# Patient Record
Sex: FEMALE | Race: WHITE | Employment: FULL TIME
[De-identification: names, ages, dates, MRNs, and addresses within clinical notes are randomized per-mention and may not be internally consistent; named-entity substitution may affect disease eponyms.]

---

## 2017-01-03 ENCOUNTER — TELEPHONE (OUTPATIENT)
Dept: FAMILY MEDICINE CLINIC | Facility: CLINIC | Age: 52
End: 2017-01-03

## 2017-01-03 DIAGNOSIS — Z00.00 ROUTINE ADULT HEALTH MAINTENANCE: Primary | ICD-10-CM

## 2017-01-03 DIAGNOSIS — Z78.9 PARTICIPANT IN HEALTH AND WELLNESS PLAN: ICD-10-CM

## 2017-01-08 DIAGNOSIS — E78.00 PURE HYPERCHOLESTEROLEMIA: Primary | ICD-10-CM

## 2017-01-08 RX ORDER — PRAVASTATIN SODIUM 20 MG
20 TABLET ORAL DAILY
Qty: 30 TABLET | Refills: 11 | Status: SHIPPED | OUTPATIENT
Start: 2017-01-08 | End: 2017-09-28 | Stop reason: SDUPTHER

## 2017-01-09 DIAGNOSIS — E78.00 PURE HYPERCHOLESTEROLEMIA: ICD-10-CM

## 2017-01-12 ENCOUNTER — OFFICE VISIT (OUTPATIENT)
Dept: FAMILY MEDICINE CLINIC | Facility: CLINIC | Age: 52
End: 2017-01-12

## 2017-01-12 VITALS
HEART RATE: 81 BPM | BODY MASS INDEX: 38.96 KG/M2 | WEIGHT: 227 LBS | SYSTOLIC BLOOD PRESSURE: 140 MMHG | OXYGEN SATURATION: 94 % | DIASTOLIC BLOOD PRESSURE: 94 MMHG

## 2017-01-12 DIAGNOSIS — Z00.00 WELL ADULT EXAM: Primary | ICD-10-CM

## 2017-01-12 DIAGNOSIS — Z12.11 COLON CANCER SCREENING: ICD-10-CM

## 2017-01-12 DIAGNOSIS — M54.50 CHRONIC BILATERAL LOW BACK PAIN WITHOUT SCIATICA: ICD-10-CM

## 2017-01-12 DIAGNOSIS — G89.29 CHRONIC BILATERAL LOW BACK PAIN WITHOUT SCIATICA: ICD-10-CM

## 2017-01-12 PROCEDURE — 99396 PREV VISIT EST AGE 40-64: CPT | Performed by: FAMILY MEDICINE

## 2017-01-12 RX ORDER — TRAMADOL HYDROCHLORIDE 50 MG/1
TABLET ORAL
Qty: 30 TABLET | Refills: 0 | Status: SHIPPED | OUTPATIENT
Start: 2017-01-12 | End: 2017-09-28 | Stop reason: SDUPTHER

## 2017-01-12 RX ORDER — OMEPRAZOLE 40 MG/1
CAPSULE, DELAYED RELEASE ORAL DAILY
Refills: 1 | COMMUNITY
Start: 2017-01-06 | End: 2017-10-16 | Stop reason: SDUPTHER

## 2017-03-09 ENCOUNTER — OFFICE VISIT (OUTPATIENT)
Dept: FAMILY MEDICINE CLINIC | Facility: CLINIC | Age: 52
End: 2017-03-09

## 2017-03-09 VITALS
OXYGEN SATURATION: 96 % | HEART RATE: 81 BPM | WEIGHT: 225.38 LBS | DIASTOLIC BLOOD PRESSURE: 90 MMHG | SYSTOLIC BLOOD PRESSURE: 140 MMHG | BODY MASS INDEX: 38.69 KG/M2

## 2017-03-09 DIAGNOSIS — K21.9 GASTROESOPHAGEAL REFLUX DISEASE WITHOUT ESOPHAGITIS: ICD-10-CM

## 2017-03-09 DIAGNOSIS — R07.9 CHEST PAIN, UNSPECIFIED TYPE: ICD-10-CM

## 2017-03-09 DIAGNOSIS — I10 ESSENTIAL HYPERTENSION: Primary | ICD-10-CM

## 2017-03-09 DIAGNOSIS — E78.00 PURE HYPERCHOLESTEROLEMIA: ICD-10-CM

## 2017-03-09 DIAGNOSIS — R06.09 DOE (DYSPNEA ON EXERTION): ICD-10-CM

## 2017-03-09 PROCEDURE — 99214 OFFICE O/P EST MOD 30 MIN: CPT | Performed by: FAMILY MEDICINE

## 2017-03-09 RX ORDER — ENALAPRIL MALEATE AND HYDROCHLOROTHIAZIDE 10; 25 MG/1; MG/1
1 TABLET ORAL DAILY
Qty: 30 TABLET | Refills: 11 | Status: SHIPPED | OUTPATIENT
Start: 2017-03-09 | End: 2018-06-16 | Stop reason: SDUPTHER

## 2017-06-06 ENCOUNTER — TELEPHONE (OUTPATIENT)
Dept: FAMILY MEDICINE CLINIC | Age: 52
End: 2017-06-06

## 2017-06-13 ENCOUNTER — HOSPITAL ENCOUNTER (OUTPATIENT)
Dept: CARDIAC CATH/INVASIVE PROCEDURES | Age: 52
Discharge: HOME | End: 2017-06-13
Payer: COMMERCIAL

## 2017-06-13 VITALS
WEIGHT: 220 LBS | OXYGEN SATURATION: 94 % | HEIGHT: 64 IN | TEMPERATURE: 98.1 F | RESPIRATION RATE: 18 BRPM | BODY MASS INDEX: 37.56 KG/M2 | DIASTOLIC BLOOD PRESSURE: 70 MMHG | SYSTOLIC BLOOD PRESSURE: 137 MMHG | HEART RATE: 70 BPM

## 2017-06-13 LAB
GFR NON-AFRICAN AMERICAN: >60 ML/MIN
GFR SERPL CREATININE-BSD FRML MDRD: >60 ML/MIN
GFR SERPL CREATININE-BSD FRML MDRD: NORMAL ML/MIN/{1.73_M2}
GLUCOSE BLD-MCNC: 89 MG/DL (ref 74–100)
PLATELET # BLD: 221 K/UL (ref 140–450)
POC CHLORIDE: 109 MMOL/L (ref 98–107)
POC CREATININE: 0.63 MG/DL (ref 0.51–1.19)
POC HEMATOCRIT: 40 % (ref 36–46)
POC HEMOGLOBIN: 13.6 G/DL (ref 12–16)
POC POTASSIUM: 4.2 MMOL/L (ref 3.5–4.5)
POC SODIUM: 145 MMOL/L (ref 138–146)

## 2017-06-13 PROCEDURE — 82565 ASSAY OF CREATININE: CPT

## 2017-06-13 PROCEDURE — 82435 ASSAY OF BLOOD CHLORIDE: CPT

## 2017-06-13 PROCEDURE — 85014 HEMATOCRIT: CPT

## 2017-06-13 PROCEDURE — 82947 ASSAY GLUCOSE BLOOD QUANT: CPT

## 2017-06-13 PROCEDURE — C1769 GUIDE WIRE: HCPCS

## 2017-06-13 PROCEDURE — 7100000010 HC PHASE II RECOVERY - FIRST 15 MIN

## 2017-06-13 PROCEDURE — 93458 L HRT ARTERY/VENTRICLE ANGIO: CPT | Performed by: INTERNAL MEDICINE

## 2017-06-13 PROCEDURE — 2709999900 HC NON-CHARGEABLE SUPPLY

## 2017-06-13 PROCEDURE — 84132 ASSAY OF SERUM POTASSIUM: CPT

## 2017-06-13 PROCEDURE — 7100000011 HC PHASE II RECOVERY - ADDTL 15 MIN

## 2017-06-13 PROCEDURE — C1894 INTRO/SHEATH, NON-LASER: HCPCS

## 2017-06-13 PROCEDURE — C1725 CATH, TRANSLUMIN NON-LASER: HCPCS

## 2017-06-13 PROCEDURE — 85049 AUTOMATED PLATELET COUNT: CPT

## 2017-06-13 PROCEDURE — 2500000003 HC RX 250 WO HCPCS

## 2017-06-13 PROCEDURE — 84295 ASSAY OF SERUM SODIUM: CPT

## 2017-06-13 RX ORDER — ISOSORBIDE MONONITRATE 30 MG/1
15 TABLET, EXTENDED RELEASE ORAL DAILY
COMMUNITY
End: 2018-07-24 | Stop reason: ALTCHOICE

## 2017-06-13 RX ORDER — SODIUM CHLORIDE 9 MG/ML
INJECTION, SOLUTION INTRAVENOUS CONTINUOUS
Status: DISCONTINUED | OUTPATIENT
Start: 2017-06-13 | End: 2017-06-14 | Stop reason: HOSPADM

## 2017-06-13 RX ADMIN — SODIUM CHLORIDE: 9 INJECTION, SOLUTION INTRAVENOUS at 12:59

## 2017-09-28 ENCOUNTER — OFFICE VISIT (OUTPATIENT)
Dept: FAMILY MEDICINE CLINIC | Age: 52
End: 2017-09-28
Payer: COMMERCIAL

## 2017-09-28 VITALS
SYSTOLIC BLOOD PRESSURE: 138 MMHG | HEART RATE: 84 BPM | OXYGEN SATURATION: 98 % | WEIGHT: 215.4 LBS | DIASTOLIC BLOOD PRESSURE: 86 MMHG | HEIGHT: 65 IN | BODY MASS INDEX: 35.89 KG/M2

## 2017-09-28 DIAGNOSIS — G47.33 OSA ON CPAP: ICD-10-CM

## 2017-09-28 DIAGNOSIS — I10 ESSENTIAL HYPERTENSION: ICD-10-CM

## 2017-09-28 DIAGNOSIS — Z99.89 OSA ON CPAP: ICD-10-CM

## 2017-09-28 DIAGNOSIS — E78.00 PURE HYPERCHOLESTEROLEMIA: Primary | ICD-10-CM

## 2017-09-28 PROCEDURE — 99213 OFFICE O/P EST LOW 20 MIN: CPT | Performed by: FAMILY MEDICINE

## 2017-09-28 RX ORDER — OMEPRAZOLE 40 MG/1
CAPSULE, DELAYED RELEASE ORAL
Qty: 15 CAPSULE | Refills: 11 | Status: SHIPPED | OUTPATIENT
Start: 2017-09-28 | End: 2018-06-18 | Stop reason: SDUPTHER

## 2017-09-28 RX ORDER — TRAMADOL HYDROCHLORIDE 50 MG/1
TABLET ORAL
Qty: 30 TABLET | Refills: 0 | Status: SHIPPED | OUTPATIENT
Start: 2017-09-28 | End: 2020-06-10 | Stop reason: ALTCHOICE

## 2017-09-28 RX ORDER — PRAVASTATIN SODIUM 20 MG
20 TABLET ORAL DAILY
Qty: 30 TABLET | Refills: 11 | Status: SHIPPED | OUTPATIENT
Start: 2017-09-28 | End: 2018-06-22 | Stop reason: SDUPTHER

## 2017-09-28 ASSESSMENT — PATIENT HEALTH QUESTIONNAIRE - PHQ9
1. LITTLE INTEREST OR PLEASURE IN DOING THINGS: 0
SUM OF ALL RESPONSES TO PHQ QUESTIONS 1-9: 0
SUM OF ALL RESPONSES TO PHQ9 QUESTIONS 1 & 2: 0
2. FEELING DOWN, DEPRESSED OR HOPELESS: 0

## 2017-10-16 ENCOUNTER — HOSPITAL ENCOUNTER (EMERGENCY)
Age: 52
Discharge: HOME | End: 2017-10-16
Attending: EMERGENCY MEDICINE
Payer: COMMERCIAL

## 2017-10-16 ENCOUNTER — APPOINTMENT (OUTPATIENT)
Dept: GENERAL RADIOLOGY | Age: 52
End: 2017-10-16
Payer: COMMERCIAL

## 2017-10-16 ENCOUNTER — TELEPHONE (OUTPATIENT)
Dept: FAMILY MEDICINE CLINIC | Age: 52
End: 2017-10-16

## 2017-10-16 VITALS
TEMPERATURE: 97.5 F | BODY MASS INDEX: 36.62 KG/M2 | OXYGEN SATURATION: 94 % | SYSTOLIC BLOOD PRESSURE: 127 MMHG | DIASTOLIC BLOOD PRESSURE: 66 MMHG | HEART RATE: 80 BPM | RESPIRATION RATE: 16 BRPM | HEIGHT: 64 IN | WEIGHT: 214.51 LBS

## 2017-10-16 DIAGNOSIS — R07.9 CHEST PAIN, UNSPECIFIED TYPE: Primary | ICD-10-CM

## 2017-10-16 DIAGNOSIS — R51.9 NONINTRACTABLE HEADACHE, UNSPECIFIED CHRONICITY PATTERN, UNSPECIFIED HEADACHE TYPE: ICD-10-CM

## 2017-10-16 LAB
% CKMB: 2.2 % (ref 0–3)
ABSOLUTE EOS #: 0.1 K/UL (ref 0–0.4)
ABSOLUTE LYMPH #: 2.2 K/UL (ref 1–4.8)
ABSOLUTE MONO #: 0.6 K/UL (ref 0.2–0.8)
ANION GAP SERPL CALCULATED.3IONS-SCNC: 14 MMOL/L (ref 9–17)
BASOPHILS # BLD: 1 %
BASOPHILS ABSOLUTE: 0 K/UL (ref 0–0.2)
BUN BLDV-MCNC: 13 MG/DL (ref 6–20)
BUN/CREAT BLD: 18 (ref 9–20)
CALCIUM SERPL-MCNC: 10 MG/DL (ref 8.6–10.4)
CHLORIDE BLD-SCNC: 100 MMOL/L (ref 98–107)
CK MB: 2.1 NG/ML
CKMB INTERPRETATION: NORMAL
CO2: 26 MMOL/L (ref 20–31)
CREAT SERPL-MCNC: 0.72 MG/DL (ref 0.5–0.9)
DIFFERENTIAL TYPE: NORMAL
EKG ATRIAL RATE: 82 BPM
EKG P AXIS: 59 DEGREES
EKG P-R INTERVAL: 148 MS
EKG Q-T INTERVAL: 380 MS
EKG QRS DURATION: 90 MS
EKG QTC CALCULATION (BAZETT): 443 MS
EKG R AXIS: 25 DEGREES
EKG T AXIS: 96 DEGREES
EKG VENTRICULAR RATE: 82 BPM
EOSINOPHILS RELATIVE PERCENT: 2 %
GFR AFRICAN AMERICAN: >60 ML/MIN
GFR NON-AFRICAN AMERICAN: >60 ML/MIN
GFR SERPL CREATININE-BSD FRML MDRD: NORMAL ML/MIN/{1.73_M2}
GFR SERPL CREATININE-BSD FRML MDRD: NORMAL ML/MIN/{1.73_M2}
GLUCOSE BLD-MCNC: 86 MG/DL (ref 70–99)
HCT VFR BLD CALC: 43.5 % (ref 36–46)
HEMOGLOBIN: 14.6 G/DL (ref 12–16)
LYMPHOCYTES # BLD: 27 %
MCH RBC QN AUTO: 28.6 PG (ref 26–34)
MCHC RBC AUTO-ENTMCNC: 33.7 G/DL (ref 31–37)
MCV RBC AUTO: 84.8 FL (ref 80–100)
MONOCYTES # BLD: 8 %
MYOGLOBIN: 31 NG/ML (ref 25–58)
MYOGLOBIN: <21 NG/ML (ref 25–58)
PDW BLD-RTO: 13.5 % (ref 11.5–14.5)
PLATELET # BLD: 261 K/UL (ref 130–400)
PLATELET ESTIMATE: NORMAL
PMV BLD AUTO: 8.8 FL (ref 6–12)
POTASSIUM SERPL-SCNC: 3.7 MMOL/L (ref 3.7–5.3)
RBC # BLD: 5.12 M/UL (ref 4–5.2)
RBC # BLD: NORMAL 10*6/UL
SEG NEUTROPHILS: 62 %
SEGMENTED NEUTROPHILS ABSOLUTE COUNT: 5.1 K/UL (ref 1.8–7.7)
SODIUM BLD-SCNC: 140 MMOL/L (ref 135–144)
TOTAL CK: 94 U/L (ref 26–192)
TROPONIN INTERP: ABNORMAL
TROPONIN INTERP: NORMAL
TROPONIN T: <0.03 NG/ML
TROPONIN T: <0.03 NG/ML
WBC # BLD: 8.1 K/UL (ref 3.5–11)
WBC # BLD: NORMAL 10*3/UL

## 2017-10-16 PROCEDURE — 71010 XR CHEST PORTABLE: CPT

## 2017-10-16 PROCEDURE — 82550 ASSAY OF CK (CPK): CPT

## 2017-10-16 PROCEDURE — 83874 ASSAY OF MYOGLOBIN: CPT

## 2017-10-16 PROCEDURE — 93005 ELECTROCARDIOGRAM TRACING: CPT

## 2017-10-16 PROCEDURE — 85025 COMPLETE CBC W/AUTO DIFF WBC: CPT

## 2017-10-16 PROCEDURE — 80048 BASIC METABOLIC PNL TOTAL CA: CPT

## 2017-10-16 PROCEDURE — 6370000000 HC RX 637 (ALT 250 FOR IP): Performed by: NURSE PRACTITIONER

## 2017-10-16 PROCEDURE — 99285 EMERGENCY DEPT VISIT HI MDM: CPT

## 2017-10-16 PROCEDURE — 84484 ASSAY OF TROPONIN QUANT: CPT

## 2017-10-16 PROCEDURE — 82553 CREATINE MB FRACTION: CPT

## 2017-10-16 RX ORDER — BUTALBITAL, ACETAMINOPHEN AND CAFFEINE 50; 325; 40 MG/1; MG/1; MG/1
1 TABLET ORAL EVERY 4 HOURS PRN
Status: DISCONTINUED | OUTPATIENT
Start: 2017-10-16 | End: 2017-10-16

## 2017-10-16 RX ORDER — ASPIRIN 81 MG/1
324 TABLET, CHEWABLE ORAL ONCE
Status: COMPLETED | OUTPATIENT
Start: 2017-10-16 | End: 2017-10-16

## 2017-10-16 RX ORDER — MORPHINE SULFATE 2 MG/ML
2 INJECTION, SOLUTION INTRAMUSCULAR; INTRAVENOUS ONCE
Status: DISCONTINUED | OUTPATIENT
Start: 2017-10-16 | End: 2017-10-16 | Stop reason: HOSPADM

## 2017-10-16 RX ORDER — M-VIT,TX,IRON,MINS/CALC/FOLIC 27MG-0.4MG
1 TABLET ORAL DAILY
COMMUNITY
End: 2018-07-24

## 2017-10-16 RX ADMIN — ASPIRIN 81 MG 324 MG: 81 TABLET ORAL at 15:33

## 2017-10-16 RX ADMIN — BUTALBITAL, ACETAMINOPHEN, AND CAFFEINE 1 TABLET: 50; 325; 40 TABLET ORAL at 18:54

## 2017-10-16 ASSESSMENT — PAIN DESCRIPTION - ONSET: ONSET: SUDDEN

## 2017-10-16 ASSESSMENT — ENCOUNTER SYMPTOMS
PHOTOPHOBIA: 0
SINUS PRESSURE: 0
BACK PAIN: 0
NAUSEA: 1
CONSTIPATION: 0
VOMITING: 0
CHEST TIGHTNESS: 0
SHORTNESS OF BREATH: 1
ABDOMINAL PAIN: 0
DIARRHEA: 0

## 2017-10-16 ASSESSMENT — PAIN DESCRIPTION - DIRECTION: RADIATING_TOWARDS: BILATERAL RIB CAGE

## 2017-10-16 ASSESSMENT — PAIN DESCRIPTION - PAIN TYPE: TYPE: ACUTE PAIN

## 2017-10-16 ASSESSMENT — PAIN SCALES - GENERAL: PAINLEVEL_OUTOF10: 4

## 2017-10-16 ASSESSMENT — PAIN DESCRIPTION - DESCRIPTORS: DESCRIPTORS: HEAVINESS

## 2017-10-16 ASSESSMENT — PAIN DESCRIPTION - FREQUENCY: FREQUENCY: CONTINUOUS

## 2017-10-16 ASSESSMENT — PAIN DESCRIPTION - LOCATION: LOCATION: CHEST

## 2017-10-16 NOTE — TELEPHONE ENCOUNTER
The patient calling c/o headache for four days now, patient stated she is nausea, and the high beat is heavy. The patient check her blood pressure last night 143/87, and 152/91. The patient stated she has not been haeviness in her chest dull pain. The patient wanted to see if it ok for her to come in to be seen today. What do you recommend.

## 2017-10-16 NOTE — ED PROVIDER NOTES
CHANGED    Details   Multiple Vitamins-Minerals (THERAPEUTIC MULTIVITAMIN-MINERALS) tablet Take 1 tablet by mouth dailyHistorical Med      pravastatin (PRAVACHOL) 20 MG tablet Take 1 tablet by mouth daily, Disp-30 tablet, R-11Normal      traMADol (ULTRAM) 50 MG tablet take 1 tablet by mouth once daily if needed, Disp-30 tablet, R-0Normal      isosorbide mononitrate (IMDUR) 30 MG extended release tablet Take 15 mg by mouth daily Takes 1/2 tab daily. Historical Med      enalapril-hydrochlorothiazide (VASERETIC) 10-25 MG per tablet Take 1 tablet by mouth daily, Disp-30 tablet, R-11Normal      nitroGLYCERIN (NITROSTAT) 0.4 MG SL tablet Place 1 tablet under the tongue every 5 minutes as needed for Chest pain Use up to 3 times; if no relief, go to the ER, Disp-100 tablet, R-11      aspirin 81 MG tablet Take 1 tablet by mouth daily (with breakfast), Disp-30 tablet, R-11      omeprazole (PRILOSEC) 40 MG delayed release capsule take 1 capsule by mouth once daily, Disp-15 capsule, R-11Normal      EPINEPHrine (EPIPEN 2-MICHELLE) 0.3 MG/0.3ML SOAJ injection Use as directed for allergic reaction, Disp-2 each, R-1             PAST MEDICAL HISTORY         Diagnosis Date    Abnormal stress test     Abnormal stress test     Allergic rhinitis     Chronic back pain     Hyperlipidemia     Hypertension     SRI on CPAP        SURGICAL HISTORY           Procedure Laterality Date    CARDIAC CATHETERIZATION  2017    CARPAL TUNNEL RELEASE Right     ENDOMETRIAL ABLATION      FOOT SURGERY      HYSTERECTOMY      LUMBAR DISC SURGERY      TONSILLECTOMY      TUBAL LIGATION           FAMILY HISTORY           Problem Relation Age of Onset    Stroke Father      Family Status   Relation Status    Father  at age 62    CV        SOCIAL HISTORY      reports that she has been smoking Cigarettes. She has been smoking about 0.50 packs per day.  She has never used smokeless tobacco. She reports that she does not drink alcohol or use

## 2018-04-25 ENCOUNTER — OFFICE VISIT (OUTPATIENT)
Dept: FAMILY MEDICINE CLINIC | Age: 53
End: 2018-04-25
Payer: COMMERCIAL

## 2018-04-25 VITALS
HEART RATE: 84 BPM | BODY MASS INDEX: 37.9 KG/M2 | WEIGHT: 222 LBS | SYSTOLIC BLOOD PRESSURE: 140 MMHG | DIASTOLIC BLOOD PRESSURE: 90 MMHG | HEIGHT: 64 IN

## 2018-04-25 DIAGNOSIS — I10 ESSENTIAL HYPERTENSION: Primary | ICD-10-CM

## 2018-04-25 DIAGNOSIS — Z12.39 BREAST CANCER SCREENING: ICD-10-CM

## 2018-04-25 DIAGNOSIS — Z12.31 ENCOUNTER FOR SCREENING MAMMOGRAM FOR BREAST CANCER: ICD-10-CM

## 2018-04-25 DIAGNOSIS — Z12.11 COLON CANCER SCREENING: Primary | ICD-10-CM

## 2018-04-25 DIAGNOSIS — Z12.11 COLON CANCER SCREENING: ICD-10-CM

## 2018-04-25 DIAGNOSIS — M25.511 CHRONIC RIGHT SHOULDER PAIN: ICD-10-CM

## 2018-04-25 DIAGNOSIS — D17.1 LIPOMA OF TORSO: ICD-10-CM

## 2018-04-25 DIAGNOSIS — G89.29 CHRONIC RIGHT SHOULDER PAIN: ICD-10-CM

## 2018-04-25 PROCEDURE — 99214 OFFICE O/P EST MOD 30 MIN: CPT | Performed by: FAMILY MEDICINE

## 2018-05-12 ENCOUNTER — HOSPITAL ENCOUNTER (OUTPATIENT)
Dept: MAMMOGRAPHY | Age: 53
Discharge: HOME | End: 2018-05-14
Payer: COMMERCIAL

## 2018-05-12 ENCOUNTER — HOSPITAL ENCOUNTER (OUTPATIENT)
Age: 53
Discharge: HOME | End: 2018-05-12
Payer: COMMERCIAL

## 2018-05-12 DIAGNOSIS — Z12.31 ENCOUNTER FOR SCREENING MAMMOGRAM FOR BREAST CANCER: ICD-10-CM

## 2018-05-12 DIAGNOSIS — E78.00 PURE HYPERCHOLESTEROLEMIA: ICD-10-CM

## 2018-05-12 LAB
ABSOLUTE EOS #: 0.2 K/UL (ref 0–0.4)
ABSOLUTE IMMATURE GRANULOCYTE: ABNORMAL K/UL (ref 0–0.3)
ABSOLUTE LYMPH #: 2.4 K/UL (ref 1–4.8)
ABSOLUTE MONO #: 0.5 K/UL (ref 0.2–0.8)
ALBUMIN SERPL-MCNC: 4.4 G/DL (ref 3.5–5.2)
ALBUMIN/GLOBULIN RATIO: ABNORMAL (ref 1–2.5)
ALP BLD-CCNC: 114 U/L (ref 35–104)
ALT SERPL-CCNC: 21 U/L (ref 5–33)
ANION GAP SERPL CALCULATED.3IONS-SCNC: 14 MMOL/L (ref 9–17)
AST SERPL-CCNC: 17 U/L
BASOPHILS # BLD: 1 % (ref 0–2)
BASOPHILS ABSOLUTE: 0 K/UL (ref 0–0.2)
BILIRUB SERPL-MCNC: 0.28 MG/DL (ref 0.3–1.2)
BUN BLDV-MCNC: 12 MG/DL (ref 6–20)
BUN/CREAT BLD: 17 (ref 9–20)
CALCIUM SERPL-MCNC: 9.2 MG/DL (ref 8.6–10.4)
CHLORIDE BLD-SCNC: 102 MMOL/L (ref 98–107)
CHOLESTEROL/HDL RATIO: 4.6
CHOLESTEROL: 246 MG/DL
CO2: 25 MMOL/L (ref 20–31)
CREAT SERPL-MCNC: 0.72 MG/DL (ref 0.5–0.9)
DIFFERENTIAL TYPE: ABNORMAL
EOSINOPHILS RELATIVE PERCENT: 3 % (ref 1–4)
GFR AFRICAN AMERICAN: >60 ML/MIN
GFR NON-AFRICAN AMERICAN: >60 ML/MIN
GFR SERPL CREATININE-BSD FRML MDRD: ABNORMAL ML/MIN/{1.73_M2}
GFR SERPL CREATININE-BSD FRML MDRD: ABNORMAL ML/MIN/{1.73_M2}
GLUCOSE BLD-MCNC: 108 MG/DL (ref 70–99)
HCT VFR BLD CALC: 43.5 % (ref 36–46)
HDLC SERPL-MCNC: 54 MG/DL
HEMOGLOBIN: 14.5 G/DL (ref 12–16)
IMMATURE GRANULOCYTES: ABNORMAL %
LDL CHOLESTEROL: 157 MG/DL (ref 0–130)
LYMPHOCYTES # BLD: 41 % (ref 24–44)
MCH RBC QN AUTO: 28.7 PG (ref 26–34)
MCHC RBC AUTO-ENTMCNC: 33.5 G/DL (ref 31–37)
MCV RBC AUTO: 85.8 FL (ref 80–100)
MONOCYTES # BLD: 8 % (ref 1–7)
NRBC AUTOMATED: ABNORMAL PER 100 WBC
PDW BLD-RTO: 13.6 % (ref 11.5–14.5)
PLATELET # BLD: 260 K/UL (ref 130–400)
PLATELET ESTIMATE: ABNORMAL
PMV BLD AUTO: 8.6 FL (ref 6–12)
POTASSIUM SERPL-SCNC: 4.4 MMOL/L (ref 3.7–5.3)
RBC # BLD: 5.06 M/UL (ref 4–5.2)
RBC # BLD: ABNORMAL 10*6/UL
SEG NEUTROPHILS: 47 % (ref 36–66)
SEGMENTED NEUTROPHILS ABSOLUTE COUNT: 2.8 K/UL (ref 1.8–7.7)
SODIUM BLD-SCNC: 141 MMOL/L (ref 135–144)
TOTAL PROTEIN: 7.1 G/DL (ref 6.4–8.3)
TRIGL SERPL-MCNC: 174 MG/DL
TSH SERPL DL<=0.05 MIU/L-ACNC: 2.18 MIU/L (ref 0.3–5)
VLDLC SERPL CALC-MCNC: ABNORMAL MG/DL (ref 1–30)
WBC # BLD: 5.9 K/UL (ref 3.5–11)
WBC # BLD: ABNORMAL 10*3/UL

## 2018-05-12 PROCEDURE — 77063 BREAST TOMOSYNTHESIS BI: CPT

## 2018-05-12 PROCEDURE — 85025 COMPLETE CBC W/AUTO DIFF WBC: CPT

## 2018-05-12 PROCEDURE — 80053 COMPREHEN METABOLIC PANEL: CPT

## 2018-05-12 PROCEDURE — 36415 COLL VENOUS BLD VENIPUNCTURE: CPT

## 2018-05-12 PROCEDURE — 80061 LIPID PANEL: CPT

## 2018-05-12 PROCEDURE — 84443 ASSAY THYROID STIM HORMONE: CPT

## 2018-05-15 ENCOUNTER — OFFICE VISIT (OUTPATIENT)
Dept: ORTHOPEDIC SURGERY | Age: 53
End: 2018-05-15
Payer: COMMERCIAL

## 2018-05-15 VITALS — HEIGHT: 64 IN | WEIGHT: 221 LBS | BODY MASS INDEX: 37.73 KG/M2

## 2018-05-15 DIAGNOSIS — G56.01 CARPAL TUNNEL SYNDROME ON RIGHT: ICD-10-CM

## 2018-05-15 DIAGNOSIS — M25.511 CHRONIC RIGHT SHOULDER PAIN: Primary | ICD-10-CM

## 2018-05-15 DIAGNOSIS — M75.41 IMPINGEMENT SYNDROME OF RIGHT SHOULDER: ICD-10-CM

## 2018-05-15 DIAGNOSIS — M19.011 ARTHRITIS OF RIGHT ACROMIOCLAVICULAR JOINT: ICD-10-CM

## 2018-05-15 DIAGNOSIS — G89.29 CHRONIC RIGHT SHOULDER PAIN: Primary | ICD-10-CM

## 2018-05-15 PROCEDURE — 99204 OFFICE O/P NEW MOD 45 MIN: CPT | Performed by: ORTHOPAEDIC SURGERY

## 2018-05-15 PROCEDURE — 20610 DRAIN/INJ JOINT/BURSA W/O US: CPT | Performed by: ORTHOPAEDIC SURGERY

## 2018-05-15 RX ORDER — METHYLPREDNISOLONE ACETATE 40 MG/ML
40 INJECTION, SUSPENSION INTRA-ARTICULAR; INTRALESIONAL; INTRAMUSCULAR; SOFT TISSUE ONCE
Status: COMPLETED | OUTPATIENT
Start: 2018-05-15 | End: 2018-05-15

## 2018-05-15 RX ADMIN — METHYLPREDNISOLONE ACETATE 40 MG: 40 INJECTION, SUSPENSION INTRA-ARTICULAR; INTRALESIONAL; INTRAMUSCULAR; SOFT TISSUE at 07:23

## 2018-06-05 ENCOUNTER — OFFICE VISIT (OUTPATIENT)
Dept: ORTHOPEDIC SURGERY | Age: 53
End: 2018-06-05
Payer: COMMERCIAL

## 2018-06-05 VITALS — HEIGHT: 63 IN | WEIGHT: 220 LBS | BODY MASS INDEX: 38.98 KG/M2

## 2018-06-05 DIAGNOSIS — G89.29 CHRONIC RIGHT SHOULDER PAIN: Primary | ICD-10-CM

## 2018-06-05 DIAGNOSIS — M25.511 CHRONIC RIGHT SHOULDER PAIN: Primary | ICD-10-CM

## 2018-06-05 PROCEDURE — 99213 OFFICE O/P EST LOW 20 MIN: CPT | Performed by: ORTHOPAEDIC SURGERY

## 2018-06-15 ENCOUNTER — TELEPHONE (OUTPATIENT)
Dept: FAMILY MEDICINE CLINIC | Age: 53
End: 2018-06-15

## 2018-06-17 RX ORDER — ENALAPRIL MALEATE AND HYDROCHLOROTHIAZIDE 10; 25 MG/1; MG/1
TABLET ORAL
Qty: 30 TABLET | Refills: 9 | Status: SHIPPED | OUTPATIENT
Start: 2018-06-17 | End: 2018-06-22 | Stop reason: SDUPTHER

## 2018-06-18 DIAGNOSIS — Z12.11 COLON CANCER SCREENING: Primary | ICD-10-CM

## 2018-06-18 RX ORDER — OMEPRAZOLE 40 MG/1
CAPSULE, DELAYED RELEASE ORAL
Qty: 30 CAPSULE | Refills: 11 | Status: SHIPPED | OUTPATIENT
Start: 2018-06-18 | End: 2018-06-22 | Stop reason: SDUPTHER

## 2018-06-19 RX ORDER — POLYETHYLENE GLYCOL 3350 17 G/17G
POWDER, FOR SOLUTION ORAL
Qty: 255 G | Refills: 0 | Status: SHIPPED | OUTPATIENT
Start: 2018-06-19 | End: 2018-07-18

## 2018-06-22 RX ORDER — ENALAPRIL MALEATE AND HYDROCHLOROTHIAZIDE 10; 25 MG/1; MG/1
TABLET ORAL
Qty: 90 TABLET | Refills: 3 | Status: ON HOLD | OUTPATIENT
Start: 2018-06-22 | End: 2018-07-26 | Stop reason: HOSPADM

## 2018-06-22 RX ORDER — PRAVASTATIN SODIUM 20 MG
20 TABLET ORAL DAILY
Qty: 90 TABLET | Refills: 3 | Status: SHIPPED | OUTPATIENT
Start: 2018-06-22 | End: 2018-08-02 | Stop reason: SDUPTHER

## 2018-06-22 RX ORDER — OMEPRAZOLE 40 MG/1
CAPSULE, DELAYED RELEASE ORAL
Qty: 90 CAPSULE | Refills: 3 | Status: SHIPPED | OUTPATIENT
Start: 2018-06-22 | End: 2019-04-26 | Stop reason: SDUPTHER

## 2018-07-24 ENCOUNTER — APPOINTMENT (OUTPATIENT)
Dept: CT IMAGING | Age: 53
DRG: 247 | End: 2018-07-24
Payer: COMMERCIAL

## 2018-07-24 ENCOUNTER — HOSPITAL ENCOUNTER (INPATIENT)
Age: 53
LOS: 2 days | Discharge: HOME | DRG: 247 | End: 2018-07-26
Attending: EMERGENCY MEDICINE | Admitting: INTERNAL MEDICINE
Payer: COMMERCIAL

## 2018-07-24 DIAGNOSIS — R07.9 CHEST PAIN, UNSPECIFIED TYPE: Primary | ICD-10-CM

## 2018-07-24 LAB
ABSOLUTE EOS #: 0.2 K/UL (ref 0–0.4)
ABSOLUTE IMMATURE GRANULOCYTE: ABNORMAL K/UL (ref 0–0.3)
ABSOLUTE LYMPH #: 2.2 K/UL (ref 1–4.8)
ABSOLUTE MONO #: 0.5 K/UL (ref 0.2–0.8)
ALBUMIN SERPL-MCNC: 4.3 G/DL (ref 3.5–5.2)
ALBUMIN/GLOBULIN RATIO: NORMAL (ref 1–2.5)
ALP BLD-CCNC: 102 U/L (ref 35–104)
ALT SERPL-CCNC: 33 U/L (ref 5–33)
AMYLASE: 39 U/L (ref 28–100)
ANION GAP SERPL CALCULATED.3IONS-SCNC: 14 MMOL/L (ref 9–17)
AST SERPL-CCNC: 21 U/L
BASOPHILS # BLD: 1 % (ref 0–2)
BASOPHILS ABSOLUTE: 0 K/UL (ref 0–0.2)
BILIRUB SERPL-MCNC: 0.31 MG/DL (ref 0.3–1.2)
BILIRUBIN DIRECT: <0.08 MG/DL
BILIRUBIN, INDIRECT: NORMAL MG/DL (ref 0–1)
BUN BLDV-MCNC: 14 MG/DL (ref 6–20)
BUN/CREAT BLD: 18 (ref 9–20)
CALCIUM SERPL-MCNC: 9.4 MG/DL (ref 8.6–10.4)
CHLORIDE BLD-SCNC: 103 MMOL/L (ref 98–107)
CO2: 24 MMOL/L (ref 20–31)
CREAT SERPL-MCNC: 0.77 MG/DL (ref 0.5–0.9)
DIFFERENTIAL TYPE: ABNORMAL
EOSINOPHILS RELATIVE PERCENT: 3 % (ref 1–4)
GFR AFRICAN AMERICAN: >60 ML/MIN
GFR NON-AFRICAN AMERICAN: >60 ML/MIN
GFR SERPL CREATININE-BSD FRML MDRD: ABNORMAL ML/MIN/{1.73_M2}
GFR SERPL CREATININE-BSD FRML MDRD: ABNORMAL ML/MIN/{1.73_M2}
GLOBULIN: NORMAL G/DL (ref 1.5–3.8)
GLUCOSE BLD-MCNC: 155 MG/DL (ref 70–99)
HCT VFR BLD CALC: 41.8 % (ref 36–46)
HEMOGLOBIN: 13.9 G/DL (ref 12–16)
IMMATURE GRANULOCYTES: ABNORMAL %
LIPASE: 20 U/L (ref 13–60)
LYMPHOCYTES # BLD: 42 % (ref 24–44)
MCH RBC QN AUTO: 28.4 PG (ref 26–34)
MCHC RBC AUTO-ENTMCNC: 33.2 G/DL (ref 31–37)
MCV RBC AUTO: 85.7 FL (ref 80–100)
MONOCYTES # BLD: 9 % (ref 1–7)
MYOGLOBIN: <21 NG/ML (ref 25–58)
NRBC AUTOMATED: ABNORMAL PER 100 WBC
PDW BLD-RTO: 13.5 % (ref 11.5–14.5)
PLATELET # BLD: 227 K/UL (ref 130–400)
PLATELET ESTIMATE: ABNORMAL
PMV BLD AUTO: 8.4 FL (ref 6–12)
POTASSIUM SERPL-SCNC: 4 MMOL/L (ref 3.7–5.3)
RBC # BLD: 4.88 M/UL (ref 4–5.2)
RBC # BLD: ABNORMAL 10*6/UL
SEG NEUTROPHILS: 45 % (ref 36–66)
SEGMENTED NEUTROPHILS ABSOLUTE COUNT: 2.4 K/UL (ref 1.8–7.7)
SODIUM BLD-SCNC: 141 MMOL/L (ref 135–144)
TOTAL PROTEIN: 6.8 G/DL (ref 6.4–8.3)
TROPONIN INTERP: ABNORMAL
TROPONIN INTERP: NORMAL
TROPONIN T: <0.03 NG/ML
TROPONIN T: <0.03 NG/ML
WBC # BLD: 5.2 K/UL (ref 3.5–11)
WBC # BLD: ABNORMAL 10*3/UL

## 2018-07-24 PROCEDURE — 83690 ASSAY OF LIPASE: CPT

## 2018-07-24 PROCEDURE — 6360000002 HC RX W HCPCS: Performed by: NURSE PRACTITIONER

## 2018-07-24 PROCEDURE — 6370000000 HC RX 637 (ALT 250 FOR IP): Performed by: INTERNAL MEDICINE

## 2018-07-24 PROCEDURE — 36415 COLL VENOUS BLD VENIPUNCTURE: CPT

## 2018-07-24 PROCEDURE — 6370000000 HC RX 637 (ALT 250 FOR IP): Performed by: EMERGENCY MEDICINE

## 2018-07-24 PROCEDURE — 80048 BASIC METABOLIC PNL TOTAL CA: CPT

## 2018-07-24 PROCEDURE — 96375 TX/PRO/DX INJ NEW DRUG ADDON: CPT

## 2018-07-24 PROCEDURE — 82150 ASSAY OF AMYLASE: CPT

## 2018-07-24 PROCEDURE — 84484 ASSAY OF TROPONIN QUANT: CPT

## 2018-07-24 PROCEDURE — 74177 CT ABD & PELVIS W/CONTRAST: CPT

## 2018-07-24 PROCEDURE — 99285 EMERGENCY DEPT VISIT HI MDM: CPT

## 2018-07-24 PROCEDURE — 93005 ELECTROCARDIOGRAM TRACING: CPT

## 2018-07-24 PROCEDURE — 6360000004 HC RX CONTRAST MEDICATION: Performed by: NURSE PRACTITIONER

## 2018-07-24 PROCEDURE — 96361 HYDRATE IV INFUSION ADD-ON: CPT

## 2018-07-24 PROCEDURE — 1200000000 HC SEMI PRIVATE

## 2018-07-24 PROCEDURE — 83874 ASSAY OF MYOGLOBIN: CPT

## 2018-07-24 PROCEDURE — 2500000003 HC RX 250 WO HCPCS: Performed by: INTERNAL MEDICINE

## 2018-07-24 PROCEDURE — 96374 THER/PROPH/DIAG INJ IV PUSH: CPT

## 2018-07-24 PROCEDURE — 85025 COMPLETE CBC W/AUTO DIFF WBC: CPT

## 2018-07-24 PROCEDURE — 6360000002 HC RX W HCPCS: Performed by: INTERNAL MEDICINE

## 2018-07-24 PROCEDURE — 80076 HEPATIC FUNCTION PANEL: CPT

## 2018-07-24 PROCEDURE — 2580000003 HC RX 258: Performed by: NURSE PRACTITIONER

## 2018-07-24 RX ORDER — AMINOPHYLLINE DIHYDRATE 25 MG/ML
100 INJECTION, SOLUTION INTRAVENOUS
Status: DISCONTINUED | OUTPATIENT
Start: 2018-07-24 | End: 2018-07-24

## 2018-07-24 RX ORDER — POTASSIUM CHLORIDE 20MEQ/15ML
40 LIQUID (ML) ORAL PRN
Status: DISCONTINUED | OUTPATIENT
Start: 2018-07-24 | End: 2018-07-26 | Stop reason: HOSPADM

## 2018-07-24 RX ORDER — SODIUM CHLORIDE 0.9 % (FLUSH) 0.9 %
10 SYRINGE (ML) INJECTION PRN
Status: DISCONTINUED | OUTPATIENT
Start: 2018-07-24 | End: 2018-07-26 | Stop reason: HOSPADM

## 2018-07-24 RX ORDER — ASPIRIN 81 MG/1
81 TABLET ORAL
Status: DISCONTINUED | OUTPATIENT
Start: 2018-07-25 | End: 2018-07-26 | Stop reason: HOSPADM

## 2018-07-24 RX ORDER — ATORVASTATIN CALCIUM 20 MG/1
20 TABLET, FILM COATED ORAL NIGHTLY
Status: DISCONTINUED | OUTPATIENT
Start: 2018-07-24 | End: 2018-07-24

## 2018-07-24 RX ORDER — ONDANSETRON 2 MG/ML
4 INJECTION INTRAMUSCULAR; INTRAVENOUS ONCE
Status: COMPLETED | OUTPATIENT
Start: 2018-07-24 | End: 2018-07-24

## 2018-07-24 RX ORDER — POTASSIUM CHLORIDE 7.45 MG/ML
10 INJECTION INTRAVENOUS PRN
Status: DISCONTINUED | OUTPATIENT
Start: 2018-07-24 | End: 2018-07-26 | Stop reason: HOSPADM

## 2018-07-24 RX ORDER — LISINOPRIL 10 MG/1
10 TABLET ORAL DAILY
Status: DISCONTINUED | OUTPATIENT
Start: 2018-07-24 | End: 2018-07-24

## 2018-07-24 RX ORDER — POTASSIUM CHLORIDE 20 MEQ/1
40 TABLET, EXTENDED RELEASE ORAL PRN
Status: DISCONTINUED | OUTPATIENT
Start: 2018-07-24 | End: 2018-07-26 | Stop reason: HOSPADM

## 2018-07-24 RX ORDER — ACETAMINOPHEN 325 MG/1
650 TABLET ORAL EVERY 4 HOURS PRN
Status: DISCONTINUED | OUTPATIENT
Start: 2018-07-24 | End: 2018-07-26 | Stop reason: HOSPADM

## 2018-07-24 RX ORDER — PANTOPRAZOLE SODIUM 40 MG/1
40 TABLET, DELAYED RELEASE ORAL
Status: DISCONTINUED | OUTPATIENT
Start: 2018-07-25 | End: 2018-07-26 | Stop reason: HOSPADM

## 2018-07-24 RX ORDER — DOCUSATE SODIUM 100 MG/1
100 CAPSULE, LIQUID FILLED ORAL 2 TIMES DAILY
Status: DISCONTINUED | OUTPATIENT
Start: 2018-07-24 | End: 2018-07-26 | Stop reason: HOSPADM

## 2018-07-24 RX ORDER — ONDANSETRON 2 MG/ML
4 INJECTION INTRAMUSCULAR; INTRAVENOUS EVERY 6 HOURS PRN
Status: DISCONTINUED | OUTPATIENT
Start: 2018-07-24 | End: 2018-07-26 | Stop reason: HOSPADM

## 2018-07-24 RX ORDER — LISINOPRIL 10 MG/1
10 TABLET ORAL DAILY
Status: DISCONTINUED | OUTPATIENT
Start: 2018-07-24 | End: 2018-07-26 | Stop reason: HOSPADM

## 2018-07-24 RX ORDER — PRAVASTATIN SODIUM 20 MG
20 TABLET ORAL NIGHTLY
Status: DISCONTINUED | OUTPATIENT
Start: 2018-07-24 | End: 2018-07-26 | Stop reason: HOSPADM

## 2018-07-24 RX ORDER — BISACODYL 10 MG
10 SUPPOSITORY, RECTAL RECTAL DAILY PRN
Status: DISCONTINUED | OUTPATIENT
Start: 2018-07-24 | End: 2018-07-26 | Stop reason: HOSPADM

## 2018-07-24 RX ORDER — MORPHINE SULFATE 4 MG/ML
4 INJECTION, SOLUTION INTRAMUSCULAR; INTRAVENOUS ONCE
Status: COMPLETED | OUTPATIENT
Start: 2018-07-24 | End: 2018-07-24

## 2018-07-24 RX ORDER — ONDANSETRON 4 MG/1
4 TABLET, ORALLY DISINTEGRATING ORAL EVERY 6 HOURS PRN
Status: DISCONTINUED | OUTPATIENT
Start: 2018-07-24 | End: 2018-07-26 | Stop reason: HOSPADM

## 2018-07-24 RX ORDER — SODIUM CHLORIDE 0.9 % (FLUSH) 0.9 %
10 SYRINGE (ML) INJECTION EVERY 12 HOURS SCHEDULED
Status: DISCONTINUED | OUTPATIENT
Start: 2018-07-24 | End: 2018-07-26 | Stop reason: HOSPADM

## 2018-07-24 RX ORDER — NITROGLYCERIN 20 MG/100ML
5 INJECTION INTRAVENOUS CONTINUOUS
Status: DISCONTINUED | OUTPATIENT
Start: 2018-07-24 | End: 2018-07-26 | Stop reason: HOSPADM

## 2018-07-24 RX ORDER — 0.9 % SODIUM CHLORIDE 0.9 %
50 INTRAVENOUS SOLUTION INTRAVENOUS ONCE
Status: COMPLETED | OUTPATIENT
Start: 2018-07-24 | End: 2018-07-24

## 2018-07-24 RX ORDER — HYDROCHLOROTHIAZIDE 25 MG/1
25 TABLET ORAL DAILY
Status: DISCONTINUED | OUTPATIENT
Start: 2018-07-24 | End: 2018-07-26

## 2018-07-24 RX ORDER — LISINOPRIL 10 MG/1
10 TABLET ORAL DAILY
COMMUNITY
End: 2018-07-24

## 2018-07-24 RX ORDER — ISOSORBIDE MONONITRATE 30 MG/1
15 TABLET, EXTENDED RELEASE ORAL DAILY
Status: DISCONTINUED | OUTPATIENT
Start: 2018-07-24 | End: 2018-07-24

## 2018-07-24 RX ORDER — SODIUM CHLORIDE 0.9 % (FLUSH) 0.9 %
10 SYRINGE (ML) INJECTION
Status: COMPLETED | OUTPATIENT
Start: 2018-07-24 | End: 2018-07-24

## 2018-07-24 RX ORDER — HYDROCODONE BITARTRATE AND ACETAMINOPHEN 5; 325 MG/1; MG/1
1 TABLET ORAL EVERY 4 HOURS PRN
Status: DISCONTINUED | OUTPATIENT
Start: 2018-07-24 | End: 2018-07-26 | Stop reason: HOSPADM

## 2018-07-24 RX ORDER — METOPROLOL TARTRATE 5 MG/5ML
2.5 INJECTION INTRAVENOUS PRN
Status: DISCONTINUED | OUTPATIENT
Start: 2018-07-24 | End: 2018-07-24

## 2018-07-24 RX ORDER — 0.9 % SODIUM CHLORIDE 0.9 %
250 INTRAVENOUS SOLUTION INTRAVENOUS ONCE
Status: DISCONTINUED | OUTPATIENT
Start: 2018-07-24 | End: 2018-07-24

## 2018-07-24 RX ORDER — ASPIRIN 81 MG/1
324 TABLET, CHEWABLE ORAL ONCE
Status: COMPLETED | OUTPATIENT
Start: 2018-07-24 | End: 2018-07-24

## 2018-07-24 RX ORDER — MAGNESIUM SULFATE 1 G/100ML
1 INJECTION INTRAVENOUS PRN
Status: DISCONTINUED | OUTPATIENT
Start: 2018-07-24 | End: 2018-07-26 | Stop reason: HOSPADM

## 2018-07-24 RX ORDER — ONDANSETRON 2 MG/ML
4 INJECTION INTRAMUSCULAR; INTRAVENOUS EVERY 6 HOURS PRN
Status: DISCONTINUED | OUTPATIENT
Start: 2018-07-24 | End: 2018-07-24

## 2018-07-24 RX ORDER — M-VIT,TX,IRON,MINS/CALC/FOLIC 27MG-0.4MG
1 TABLET ORAL DAILY
Status: DISCONTINUED | OUTPATIENT
Start: 2018-07-24 | End: 2018-07-26 | Stop reason: HOSPADM

## 2018-07-24 RX ORDER — SODIUM CHLORIDE 0.9 % (FLUSH) 0.9 %
10 SYRINGE (ML) INJECTION PRN
Status: DISCONTINUED | OUTPATIENT
Start: 2018-07-24 | End: 2018-07-24

## 2018-07-24 RX ORDER — NITROGLYCERIN 0.4 MG/1
0.4 TABLET SUBLINGUAL EVERY 5 MIN PRN
Status: DISCONTINUED | OUTPATIENT
Start: 2018-07-24 | End: 2018-07-24

## 2018-07-24 RX ORDER — NITROGLYCERIN 0.4 MG/1
0.4 TABLET SUBLINGUAL EVERY 5 MIN PRN
Status: DISCONTINUED | OUTPATIENT
Start: 2018-07-24 | End: 2018-07-25 | Stop reason: SDUPTHER

## 2018-07-24 RX ORDER — SODIUM CHLORIDE 9 MG/ML
INJECTION, SOLUTION INTRAVENOUS CONTINUOUS
Status: DISCONTINUED | OUTPATIENT
Start: 2018-07-24 | End: 2018-07-26

## 2018-07-24 RX ORDER — TRAMADOL HYDROCHLORIDE 50 MG/1
50 TABLET ORAL EVERY 6 HOURS PRN
Status: DISCONTINUED | OUTPATIENT
Start: 2018-07-24 | End: 2018-07-26 | Stop reason: HOSPADM

## 2018-07-24 RX ORDER — MORPHINE SULFATE 4 MG/ML
4 INJECTION, SOLUTION INTRAMUSCULAR; INTRAVENOUS
Status: DISCONTINUED | OUTPATIENT
Start: 2018-07-24 | End: 2018-07-26 | Stop reason: HOSPADM

## 2018-07-24 RX ORDER — MORPHINE SULFATE 2 MG/ML
2 INJECTION, SOLUTION INTRAMUSCULAR; INTRAVENOUS
Status: DISCONTINUED | OUTPATIENT
Start: 2018-07-24 | End: 2018-07-26 | Stop reason: HOSPADM

## 2018-07-24 RX ADMIN — NITROGLYCERIN 5 MCG/MIN: 20 INJECTION INTRAVENOUS at 17:39

## 2018-07-24 RX ADMIN — ONDANSETRON HYDROCHLORIDE 4 MG: 2 INJECTION, SOLUTION INTRAMUSCULAR; INTRAVENOUS at 08:43

## 2018-07-24 RX ADMIN — IOPAMIDOL 75 ML: 755 INJECTION, SOLUTION INTRAVENOUS at 09:25

## 2018-07-24 RX ADMIN — Medication 10 ML: at 09:25

## 2018-07-24 RX ADMIN — ASPIRIN 81 MG 324 MG: 81 TABLET ORAL at 11:54

## 2018-07-24 RX ADMIN — PRAVASTATIN SODIUM 20 MG: 20 TABLET ORAL at 21:25

## 2018-07-24 RX ADMIN — ENOXAPARIN SODIUM 40 MG: 40 INJECTION SUBCUTANEOUS at 17:38

## 2018-07-24 RX ADMIN — ACETAMINOPHEN 650 MG: 325 TABLET ORAL at 23:58

## 2018-07-24 RX ADMIN — MORPHINE SULFATE 4 MG: 4 INJECTION INTRAVENOUS at 08:43

## 2018-07-24 RX ADMIN — LISINOPRIL 10 MG: 10 TABLET ORAL at 17:38

## 2018-07-24 RX ADMIN — HYDROCHLOROTHIAZIDE 25 MG: 25 TABLET ORAL at 17:38

## 2018-07-24 RX ADMIN — METOPROLOL TARTRATE 25 MG: 25 TABLET ORAL at 21:25

## 2018-07-24 RX ADMIN — SODIUM CHLORIDE: 9 INJECTION, SOLUTION INTRAVENOUS at 08:43

## 2018-07-24 RX ADMIN — SODIUM CHLORIDE 50 ML: 9 INJECTION, SOLUTION INTRAVENOUS at 09:25

## 2018-07-24 ASSESSMENT — ENCOUNTER SYMPTOMS
ABDOMINAL PAIN: 1
CONSTIPATION: 0
SORE THROAT: 0
NAUSEA: 0
RHINORRHEA: 0
SINUS PRESSURE: 0
COLOR CHANGE: 0
DIARRHEA: 0
COUGH: 0
VOMITING: 0
SHORTNESS OF BREATH: 0
WHEEZING: 0

## 2018-07-24 ASSESSMENT — PAIN SCALES - GENERAL
PAINLEVEL_OUTOF10: 8
PAINLEVEL_OUTOF10: 3
PAINLEVEL_OUTOF10: 9
PAINLEVEL_OUTOF10: 4

## 2018-07-24 ASSESSMENT — PAIN DESCRIPTION - FREQUENCY: FREQUENCY: INTERMITTENT

## 2018-07-24 ASSESSMENT — PAIN DESCRIPTION - DESCRIPTORS
DESCRIPTORS: SHARP
DESCRIPTORS: ACHING

## 2018-07-24 ASSESSMENT — PAIN DESCRIPTION - LOCATION
LOCATION: ABDOMEN
LOCATION: HEAD

## 2018-07-24 ASSESSMENT — PAIN DESCRIPTION - PAIN TYPE
TYPE: ACUTE PAIN
TYPE: ACUTE PAIN

## 2018-07-24 ASSESSMENT — PAIN DESCRIPTION - ONSET: ONSET: GRADUAL

## 2018-07-24 ASSESSMENT — PAIN DESCRIPTION - ORIENTATION: ORIENTATION: UPPER

## 2018-07-24 NOTE — ED PROVIDER NOTES
Addendum to note  The HPI should read she is here today by private automobile for evaluation of abdominal pain.      BRITTON [place] - [place]  07/24/18 [id number]

## 2018-07-24 NOTE — ED NOTES
Pt presents to the ED with upper stomach pain. She states it started this morning around 0700. She states it hurts right where her ribs end and behind her ribs. She rates the pain an 8/10 and did not take any pain medications. Pt states she is a little bit nauseous. Pt states she has never had this pain before.       Shyla Dietrich RN  07/24/18 7945

## 2018-07-24 NOTE — ED PROVIDER NOTES
[address] [place] [address] ED  eMERGENCY dEPARTMENT eNCOUnter      Pt Name: Hoang Ledbetter  MRN: [id number]  Fredgflois 1965  Date of evaluation: 7/24/2018  Provider: [address] [place] [address] NP, APRN - [id number] [place]       Chief Complaint   Patient presents with    Abdominal Pain     since AM         HISTORY OF PRESENT ILLNESS  (Location/Symptom, Timing/Onset, Context/Setting, Quality, Duration, Modifying Factors, Severity.)   Hoang Ledbetter is a 46 y.o. female who presents to the emergency department Today by private vehicle for evaluation of abdominal pain. Patient states that since this morning she has had a sharp stabbing pain all across her upper abdomen. She states that she feels like she has a tight band around her upper abdomen. She rates the pain an 8 on a 0-10 scale. She denies any associated nausea or vomiting. She states that she had a normal bowel movement this morning. She denies any fevers or chills. She states that she did not eat or drink anything for breakfast before the pain started. She states that last night for dinner she had a salad. Nursing Notes were reviewed. ALLERGIES     Bee venom and Tetracyclines & related    CURRENT MEDICATIONS       Previous Medications    ASPIRIN 81 MG TABLET    Take 1 tablet by mouth daily (with breakfast)    ENALAPRIL-HYDROCHLOROTHIAZIDE (VASERETIC) 10-25 MG PER TABLET    take 1 tablet by mouth once daily    ISOSORBIDE MONONITRATE (IMDUR) 30 MG EXTENDED RELEASE TABLET    Take 15 mg by mouth daily Takes 1/2 tab daily.      LISINOPRIL (PRINIVIL;ZESTRIL) 10 MG TABLET    Take 10 mg by mouth daily    MULTIPLE VITAMINS-MINERALS (THERAPEUTIC MULTIVITAMIN-MINERALS) TABLET    Take 1 tablet by mouth daily    OMEPRAZOLE (PRILOSEC) 40 MG DELAYED RELEASE CAPSULE    take 1 capsule by mouth once daily    PRAVASTATIN (PRAVACHOL) 20 MG TABLET    Take 1 tablet by mouth daily    TRAMADOL (ULTRAM) 50 MG TABLET    take 1 tablet by mouth once daily if needed       PAST MEDICAL

## 2018-07-25 ENCOUNTER — APPOINTMENT (OUTPATIENT)
Dept: NUCLEAR MEDICINE | Age: 53
DRG: 247 | End: 2018-07-25
Payer: COMMERCIAL

## 2018-07-25 ENCOUNTER — APPOINTMENT (OUTPATIENT)
Dept: CARDIAC CATH/INVASIVE PROCEDURES | Age: 53
DRG: 247 | End: 2018-07-25
Payer: COMMERCIAL

## 2018-07-25 PROBLEM — E78.2 MIXED HYPERLIPIDEMIA: Status: ACTIVE | Noted: 2017-01-08

## 2018-07-25 PROBLEM — E66.812 CLASS 2 OBESITY DUE TO EXCESS CALORIES WITH BODY MASS INDEX (BMI) OF 39.0 TO 39.9 IN ADULT: Status: ACTIVE | Noted: 2018-07-25

## 2018-07-25 PROBLEM — E66.09 CLASS 2 OBESITY DUE TO EXCESS CALORIES WITH BODY MASS INDEX (BMI) OF 39.0 TO 39.9 IN ADULT: Status: ACTIVE | Noted: 2018-07-25

## 2018-07-25 PROBLEM — I25.110 CORONARY ARTERY DISEASE INVOLVING NATIVE CORONARY ARTERY OF NATIVE HEART WITH UNSTABLE ANGINA PECTORIS (HCC): Status: ACTIVE | Noted: 2018-07-25

## 2018-07-25 PROBLEM — Z95.5 S/P DRUG ELUTING CORONARY STENT PLACEMENT: Status: ACTIVE | Noted: 2018-07-25

## 2018-07-25 LAB
ALBUMIN SERPL-MCNC: 4.3 G/DL (ref 3.5–5.2)
ALBUMIN/GLOBULIN RATIO: ABNORMAL (ref 1–2.5)
ALP BLD-CCNC: 97 U/L (ref 35–104)
ALT SERPL-CCNC: 30 U/L (ref 5–33)
ANION GAP SERPL CALCULATED.3IONS-SCNC: 14 MMOL/L (ref 9–17)
AST SERPL-CCNC: 19 U/L
BILIRUB SERPL-MCNC: 0.42 MG/DL (ref 0.3–1.2)
BUN BLDV-MCNC: 9 MG/DL (ref 6–20)
BUN/CREAT BLD: 14 (ref 9–20)
CALCIUM SERPL-MCNC: 9.3 MG/DL (ref 8.6–10.4)
CHLORIDE BLD-SCNC: 101 MMOL/L (ref 98–107)
CHOLESTEROL/HDL RATIO: 4.9
CHOLESTEROL: 232 MG/DL
CO2: 24 MMOL/L (ref 20–31)
CREAT SERPL-MCNC: 0.66 MG/DL (ref 0.5–0.9)
D-DIMER QUANTITATIVE: 0.59 MG/L FEU
GFR AFRICAN AMERICAN: >60 ML/MIN
GFR NON-AFRICAN AMERICAN: >60 ML/MIN
GFR SERPL CREATININE-BSD FRML MDRD: ABNORMAL ML/MIN/{1.73_M2}
GFR SERPL CREATININE-BSD FRML MDRD: ABNORMAL ML/MIN/{1.73_M2}
GLUCOSE BLD-MCNC: 112 MG/DL (ref 70–99)
HCT VFR BLD CALC: 42 % (ref 36–46)
HDLC SERPL-MCNC: 47 MG/DL
HEMOGLOBIN: 13.8 G/DL (ref 12–16)
LDL CHOLESTEROL: 150 MG/DL (ref 0–130)
LV EF: 60 %
LVEF MODALITY: NORMAL
MAGNESIUM: 2 MG/DL (ref 1.6–2.6)
MCH RBC QN AUTO: 28.4 PG (ref 26–34)
MCHC RBC AUTO-ENTMCNC: 33 G/DL (ref 31–37)
MCV RBC AUTO: 86.1 FL (ref 80–100)
MYOGLOBIN: 22 NG/ML (ref 25–58)
MYOGLOBIN: 23 NG/ML (ref 25–58)
NRBC AUTOMATED: NORMAL PER 100 WBC
PDW BLD-RTO: 13.6 % (ref 11.5–14.5)
PLATELET # BLD: 252 K/UL (ref 130–400)
PMV BLD AUTO: 9 FL (ref 6–12)
POTASSIUM SERPL-SCNC: 3.9 MMOL/L (ref 3.7–5.3)
RBC # BLD: 4.87 M/UL (ref 4–5.2)
SODIUM BLD-SCNC: 139 MMOL/L (ref 135–144)
TOTAL PROTEIN: 6.7 G/DL (ref 6.4–8.3)
TRIGL SERPL-MCNC: 173 MG/DL
TROPONIN INTERP: ABNORMAL
TROPONIN INTERP: ABNORMAL
TROPONIN T: <0.03 NG/ML
TROPONIN T: <0.03 NG/ML
TSH SERPL DL<=0.05 MIU/L-ACNC: 3.08 MIU/L (ref 0.3–5)
VLDLC SERPL CALC-MCNC: ABNORMAL MG/DL (ref 1–30)
WBC # BLD: 7.3 K/UL (ref 3.5–11)

## 2018-07-25 PROCEDURE — B2151ZZ FLUOROSCOPY OF LEFT HEART USING LOW OSMOLAR CONTRAST: ICD-10-PCS | Performed by: INTERNAL MEDICINE

## 2018-07-25 PROCEDURE — 6370000000 HC RX 637 (ALT 250 FOR IP): Performed by: INTERNAL MEDICINE

## 2018-07-25 PROCEDURE — 85027 COMPLETE CBC AUTOMATED: CPT

## 2018-07-25 PROCEDURE — 6360000004 HC RX CONTRAST MEDICATION

## 2018-07-25 PROCEDURE — 027034Z DILATION OF CORONARY ARTERY, ONE ARTERY WITH DRUG-ELUTING INTRALUMINAL DEVICE, PERCUTANEOUS APPROACH: ICD-10-PCS | Performed by: INTERNAL MEDICINE

## 2018-07-25 PROCEDURE — 80053 COMPREHEN METABOLIC PANEL: CPT

## 2018-07-25 PROCEDURE — 93306 TTE W/DOPPLER COMPLETE: CPT

## 2018-07-25 PROCEDURE — 83735 ASSAY OF MAGNESIUM: CPT

## 2018-07-25 PROCEDURE — 4A023N7 MEASUREMENT OF CARDIAC SAMPLING AND PRESSURE, LEFT HEART, PERCUTANEOUS APPROACH: ICD-10-PCS | Performed by: INTERNAL MEDICINE

## 2018-07-25 PROCEDURE — 6360000002 HC RX W HCPCS: Performed by: INTERNAL MEDICINE

## 2018-07-25 PROCEDURE — 92928 PRQ TCAT PLMT NTRAC ST 1 LES: CPT | Performed by: INTERNAL MEDICINE

## 2018-07-25 PROCEDURE — 6370000000 HC RX 637 (ALT 250 FOR IP)

## 2018-07-25 PROCEDURE — 2060000000 HC ICU INTERMEDIATE R&B

## 2018-07-25 PROCEDURE — 2500000003 HC RX 250 WO HCPCS

## 2018-07-25 PROCEDURE — 84443 ASSAY THYROID STIM HORMONE: CPT

## 2018-07-25 PROCEDURE — 85379 FIBRIN DEGRADATION QUANT: CPT

## 2018-07-25 PROCEDURE — 6360000002 HC RX W HCPCS

## 2018-07-25 PROCEDURE — 93458 L HRT ARTERY/VENTRICLE ANGIO: CPT | Performed by: INTERNAL MEDICINE

## 2018-07-25 PROCEDURE — 93005 ELECTROCARDIOGRAM TRACING: CPT

## 2018-07-25 PROCEDURE — B2111ZZ FLUOROSCOPY OF MULTIPLE CORONARY ARTERIES USING LOW OSMOLAR CONTRAST: ICD-10-PCS | Performed by: INTERNAL MEDICINE

## 2018-07-25 PROCEDURE — 80061 LIPID PANEL: CPT

## 2018-07-25 PROCEDURE — 36415 COLL VENOUS BLD VENIPUNCTURE: CPT

## 2018-07-25 PROCEDURE — 99223 1ST HOSP IP/OBS HIGH 75: CPT | Performed by: INTERNAL MEDICINE

## 2018-07-25 RX ORDER — SODIUM CHLORIDE 0.9 % (FLUSH) 0.9 %
10 SYRINGE (ML) INJECTION EVERY 12 HOURS SCHEDULED
Status: CANCELLED | OUTPATIENT
Start: 2018-07-25

## 2018-07-25 RX ORDER — ACETAMINOPHEN 325 MG/1
650 TABLET ORAL EVERY 4 HOURS PRN
Status: CANCELLED | OUTPATIENT
Start: 2018-07-25

## 2018-07-25 RX ORDER — SODIUM CHLORIDE 0.9 % (FLUSH) 0.9 %
10 SYRINGE (ML) INJECTION PRN
Status: CANCELLED | OUTPATIENT
Start: 2018-07-25

## 2018-07-25 RX ORDER — SODIUM CHLORIDE 9 MG/ML
INJECTION, SOLUTION INTRAVENOUS CONTINUOUS
Status: CANCELLED | OUTPATIENT
Start: 2018-07-25

## 2018-07-25 RX ORDER — NITROGLYCERIN 0.4 MG/1
0.4 TABLET SUBLINGUAL EVERY 5 MIN PRN
Status: DISCONTINUED | OUTPATIENT
Start: 2018-07-25 | End: 2018-07-26 | Stop reason: HOSPADM

## 2018-07-25 RX ADMIN — MULTIPLE VITAMINS W/ MINERALS TAB 1 TABLET: TAB at 12:40

## 2018-07-25 RX ADMIN — HYDROCHLOROTHIAZIDE 25 MG: 25 TABLET ORAL at 12:39

## 2018-07-25 RX ADMIN — LISINOPRIL 10 MG: 10 TABLET ORAL at 12:39

## 2018-07-25 RX ADMIN — MORPHINE SULFATE 2 MG: 2 INJECTION, SOLUTION INTRAMUSCULAR; INTRAVENOUS at 12:30

## 2018-07-25 RX ADMIN — MORPHINE SULFATE 2 MG: 2 INJECTION, SOLUTION INTRAMUSCULAR; INTRAVENOUS at 19:16

## 2018-07-25 RX ADMIN — ACETAMINOPHEN 650 MG: 325 TABLET ORAL at 06:12

## 2018-07-25 RX ADMIN — ASPIRIN 81 MG: 81 TABLET, COATED ORAL at 12:39

## 2018-07-25 RX ADMIN — ONDANSETRON 4 MG: 2 INJECTION INTRAMUSCULAR; INTRAVENOUS at 12:36

## 2018-07-25 RX ADMIN — METOPROLOL TARTRATE 25 MG: 25 TABLET ORAL at 12:40

## 2018-07-25 RX ADMIN — METOPROLOL TARTRATE 25 MG: 25 TABLET ORAL at 20:25

## 2018-07-25 RX ADMIN — PRAVASTATIN SODIUM 20 MG: 20 TABLET ORAL at 20:26

## 2018-07-25 RX ADMIN — PANTOPRAZOLE SODIUM 40 MG: 40 TABLET, DELAYED RELEASE ORAL at 06:12

## 2018-07-25 ASSESSMENT — PAIN DESCRIPTION - DIRECTION: RADIATING_TOWARDS: EPIGASTRIC AREA

## 2018-07-25 ASSESSMENT — PAIN DESCRIPTION - PROGRESSION
CLINICAL_PROGRESSION: NOT CHANGED
CLINICAL_PROGRESSION: RESOLVED
CLINICAL_PROGRESSION: NOT CHANGED
CLINICAL_PROGRESSION: GRADUALLY IMPROVING
CLINICAL_PROGRESSION: RESOLVED
CLINICAL_PROGRESSION: NOT CHANGED
CLINICAL_PROGRESSION: RESOLVED

## 2018-07-25 ASSESSMENT — PAIN SCALES - GENERAL
PAINLEVEL_OUTOF10: 3
PAINLEVEL_OUTOF10: 0
PAINLEVEL_OUTOF10: 0
PAINLEVEL_OUTOF10: 5
PAINLEVEL_OUTOF10: 6
PAINLEVEL_OUTOF10: 7
PAINLEVEL_OUTOF10: 6
PAINLEVEL_OUTOF10: 6
PAINLEVEL_OUTOF10: 5
PAINLEVEL_OUTOF10: 6

## 2018-07-25 ASSESSMENT — PAIN DESCRIPTION - FREQUENCY
FREQUENCY: INTERMITTENT
FREQUENCY: CONTINUOUS
FREQUENCY: INTERMITTENT
FREQUENCY: CONTINUOUS

## 2018-07-25 ASSESSMENT — PAIN DESCRIPTION - LOCATION
LOCATION: CHEST
LOCATION: CHEST
LOCATION: ABDOMEN
LOCATION: CHEST
LOCATION: CHEST
LOCATION: CHEST;ABDOMEN

## 2018-07-25 ASSESSMENT — PAIN DESCRIPTION - ONSET
ONSET: ON-GOING

## 2018-07-25 ASSESSMENT — PAIN DESCRIPTION - ORIENTATION
ORIENTATION: UPPER
ORIENTATION: MID
ORIENTATION: MID
ORIENTATION: MID;UPPER
ORIENTATION: LEFT

## 2018-07-25 ASSESSMENT — PAIN DESCRIPTION - PAIN TYPE
TYPE: ACUTE PAIN

## 2018-07-25 ASSESSMENT — PAIN DESCRIPTION - DESCRIPTORS
DESCRIPTORS: CONSTANT;SHARP
DESCRIPTORS: SHARP
DESCRIPTORS: ACHING
DESCRIPTORS: CONSTANT;PRESSURE

## 2018-07-25 NOTE — PROGRESS NOTES
Second attempt to remove air. 2cc of air removed and patient began bleeding again. 2 cc replaced at this time. TR band recovery restarted again at this time. Patient aware we will wait another hour and retry. Patient aware it is likely due to the loading dose of Brilinta. RN will continue to monitor.

## 2018-07-25 NOTE — PROGRESS NOTES
3rd attempt to remove air for cath recovery protocol. 2 cc removed and no bleeding or oozing noted at this time. Patient denies any pain at this time. NO hematoma, NO bleeding, NO oozing, NO pain. Will continue to monitor and continue with cath recovery protocol.

## 2018-07-25 NOTE — PROGRESS NOTES
Patient received in CVL for Cardiolite stress test with nitroglycerin gtt infusing.  Stopped per cardiologist.    Will stay off for 5 minutes and re-evalute morning stress testing

## 2018-07-25 NOTE — PROGRESS NOTES
Patient arrived for stress test  Chest/epigastric pain on admission    Pain was 9/10 and down to 6/10 with IV-NTG  Trops negative, old cath noted    However she does have significant ST and T wave changes in the anterolateral leads that were not present on her last EKG on record (10/2017).     Due to new ST/T wave changes and ongoing chest pain despite IV-NTG, stress test was put on hold pending in person evaluation by primary service and cardiology (seen in the past by [facility])    Stephanie Conway 7/25/2018 8:39 AM

## 2018-07-25 NOTE — CONSULTS
pulsation Normal  · The carotid upstroke is normal in amplitude and contour without delay or bruit   Abdomen:   · soft  · Bowel sounds present  Extremities:  ·  No edema  Neurological:  · Alert and oriented. DATA:    Diagnostics:    EKG: NSR, ST-T changes, inferior and anterolateral ischemia, new changes 10/16/17    Cardiac cath 6/13/17: LAD moderate disease    Labs:     CBC:   Recent Labs      07/24/18   0840  07/25/18   0533   WBC  5.2  7.3   HGB  13.9  13.8   HCT  41.8  42.0   PLT  227  252     BMP:   Recent Labs      07/24/18   0840  07/25/18   0533   NA  141  139   K  4.0  3.9   CO2  24  24   BUN  14  9   CREATININE  0.77  0.66   LABGLOM  >60  >60   GLUCOSE  155*  112*     BNP: No results for input(s): BNP in the last 72 hours. PT/INR: No results for input(s): PROTIME, INR in the last 72 hours. APTT:No results for input(s): APTT in the last 72 hours. CARDIAC ENZYMES:No results for input(s): CKTOTAL, CKMB, CKMBINDEX, TROPONINI in the last 72 hours. FASTING LIPID PANEL:  Lab Results   Component Value Date    HDL 47 07/25/2018    TRIG 173 07/25/2018     LIVER PROFILE:  Recent Labs      07/24/18   0840  07/25/18   0533   AST  21  19   ALT  33  30   LABALBU  4.3  4.3       IMPRESSION:    Patient Active Problem List   Diagnosis    Chronic back pain    Hypertension    SRI on CPAP    Pure hypercholesterolemia    Chest pain     Chest pain worrisome for angina  New ECG changes  HTN  HL  SRI on CPAP    RECOMMENDATIONS:  1. Continue ASA  2. Continue BB and statin  3. Need cardiac cath for risk stratification. Risk and benefits of cardiac catheterization were discussed in detail. Risk of bleeding, requiring blood transfusion, vascular complication requiring surgery, renal insufficieny with need of dialysis, CVA, MI, death and anesthesia complications including intubation were discussed. Patient agrees to proceed and verbalizes understanding. Discussed with patient and Nurse.     Pinky Abarca MD  [facility]

## 2018-07-25 NOTE — FLOWSHEET NOTE
07/25/18 0020   Provider Notification   Reason for Communication Review case   Provider Name Dr. Markie Taveras   Provider Notification Physician   Method of Communication Call   Response Other (Comment)     Dr. Markie Taveras called unit and states he does not feel comfortable performing stress test at this time due to twave inversion and chest pain that continues on Nitro. MD aware that Dr. Jeff Youssef is on the case and provided orders last night. RN to clarify if cardiac cath to be done today or other plan but no stress to be done at this time per Dr. Markie Taveras.

## 2018-07-25 NOTE — PROGRESS NOTES
Patient placed on bedpan at this time as she states she needs to urinate. 400 mL clear yellow urine resulted. Will continue to monitor.

## 2018-07-25 NOTE — FLOWSHEET NOTE
07/25/18 1259   Provider Notification   Reason for Communication Evaluate; Review case   Provider Name Dr. Deb Alarcon   Provider Notification Physician   Method of Communication Call   Response See orders     Dr. Venetta Parson called unit and states that Dr. José Chambers will be coming soon and will plan to perform a cardiac cath around 1330. Patient aware and Lovenox was not given.

## 2018-07-25 NOTE — FLOWSHEET NOTE
07/25/18 1427   Mobility   Patient Arrival To Unit 1427  (Back to CVICU from Cath lab s/p intervention)

## 2018-07-25 NOTE — H&P
Medications Prior to Admission:     Prior to Admission medications    Medication Sig Start Date End Date Taking? Authorizing Provider   pravastatin (PRAVACHOL) 20 MG tablet Take 1 tablet by mouth daily 6/22/18  Yes Ashley Olivera MD   omeprazole (PRILOSEC) 40 MG delayed release capsule take 1 capsule by mouth once daily 6/22/18  Yes Ashley Olivera MD   enalapril-hydrochlorothiazide (VASERETIC) 10-25 MG per tablet take 1 tablet by mouth once daily 6/22/18   Ashley Olivera MD   traMADol Burnett Juan) 50 MG tablet take 1 tablet by mouth once daily if needed 9/28/17   Ashley Olivera MD   aspirin 81 MG tablet Take 1 tablet by mouth daily (with breakfast) 8/19/16   Ashley Olivera MD        Allergies:     Bee venom and Tetracyclines & related    Social History:     Tobacco:    reports that she has been smoking Cigarettes. She has smoked for the past 30.00 years. She has never used smokeless tobacco.  Alcohol:      reports that she does not drink alcohol. Drug Use:  reports that she does not use drugs. Family History:     Family History   Problem Relation Age of Onset    Stroke Father        Review of Systems:     Positive and Negative as described in HPI. CONSTITUTIONAL:  negative for fevers, chills, sweats, fatigue, weight loss  HEENT:  negative for vision, hearing changes, runny nose, throat pain  RESPIRATORY:  negative for  cough, congestion, wheezing.   GASTROINTESTINAL:  negative for nausea, vomiting, diarrhea, constipation, change in bowel habits, abdominal pain   GENITOURINARY:  negative for difficulty of urination, burning with urination, frequency   INTEGUMENT:  negative for rash, skin lesions, easy bruising   HEMATOLOGIC/LYMPHATIC:  negative for swelling/edema   ALLERGIC/IMMUNOLOGIC:  negative for urticaria , itching  ENDOCRINE:  negative increase in drinking, increase in urination, hot or cold intolerance  MUSCULOSKELETAL:  negative joint pains, muscle aches, swelling of joints  NEUROLOGICAL: negative for headaches, dizziness, lightheadedness, numbness, pain, tingling extremities  BEHAVIOR/PSYCH:  negative for depression, anxiety    Physical Exam:   /61   Pulse 62   Temp 98 °F (36.7 °C) (Temporal)   Resp 16   Ht 5' 4\" (1.626 m)   Wt 227 lb 1.2 oz (103 kg)   SpO2 98%   BMI 38.98 kg/m²   Temp (24hrs), Av °F (36.7 °C), Min:97.8 °F (36.6 °C), Max:98.2 °F (36.8 °C)    No results for input(s): POCGLU in the last 72 hours. Intake/Output Summary (Last 24 hours) at 18 1606  Last data filed at 18 1500   Gross per 24 hour   Intake          8.33 ml   Output                0 ml   Net          8.33 ml       General Appearance:  alert, well appearing, and in no acute distress. Mental status: oriented to person, place, and time with normal affect  Head:  normocephalic, atraumatic. Eye: no icterus, redness, pupils equal and reactive, extraocular eye movements intact, conjunctiva clear  Ear: normal external ear, no discharge, hearing intact  Nose:  no drainage noted Mouth: mucous membranes moist  Neck: supple, no carotid bruits, thyroid not palpable  Lungs: Bilateral equal air entry, clear to ausculation, no wheezing, rales or rhonchi, normal effort  Cardiovascular: normal rate, regular rhythm, no murmur, gallop, rub.   Abdomen: Soft, nontender, nondistended, normal bowel sounds, no hepatomegaly or splenomegaly  Neurologic: There are no new focal motor or sensory deficits, normal muscle tone and bulk, no abnormal sensation, normal speech, cranial nerves II through XII grossly intact  Skin: No gross lesions, rashes, bruising or bleeding on exposed skin area  Extremities:  peripheral pulses palpable, no pedal edema or calf pain with palpation    Investigations:      Laboratory Testing:  Recent Results (from the past 24 hour(s))   Trop/Myoglobin    Collection Time: 18  4:47 PM   Result Value Ref Range    Troponin T <0.03 <0.03 ng/mL    Troponin Interp          Myoglobin <21 (L) 25 - 58 ng/mL   EKG 12 lead    Collection Time: 07/24/18  6:35 PM   Result Value Ref Range    Ventricular Rate 74 BPM    Atrial Rate 74 BPM    P-R Interval 150 ms    QRS Duration 82 ms    Q-T Interval 412 ms    QTc Calculation (Bazett) 457 ms    P Axis 55 degrees    R Axis 32 degrees    T Axis 133 degrees   Trop/Myoglobin    Collection Time: 07/24/18 11:49 PM   Result Value Ref Range    Troponin T <0.03 <0.03 ng/mL    Troponin Interp          Myoglobin 22 (L) 25 - 58 ng/mL   EKG 12 lead    Collection Time: 07/25/18 12:00 AM   Result Value Ref Range    Ventricular Rate 66 BPM    Atrial Rate 66 BPM    P-R Interval 140 ms    QRS Duration 86 ms    Q-T Interval 458 ms    QTc Calculation (Bazett) 480 ms    P Axis 41 degrees    R Axis 27 degrees    T Axis 140 degrees   Comprehensive Metabolic Panel w/ Reflex to MG    Collection Time: 07/25/18  5:33 AM   Result Value Ref Range    Glucose 112 (H) 70 - 99 mg/dL    BUN 9 6 - 20 mg/dL    CREATININE 0.66 0.50 - 0.90 mg/dL    Bun/Cre Ratio 14 9 - 20    Calcium 9.3 8.6 - 10.4 mg/dL    Sodium 139 135 - 144 mmol/L    Potassium 3.9 3.7 - 5.3 mmol/L    Chloride 101 98 - 107 mmol/L    CO2 24 20 - 31 mmol/L    Anion Gap 14 9 - 17 mmol/L    Alkaline Phosphatase 97 35 - 104 U/L    ALT 30 5 - 33 U/L    AST 19 <32 U/L    Total Bilirubin 0.42 0.3 - 1.2 mg/dL    Total Protein 6.7 6.4 - 8.3 g/dL    Alb 4.3 3.5 - 5.2 g/dL    Albumin/Globulin Ratio NOT REPORTED 1.0 - 2.5    GFR Non-African American >60 >60 mL/min    GFR African American >60 >60 mL/min    GFR Comment          GFR Staging NOT REPORTED    Magnesium    Collection Time: 07/25/18  5:33 AM   Result Value Ref Range    Magnesium 2.0 1.6 - 2.6 mg/dL   Lipid panel - fasting    Collection Time: 07/25/18  5:33 AM   Result Value Ref Range    Cholesterol 232 (H) <200 mg/dL    HDL 47 >40 mg/dL    LDL Cholesterol 150 (H) 0 - 130 mg/dL    Chol/HDL Ratio 4.9 <5    Triglycerides 173 (H) <150 mg/dL    VLDL NOT REPORTED 1 - 30 mg/dL   CBC the  Cardiovascular ICU   47 y/o admitted with unstable angina - underwent cath : CAD s/p stent in LAD. - uncontrolled HPL: . Discussed importance of diet control and medication compliance. - HTN: controlled now. Continue current meds. Started on BB and was to be on  ACEI at home but admits to noncomplaince. - Obesity, SRI: discussed importance of weight loss. - chronic back pain with CT showing   Severe L4-L5 and L5-S1 disc space narrowing as well as   bilateral foraminal narrowing. Will need outpatient w/u and Neuro sx eval.     Consultations:   IP CONSULT TO INTERNAL MEDICINE  IP CONSULT TO CARDIOLOGY     Patient is admitted as inpatient status because of co-morbidities listed above, severity of signs and symptoms as outlined, requirement for current medical therapies and most importantly because of direct risk to patient if care not provided in a hospital setting.     Marcie Hernandez MD  7/25/2018  4:06 PM    Copy sent to Dr. Jennifer Peterson MD

## 2018-07-25 NOTE — FLOWSHEET NOTE
07/25/18 0911   Provider Notification   Reason for Communication Evaluate; Review case   Provider Name Dr. Margie Gould   Provider Notification Physician   Method of Communication Call   Response See orders     Dr. Margie Gould contacted and updated on cancellation of stress test per Dr. Osiel Richey. Also aware that patient continues on Nitro drip, has had a cough but no SOB and states she had a previous cath that was negative and was done by Dr. Liz Or. Per Dr. Margie Gould - OK for patient to eat but he would like a D-Dimer obtained and CTA chest to rule out PE. Patient updated and aware. No plan for a cardiac cath today per Dr. Margie Gould but he is at [facility] at this time and will call his partners to find out who is rounding here today and will have them come see the patient.

## 2018-07-25 NOTE — FLOWSHEET NOTE
Patient receives Sacrament of the Sick (anointing) from [facility]. [facility] will follow as needed. (writer charting for [employer].)     26/13/17 1200   Encounter Summary   Services provided to: Patient   Referral/Consult From: Rounding   [facility] [address] [facility] (7/25/18 anointed)   Complexity of Encounter Low   Length of Encounter 15 minutes   Routine   Type Initial   Sacraments   Sacrament of Sick-Anointing Anointed  (7/25/18 Fr. Wu Cross)

## 2018-07-25 NOTE — OP NOTE
[facility]    CARDIAC CATHETERIZATION    Date:   7/25/2018  Patient name: Daniel Parker  Date of admission:  7/24/2018  8:15 AM  MRN:   [id number]  YOB: 1965    Operators:  Primary:     CV Fellow:    Pre Procedure Conscious Sedation Data:    ASA Class:    [] I [x] II [] III [] IV    Mallampati Class:  [] I [] II [x] III [] IV     Indication:  [] STEMI      [] + Stress test  [x] ACS      [x] + EKG Changes  [] Non Q MI       [x] Significant Risk Factors  [] Recurrent Angina             [x] Diabetes Mellitus    [] New LBBB      [x] Uncontrolled HTN. [] CHF / Low EF changes     [] Abnormal CTA / Ca Score    Procedure:  Access:  [] Femoral  [x] Radial  artery       [x] Right  [] Left    Procedure: After informed consent was obtained with explanation of the risks and benefits, patient was brought to the cath lab. The access area was prepped and draped in sterile fashion. 1% lidocaine was used for local block. The artery was cannulated with 6  Fr sheath with brisk arterial blood return. The side port was frequently flushed and aspirated with normal saline. Findings:  Left main: NL  LAD: Mid area 80-90% stenosis. Required PTCA -NASIR 2.5/12. LCX: NL  RCA: Minimal disease  The LV gram was performed in the RODRÍGUEZ 30 position. LVEF: 65%. LV Wall Motion: Hyperdynamic     Conclusions:  1. Single vessel CAD  2. Successful PTCA -NASIR  3. Overall preserved LV function    Recommendation:  1. Post stent protocol        History and Risk Factors    [x] Hypertension     [x] Family history of CAD  [x] Hyperlipidemia     [] Cerebrovascular Disease   [] Prior MI       [] Peripheral Vascular disease   [] Prior PCI              [x] Diabetes Mellitus    [] Left Main PCI. [] Currently on Dialysis. [] Prior CABG. [] Currently smoker. [] Cardiac Arrest outside of healthcare facility.  [] Yes    [x] No        Witnessed     [] Yes   [] No     Arrest after arrival of EMS  [] Yes   [] No     [] Cardiac Arrest at other Facility. [] Yes   [x] No    Pre-Procedure Information. Heart Failure       [] Yes    [x] No        Class  [] I      [] II  [] III    [] IV. New Diagnosis    [] Yes  [] No    HF Type      [] Systolic   [] Diastolic          [] Unknown. Diagnostic Test:   EKG       [] Normal   [x] Abnormal    New antiarrhythmia medications:    [] Yes   [] No   New onset atrial fibrillation / Flutter     [] Yes   [] No   ECG Abnormalities:      [] V. Fib   [] Nasra V. Tach           [] NS V. T   [] New LBBB           [] T. Inv  []  ST dev > 0.5 mm         [] PVC's freq  [] PVC's infrequent    Stress Test Performed:   [] Yes    [x] No     Type:     [] Stress Echo   [] Exercise Stress Test (no imaging)      [] Stress Nuclear  [] Stress Imaging     Results   [] Negative   [] Positive        [] Indeterminate  [] Unavailable     If Positive/ Risk / Extent of Ischemia:       [] Low  [] Intermediate         [] High  [] Unavailable      Cardiac CTA Performed:  [] Yes    [x] No      Results   [] CAD   [] Non obstructive CAD      [] No CAD   [] Uncertain      [] Unknown   [] Structural Disease.      Pre Procedure Medications: [] Yes    [x] No         [] ASA  [] Beta Blockers      [] Nitrate  [] Ca Channel Blockers      [] Ranolazine  [] Statin       [] Plavix/Others antiplatelets        Electronically signed by Alicja Hanks MD on 7/25/2018 at 2:24 PM      [facility]  [phone]

## 2018-07-26 ENCOUNTER — APPOINTMENT (OUTPATIENT)
Dept: GENERAL RADIOLOGY | Age: 53
DRG: 247 | End: 2018-07-26
Payer: COMMERCIAL

## 2018-07-26 VITALS
HEIGHT: 64 IN | SYSTOLIC BLOOD PRESSURE: 112 MMHG | TEMPERATURE: 97.7 F | RESPIRATION RATE: 24 BRPM | BODY MASS INDEX: 37.83 KG/M2 | WEIGHT: 221.56 LBS | HEART RATE: 75 BPM | DIASTOLIC BLOOD PRESSURE: 66 MMHG | OXYGEN SATURATION: 96 %

## 2018-07-26 PROBLEM — J20.8 ACUTE BRONCHITIS DUE TO OTHER SPECIFIED ORGANISMS: Status: ACTIVE | Noted: 2018-07-26

## 2018-07-26 PROBLEM — R07.9 CHEST PAIN: Status: RESOLVED | Noted: 2018-07-24 | Resolved: 2018-07-26

## 2018-07-26 PROCEDURE — C1887 CATHETER, GUIDING: HCPCS

## 2018-07-26 PROCEDURE — C1874 STENT, COATED/COV W/DEL SYS: HCPCS

## 2018-07-26 PROCEDURE — 6370000000 HC RX 637 (ALT 250 FOR IP): Performed by: INTERNAL MEDICINE

## 2018-07-26 PROCEDURE — 2709999900 HC NON-CHARGEABLE SUPPLY

## 2018-07-26 PROCEDURE — 99232 SBSQ HOSP IP/OBS MODERATE 35: CPT | Performed by: INTERNAL MEDICINE

## 2018-07-26 PROCEDURE — 6360000002 HC RX W HCPCS: Performed by: INTERNAL MEDICINE

## 2018-07-26 PROCEDURE — C1769 GUIDE WIRE: HCPCS

## 2018-07-26 PROCEDURE — C1894 INTRO/SHEATH, NON-LASER: HCPCS

## 2018-07-26 PROCEDURE — 93005 ELECTROCARDIOGRAM TRACING: CPT

## 2018-07-26 PROCEDURE — C1725 CATH, TRANSLUMIN NON-LASER: HCPCS

## 2018-07-26 PROCEDURE — 71045 X-RAY EXAM CHEST 1 VIEW: CPT

## 2018-07-26 RX ORDER — LISINOPRIL 10 MG/1
10 TABLET ORAL DAILY
Qty: 30 TABLET | Refills: 1 | Status: SHIPPED | OUTPATIENT
Start: 2018-07-27 | End: 2018-09-23 | Stop reason: SDUPTHER

## 2018-07-26 RX ORDER — AZITHROMYCIN 500 MG/1
500 TABLET, FILM COATED ORAL DAILY
Qty: 5 TABLET | Refills: 0 | Status: SHIPPED | OUTPATIENT
Start: 2018-07-26 | End: 2018-07-31

## 2018-07-26 RX ORDER — GUAIFENESIN 600 MG/1
600 TABLET, EXTENDED RELEASE ORAL 2 TIMES DAILY
Qty: 14 TABLET | Refills: 1 | Status: ON HOLD | OUTPATIENT
Start: 2018-07-26 | End: 2018-08-16 | Stop reason: ALTCHOICE

## 2018-07-26 RX ORDER — NITROGLYCERIN 0.4 MG/1
TABLET SUBLINGUAL
Qty: 25 TABLET | Refills: 1 | Status: SHIPPED | OUTPATIENT
Start: 2018-07-26 | End: 2020-09-13

## 2018-07-26 RX ORDER — GUAIFENESIN 600 MG/1
600 TABLET, EXTENDED RELEASE ORAL 2 TIMES DAILY
Status: DISCONTINUED | OUTPATIENT
Start: 2018-07-26 | End: 2018-07-26 | Stop reason: HOSPADM

## 2018-07-26 RX ORDER — AZITHROMYCIN 250 MG/1
500 TABLET, FILM COATED ORAL DAILY
Status: DISCONTINUED | OUTPATIENT
Start: 2018-07-26 | End: 2018-07-26 | Stop reason: HOSPADM

## 2018-07-26 RX ADMIN — ASPIRIN 81 MG: 81 TABLET, COATED ORAL at 09:00

## 2018-07-26 RX ADMIN — MULTIPLE VITAMINS W/ MINERALS TAB 1 TABLET: TAB at 09:01

## 2018-07-26 RX ADMIN — MORPHINE SULFATE 2 MG: 2 INJECTION, SOLUTION INTRAMUSCULAR; INTRAVENOUS at 00:58

## 2018-07-26 RX ADMIN — PANTOPRAZOLE SODIUM 40 MG: 40 TABLET, DELAYED RELEASE ORAL at 09:01

## 2018-07-26 RX ADMIN — TICAGRELOR 90 MG: 90 TABLET ORAL at 09:42

## 2018-07-26 RX ADMIN — METOPROLOL TARTRATE 25 MG: 25 TABLET ORAL at 09:01

## 2018-07-26 RX ADMIN — HYDROCHLOROTHIAZIDE 25 MG: 25 TABLET ORAL at 09:01

## 2018-07-26 RX ADMIN — LISINOPRIL 10 MG: 10 TABLET ORAL at 09:00

## 2018-07-26 ASSESSMENT — PAIN DESCRIPTION - FREQUENCY
FREQUENCY: CONTINUOUS
FREQUENCY: INTERMITTENT

## 2018-07-26 ASSESSMENT — PAIN DESCRIPTION - PAIN TYPE
TYPE: ACUTE PAIN
TYPE: CHRONIC PAIN
TYPE: ACUTE PAIN

## 2018-07-26 ASSESSMENT — PAIN SCALES - GENERAL
PAINLEVEL_OUTOF10: 0
PAINLEVEL_OUTOF10: 0
PAINLEVEL_OUTOF10: 2
PAINLEVEL_OUTOF10: 3
PAINLEVEL_OUTOF10: 6

## 2018-07-26 ASSESSMENT — PAIN DESCRIPTION - LOCATION
LOCATION: CHEST
LOCATION: BACK
LOCATION: CHEST

## 2018-07-26 ASSESSMENT — PAIN DESCRIPTION - ONSET: ONSET: ON-GOING

## 2018-07-26 ASSESSMENT — PAIN DESCRIPTION - DESCRIPTORS
DESCRIPTORS: ACHING
DESCRIPTORS: ACHING
DESCRIPTORS: PRESSURE

## 2018-07-26 NOTE — FLOWSHEET NOTE
07/26/18 0911   Provider Notification   Reason for Communication Review case   Provider Name Dr. Enriqueta Diaz   Provider Notification Physician   Method of Communication Page   Response Waiting for response   Notification Time [phone]   RN paged to ask if patient needs to be d/c on anti-platelet. 2427: MD returned page - ordered received for brilinta 90mg BID. See orders.

## 2018-07-26 NOTE — PROGRESS NOTES
[facility]    Progress Note    7/26/2018    4:16 PM    Name:   Deanne Vieyra  MRN:     [id number]     Acct:      [de-identified]   Room:   2027/2027-01   Day:  2  Admit Date:  7/24/2018  8:15 AM    PCP:   Zaida De Leon MD  Code Status:  Full Code    Subjective:     C/C:   Chief Complaint   Patient presents with    Abdominal Pain     since AM     Interval History Status: not changed. Patient still has a little chest tightness. She is c/o a productive cough of green-yellow mucus. No fever or chills. She is SOB too. Brief History:     Per my partner's note:  70-year-old presented with epigastric sharp pain 8 /10 that started 7/24 morning, associated with shortness of breath and diaphoresis, no radiation. +mild nauses. No vomiting/diarrhea. No sick contacts. ED w/u: /70, EKG- 'ST depression in the lateral leads that are new compared to previous EKGs. ' tropsx3 neg. CT abd and pelvis unremarkable for any acute findings. Admits to dyspnea on exertion in the last couple years. Had a cardiac cath done 7/2017 which showed mid LAD moderate disease. +prior h/o HTN, HPL, SRI . Also admits to non -complaince with diet and medications' sometimes go weeks without taking medications'. - Stress test this Am was cancelled sec to changes in EKG and active chest pain. Cardiology evaluated her this morning and she underwent Cath this afternoon. Showed Single vessel CAD- Mid area 80-90% stenosis. Required PTCA -NASIR. - post cath she is doing well . Denies chest pain/SOB/diaphoresis.      Review of Systems:     Constitutional:  negative for chills, fevers, sweats  Respiratory:  Positive for cough, dyspnea on exertion, shortness of breath, wheezing  Cardiovascular:  Positive for chest pain, chest pressure/discomfort, no lower extremity edema, palpitations  Gastrointestinal:  negative for abdominal pain, constipation, diarrhea, nausea, vomiting  Neurological: negative for dizziness, headache    Medications: Allergies: Allergies   Allergen Reactions    Bee Venom Hives    Tetracyclines & Related Nausea Only and Other (See Comments)     dizziness       Current Meds:   Scheduled Meds:    ticagrelor  90 mg Oral BID    azithromycin  500 mg Oral Daily    guaiFENesin  600 mg Oral BID    aspirin  81 mg Oral Daily with breakfast    therapeutic multivitamin-minerals  1 tablet Oral Daily    pravastatin  20 mg Oral Nightly    pantoprazole  40 mg Oral QAM AC    lisinopril  10 mg Oral Daily    sodium chloride flush  10 mL Intravenous 2 times per day    docusate sodium  100 mg Oral BID    enoxaparin  40 mg Subcutaneous Daily    metoprolol tartrate  25 mg Oral BID     Continuous Infusions:    nitroGLYCERIN Stopped (18 1425)     PRN Meds: nitroGLYCERIN, traMADol, sodium chloride flush, potassium chloride **OR** potassium chloride **OR** potassium chloride, potassium chloride, magnesium sulfate, acetaminophen, HYDROcodone 5 mg - acetaminophen, morphine **OR** morphine, magnesium hydroxide, bisacodyl, ondansetron **OR** ondansetron    Data:     Past Medical History:   has a past medical history of Abnormal stress test; Abnormal stress test; Allergic rhinitis; Chronic back pain; Hyperlipidemia; Hypertension; and SRI on CPAP. Social History:   reports that she has been smoking Cigarettes. She has smoked for the past 30.00 years. She has never used smokeless tobacco. She reports that she does not drink alcohol or use drugs. Family History:   Family History   Problem Relation Age of Onset    Stroke Father        Vitals:  /66   Pulse 75   Temp 97.7 °F (36.5 °C) (Temporal)   Resp 24   Ht 5' 4\" (1.626 m)   Wt 221 lb 9 oz (100.5 kg)   SpO2 96%   BMI 38.03 kg/m²   Temp (24hrs), Av.9 °F (36.6 °C), Min:97.5 °F (36.4 °C), Max:98.2 °F (36.8 °C)    No results for input(s): POCGLU in the last 72 hours. I/O (24Hr):     Intake/Output Summary (Last 24 07/25/2018    Class 2 obesity due to excess calories with body mass index (BMI) of 39.0 to 39.9 in adult [E66.09, Z68.39] 07/25/2018    Chest pain [R07.9] 07/24/2018    Mixed hyperlipidemia [E78.2] 01/08/2017    Chronic back pain [M54.9, G89.29]     Hypertension [I10]     SRI on CPAP [G47.33, Z99.89]        Plan:        1. CAD- s/p NASIR, con't ASA, statin, Brilinta, BB, f/u with Cardiology  2. URI/ acute bronchitis- check CXR, start Zithromax, Mucinex  3. HTN- BP is low now, stop HCTZ, con't ACE-I and BB  4. Chronic low back pain: f/u with Neurosurgery outpatient for L4-L5 and L5-S1 disc space narrowing  5.  HPL: statin  6. GI proph    DC home today after CXR    dw pt and nurse    Asaf Serrano MD  7/26/2018  4:16 PM

## 2018-07-26 NOTE — FLOWSHEET NOTE
07/26/18 0830   Provider Notification   Reason for Communication Review case   Provider Name Dr. Nova Mcginnis   Provider Notification Physician   Method of Communication Face to face   Response At bedside   Notification Time 0830   Per Easter Nyhan - MD rounded and states patient is OK to d/c today and f/u in 2-3 weeks.

## 2018-07-26 NOTE — DISCHARGE SUMMARY
[facility]    Discharge Summary     Patient ID: Kirsten Perry  :  1965   MRN: [id number]     ACCOUNT:  [de-identified]   Patient's PCP: Reggie Holder MD  Admit Date: 2018   Discharge Date: 2018     Length of Stay: 2  Code Status:  Full Code  Admitting Physician: Keyshawn Kaur MD  Discharge Physician: Keyshawn Kaur MD     Active Discharge Diagnoses:     Hospital Problem Lists:  Principal Problem (Resolved):    Chest pain  Active Problems:    S/P drug eluting coronary stent placement - Mid LAD 18-Dr. lea    Chronic back pain    Hypertension    SRI on CPAP    Mixed hyperlipidemia    Coronary artery disease involving native coronary artery of native heart with unstable angina pectoris (HCC)    Class 2 obesity due to excess calories with body mass index (BMI) of 39.0 to 39.9 in adult    Acute bronchitis due to other specified organisms      Admission Condition:  fair     Discharged Condition: good    Hospital Stay:     Hospital Course:  Kirsten Perry is a 46 y.o. female who was admitted for the management of   Chest pain , presented to ER with Abdominal Pain (since AM)    Per my partner's note:  27-year-old presented with epigastric sharp pain 8 /10 that started  morning, associated with shortness of breath and diaphoresis, no radiation. +mild nauses. No vomiting/diarrhea. No sick contacts. ED w/u: /70, EKG- 'ST depression in the lateral leads that are new compared to previous EKGs. ' tropsx3 neg. CT abd and pelvis unremarkable for any acute findings. Admits to dyspnea on exertion in the last couple years. Had a cardiac cath done 2017 which showed mid LAD moderate disease. +prior h/o HTN, HPL, SRI . Also admits to non -complaince with diet and medications' sometimes go weeks without taking medications'. - Stress test this Am was cancelled sec to changes in EKG and active chest pain.  Cardiology evaluated her  and she underwent Cath this afternoon. Showed Single vessel CAD- Mid area 80-90% stenosis. Required PTCA -NASIR. - post cath she is doing well . Denies chest pain. She was started on Brilinti, Metoprolol and a statin. She also is noted to have a cough productive of green-yellow sputum, CXR negative. She was started on     Significant therapeutic interventions: PCI    Significant Diagnostic Studies:      Radiology:    Ct Abdomen Pelvis W Iv Contrast    Result Date: 7/24/2018  EXAMINATION: CT OF THE ABDOMEN AND PELVIS WITH CONTRAST 7/24/2018 9:39 am TECHNIQUE: CT of the abdomen and pelvis was performed with the administration of intravenous contrast. Multiplanar reformatted images are provided for review. Dose modulation, iterative reconstruction, and/or weight based adjustment of the mA/kV was utilized to reduce the radiation dose to as low as reasonably achievable. COMPARISON: None. HISTORY: ORDERING SYSTEM PROVIDED HISTORY: abd pain TECHNOLOGIST PROVIDED HISTORY: IV Only Contrast FINDINGS: Lower Chest: Normal heart size. Bibasilar dependent atelectasis. Organs: The liver demonstrates fatty infiltration with focal sparing along the gallbladder fossa. Spleen, pancreas, adrenal glands, right kidney and gallbladder appear unremarkable. 4 mm hypodense lesion posterior upper to midpole left kidney which is too small to characterize but appears benign likely cyst. GI/Bowel: No evidence of bowel obstruction or inflammation. Pelvis: Bladder is unremarkable hysterectomy. Peritoneum/Retroperitoneum: Normal caliber abdominal aorta with mild infrarenal atherosclerosis. No suspicious lymphadenopathy. No ascites or free air. Bones/Soft Tissues: Severe L4-L5 and L5-S1 disc space narrowing as well as bilateral foraminal narrowing. No acute process within the abdomen or pelvis including normal appearing appendix. Multiple incidental/chronic findings as detailed above including hepatic fatty infiltration.      Xr Chest Portable    Result Date: 7/26/2018  EXAMINATION: SINGLE XRAY VIEW OF THE CHEST 7/26/2018 4:10 pm COMPARISON: 10 October 2017 HISTORY: ORDERING SYSTEM PROVIDED HISTORY: cough TECHNOLOGIST PROVIDED HISTORY: Reason for exam:->cough Ordering Physician Provided Reason for Exam: cough Acuity: Unknown Type of Exam: Unknown FINDINGS: AP portable view of the chest time stamped at 1608 hours demonstrates overlying cardiac monitoring electrodes. Heart size is normal.  No vascular congestion, focal consolidation, effusion, or pneumothorax is noted. Osseous structures are stable. Mild to moderate spondylosis is present in the dorsal spine. Mediastinal contours are within normal limits. No evidence of acute cardiopulmonary disease. Consultations:    Consults:     Final Specialist Recommendations/Findings:   IP CONSULT TO INTERNAL MEDICINE  IP CONSULT TO CARDIOLOGY      The patient was seen and examined on day of discharge and this discharge summary is in conjunction with any daily progress note from day of discharge. Discharge plan:     Disposition: Home    Physician Follow Up:     Monica Ruth MD  [address] [place], [place] [phone]    On 8/2/2018  Hospital follow up 2:00. Bring photo ID, insurance cared and all medicaatons. Paola Whitaker MD  [facility]  [phone]    Schedule an appointment as soon as possible for a visit in 2 weeks  2-3 week follow up    Leopold Mattocks, MD  [address]  [phone]             Requiring Further Evaluation/Follow Up POST HOSPITALIZATION/Incidental Findings:     Diet: cardiac diet    Activity: As tolerated    Discharge Medications:      Medication List      START taking these medications    azithromycin 500 MG tablet  Commonly known as:  ZITHROMAX  Take 1 tablet by mouth daily for 5 days     guaiFENesin 600 MG extended release tablet  Commonly known as:  MUCINEX  Take 1 tablet by mouth 2 times daily     metoprolol tartrate 25 MG tablet  Commonly known as:  LOPRESSOR  Take 1 tablet by mouth 2 times daily     nitroGLYCERIN 0.4 MG SL tablet  Commonly known as:  NITROSTAT  up to max of 3 total doses. If no relief after 1 dose, call 911. ticagrelor 90 MG Tabs tablet  Commonly known as:  BRILINTA  Take 1 tablet by mouth 2 times daily        CONTINUE taking these medications    aspirin 81 MG tablet  Take 1 tablet by mouth daily (with breakfast)     lisinopril 10 MG tablet  Commonly known as:  PRINIVIL;ZESTRIL  Take 1 tablet by mouth daily     omeprazole 40 MG delayed release capsule  Commonly known as:  PRILOSEC  take 1 capsule by mouth once daily     pravastatin 20 MG tablet  Commonly known as:  PRAVACHOL  Take 1 tablet by mouth daily     traMADol 50 MG tablet  Commonly known as:  ULTRAM  take 1 tablet by mouth once daily if needed        STOP taking these medications    enalapril-hydrochlorothiazide 10-25 MG per tablet  Commonly known as:  VASERETIC           Where to Get Your Medications      You can get these medications from any pharmacy    Bring a paper prescription for each of these medications  · azithromycin 500 MG tablet  · guaiFENesin 600 MG extended release tablet  · lisinopril 10 MG tablet  · metoprolol tartrate 25 MG tablet  · nitroGLYCERIN 0.4 MG SL tablet  · ticagrelor 90 MG Tabs tablet         Time Spent on discharge is  20 mins in patient examination, evaluation, counseling as well as medication reconciliation, prescriptions for required medications, discharge plan and follow up. Electronically signed by   Kymberly Macedo MD  7/26/2018  5:14 PM      Thank you Dr. Melissa Aj MD for the opportunity to be involved in this patient's care.

## 2018-07-26 NOTE — FLOWSHEET NOTE
Patient sitting up in bed and eating lunch. She is pleasant, and engages in conversation. She shares that she \"had a stent placed\", and that she is doing well, and expects to be discharged today. Writer offers supportive conversation and listening presence, and patient expresses thanks.      07/26/18 2624   Encounter Summary   Services provided to: Patient   Referral/Consult From: Paul   Continue Visiting (7/26/18)   Complexity of Encounter Low   Length of Encounter 15 minutes   Spiritual Assessment Completed Yes   Routine   Type Follow up   Assessment Approachable   Intervention Active listening   Outcome Engaged in conversation;Expressed gratitude

## 2018-07-27 LAB
EKG ATRIAL RATE: 60 BPM
EKG ATRIAL RATE: 66 BPM
EKG ATRIAL RATE: 67 BPM
EKG ATRIAL RATE: 71 BPM
EKG ATRIAL RATE: 74 BPM
EKG ATRIAL RATE: 81 BPM
EKG P AXIS: 36 DEGREES
EKG P AXIS: 41 DEGREES
EKG P AXIS: 42 DEGREES
EKG P AXIS: 46 DEGREES
EKG P AXIS: 48 DEGREES
EKG P AXIS: 55 DEGREES
EKG P-R INTERVAL: 132 MS
EKG P-R INTERVAL: 138 MS
EKG P-R INTERVAL: 140 MS
EKG P-R INTERVAL: 140 MS
EKG P-R INTERVAL: 142 MS
EKG P-R INTERVAL: 150 MS
EKG Q-T INTERVAL: 412 MS
EKG Q-T INTERVAL: 414 MS
EKG Q-T INTERVAL: 444 MS
EKG Q-T INTERVAL: 450 MS
EKG Q-T INTERVAL: 458 MS
EKG Q-T INTERVAL: 458 MS
EKG QRS DURATION: 82 MS
EKG QRS DURATION: 82 MS
EKG QRS DURATION: 84 MS
EKG QRS DURATION: 86 MS
EKG QRS DURATION: 86 MS
EKG QRS DURATION: 94 MS
EKG QTC CALCULATION (BAZETT): 457 MS
EKG QTC CALCULATION (BAZETT): 458 MS
EKG QTC CALCULATION (BAZETT): 475 MS
EKG QTC CALCULATION (BAZETT): 480 MS
EKG QTC CALCULATION (BAZETT): 480 MS
EKG QTC CALCULATION (BAZETT): 482 MS
EKG R AXIS: 24 DEGREES
EKG R AXIS: 25 DEGREES
EKG R AXIS: 25 DEGREES
EKG R AXIS: 27 DEGREES
EKG R AXIS: 28 DEGREES
EKG R AXIS: 32 DEGREES
EKG T AXIS: 133 DEGREES
EKG T AXIS: 133 DEGREES
EKG T AXIS: 139 DEGREES
EKG T AXIS: 139 DEGREES
EKG T AXIS: 140 DEGREES
EKG T AXIS: 144 DEGREES
EKG VENTRICULAR RATE: 60 BPM
EKG VENTRICULAR RATE: 66 BPM
EKG VENTRICULAR RATE: 67 BPM
EKG VENTRICULAR RATE: 71 BPM
EKG VENTRICULAR RATE: 74 BPM
EKG VENTRICULAR RATE: 81 BPM

## 2018-08-02 ENCOUNTER — OFFICE VISIT (OUTPATIENT)
Dept: FAMILY MEDICINE CLINIC | Age: 53
End: 2018-08-02
Payer: COMMERCIAL

## 2018-08-02 VITALS
BODY MASS INDEX: 39.2 KG/M2 | OXYGEN SATURATION: 94 % | WEIGHT: 228.4 LBS | HEART RATE: 72 BPM | DIASTOLIC BLOOD PRESSURE: 72 MMHG | SYSTOLIC BLOOD PRESSURE: 124 MMHG

## 2018-08-02 DIAGNOSIS — F17.200 SMOKER: Chronic | ICD-10-CM

## 2018-08-02 DIAGNOSIS — I10 ESSENTIAL HYPERTENSION: ICD-10-CM

## 2018-08-02 DIAGNOSIS — I25.110 CORONARY ARTERY DISEASE INVOLVING NATIVE CORONARY ARTERY OF NATIVE HEART WITH UNSTABLE ANGINA PECTORIS (HCC): Primary | ICD-10-CM

## 2018-08-02 PROCEDURE — 99214 OFFICE O/P EST MOD 30 MIN: CPT | Performed by: FAMILY MEDICINE

## 2018-08-02 RX ORDER — PRAVASTATIN SODIUM 80 MG/1
80 TABLET ORAL DAILY
Qty: 90 TABLET | Refills: 3 | Status: SHIPPED | OUTPATIENT
Start: 2018-08-02 | End: 2019-03-07

## 2018-08-02 NOTE — PROGRESS NOTES
Visit Information    Have you changed or started any medications since your last visit including any over-the-counter medicines, vitamins, or herbal medicines? no   Are you having any side effects from any of your medications? -  no  Have you stopped taking any of your medications? Is so, why? -  no    Have you seen any other physician or provider since your last visit? No  Have you had any other diagnostic tests since your last visit? No  Have you been seen in the emergency room and/or had an admission to a hospital since we last saw you? Yes - Records Obtained  Have you had your routine dental cleaning in the past 6 months? yes -     Have you activated your [employer] account? If not, what are your barriers?  Yes     Patient Care Team:  Orquidea Zelaya MD as PCP - General (Family Medicine)  Orquidea Zelaya MD as PCP - S Attributed Provider  Saqib Lewis    Medical History Review  Past Medical, Family, and Social History reviewed and does not contribute to the patient presenting condition    Health Maintenance   Topic Date Due    Hepatitis C screen  1965    HIV screen  04/28/1979    DTaP/Tdap/Td vaccine (1 - Tdap) 04/28/1983    Pneumococcal med risk (1 of 1 - PPSV23) 04/28/1983    Cervical cancer screen  04/28/1985    Shingles Vaccine (1 of 2 - 2 Dose Series) 04/28/2014    Colon cancer screen colonoscopy  04/28/2014    A1C test (Diabetic or Prediabetic)  01/05/2018    Flu vaccine (1) 09/01/2018    Potassium monitoring  07/25/2019    Creatinine monitoring  07/25/2019    Breast cancer screen  05/12/2020    Lipid screen  07/25/2023
may help protect the heart and blood vessels. How can you get started on the Mediterranean diet? Here are some things you can do to switch to a more Mediterranean way of eating. What to eat  · Eat a variety of fruits and vegetables each day, such as grapes, blueberries, tomatoes, broccoli, peppers, figs, olives, spinach, eggplant, beans, lentils, and chickpeas. · Eat a variety of whole-grain foods each day, such as oats, brown rice, and whole wheat bread, pasta, and couscous. · Eat fish at least 2 times a week. Try tuna, salmon, mackerel, lake trout, herring, or sardines. · Eat moderate amounts of low-fat dairy products, such as milk, cheese, or yogurt. · Eat moderate amounts of poultry and eggs. · Choose healthy (unsaturated) fats, such as nuts, olive oil, and certain nut or seed oils like canola, soybean, and flaxseed. · Limit unhealthy (saturated) fats, such as butter, palm oil, and coconut oil. And limit fats found in animal products, such as meat and dairy products made with whole milk. Try to eat red meat only a few times a month in very small amounts. · Limit sweets and desserts to only a few times a week. This includes sugar-sweetened drinks like soda. The Mediterranean diet may also include red wine with your meal-1 glass each day for women and up to 2 glasses a day for men. Tips for eating at home  · Use herbs, spices, garlic, lemon zest, and citrus juice instead of salt to add flavor to foods. · Add avocado slices to your sandwich instead of montgomery. · Have fish for lunch or dinner instead of red meat. Brush the fish with olive oil, and broil or grill it. · Sprinkle your salad with seeds or nuts instead of cheese. · Cook with olive or canola oil instead of butter or oils that are high in saturated fat. · Switch from 2% milk or whole milk to 1% or fat-free milk.   · Dip raw vegetables in a vinaigrette dressing or hummus instead of dips made from mayonnaise or sour cream.  · Have a piece of

## 2018-08-02 NOTE — PATIENT INSTRUCTIONS
Patient Education        Learning About the Mediterranean Diet  What is the [address]? The Mediterranean diet is a style of eating rather than a diet plan. It features foods eaten in [place], [place], [place] and [place], and other countries along the [facility]. It emphasizes eating foods like fish, fruits, vegetables, beans, high-fiber breads and whole grains, nuts, and olive oil. This style of eating includes limited red meat, cheese, and sweets. Why choose the Mediterranean diet? A Mediterranean-style diet may improve heart health. It contains more fat than other heart-healthy diets. But the fats are mainly from nuts, unsaturated oils (such as fish oils and olive oil), and certain nut or seed oils (such as canola, soybean, or flaxseed oil). These fats may help protect the heart and blood vessels. How can you get started on the Mediterranean diet? Here are some things you can do to switch to a more Mediterranean way of eating. What to eat  · Eat a variety of fruits and vegetables each day, such as grapes, blueberries, tomatoes, broccoli, peppers, figs, olives, spinach, eggplant, beans, lentils, and chickpeas. · Eat a variety of whole-grain foods each day, such as oats, brown rice, and whole wheat bread, pasta, and couscous. · Eat fish at least 2 times a week. Try tuna, salmon, mackerel, lake trout, herring, or sardines. · Eat moderate amounts of low-fat dairy products, such as milk, cheese, or yogurt. · Eat moderate amounts of poultry and eggs. · Choose healthy (unsaturated) fats, such as nuts, olive oil, and certain nut or seed oils like canola, soybean, and flaxseed. · Limit unhealthy (saturated) fats, such as butter, palm oil, and coconut oil. And limit fats found in animal products, such as meat and dairy products made with whole milk. Try to eat red meat only a few times a month in very small amounts. · Limit sweets and desserts to only a few times a week.  This includes sugar-sweetened drinks like soda. The Mediterranean diet may also include red wine with your meal-1 glass each day for women and up to 2 glasses a day for men. Tips for eating at home  · Use herbs, spices, garlic, lemon zest, and citrus juice instead of salt to add flavor to foods. · Add avocado slices to your sandwich instead of montgomery. · Have fish for lunch or dinner instead of red meat. Brush the fish with olive oil, and broil or grill it. · Sprinkle your salad with seeds or nuts instead of cheese. · Cook with olive or canola oil instead of butter or oils that are high in saturated fat. · Switch from 2% milk or whole milk to 1% or fat-free milk. · Dip raw vegetables in a vinaigrette dressing or hummus instead of dips made from mayonnaise or sour cream.  · Have a piece of fruit for dessert instead of a piece of cake. Try baked apples, or have some dried fruit. Tips for eating out  · Try broiled, grilled, baked, or poached fish instead of having it fried or breaded. · Ask your  to have your meals prepared with olive oil instead of butter. · Order dishes made with marinara sauce or sauces made from olive oil. Avoid sauces made from cream or mayonnaise. · Choose whole-grain breads, whole wheat pasta and pizza crust, brown rice, beans, and lentils. · Cut back on butter or margarine on bread. Instead, you can dip your bread in a small amount of olive oil. · Ask for a side salad or grilled vegetables instead of french fries or chips. Where can you learn more? Go to https://[email or web address]. org and sign in to your [employer] account. Enter [phone] in the [facility] box to learn more about \"Learning About the Mediterranean Diet. \"     If you do not have an account, please click on the \"Sign Up Now\" link. Current as of: May 12, 2017  Content Version: 11.6  © 0004-7043 [employer]. Care instructions adapted under license by [facility] ([facility]).  If you have questions about a medical condition or this instruction, always ask your healthcare professional. [place] [address] any warranty or liability for your use of this information.

## 2018-08-10 ENCOUNTER — TELEPHONE (OUTPATIENT)
Dept: GASTROENTEROLOGY | Age: 53
End: 2018-08-10

## 2018-08-14 ENCOUNTER — TELEPHONE (OUTPATIENT)
Dept: GASTROENTEROLOGY | Age: 53
End: 2018-08-14

## 2018-08-15 ENCOUNTER — ANESTHESIA EVENT (OUTPATIENT)
Dept: OPERATING ROOM | Age: 53
End: 2018-08-15
Payer: COMMERCIAL

## 2018-08-16 ENCOUNTER — HOSPITAL ENCOUNTER (OUTPATIENT)
Age: 53
Setting detail: OUTPATIENT SURGERY
Discharge: HOME | End: 2018-08-16
Attending: INTERNAL MEDICINE | Admitting: INTERNAL MEDICINE
Payer: COMMERCIAL

## 2018-08-16 ENCOUNTER — ANESTHESIA (OUTPATIENT)
Dept: OPERATING ROOM | Age: 53
End: 2018-08-16
Payer: COMMERCIAL

## 2018-08-16 VITALS
OXYGEN SATURATION: 96 % | RESPIRATION RATE: 20 BRPM | SYSTOLIC BLOOD PRESSURE: 114 MMHG | DIASTOLIC BLOOD PRESSURE: 55 MMHG

## 2018-08-16 VITALS
TEMPERATURE: 97.2 F | RESPIRATION RATE: 14 BRPM | SYSTOLIC BLOOD PRESSURE: 115 MMHG | BODY MASS INDEX: 37.94 KG/M2 | OXYGEN SATURATION: 97 % | WEIGHT: 222.22 LBS | DIASTOLIC BLOOD PRESSURE: 62 MMHG | HEIGHT: 64 IN | HEART RATE: 70 BPM

## 2018-08-16 PROCEDURE — 3700000000 HC ANESTHESIA ATTENDED CARE: Performed by: INTERNAL MEDICINE

## 2018-08-16 PROCEDURE — 7100000011 HC PHASE II RECOVERY - ADDTL 15 MIN: Performed by: INTERNAL MEDICINE

## 2018-08-16 PROCEDURE — 2500000003 HC RX 250 WO HCPCS: Performed by: NURSE ANESTHETIST, CERTIFIED REGISTERED

## 2018-08-16 PROCEDURE — 3700000001 HC ADD 15 MINUTES (ANESTHESIA): Performed by: INTERNAL MEDICINE

## 2018-08-16 PROCEDURE — 7100000010 HC PHASE II RECOVERY - FIRST 15 MIN: Performed by: INTERNAL MEDICINE

## 2018-08-16 PROCEDURE — 2580000003 HC RX 258: Performed by: ANESTHESIOLOGY

## 2018-08-16 PROCEDURE — 3609010300 HC COLONOSCOPY W/BIOPSY SINGLE/MULTIPLE: Performed by: INTERNAL MEDICINE

## 2018-08-16 PROCEDURE — 2709999900 HC NON-CHARGEABLE SUPPLY: Performed by: INTERNAL MEDICINE

## 2018-08-16 PROCEDURE — 6360000002 HC RX W HCPCS: Performed by: NURSE ANESTHETIST, CERTIFIED REGISTERED

## 2018-08-16 PROCEDURE — 2580000003 HC RX 258: Performed by: NURSE ANESTHETIST, CERTIFIED REGISTERED

## 2018-08-16 PROCEDURE — 88305 TISSUE EXAM BY PATHOLOGIST: CPT

## 2018-08-16 RX ORDER — FENTANYL CITRATE 50 UG/ML
50 INJECTION, SOLUTION INTRAMUSCULAR; INTRAVENOUS EVERY 5 MIN PRN
Status: DISCONTINUED | OUTPATIENT
Start: 2018-08-16 | End: 2018-08-16 | Stop reason: HOSPADM

## 2018-08-16 RX ORDER — LIDOCAINE HYDROCHLORIDE 20 MG/ML
INJECTION, SOLUTION EPIDURAL; INFILTRATION; INTRACAUDAL; PERINEURAL PRN
Status: DISCONTINUED | OUTPATIENT
Start: 2018-08-16 | End: 2018-08-16 | Stop reason: SDUPTHER

## 2018-08-16 RX ORDER — LIDOCAINE HYDROCHLORIDE 10 MG/ML
1 INJECTION, SOLUTION EPIDURAL; INFILTRATION; INTRACAUDAL; PERINEURAL
Status: DISCONTINUED | OUTPATIENT
Start: 2018-08-17 | End: 2018-08-16 | Stop reason: HOSPADM

## 2018-08-16 RX ORDER — SODIUM CHLORIDE, SODIUM LACTATE, POTASSIUM CHLORIDE, CALCIUM CHLORIDE 600; 310; 30; 20 MG/100ML; MG/100ML; MG/100ML; MG/100ML
INJECTION, SOLUTION INTRAVENOUS CONTINUOUS
Status: DISCONTINUED | OUTPATIENT
Start: 2018-08-17 | End: 2018-08-16 | Stop reason: HOSPADM

## 2018-08-16 RX ORDER — FENTANYL CITRATE 50 UG/ML
25 INJECTION, SOLUTION INTRAMUSCULAR; INTRAVENOUS EVERY 5 MIN PRN
Status: DISCONTINUED | OUTPATIENT
Start: 2018-08-16 | End: 2018-08-16 | Stop reason: HOSPADM

## 2018-08-16 RX ORDER — PROPOFOL 10 MG/ML
INJECTION, EMULSION INTRAVENOUS PRN
Status: DISCONTINUED | OUTPATIENT
Start: 2018-08-16 | End: 2018-08-16 | Stop reason: SDUPTHER

## 2018-08-16 RX ORDER — ONDANSETRON 2 MG/ML
4 INJECTION INTRAMUSCULAR; INTRAVENOUS
Status: DISCONTINUED | OUTPATIENT
Start: 2018-08-16 | End: 2018-08-16 | Stop reason: HOSPADM

## 2018-08-16 RX ORDER — SODIUM CHLORIDE, SODIUM LACTATE, POTASSIUM CHLORIDE, CALCIUM CHLORIDE 600; 310; 30; 20 MG/100ML; MG/100ML; MG/100ML; MG/100ML
INJECTION, SOLUTION INTRAVENOUS CONTINUOUS PRN
Status: DISCONTINUED | OUTPATIENT
Start: 2018-08-16 | End: 2018-08-16 | Stop reason: SDUPTHER

## 2018-08-16 RX ADMIN — PROPOFOL 80 MG: 10 INJECTION, EMULSION INTRAVENOUS at 07:40

## 2018-08-16 RX ADMIN — PROPOFOL 30 MG: 10 INJECTION, EMULSION INTRAVENOUS at 07:56

## 2018-08-16 RX ADMIN — SODIUM CHLORIDE, POTASSIUM CHLORIDE, SODIUM LACTATE AND CALCIUM CHLORIDE: 600; 310; 30; 20 INJECTION, SOLUTION INTRAVENOUS at 07:36

## 2018-08-16 RX ADMIN — PROPOFOL 20 MG: 10 INJECTION, EMULSION INTRAVENOUS at 07:49

## 2018-08-16 RX ADMIN — LIDOCAINE HYDROCHLORIDE 100 MG: 20 INJECTION, SOLUTION EPIDURAL; INFILTRATION; INTRACAUDAL; PERINEURAL at 07:40

## 2018-08-16 RX ADMIN — SODIUM CHLORIDE, POTASSIUM CHLORIDE, SODIUM LACTATE AND CALCIUM CHLORIDE: 600; 310; 30; 20 INJECTION, SOLUTION INTRAVENOUS at 07:03

## 2018-08-16 RX ADMIN — PROPOFOL 20 MG: 10 INJECTION, EMULSION INTRAVENOUS at 07:53

## 2018-08-16 RX ADMIN — PROPOFOL 20 MG: 10 INJECTION, EMULSION INTRAVENOUS at 07:46

## 2018-08-16 RX ADMIN — PROPOFOL 60 MG: 10 INJECTION, EMULSION INTRAVENOUS at 07:44

## 2018-08-16 ASSESSMENT — PULMONARY FUNCTION TESTS
PIF_VALUE: 1

## 2018-08-16 ASSESSMENT — PAIN DESCRIPTION - LOCATION: LOCATION: ABDOMEN

## 2018-08-16 ASSESSMENT — PAIN SCALES - GENERAL
PAINLEVEL_OUTOF10: 2
PAINLEVEL_OUTOF10: 2
PAINLEVEL_OUTOF10: 0
PAINLEVEL_OUTOF10: 2

## 2018-08-16 ASSESSMENT — PAIN DESCRIPTION - DESCRIPTORS: DESCRIPTORS: CRAMPING

## 2018-08-16 ASSESSMENT — PAIN - FUNCTIONAL ASSESSMENT: PAIN_FUNCTIONAL_ASSESSMENT: 0-10

## 2018-08-16 NOTE — ANESTHESIA POSTPROCEDURE EVALUATION
Department of Anesthesiology  Postprocedure Note    Patient: Sandra Alvarez  MRN: [id number]  YOB: 1965  Date of evaluation: 8/16/2018  Time:  9:52 AM     Procedure Summary     Date:  08/16/18 Room / Location:  [place] [address] / [facility]    Anesthesia Start:  0736 Anesthesia Stop:  0806    Procedure:  COLONOSCOPY (N/A ) Diagnosis:  (SCREENING)    Surgeon:  Graciela Connell MD Responsible Provider:  Rory Rascon MD    Anesthesia Type:  MAC, general ASA Status:  3          Anesthesia Type: MAC, general    Lowell Phase I: Lowell Score: 10    Lowell Phase II: Lowell Score: 5    Last vitals: Reviewed and per EMR flowsheets.        Anesthesia Post Evaluation    Patient location during evaluation: PACU  Level of consciousness: awake and alert  Airway patency: patent  Nausea & Vomiting: no nausea and no vomiting  Complications: no  Cardiovascular status: blood pressure returned to baseline  Respiratory status: acceptable  Hydration status: euvolemic

## 2018-08-16 NOTE — OP NOTE
COLONOSCOPY    DATE OF PROCEDURE: 8/16/2018    SURGEON: Alaina Floewr MD    ASSISTANT: None    PREOPERATIVE DIAGNOSIS: Screening exam,    POSTOPERATIVE DIAGNOSIS: Polyp in the rectosigmoid area    OPERATION: Total colonoscopy , biopsy and cauterization of the polyp rectosigmoid area    ANESTHESIA: MAC    ESTIMATED BLOOD LOSS: None    COMPLICATIONS: None     SPECIMENS:  Was Obtained: Rectosigmoid polyp    HISTORY: The patient is a 46y.o. year old female with history of above preop diagnosis. I recommended colonoscopy with possible biopsy or polypectomy and I explained the risk, benefits, expected outcome, and alternatives to the procedure. Risks included but are not limited to bleeding, infection, respiratory distress, hypotension, and perforation of the colon and possibility of missing a lesion. The patient understands and is in agreement. PROCEDURE:  The patient's SPO2 remained above 90% throughout the procedure. Digital rectal exam was normal.  The colonoscope was inserted through the anus into the rectum and advanced under direct vision to the cecum without difficulty. Terminal ileum was examined for approximately 2 inches. The prep was good. Findings:  Terminal ileum: normal    Cecum/Ascending colon: normal    Transverse colon: normal    Descending/Sigmoid colon: normal    Rectum/Anus: examined in normal and retroflexed positions and was abnormal: In the rectosigmoid area at about 25 cm from the anus, there is a polyp, 5 mm, sessile, biopsied and cauterized with hot biopsy forceps    Withdrawal Time was (minutes): 12          The colon was decompressed and the scope was removed. The patient tolerated the procedure and conscious sedation without unusual events. During the procedure, the patient's blood pressure, pulse and oxygen saturation remained stable and documented. No unusual events occurred during the procedure.  Patient was transferred to recovery room and will be discharged

## 2018-08-16 NOTE — H&P (VIEW-ONLY)
Collection Time: 07/25/18  5:33 AM   Result Value Ref Range    WBC 7.3 3.5 - 11.0 k/uL    RBC 4.87 4.0 - 5.2 m/uL    Hemoglobin 13.8 12.0 - 16.0 g/dL    Hematocrit 42.0 36 - 46 %    MCV 86.1 80 - 100 fL    MCH 28.4 26 - 34 pg    MCHC 33.0 31 - 37 g/dL    RDW 13.6 11.5 - 14.5 %    Platelets 554 993 - 280 k/uL    MPV 9.0 6.0 - 12.0 fL    NRBC Automated NOT REPORTED per 100 WBC   Trop/Myoglobin    Collection Time: 07/25/18  5:33 AM   Result Value Ref Range    Troponin T <0.03 <0.03 ng/mL    Troponin Interp          Myoglobin 23 (L) 25 - 58 ng/mL   EKG 12 lead    Collection Time: 07/25/18  5:51 AM   Result Value Ref Range    Ventricular Rate 67 BPM    Atrial Rate 67 BPM    P-R Interval 140 ms    QRS Duration 94 ms    Q-T Interval 450 ms    QTc Calculation (Bazett) 475 ms    P Axis 46 degrees    R Axis 25 degrees    T Axis 139 degrees   D-Dimer Test    Collection Time: 07/25/18  9:08 AM   Result Value Ref Range    D-Dimer, Quant 0.59 mg/L FEU   EKG 12 lead    Collection Time: 07/25/18  2:47 PM   Result Value Ref Range    Ventricular Rate 60 BPM    Atrial Rate 60 BPM    P-R Interval 132 ms    QRS Duration 82 ms    Q-T Interval 458 ms    QTc Calculation (Bazett) 458 ms    P Axis 36 degrees    R Axis 24 degrees    T Axis 133 degrees       Assessment :      Primary Problem  Chest pain    Active Hospital Problems    Diagnosis Date Noted    S/P drug eluting coronary stent placement - Mid LAD 7/25/18-Dr. Luis Adam [Z95.5] 07/25/2018     Priority: High    Coronary artery disease involving native coronary artery of native heart with unstable angina pectoris (HCC) [I25.110] 07/25/2018    Class 2 obesity due to excess calories with body mass index (BMI) of 39.0 to 39.9 in adult [E66.09, Z68.39] 07/25/2018    Chest pain [R07.9] 07/24/2018    Mixed hyperlipidemia [E78.2] 01/08/2017    Chronic back pain [M54.9, G89.29]     Hypertension [I10]     SRI on CPAP [G47.33, Z99.89]        Plan:     Patient status Admit as inpatient in

## 2018-08-16 NOTE — PROGRESS NOTES
Dr Zada Cogan was informed prior to the colonoscopy that the patient was not allowed by her cardiologist to stop her Brilinta prior to this procedure today.

## 2018-08-17 LAB — SURGICAL PATHOLOGY REPORT: NORMAL

## 2018-09-05 ENCOUNTER — HOSPITAL ENCOUNTER (OUTPATIENT)
Dept: CARDIAC REHAB | Age: 53
Setting detail: THERAPIES SERIES
Discharge: HOME | End: 2018-09-05
Payer: COMMERCIAL

## 2018-09-05 VITALS — WEIGHT: 222 LBS | HEIGHT: 64 IN | BODY MASS INDEX: 37.9 KG/M2

## 2018-09-05 PROCEDURE — 93798 PHYS/QHP OP CAR RHAB W/ECG: CPT

## 2018-09-05 RX ORDER — HYDROCHLOROTHIAZIDE 25 MG/1
25 TABLET ORAL DAILY
COMMUNITY

## 2018-09-05 ASSESSMENT — PATIENT HEALTH QUESTIONNAIRE - PHQ9
SUM OF ALL RESPONSES TO PHQ QUESTIONS 1-9: 0
SUM OF ALL RESPONSES TO PHQ QUESTIONS 1-9: 0

## 2018-09-10 ENCOUNTER — HOSPITAL ENCOUNTER (OUTPATIENT)
Dept: CARDIAC REHAB | Age: 53
Setting detail: THERAPIES SERIES
Discharge: HOME | End: 2018-09-10
Payer: COMMERCIAL

## 2018-09-10 VITALS — BODY MASS INDEX: 38.78 KG/M2 | WEIGHT: 225.9 LBS

## 2018-09-10 PROCEDURE — 93798 PHYS/QHP OP CAR RHAB W/ECG: CPT

## 2018-09-12 ENCOUNTER — HOSPITAL ENCOUNTER (OUTPATIENT)
Dept: CARDIAC REHAB | Age: 53
Setting detail: THERAPIES SERIES
Discharge: HOME | End: 2018-09-12
Payer: COMMERCIAL

## 2018-09-12 VITALS — BODY MASS INDEX: 38.96 KG/M2 | WEIGHT: 227 LBS

## 2018-09-12 PROCEDURE — 93798 PHYS/QHP OP CAR RHAB W/ECG: CPT

## 2018-09-13 ENCOUNTER — OFFICE VISIT (OUTPATIENT)
Dept: GASTROENTEROLOGY | Age: 53
End: 2018-09-13
Payer: COMMERCIAL

## 2018-09-13 VITALS
BODY MASS INDEX: 38.39 KG/M2 | HEIGHT: 64 IN | HEART RATE: 66 BPM | DIASTOLIC BLOOD PRESSURE: 60 MMHG | OXYGEN SATURATION: 96 % | WEIGHT: 224.9 LBS | SYSTOLIC BLOOD PRESSURE: 115 MMHG

## 2018-09-13 DIAGNOSIS — K63.5 HYPERPLASTIC COLONIC POLYP, UNSPECIFIED PART OF COLON: ICD-10-CM

## 2018-09-13 PROCEDURE — 99213 OFFICE O/P EST LOW 20 MIN: CPT | Performed by: INTERNAL MEDICINE

## 2018-09-13 ASSESSMENT — ENCOUNTER SYMPTOMS
RECTAL PAIN: 0
CONSTIPATION: 0
ABDOMINAL PAIN: 0
RESPIRATORY NEGATIVE: 1
BACK PAIN: 1
ANAL BLEEDING: 0
ALLERGIC/IMMUNOLOGIC NEGATIVE: 1
DIARRHEA: 0
ABDOMINAL DISTENTION: 0
BLOOD IN STOOL: 0
VOMITING: 0
NAUSEA: 0

## 2018-09-14 ENCOUNTER — HOSPITAL ENCOUNTER (OUTPATIENT)
Dept: CARDIAC REHAB | Age: 53
Setting detail: THERAPIES SERIES
Discharge: HOME | End: 2018-09-14
Payer: COMMERCIAL

## 2018-09-14 VITALS — WEIGHT: 224.7 LBS | BODY MASS INDEX: 38.57 KG/M2

## 2018-09-14 PROCEDURE — 93798 PHYS/QHP OP CAR RHAB W/ECG: CPT

## 2018-09-17 ENCOUNTER — HOSPITAL ENCOUNTER (OUTPATIENT)
Dept: CARDIAC REHAB | Age: 53
Setting detail: THERAPIES SERIES
Discharge: HOME | End: 2018-09-17
Payer: COMMERCIAL

## 2018-09-17 VITALS — WEIGHT: 222.8 LBS | BODY MASS INDEX: 38.24 KG/M2

## 2018-09-17 PROCEDURE — 93798 PHYS/QHP OP CAR RHAB W/ECG: CPT

## 2018-09-19 ENCOUNTER — HOSPITAL ENCOUNTER (OUTPATIENT)
Dept: CARDIAC REHAB | Age: 53
Setting detail: THERAPIES SERIES
Discharge: HOME | End: 2018-09-19
Payer: COMMERCIAL

## 2018-09-19 VITALS — WEIGHT: 222 LBS | BODY MASS INDEX: 38.11 KG/M2

## 2018-09-19 PROCEDURE — 93798 PHYS/QHP OP CAR RHAB W/ECG: CPT

## 2018-09-21 ENCOUNTER — HOSPITAL ENCOUNTER (OUTPATIENT)
Dept: CARDIAC REHAB | Age: 53
Setting detail: THERAPIES SERIES
Discharge: HOME | End: 2018-09-21
Payer: COMMERCIAL

## 2018-09-21 VITALS — WEIGHT: 223.8 LBS | BODY MASS INDEX: 38.42 KG/M2

## 2018-09-21 PROCEDURE — 93798 PHYS/QHP OP CAR RHAB W/ECG: CPT

## 2018-09-24 ENCOUNTER — HOSPITAL ENCOUNTER (OUTPATIENT)
Dept: CARDIAC REHAB | Age: 53
Setting detail: THERAPIES SERIES
Discharge: HOME | End: 2018-09-24
Payer: COMMERCIAL

## 2018-09-24 VITALS — WEIGHT: 222.4 LBS | BODY MASS INDEX: 38.17 KG/M2

## 2018-09-24 PROCEDURE — 93798 PHYS/QHP OP CAR RHAB W/ECG: CPT

## 2018-09-24 RX ORDER — LISINOPRIL 10 MG/1
TABLET ORAL
Qty: 30 TABLET | Refills: 1 | Status: SHIPPED | OUTPATIENT
Start: 2018-09-24 | End: 2018-11-24 | Stop reason: SDUPTHER

## 2018-09-26 ENCOUNTER — HOSPITAL ENCOUNTER (OUTPATIENT)
Dept: CARDIAC REHAB | Age: 53
Setting detail: THERAPIES SERIES
Discharge: HOME | End: 2018-09-26
Payer: COMMERCIAL

## 2018-09-26 VITALS — WEIGHT: 223.7 LBS | BODY MASS INDEX: 38.4 KG/M2

## 2018-09-26 PROCEDURE — 93798 PHYS/QHP OP CAR RHAB W/ECG: CPT

## 2018-09-28 ENCOUNTER — HOSPITAL ENCOUNTER (OUTPATIENT)
Dept: CARDIAC REHAB | Age: 53
Setting detail: THERAPIES SERIES
Discharge: HOME | End: 2018-09-28
Payer: COMMERCIAL

## 2018-09-28 VITALS — WEIGHT: 223 LBS | BODY MASS INDEX: 38.28 KG/M2

## 2018-09-28 PROCEDURE — 93798 PHYS/QHP OP CAR RHAB W/ECG: CPT

## 2018-09-29 ENCOUNTER — HOSPITAL ENCOUNTER (OUTPATIENT)
Age: 53
Discharge: HOME | End: 2018-09-29
Payer: COMMERCIAL

## 2018-09-29 DIAGNOSIS — I25.110 CORONARY ARTERY DISEASE INVOLVING NATIVE CORONARY ARTERY OF NATIVE HEART WITH UNSTABLE ANGINA PECTORIS (HCC): ICD-10-CM

## 2018-09-29 LAB
ALBUMIN SERPL-MCNC: 4.2 G/DL (ref 3.5–5.2)
ALBUMIN/GLOBULIN RATIO: ABNORMAL (ref 1–2.5)
ALP BLD-CCNC: 105 U/L (ref 35–104)
ALT SERPL-CCNC: 33 U/L (ref 5–33)
AST SERPL-CCNC: 26 U/L
BILIRUB SERPL-MCNC: 0.47 MG/DL (ref 0.3–1.2)
BILIRUBIN DIRECT: 0.09 MG/DL
BILIRUBIN, INDIRECT: 0.38 MG/DL (ref 0–1)
CHOLESTEROL/HDL RATIO: 3.1
CHOLESTEROL: 167 MG/DL
GLOBULIN: ABNORMAL G/DL (ref 1.5–3.8)
HDLC SERPL-MCNC: 54 MG/DL
LDL CHOLESTEROL: 87 MG/DL (ref 0–130)
TOTAL PROTEIN: 7 G/DL (ref 6.4–8.3)
TRIGL SERPL-MCNC: 132 MG/DL
VLDLC SERPL CALC-MCNC: NORMAL MG/DL (ref 1–30)

## 2018-09-29 PROCEDURE — 36415 COLL VENOUS BLD VENIPUNCTURE: CPT

## 2018-09-29 PROCEDURE — 80076 HEPATIC FUNCTION PANEL: CPT

## 2018-09-29 PROCEDURE — 80061 LIPID PANEL: CPT

## 2018-10-01 ENCOUNTER — HOSPITAL ENCOUNTER (OUTPATIENT)
Dept: CARDIAC REHAB | Age: 53
Setting detail: THERAPIES SERIES
Discharge: HOME | End: 2018-10-01
Payer: COMMERCIAL

## 2018-10-01 VITALS — WEIGHT: 221 LBS | BODY MASS INDEX: 37.93 KG/M2

## 2018-10-01 PROCEDURE — 93798 PHYS/QHP OP CAR RHAB W/ECG: CPT

## 2018-10-02 ENCOUNTER — OFFICE VISIT (OUTPATIENT)
Dept: FAMILY MEDICINE CLINIC | Age: 53
End: 2018-10-02
Payer: COMMERCIAL

## 2018-10-02 VITALS
OXYGEN SATURATION: 93 % | WEIGHT: 224 LBS | SYSTOLIC BLOOD PRESSURE: 130 MMHG | HEART RATE: 78 BPM | BODY MASS INDEX: 38.45 KG/M2 | DIASTOLIC BLOOD PRESSURE: 80 MMHG

## 2018-10-02 DIAGNOSIS — E66.9 OBESITY WITH SERIOUS COMORBIDITY, UNSPECIFIED CLASSIFICATION, UNSPECIFIED OBESITY TYPE: ICD-10-CM

## 2018-10-02 DIAGNOSIS — Z23 NEED FOR INFLUENZA VACCINATION: ICD-10-CM

## 2018-10-02 DIAGNOSIS — E78.2 MIXED HYPERLIPIDEMIA: Primary | ICD-10-CM

## 2018-10-02 DIAGNOSIS — I10 ESSENTIAL HYPERTENSION: ICD-10-CM

## 2018-10-02 DIAGNOSIS — I25.110 CORONARY ARTERY DISEASE INVOLVING NATIVE CORONARY ARTERY OF NATIVE HEART WITH UNSTABLE ANGINA PECTORIS (HCC): ICD-10-CM

## 2018-10-02 PROCEDURE — 99214 OFFICE O/P EST MOD 30 MIN: CPT | Performed by: FAMILY MEDICINE

## 2018-10-03 ENCOUNTER — HOSPITAL ENCOUNTER (OUTPATIENT)
Dept: CARDIAC REHAB | Age: 53
Setting detail: THERAPIES SERIES
Discharge: HOME | End: 2018-10-03
Payer: COMMERCIAL

## 2018-10-03 VITALS — WEIGHT: 222 LBS | BODY MASS INDEX: 38.11 KG/M2

## 2018-10-03 PROCEDURE — 93798 PHYS/QHP OP CAR RHAB W/ECG: CPT

## 2018-10-05 ENCOUNTER — HOSPITAL ENCOUNTER (OUTPATIENT)
Dept: CARDIAC REHAB | Age: 53
Setting detail: THERAPIES SERIES
Discharge: HOME | End: 2018-10-05
Payer: COMMERCIAL

## 2018-10-05 VITALS — BODY MASS INDEX: 38.11 KG/M2 | WEIGHT: 222 LBS

## 2018-10-05 PROCEDURE — 93798 PHYS/QHP OP CAR RHAB W/ECG: CPT

## 2018-10-08 ENCOUNTER — HOSPITAL ENCOUNTER (OUTPATIENT)
Dept: CARDIAC REHAB | Age: 53
Setting detail: THERAPIES SERIES
Discharge: HOME | End: 2018-10-08
Payer: COMMERCIAL

## 2018-10-08 VITALS — BODY MASS INDEX: 38.45 KG/M2 | WEIGHT: 224 LBS

## 2018-10-08 PROCEDURE — 93798 PHYS/QHP OP CAR RHAB W/ECG: CPT

## 2018-10-10 ENCOUNTER — HOSPITAL ENCOUNTER (OUTPATIENT)
Dept: CARDIAC REHAB | Age: 53
Setting detail: THERAPIES SERIES
Discharge: HOME | End: 2018-10-10
Payer: COMMERCIAL

## 2018-10-10 VITALS — BODY MASS INDEX: 38.11 KG/M2 | WEIGHT: 222 LBS

## 2018-10-10 PROCEDURE — 93798 PHYS/QHP OP CAR RHAB W/ECG: CPT

## 2018-10-12 ENCOUNTER — HOSPITAL ENCOUNTER (OUTPATIENT)
Dept: CARDIAC REHAB | Age: 53
Setting detail: THERAPIES SERIES
Discharge: HOME | End: 2018-10-12
Payer: COMMERCIAL

## 2018-10-12 VITALS — WEIGHT: 225 LBS | BODY MASS INDEX: 38.62 KG/M2

## 2018-10-12 PROCEDURE — 93798 PHYS/QHP OP CAR RHAB W/ECG: CPT

## 2018-10-15 ENCOUNTER — HOSPITAL ENCOUNTER (OUTPATIENT)
Dept: CARDIAC REHAB | Age: 53
Setting detail: THERAPIES SERIES
Discharge: HOME | End: 2018-10-15
Payer: COMMERCIAL

## 2018-10-15 VITALS — BODY MASS INDEX: 37.93 KG/M2 | WEIGHT: 221 LBS

## 2018-10-15 PROCEDURE — 93798 PHYS/QHP OP CAR RHAB W/ECG: CPT

## 2018-10-17 ENCOUNTER — HOSPITAL ENCOUNTER (OUTPATIENT)
Dept: CARDIAC REHAB | Age: 53
Setting detail: THERAPIES SERIES
Discharge: HOME | End: 2018-10-17
Payer: COMMERCIAL

## 2018-10-17 VITALS — WEIGHT: 222 LBS | BODY MASS INDEX: 38.11 KG/M2

## 2018-10-17 PROCEDURE — 93798 PHYS/QHP OP CAR RHAB W/ECG: CPT

## 2018-10-19 ENCOUNTER — HOSPITAL ENCOUNTER (OUTPATIENT)
Dept: CARDIAC REHAB | Age: 53
Setting detail: THERAPIES SERIES
Discharge: HOME | End: 2018-10-19
Payer: COMMERCIAL

## 2018-10-19 VITALS — BODY MASS INDEX: 38.11 KG/M2 | WEIGHT: 222 LBS

## 2018-10-19 PROCEDURE — 93798 PHYS/QHP OP CAR RHAB W/ECG: CPT

## 2018-10-22 ENCOUNTER — HOSPITAL ENCOUNTER (OUTPATIENT)
Dept: CARDIAC REHAB | Age: 53
Setting detail: THERAPIES SERIES
Discharge: HOME | End: 2018-10-22
Payer: COMMERCIAL

## 2018-10-22 VITALS — BODY MASS INDEX: 37.93 KG/M2 | WEIGHT: 221 LBS

## 2018-10-22 PROCEDURE — 93798 PHYS/QHP OP CAR RHAB W/ECG: CPT

## 2018-10-24 ENCOUNTER — HOSPITAL ENCOUNTER (OUTPATIENT)
Dept: CARDIAC REHAB | Age: 53
Setting detail: THERAPIES SERIES
Discharge: HOME | End: 2018-10-24
Payer: COMMERCIAL

## 2018-10-24 VITALS — BODY MASS INDEX: 37.76 KG/M2 | WEIGHT: 220 LBS

## 2018-10-24 PROCEDURE — 93798 PHYS/QHP OP CAR RHAB W/ECG: CPT

## 2018-10-26 ENCOUNTER — HOSPITAL ENCOUNTER (OUTPATIENT)
Dept: CARDIAC REHAB | Age: 53
Setting detail: THERAPIES SERIES
Discharge: HOME | End: 2018-10-26
Payer: COMMERCIAL

## 2018-10-26 VITALS — BODY MASS INDEX: 37.76 KG/M2 | WEIGHT: 220 LBS

## 2018-10-26 PROCEDURE — 93798 PHYS/QHP OP CAR RHAB W/ECG: CPT

## 2018-10-26 NOTE — PROGRESS NOTES
GOAL REVIEWED WITH PATIENT. FEELS SHE IS IMPROVING TOLERANCE TO ACTIVITY. EXERCISES ON OFF DAYS- WALKS X2 AT WORK ALSO.

## 2018-10-29 ENCOUNTER — HOSPITAL ENCOUNTER (OUTPATIENT)
Dept: CARDIAC REHAB | Age: 53
Setting detail: THERAPIES SERIES
Discharge: HOME | End: 2018-10-29
Payer: COMMERCIAL

## 2018-10-29 VITALS — BODY MASS INDEX: 37.93 KG/M2 | WEIGHT: 221 LBS

## 2018-10-29 PROCEDURE — 93798 PHYS/QHP OP CAR RHAB W/ECG: CPT

## 2018-10-31 ENCOUNTER — HOSPITAL ENCOUNTER (OUTPATIENT)
Dept: CARDIAC REHAB | Age: 53
Setting detail: THERAPIES SERIES
Discharge: HOME | End: 2018-10-31
Payer: COMMERCIAL

## 2018-11-02 ENCOUNTER — HOSPITAL ENCOUNTER (OUTPATIENT)
Dept: CARDIAC REHAB | Age: 53
Setting detail: THERAPIES SERIES
Discharge: HOME | End: 2018-11-02
Payer: COMMERCIAL

## 2018-11-02 VITALS — HEIGHT: 64 IN | BODY MASS INDEX: 37.73 KG/M2 | WEIGHT: 221 LBS

## 2018-11-02 PROCEDURE — 93798 PHYS/QHP OP CAR RHAB W/ECG: CPT

## 2018-11-05 ENCOUNTER — HOSPITAL ENCOUNTER (OUTPATIENT)
Dept: CARDIAC REHAB | Age: 53
Setting detail: THERAPIES SERIES
Discharge: HOME | End: 2018-11-05
Payer: COMMERCIAL

## 2018-11-05 VITALS — BODY MASS INDEX: 37.76 KG/M2 | WEIGHT: 220 LBS

## 2018-11-05 PROCEDURE — 93798 PHYS/QHP OP CAR RHAB W/ECG: CPT

## 2018-11-07 ENCOUNTER — HOSPITAL ENCOUNTER (OUTPATIENT)
Dept: CARDIAC REHAB | Age: 53
Setting detail: THERAPIES SERIES
Discharge: HOME | End: 2018-11-07
Payer: COMMERCIAL

## 2018-11-09 ENCOUNTER — HOSPITAL ENCOUNTER (OUTPATIENT)
Dept: CARDIAC REHAB | Age: 53
Setting detail: THERAPIES SERIES
Discharge: HOME | End: 2018-11-09
Payer: COMMERCIAL

## 2018-11-09 VITALS — BODY MASS INDEX: 37.76 KG/M2 | WEIGHT: 220 LBS

## 2018-11-09 PROCEDURE — 93798 PHYS/QHP OP CAR RHAB W/ECG: CPT

## 2018-11-12 ENCOUNTER — HOSPITAL ENCOUNTER (OUTPATIENT)
Dept: CARDIAC REHAB | Age: 53
Setting detail: THERAPIES SERIES
Discharge: HOME | End: 2018-11-12
Payer: COMMERCIAL

## 2018-11-12 VITALS — BODY MASS INDEX: 37.76 KG/M2 | WEIGHT: 220 LBS

## 2018-11-12 PROCEDURE — 93798 PHYS/QHP OP CAR RHAB W/ECG: CPT

## 2018-11-14 ENCOUNTER — HOSPITAL ENCOUNTER (OUTPATIENT)
Dept: CARDIAC REHAB | Age: 53
Setting detail: THERAPIES SERIES
Discharge: HOME | End: 2018-11-14
Payer: COMMERCIAL

## 2018-11-14 VITALS — BODY MASS INDEX: 37.59 KG/M2 | WEIGHT: 219 LBS

## 2018-11-14 PROCEDURE — 93798 PHYS/QHP OP CAR RHAB W/ECG: CPT

## 2018-11-16 ENCOUNTER — HOSPITAL ENCOUNTER (OUTPATIENT)
Dept: CARDIAC REHAB | Age: 53
Setting detail: THERAPIES SERIES
Discharge: HOME | End: 2018-11-16
Payer: COMMERCIAL

## 2018-11-16 VITALS — WEIGHT: 220 LBS | BODY MASS INDEX: 37.76 KG/M2

## 2018-11-16 PROCEDURE — 93798 PHYS/QHP OP CAR RHAB W/ECG: CPT

## 2018-11-19 ENCOUNTER — HOSPITAL ENCOUNTER (OUTPATIENT)
Dept: CARDIAC REHAB | Age: 53
Setting detail: THERAPIES SERIES
Discharge: HOME | End: 2018-11-19
Payer: COMMERCIAL

## 2018-11-19 VITALS — BODY MASS INDEX: 37.42 KG/M2 | WEIGHT: 218 LBS

## 2018-11-19 PROCEDURE — 93798 PHYS/QHP OP CAR RHAB W/ECG: CPT

## 2018-11-21 ENCOUNTER — HOSPITAL ENCOUNTER (OUTPATIENT)
Dept: CARDIAC REHAB | Age: 53
Setting detail: THERAPIES SERIES
Discharge: HOME | End: 2018-11-21
Payer: COMMERCIAL

## 2018-11-21 VITALS — WEIGHT: 218 LBS | BODY MASS INDEX: 37.42 KG/M2

## 2018-11-21 PROCEDURE — 93798 PHYS/QHP OP CAR RHAB W/ECG: CPT

## 2018-11-23 ENCOUNTER — APPOINTMENT (OUTPATIENT)
Dept: CARDIAC REHAB | Age: 53
End: 2018-11-23
Payer: COMMERCIAL

## 2018-11-25 RX ORDER — LISINOPRIL 10 MG/1
TABLET ORAL
Qty: 30 TABLET | Refills: 1 | Status: SHIPPED | OUTPATIENT
Start: 2018-11-25 | End: 2019-01-11

## 2018-11-26 ENCOUNTER — HOSPITAL ENCOUNTER (OUTPATIENT)
Dept: CARDIAC REHAB | Age: 53
Setting detail: THERAPIES SERIES
Discharge: HOME | End: 2018-11-26
Payer: COMMERCIAL

## 2018-11-26 VITALS — WEIGHT: 220 LBS | BODY MASS INDEX: 37.76 KG/M2

## 2018-11-26 PROCEDURE — 93798 PHYS/QHP OP CAR RHAB W/ECG: CPT

## 2018-11-28 ENCOUNTER — HOSPITAL ENCOUNTER (OUTPATIENT)
Dept: CARDIAC REHAB | Age: 53
Setting detail: THERAPIES SERIES
Discharge: HOME | End: 2018-11-28
Payer: COMMERCIAL

## 2018-11-28 VITALS — WEIGHT: 216 LBS | BODY MASS INDEX: 37.08 KG/M2

## 2018-11-28 PROCEDURE — 93798 PHYS/QHP OP CAR RHAB W/ECG: CPT

## 2018-11-30 ENCOUNTER — HOSPITAL ENCOUNTER (OUTPATIENT)
Dept: CARDIAC REHAB | Age: 53
Setting detail: THERAPIES SERIES
Discharge: HOME | End: 2018-11-30
Payer: COMMERCIAL

## 2018-11-30 VITALS — BODY MASS INDEX: 37.25 KG/M2 | WEIGHT: 217 LBS

## 2018-11-30 PROCEDURE — 93798 PHYS/QHP OP CAR RHAB W/ECG: CPT

## 2018-12-03 ENCOUNTER — HOSPITAL ENCOUNTER (OUTPATIENT)
Dept: CARDIAC REHAB | Age: 53
Setting detail: THERAPIES SERIES
Discharge: HOME | End: 2018-12-03
Payer: COMMERCIAL

## 2018-12-03 VITALS — WEIGHT: 219 LBS | BODY MASS INDEX: 37.59 KG/M2

## 2018-12-03 PROCEDURE — 93798 PHYS/QHP OP CAR RHAB W/ECG: CPT

## 2018-12-05 ENCOUNTER — TELEPHONE (OUTPATIENT)
Dept: FAMILY MEDICINE CLINIC | Age: 53
End: 2018-12-05

## 2018-12-05 ENCOUNTER — HOSPITAL ENCOUNTER (OUTPATIENT)
Dept: CARDIAC REHAB | Age: 53
Setting detail: THERAPIES SERIES
Discharge: HOME | End: 2018-12-05
Payer: COMMERCIAL

## 2018-12-05 VITALS — HEIGHT: 64 IN | BODY MASS INDEX: 37.05 KG/M2 | WEIGHT: 217 LBS

## 2018-12-05 PROCEDURE — 93798 PHYS/QHP OP CAR RHAB W/ECG: CPT

## 2018-12-05 ASSESSMENT — PATIENT HEALTH QUESTIONNAIRE - PHQ9
SUM OF ALL RESPONSES TO PHQ QUESTIONS 1-9: 0
SUM OF ALL RESPONSES TO PHQ QUESTIONS 1-9: 0

## 2019-01-02 ENCOUNTER — TELEPHONE (OUTPATIENT)
Dept: FAMILY MEDICINE CLINIC | Age: 54
End: 2019-01-02

## 2019-01-02 ENCOUNTER — HOSPITAL ENCOUNTER (OUTPATIENT)
Age: 54
Discharge: HOME | End: 2019-01-02
Payer: COMMERCIAL

## 2019-01-02 PROCEDURE — 9900000065 HC CARDIAC REHAB PHASE 3 - 1 VISIT

## 2019-01-11 ENCOUNTER — OFFICE VISIT (OUTPATIENT)
Dept: FAMILY MEDICINE CLINIC | Age: 54
End: 2019-01-11
Payer: COMMERCIAL

## 2019-01-11 VITALS
SYSTOLIC BLOOD PRESSURE: 138 MMHG | BODY MASS INDEX: 38.11 KG/M2 | DIASTOLIC BLOOD PRESSURE: 60 MMHG | WEIGHT: 222 LBS | HEART RATE: 72 BPM

## 2019-01-11 DIAGNOSIS — L30.9 ECZEMA, UNSPECIFIED TYPE: Primary | ICD-10-CM

## 2019-01-11 PROCEDURE — 99213 OFFICE O/P EST LOW 20 MIN: CPT | Performed by: FAMILY MEDICINE

## 2019-01-11 RX ORDER — PREDNISONE 20 MG/1
20 TABLET ORAL 2 TIMES DAILY
Qty: 10 TABLET | Refills: 0 | Status: SHIPPED | OUTPATIENT
Start: 2019-01-11 | End: 2019-01-16

## 2019-01-11 ASSESSMENT — ENCOUNTER SYMPTOMS
SINUS PRESSURE: 0
DIARRHEA: 0
SORE THROAT: 0
BACK PAIN: 0
SHORTNESS OF BREATH: 0
COUGH: 0
VOMITING: 0
NAUSEA: 0

## 2019-01-25 RX ORDER — LISINOPRIL 10 MG/1
TABLET ORAL
Qty: 30 TABLET | Refills: 5 | Status: SHIPPED | OUTPATIENT
Start: 2019-01-25 | End: 2019-02-05

## 2019-02-05 ENCOUNTER — OFFICE VISIT (OUTPATIENT)
Dept: FAMILY MEDICINE CLINIC | Age: 54
End: 2019-02-05
Payer: COMMERCIAL

## 2019-02-05 VITALS
SYSTOLIC BLOOD PRESSURE: 150 MMHG | WEIGHT: 224 LBS | OXYGEN SATURATION: 93 % | BODY MASS INDEX: 38.45 KG/M2 | HEART RATE: 83 BPM | DIASTOLIC BLOOD PRESSURE: 90 MMHG

## 2019-02-05 DIAGNOSIS — I10 ESSENTIAL HYPERTENSION: Primary | ICD-10-CM

## 2019-02-05 DIAGNOSIS — I25.110 CORONARY ARTERY DISEASE INVOLVING NATIVE CORONARY ARTERY OF NATIVE HEART WITH UNSTABLE ANGINA PECTORIS (HCC): ICD-10-CM

## 2019-02-05 DIAGNOSIS — Z12.39 SCREENING FOR BREAST CANCER: ICD-10-CM

## 2019-02-05 DIAGNOSIS — E78.2 MIXED HYPERLIPIDEMIA: ICD-10-CM

## 2019-02-05 PROBLEM — F17.200 SMOKER: Chronic | Status: RESOLVED | Noted: 2018-08-02 | Resolved: 2019-02-05

## 2019-02-05 PROCEDURE — 99214 OFFICE O/P EST MOD 30 MIN: CPT | Performed by: FAMILY MEDICINE

## 2019-02-05 RX ORDER — LOSARTAN POTASSIUM 100 MG/1
100 TABLET ORAL DAILY
Qty: 30 TABLET | Refills: 5 | Status: SHIPPED | OUTPATIENT
Start: 2019-02-05 | End: 2019-07-22 | Stop reason: SDUPTHER

## 2019-02-10 ENCOUNTER — APPOINTMENT (OUTPATIENT)
Dept: GENERAL RADIOLOGY | Age: 54
End: 2019-02-10
Payer: COMMERCIAL

## 2019-02-10 ENCOUNTER — HOSPITAL ENCOUNTER (OUTPATIENT)
Age: 54
Setting detail: OBSERVATION
Discharge: HOME | End: 2019-02-11
Attending: EMERGENCY MEDICINE | Admitting: INTERNAL MEDICINE
Payer: COMMERCIAL

## 2019-02-10 DIAGNOSIS — R07.9 CHEST PAIN, UNSPECIFIED TYPE: Primary | ICD-10-CM

## 2019-02-10 LAB
ABSOLUTE EOS #: 0.07 K/UL (ref 0–0.4)
ABSOLUTE IMMATURE GRANULOCYTE: ABNORMAL K/UL (ref 0–0.3)
ABSOLUTE LYMPH #: 2 K/UL (ref 1–4.8)
ABSOLUTE MONO #: 0.67 K/UL (ref 0.2–0.8)
ANION GAP SERPL CALCULATED.3IONS-SCNC: 15 MMOL/L (ref 9–17)
BASOPHILS # BLD: 1 %
BASOPHILS ABSOLUTE: 0.07 K/UL (ref 0–0.2)
BNP INTERPRETATION: NORMAL
BUN BLDV-MCNC: 14 MG/DL (ref 6–20)
BUN/CREAT BLD: 15 (ref 9–20)
CALCIUM SERPL-MCNC: 9.7 MG/DL (ref 8.6–10.4)
CHLORIDE BLD-SCNC: 100 MMOL/L (ref 98–107)
CO2: 23 MMOL/L (ref 20–31)
CREAT SERPL-MCNC: 0.92 MG/DL (ref 0.5–0.9)
DIFFERENTIAL TYPE: ABNORMAL
EOSINOPHILS RELATIVE PERCENT: 1 % (ref 1–4)
GFR AFRICAN AMERICAN: >60 ML/MIN
GFR NON-AFRICAN AMERICAN: >60 ML/MIN
GFR SERPL CREATININE-BSD FRML MDRD: ABNORMAL ML/MIN/{1.73_M2}
GFR SERPL CREATININE-BSD FRML MDRD: ABNORMAL ML/MIN/{1.73_M2}
GLUCOSE BLD-MCNC: 112 MG/DL (ref 65–105)
GLUCOSE BLD-MCNC: 161 MG/DL (ref 70–99)
HCT VFR BLD CALC: 41.1 % (ref 36–46)
HEMOGLOBIN: 14.5 G/DL (ref 12–16)
IMMATURE GRANULOCYTES: ABNORMAL %
LYMPHOCYTES # BLD: 27 % (ref 24–44)
MCH RBC QN AUTO: 28.7 PG (ref 26–34)
MCHC RBC AUTO-ENTMCNC: 35.2 G/DL (ref 31–37)
MCV RBC AUTO: 81.6 FL (ref 80–100)
MONOCYTES # BLD: 9 % (ref 1–7)
MYOGLOBIN: 28 NG/ML (ref 25–58)
MYOGLOBIN: <21 NG/ML (ref 25–58)
NRBC AUTOMATED: ABNORMAL PER 100 WBC
PDW BLD-RTO: 13.3 % (ref 11.5–14.5)
PLATELET # BLD: 266 K/UL (ref 130–400)
PLATELET ESTIMATE: ABNORMAL
PMV BLD AUTO: 8.5 FL (ref 6–12)
POTASSIUM SERPL-SCNC: 4.3 MMOL/L (ref 3.7–5.3)
PRO-BNP: 30 PG/ML
RBC # BLD: 5.04 M/UL (ref 4–5.2)
RBC # BLD: ABNORMAL 10*6/UL
SEG NEUTROPHILS: 62 % (ref 36–66)
SEGMENTED NEUTROPHILS ABSOLUTE COUNT: 4.59 K/UL (ref 1.8–7.7)
SODIUM BLD-SCNC: 138 MMOL/L (ref 135–144)
TROPONIN INTERP: ABNORMAL
TROPONIN INTERP: ABNORMAL
TROPONIN INTERP: NORMAL
TROPONIN T: ABNORMAL NG/ML
TROPONIN T: ABNORMAL NG/ML
TROPONIN T: NORMAL NG/ML
TROPONIN, HIGH SENSITIVITY: 11 NG/L (ref 0–14)
TROPONIN, HIGH SENSITIVITY: 23 NG/L (ref 0–14)
TROPONIN, HIGH SENSITIVITY: 28 NG/L (ref 0–14)
WBC # BLD: 7.4 K/UL (ref 3.5–11)
WBC # BLD: ABNORMAL 10*3/UL

## 2019-02-10 PROCEDURE — 6370000000 HC RX 637 (ALT 250 FOR IP): Performed by: INTERNAL MEDICINE

## 2019-02-10 PROCEDURE — G0378 HOSPITAL OBSERVATION PER HR: HCPCS

## 2019-02-10 PROCEDURE — 36415 COLL VENOUS BLD VENIPUNCTURE: CPT

## 2019-02-10 PROCEDURE — 2060000000 HC ICU INTERMEDIATE R&B

## 2019-02-10 PROCEDURE — 96374 THER/PROPH/DIAG INJ IV PUSH: CPT

## 2019-02-10 PROCEDURE — 71045 X-RAY EXAM CHEST 1 VIEW: CPT

## 2019-02-10 PROCEDURE — 83880 ASSAY OF NATRIURETIC PEPTIDE: CPT

## 2019-02-10 PROCEDURE — 80048 BASIC METABOLIC PNL TOTAL CA: CPT

## 2019-02-10 PROCEDURE — 99223 1ST HOSP IP/OBS HIGH 75: CPT | Performed by: INTERNAL MEDICINE

## 2019-02-10 PROCEDURE — 6370000000 HC RX 637 (ALT 250 FOR IP): Performed by: EMERGENCY MEDICINE

## 2019-02-10 PROCEDURE — 2580000003 HC RX 258: Performed by: INTERNAL MEDICINE

## 2019-02-10 PROCEDURE — 93005 ELECTROCARDIOGRAM TRACING: CPT

## 2019-02-10 PROCEDURE — 6360000002 HC RX W HCPCS: Performed by: NURSE PRACTITIONER

## 2019-02-10 PROCEDURE — 99285 EMERGENCY DEPT VISIT HI MDM: CPT

## 2019-02-10 PROCEDURE — 83874 ASSAY OF MYOGLOBIN: CPT

## 2019-02-10 PROCEDURE — 84484 ASSAY OF TROPONIN QUANT: CPT

## 2019-02-10 PROCEDURE — 85025 COMPLETE CBC W/AUTO DIFF WBC: CPT

## 2019-02-10 PROCEDURE — 82947 ASSAY GLUCOSE BLOOD QUANT: CPT

## 2019-02-10 RX ORDER — SODIUM CHLORIDE 0.9 % (FLUSH) 0.9 %
10 SYRINGE (ML) INJECTION EVERY 12 HOURS SCHEDULED
Status: DISCONTINUED | OUTPATIENT
Start: 2019-02-10 | End: 2019-02-10 | Stop reason: SDUPTHER

## 2019-02-10 RX ORDER — POTASSIUM CHLORIDE 7.45 MG/ML
10 INJECTION INTRAVENOUS PRN
Status: DISCONTINUED | OUTPATIENT
Start: 2019-02-10 | End: 2019-02-12 | Stop reason: HOSPADM

## 2019-02-10 RX ORDER — POTASSIUM CHLORIDE 20MEQ/15ML
40 LIQUID (ML) ORAL PRN
Status: DISCONTINUED | OUTPATIENT
Start: 2019-02-10 | End: 2019-02-12 | Stop reason: HOSPADM

## 2019-02-10 RX ORDER — LOSARTAN POTASSIUM 100 MG/1
100 TABLET ORAL DAILY
Status: DISCONTINUED | OUTPATIENT
Start: 2019-02-10 | End: 2019-02-12 | Stop reason: HOSPADM

## 2019-02-10 RX ORDER — NITROGLYCERIN 0.4 MG/1
0.4 TABLET SUBLINGUAL EVERY 5 MIN PRN
Status: DISCONTINUED | OUTPATIENT
Start: 2019-02-10 | End: 2019-02-12 | Stop reason: HOSPADM

## 2019-02-10 RX ORDER — NITROGLYCERIN 0.4 MG/1
0.4 TABLET SUBLINGUAL EVERY 5 MIN PRN
Status: DISCONTINUED | OUTPATIENT
Start: 2019-02-10 | End: 2019-02-10 | Stop reason: SDUPTHER

## 2019-02-10 RX ORDER — PANTOPRAZOLE SODIUM 40 MG/1
40 TABLET, DELAYED RELEASE ORAL
Status: DISCONTINUED | OUTPATIENT
Start: 2019-02-11 | End: 2019-02-12 | Stop reason: HOSPADM

## 2019-02-10 RX ORDER — ONDANSETRON 4 MG/1
4 TABLET, ORALLY DISINTEGRATING ORAL EVERY 6 HOURS PRN
Status: DISCONTINUED | OUTPATIENT
Start: 2019-02-10 | End: 2019-02-12 | Stop reason: HOSPADM

## 2019-02-10 RX ORDER — ONDANSETRON 2 MG/ML
4 INJECTION INTRAMUSCULAR; INTRAVENOUS EVERY 6 HOURS PRN
Status: DISCONTINUED | OUTPATIENT
Start: 2019-02-10 | End: 2019-02-12 | Stop reason: HOSPADM

## 2019-02-10 RX ORDER — DOCUSATE SODIUM 100 MG/1
100 CAPSULE, LIQUID FILLED ORAL 2 TIMES DAILY
Status: DISCONTINUED | OUTPATIENT
Start: 2019-02-10 | End: 2019-02-12 | Stop reason: HOSPADM

## 2019-02-10 RX ORDER — ONDANSETRON 2 MG/ML
4 INJECTION INTRAMUSCULAR; INTRAVENOUS EVERY 6 HOURS PRN
Status: DISCONTINUED | OUTPATIENT
Start: 2019-02-10 | End: 2019-02-10 | Stop reason: SDUPTHER

## 2019-02-10 RX ORDER — ASPIRIN 325 MG
325 TABLET ORAL DAILY
Status: DISCONTINUED | OUTPATIENT
Start: 2019-02-11 | End: 2019-02-12 | Stop reason: HOSPADM

## 2019-02-10 RX ORDER — SODIUM CHLORIDE 0.9 % (FLUSH) 0.9 %
10 SYRINGE (ML) INJECTION PRN
Status: DISCONTINUED | OUTPATIENT
Start: 2019-02-10 | End: 2019-02-10 | Stop reason: SDUPTHER

## 2019-02-10 RX ORDER — HYDROCHLOROTHIAZIDE 25 MG/1
25 TABLET ORAL DAILY
Status: DISCONTINUED | OUTPATIENT
Start: 2019-02-11 | End: 2019-02-12 | Stop reason: HOSPADM

## 2019-02-10 RX ORDER — ATORVASTATIN CALCIUM 40 MG/1
40 TABLET, FILM COATED ORAL NIGHTLY
Status: DISCONTINUED | OUTPATIENT
Start: 2019-02-10 | End: 2019-02-10 | Stop reason: ALTCHOICE

## 2019-02-10 RX ORDER — TRAMADOL HYDROCHLORIDE 50 MG/1
50 TABLET ORAL EVERY 6 HOURS PRN
Status: DISCONTINUED | OUTPATIENT
Start: 2019-02-10 | End: 2019-02-12 | Stop reason: HOSPADM

## 2019-02-10 RX ORDER — POTASSIUM CHLORIDE 7.45 MG/ML
10 INJECTION INTRAVENOUS PRN
Status: DISCONTINUED | OUTPATIENT
Start: 2019-02-10 | End: 2019-02-10 | Stop reason: SDUPTHER

## 2019-02-10 RX ORDER — SODIUM CHLORIDE 0.9 % (FLUSH) 0.9 %
10 SYRINGE (ML) INJECTION EVERY 12 HOURS SCHEDULED
Status: DISCONTINUED | OUTPATIENT
Start: 2019-02-10 | End: 2019-02-12 | Stop reason: HOSPADM

## 2019-02-10 RX ORDER — MAGNESIUM SULFATE 1 G/100ML
1 INJECTION INTRAVENOUS PRN
Status: DISCONTINUED | OUTPATIENT
Start: 2019-02-10 | End: 2019-02-12 | Stop reason: HOSPADM

## 2019-02-10 RX ORDER — DOCUSATE SODIUM 100 MG/1
100 CAPSULE, LIQUID FILLED ORAL 2 TIMES DAILY
Status: DISCONTINUED | OUTPATIENT
Start: 2019-02-10 | End: 2019-02-10 | Stop reason: SDUPTHER

## 2019-02-10 RX ORDER — POTASSIUM CHLORIDE 20MEQ/15ML
40 LIQUID (ML) ORAL PRN
Status: DISCONTINUED | OUTPATIENT
Start: 2019-02-10 | End: 2019-02-10 | Stop reason: SDUPTHER

## 2019-02-10 RX ORDER — MAGNESIUM SULFATE 1 G/100ML
1 INJECTION INTRAVENOUS PRN
Status: DISCONTINUED | OUTPATIENT
Start: 2019-02-10 | End: 2019-02-10 | Stop reason: SDUPTHER

## 2019-02-10 RX ORDER — NICOTINE 21 MG/24HR
1 PATCH, TRANSDERMAL 24 HOURS TRANSDERMAL DAILY
Status: DISCONTINUED | OUTPATIENT
Start: 2019-02-10 | End: 2019-02-12 | Stop reason: HOSPADM

## 2019-02-10 RX ORDER — POTASSIUM CHLORIDE 20 MEQ/1
40 TABLET, EXTENDED RELEASE ORAL PRN
Status: DISCONTINUED | OUTPATIENT
Start: 2019-02-10 | End: 2019-02-10 | Stop reason: SDUPTHER

## 2019-02-10 RX ORDER — PRAVASTATIN SODIUM 40 MG
80 TABLET ORAL DAILY
Status: DISCONTINUED | OUTPATIENT
Start: 2019-02-10 | End: 2019-02-12 | Stop reason: HOSPADM

## 2019-02-10 RX ORDER — NITROGLYCERIN 0.4 MG/1
0.4 TABLET SUBLINGUAL ONCE
Status: COMPLETED | OUTPATIENT
Start: 2019-02-10 | End: 2019-02-10

## 2019-02-10 RX ORDER — MORPHINE SULFATE 4 MG/ML
4 INJECTION, SOLUTION INTRAMUSCULAR; INTRAVENOUS ONCE
Status: COMPLETED | OUTPATIENT
Start: 2019-02-10 | End: 2019-02-10

## 2019-02-10 RX ORDER — BISACODYL 10 MG
10 SUPPOSITORY, RECTAL RECTAL DAILY PRN
Status: DISCONTINUED | OUTPATIENT
Start: 2019-02-10 | End: 2019-02-12 | Stop reason: HOSPADM

## 2019-02-10 RX ORDER — BISACODYL 10 MG
10 SUPPOSITORY, RECTAL RECTAL DAILY PRN
Status: DISCONTINUED | OUTPATIENT
Start: 2019-02-10 | End: 2019-02-10 | Stop reason: SDUPTHER

## 2019-02-10 RX ORDER — ATORVASTATIN CALCIUM 40 MG/1
40 TABLET, FILM COATED ORAL NIGHTLY
Status: DISCONTINUED | OUTPATIENT
Start: 2019-02-10 | End: 2019-02-10

## 2019-02-10 RX ORDER — POTASSIUM CHLORIDE 20 MEQ/1
40 TABLET, EXTENDED RELEASE ORAL PRN
Status: DISCONTINUED | OUTPATIENT
Start: 2019-02-10 | End: 2019-02-12 | Stop reason: HOSPADM

## 2019-02-10 RX ORDER — LOSARTAN POTASSIUM 100 MG/1
100 TABLET ORAL DAILY
Status: DISCONTINUED | OUTPATIENT
Start: 2019-02-10 | End: 2019-02-10 | Stop reason: SDUPTHER

## 2019-02-10 RX ORDER — SODIUM CHLORIDE 0.9 % (FLUSH) 0.9 %
10 SYRINGE (ML) INJECTION PRN
Status: DISCONTINUED | OUTPATIENT
Start: 2019-02-10 | End: 2019-02-12 | Stop reason: HOSPADM

## 2019-02-10 RX ADMIN — MORPHINE SULFATE 4 MG: 4 INJECTION INTRAVENOUS at 16:41

## 2019-02-10 RX ADMIN — LIDOCAINE HYDROCHLORIDE: 20 SOLUTION ORAL; TOPICAL at 22:40

## 2019-02-10 RX ADMIN — TICAGRELOR 90 MG: 90 TABLET ORAL at 22:02

## 2019-02-10 RX ADMIN — NITROGLYCERIN 0.4 MG: 0.4 TABLET SUBLINGUAL at 22:04

## 2019-02-10 RX ADMIN — METOPROLOL TARTRATE 25 MG: 25 TABLET ORAL at 22:04

## 2019-02-10 RX ADMIN — NITROGLYCERIN 0.4 MG: 0.4 TABLET, ORALLY DISINTEGRATING SUBLINGUAL at 18:13

## 2019-02-10 RX ADMIN — Medication 10 ML: at 22:10

## 2019-02-10 RX ADMIN — PRAVASTATIN SODIUM 80 MG: 40 TABLET ORAL at 22:04

## 2019-02-10 ASSESSMENT — PAIN DESCRIPTION - FREQUENCY: FREQUENCY: CONTINUOUS

## 2019-02-10 ASSESSMENT — PAIN DESCRIPTION - ORIENTATION: ORIENTATION: LEFT;RIGHT

## 2019-02-10 ASSESSMENT — ENCOUNTER SYMPTOMS
ABDOMINAL PAIN: 0
COUGH: 0
NAUSEA: 0
VOMITING: 0
SHORTNESS OF BREATH: 0

## 2019-02-10 ASSESSMENT — PAIN SCALES - GENERAL
PAINLEVEL_OUTOF10: 4
PAINLEVEL_OUTOF10: 4

## 2019-02-10 ASSESSMENT — PAIN DESCRIPTION - DESCRIPTORS: DESCRIPTORS: HEAVINESS;CONSTANT

## 2019-02-10 ASSESSMENT — PAIN DESCRIPTION - LOCATION: LOCATION: ARM

## 2019-02-10 ASSESSMENT — PAIN SCALES - WONG BAKER: WONGBAKER_NUMERICALRESPONSE: 4

## 2019-02-11 ENCOUNTER — APPOINTMENT (OUTPATIENT)
Dept: NUCLEAR MEDICINE | Age: 54
End: 2019-02-11
Payer: COMMERCIAL

## 2019-02-11 VITALS
BODY MASS INDEX: 37.87 KG/M2 | SYSTOLIC BLOOD PRESSURE: 117 MMHG | HEIGHT: 64 IN | RESPIRATION RATE: 14 BRPM | TEMPERATURE: 97.3 F | HEART RATE: 73 BPM | DIASTOLIC BLOOD PRESSURE: 56 MMHG | WEIGHT: 221.8 LBS | OXYGEN SATURATION: 93 %

## 2019-02-11 LAB
ALBUMIN SERPL-MCNC: 3.9 G/DL (ref 3.5–5.2)
ALBUMIN/GLOBULIN RATIO: ABNORMAL (ref 1–2.5)
ALP BLD-CCNC: 96 U/L (ref 35–104)
ALT SERPL-CCNC: 20 U/L (ref 5–33)
ANION GAP SERPL CALCULATED.3IONS-SCNC: 15 MMOL/L (ref 9–17)
AST SERPL-CCNC: 17 U/L
BILIRUB SERPL-MCNC: 0.46 MG/DL (ref 0.3–1.2)
BUN BLDV-MCNC: 16 MG/DL (ref 6–20)
BUN/CREAT BLD: 23 (ref 9–20)
CALCIUM SERPL-MCNC: 9.3 MG/DL (ref 8.6–10.4)
CHLORIDE BLD-SCNC: 101 MMOL/L (ref 98–107)
CHOLESTEROL/HDL RATIO: 3.7
CHOLESTEROL: 183 MG/DL
CO2: 24 MMOL/L (ref 20–31)
CREAT SERPL-MCNC: 0.69 MG/DL (ref 0.5–0.9)
D-DIMER QUANTITATIVE: 0.2 MG/L FEU
EKG ATRIAL RATE: 87 BPM
EKG P AXIS: 50 DEGREES
EKG P-R INTERVAL: 134 MS
EKG Q-T INTERVAL: 344 MS
EKG QRS DURATION: 82 MS
EKG QTC CALCULATION (BAZETT): 413 MS
EKG R AXIS: 20 DEGREES
EKG T AXIS: 104 DEGREES
EKG VENTRICULAR RATE: 87 BPM
GFR AFRICAN AMERICAN: >60 ML/MIN
GFR NON-AFRICAN AMERICAN: >60 ML/MIN
GFR SERPL CREATININE-BSD FRML MDRD: ABNORMAL ML/MIN/{1.73_M2}
GFR SERPL CREATININE-BSD FRML MDRD: ABNORMAL ML/MIN/{1.73_M2}
GLUCOSE BLD-MCNC: 100 MG/DL (ref 70–99)
GLUCOSE BLD-MCNC: 129 MG/DL (ref 65–105)
HCT VFR BLD CALC: 40.2 % (ref 36–46)
HDLC SERPL-MCNC: 49 MG/DL
HEMOGLOBIN: 13.4 G/DL (ref 12–16)
LDL CHOLESTEROL: 111 MG/DL (ref 0–130)
LV EF: 70 %
LVEF MODALITY: NORMAL
MAGNESIUM: 2.1 MG/DL (ref 1.6–2.6)
MCH RBC QN AUTO: 27.8 PG (ref 26–34)
MCHC RBC AUTO-ENTMCNC: 33.4 G/DL (ref 31–37)
MCV RBC AUTO: 83.2 FL (ref 80–100)
NRBC AUTOMATED: NORMAL PER 100 WBC
PDW BLD-RTO: 13.8 % (ref 11.5–14.5)
PLATELET # BLD: 274 K/UL (ref 130–400)
PMV BLD AUTO: 8.7 FL (ref 6–12)
POTASSIUM SERPL-SCNC: 3.9 MMOL/L (ref 3.7–5.3)
RBC # BLD: 4.84 M/UL (ref 4–5.2)
SODIUM BLD-SCNC: 140 MMOL/L (ref 135–144)
TOTAL PROTEIN: 6.6 G/DL (ref 6.4–8.3)
TRIGL SERPL-MCNC: 114 MG/DL
TROPONIN INTERP: ABNORMAL
TROPONIN T: ABNORMAL NG/ML
TROPONIN, HIGH SENSITIVITY: 25 NG/L (ref 0–14)
VLDLC SERPL CALC-MCNC: NORMAL MG/DL (ref 1–30)
WBC # BLD: 7.1 K/UL (ref 3.5–11)

## 2019-02-11 PROCEDURE — 80053 COMPREHEN METABOLIC PANEL: CPT

## 2019-02-11 PROCEDURE — 6360000002 HC RX W HCPCS: Performed by: INTERNAL MEDICINE

## 2019-02-11 PROCEDURE — 2580000003 HC RX 258: Performed by: INTERNAL MEDICINE

## 2019-02-11 PROCEDURE — 93005 ELECTROCARDIOGRAM TRACING: CPT

## 2019-02-11 PROCEDURE — 93017 CV STRESS TEST TRACING ONLY: CPT

## 2019-02-11 PROCEDURE — A9500 TC99M SESTAMIBI: HCPCS | Performed by: INTERNAL MEDICINE

## 2019-02-11 PROCEDURE — G0378 HOSPITAL OBSERVATION PER HR: HCPCS

## 2019-02-11 PROCEDURE — 6370000000 HC RX 637 (ALT 250 FOR IP): Performed by: INTERNAL MEDICINE

## 2019-02-11 PROCEDURE — 99225 PR SBSQ OBSERVATION CARE/DAY 25 MINUTES: CPT | Performed by: INTERNAL MEDICINE

## 2019-02-11 PROCEDURE — 85379 FIBRIN DEGRADATION QUANT: CPT

## 2019-02-11 PROCEDURE — 36415 COLL VENOUS BLD VENIPUNCTURE: CPT

## 2019-02-11 PROCEDURE — 83735 ASSAY OF MAGNESIUM: CPT

## 2019-02-11 PROCEDURE — 85027 COMPLETE CBC AUTOMATED: CPT

## 2019-02-11 PROCEDURE — 80061 LIPID PANEL: CPT

## 2019-02-11 PROCEDURE — 78452 HT MUSCLE IMAGE SPECT MULT: CPT

## 2019-02-11 PROCEDURE — 3430000000 HC RX DIAGNOSTIC RADIOPHARMACEUTICAL: Performed by: INTERNAL MEDICINE

## 2019-02-11 PROCEDURE — 82947 ASSAY GLUCOSE BLOOD QUANT: CPT

## 2019-02-11 RX ORDER — NITROGLYCERIN 0.4 MG/1
0.4 TABLET SUBLINGUAL EVERY 5 MIN PRN
Status: DISCONTINUED | OUTPATIENT
Start: 2019-02-11 | End: 2019-02-12 | Stop reason: HOSPADM

## 2019-02-11 RX ORDER — SODIUM CHLORIDE 0.9 % (FLUSH) 0.9 %
10 SYRINGE (ML) INJECTION PRN
Status: DISCONTINUED | OUTPATIENT
Start: 2019-02-11 | End: 2019-02-12 | Stop reason: HOSPADM

## 2019-02-11 RX ORDER — AMINOPHYLLINE DIHYDRATE 25 MG/ML
100 INJECTION, SOLUTION INTRAVENOUS
Status: DISCONTINUED | OUTPATIENT
Start: 2019-02-11 | End: 2019-02-11 | Stop reason: HOSPADM

## 2019-02-11 RX ORDER — 0.9 % SODIUM CHLORIDE 0.9 %
250 INTRAVENOUS SOLUTION INTRAVENOUS ONCE
Status: DISCONTINUED | OUTPATIENT
Start: 2019-02-11 | End: 2019-02-12 | Stop reason: HOSPADM

## 2019-02-11 RX ORDER — METOPROLOL TARTRATE 5 MG/5ML
2.5 INJECTION INTRAVENOUS PRN
Status: DISCONTINUED | OUTPATIENT
Start: 2019-02-11 | End: 2019-02-12 | Stop reason: HOSPADM

## 2019-02-11 RX ADMIN — HYDROCHLOROTHIAZIDE 25 MG: 25 TABLET ORAL at 09:36

## 2019-02-11 RX ADMIN — Medication 10 ML: at 09:37

## 2019-02-11 RX ADMIN — TICAGRELOR 90 MG: 90 TABLET ORAL at 09:36

## 2019-02-11 RX ADMIN — LOSARTAN POTASSIUM 100 MG: 100 TABLET, FILM COATED ORAL at 09:36

## 2019-02-11 RX ADMIN — METOPROLOL TARTRATE 25 MG: 25 TABLET ORAL at 09:36

## 2019-02-11 RX ADMIN — TETRAKIS(2-METHOXYISOBUTYLISOCYANIDE)COPPER(I) TETRAFLUOROBORATE 40.4 MILLICURIE: 1 INJECTION, POWDER, LYOPHILIZED, FOR SOLUTION INTRAVENOUS at 12:05

## 2019-02-11 RX ADMIN — TETRAKIS(2-METHOXYISOBUTYLISOCYANIDE)COPPER(I) TETRAFLUOROBORATE 19 MILLICURIE: 1 INJECTION, POWDER, LYOPHILIZED, FOR SOLUTION INTRAVENOUS at 08:21

## 2019-02-11 RX ADMIN — Medication 10 ML: at 08:20

## 2019-02-11 RX ADMIN — REGADENOSON 0.4 MG: 0.08 INJECTION, SOLUTION INTRAVENOUS at 08:20

## 2019-02-11 RX ADMIN — ASPIRIN 325 MG: 325 TABLET, COATED ORAL at 09:36

## 2019-02-11 ASSESSMENT — PAIN SCALES - GENERAL
PAINLEVEL_OUTOF10: 4
PAINLEVEL_OUTOF10: 4

## 2019-02-11 ASSESSMENT — ENCOUNTER SYMPTOMS
COUGH: 0
NAUSEA: 0
ABDOMINAL PAIN: 0
VOMITING: 0
SHORTNESS OF BREATH: 0

## 2019-02-12 LAB
EKG ATRIAL RATE: 75 BPM
EKG P AXIS: 59 DEGREES
EKG P-R INTERVAL: 126 MS
EKG Q-T INTERVAL: 348 MS
EKG QRS DURATION: 84 MS
EKG QTC CALCULATION (BAZETT): 388 MS
EKG R AXIS: 64 DEGREES
EKG T AXIS: 121 DEGREES
EKG VENTRICULAR RATE: 75 BPM

## 2019-02-14 ENCOUNTER — TELEPHONE (OUTPATIENT)
Dept: FAMILY MEDICINE CLINIC | Age: 54
End: 2019-02-14

## 2019-02-14 ENCOUNTER — OFFICE VISIT (OUTPATIENT)
Dept: FAMILY MEDICINE CLINIC | Age: 54
End: 2019-02-14
Payer: COMMERCIAL

## 2019-02-14 VITALS
HEART RATE: 71 BPM | OXYGEN SATURATION: 96 % | BODY MASS INDEX: 37.76 KG/M2 | DIASTOLIC BLOOD PRESSURE: 70 MMHG | SYSTOLIC BLOOD PRESSURE: 110 MMHG | WEIGHT: 220 LBS

## 2019-02-14 DIAGNOSIS — I25.110 CORONARY ARTERY DISEASE INVOLVING NATIVE CORONARY ARTERY OF NATIVE HEART WITH UNSTABLE ANGINA PECTORIS (HCC): Primary | ICD-10-CM

## 2019-02-14 DIAGNOSIS — R07.9 CHEST PAIN, UNSPECIFIED TYPE: ICD-10-CM

## 2019-02-14 PROCEDURE — 99214 OFFICE O/P EST MOD 30 MIN: CPT | Performed by: FAMILY MEDICINE

## 2019-02-14 ASSESSMENT — PATIENT HEALTH QUESTIONNAIRE - PHQ9
2. FEELING DOWN, DEPRESSED OR HOPELESS: 0
SUM OF ALL RESPONSES TO PHQ QUESTIONS 1-9: 0
1. LITTLE INTEREST OR PLEASURE IN DOING THINGS: 0
SUM OF ALL RESPONSES TO PHQ9 QUESTIONS 1 & 2: 0
SUM OF ALL RESPONSES TO PHQ QUESTIONS 1-9: 0

## 2019-03-07 ENCOUNTER — OFFICE VISIT (OUTPATIENT)
Dept: FAMILY MEDICINE CLINIC | Age: 54
End: 2019-03-07
Payer: COMMERCIAL

## 2019-03-07 VITALS
WEIGHT: 223 LBS | TEMPERATURE: 97.9 F | OXYGEN SATURATION: 95 % | SYSTOLIC BLOOD PRESSURE: 124 MMHG | BODY MASS INDEX: 38.28 KG/M2 | DIASTOLIC BLOOD PRESSURE: 74 MMHG | HEART RATE: 81 BPM

## 2019-03-07 DIAGNOSIS — J01.90 SUBACUTE SINUSITIS, UNSPECIFIED LOCATION: Primary | ICD-10-CM

## 2019-03-07 PROCEDURE — 99213 OFFICE O/P EST LOW 20 MIN: CPT | Performed by: FAMILY MEDICINE

## 2019-03-07 RX ORDER — ATORVASTATIN CALCIUM 40 MG/1
TABLET, FILM COATED ORAL DAILY
Status: ON HOLD | COMMUNITY
Start: 2019-03-04 | End: 2019-03-13 | Stop reason: HOSPADM

## 2019-03-07 RX ORDER — AMOXICILLIN 875 MG/1
875 TABLET, COATED ORAL 2 TIMES DAILY
Qty: 20 TABLET | Refills: 0 | Status: SHIPPED | OUTPATIENT
Start: 2019-03-07 | End: 2019-03-14 | Stop reason: SDUPTHER

## 2019-03-07 RX ORDER — AMLODIPINE BESYLATE 5 MG/1
10 TABLET ORAL DAILY
COMMUNITY
Start: 2019-03-04

## 2019-03-12 ENCOUNTER — HOSPITAL ENCOUNTER (OUTPATIENT)
Dept: CARDIAC CATH/INVASIVE PROCEDURES | Age: 54
Setting detail: OBSERVATION
Discharge: HOME | End: 2019-03-13
Attending: INTERNAL MEDICINE | Admitting: INTERNAL MEDICINE
Payer: COMMERCIAL

## 2019-03-12 DIAGNOSIS — Z98.61 POST PTCA: ICD-10-CM

## 2019-03-12 LAB
GFR NON-AFRICAN AMERICAN: >60 ML/MIN
GFR SERPL CREATININE-BSD FRML MDRD: >60 ML/MIN
GFR SERPL CREATININE-BSD FRML MDRD: NORMAL ML/MIN/{1.73_M2}
GLUCOSE BLD-MCNC: 101 MG/DL (ref 74–100)
PLATELET # BLD: 272 K/UL (ref 138–453)
POC CHLORIDE: 108 MMOL/L (ref 98–107)
POC CREATININE: 0.75 MG/DL (ref 0.51–1.19)
POC HEMATOCRIT: 41 % (ref 36–46)
POC HEMOGLOBIN: 14 G/DL (ref 12–16)
POC LACTIC ACID: 1.07 MMOL/L (ref 0.56–1.39)
POC POTASSIUM: 3.3 MMOL/L (ref 3.5–4.5)
POC SODIUM: 144 MMOL/L (ref 138–146)

## 2019-03-12 PROCEDURE — 82435 ASSAY OF BLOOD CHLORIDE: CPT

## 2019-03-12 PROCEDURE — 7100000010 HC PHASE II RECOVERY - FIRST 15 MIN

## 2019-03-12 PROCEDURE — 7100000011 HC PHASE II RECOVERY - ADDTL 15 MIN

## 2019-03-12 PROCEDURE — 6370000000 HC RX 637 (ALT 250 FOR IP)

## 2019-03-12 PROCEDURE — C1769 GUIDE WIRE: HCPCS

## 2019-03-12 PROCEDURE — C1760 CLOSURE DEV, VASC: HCPCS

## 2019-03-12 PROCEDURE — 76937 US GUIDE VASCULAR ACCESS: CPT

## 2019-03-12 PROCEDURE — G0378 HOSPITAL OBSERVATION PER HR: HCPCS

## 2019-03-12 PROCEDURE — 83605 ASSAY OF LACTIC ACID: CPT

## 2019-03-12 PROCEDURE — C1887 CATHETER, GUIDING: HCPCS

## 2019-03-12 PROCEDURE — 84132 ASSAY OF SERUM POTASSIUM: CPT

## 2019-03-12 PROCEDURE — 85049 AUTOMATED PLATELET COUNT: CPT

## 2019-03-12 PROCEDURE — C1725 CATH, TRANSLUMIN NON-LASER: HCPCS

## 2019-03-12 PROCEDURE — 82947 ASSAY GLUCOSE BLOOD QUANT: CPT

## 2019-03-12 PROCEDURE — 85014 HEMATOCRIT: CPT

## 2019-03-12 PROCEDURE — 6360000004 HC RX CONTRAST MEDICATION

## 2019-03-12 PROCEDURE — 6360000002 HC RX W HCPCS

## 2019-03-12 PROCEDURE — 92920 PRQ TRLUML C ANGIOP 1ART&/BR: CPT | Performed by: INTERNAL MEDICINE

## 2019-03-12 PROCEDURE — 84295 ASSAY OF SERUM SODIUM: CPT

## 2019-03-12 PROCEDURE — 6370000000 HC RX 637 (ALT 250 FOR IP): Performed by: STUDENT IN AN ORGANIZED HEALTH CARE EDUCATION/TRAINING PROGRAM

## 2019-03-12 PROCEDURE — 2709999900 HC NON-CHARGEABLE SUPPLY

## 2019-03-12 PROCEDURE — 93454 CORONARY ARTERY ANGIO S&I: CPT | Performed by: INTERNAL MEDICINE

## 2019-03-12 PROCEDURE — 82565 ASSAY OF CREATININE: CPT

## 2019-03-12 PROCEDURE — 2500000003 HC RX 250 WO HCPCS

## 2019-03-12 PROCEDURE — C1894 INTRO/SHEATH, NON-LASER: HCPCS

## 2019-03-12 RX ORDER — LOSARTAN POTASSIUM 50 MG/1
100 TABLET ORAL DAILY
Status: DISCONTINUED | OUTPATIENT
Start: 2019-03-12 | End: 2019-03-13 | Stop reason: HOSPADM

## 2019-03-12 RX ORDER — TRAMADOL HYDROCHLORIDE 50 MG/1
25 TABLET ORAL EVERY 6 HOURS PRN
Status: DISCONTINUED | OUTPATIENT
Start: 2019-03-12 | End: 2019-03-13 | Stop reason: HOSPADM

## 2019-03-12 RX ORDER — ASPIRIN 81 MG/1
81 TABLET ORAL
Status: DISCONTINUED | OUTPATIENT
Start: 2019-03-13 | End: 2019-03-13 | Stop reason: HOSPADM

## 2019-03-12 RX ORDER — NITROGLYCERIN 0.4 MG/1
0.4 TABLET SUBLINGUAL EVERY 5 MIN PRN
Status: DISCONTINUED | OUTPATIENT
Start: 2019-03-12 | End: 2019-03-13 | Stop reason: HOSPADM

## 2019-03-12 RX ORDER — PANTOPRAZOLE SODIUM 40 MG/1
40 TABLET, DELAYED RELEASE ORAL
Status: DISCONTINUED | OUTPATIENT
Start: 2019-03-13 | End: 2019-03-13 | Stop reason: HOSPADM

## 2019-03-12 RX ORDER — ATORVASTATIN CALCIUM 40 MG/1
40 TABLET, FILM COATED ORAL DAILY
Status: DISCONTINUED | OUTPATIENT
Start: 2019-03-12 | End: 2019-03-13 | Stop reason: HOSPADM

## 2019-03-12 RX ORDER — ACETAMINOPHEN 325 MG/1
650 TABLET ORAL EVERY 4 HOURS PRN
Status: DISCONTINUED | OUTPATIENT
Start: 2019-03-12 | End: 2019-03-13 | Stop reason: HOSPADM

## 2019-03-12 RX ORDER — AMLODIPINE BESYLATE 5 MG/1
5 TABLET ORAL DAILY
Status: DISCONTINUED | OUTPATIENT
Start: 2019-03-12 | End: 2019-03-13 | Stop reason: HOSPADM

## 2019-03-12 RX ORDER — POTASSIUM CHLORIDE 7.45 MG/ML
10 INJECTION INTRAVENOUS ONCE
Status: COMPLETED | OUTPATIENT
Start: 2019-03-12 | End: 2019-03-12

## 2019-03-12 RX ORDER — SODIUM CHLORIDE 0.9 % (FLUSH) 0.9 %
10 SYRINGE (ML) INJECTION EVERY 12 HOURS SCHEDULED
Status: DISCONTINUED | OUTPATIENT
Start: 2019-03-12 | End: 2019-03-13 | Stop reason: HOSPADM

## 2019-03-12 RX ORDER — SODIUM CHLORIDE 9 MG/ML
INJECTION, SOLUTION INTRAVENOUS CONTINUOUS
Status: DISCONTINUED | OUTPATIENT
Start: 2019-03-12 | End: 2019-03-13 | Stop reason: HOSPADM

## 2019-03-12 RX ORDER — SODIUM CHLORIDE 0.9 % (FLUSH) 0.9 %
10 SYRINGE (ML) INJECTION PRN
Status: DISCONTINUED | OUTPATIENT
Start: 2019-03-12 | End: 2019-03-13 | Stop reason: HOSPADM

## 2019-03-12 RX ADMIN — LOSARTAN POTASSIUM 100 MG: 50 TABLET, FILM COATED ORAL at 20:47

## 2019-03-12 RX ADMIN — METOPROLOL TARTRATE 25 MG: 25 TABLET ORAL at 20:47

## 2019-03-12 RX ADMIN — DESMOPRESSIN ACETATE 40 MG: 0.2 TABLET ORAL at 20:47

## 2019-03-12 RX ADMIN — AMLODIPINE BESYLATE 5 MG: 5 TABLET ORAL at 20:48

## 2019-03-12 RX ADMIN — TICAGRELOR 90 MG: 90 TABLET ORAL at 20:47

## 2019-03-12 RX ADMIN — POTASSIUM CHLORIDE 10 MEQ: 7.45 INJECTION INTRAVENOUS at 12:24

## 2019-03-12 RX ADMIN — SODIUM CHLORIDE: 9 INJECTION, SOLUTION INTRAVENOUS at 12:08

## 2019-03-13 VITALS
WEIGHT: 225 LBS | BODY MASS INDEX: 38.41 KG/M2 | SYSTOLIC BLOOD PRESSURE: 103 MMHG | OXYGEN SATURATION: 95 % | DIASTOLIC BLOOD PRESSURE: 49 MMHG | RESPIRATION RATE: 13 BRPM | HEART RATE: 74 BPM | TEMPERATURE: 98 F | HEIGHT: 64 IN

## 2019-03-13 LAB
ANION GAP SERPL CALCULATED.3IONS-SCNC: 10 MMOL/L (ref 9–17)
BUN BLDV-MCNC: 9 MG/DL (ref 6–20)
BUN/CREAT BLD: ABNORMAL (ref 9–20)
CALCIUM SERPL-MCNC: 9.2 MG/DL (ref 8.6–10.4)
CHLORIDE BLD-SCNC: 103 MMOL/L (ref 98–107)
CO2: 27 MMOL/L (ref 20–31)
CREAT SERPL-MCNC: 0.58 MG/DL (ref 0.5–0.9)
GFR AFRICAN AMERICAN: >60 ML/MIN
GFR NON-AFRICAN AMERICAN: >60 ML/MIN
GFR SERPL CREATININE-BSD FRML MDRD: ABNORMAL ML/MIN/{1.73_M2}
GFR SERPL CREATININE-BSD FRML MDRD: ABNORMAL ML/MIN/{1.73_M2}
GLUCOSE BLD-MCNC: 125 MG/DL (ref 70–99)
HCT VFR BLD CALC: 37.2 % (ref 36.3–47.1)
HEMOGLOBIN: 12.4 G/DL (ref 11.9–15.1)
MCH RBC QN AUTO: 28.1 PG (ref 25.2–33.5)
MCHC RBC AUTO-ENTMCNC: 33.3 G/DL (ref 28.4–34.8)
MCV RBC AUTO: 84.4 FL (ref 82.6–102.9)
NRBC AUTOMATED: 0 PER 100 WBC
PDW BLD-RTO: 13 % (ref 11.8–14.4)
PLATELET # BLD: 257 K/UL (ref 138–453)
PMV BLD AUTO: 10.6 FL (ref 8.1–13.5)
POTASSIUM SERPL-SCNC: 3.8 MMOL/L (ref 3.7–5.3)
RBC # BLD: 4.41 M/UL (ref 3.95–5.11)
SODIUM BLD-SCNC: 140 MMOL/L (ref 135–144)
WBC # BLD: 6.4 K/UL (ref 3.5–11.3)

## 2019-03-13 PROCEDURE — 85027 COMPLETE CBC AUTOMATED: CPT

## 2019-03-13 PROCEDURE — 80048 BASIC METABOLIC PNL TOTAL CA: CPT

## 2019-03-13 PROCEDURE — G0378 HOSPITAL OBSERVATION PER HR: HCPCS

## 2019-03-13 PROCEDURE — 36415 COLL VENOUS BLD VENIPUNCTURE: CPT

## 2019-03-13 RX ORDER — ATORVASTATIN CALCIUM 40 MG/1
80 TABLET, FILM COATED ORAL DAILY
Qty: 30 TABLET | Refills: 3 | Status: SHIPPED | OUTPATIENT
Start: 2019-03-13 | End: 2019-05-01 | Stop reason: SDUPTHER

## 2019-03-13 RX ORDER — POTASSIUM CHLORIDE 20 MEQ/1
20 TABLET, EXTENDED RELEASE ORAL DAILY
Qty: 90 TABLET | Refills: 1 | Status: SHIPPED | OUTPATIENT
Start: 2019-03-13 | End: 2020-02-08

## 2019-03-14 RX ORDER — AMOXICILLIN 875 MG/1
875 TABLET, COATED ORAL 2 TIMES DAILY
Qty: 20 TABLET | Refills: 0 | Status: SHIPPED | OUTPATIENT
Start: 2019-03-14 | End: 2019-03-24

## 2019-03-20 ENCOUNTER — HOSPITAL ENCOUNTER (OUTPATIENT)
Age: 54
Setting detail: SPECIMEN
Discharge: HOME | End: 2019-03-20
Payer: COMMERCIAL

## 2019-03-20 ENCOUNTER — OFFICE VISIT (OUTPATIENT)
Dept: FAMILY MEDICINE CLINIC | Age: 54
End: 2019-03-20
Payer: COMMERCIAL

## 2019-03-20 VITALS
WEIGHT: 220 LBS | OXYGEN SATURATION: 95 % | BODY MASS INDEX: 37.76 KG/M2 | HEART RATE: 75 BPM | DIASTOLIC BLOOD PRESSURE: 64 MMHG | SYSTOLIC BLOOD PRESSURE: 116 MMHG

## 2019-03-20 DIAGNOSIS — E78.00 ELEVATED LDL CHOLESTEROL LEVEL: ICD-10-CM

## 2019-03-20 DIAGNOSIS — E87.6 HYPOKALEMIA: ICD-10-CM

## 2019-03-20 DIAGNOSIS — I25.110 CORONARY ARTERY DISEASE INVOLVING NATIVE CORONARY ARTERY OF NATIVE HEART WITH UNSTABLE ANGINA PECTORIS (HCC): Primary | ICD-10-CM

## 2019-03-20 LAB
ANION GAP SERPL CALCULATED.3IONS-SCNC: 12 MMOL/L (ref 9–17)
CHLORIDE BLD-SCNC: 101 MMOL/L (ref 98–107)
CO2: 27 MMOL/L (ref 20–31)
POTASSIUM SERPL-SCNC: 3.3 MMOL/L (ref 3.7–5.3)
SODIUM BLD-SCNC: 140 MMOL/L (ref 135–144)

## 2019-03-20 PROCEDURE — 99214 OFFICE O/P EST MOD 30 MIN: CPT | Performed by: FAMILY MEDICINE

## 2019-03-22 DIAGNOSIS — E87.6 HYPOKALEMIA: Primary | ICD-10-CM

## 2019-03-26 ENCOUNTER — TELEPHONE (OUTPATIENT)
Dept: FAMILY MEDICINE CLINIC | Age: 54
End: 2019-03-26

## 2019-03-26 DIAGNOSIS — J01.91 ACUTE RECURRENT SINUSITIS, UNSPECIFIED LOCATION: Primary | ICD-10-CM

## 2019-03-27 ENCOUNTER — HOSPITAL ENCOUNTER (OUTPATIENT)
Age: 54
Discharge: HOME | End: 2019-03-27
Payer: COMMERCIAL

## 2019-03-27 DIAGNOSIS — E87.6 HYPOKALEMIA: ICD-10-CM

## 2019-03-27 LAB
ANION GAP SERPL CALCULATED.3IONS-SCNC: 14 MMOL/L (ref 9–17)
CHLORIDE BLD-SCNC: 108 MMOL/L (ref 98–107)
CO2: 25 MMOL/L (ref 20–31)
POTASSIUM SERPL-SCNC: 4.2 MMOL/L (ref 3.7–5.3)
SODIUM BLD-SCNC: 147 MMOL/L (ref 135–144)

## 2019-03-27 PROCEDURE — 36415 COLL VENOUS BLD VENIPUNCTURE: CPT

## 2019-03-27 PROCEDURE — 80051 ELECTROLYTE PANEL: CPT

## 2019-03-29 ENCOUNTER — TELEPHONE (OUTPATIENT)
Dept: FAMILY MEDICINE CLINIC | Age: 54
End: 2019-03-29

## 2019-04-04 ENCOUNTER — HOSPITAL ENCOUNTER (OUTPATIENT)
Dept: CT IMAGING | Age: 54
Discharge: HOME | End: 2019-04-06
Payer: COMMERCIAL

## 2019-04-04 DIAGNOSIS — J01.91 ACUTE RECURRENT SINUSITIS, UNSPECIFIED LOCATION: ICD-10-CM

## 2019-04-04 PROCEDURE — 70486 CT MAXILLOFACIAL W/O DYE: CPT

## 2019-04-08 DIAGNOSIS — J01.91 ACUTE RECURRENT SINUSITIS, UNSPECIFIED LOCATION: Primary | ICD-10-CM

## 2019-04-30 ENCOUNTER — TELEPHONE (OUTPATIENT)
Dept: FAMILY MEDICINE CLINIC | Age: 54
End: 2019-04-30

## 2019-05-01 RX ORDER — ATORVASTATIN CALCIUM 40 MG/1
80 TABLET, FILM COATED ORAL DAILY
Qty: 60 TABLET | Refills: 11 | Status: SHIPPED | OUTPATIENT
Start: 2019-05-01 | End: 2020-01-24

## 2019-05-01 NOTE — TELEPHONE ENCOUNTER
That never happened, their was no communication or need to stop the med. They must have mixed this up with another patient.     Med is refilled

## 2019-05-15 ENCOUNTER — OFFICE VISIT (OUTPATIENT)
Dept: FAMILY MEDICINE CLINIC | Age: 54
End: 2019-05-15
Payer: COMMERCIAL

## 2019-05-15 VITALS
HEART RATE: 79 BPM | DIASTOLIC BLOOD PRESSURE: 80 MMHG | WEIGHT: 234 LBS | BODY MASS INDEX: 40.17 KG/M2 | OXYGEN SATURATION: 98 % | SYSTOLIC BLOOD PRESSURE: 120 MMHG

## 2019-05-15 DIAGNOSIS — I10 ESSENTIAL HYPERTENSION: Primary | ICD-10-CM

## 2019-05-15 DIAGNOSIS — E78.2 MIXED HYPERLIPIDEMIA: ICD-10-CM

## 2019-05-15 DIAGNOSIS — I25.110 CORONARY ARTERY DISEASE INVOLVING NATIVE CORONARY ARTERY OF NATIVE HEART WITH UNSTABLE ANGINA PECTORIS (HCC): ICD-10-CM

## 2019-05-15 PROCEDURE — 99214 OFFICE O/P EST MOD 30 MIN: CPT | Performed by: FAMILY MEDICINE

## 2019-05-15 RX ORDER — LORATADINE 10 MG/1
10 TABLET ORAL DAILY
COMMUNITY
End: 2019-11-06

## 2019-05-15 NOTE — PROGRESS NOTES
Subjective: Vazquez Rolle presents for   Chief Complaint   Patient presents with    Hypertension    Hyperlipidemia     Tolerating meds    No cp    See's cardio about every 6 months    They did increase her atorvastatin, but didn;t order any labs for FU    Patient Active Problem List   Diagnosis    Chronic back pain    Hypertension    SRI on CPAP    Mixed hyperlipidemia    S/P drug eluting coronary stent placement - Mid LAD 7/25/18-Dr. lea    Coronary artery disease involving native coronary artery of native heart with unstable angina pectoris (HCC)    Class 2 obesity due to excess calories with body mass index (BMI) of 39.0 to 39.9 in adult    Acute bronchitis due to other specified organisms    Hyperplastic colon polyp    Chest pain    BMI 38.0-38.9,adult    Post PTCA       Review of Systems:  · General: no significant weight changes. · Respiratory: no cough, pleuritic chest pain, dyspnea, or wheezing  · Cardiovascular: no pain, THOMAS, orthopnea, palpitations, or claudication  · Gastrointestinal: no chronic nausea, vomiting, heartburn, diarrhea, constipation, bloating, or abdominal pain. No bloody or black stools. Objective:  Physical Exam   Vitals:   Vitals:    05/15/19 1125   BP: 120/80   Pulse: 79   SpO2: 98%   Weight: 234 lb (106.1 kg)     Wt Readings from Last 3 Encounters:   05/15/19 234 lb (106.1 kg)   03/20/19 220 lb (99.8 kg)   03/12/19 225 lb (102.1 kg)     Ht Readings from Last 3 Encounters:   03/12/19 5' 4\" (1.626 m)   02/10/19 5' 4\" (1.626 m)   12/05/18 5' 4\" (1.626 m)     Body mass index is 40.17 kg/m². Constitutional: She is oriented to person, place, and time. She appears well-developed and well-nourished and in no acute distress. Answers all my questions appropriately. Head: Normocephalic and atraumatic. Eyes:conjunctiva appear normal.  Nose: pink, non-edematous mucosa. No polyps. No septal deviation  Throat: no erythema, tonsillar hypertrophy or exudate.  No ulcerations noted.  Lips/Teeth/Gums all appear normal.  Neck: Normal range of motion. Neck supple. No tracheal deviation present. No abnormal lymphadenopathy. No JVD noted. Carotids are clear bilaterally. No thyroid masses noted. Heart: RRR jennifer 4/6. No S3, S4, or gallop noted. Chest: Clear to auscultation bilaterally. Good breath sounds noted. No rales, wheezes, or rhonchi noted. No respiratory retractions noted. Wall has symmetrical movement with respirations. Trace pedal edema  Assessment:   Encounter Diagnoses   Name Primary?  Essential hypertension Yes    Mixed hyperlipidemia     Coronary artery disease involving native coronary artery of native heart with unstable angina pectoris ([facility] [address]          Plan:    monitor wiehgt., not sure if today's is accurate    There are no discontinued medications. THE ABOVE NOTED DISCONTINUED MEDS MAY ONLY BE FROM 'CLEANING UP' THE MED LIST AND WERE NOT ACTUALLY CANCELED;  SEE CHART FOR DETAILS! No orders of the defined types were placed in this encounter. Orders Placed This Encounter   Procedures    Comprehensive Metabolic Panel     Standing Status:   Future     Standing Expiration Date:   5/15/2020    Lipid Panel     Standing Status:   Future     Standing Expiration Date:   5/15/2020     Order Specific Question:   Is Patient Fasting?/# of Hours     Answer:   8 hour fasting (no calories)     Return in about 4 months (around 9/15/2019). There are no Patient Instructions on file for this visit.   Data Unavailable

## 2019-05-18 ENCOUNTER — HOSPITAL ENCOUNTER (OUTPATIENT)
Age: 54
Discharge: HOME | End: 2019-05-18
Payer: COMMERCIAL

## 2019-05-18 DIAGNOSIS — E78.2 MIXED HYPERLIPIDEMIA: ICD-10-CM

## 2019-05-18 LAB
ALBUMIN SERPL-MCNC: 4.1 G/DL (ref 3.5–5.2)
ALBUMIN/GLOBULIN RATIO: 1.5 (ref 1–2.5)
ALP BLD-CCNC: 99 U/L (ref 35–104)
ALT SERPL-CCNC: 22 U/L (ref 5–33)
ANION GAP SERPL CALCULATED.3IONS-SCNC: 11 MMOL/L (ref 9–17)
AST SERPL-CCNC: 18 U/L
BILIRUB SERPL-MCNC: 0.51 MG/DL (ref 0.3–1.2)
BUN BLDV-MCNC: 11 MG/DL (ref 6–20)
BUN/CREAT BLD: ABNORMAL (ref 9–20)
CALCIUM SERPL-MCNC: 9.3 MG/DL (ref 8.6–10.4)
CHLORIDE BLD-SCNC: 105 MMOL/L (ref 98–107)
CHOLESTEROL/HDL RATIO: 2.5
CHOLESTEROL: 133 MG/DL
CO2: 25 MMOL/L (ref 20–31)
CREAT SERPL-MCNC: 0.65 MG/DL (ref 0.5–0.9)
GFR AFRICAN AMERICAN: >60 ML/MIN
GFR NON-AFRICAN AMERICAN: >60 ML/MIN
GFR SERPL CREATININE-BSD FRML MDRD: ABNORMAL ML/MIN/{1.73_M2}
GFR SERPL CREATININE-BSD FRML MDRD: ABNORMAL ML/MIN/{1.73_M2}
GLUCOSE BLD-MCNC: 125 MG/DL (ref 70–99)
HDLC SERPL-MCNC: 53 MG/DL
LDL CHOLESTEROL: 62 MG/DL (ref 0–130)
POTASSIUM SERPL-SCNC: 4.1 MMOL/L (ref 3.7–5.3)
SODIUM BLD-SCNC: 141 MMOL/L (ref 135–144)
TOTAL PROTEIN: 6.8 G/DL (ref 6.4–8.3)
TRIGL SERPL-MCNC: 92 MG/DL
VLDLC SERPL CALC-MCNC: NORMAL MG/DL (ref 1–30)

## 2019-05-18 PROCEDURE — 36415 COLL VENOUS BLD VENIPUNCTURE: CPT

## 2019-05-18 PROCEDURE — 80061 LIPID PANEL: CPT

## 2019-05-18 PROCEDURE — 80053 COMPREHEN METABOLIC PANEL: CPT

## 2019-05-20 PROBLEM — R73.01 IFG (IMPAIRED FASTING GLUCOSE): Chronic | Status: ACTIVE | Noted: 2019-05-20

## 2019-05-29 ENCOUNTER — TELEPHONE (OUTPATIENT)
Dept: FAMILY MEDICINE CLINIC | Age: 54
End: 2019-05-29

## 2019-05-29 RX ORDER — BACLOFEN 10 MG/1
10 TABLET ORAL 3 TIMES DAILY
Qty: 30 TABLET | Refills: 1 | Status: SHIPPED | OUTPATIENT
Start: 2019-05-29 | End: 2019-10-03 | Stop reason: ALTCHOICE

## 2019-05-29 NOTE — TELEPHONE ENCOUNTER
The patient is having back pain and she doesnt know what to do. Could someone call her back? She dont know if she go to a specialist or not.

## 2019-07-01 ENCOUNTER — OFFICE VISIT (OUTPATIENT)
Dept: FAMILY MEDICINE CLINIC | Age: 54
End: 2019-07-01
Payer: COMMERCIAL

## 2019-07-01 ENCOUNTER — HOSPITAL ENCOUNTER (OUTPATIENT)
Dept: GENERAL RADIOLOGY | Age: 54
Discharge: HOME | End: 2019-07-03
Payer: COMMERCIAL

## 2019-07-01 ENCOUNTER — HOSPITAL ENCOUNTER (OUTPATIENT)
Age: 54
Discharge: HOME | End: 2019-07-03
Payer: COMMERCIAL

## 2019-07-01 VITALS
SYSTOLIC BLOOD PRESSURE: 128 MMHG | OXYGEN SATURATION: 97 % | BODY MASS INDEX: 39.82 KG/M2 | WEIGHT: 232 LBS | HEART RATE: 70 BPM | DIASTOLIC BLOOD PRESSURE: 70 MMHG

## 2019-07-01 DIAGNOSIS — M54.5 ACUTE MIDLINE LOW BACK PAIN, WITH SCIATICA PRESENCE UNSPECIFIED: Primary | ICD-10-CM

## 2019-07-01 DIAGNOSIS — M54.5 ACUTE MIDLINE LOW BACK PAIN, WITH SCIATICA PRESENCE UNSPECIFIED: ICD-10-CM

## 2019-07-01 PROCEDURE — 72100 X-RAY EXAM L-S SPINE 2/3 VWS: CPT

## 2019-07-01 PROCEDURE — 99214 OFFICE O/P EST MOD 30 MIN: CPT | Performed by: FAMILY MEDICINE

## 2019-07-01 RX ORDER — PREDNISONE 20 MG/1
TABLET ORAL
Qty: 18 TABLET | Refills: 0 | Status: SHIPPED | OUTPATIENT
Start: 2019-07-01 | End: 2019-07-11

## 2019-07-09 ENCOUNTER — HOSPITAL ENCOUNTER (OUTPATIENT)
Dept: PHYSICAL THERAPY | Facility: CLINIC | Age: 54
Setting detail: THERAPIES SERIES
Discharge: HOME | End: 2019-07-09
Payer: COMMERCIAL

## 2019-07-09 PROCEDURE — 97161 PT EVAL LOW COMPLEX 20 MIN: CPT

## 2019-07-09 PROCEDURE — 97110 THERAPEUTIC EXERCISES: CPT

## 2019-07-09 NOTE — PLAN OF CARE
Dexamethasone Sodium  [x] Manual Therapy                                                                          Phosphate  mAmin  [] Aquatic Therapy                                 [] Dry Needling  [] Other:     []  Medication allergies reviewed for use of               Dexamethasone Sodium Phosphate 4mg/ml                with iontophoresis treatments. Pt is not allergic.     Frequency:  1-2 x/week for 12 visits          Electronically signed by: Nba Addison PT        Physician Signature:________________________________Date:__________________  By signing above or cosigning this note, I have reviewed this plan of care and certify a need for medically necessary rehabilitation services.      *PLEASE SIGN ABOVE AND FAX BACK ALL PAGES*

## 2019-07-15 ENCOUNTER — HOSPITAL ENCOUNTER (OUTPATIENT)
Dept: PHYSICAL THERAPY | Facility: CLINIC | Age: 54
Setting detail: THERAPIES SERIES
Discharge: HOME | End: 2019-07-15
Payer: COMMERCIAL

## 2019-07-15 PROCEDURE — 97012 MECHANICAL TRACTION THERAPY: CPT

## 2019-07-15 PROCEDURE — 97110 THERAPEUTIC EXERCISES: CPT

## 2019-07-15 NOTE — FLOWSHEET NOTE
Other:       Time In: 12:00 pm            Time Out: 1250 pm    Electronically signed by:  Vicky Powell PTA

## 2019-07-18 ENCOUNTER — HOSPITAL ENCOUNTER (OUTPATIENT)
Dept: PHYSICAL THERAPY | Facility: CLINIC | Age: 54
Setting detail: THERAPIES SERIES
Discharge: HOME | End: 2019-07-18
Payer: COMMERCIAL

## 2019-07-18 PROCEDURE — 97110 THERAPEUTIC EXERCISES: CPT

## 2019-07-18 PROCEDURE — 97012 MECHANICAL TRACTION THERAPY: CPT

## 2019-07-18 NOTE — TELEPHONE ENCOUNTER
native coronary artery of native heart with unstable angina pectoris (HCC)     Class 2 obesity due to excess calories with body mass index (BMI) of 39.0 to 39.9 in adult     Acute bronchitis due to other specified organisms     Hyperplastic colon polyp     Chest pain     BMI 38.0-38.9,adult     Post PTCA     IFG (impaired fasting glucose)

## 2019-07-18 NOTE — FLOWSHEET NOTE
[] [facility]) - [facility] &  [facility]  [address]  [phone]  F: [phone] [x] [address]  [address]   [address]  P: [phone]  F: [phone] [] [address] &  Therapy  [address] [facility]  P: [phone]  F: [phone] [] [address]  [facility]   [address]  [place]: [phone]  F: [phone]     Physical Therapy Daily Treatment Note    Date:  2019  Patient Name:  Jose Gant    :  1965  MRN: [id number]  Physician: [address] [place] [address] Avenue: [facility]  Diagnosis: Acute Midline low back pain with sciatica presence unspecified   Onset Date: 19   Next Dr. Jamison [place]: 19  Visit# / total visits: 3/12  Cancels/No Shows: 0/0    Subjective:    Pain:  [x] Yes  [] No Location: low back Pain Rating: (0-10 scale) 5/10  Pain altered Tx:  [] No  [] Yes  Action:  Comments: Patient states she feels a little better, only notes increased pain upon movement. Pt ordered a lumbar support roll, should receive it later today.      Objective:  Modalities: Traction post exercises this date: Intermittent   60#/30#  Body weight 225   Precautions: HTN, 3 previous L5-S1 microdiscectomies    Exercises:  Exercise Reps/ Time Weight/ Level Comments   PRONE         Prone lying x   Pillow under hips   Prone prop x   \"\"   Press up 10 x 5\"   \"\"   Hip extension  10x ea A     Glute squeezes 10x5\"     Heel squeezes 10x5\"   No pain 7                   SUPINE          Bridges with band 15x blueberry     Supine clamshells 15x blueberry     HS stretch  3 x 20\"   With towel/strap    PPT 10x5\"   Added 7/15   SLR 10x  Added 7/15   LTR 10x5\" ea  Added 7/15          Standing         Doorway pec stretch 3 x 15\"  Added    Pallof press 15x ea blueberry     Other: discussed  Use of lumbar roll to promote proper

## 2019-07-19 RX ORDER — OMEPRAZOLE 40 MG/1
CAPSULE, DELAYED RELEASE ORAL
Qty: 30 CAPSULE | Refills: 5 | Status: SHIPPED | OUTPATIENT
Start: 2019-07-19 | End: 2020-01-10

## 2019-07-22 ENCOUNTER — HOSPITAL ENCOUNTER (OUTPATIENT)
Dept: PHYSICAL THERAPY | Facility: CLINIC | Age: 54
Setting detail: THERAPIES SERIES
Discharge: HOME | End: 2019-07-22
Payer: COMMERCIAL

## 2019-07-22 PROCEDURE — 97110 THERAPEUTIC EXERCISES: CPT

## 2019-07-22 PROCEDURE — 97012 MECHANICAL TRACTION THERAPY: CPT

## 2019-07-22 RX ORDER — LOSARTAN POTASSIUM 100 MG/1
TABLET ORAL
Qty: 30 TABLET | Refills: 5 | Status: SHIPPED | OUTPATIENT
Start: 2019-07-22 | End: 2020-01-10

## 2019-07-22 NOTE — FLOWSHEET NOTE
[] [facility] &  Therapy  [address]  [phone]  F: [phone] [x] [address]  [facility]   [address]  P: [phone]  F: [phone] [] [address]  [address] [facility]  P: [phone]  F: [phone] [] [address] [facility]   [address]  [place]: [phone]  F: [phone]     Physical Therapy Daily Treatment Note    Date:  2019  Patient Name:  Calos Alba    :  1965  MRN: [id number]  Physician: [address] [place] [address] Avenue: [facility]  Diagnosis: Acute Midline low back pain with sciatica presence unspecified   Onset Date: 19   Next Dr. Hollice Ganser: 19  Visit# / total visits:   Cancels/No Shows: 0/0    Subjective:    Pain:  [x] Yes  [] No Location: low back Pain Rating: (0-10 scale) 5/10  Pain altered Tx:  [x] No  [] Yes  Action:  Comments: Patient states she feels better overall but has some pain around her ribs during traction.      Objective:  Modalities: Traction post exercises this date: Intermittent   60#/30#  Body weight 225   Precautions: HTN, 3 previous L5-S1 microdiscectomies    Exercises:  Exercise Reps/ Time Weight/ Level Comments   PRONE         Prone lying x   Pillow under hips   Prone prop x   \"\"   Press up 10 x 5\"   \"\"   Hip extension  10x ea A     Glute squeezes 10x5\"     Heel squeezes 10x5\"   Gentle squeeze                    SUPINE          Bridges with band 15x blueberry     Supine clamshells 15x blueberry     HS stretch  3 x 20\"   With towel/strap    PPT 10x5\"   Added 7/15   SLR 10x  Added 7/15   LTR 10x5\" ea  Added 7/15   Marches 10x  Added    Lat pull down 15x blueberry Added           Standing         Doorway pec stretch 3 x 15\"  Added    Pallof press 15x ea blueberry     Other: discussed  Use of lumbar roll to promote

## 2019-07-25 ENCOUNTER — HOSPITAL ENCOUNTER (OUTPATIENT)
Dept: PHYSICAL THERAPY | Facility: CLINIC | Age: 54
Setting detail: THERAPIES SERIES
Discharge: HOME | End: 2019-07-25
Payer: COMMERCIAL

## 2019-07-25 PROCEDURE — 97110 THERAPEUTIC EXERCISES: CPT

## 2019-07-25 PROCEDURE — 97012 MECHANICAL TRACTION THERAPY: CPT

## 2019-07-29 ENCOUNTER — HOSPITAL ENCOUNTER (OUTPATIENT)
Dept: PHYSICAL THERAPY | Facility: CLINIC | Age: 54
Setting detail: THERAPIES SERIES
Discharge: HOME | End: 2019-07-29
Payer: COMMERCIAL

## 2019-07-29 PROCEDURE — 97012 MECHANICAL TRACTION THERAPY: CPT

## 2019-07-29 PROCEDURE — 97110 THERAPEUTIC EXERCISES: CPT

## 2019-08-01 ENCOUNTER — HOSPITAL ENCOUNTER (OUTPATIENT)
Dept: PHYSICAL THERAPY | Facility: CLINIC | Age: 54
Setting detail: THERAPIES SERIES
Discharge: HOME | End: 2019-08-01
Payer: COMMERCIAL

## 2019-08-01 PROCEDURE — 97110 THERAPEUTIC EXERCISES: CPT

## 2019-08-01 PROCEDURE — 97012 MECHANICAL TRACTION THERAPY: CPT

## 2019-08-05 ENCOUNTER — HOSPITAL ENCOUNTER (OUTPATIENT)
Dept: PHYSICAL THERAPY | Facility: CLINIC | Age: 54
Setting detail: THERAPIES SERIES
Discharge: HOME | End: 2019-08-05
Payer: COMMERCIAL

## 2019-08-05 PROCEDURE — 97012 MECHANICAL TRACTION THERAPY: CPT

## 2019-08-05 PROCEDURE — 97110 THERAPEUTIC EXERCISES: CPT

## 2019-08-05 NOTE — FLOWSHEET NOTE
[] [facility] &  Therapy  [address]  [phone]  F: [phone] [x] [address]  [address]   [address]  P: [phone]  F: [phone] [] [address] &  Therapy  [address] [facility] [address]  P: [phone]  F: [phone] [] [address]  [facility]   [address]  [place]: [phone]  F: [phone]     Physical Therapy Daily Treatment Note    Date:  2019  Patient Name:  Jazzmine Montaño    :  1965  MRN: [id number]  Physician: [address] [place] [address] Avenue: [facility]  Diagnosis: Acute Midline low back pain with sciatica presence unspecified   Onset Date: 19   Next Dr. Matute Done: 19  Visit# / total visits:   Cancels/No Shows: 0/0      Subjective:    Pain:  [x] Yes  [] No Location: low back Pain Rating: (0-10 scale) 5/10  Pain altered Tx:  [x] No  [] Yes  Action:  Comments: Patient reports higher low back irritability today, states she did some heavy lifting while moving/cleaning over the weekend.       Objective:  Modalities: Traction before exercises this date: Intermittent   70#/40#  (table unlocked) Body weight 225   HP to Low back in Prone x 10 minutes following exercises   Precautions: HTN, 3 previous L5-S1 microdiscectomies    Exercises:bolded completed 19   Exercise Reps/ Time Weight/ Level Comments   Nustep warm up 4 min  Added    PRONE      NOT TODAY- standing    Prone lying x   Pillow under hips   Prone prop x   \"\"   Press up 10 x 5\"   \"\"   Hip extension  10x ea A     Glute squeezes 10x5\"     Heel squeezes 10x5\"   Gentle squeeze    STM R paraspinals 5'  Added 8/ d/t increased pain          Sidelying       Clamshells  20x ea blueberry    Reverse clamshells 20x blueberry    Abduction 15x               SUPINE          Bridges with band 15x blueberry     Supine clamshells 15x blueberry     HS stretch  3 x 20\"   With towel/strap    PPT 10x5\"      SLR 12x 2# Increased 8/1   LTR 10x5\" ea     Marches 10x ea 2# Wt added 7/29   Lat pull down 15x blueberry    Piriformis stretch 20\"x2  Knee to shoulder         Standing      held 8/5 d/t increased pain irritability    Standing lumbar ext 10 x 5\"  Added 7/29, against wall    Doorway pec stretch 3 x 15\"  Added 7/18   Side steps  10x ea blue Added 7/25   Pallof press 15x ea blueberry     3 way hip 10x   Add resistance NEXT   Quad stretch 2 x 20\"ea  Added 8/1, pt holding onto pant leg for stretch   Squats  10x  Added 8/1   Other:      Specific Instructions for next treatment: quadruped strengthening as tolerated          Treatment Charges: Mins Units   [x]  Modalities: traction, HP 15/10 1/0   [x]  Ther Exercise 45 3   []  Manual Therapy     []  Ther Activities     []  Aquatics     []  Vasocompression     []  Other     Total Treatment time 60 4       Assessment: [x] Progressing toward goals. Pt with higher pain levels this date; reports slight increase in pain following traction this date. Held standing exercises d/t high irritability. Initiated STM to R paraspinals as spasms were present with fair tolerance; pt reports greatest discomfort with palpation to R SIJ. Ended with hot back to low back, pt reports pain levels returned to 4/10 at conclusion of session. [] No change. [] Other:    STG: (to be met in 6 treatments)  1. ? Pain:   a. <5/10 average LBP for improved tolerance to ADL's- MET, reports average of 4-5/10  b. Pt will report 50% or greater reduction in BLE radicular sx to signify centralization of symptoms- MET, no reports of radicular pain    2. ? ROM: Pt will demonstrate lumbar extension ROM to neutral without increased pain for improved lumbar mobility-MET   3. ? Function:   a. Pt will deny pain during sit to stand for improved ability to stand up from work desk-NOT MET, still reports pain with STS  b.  Pt will

## 2019-08-08 ENCOUNTER — HOSPITAL ENCOUNTER (OUTPATIENT)
Dept: PHYSICAL THERAPY | Facility: CLINIC | Age: 54
Setting detail: THERAPIES SERIES
Discharge: HOME | End: 2019-08-08
Payer: COMMERCIAL

## 2019-08-08 PROCEDURE — 97110 THERAPEUTIC EXERCISES: CPT

## 2019-08-12 ENCOUNTER — HOSPITAL ENCOUNTER (OUTPATIENT)
Dept: PHYSICAL THERAPY | Facility: CLINIC | Age: 54
Setting detail: THERAPIES SERIES
Discharge: HOME | End: 2019-08-12
Payer: COMMERCIAL

## 2019-08-12 PROCEDURE — 97110 THERAPEUTIC EXERCISES: CPT

## 2019-08-15 ENCOUNTER — HOSPITAL ENCOUNTER (OUTPATIENT)
Dept: PHYSICAL THERAPY | Facility: CLINIC | Age: 54
Setting detail: THERAPIES SERIES
Discharge: HOME | End: 2019-08-15
Payer: COMMERCIAL

## 2019-08-15 PROCEDURE — 97110 THERAPEUTIC EXERCISES: CPT

## 2019-08-20 ENCOUNTER — HOSPITAL ENCOUNTER (OUTPATIENT)
Dept: PHYSICAL THERAPY | Facility: CLINIC | Age: 54
Setting detail: THERAPIES SERIES
Discharge: HOME | End: 2019-08-20
Payer: COMMERCIAL

## 2019-08-20 PROCEDURE — 97110 THERAPEUTIC EXERCISES: CPT

## 2019-08-20 NOTE — DISCHARGE SUMMARY
pain levels. At this time, patient is discharged from physical therapy and is expected to continue exercises as a part of her weekly routine. [] No change. [] Other:     STG: (to be met in 6 treatments)  1. ? Pain:   a. <5/10 average LBP for improved tolerance to ADL's- MET, reports average of 4-5/10  b. Pt will report 50% or greater reduction in BLE radicular sx to signify centralization of symptoms- MET, no reports of radicular pain    2. ? ROM: Pt will demonstrate lumbar extension ROM to neutral without increased pain for improved lumbar mobility-MET   3. ? Function:   a. Pt will deny pain during sit to stand for improved ability to stand up from work desk- MET, still reports pain with STS  b. Pt will report purchase of lumbar roll to help neutral lumbar alignment with seated positions- MET   4. Independent with Home Exercise Programs-MET  LTG: (to be met in 12 treatments)  1.  ? Strength: pt to demo grossly 4+/5 B hip strength for improved trunk stability- MET   2. Pt will demonstrate proper T.A. Activation and will hold for 10 seconds without pain or valsalva maneuver for improved core stability- MET   3. <50% impairment on Oswestry to indicate improved functional mobility- MET  4.  Pt will report 2 or less nightly sleep disturbances d/t pain for improved sleep patterns-NOT MET, wakes up 5-6x a night still, chronic issue      Treatment to Date:  [x] Therapeutic Exercise    [] Modalities:  [x] Therapeutic Activity    [] Ultrasound  [] Electrical Stimulation  [] Gait Training     [] Massage       [] Lumbar/Cervical Traction  [] Neuromuscular Re-education [] Cold/hotpack [] Iontophoresis: 4 mg/mL  [x] Instruction in Home Exercise Program                     Dexamethasone Sodium  [] Manual Therapy             Phosphate 40-80 mAmin  [] Aquatic Therapy                   [] Vasocompression/    [] Other:             Game Ready    Discharge Status:     [x] Pt

## 2019-09-04 ENCOUNTER — OFFICE VISIT (OUTPATIENT)
Dept: FAMILY MEDICINE CLINIC | Age: 54
End: 2019-09-04
Payer: COMMERCIAL

## 2019-09-04 VITALS
HEART RATE: 76 BPM | SYSTOLIC BLOOD PRESSURE: 120 MMHG | WEIGHT: 232 LBS | DIASTOLIC BLOOD PRESSURE: 74 MMHG | BODY MASS INDEX: 39.82 KG/M2 | RESPIRATION RATE: 18 BRPM | OXYGEN SATURATION: 98 %

## 2019-09-04 DIAGNOSIS — M54.5 ACUTE MIDLINE LOW BACK PAIN, WITH SCIATICA PRESENCE UNSPECIFIED: Primary | ICD-10-CM

## 2019-09-04 PROCEDURE — 99213 OFFICE O/P EST LOW 20 MIN: CPT | Performed by: FAMILY MEDICINE

## 2019-10-03 ENCOUNTER — OFFICE VISIT (OUTPATIENT)
Dept: FAMILY MEDICINE CLINIC | Age: 54
End: 2019-10-03
Payer: COMMERCIAL

## 2019-10-03 VITALS
SYSTOLIC BLOOD PRESSURE: 130 MMHG | HEART RATE: 84 BPM | OXYGEN SATURATION: 97 % | BODY MASS INDEX: 39.48 KG/M2 | WEIGHT: 230 LBS | DIASTOLIC BLOOD PRESSURE: 74 MMHG

## 2019-10-03 DIAGNOSIS — M54.42 CHRONIC LEFT-SIDED LOW BACK PAIN WITH LEFT-SIDED SCIATICA: Primary | ICD-10-CM

## 2019-10-03 DIAGNOSIS — G89.29 CHRONIC LEFT-SIDED LOW BACK PAIN WITH LEFT-SIDED SCIATICA: Primary | ICD-10-CM

## 2019-10-03 PROCEDURE — 99214 OFFICE O/P EST MOD 30 MIN: CPT | Performed by: FAMILY MEDICINE

## 2019-10-12 ENCOUNTER — HOSPITAL ENCOUNTER (OUTPATIENT)
Age: 54
Discharge: HOME | End: 2019-10-12
Payer: COMMERCIAL

## 2019-10-12 ENCOUNTER — HOSPITAL ENCOUNTER (OUTPATIENT)
Dept: MRI IMAGING | Age: 54
Discharge: HOME | End: 2019-10-14
Payer: COMMERCIAL

## 2019-10-12 DIAGNOSIS — G89.29 CHRONIC LEFT-SIDED LOW BACK PAIN WITH LEFT-SIDED SCIATICA: ICD-10-CM

## 2019-10-12 DIAGNOSIS — M54.42 CHRONIC LEFT-SIDED LOW BACK PAIN WITH LEFT-SIDED SCIATICA: ICD-10-CM

## 2019-10-12 LAB — IGE: 32 IU/ML

## 2019-10-12 PROCEDURE — 72148 MRI LUMBAR SPINE W/O DYE: CPT

## 2019-10-12 PROCEDURE — 36415 COLL VENOUS BLD VENIPUNCTURE: CPT

## 2019-10-12 PROCEDURE — 86003 ALLG SPEC IGE CRUDE XTRC EA: CPT

## 2019-10-12 PROCEDURE — 82785 ASSAY OF IGE: CPT

## 2019-10-14 LAB
2000687N OAK TREE IGE: <0.34 KU/L (ref 0–0.34)
ALLERGEN BERMUDA GRASS IGE: <0.34 KU/L (ref 0–0.34)
ALLERGEN BIRCH IGE: <0.34 KU/L (ref 0–0.34)
ALLERGEN DOG DANDER IGE: <0.34 KU/L (ref 0–0.34)
ALLERGEN GERMAN COCKROACH IGE: <0.34 KU/L (ref 0–0.34)
ALLERGEN HELMINTHOSPORIUM HALODES: <0.34 KU/L (ref 0–0.34)
ALLERGEN HORMODENDRUM IGE: <0.34 KUL/L (ref 0–0.34)
ALLERGEN JOHNSON GRASS IGE: <0.34 KU/L (ref 0–0.34)
ALLERGEN LAMB'S QUARTER IGE: <0.34 KU/L (ref 0–0.34)
ALLERGEN PHOMA BETAE: <0.34 KU/L (ref 0–0.34)
ALLERGEN PIGWEED ROUGH IGE: <0.34 KU/L (ref 0–0.34)
ALLERGEN PINE WHITE IGE: <0.34 KU/L (ref 0–0.34)
ALLERGEN SHEEP SORREL (W18) IGE: <0.34 KU/L (ref 0–0.34)
ALLERGEN STEMPHYLIUM HERBARUM IGE: <0.34 KU/L (ref 0–0.34)
ALLERGEN TREE SYCAMORE: <0.34 KU/L (ref 0–0.34)
ALLERGEN WALNUT TREE IGE: <0.34 KU/L (ref 0–0.34)
ALTERNARIA ALTERNATA: <0.34 KU/L (ref 0–0.34)
ASPERGILLUS FUMIGATUS: <0.34 KU/L (ref 0–0.34)
CANDIDA ALBICANS IGE: <0.34 KU/L (ref 0–0.34)
CAT DANDER ANTIBODY: <0.34 KU/L (ref 0–0.34)
COTTONWOOD TREE: <0.34 KU/L (ref 0–0.34)
D. FARINAE: <0.34 KU/L (ref 0–0.34)
D. PTERONYSSINUS: <0.34 KU/L (ref 0–0.34)
ELM TREE: <0.34 KU/L (ref 0–0.34)
FUSARIUM MONILIFORME: <0.34 KU/L (ref 0–0.34)
IGE: 33 IU/ML
MAPLE/BOXELDER TREE: <0.34 KU/L (ref 0–0.34)
P. NOTATUM: <0.34 KU/L (ref 0–0.34)
SHORT RAGWD(A ARTEMIS.) IGE: <0.34 KU/L (ref 0–0.34)
TIMOTHY GRASS: <0.34 KU/L (ref 0–0.34)

## 2019-10-16 DIAGNOSIS — G89.29 CHRONIC LEFT-SIDED LOW BACK PAIN WITH LEFT-SIDED SCIATICA: Primary | ICD-10-CM

## 2019-10-16 DIAGNOSIS — M54.42 CHRONIC LEFT-SIDED LOW BACK PAIN WITH LEFT-SIDED SCIATICA: Primary | ICD-10-CM

## 2019-11-02 ENCOUNTER — HOSPITAL ENCOUNTER (OUTPATIENT)
Dept: MAMMOGRAPHY | Age: 54
Discharge: HOME | End: 2019-11-04
Payer: COMMERCIAL

## 2019-11-02 DIAGNOSIS — Z12.39 SCREENING FOR BREAST CANCER: ICD-10-CM

## 2019-11-02 PROCEDURE — 77063 BREAST TOMOSYNTHESIS BI: CPT

## 2019-11-06 ENCOUNTER — OFFICE VISIT (OUTPATIENT)
Dept: NEUROSURGERY | Age: 54
End: 2019-11-06
Payer: COMMERCIAL

## 2019-11-06 VITALS — RESPIRATION RATE: 16 BRPM | SYSTOLIC BLOOD PRESSURE: 161 MMHG | HEART RATE: 73 BPM | DIASTOLIC BLOOD PRESSURE: 72 MMHG

## 2019-11-06 DIAGNOSIS — M47.26 OTHER SPONDYLOSIS WITH RADICULOPATHY, LUMBAR REGION: Primary | ICD-10-CM

## 2019-11-06 PROCEDURE — 99203 OFFICE O/P NEW LOW 30 MIN: CPT | Performed by: NEUROLOGICAL SURGERY

## 2019-11-09 ENCOUNTER — HOSPITAL ENCOUNTER (OUTPATIENT)
Dept: GENERAL RADIOLOGY | Age: 54
Discharge: HOME | End: 2019-11-11
Payer: COMMERCIAL

## 2019-11-09 ENCOUNTER — HOSPITAL ENCOUNTER (OUTPATIENT)
Age: 54
Discharge: HOME | End: 2019-11-11
Payer: COMMERCIAL

## 2019-11-09 DIAGNOSIS — M47.26 OTHER SPONDYLOSIS WITH RADICULOPATHY, LUMBAR REGION: ICD-10-CM

## 2019-11-09 PROCEDURE — 72110 X-RAY EXAM L-2 SPINE 4/>VWS: CPT

## 2019-11-15 ENCOUNTER — OFFICE VISIT (OUTPATIENT)
Dept: FAMILY MEDICINE CLINIC | Age: 54
End: 2019-11-15
Payer: COMMERCIAL

## 2019-11-15 VITALS
SYSTOLIC BLOOD PRESSURE: 132 MMHG | WEIGHT: 232 LBS | HEART RATE: 75 BPM | DIASTOLIC BLOOD PRESSURE: 88 MMHG | BODY MASS INDEX: 39.82 KG/M2 | OXYGEN SATURATION: 98 %

## 2019-11-15 DIAGNOSIS — Z98.61 POST PTCA: ICD-10-CM

## 2019-11-15 DIAGNOSIS — G47.33 OSA ON CPAP: ICD-10-CM

## 2019-11-15 DIAGNOSIS — Z99.89 OSA ON CPAP: ICD-10-CM

## 2019-11-15 DIAGNOSIS — I10 ESSENTIAL HYPERTENSION: Primary | ICD-10-CM

## 2019-11-15 PROCEDURE — 99213 OFFICE O/P EST LOW 20 MIN: CPT | Performed by: FAMILY MEDICINE

## 2019-11-22 ENCOUNTER — INITIAL CONSULT (OUTPATIENT)
Dept: PAIN MANAGEMENT | Age: 54
End: 2019-11-22
Payer: COMMERCIAL

## 2019-11-22 VITALS
DIASTOLIC BLOOD PRESSURE: 79 MMHG | HEART RATE: 71 BPM | SYSTOLIC BLOOD PRESSURE: 136 MMHG | WEIGHT: 228 LBS | HEIGHT: 64 IN | BODY MASS INDEX: 38.93 KG/M2

## 2019-11-22 DIAGNOSIS — M47.817 LUMBOSACRAL SPONDYLOSIS WITHOUT MYELOPATHY: ICD-10-CM

## 2019-11-22 DIAGNOSIS — M47.26 OTHER SPONDYLOSIS WITH RADICULOPATHY, LUMBAR REGION: ICD-10-CM

## 2019-11-22 DIAGNOSIS — M46.1 SACROILIITIS (HCC): Primary | ICD-10-CM

## 2019-11-22 DIAGNOSIS — M54.16 LUMBAR RADICULOPATHY: ICD-10-CM

## 2019-11-22 DIAGNOSIS — I25.110 CORONARY ARTERY DISEASE INVOLVING NATIVE CORONARY ARTERY OF NATIVE HEART WITH UNSTABLE ANGINA PECTORIS (HCC): ICD-10-CM

## 2019-11-22 PROCEDURE — 99204 OFFICE O/P NEW MOD 45 MIN: CPT | Performed by: PAIN MEDICINE

## 2019-11-22 ASSESSMENT — ENCOUNTER SYMPTOMS
SHORTNESS OF BREATH: 1
BACK PAIN: 1
BOWEL INCONTINENCE: 0

## 2019-12-12 ENCOUNTER — ANESTHESIA EVENT (OUTPATIENT)
Dept: OPERATING ROOM | Age: 54
End: 2019-12-12
Payer: COMMERCIAL

## 2019-12-12 ENCOUNTER — HOSPITAL ENCOUNTER (OUTPATIENT)
Age: 54
Setting detail: OUTPATIENT SURGERY
Discharge: HOME | End: 2019-12-12
Attending: PAIN MEDICINE | Admitting: PAIN MEDICINE
Payer: COMMERCIAL

## 2019-12-12 ENCOUNTER — ANESTHESIA (OUTPATIENT)
Dept: OPERATING ROOM | Age: 54
End: 2019-12-12
Payer: COMMERCIAL

## 2019-12-12 ENCOUNTER — APPOINTMENT (OUTPATIENT)
Dept: GENERAL RADIOLOGY | Age: 54
End: 2019-12-12
Attending: PAIN MEDICINE
Payer: COMMERCIAL

## 2019-12-12 VITALS
TEMPERATURE: 97.3 F | WEIGHT: 227.8 LBS | BODY MASS INDEX: 38.89 KG/M2 | DIASTOLIC BLOOD PRESSURE: 77 MMHG | HEART RATE: 71 BPM | HEIGHT: 64 IN | SYSTOLIC BLOOD PRESSURE: 145 MMHG | OXYGEN SATURATION: 97 % | RESPIRATION RATE: 16 BRPM

## 2019-12-12 VITALS
DIASTOLIC BLOOD PRESSURE: 82 MMHG | SYSTOLIC BLOOD PRESSURE: 172 MMHG | RESPIRATION RATE: 19 BRPM | OXYGEN SATURATION: 100 %

## 2019-12-12 PROCEDURE — 27096 INJECT SACROILIAC JOINT: CPT | Performed by: PAIN MEDICINE

## 2019-12-12 PROCEDURE — 7100000011 HC PHASE II RECOVERY - ADDTL 15 MIN: Performed by: PAIN MEDICINE

## 2019-12-12 PROCEDURE — 3600000002 HC SURGERY LEVEL 2 BASE: Performed by: PAIN MEDICINE

## 2019-12-12 PROCEDURE — 6360000002 HC RX W HCPCS: Performed by: PAIN MEDICINE

## 2019-12-12 PROCEDURE — 3209999900 FLUORO FOR SURGICAL PROCEDURES

## 2019-12-12 PROCEDURE — 7100000010 HC PHASE II RECOVERY - FIRST 15 MIN: Performed by: PAIN MEDICINE

## 2019-12-12 PROCEDURE — 2709999900 HC NON-CHARGEABLE SUPPLY: Performed by: PAIN MEDICINE

## 2019-12-12 PROCEDURE — 2500000003 HC RX 250 WO HCPCS: Performed by: PAIN MEDICINE

## 2019-12-12 PROCEDURE — 6360000002 HC RX W HCPCS: Performed by: NURSE ANESTHETIST, CERTIFIED REGISTERED

## 2019-12-12 PROCEDURE — 3700000000 HC ANESTHESIA ATTENDED CARE: Performed by: PAIN MEDICINE

## 2019-12-12 RX ORDER — MIDAZOLAM HYDROCHLORIDE 1 MG/ML
INJECTION INTRAMUSCULAR; INTRAVENOUS PRN
Status: DISCONTINUED | OUTPATIENT
Start: 2019-12-12 | End: 2019-12-12 | Stop reason: SDUPTHER

## 2019-12-12 RX ORDER — FENTANYL CITRATE 50 UG/ML
25 INJECTION, SOLUTION INTRAMUSCULAR; INTRAVENOUS EVERY 5 MIN PRN
Status: DISCONTINUED | OUTPATIENT
Start: 2019-12-12 | End: 2019-12-12 | Stop reason: HOSPADM

## 2019-12-12 RX ORDER — ONDANSETRON 2 MG/ML
4 INJECTION INTRAMUSCULAR; INTRAVENOUS
Status: DISCONTINUED | OUTPATIENT
Start: 2019-12-12 | End: 2019-12-12 | Stop reason: HOSPADM

## 2019-12-12 RX ORDER — DEXAMETHASONE SODIUM PHOSPHATE 10 MG/ML
INJECTION INTRAMUSCULAR; INTRAVENOUS PRN
Status: DISCONTINUED | OUTPATIENT
Start: 2019-12-12 | End: 2019-12-12 | Stop reason: ALTCHOICE

## 2019-12-12 RX ORDER — FENTANYL CITRATE 50 UG/ML
INJECTION, SOLUTION INTRAMUSCULAR; INTRAVENOUS PRN
Status: DISCONTINUED | OUTPATIENT
Start: 2019-12-12 | End: 2019-12-12 | Stop reason: SDUPTHER

## 2019-12-12 RX ORDER — FENTANYL CITRATE 50 UG/ML
50 INJECTION, SOLUTION INTRAMUSCULAR; INTRAVENOUS EVERY 5 MIN PRN
Status: DISCONTINUED | OUTPATIENT
Start: 2019-12-12 | End: 2019-12-12 | Stop reason: HOSPADM

## 2019-12-12 RX ORDER — BUPIVACAINE HYDROCHLORIDE 2.5 MG/ML
INJECTION, SOLUTION INFILTRATION; PERINEURAL PRN
Status: DISCONTINUED | OUTPATIENT
Start: 2019-12-12 | End: 2019-12-12 | Stop reason: ALTCHOICE

## 2019-12-12 RX ORDER — HYDRALAZINE HYDROCHLORIDE 20 MG/ML
5 INJECTION INTRAMUSCULAR; INTRAVENOUS EVERY 10 MIN PRN
Status: DISCONTINUED | OUTPATIENT
Start: 2019-12-12 | End: 2019-12-12 | Stop reason: HOSPADM

## 2019-12-12 RX ORDER — METOCLOPRAMIDE HYDROCHLORIDE 5 MG/ML
10 INJECTION INTRAMUSCULAR; INTRAVENOUS
Status: DISCONTINUED | OUTPATIENT
Start: 2019-12-12 | End: 2019-12-12 | Stop reason: HOSPADM

## 2019-12-12 RX ADMIN — MIDAZOLAM HYDROCHLORIDE 2 MG: 1 INJECTION, SOLUTION INTRAMUSCULAR; INTRAVENOUS at 11:51

## 2019-12-12 RX ADMIN — FENTANYL CITRATE 100 MCG: 50 INJECTION INTRAMUSCULAR; INTRAVENOUS at 11:51

## 2019-12-12 ASSESSMENT — PAIN SCALES - GENERAL
PAINLEVEL_OUTOF10: 4
PAINLEVEL_OUTOF10: 4

## 2019-12-12 ASSESSMENT — PULMONARY FUNCTION TESTS
PIF_VALUE: 0

## 2019-12-12 ASSESSMENT — PAIN DESCRIPTION - DESCRIPTORS: DESCRIPTORS: RADIATING;CONSTANT

## 2019-12-12 ASSESSMENT — PAIN - FUNCTIONAL ASSESSMENT
PAIN_FUNCTIONAL_ASSESSMENT: 0-10
PAIN_FUNCTIONAL_ASSESSMENT: PREVENTS OR INTERFERES SOME ACTIVE ACTIVITIES AND ADLS

## 2019-12-12 ASSESSMENT — PAIN DESCRIPTION - LOCATION: LOCATION: BACK

## 2019-12-18 ENCOUNTER — ANESTHESIA EVENT (OUTPATIENT)
Dept: OPERATING ROOM | Age: 54
End: 2019-12-18
Payer: COMMERCIAL

## 2019-12-18 RX ORDER — SODIUM CHLORIDE 0.9 % (FLUSH) 0.9 %
10 SYRINGE (ML) INJECTION EVERY 12 HOURS SCHEDULED
Status: CANCELLED | OUTPATIENT
Start: 2019-12-18

## 2019-12-18 RX ORDER — SODIUM CHLORIDE 0.9 % (FLUSH) 0.9 %
10 SYRINGE (ML) INJECTION PRN
Status: CANCELLED | OUTPATIENT
Start: 2019-12-18

## 2019-12-19 ENCOUNTER — HOSPITAL ENCOUNTER (OUTPATIENT)
Age: 54
Setting detail: OUTPATIENT SURGERY
Discharge: HOME | End: 2019-12-19
Attending: PAIN MEDICINE | Admitting: PAIN MEDICINE
Payer: COMMERCIAL

## 2019-12-19 ENCOUNTER — ANESTHESIA (OUTPATIENT)
Dept: OPERATING ROOM | Age: 54
End: 2019-12-19
Payer: COMMERCIAL

## 2019-12-19 ENCOUNTER — APPOINTMENT (OUTPATIENT)
Dept: GENERAL RADIOLOGY | Age: 54
End: 2019-12-19
Attending: PAIN MEDICINE
Payer: COMMERCIAL

## 2019-12-19 VITALS
RESPIRATION RATE: 21 BRPM | SYSTOLIC BLOOD PRESSURE: 144 MMHG | DIASTOLIC BLOOD PRESSURE: 72 MMHG | OXYGEN SATURATION: 100 %

## 2019-12-19 VITALS
DIASTOLIC BLOOD PRESSURE: 78 MMHG | OXYGEN SATURATION: 94 % | HEART RATE: 75 BPM | WEIGHT: 225.5 LBS | RESPIRATION RATE: 15 BRPM | SYSTOLIC BLOOD PRESSURE: 137 MMHG | TEMPERATURE: 97.5 F | HEIGHT: 64 IN | BODY MASS INDEX: 38.5 KG/M2

## 2019-12-19 PROCEDURE — 3700000000 HC ANESTHESIA ATTENDED CARE: Performed by: PAIN MEDICINE

## 2019-12-19 PROCEDURE — 6360000002 HC RX W HCPCS: Performed by: NURSE ANESTHETIST, CERTIFIED REGISTERED

## 2019-12-19 PROCEDURE — 2709999900 HC NON-CHARGEABLE SUPPLY: Performed by: PAIN MEDICINE

## 2019-12-19 PROCEDURE — 64493 INJ PARAVERT F JNT L/S 1 LEV: CPT | Performed by: PAIN MEDICINE

## 2019-12-19 PROCEDURE — 2500000003 HC RX 250 WO HCPCS: Performed by: PAIN MEDICINE

## 2019-12-19 PROCEDURE — 7100000011 HC PHASE II RECOVERY - ADDTL 15 MIN: Performed by: PAIN MEDICINE

## 2019-12-19 PROCEDURE — 7100000010 HC PHASE II RECOVERY - FIRST 15 MIN: Performed by: PAIN MEDICINE

## 2019-12-19 PROCEDURE — 3600000002 HC SURGERY LEVEL 2 BASE: Performed by: PAIN MEDICINE

## 2019-12-19 PROCEDURE — 6360000002 HC RX W HCPCS: Performed by: ANESTHESIOLOGY

## 2019-12-19 PROCEDURE — 3209999900 FLUORO FOR SURGICAL PROCEDURES

## 2019-12-19 RX ORDER — BUPIVACAINE HYDROCHLORIDE 2.5 MG/ML
INJECTION, SOLUTION EPIDURAL; INFILTRATION; INTRACAUDAL PRN
Status: DISCONTINUED | OUTPATIENT
Start: 2019-12-19 | End: 2019-12-19 | Stop reason: ALTCHOICE

## 2019-12-19 RX ORDER — HYDRALAZINE HYDROCHLORIDE 20 MG/ML
5 INJECTION INTRAMUSCULAR; INTRAVENOUS EVERY 10 MIN PRN
Status: DISCONTINUED | OUTPATIENT
Start: 2019-12-19 | End: 2019-12-19 | Stop reason: HOSPADM

## 2019-12-19 RX ORDER — FENTANYL CITRATE 50 UG/ML
50 INJECTION, SOLUTION INTRAMUSCULAR; INTRAVENOUS EVERY 5 MIN PRN
Status: DISCONTINUED | OUTPATIENT
Start: 2019-12-19 | End: 2019-12-19 | Stop reason: HOSPADM

## 2019-12-19 RX ORDER — FENTANYL CITRATE 50 UG/ML
25 INJECTION, SOLUTION INTRAMUSCULAR; INTRAVENOUS EVERY 5 MIN PRN
Status: DISCONTINUED | OUTPATIENT
Start: 2019-12-19 | End: 2019-12-19 | Stop reason: HOSPADM

## 2019-12-19 RX ORDER — ONDANSETRON 2 MG/ML
4 INJECTION INTRAMUSCULAR; INTRAVENOUS
Status: COMPLETED | OUTPATIENT
Start: 2019-12-19 | End: 2019-12-19

## 2019-12-19 RX ORDER — MIDAZOLAM HYDROCHLORIDE 1 MG/ML
INJECTION INTRAMUSCULAR; INTRAVENOUS PRN
Status: DISCONTINUED | OUTPATIENT
Start: 2019-12-19 | End: 2019-12-19 | Stop reason: SDUPTHER

## 2019-12-19 RX ORDER — METOCLOPRAMIDE HYDROCHLORIDE 5 MG/ML
10 INJECTION INTRAMUSCULAR; INTRAVENOUS
Status: COMPLETED | OUTPATIENT
Start: 2019-12-19 | End: 2019-12-19

## 2019-12-19 RX ORDER — FENTANYL CITRATE 50 UG/ML
INJECTION, SOLUTION INTRAMUSCULAR; INTRAVENOUS PRN
Status: DISCONTINUED | OUTPATIENT
Start: 2019-12-19 | End: 2019-12-19 | Stop reason: SDUPTHER

## 2019-12-19 RX ADMIN — MIDAZOLAM HYDROCHLORIDE 2 MG: 1 INJECTION, SOLUTION INTRAMUSCULAR; INTRAVENOUS at 09:18

## 2019-12-19 RX ADMIN — ONDANSETRON 4 MG: 2 INJECTION INTRAMUSCULAR; INTRAVENOUS at 09:32

## 2019-12-19 RX ADMIN — METOCLOPRAMIDE 10 MG: 5 INJECTION, SOLUTION INTRAMUSCULAR; INTRAVENOUS at 09:48

## 2019-12-19 RX ADMIN — FENTANYL CITRATE 100 MCG: 50 INJECTION INTRAMUSCULAR; INTRAVENOUS at 09:18

## 2019-12-19 ASSESSMENT — PULMONARY FUNCTION TESTS
PIF_VALUE: 0

## 2019-12-19 ASSESSMENT — PAIN SCALES - GENERAL: PAINLEVEL_OUTOF10: 0

## 2019-12-19 ASSESSMENT — PAIN - FUNCTIONAL ASSESSMENT: PAIN_FUNCTIONAL_ASSESSMENT: 0-10

## 2020-01-10 ENCOUNTER — OFFICE VISIT (OUTPATIENT)
Dept: PAIN MANAGEMENT | Age: 55
End: 2020-01-10
Payer: COMMERCIAL

## 2020-01-10 VITALS
BODY MASS INDEX: 40.12 KG/M2 | HEIGHT: 64 IN | DIASTOLIC BLOOD PRESSURE: 72 MMHG | SYSTOLIC BLOOD PRESSURE: 141 MMHG | HEART RATE: 84 BPM | WEIGHT: 235 LBS

## 2020-01-10 PROCEDURE — 99213 OFFICE O/P EST LOW 20 MIN: CPT | Performed by: PAIN MEDICINE

## 2020-01-10 RX ORDER — LOSARTAN POTASSIUM 100 MG/1
TABLET ORAL
Qty: 30 TABLET | Refills: 5 | Status: SHIPPED | OUTPATIENT
Start: 2020-01-10 | End: 2020-07-15

## 2020-01-10 RX ORDER — OMEPRAZOLE 40 MG/1
CAPSULE, DELAYED RELEASE ORAL
Qty: 30 CAPSULE | Refills: 5 | Status: SHIPPED | OUTPATIENT
Start: 2020-01-10 | End: 2020-07-15

## 2020-01-10 ASSESSMENT — ENCOUNTER SYMPTOMS
DOUBLE VISION: 0
BOWEL INCONTINENCE: 0
ABDOMINAL PAIN: 0
SHORTNESS OF BREATH: 1
BACK PAIN: 1

## 2020-01-10 NOTE — PROGRESS NOTES
HPI: Back Pain   This is a chronic problem. The current episode started more than 1 year ago. The problem occurs constantly. The problem has been gradually worsening since onset. The pain is present in the lumbar spine, gluteal and sacro-iliac. The quality of the pain is described as burning and aching. The pain radiates to the left thigh. The pain is at a severity of 4/10. The pain is moderate. The pain is the same all the time. The symptoms are aggravated by position and standing. Stiffness is present in the morning. Associated symptoms include bladder incontinence, leg pain, numbness and tingling. Pertinent negatives include no abdominal pain, bowel incontinence, chest pain, dysuria, fever, headaches, paresis, paresthesias, pelvic pain, perianal numbness, weakness or weight loss. She has tried walking, home exercises and analgesics for the symptoms. The treatment provided no relief. Chronic left-sided low back pain. Imaging with degenerative changes. She had a left SI joint injection with good temporary benefit following the left L5-S1 facet joint injection. She feels the left L5-S1 facet injection was more beneficial.  Physical therapy in the past with minimal benefit. She is seeing neurosurgeon and considering surgical route. She is on Brilinta for coronary artery disease. Patient denies any new neurological symptoms. Nobowel or bladder incontinence, no weakness, and no falling. Review of OARRS does not show any aberrant prescription behavior.  .    Past Medical History:   Diagnosis Date    Abnormal stress test     Abnormal stress test     Allergic rhinitis     Chronic back pain     Hyperlipidemia     Hypertension     SRI on CPAP        Past Surgical History:   Procedure Laterality Date    ANESTHESIA NERVE BLOCK Left 12/19/2019    NERVE BLOCK (DEFINE) - # 1 L5-S1 performed by Eugenio Quijano MD at [address]  06/13/2017    CARDIAC CATHETERIZATION 2019    CARPAL TUNNEL RELEASE Right      SECTION      x3    COLONOSCOPY  2018    polypectomy    COLONOSCOPY N/A 2018    COLONOSCOPY WITH BIOPSY performed by Richard Toledo MD at [address] [place] [address]      HAND TENDON SURGERY [facility] INJECTION PROCEDURE FOR SACROILIAC JOINT Left 2019    SACROILIAC JOINT INJECTION - #1 performed by Yovanny Levin MD at [facility]  2019    SACROILIAC JOINT INJECTION - #1     NERVE BLOCK Left 2019    #1 L5-S1    TONSILLECTOMY      TUBAL LIGATION         Allergies   Allergen Reactions    Bee Venom Hives    Tetracyclines & Related Nausea Only and Other (See Comments)     dizziness    Ace Inhibitors Other (See Comments)     cough         Current Outpatient Medications:     atorvastatin (LIPITOR) 40 MG tablet, Take 2 tablets by mouth daily, Disp: 60 tablet, Rfl: 11    potassium chloride (KLOR-CON M) 20 MEQ extended release tablet, Take 1 tablet by mouth daily, Disp: 90 tablet, Rfl: 1    amLODIPine (NORVASC) 5 MG tablet, Take by mouth daily, Disp: , Rfl:     hydrochlorothiazide (HYDRODIURIL) 25 MG tablet, Take 25 mg by mouth daily, Disp: , Rfl:     Multiple Vitamins-Minerals (ONE-A-DAY 50 PLUS PO), Take 1 tablet by mouth daily, Disp: , Rfl:     nitroGLYCERIN (NITROSTAT) 0.4 MG SL tablet, up to max of 3 total doses.  If no relief after 1 dose, call 911., Disp: 25 tablet, Rfl: 1    ticagrelor (BRILINTA) 90 MG TABS tablet, Take 1 tablet by mouth 2 times daily, Disp: 60 tablet, Rfl: 0    traMADol (ULTRAM) 50 MG tablet, take 1 tablet by mouth once daily if needed, Disp: 30 tablet, Rfl: 0    aspirin 81 MG tablet, Take 1 tablet by mouth daily (with breakfast), Disp: 30 tablet, Rfl: 11    omeprazole (PRILOSEC) 40 MG delayed release capsule, take 1 capsule by mouth once daily, Disp: 30 capsule, Rfl: 5    metoprolol tartrate Positive for shortness of breath. Endocrine: Positive for cold intolerance. Hematologic/Lymphatic: Negative for bleeding problem. Skin: Negative for rash. Musculoskeletal: Positive for back pain. Gastrointestinal: Negative for abdominal pain and bowel incontinence. Genitourinary: Positive for bladder incontinence. Negative for dysuria and pelvic pain. Neurological: Positive for numbness and tingling. Negative for headaches, paresthesias and weakness. Psychiatric/Behavioral: The patient has insomnia. Allergic/Immunologic: Negative for environmental allergies and HIV exposure. Physical Exam:  BP (!) 141/72   Pulse 84   Ht 5' 4\" (1.626 m)   Wt 235 lb (106.6 kg)   BMI 40.34 kg/m²     Physical Exam  Vitals signs reviewed. Musculoskeletal:      Lumbar back: She exhibits tenderness. She exhibits no edema and no deformity. Neurological:      Mental Status: She is alert and oriented to person, place, and time. Gait: Gait normal.       Record/Diagnostics Review:    As above, I did review the imaging      Assessment:  1. Sacroiliitis ([address]    2. Lumbosacral spondylosis without myelopathy    3. S/P drug eluting coronary stent placement - Mid LAD 7/25/18-Dr. lea        Treatment Plan:  DISCUSSION: Treatment options discussed with patient and all questions answered to patient's satisfaction. OARRS Review: Reviewed and acceptable for medications prescribed. TREATMENT OPTIONS:     Excellent temporary benefit with both SI joint injection as well as left L5 and S1 MBB, feels the left L5-S1 injection was more beneficial than the SI joint injection. She has surgical evaluation pending. Consider confirmatory block and possible RFA. Discussed the importance of continued physical therapy and exercise program as well. On Brilinta for coronary artery disease, okay for RFA but will need clearance to hold for certain interventions. Jc Fernandez M.D.         I have reviewed the chief complaint and history of present illness (including ROS and PFSH) and vital documentation by my staff and I agree with their documentation and have added where applicable.

## 2020-01-24 ENCOUNTER — TELEPHONE (OUTPATIENT)
Dept: FAMILY MEDICINE CLINIC | Age: 55
End: 2020-01-24

## 2020-01-24 RX ORDER — ATORVASTATIN CALCIUM 80 MG/1
80 TABLET, FILM COATED ORAL DAILY
Qty: 30 TABLET | Refills: 11 | Status: SHIPPED | OUTPATIENT
Start: 2020-01-24 | End: 2021-05-21

## 2020-01-24 NOTE — TELEPHONE ENCOUNTER
Received notification from pharmacy stating insurance wont pay for 2 tablets a day of atorvastatin. They are asking if atorvastatin 80mg once a day. Can this be changed and sent to [facility]?

## 2020-01-29 ENCOUNTER — OFFICE VISIT (OUTPATIENT)
Dept: NEUROSURGERY | Age: 55
End: 2020-01-29
Payer: COMMERCIAL

## 2020-01-29 VITALS — RESPIRATION RATE: 16 BRPM | DIASTOLIC BLOOD PRESSURE: 74 MMHG | SYSTOLIC BLOOD PRESSURE: 128 MMHG | HEART RATE: 73 BPM

## 2020-01-29 PROCEDURE — 99214 OFFICE O/P EST MOD 30 MIN: CPT | Performed by: NEUROLOGICAL SURGERY

## 2020-01-29 NOTE — PROGRESS NOTES
finger rub    Sensation:   Diminished L5    Motor  L deltoid 5/5; R deltoid 5/5  L biceps 5/5; R biceps 5/5  L triceps 5/5; R triceps 5/5  L wrist extension 5/5; R wrist extension 5/5  L intrinsics 5/5; R intrinsics 5/5     L iliopsoas 5/5 , R iliopsoas 5/5  L quadriceps 5/5; R quadriceps 5/5  L Dorsiflexion 5/5; R dorsiflexion 5/5  L Plantarflexion 5/5; R plantarflexion 5/5  L EHL 5/5; R EHL 5/5    Reflexes  L Brachioradialis 2+/4; R brachioradialis 2+/4  L Biceps 2+/4; R Biceps 2+/4  L Triceps 2+/4; R Triceps 2+/4  L Patellar 2+/4: R Patellar 2+/4  L Achilles 2+/4; R Achilles 2+/4    hoffmans L: neg  hoffmans R: neg  Clonus L: neg  Clonus R: neg  Babinski L: up  Babinski R; up    Studies Review:     Flex ext stable  Mri L5 protrusion with lat recess stenosis    Assessment and Plan:      1. Other spondylosis with radiculopathy, lumbar region          Plan: Offered options including lumbar fusion from L4-S1 with fusion fixation and decompression of the L5 lateral recess. Another option would be lumbar discectomy with lysis of the scar and adhesions considering there is lateral recess stenosis with a small disc protrusion and the patient is has significant limiting L5 dermatomal pain and numbness in the left side. Third option would be spinal cord stimulation that would not treat the root cause of the symptoms but has a good likelihood of success as well as the only option of the availability to trial the therapy prior to implantation. The patient like to think over the options and decide what she would wants to do. I did explain that with a lumbar fusion there is some presumption as to the source of the back pain which cannot tell definitively. Considering spondylosis and some relief with the facet block I am presuming that these are the source of her axial symptoms. She would like to talk things over with her  and decide on a plan and she will notify us at that time.     Followup: No follow-ups on

## 2020-02-07 ENCOUNTER — HOSPITAL ENCOUNTER (INPATIENT)
Age: 55
LOS: 2 days | Discharge: HOME | DRG: 287 | End: 2020-02-10
Attending: EMERGENCY MEDICINE | Admitting: INTERNAL MEDICINE
Payer: COMMERCIAL

## 2020-02-07 ENCOUNTER — APPOINTMENT (OUTPATIENT)
Dept: CT IMAGING | Age: 55
DRG: 287 | End: 2020-02-07
Payer: COMMERCIAL

## 2020-02-07 ENCOUNTER — APPOINTMENT (OUTPATIENT)
Dept: GENERAL RADIOLOGY | Age: 55
DRG: 287 | End: 2020-02-07
Payer: COMMERCIAL

## 2020-02-07 PROBLEM — Z87.891 FORMER SMOKER: Status: ACTIVE | Noted: 2020-02-07

## 2020-02-07 PROBLEM — R73.9 HYPERGLYCEMIA: Status: ACTIVE | Noted: 2020-02-07

## 2020-02-07 PROBLEM — E87.6 HYPOKALEMIA: Status: ACTIVE | Noted: 2020-02-07

## 2020-02-07 LAB
ABSOLUTE EOS #: 0.13 K/UL (ref 0–0.44)
ABSOLUTE IMMATURE GRANULOCYTE: 0.02 K/UL (ref 0–0.3)
ABSOLUTE LYMPH #: 2.51 K/UL (ref 1.1–3.7)
ABSOLUTE MONO #: 0.62 K/UL (ref 0.1–1.2)
ANION GAP SERPL CALCULATED.3IONS-SCNC: 11 MMOL/L (ref 9–17)
BASOPHILS # BLD: 0 % (ref 0–2)
BASOPHILS ABSOLUTE: 0.03 K/UL (ref 0–0.2)
BNP INTERPRETATION: NORMAL
BUN BLDV-MCNC: 17 MG/DL (ref 6–20)
BUN/CREAT BLD: 25 (ref 9–20)
CALCIUM SERPL-MCNC: 9.5 MG/DL (ref 8.6–10.4)
CHLORIDE BLD-SCNC: 103 MMOL/L (ref 98–107)
CO2: 25 MMOL/L (ref 20–31)
CREAT SERPL-MCNC: 0.68 MG/DL (ref 0.5–0.9)
DIFFERENTIAL TYPE: NORMAL
EKG ATRIAL RATE: 88 BPM
EKG P AXIS: 60 DEGREES
EKG P-R INTERVAL: 112 MS
EKG Q-T INTERVAL: 368 MS
EKG QRS DURATION: 86 MS
EKG QTC CALCULATION (BAZETT): 445 MS
EKG R AXIS: 36 DEGREES
EKG T AXIS: 148 DEGREES
EKG VENTRICULAR RATE: 88 BPM
EOSINOPHILS RELATIVE PERCENT: 2 % (ref 1–4)
GFR AFRICAN AMERICAN: >60 ML/MIN
GFR NON-AFRICAN AMERICAN: >60 ML/MIN
GFR SERPL CREATININE-BSD FRML MDRD: ABNORMAL ML/MIN/{1.73_M2}
GFR SERPL CREATININE-BSD FRML MDRD: ABNORMAL ML/MIN/{1.73_M2}
GLUCOSE BLD-MCNC: 152 MG/DL (ref 70–99)
HCT VFR BLD CALC: 40 % (ref 36.3–47.1)
HEMOGLOBIN: 13.2 G/DL (ref 11.9–15.1)
IMMATURE GRANULOCYTES: 0 %
LYMPHOCYTES # BLD: 37 % (ref 24–43)
MCH RBC QN AUTO: 28 PG (ref 25.2–33.5)
MCHC RBC AUTO-ENTMCNC: 33 G/DL (ref 28.4–34.8)
MCV RBC AUTO: 84.7 FL (ref 82.6–102.9)
MONOCYTES # BLD: 9 % (ref 3–12)
MYOGLOBIN: <21 NG/ML (ref 25–58)
NRBC AUTOMATED: 0 PER 100 WBC
PDW BLD-RTO: 13.3 % (ref 11.8–14.4)
PLATELET # BLD: 232 K/UL (ref 138–453)
PLATELET ESTIMATE: NORMAL
PMV BLD AUTO: 10.6 FL (ref 8.1–13.5)
POTASSIUM SERPL-SCNC: 3.6 MMOL/L (ref 3.7–5.3)
PRO-BNP: 91 PG/ML
RBC # BLD: 4.72 M/UL (ref 3.95–5.11)
RBC # BLD: NORMAL 10*6/UL
SEG NEUTROPHILS: 52 % (ref 36–65)
SEGMENTED NEUTROPHILS ABSOLUTE COUNT: 3.44 K/UL (ref 1.5–8.1)
SODIUM BLD-SCNC: 139 MMOL/L (ref 135–144)
TROPONIN INTERP: ABNORMAL
TROPONIN INTERP: NORMAL
TROPONIN T: ABNORMAL NG/ML
TROPONIN T: NORMAL NG/ML
TROPONIN, HIGH SENSITIVITY: <6 NG/L (ref 0–14)
TROPONIN, HIGH SENSITIVITY: <6 NG/L (ref 0–14)
WBC # BLD: 6.8 K/UL (ref 3.5–11.3)
WBC # BLD: NORMAL 10*3/UL

## 2020-02-07 PROCEDURE — 36415 COLL VENOUS BLD VENIPUNCTURE: CPT

## 2020-02-07 PROCEDURE — 83880 ASSAY OF NATRIURETIC PEPTIDE: CPT

## 2020-02-07 PROCEDURE — 83036 HEMOGLOBIN GLYCOSYLATED A1C: CPT

## 2020-02-07 PROCEDURE — 84484 ASSAY OF TROPONIN QUANT: CPT

## 2020-02-07 PROCEDURE — 99285 EMERGENCY DEPT VISIT HI MDM: CPT

## 2020-02-07 PROCEDURE — 6360000002 HC RX W HCPCS: Performed by: NURSE PRACTITIONER

## 2020-02-07 PROCEDURE — 6370000000 HC RX 637 (ALT 250 FOR IP): Performed by: NURSE PRACTITIONER

## 2020-02-07 PROCEDURE — 2580000003 HC RX 258: Performed by: NURSE PRACTITIONER

## 2020-02-07 PROCEDURE — 93010 ELECTROCARDIOGRAM REPORT: CPT | Performed by: INTERNAL MEDICINE

## 2020-02-07 PROCEDURE — 96374 THER/PROPH/DIAG INJ IV PUSH: CPT

## 2020-02-07 PROCEDURE — 93005 ELECTROCARDIOGRAM TRACING: CPT | Performed by: NURSE PRACTITIONER

## 2020-02-07 PROCEDURE — 6360000004 HC RX CONTRAST MEDICATION: Performed by: NURSE PRACTITIONER

## 2020-02-07 PROCEDURE — 83874 ASSAY OF MYOGLOBIN: CPT

## 2020-02-07 PROCEDURE — 71045 X-RAY EXAM CHEST 1 VIEW: CPT

## 2020-02-07 PROCEDURE — G0378 HOSPITAL OBSERVATION PER HR: HCPCS

## 2020-02-07 PROCEDURE — 99222 1ST HOSP IP/OBS MODERATE 55: CPT | Performed by: NURSE PRACTITIONER

## 2020-02-07 PROCEDURE — 96375 TX/PRO/DX INJ NEW DRUG ADDON: CPT

## 2020-02-07 PROCEDURE — 80048 BASIC METABOLIC PNL TOTAL CA: CPT

## 2020-02-07 PROCEDURE — 71260 CT THORAX DX C+: CPT

## 2020-02-07 PROCEDURE — 85025 COMPLETE CBC W/AUTO DIFF WBC: CPT

## 2020-02-07 RX ORDER — SODIUM CHLORIDE 0.9 % (FLUSH) 0.9 %
10 SYRINGE (ML) INJECTION PRN
Status: DISCONTINUED | OUTPATIENT
Start: 2020-02-07 | End: 2020-02-08 | Stop reason: SDUPTHER

## 2020-02-07 RX ORDER — SODIUM CHLORIDE 0.9 % (FLUSH) 0.9 %
10 SYRINGE (ML) INJECTION EVERY 12 HOURS SCHEDULED
Status: DISCONTINUED | OUTPATIENT
Start: 2020-02-07 | End: 2020-02-10 | Stop reason: SDUPTHER

## 2020-02-07 RX ORDER — AMLODIPINE BESYLATE 5 MG/1
5 TABLET ORAL DAILY
Status: DISCONTINUED | OUTPATIENT
Start: 2020-02-08 | End: 2020-02-10 | Stop reason: HOSPADM

## 2020-02-07 RX ORDER — ATORVASTATIN CALCIUM 80 MG/1
80 TABLET, FILM COATED ORAL DAILY
Status: DISCONTINUED | OUTPATIENT
Start: 2020-02-08 | End: 2020-02-10 | Stop reason: HOSPADM

## 2020-02-07 RX ORDER — ACETAMINOPHEN 325 MG/1
650 TABLET ORAL EVERY 4 HOURS PRN
Status: DISCONTINUED | OUTPATIENT
Start: 2020-02-07 | End: 2020-02-10 | Stop reason: HOSPADM

## 2020-02-07 RX ORDER — TRAMADOL HYDROCHLORIDE 50 MG/1
50 TABLET ORAL EVERY 6 HOURS PRN
Status: DISCONTINUED | OUTPATIENT
Start: 2020-02-07 | End: 2020-02-10 | Stop reason: HOSPADM

## 2020-02-07 RX ORDER — POTASSIUM CHLORIDE 20 MEQ/1
40 TABLET, EXTENDED RELEASE ORAL PRN
Status: DISCONTINUED | OUTPATIENT
Start: 2020-02-07 | End: 2020-02-10 | Stop reason: HOSPADM

## 2020-02-07 RX ORDER — PANTOPRAZOLE SODIUM 40 MG/1
40 TABLET, DELAYED RELEASE ORAL
Status: DISCONTINUED | OUTPATIENT
Start: 2020-02-08 | End: 2020-02-10 | Stop reason: HOSPADM

## 2020-02-07 RX ORDER — POTASSIUM CHLORIDE 7.45 MG/ML
10 INJECTION INTRAVENOUS PRN
Status: DISCONTINUED | OUTPATIENT
Start: 2020-02-07 | End: 2020-02-10 | Stop reason: HOSPADM

## 2020-02-07 RX ORDER — ONDANSETRON 2 MG/ML
4 INJECTION INTRAMUSCULAR; INTRAVENOUS EVERY 6 HOURS PRN
Status: DISCONTINUED | OUTPATIENT
Start: 2020-02-07 | End: 2020-02-08 | Stop reason: SDUPTHER

## 2020-02-07 RX ORDER — LOSARTAN POTASSIUM 100 MG/1
100 TABLET ORAL DAILY
Status: DISCONTINUED | OUTPATIENT
Start: 2020-02-08 | End: 2020-02-10 | Stop reason: HOSPADM

## 2020-02-07 RX ORDER — MAGNESIUM SULFATE 1 G/100ML
1 INJECTION INTRAVENOUS PRN
Status: DISCONTINUED | OUTPATIENT
Start: 2020-02-07 | End: 2020-02-10 | Stop reason: HOSPADM

## 2020-02-07 RX ORDER — HYDROCHLOROTHIAZIDE 25 MG/1
25 TABLET ORAL DAILY
Status: DISCONTINUED | OUTPATIENT
Start: 2020-02-08 | End: 2020-02-10 | Stop reason: HOSPADM

## 2020-02-07 RX ORDER — ONDANSETRON 2 MG/ML
4 INJECTION INTRAMUSCULAR; INTRAVENOUS ONCE
Status: COMPLETED | OUTPATIENT
Start: 2020-02-07 | End: 2020-02-07

## 2020-02-07 RX ORDER — MORPHINE SULFATE 2 MG/ML
2 INJECTION, SOLUTION INTRAMUSCULAR; INTRAVENOUS ONCE
Status: COMPLETED | OUTPATIENT
Start: 2020-02-07 | End: 2020-02-07

## 2020-02-07 RX ORDER — SODIUM CHLORIDE 0.9 % (FLUSH) 0.9 %
10 SYRINGE (ML) INJECTION PRN
Status: DISCONTINUED | OUTPATIENT
Start: 2020-02-07 | End: 2020-02-10 | Stop reason: SDUPTHER

## 2020-02-07 RX ORDER — NITROGLYCERIN 0.4 MG/1
0.4 TABLET SUBLINGUAL EVERY 5 MIN PRN
Status: DISCONTINUED | OUTPATIENT
Start: 2020-02-07 | End: 2020-02-10 | Stop reason: HOSPADM

## 2020-02-07 RX ORDER — POTASSIUM CHLORIDE 7.45 MG/ML
10 INJECTION INTRAVENOUS PRN
Status: DISCONTINUED | OUTPATIENT
Start: 2020-02-07 | End: 2020-02-08 | Stop reason: SDUPTHER

## 2020-02-07 RX ORDER — ASPIRIN 81 MG/1
81 TABLET ORAL
Status: DISCONTINUED | OUTPATIENT
Start: 2020-02-08 | End: 2020-02-10 | Stop reason: HOSPADM

## 2020-02-07 RX ORDER — 0.9 % SODIUM CHLORIDE 0.9 %
80 INTRAVENOUS SOLUTION INTRAVENOUS ONCE
Status: COMPLETED | OUTPATIENT
Start: 2020-02-07 | End: 2020-02-07

## 2020-02-07 RX ORDER — ASPIRIN 81 MG/1
324 TABLET, CHEWABLE ORAL ONCE
Status: COMPLETED | OUTPATIENT
Start: 2020-02-07 | End: 2020-02-07

## 2020-02-07 RX ADMIN — Medication 10 ML: at 19:24

## 2020-02-07 RX ADMIN — ONDANSETRON 4 MG: 2 INJECTION INTRAMUSCULAR; INTRAVENOUS at 19:45

## 2020-02-07 RX ADMIN — SODIUM CHLORIDE 80 ML: 9 INJECTION, SOLUTION INTRAVENOUS at 19:24

## 2020-02-07 RX ADMIN — ASPIRIN 81 MG 324 MG: 81 TABLET ORAL at 18:03

## 2020-02-07 RX ADMIN — MORPHINE SULFATE 2 MG: 2 INJECTION, SOLUTION INTRAMUSCULAR; INTRAVENOUS at 19:45

## 2020-02-07 RX ADMIN — IOPAMIDOL 75 ML: 755 INJECTION, SOLUTION INTRAVENOUS at 19:24

## 2020-02-07 ASSESSMENT — PAIN SCALES - GENERAL
PAINLEVEL_OUTOF10: 7
PAINLEVEL_OUTOF10: 7
PAINLEVEL_OUTOF10: 3

## 2020-02-07 ASSESSMENT — PAIN DESCRIPTION - ONSET: ONSET: GRADUAL

## 2020-02-07 ASSESSMENT — PAIN DESCRIPTION - PAIN TYPE: TYPE: ACUTE PAIN

## 2020-02-07 ASSESSMENT — ENCOUNTER SYMPTOMS
BACK PAIN: 0
COUGH: 0
COLOR CHANGE: 0
SHORTNESS OF BREATH: 0
ABDOMINAL PAIN: 0

## 2020-02-07 ASSESSMENT — PAIN DESCRIPTION - PROGRESSION: CLINICAL_PROGRESSION: NOT CHANGED

## 2020-02-07 ASSESSMENT — PAIN DESCRIPTION - FREQUENCY: FREQUENCY: CONTINUOUS

## 2020-02-07 ASSESSMENT — PAIN DESCRIPTION - LOCATION: LOCATION: CHEST

## 2020-02-07 ASSESSMENT — PAIN DESCRIPTION - DESCRIPTORS: DESCRIPTORS: ACHING

## 2020-02-07 NOTE — ED NOTES
Pt to ED with c/o midsternal chest pain that wraps around both sides under the breast line that started this morning. Pt describes the pain as aching but constant. Pt reports she has a cardiac history of x2 previous stent placements after abnormal cardiac cath. Pt reports that her first stent clotted, and they re stented it. Pt rates pain 8/10 on pain scale. Pt is alert, oriented x4, eupneic, skin WPD but flushed @ times. NSR on the monitor, slightly hypertensive. Pt reports she takes Brilinta as a blood thinner twice a day. Nc 2L applied for comfort, will monitor.         Prince Powell RN  02/07/20 1088

## 2020-02-07 NOTE — ED PROVIDER NOTES
Abnormal stress test     Abnormal stress test     Allergic rhinitis     Chronic back pain     Hyperlipidemia     Hypertension     SRI on CPAP        SURGICAL HISTORY           Procedure Laterality Date    ANESTHESIA NERVE BLOCK Left 2019    NERVE BLOCK (DEFINE) - # 1 L5-S1 performed by Patric Bradford MD at [address]  2017    CARDIAC CATHETERIZATION  2019    CARPAL TUNNEL RELEASE Right      SECTION      x3    COLONOSCOPY  2018    polypectomy    COLONOSCOPY N/A 2018    COLONOSCOPY WITH BIOPSY performed by Beti Gray MD at [address] [facility]      HAND TENDON SURGERY Right     [facility] [place]JECTION PROCEDURE FOR SACROILIAC JOINT Left 2019    SACROILIAC JOINT INJECTION - #1 performed by Patric Bradford MD at [facility]  2019    SACROILIAC JOINT INJECTION - #1     NERVE BLOCK Left 2019    #1 L5-S1    TONSILLECTOMY      TUBAL LIGATION           FAMILY HISTORY           Problem Relation Age of Onset    Stroke Father      Family Status   Relation Name Status    Father   at age 62        CV        SOCIAL HISTORY      reports that she quit smoking about 18 months ago. Her smoking use included cigarettes. She quit after 30.00 years of use. She has never used smokeless tobacco. She reports that she does not drink alcohol or use drugs. REVIEW OF SYSTEMS    (2-9 systems for level 4, 10 or more for level 5)     Review of Systems   Constitutional: Negative for chills, diaphoresis, fatigue and fever. Respiratory: Negative for cough and shortness of breath. Cardiovascular: Positive for chest pain. Negative for palpitations and leg swelling. Gastrointestinal: Negative for abdominal pain. Musculoskeletal: Negative for back pain. Skin: Negative for color change, rash and wound.    Neurological: Negative for dizziness, weakness, light-headedness and headaches. Except as noted above the remainder of the review of systems was reviewed and negative. PHYSICAL EXAM    (up to 7 for level 4, 8 or more for level 5)     ED Triage Vitals [02/07/20 1737]   BP Temp Temp Source Pulse Resp SpO2 Height Weight   (!) 149/78 98.5 °F (36.9 °C) Oral 84 16 98 % -- --     Physical Exam  Vitals signs reviewed. Constitutional:       General: She is not in acute distress. Appearance: She is well-developed. She is not diaphoretic. HENT:      Mouth/Throat:      Mouth: Mucous membranes are moist.   Eyes:      Conjunctiva/sclera: Conjunctivae normal.   Cardiovascular:      Rate and Rhythm: Normal rate and regular rhythm. Pulses: Normal pulses. Heart sounds: Normal heart sounds. Pulmonary:      Effort: Pulmonary effort is normal. No respiratory distress. Breath sounds: Normal breath sounds. No wheezing or rales. Abdominal:      General: There is no distension. Palpations: Abdomen is soft. Tenderness: There is no abdominal tenderness. Musculoskeletal:      Right lower leg: No edema. Left lower leg: No edema. Skin:     General: Skin is warm and dry. Capillary Refill: Capillary refill takes less than 2 seconds. Findings: No rash. Neurological:      Mental Status: She is alert and oriented to person, place, and time. Psychiatric:         Behavior: Behavior normal.           DIAGNOSTIC RESULTS     EKG: All EKG's are interpreted by the Emergency Department Physician who either signs or Co-signs this chart in the absence of a cardiologist.  EKG was interpreted by Dr. Agata Betancur after completion.       RADIOLOGY:   Non-plain film images such as CT, Ultrasound and MRI are read by the radiologist. Plain radiographic images are visualized and preliminarily interpreted by the emergency physician with the below findings:    Interpretation per the Radiologist below, if available at the time of this All other components within normal limits   TROP/MYOGLOBIN - Abnormal; Notable for the following components:    Myoglobin <21 (*)     All other components within normal limits   BRAIN NATRIURETIC PEPTIDE   CBC WITH AUTO DIFFERENTIAL   TROPONIN       All other labs were within normal range or not returned as of this dictation. EMERGENCY DEPARTMENT COURSE and DIFFERENTIAL DIAGNOSIS/MDM:   Vitals:    Vitals:    02/07/20 1737 02/07/20 1820   BP: (!) 149/78 (!) 144/66   Pulse: 84 81   Resp: 16    Temp: 98.5 °F (36.9 °C)    TempSrc: Oral    SpO2: 98% 96%         MEDICATIONS GIVEN IN THE ED:  Medications   sodium chloride flush 0.9 % injection 10 mL (10 mLs Intravenous Given 2/7/20 1924)   aspirin chewable tablet 324 mg (324 mg Oral Given 2/7/20 1803)   ondansetron (ZOFRAN) injection 4 mg (4 mg Intravenous Given 2/7/20 1945)   morphine (PF) injection 2 mg (2 mg Intravenous Given 2/7/20 1945)   0.9 % sodium chloride bolus (0 mLs Intravenous Stopped 2/7/20 1928)   iopamidol (ISOVUE-370) 76 % injection 75 mL (75 mLs Intravenous Given 2/7/20 1924)       CLINICAL DECISION MAKING:  The patient presented alert with a nontoxic appearance and was seen in conjunction with Dr. Frederick Patton. She has a cardiac history. She will be admitted for further evaluation and treatment. I spoke with Niyah Blancas CNP from [facility]. CONSULTS:  IP CONSULT TO INTERNAL MEDICINE        FINAL IMPRESSION      1.  Chest pain, unspecified type            Problem List  Patient Active Problem List   Diagnosis Code    Chronic back pain M54.9, G89.29    Hypertension I10    SRI on CPAP G47.33, Z99.89    Mixed hyperlipidemia E78.2    S/P drug eluting coronary stent placement - Mid LAD 7/25/18-Dr. Vincent Avilez Z95.5    Coronary artery disease involving native coronary artery of native heart with unstable angina pectoris (HCC) I25.110    Class 2 obesity due to excess calories with body mass index (BMI) of 39.0 to 39.9 in adult E66.09, Z68.39    Acute bronchitis due to other specified organisms J20.8    Hyperplastic colon polyp K63.5    Chest pain R07.9    BMI 38.0-38.9,adult Z68.38    Post PTCA Z98.61    IFG (impaired fasting glucose) R73.01         DISPOSITION/PLAN   DISPOSITION  ADMIT      PATIENT REFERRED TO:   Clement Paul MD  [address] [place] [address]            DISCHARGE MEDICATIONS:     New Prescriptions    No medications on file           (Please note that portions of this note were completed with a voice recognition program.  Efforts were made to edit the dictations but occasionally words are mis-transcribed.)    BRITTON Loco - BRITTON Murphy CNP  02/07/20 2022       BRITTON Loco CNP  02/07/20 2039

## 2020-02-08 PROBLEM — E66.812 CLASS 2 OBESITY DUE TO EXCESS CALORIES WITH BODY MASS INDEX (BMI) OF 39.0 TO 39.9 IN ADULT: Chronic | Status: ACTIVE | Noted: 2018-07-25

## 2020-02-08 PROBLEM — E66.09 CLASS 2 OBESITY DUE TO EXCESS CALORIES WITH BODY MASS INDEX (BMI) OF 39.0 TO 39.9 IN ADULT: Chronic | Status: ACTIVE | Noted: 2018-07-25

## 2020-02-08 PROBLEM — R01.1 MURMUR, CARDIAC: Chronic | Status: ACTIVE | Noted: 2020-02-08

## 2020-02-08 PROBLEM — Z95.5 S/P DRUG ELUTING CORONARY STENT PLACEMENT: Chronic | Status: ACTIVE | Noted: 2018-07-25

## 2020-02-08 PROBLEM — Z87.891 FORMER SMOKER: Chronic | Status: ACTIVE | Noted: 2020-02-07

## 2020-02-08 PROBLEM — I20.0 UNSTABLE ANGINA (HCC): Status: ACTIVE | Noted: 2020-02-08

## 2020-02-08 PROBLEM — E78.2 MIXED HYPERLIPIDEMIA: Chronic | Status: ACTIVE | Noted: 2017-01-08

## 2020-02-08 PROBLEM — I20.0 UNSTABLE ANGINA (HCC): Status: ACTIVE | Noted: 2019-02-10

## 2020-02-08 LAB
ALBUMIN SERPL-MCNC: 3.8 G/DL (ref 3.5–5.2)
ALBUMIN/GLOBULIN RATIO: ABNORMAL (ref 1–2.5)
ALP BLD-CCNC: 106 U/L (ref 35–104)
ALT SERPL-CCNC: 28 U/L (ref 5–33)
ANION GAP SERPL CALCULATED.3IONS-SCNC: 11 MMOL/L (ref 9–17)
AST SERPL-CCNC: 20 U/L
BILIRUB SERPL-MCNC: 0.39 MG/DL (ref 0.3–1.2)
BUN BLDV-MCNC: 14 MG/DL (ref 6–20)
BUN/CREAT BLD: 20 (ref 9–20)
CALCIUM SERPL-MCNC: 9.1 MG/DL (ref 8.6–10.4)
CHLORIDE BLD-SCNC: 106 MMOL/L (ref 98–107)
CHOLESTEROL/HDL RATIO: 2.4
CHOLESTEROL: 140 MG/DL
CO2: 25 MMOL/L (ref 20–31)
CREAT SERPL-MCNC: 0.69 MG/DL (ref 0.5–0.9)
EKG ATRIAL RATE: 70 BPM
EKG P AXIS: 50 DEGREES
EKG P-R INTERVAL: 150 MS
EKG Q-T INTERVAL: 434 MS
EKG QRS DURATION: 90 MS
EKG QTC CALCULATION (BAZETT): 468 MS
EKG R AXIS: 47 DEGREES
EKG T AXIS: -165 DEGREES
EKG VENTRICULAR RATE: 70 BPM
GFR AFRICAN AMERICAN: >60 ML/MIN
GFR NON-AFRICAN AMERICAN: >60 ML/MIN
GFR SERPL CREATININE-BSD FRML MDRD: ABNORMAL ML/MIN/{1.73_M2}
GFR SERPL CREATININE-BSD FRML MDRD: ABNORMAL ML/MIN/{1.73_M2}
GLUCOSE BLD-MCNC: 112 MG/DL (ref 65–105)
GLUCOSE BLD-MCNC: 94 MG/DL (ref 70–99)
HCG QUALITATIVE: NEGATIVE
HCT VFR BLD CALC: 39 % (ref 36.3–47.1)
HDLC SERPL-MCNC: 59 MG/DL
HEMOGLOBIN: 12.8 G/DL (ref 11.9–15.1)
LDL CHOLESTEROL: 65 MG/DL (ref 0–130)
MAGNESIUM: 2 MG/DL (ref 1.6–2.6)
MCH RBC QN AUTO: 28.1 PG (ref 25.2–33.5)
MCHC RBC AUTO-ENTMCNC: 32.8 G/DL (ref 28.4–34.8)
MCV RBC AUTO: 85.5 FL (ref 82.6–102.9)
NRBC AUTOMATED: 0 PER 100 WBC
PDW BLD-RTO: 13.4 % (ref 11.8–14.4)
PLATELET # BLD: 234 K/UL (ref 138–453)
PMV BLD AUTO: 10.5 FL (ref 8.1–13.5)
POTASSIUM SERPL-SCNC: 3.8 MMOL/L (ref 3.7–5.3)
RBC # BLD: 4.56 M/UL (ref 3.95–5.11)
SODIUM BLD-SCNC: 142 MMOL/L (ref 135–144)
TOTAL PROTEIN: 6.5 G/DL (ref 6.4–8.3)
TRIGL SERPL-MCNC: 78 MG/DL
TROPONIN INTERP: NORMAL
TROPONIN INTERP: NORMAL
TROPONIN T: NORMAL NG/ML
TROPONIN T: NORMAL NG/ML
TROPONIN, HIGH SENSITIVITY: <6 NG/L (ref 0–14)
TROPONIN, HIGH SENSITIVITY: <6 NG/L (ref 0–14)
VLDLC SERPL CALC-MCNC: NORMAL MG/DL (ref 1–30)
WBC # BLD: 6.2 K/UL (ref 3.5–11.3)

## 2020-02-08 PROCEDURE — 36415 COLL VENOUS BLD VENIPUNCTURE: CPT

## 2020-02-08 PROCEDURE — 6360000002 HC RX W HCPCS: Performed by: INTERNAL MEDICINE

## 2020-02-08 PROCEDURE — 93010 ELECTROCARDIOGRAM REPORT: CPT | Performed by: INTERNAL MEDICINE

## 2020-02-08 PROCEDURE — 99232 SBSQ HOSP IP/OBS MODERATE 35: CPT | Performed by: INTERNAL MEDICINE

## 2020-02-08 PROCEDURE — 6360000002 HC RX W HCPCS: Performed by: NURSE PRACTITIONER

## 2020-02-08 PROCEDURE — 96365 THER/PROPH/DIAG IV INF INIT: CPT

## 2020-02-08 PROCEDURE — 96372 THER/PROPH/DIAG INJ SC/IM: CPT

## 2020-02-08 PROCEDURE — 2500000003 HC RX 250 WO HCPCS: Performed by: INTERNAL MEDICINE

## 2020-02-08 PROCEDURE — 2580000003 HC RX 258: Performed by: NURSE PRACTITIONER

## 2020-02-08 PROCEDURE — 85027 COMPLETE CBC AUTOMATED: CPT

## 2020-02-08 PROCEDURE — 96366 THER/PROPH/DIAG IV INF ADDON: CPT

## 2020-02-08 PROCEDURE — 84484 ASSAY OF TROPONIN QUANT: CPT

## 2020-02-08 PROCEDURE — 80061 LIPID PANEL: CPT

## 2020-02-08 PROCEDURE — 6370000000 HC RX 637 (ALT 250 FOR IP): Performed by: NURSE PRACTITIONER

## 2020-02-08 PROCEDURE — 83735 ASSAY OF MAGNESIUM: CPT

## 2020-02-08 PROCEDURE — 93005 ELECTROCARDIOGRAM TRACING: CPT | Performed by: NURSE PRACTITIONER

## 2020-02-08 PROCEDURE — 80053 COMPREHEN METABOLIC PANEL: CPT

## 2020-02-08 PROCEDURE — 84703 CHORIONIC GONADOTROPIN ASSAY: CPT

## 2020-02-08 PROCEDURE — 2060000000 HC ICU INTERMEDIATE R&B

## 2020-02-08 PROCEDURE — 82947 ASSAY GLUCOSE BLOOD QUANT: CPT

## 2020-02-08 RX ORDER — FENTANYL CITRATE 50 UG/ML
50 INJECTION, SOLUTION INTRAMUSCULAR; INTRAVENOUS EVERY 6 HOURS PRN
Status: DISCONTINUED | OUTPATIENT
Start: 2020-02-08 | End: 2020-02-10

## 2020-02-08 RX ORDER — METOPROLOL TARTRATE 5 MG/5ML
5 INJECTION INTRAVENOUS EVERY 5 MIN PRN
Status: CANCELLED | OUTPATIENT
Start: 2020-02-08 | End: 2020-02-08

## 2020-02-08 RX ORDER — ATROPINE SULFATE 0.1 MG/ML
0.5 INJECTION INTRAVENOUS EVERY 5 MIN PRN
Status: CANCELLED | OUTPATIENT
Start: 2020-02-08 | End: 2020-02-08

## 2020-02-08 RX ORDER — SODIUM CHLORIDE 9 MG/ML
500 INJECTION, SOLUTION INTRAVENOUS CONTINUOUS PRN
Status: CANCELLED | OUTPATIENT
Start: 2020-02-08 | End: 2020-02-08

## 2020-02-08 RX ORDER — ALBUTEROL SULFATE 90 UG/1
2 AEROSOL, METERED RESPIRATORY (INHALATION) PRN
Status: CANCELLED | OUTPATIENT
Start: 2020-02-08 | End: 2020-02-08

## 2020-02-08 RX ORDER — FENTANYL CITRATE 50 UG/ML
25 INJECTION, SOLUTION INTRAMUSCULAR; INTRAVENOUS EVERY 6 HOURS PRN
Status: DISCONTINUED | OUTPATIENT
Start: 2020-02-08 | End: 2020-02-10

## 2020-02-08 RX ORDER — AMINOPHYLLINE DIHYDRATE 25 MG/ML
50 INJECTION, SOLUTION INTRAVENOUS PRN
Status: CANCELLED | OUTPATIENT
Start: 2020-02-08 | End: 2020-02-08

## 2020-02-08 RX ORDER — ONDANSETRON 4 MG/1
4 TABLET, ORALLY DISINTEGRATING ORAL EVERY 6 HOURS PRN
Status: DISCONTINUED | OUTPATIENT
Start: 2020-02-08 | End: 2020-02-10 | Stop reason: HOSPADM

## 2020-02-08 RX ORDER — SODIUM CHLORIDE 0.9 % (FLUSH) 0.9 %
10 SYRINGE (ML) INJECTION PRN
Status: CANCELLED | OUTPATIENT
Start: 2020-02-08 | End: 2020-02-08

## 2020-02-08 RX ORDER — ONDANSETRON 2 MG/ML
4 INJECTION INTRAMUSCULAR; INTRAVENOUS EVERY 6 HOURS PRN
Status: DISCONTINUED | OUTPATIENT
Start: 2020-02-08 | End: 2020-02-10 | Stop reason: HOSPADM

## 2020-02-08 RX ORDER — NITROGLYCERIN 20 MG/100ML
5 INJECTION INTRAVENOUS CONTINUOUS
Status: DISCONTINUED | OUTPATIENT
Start: 2020-02-08 | End: 2020-02-10

## 2020-02-08 RX ADMIN — ENOXAPARIN SODIUM 100 MG: 100 INJECTION SUBCUTANEOUS at 09:34

## 2020-02-08 RX ADMIN — TICAGRELOR 90 MG: 90 TABLET ORAL at 08:47

## 2020-02-08 RX ADMIN — ASPIRIN 81 MG: 81 TABLET, COATED ORAL at 08:47

## 2020-02-08 RX ADMIN — TRAMADOL HYDROCHLORIDE 50 MG: 50 TABLET, FILM COATED ORAL at 08:47

## 2020-02-08 RX ADMIN — TRAMADOL HYDROCHLORIDE 50 MG: 50 TABLET, FILM COATED ORAL at 02:33

## 2020-02-08 RX ADMIN — ATORVASTATIN CALCIUM 80 MG: 80 TABLET, FILM COATED ORAL at 08:47

## 2020-02-08 RX ADMIN — AMLODIPINE BESYLATE 5 MG: 5 TABLET ORAL at 08:47

## 2020-02-08 RX ADMIN — TICAGRELOR 90 MG: 90 TABLET ORAL at 19:58

## 2020-02-08 RX ADMIN — ACETAMINOPHEN 650 MG: 325 TABLET ORAL at 07:01

## 2020-02-08 RX ADMIN — PANTOPRAZOLE SODIUM 40 MG: 40 TABLET, DELAYED RELEASE ORAL at 07:34

## 2020-02-08 RX ADMIN — ENOXAPARIN SODIUM 100 MG: 100 INJECTION SUBCUTANEOUS at 19:58

## 2020-02-08 RX ADMIN — METOPROLOL TARTRATE 25 MG: 25 TABLET ORAL at 19:58

## 2020-02-08 RX ADMIN — ACETAMINOPHEN 650 MG: 325 TABLET ORAL at 17:01

## 2020-02-08 RX ADMIN — SODIUM CHLORIDE, PRESERVATIVE FREE 10 ML: 5 INJECTION INTRAVENOUS at 08:54

## 2020-02-08 RX ADMIN — ONDANSETRON 4 MG: 2 INJECTION INTRAMUSCULAR; INTRAVENOUS at 19:58

## 2020-02-08 RX ADMIN — SODIUM CHLORIDE, PRESERVATIVE FREE 10 ML: 5 INJECTION INTRAVENOUS at 00:24

## 2020-02-08 RX ADMIN — METOPROLOL TARTRATE 25 MG: 25 TABLET ORAL at 00:22

## 2020-02-08 RX ADMIN — METOPROLOL TARTRATE 25 MG: 25 TABLET ORAL at 08:47

## 2020-02-08 RX ADMIN — FENTANYL CITRATE 50 MCG: 50 INJECTION INTRAMUSCULAR; INTRAVENOUS at 21:50

## 2020-02-08 RX ADMIN — LOSARTAN POTASSIUM 100 MG: 100 TABLET, FILM COATED ORAL at 08:47

## 2020-02-08 RX ADMIN — HYDROCHLOROTHIAZIDE 25 MG: 25 TABLET ORAL at 08:47

## 2020-02-08 RX ADMIN — TICAGRELOR 90 MG: 90 TABLET ORAL at 00:23

## 2020-02-08 RX ADMIN — NITROGLYCERIN 5 MCG/MIN: 20 INJECTION INTRAVENOUS at 09:35

## 2020-02-08 ASSESSMENT — PAIN SCALES - GENERAL
PAINLEVEL_OUTOF10: 5
PAINLEVEL_OUTOF10: 5
PAINLEVEL_OUTOF10: 7
PAINLEVEL_OUTOF10: 5
PAINLEVEL_OUTOF10: 5
PAINLEVEL_OUTOF10: 6
PAINLEVEL_OUTOF10: 5
PAINLEVEL_OUTOF10: 6
PAINLEVEL_OUTOF10: 6
PAINLEVEL_OUTOF10: 5
PAINLEVEL_OUTOF10: 4
PAINLEVEL_OUTOF10: 3
PAINLEVEL_OUTOF10: 5
PAINLEVEL_OUTOF10: 5
PAINLEVEL_OUTOF10: 6
PAINLEVEL_OUTOF10: 3
PAINLEVEL_OUTOF10: 5

## 2020-02-08 ASSESSMENT — PAIN DESCRIPTION - PROGRESSION
CLINICAL_PROGRESSION: NOT CHANGED

## 2020-02-08 ASSESSMENT — PAIN DESCRIPTION - DESCRIPTORS
DESCRIPTORS: TIGHTNESS

## 2020-02-08 ASSESSMENT — PAIN DESCRIPTION - ONSET
ONSET: ON-GOING
ONSET: PROGRESSIVE
ONSET: ON-GOING
ONSET: ON-GOING

## 2020-02-08 ASSESSMENT — PAIN DESCRIPTION - LOCATION
LOCATION: RIB CAGE

## 2020-02-08 ASSESSMENT — PAIN - FUNCTIONAL ASSESSMENT: PAIN_FUNCTIONAL_ASSESSMENT: ACTIVITIES ARE NOT PREVENTED

## 2020-02-08 ASSESSMENT — PAIN DESCRIPTION - FREQUENCY
FREQUENCY: CONTINUOUS
FREQUENCY: INTERMITTENT
FREQUENCY: CONTINUOUS
FREQUENCY: CONTINUOUS

## 2020-02-08 ASSESSMENT — ENCOUNTER SYMPTOMS
DIARRHEA: 0
WHEEZING: 0
VOMITING: 0
SHORTNESS OF BREATH: 0
CONSTIPATION: 0
COUGH: 0
BLOOD IN STOOL: 0
NAUSEA: 1
ABDOMINAL PAIN: 0
STRIDOR: 0

## 2020-02-08 ASSESSMENT — PAIN DESCRIPTION - PAIN TYPE
TYPE: ACUTE PAIN

## 2020-02-08 NOTE — ED NOTES
Pt placed in Hospital bed.  Pt to be put on portable Tele     [facility], [facility]  02/07/20 5787

## 2020-02-08 NOTE — CONSULTS
for which a RCA was injected again and there was no lesion. She was started on Norvasc. 14. Lipid panel dated 2019 showed a total cholesterol 133, LDL 62, HDL 53    Past Medical History:   has a past medical history of Abnormal stress test, Abnormal stress test, Allergic rhinitis, Chronic back pain, Former smoker, Hyperlipidemia, Hypertension, Murmur, cardiac, and SRI on CPAP. Past Surgical History:   has a past surgical history that includes Tonsillectomy; Foot surgery; Lumbar disc surgery; Endometrial ablation; Tubal ligation; Hysterectomy; Carpal tunnel release (Right); Cardiac catheterization (2017); Colonoscopy (2018); Colonoscopy (N/A, 2018); Cardiac catheterization (2019); Nerve Block (2019); Injection Procedure For Sacroiliac Joint (Left, 2019);  section; Hand tendon surgery (Right); Nerve Block (Left, 2019); and Anesthesia Nerve Block (Left, 2019). Home Medications:    Prior to Admission medications    Medication Sig Start Date End Date Taking?  Authorizing Provider   omeprazole (PRILOSEC) 40 MG delayed release capsule take 1 capsule by mouth once daily 1/10/20  Yes Jerilyn Sebastian MD   metoprolol tartrate (LOPRESSOR) 25 MG tablet take 1 tablet by mouth twice a day 1/10/20  Yes Jerilyn Sebastian MD   losartan (COZAAR) 100 MG tablet take 1 tablet by mouth once daily 1/10/20  Yes Jerilyn Sebastian MD   potassium chloride (KLOR-CON M) 20 MEQ extended release tablet Take 1 tablet by mouth daily 3/13/19  Yes BRITTON Castellanos - CNP   amLODIPine (NORVASC) 5 MG tablet Take by mouth daily 3/4/19  Yes Historical Provider, MD   Multiple Vitamins-Minerals (ONE-A-DAY 50 PLUS PO) Take 1 tablet by mouth daily   Yes Historical Provider, MD   ticagrelor (BRILINTA) 90 MG TABS tablet Take 1 tablet by mouth 2 times daily 18  Yes Robert Rendon MD   traMADol (ULTRAM) 50 MG tablet take 1 tablet by mouth once daily if needed 17  Yes Emmanuelle Alfaro Marsha Rivas MD   aspirin 81 MG tablet Take 1 tablet by mouth daily (with breakfast) 8/19/16  Yes Divina Blackmon MD   atorvastatin (LIPITOR) 80 MG tablet Take 1 tablet by mouth daily 1/24/20   Divina Blackmon MD   hydrochlorothiazide (HYDRODIURIL) 25 MG tablet Take 25 mg by mouth daily    Historical Provider, MD   nitroGLYCERIN (NITROSTAT) 0.4 MG SL tablet up to max of 3 total doses. If no relief after 1 dose, call 911. 7/26/18   Yisel Blackwood MD       Allergies:  Bee venom; Tetracyclines & related; and Ace inhibitors    Social History:   reports that she quit smoking about 18 months ago. Her smoking use included cigarettes. She quit after 30.00 years of use. She has never used smokeless tobacco. She reports that she does not drink alcohol or use drugs. Family History: family history includes Stroke in her father. No h/o sudden cardiac death. No for premature CAD    REVIEW OF SYSTEMS:    · Constitutional: there has been no unanticipated weight loss. There's been No change in energy level, No change in activity level. · Eyes: No visual changes or diplopia. No scleral icterus. · ENT: No Headaches, hearing loss or vertigo. No mouth sores or sore throat. · Cardiovascular: see above  · Respiratory: see above  · Gastrointestinal: No abdominal pain, appetite loss, blood in stools. · Genitourinary: No dysuria, trouble voiding, or hematuria. · Musculoskeletal:  No gait disturbance, No weakness or joint complaints. · Integumentary: No rash or pruritis. · Neurological: No headache or diplopia. No tingling  · Psychiatric: No anxiety, or depression. · Endocrine: No temperature intolerance. · Hematologic/Lymphatic: No abnormal bruising or bleeding, blood clots or swollen lymph nodes. · Allergic/Immunologic: No nasal congestion or hives.       PHYSICAL EXAM:      BP (!) 133/58   Pulse 95   Temp 98.5 °F (36.9 °C) (Oral)   Resp 16   SpO2 94%    Constitutional and General Appearance: alert, cooperative, no distress and appears stated age  [de-identified]: PERRL, no cervical lymphadenopathy. No masses palpable. Normal oral mucosa  Respiratory:  · Normal excursion and expansion without use of accessory muscles  · Resp Auscultation: Good respiratory effort. No for increased work of breathing. On auscultation: clear to auscultation bilaterally  Cardiovascular:  · Heart tones are crisp and normal. regular S1 and S2.  · Jugular venous pulsation Normal  · The carotid upstroke is normal in amplitude and contour without delay or bruit   Abdomen:   · soft  · Bowel sounds present  Extremities:  ·  No edema  Neurological:  · Alert and oriented. DATA:    Diagnostics:    EKG: NSR, ST-T changes inferior and anterolateral ischemia    Labs:     CBC:   Recent Labs     02/07/20  1800 02/08/20  0358   WBC 6.8 6.2   HGB 13.2 12.8   HCT 40.0 39.0    234     BMP:   Recent Labs     02/07/20  1800 02/08/20  0358    142   K 3.6* 3.8   CO2 25 25   BUN 17 14   CREATININE 0.68 0.69   LABGLOM >60 >60   GLUCOSE 152* 94     BNP: No results for input(s): BNP in the last 72 hours. PT/INR: No results for input(s): PROTIME, INR in the last 72 hours. APTT:No results for input(s): APTT in the last 72 hours. CARDIAC ENZYMES:No results for input(s): CKTOTAL, CKMB, CKMBINDEX, TROPONINI in the last 72 hours.   FASTING LIPID PANEL:  Lab Results   Component Value Date    HDL 59 02/08/2020    TRIG 78 02/08/2020     LIVER PROFILE:  Recent Labs     02/08/20  0358   AST 20   ALT 28   LABALBU 3.8       IMPRESSION:    Patient Active Problem List   Diagnosis    Chronic back pain    Hypertension    SRI on CPAP    Mixed hyperlipidemia    S/P drug eluting coronary stent placement - Mid LAD 7/25/18-Dr. lea    Coronary artery disease involving native coronary artery of native heart with unstable angina pectoris (HCC)    Class 2 obesity due to excess calories with body mass index (BMI) of 39.0 to 39.9 in adult    Acute bronchitis due to other specified organisms    Hyperplastic colon polyp    Chest pain    BMI 38.0-38.9,adult    Post PTCA    IFG (impaired fasting glucose)    Former smoker    Hypokalemia    Hyperglycemia    Murmur, cardiac     Chest pain concerning for angina with EKG changes   history of coronary artery disease with previous PCI to LAD   hypertension   hyperlipidemia      RECOMMENDATIONS:  1. Continue aspirin and Brilinta  2. Continue  Metoprolol, amlodipine  3. Full dose Lovenox  4. IV nitroglycerin  5. N.p.o. midnight Hank night  6. Need cardiac cath for risk stratification. Risk and benefits of cardiac catheterization were discussed in detail. Risk of bleeding, requiring blood transfusion, vascular complication requiring surgery, renal insufficieny with need of dialysis, CVA, MI, death and anesthesia complications including intubation were discussed. Patient agrees to proceed and verbalizes understanding. Discussed with patient and Nurse.     Selina Garcia [address] Cardiology Consult           [phone]

## 2020-02-08 NOTE — H&P
disease of ostial LCX. Transient ST elevation noted during case prior to intervention. RCA   injected again with no issues and LCX also stable lesion. Successful POBA of mid LAD stent    2D echo on 2018  Summary  Left ventricle is normal in size  Global left ventricular systolic function is normal  Estimated ejection fraction is 60 % . No significant valvular regurgitation or stenosis seen. No pericardial effusion seen. Normal aortic root dimension. Past Medical History:     Past Medical History:   Diagnosis Date    Abnormal stress test     Abnormal stress test     Allergic rhinitis     Chronic back pain     Former smoker 2020    30-year history. Quit in 2018    Hyperlipidemia     Hypertension     Murmur, cardiac 2020    SRI on CPAP         Past Surgical History:     Past Surgical History:   Procedure Laterality Date    ANESTHESIA NERVE BLOCK Left 2019    NERVE BLOCK (DEFINE) - # 1 L5-S1 performed by Meng Contreras MD at [address] [facility]  2017    CARDIAC CATHETERIZATION  2019    CARPAL TUNNEL RELEASE Right      SECTION      x3    COLONOSCOPY  2018    polypectomy    COLONOSCOPY N/A 2018    COLONOSCOPY WITH BIOPSY performed by Lindsey Venegas MD at [address] [place] [address]      HAND TENDON SURGERY [facility] INJECTION PROCEDURE FOR SACROILIAC JOINT Left 2019    SACROILIAC JOINT INJECTION - #1 performed by Meng Contreras MD at [facility]  2019    SACROILIAC JOINT INJECTION - #1     NERVE BLOCK Left 2019    #1 L5-S1    TONSILLECTOMY      TUBAL LIGATION          Medications Prior to Admission:     Prior to Admission medications    Medication Sig Start Date End Date Taking?  Authorizing Provider   omeprazole (PRILOSEC) 40 MG delayed release capsule take 1 capsule by mouth once daily 1/10/20 Yes Aric Boothe MD   metoprolol tartrate (LOPRESSOR) 25 MG tablet take 1 tablet by mouth twice a day 1/10/20  Yes Aric Boothe MD   losartan (COZAAR) 100 MG tablet take 1 tablet by mouth once daily 1/10/20  Yes Aric Boothe MD   potassium chloride (KLOR-CON M) 20 MEQ extended release tablet Take 1 tablet by mouth daily 3/13/19  Yes Adell Goldmann, APRN - CNP   amLODIPine (NORVASC) 5 MG tablet Take by mouth daily 3/4/19  Yes Historical Provider, MD   Multiple Vitamins-Minerals (ONE-A-DAY 50 PLUS PO) Take 1 tablet by mouth daily   Yes Historical Provider, MD   ticagrelor (BRILINTA) 90 MG TABS tablet Take 1 tablet by mouth 2 times daily 7/26/18  Yes Michael Bansal MD   traMADol (ULTRAM) 50 MG tablet take 1 tablet by mouth once daily if needed 9/28/17  Yes Aric Boothe MD   aspirin 81 MG tablet Take 1 tablet by mouth daily (with breakfast) 8/19/16  Yes Aric Boothe MD   atorvastatin (LIPITOR) 80 MG tablet Take 1 tablet by mouth daily 1/24/20   Aric Boothe MD   hydrochlorothiazide (HYDRODIURIL) 25 MG tablet Take 25 mg by mouth daily    Historical Provider, MD   nitroGLYCERIN (NITROSTAT) 0.4 MG SL tablet up to max of 3 total doses. If no relief after 1 dose, call 911. 7/26/18   Michael Bansal MD        Allergies:     Bee venom; Tetracyclines & related; and Ace inhibitors    Social History:     Tobacco:    reports that she quit smoking about 18 months ago. Her smoking use included cigarettes. She quit after 30.00 years of use. She has never used smokeless tobacco.  Alcohol:      reports no history of alcohol use. Drug Use:  reports no history of drug use. Family History:     Family History   Problem Relation Age of Onset    Stroke Father        Review of Systems:     Positive and Negative as described in HPI. Review of Systems   Constitutional: Positive for diaphoresis. Negative for chills and fever. HENT: Negative for congestion and hearing loss.     Respiratory: Negative for 0.0 0.0 per 100 WBC    Differential Type NOT REPORTED     Seg Neutrophils 52 36 - 65 %    Lymphocytes 37 24 - 43 %    Monocytes 9 3 - 12 %    Eosinophils % 2 1 - 4 %    Basophils 0 0 - 2 %    Immature Granulocytes 0 0 %    Segs Absolute 3.44 1.50 - 8.10 k/uL    Absolute Lymph # 2.51 1.10 - 3.70 k/uL    Absolute Mono # 0.62 0.10 - 1.20 k/uL    Absolute Eos # 0.13 0.00 - 0.44 k/uL    Basophils Absolute 0.03 0.00 - 0.20 k/uL    Absolute Immature Granulocyte 0.02 0.00 - 0.30 k/uL    WBC Morphology NOT REPORTED     RBC Morphology NOT REPORTED     Platelet Estimate NOT REPORTED    Trop/Myoglobin    Collection Time: 02/07/20  6:00 PM   Result Value Ref Range    Troponin, High Sensitivity <6 0 - 14 ng/L    Troponin T NOT REPORTED <0.03 ng/mL    Troponin Interp NOT REPORTED     Myoglobin <21 (L) 25 - 58 ng/mL   Troponin    Collection Time: 02/07/20  7:46 PM   Result Value Ref Range    Troponin, High Sensitivity <6 0 - 14 ng/L    Troponin T NOT REPORTED <0.03 ng/mL    Troponin Interp NOT REPORTED    Troponin    Collection Time: 02/07/20 11:53 PM   Result Value Ref Range    Troponin, High Sensitivity <6 0 - 14 ng/L    Troponin T NOT REPORTED <0.03 ng/mL    Troponin Interp NOT REPORTED    CBC    Collection Time: 02/08/20  3:58 AM   Result Value Ref Range    WBC 6.2 3.5 - 11.3 k/uL    RBC 4.56 3.95 - 5.11 m/uL    Hemoglobin 12.8 11.9 - 15.1 g/dL    Hematocrit 39.0 36.3 - 47.1 %    MCV 85.5 82.6 - 102.9 fL    MCH 28.1 25.2 - 33.5 pg    MCHC 32.8 28.4 - 34.8 g/dL    RDW 13.4 11.8 - 14.4 %    Platelets 028 695 - 479 k/uL    MPV 10.5 8.1 - 13.5 fL    NRBC Automated 0.0 0.0 per 100 WBC       Imaging/Diagnostics:  Xr Chest Portable    Result Date: 2/7/2020  No acute process. Ct Chest Pulmonary Embolism W Contrast    Result Date: 2/7/2020  No pulmonary embolism or focal airspace consolidation. Bibasilar atelectasis.        Assessment :      Hospital Problems           Last Modified POA    * (Principal) Chest pain 2/7/2020 Yes    S/P drug eluting coronary stent placement - Mid LAD 7/25/18-Dr. lea (Chronic) 2/8/2020 Yes    Hypertension 2/7/2020 Yes    SRI on CPAP (Chronic) 2/8/2020 Yes    Mixed hyperlipidemia (Chronic) 2/8/2020 Yes    Coronary artery disease involving native coronary artery of native heart with unstable angina pectoris ([facility] [address] 2/7/2020 Yes    Class 2 obesity due to excess calories with body mass index (BMI) of 39.0 to 39.9 in adult (Chronic) 2/8/2020 Yes    BMI 38.0-38.9,adult (Chronic) 2/8/2020 Yes    Former smoker (Chronic) 2/8/2020 Yes    Overview Signed 2/7/2020  8:31 PM by BRITTON Lyn CNP     30-year history. Quit in 2018         Hypokalemia 2/7/2020 Yes    Hyperglycemia 2/7/2020 Yes    Murmur, cardiac (Chronic) 2/8/2020 Yes          Plan:     Patient status observation in the  Med/Surge    1. Admit for observation  2. Consult cardiology Dr. Carlos Chapa group  3. Resume home Norvasc, hydrochlorothiazide, Cozaar, PPI, Brilinta, Lopressor, Lipitor, and aspirin  4. Zofran for nausea prn  5. Continue home CPAP  6. Nitroglycerin sublingual as needed for chest pain  7. Meter glucose before meals and at bedtime  8. Hemoglobin A1c  9. Repeat EKG in a.m. 10. Replace electrolytes as needed  11. Monitor telemetry and vitals  12. Oxygen as needed  13.  Plan discussed with the patient and RN    Consultations:   Adilene Massey  IP CONSULT TO BRITTON Schwartz CNP  2/8/2020  4:21 AM    Copy sent to Dr. Aric Boothe MD

## 2020-02-08 NOTE — PROGRESS NOTES
[facility]    Progress Note    2/8/2020    1:55 PM    Name:   Zuleima Roberts  MRN:     [id number]     Acct:      [de-identified]   Room:   [facility]12/  IP Day:  0  Admit Date:  2/7/2020  5:29 PM    PCP:   Delvin Nayak MD  Code Status:  Full Code    Subjective:     C/C:   Chief Complaint   Patient presents with    Chest Pain     Interval History Status: improved. Patient is pain-free at the time of evaluation. Had episode of chest pain similar to her prior angina that is improved with treatment here. Denies any shortness of breath, nausea vomiting, fever chills or other at this point. Brief History: This is a 50-year-old white female with known history of coronary disease with prior heart catheterization with stents. She presents at this time with recurrent anginal symptoms similar to prior episodes. She is undergone serial cardiac enzymes which have been negative and initially had a stress test ordered this morning however had recurrent episodes of angina and cardiology cancel stress test and is opting for heart catheterization. She is been started on nitro drip. Review of Systems:     Constitutional:  negative for chills, fevers, sweats  Respiratory:  negative for cough, dyspnea on exertion, shortness of breath, wheezing  Cardiovascular:  negative for chest pain, chest pressure/discomfort, lower extremity edema, palpitations  Gastrointestinal:  negative for abdominal pain, constipation, diarrhea, nausea, vomiting  Neurological:  negative for dizziness, headache    Medications: Allergies:     Allergies   Allergen Reactions    Bee Venom Hives    Tetracyclines & Related Nausea Only and Other (See Comments)     dizziness    Ace Inhibitors Other (See Comments)     cough       Current Meds:   Scheduled Meds:    enoxaparin  1 mg/kg Subcutaneous BID    amLODIPine  5 mg Oral Daily    aspirin  81 mg Oral Daily with breakfast    atorvastatin  80 mg Oral Daily    hydrochlorothiazide  25 mg Oral Daily    losartan  100 mg Oral Daily    metoprolol tartrate  25 mg Oral BID    pantoprazole  40 mg Oral QAM AC    ticagrelor  90 mg Oral BID    sodium chloride flush  10 mL Intravenous 2 times per day     Continuous Infusions:    nitroGLYCERIN 6 mcg/min (20 1304)     PRN Meds: ondansetron **OR** ondansetron, traMADol, sodium chloride flush, potassium chloride **OR** potassium alternative oral replacement **OR** potassium chloride, magnesium sulfate, magnesium hydroxide, nitroGLYCERIN, acetaminophen    Data:     Past Medical History:   has a past medical history of Abnormal stress test, Abnormal stress test, Allergic rhinitis, Chronic back pain, Former smoker, Hyperlipidemia, Hypertension, Murmur, cardiac, and SRI on CPAP. Social History:   reports that she quit smoking about 18 months ago. Her smoking use included cigarettes. She quit after 30.00 years of use. She has never used smokeless tobacco. She reports that she does not drink alcohol or use drugs. Family History:   Family History   Problem Relation Age of Onset    Stroke Father        Vitals:  /63   Pulse 88   Temp 97 °F (36.1 °C) (Oral)   Resp 16   Wt 230 lb (104.3 kg)   SpO2 94%   BMI 39.48 kg/m²   Temp (24hrs), Av.8 °F (36.6 °C), Min:97 °F (36.1 °C), Max:98.5 °F (36.9 °C)    Recent Labs     20  0737   POCGLU 112*       I/O (24Hr):     Intake/Output Summary (Last 24 hours) at 2020 1355  Last data filed at 2020 0050  Gross per 24 hour   Intake --   Output 500 ml   Net -500 ml       Labs:  Hematology:  Recent Labs     20  1800 20  0358   WBC 6.8 6.2   RBC 4.72 4.56   HGB 13.2 12.8   HCT 40.0 39.0   MCV 84.7 85.5   MCH 28.0 28.1   MCHC 33.0 32.8   RDW 13.3 13.4    234   MPV 10.6 10.5     Chemistry:  Recent Labs     20  1800 20  1946 20  2353 20  0358     --   --  142   K 3.6*  --   --  3.8    --   --  106   CO2 25  --   --  25   GLUCOSE 152*  --   --  94   BUN 17  --   --  14   CREATININE 0.68  --   --  0.69   MG  --   --   --  2.0   ANIONGAP 11  --   --  11   LABGLOM >60  --   --  >60   GFRAA >60  --   --  >60   CALCIUM 9.5  --   --  9.1   PROBNP 91  --   --   --    TROPHS <6 <6 <6 <6   MYOGLOBIN <21*  --   --   --      Recent Labs     02/08/20  0358 02/08/20  0737   PROT 6.5  --    LABALBU 3.8  --    AST 20  --    ALT 28  --    ALKPHOS 106*  --    BILITOT 0.39  --    CHOL 140  --    HDL 59  --    LDLCHOLESTEROL 65  --    CHOLHDLRATIO 2.4  --    TRIG 78  --    VLDL NOT REPORTED  --    POCGLU  --  112*     ABG:No results found for: POCPH, PHART, PH, POCPCO2, ESF0BNE, PCO2, POCPO2, PO2ART, PO2, POCHCO3, NNM4MMK, HCO3, NBEA, PBEA, BEART, BE, THGBART, THB, AXN0JBM, SQSX4RAA, J5YUMRCH, O2SAT, FIO2  No results found for: SPECIAL  No results found for: CULTURE    Radiology:  Xr Chest Portable    Result Date: 2/7/2020  No acute process. Ct Chest Pulmonary Embolism W Contrast    Result Date: 2/7/2020  No pulmonary embolism or focal airspace consolidation. Bibasilar atelectasis.        Physical Examination:        General appearance:  alert, cooperative and no distress  Mental Status:  oriented to person, place and time and normal affect  Lungs:  clear to auscultation bilaterally, normal effort  Heart:  regular rate and rhythm, no murmur  Abdomen:  soft, nontender, nondistended, normal bowel sounds, no masses, hepatomegaly, splenomegaly  Extremities:  no edema, redness, tenderness in the calves  Skin:  no gross lesions, rashes, induration    Assessment:        Hospital Problems           Last Modified POA    * (Principal) Unstable angina ([address] 2/8/2020 Yes    S/P drug eluting coronary stent placement - Mid LAD 7/25/18-Dr. lea (Chronic) 2/8/2020 Yes    Hypertension 2/7/2020 Yes    SRI on CPAP (Chronic) 2/8/2020 Yes    Mixed hyperlipidemia (Chronic) 2/8/2020 Yes    Coronary artery disease involving native coronary artery of native heart with unstable angina pectoris ([facility] [address] 2/7/2020 Yes    Class 2 obesity due to excess calories with body mass index (BMI) of 39.0 to 39.9 in adult (Chronic) 2/8/2020 Yes    BMI 38.0-38.9,adult (Chronic) 2/8/2020 Yes    Former smoker (Chronic) 2/8/2020 Yes    Overview Signed 2/7/2020  8:31 PM by BRITTON Lyn CNP     30-year history. Quit in 2018         Hypokalemia 2/7/2020 Yes    Hyperglycemia 2/7/2020 Yes    Murmur, cardiac (Chronic) 2/8/2020 Yes          Plan:        1. Nitroglycerin drip per cardiology  2. Heart catheterization  3. Monitor and control blood pressure  4. Continue home CPAP  5. DVT prophylaxis  6. Cardiac diet pending testing  7. Trend labs, supplement electrolytes as needed  8.  Further recommendations and plans pending cardiac work-up    Alejandro Che DO  2/8/2020  1:55 PM

## 2020-02-08 NOTE — PROGRESS NOTES
Patient weight is between 101-149kg. For prophylaxis with Enoxaparin, Pharmacy adjusted the dose to account for the patient's increased body weight in accordance with hospital approved protocol. The dose has been changed to 30mg BID. Please contact pharmacy with any concerns @ [phone]. Thank you.    Aminta Mayorga  2/8/2020 7:31 AM

## 2020-02-09 LAB
ABSOLUTE EOS #: 0.14 K/UL (ref 0–0.44)
ABSOLUTE IMMATURE GRANULOCYTE: 0.02 K/UL (ref 0–0.3)
ABSOLUTE LYMPH #: 2.07 K/UL (ref 1.1–3.7)
ABSOLUTE MONO #: 0.55 K/UL (ref 0.1–1.2)
ANION GAP SERPL CALCULATED.3IONS-SCNC: 11 MMOL/L (ref 9–17)
BASOPHILS # BLD: 1 % (ref 0–2)
BASOPHILS ABSOLUTE: 0.04 K/UL (ref 0–0.2)
BUN BLDV-MCNC: 12 MG/DL (ref 6–20)
BUN/CREAT BLD: 16 (ref 9–20)
CALCIUM SERPL-MCNC: 9.7 MG/DL (ref 8.6–10.4)
CHLORIDE BLD-SCNC: 102 MMOL/L (ref 98–107)
CO2: 26 MMOL/L (ref 20–31)
CREAT SERPL-MCNC: 0.76 MG/DL (ref 0.5–0.9)
DIFFERENTIAL TYPE: NORMAL
EOSINOPHILS RELATIVE PERCENT: 2 % (ref 1–4)
ESTIMATED AVERAGE GLUCOSE: 143 MG/DL
GFR AFRICAN AMERICAN: >60 ML/MIN
GFR NON-AFRICAN AMERICAN: >60 ML/MIN
GFR SERPL CREATININE-BSD FRML MDRD: ABNORMAL ML/MIN/{1.73_M2}
GFR SERPL CREATININE-BSD FRML MDRD: ABNORMAL ML/MIN/{1.73_M2}
GLUCOSE BLD-MCNC: 116 MG/DL (ref 70–99)
HBA1C MFR BLD: 6.6 % (ref 4–6)
HCT VFR BLD CALC: 43 % (ref 36.3–47.1)
HEMOGLOBIN: 13.8 G/DL (ref 11.9–15.1)
IMMATURE GRANULOCYTES: 0 %
INR BLD: 1
LYMPHOCYTES # BLD: 29 % (ref 24–43)
MAGNESIUM: 2.1 MG/DL (ref 1.6–2.6)
MCH RBC QN AUTO: 27.7 PG (ref 25.2–33.5)
MCHC RBC AUTO-ENTMCNC: 32.1 G/DL (ref 28.4–34.8)
MCV RBC AUTO: 86.2 FL (ref 82.6–102.9)
MONOCYTES # BLD: 8 % (ref 3–12)
NRBC AUTOMATED: 0 PER 100 WBC
PARTIAL THROMBOPLASTIN TIME: 31.2 SEC (ref 23–31)
PDW BLD-RTO: 13.4 % (ref 11.8–14.4)
PLATELET # BLD: 245 K/UL (ref 138–453)
PLATELET ESTIMATE: NORMAL
PMV BLD AUTO: 10.6 FL (ref 8.1–13.5)
POTASSIUM SERPL-SCNC: 3.9 MMOL/L (ref 3.7–5.3)
PROTHROMBIN TIME: 10.7 SEC (ref 9.7–11.6)
RBC # BLD: 4.99 M/UL (ref 3.95–5.11)
RBC # BLD: NORMAL 10*6/UL
SEG NEUTROPHILS: 60 % (ref 36–65)
SEGMENTED NEUTROPHILS ABSOLUTE COUNT: 4.22 K/UL (ref 1.5–8.1)
SODIUM BLD-SCNC: 139 MMOL/L (ref 135–144)
WBC # BLD: 7 K/UL (ref 3.5–11.3)
WBC # BLD: NORMAL 10*3/UL

## 2020-02-09 PROCEDURE — 80048 BASIC METABOLIC PNL TOTAL CA: CPT

## 2020-02-09 PROCEDURE — 99232 SBSQ HOSP IP/OBS MODERATE 35: CPT | Performed by: INTERNAL MEDICINE

## 2020-02-09 PROCEDURE — 6370000000 HC RX 637 (ALT 250 FOR IP): Performed by: NURSE PRACTITIONER

## 2020-02-09 PROCEDURE — 2060000000 HC ICU INTERMEDIATE R&B

## 2020-02-09 PROCEDURE — 6360000002 HC RX W HCPCS: Performed by: INTERNAL MEDICINE

## 2020-02-09 PROCEDURE — 85730 THROMBOPLASTIN TIME PARTIAL: CPT

## 2020-02-09 PROCEDURE — 85025 COMPLETE CBC W/AUTO DIFF WBC: CPT

## 2020-02-09 PROCEDURE — 85610 PROTHROMBIN TIME: CPT

## 2020-02-09 PROCEDURE — 36415 COLL VENOUS BLD VENIPUNCTURE: CPT

## 2020-02-09 PROCEDURE — 83735 ASSAY OF MAGNESIUM: CPT

## 2020-02-09 RX ADMIN — TICAGRELOR 90 MG: 90 TABLET ORAL at 08:44

## 2020-02-09 RX ADMIN — METOPROLOL TARTRATE 25 MG: 25 TABLET ORAL at 19:45

## 2020-02-09 RX ADMIN — ATORVASTATIN CALCIUM 80 MG: 80 TABLET, FILM COATED ORAL at 08:44

## 2020-02-09 RX ADMIN — LOSARTAN POTASSIUM 100 MG: 100 TABLET, FILM COATED ORAL at 08:44

## 2020-02-09 RX ADMIN — METOPROLOL TARTRATE 25 MG: 25 TABLET ORAL at 08:44

## 2020-02-09 RX ADMIN — ASPIRIN 81 MG: 81 TABLET, COATED ORAL at 08:44

## 2020-02-09 RX ADMIN — PANTOPRAZOLE SODIUM 40 MG: 40 TABLET, DELAYED RELEASE ORAL at 08:48

## 2020-02-09 RX ADMIN — ENOXAPARIN SODIUM 100 MG: 100 INJECTION SUBCUTANEOUS at 08:44

## 2020-02-09 RX ADMIN — HYDROCHLOROTHIAZIDE 25 MG: 25 TABLET ORAL at 08:44

## 2020-02-09 RX ADMIN — AMLODIPINE BESYLATE 5 MG: 5 TABLET ORAL at 08:44

## 2020-02-09 RX ADMIN — TICAGRELOR 90 MG: 90 TABLET ORAL at 19:45

## 2020-02-09 RX ADMIN — ACETAMINOPHEN 650 MG: 325 TABLET ORAL at 19:45

## 2020-02-09 RX ADMIN — TRAMADOL HYDROCHLORIDE 50 MG: 50 TABLET, FILM COATED ORAL at 08:49

## 2020-02-09 ASSESSMENT — PAIN SCALES - GENERAL
PAINLEVEL_OUTOF10: 3
PAINLEVEL_OUTOF10: 4
PAINLEVEL_OUTOF10: 4
PAINLEVEL_OUTOF10: 5

## 2020-02-09 ASSESSMENT — PAIN DESCRIPTION - DESCRIPTORS
DESCRIPTORS: TIGHTNESS
DESCRIPTORS: TIGHTNESS

## 2020-02-09 ASSESSMENT — PAIN DESCRIPTION - PAIN TYPE
TYPE: ACUTE PAIN

## 2020-02-09 ASSESSMENT — PAIN DESCRIPTION - PROGRESSION
CLINICAL_PROGRESSION: NOT CHANGED

## 2020-02-09 ASSESSMENT — PAIN - FUNCTIONAL ASSESSMENT
PAIN_FUNCTIONAL_ASSESSMENT: ACTIVITIES ARE NOT PREVENTED
PAIN_FUNCTIONAL_ASSESSMENT: ACTIVITIES ARE NOT PREVENTED
PAIN_FUNCTIONAL_ASSESSMENT: PREVENTS OR INTERFERES SOME ACTIVE ACTIVITIES AND ADLS

## 2020-02-09 ASSESSMENT — PAIN DESCRIPTION - ONSET
ONSET: ON-GOING

## 2020-02-09 ASSESSMENT — PAIN DESCRIPTION - FREQUENCY
FREQUENCY: CONTINUOUS

## 2020-02-09 ASSESSMENT — PAIN DESCRIPTION - LOCATION
LOCATION: RIB CAGE

## 2020-02-09 NOTE — PROGRESS NOTES
[facility]   Progress Note                   Date:   2/9/2020  Patient name: Jun Roldan  Date of admission:  2/7/2020  5:29 PM  MRN:   [id number]  YOB: 1965  PCP: Saima Foreman MD    Reason for Admission: Chest pain [R07.9]  Chest pain [R07.9]    Subjective:       Clinical Changes / Abnormalities: some chest pain. No dyspnea. Medications:   Scheduled Meds:   enoxaparin  1 mg/kg Subcutaneous BID    amLODIPine  5 mg Oral Daily    aspirin  81 mg Oral Daily with breakfast    atorvastatin  80 mg Oral Daily    hydrochlorothiazide  25 mg Oral Daily    losartan  100 mg Oral Daily    metoprolol tartrate  25 mg Oral BID    pantoprazole  40 mg Oral QAM AC    ticagrelor  90 mg Oral BID    sodium chloride flush  10 mL Intravenous 2 times per day     Continuous Infusions:   nitroGLYCERIN 6 mcg/min (02/09/20 0619)     CBC:   Recent Labs     02/07/20  1800 02/08/20  0358   WBC 6.8 6.2   HGB 13.2 12.8    234     BMP:    Recent Labs     02/07/20  1800 02/08/20  0358    142   K 3.6* 3.8    106   CO2 25 25   BUN 17 14   CREATININE 0.68 0.69   GLUCOSE 152* 94     Hepatic:   Recent Labs     02/08/20  0358   AST 20   ALT 28   BILITOT 0.39   ALKPHOS 106*     Troponin: No results for input(s): TROPONINI in the last 72 hours. BNP: No results for input(s): BNP in the last 72 hours. Lipids:   Recent Labs     02/08/20  0358   CHOL 140   HDL 59     INR: No results for input(s): INR in the last 72 hours. Objective:   Vitals: /71   Pulse 63   Temp 98 °F (36.7 °C) (Temporal)   Resp 14   Wt 228 lb 6 oz (103.6 kg)   SpO2 97%   BMI 39.20 kg/m²   General appearance: alert and cooperative with exam  HEENT: Head: Normocephalic, no lesions, without obvious abnormality.   Neck: no JVD  Lungs: CTAB  Heart: RRR s1+s2, no murmurs  Abdomen: soft, non-tender  Extremities: no edema  Neurologic: not done        Assessment / Acute Cardiac Problems:   Unstable angina  EKG

## 2020-02-09 NOTE — PROGRESS NOTES
[facility]    Progress Note    2/9/2020    11:54 AM    Name:   Zuleima Roberts  MRN:     [id number]     Acct:      [de-identified]   Room:   2028/2028-01   Day:  1  Admit Date:  2/7/2020  5:29 PM    PCP:   Delvin Nayak MD  Code Status:  Full Code    Subjective:     C/C:   Chief Complaint   Patient presents with    Chest Pain     Interval History Status: improved. Patient continues to have some lower chest pain, improved with nitroglycerin drip. Denies any shortness of breath, diaphoresis, fevers or chills or associated symptoms. Brief History: This is a 79-year-old white female with known history of coronary artery disease with prior heart catheterization and stent that presents with recurrent anginal symptoms. Generally serial cardiac enzymes in the emergency room which were negative and had improvement in her pain. She initially was a scheduled for a stress test however developed recurrent anginal symptoms. The stress test was canceled per cardiology and she was placed on nitroglycerin drip. Her pain is improved with the nitroglycerin drip and she is scheduled for heart catheterization on the 10th    Review of Systems:     Constitutional:  negative for chills, fevers, sweats  Respiratory:  negative for cough, dyspnea on exertion, shortness of breath, wheezing  Cardiovascular:  negative for chest pain, chest pressure/discomfort, lower extremity edema, palpitations  Gastrointestinal:  negative for abdominal pain, constipation, diarrhea, nausea, vomiting  Neurological:  negative for dizziness, headache    Medications: Allergies:     Allergies   Allergen Reactions    Bee Venom Hives    Tetracyclines & Related Nausea Only and Other (See Comments)     dizziness    Ace Inhibitors Other (See Comments)     cough       Current Meds:   Scheduled Meds:    enoxaparin  1 mg/kg Subcutaneous BID    amLODIPine  5 mg Oral Daily    aspirin  81 induration    Assessment:        Hospital Problems           Last Modified POA    * (Principal) Unstable angina ([facility] [address] 2/8/2020 Yes    S/P drug eluting coronary stent placement - Mid LAD 7/25/18-Dr. lea (Chronic) 2/8/2020 Yes    Hypertension 2/7/2020 Yes    SRI on CPAP (Chronic) 2/8/2020 Yes    Mixed hyperlipidemia (Chronic) 2/8/2020 Yes    Coronary artery disease involving native coronary artery of native heart with unstable angina pectoris ([facility] [address] 2/7/2020 Yes    Class 2 obesity due to excess calories with body mass index (BMI) of 39.0 to 39.9 in adult (Chronic) 2/8/2020 Yes    BMI 38.0-38.9,adult (Chronic) 2/8/2020 Yes    Former smoker (Chronic) 2/8/2020 Yes    Overview Signed 2/7/2020  8:31 PM by BRITTON Greer - CNP     30-year history. Quit in 2018         Hypokalemia 2/7/2020 Yes    Hyperglycemia 2/7/2020 Yes    Murmur, cardiac (Chronic) 2/8/2020 Yes          Plan:        1. Heart cath tomorrow  2. Continue present cardiac medications  3. Nitroglycerin drip as ordered  4. Statin therapy  5. Aspirin daily  6. CPAP nightly and as needed  7. Risk factor modification  8. DVT prophylaxis  9. Consider initiation of metformin after her heart catheterization versus dietary changes and weight loss as an outpatient. Given history of coronary disease and family history favor more aggressive approach initially.     Tracy Galindo,   2/9/2020  11:54 AM

## 2020-02-10 VITALS
HEIGHT: 64 IN | RESPIRATION RATE: 16 BRPM | WEIGHT: 228.38 LBS | BODY MASS INDEX: 38.99 KG/M2 | OXYGEN SATURATION: 93 % | TEMPERATURE: 96.7 F | HEART RATE: 68 BPM | DIASTOLIC BLOOD PRESSURE: 65 MMHG | SYSTOLIC BLOOD PRESSURE: 128 MMHG

## 2020-02-10 LAB
ABSOLUTE EOS #: 0.14 K/UL (ref 0–0.44)
ABSOLUTE IMMATURE GRANULOCYTE: 0.02 K/UL (ref 0–0.3)
ABSOLUTE LYMPH #: 2.34 K/UL (ref 1.1–3.7)
ABSOLUTE MONO #: 0.58 K/UL (ref 0.1–1.2)
ANION GAP SERPL CALCULATED.3IONS-SCNC: 10 MMOL/L (ref 9–17)
BASOPHILS # BLD: 1 % (ref 0–2)
BASOPHILS ABSOLUTE: 0.04 K/UL (ref 0–0.2)
BUN BLDV-MCNC: 16 MG/DL (ref 6–20)
BUN/CREAT BLD: 25 (ref 9–20)
CALCIUM SERPL-MCNC: 9.5 MG/DL (ref 8.6–10.4)
CHLORIDE BLD-SCNC: 102 MMOL/L (ref 98–107)
CO2: 26 MMOL/L (ref 20–31)
CREAT SERPL-MCNC: 0.65 MG/DL (ref 0.5–0.9)
DIFFERENTIAL TYPE: NORMAL
EOSINOPHILS RELATIVE PERCENT: 2 % (ref 1–4)
GFR AFRICAN AMERICAN: >60 ML/MIN
GFR NON-AFRICAN AMERICAN: >60 ML/MIN
GFR SERPL CREATININE-BSD FRML MDRD: ABNORMAL ML/MIN/{1.73_M2}
GFR SERPL CREATININE-BSD FRML MDRD: ABNORMAL ML/MIN/{1.73_M2}
GLUCOSE BLD-MCNC: 104 MG/DL (ref 65–105)
GLUCOSE BLD-MCNC: 121 MG/DL (ref 70–99)
GLUCOSE BLD-MCNC: 97 MG/DL (ref 65–105)
HCT VFR BLD CALC: 42.9 % (ref 36.3–47.1)
HEMOGLOBIN: 13.8 G/DL (ref 11.9–15.1)
IMMATURE GRANULOCYTES: 0 %
LYMPHOCYTES # BLD: 38 % (ref 24–43)
MCH RBC QN AUTO: 27.6 PG (ref 25.2–33.5)
MCHC RBC AUTO-ENTMCNC: 32.2 G/DL (ref 28.4–34.8)
MCV RBC AUTO: 85.8 FL (ref 82.6–102.9)
MONOCYTES # BLD: 10 % (ref 3–12)
NRBC AUTOMATED: 0 PER 100 WBC
PDW BLD-RTO: 13.5 % (ref 11.8–14.4)
PLATELET # BLD: 240 K/UL (ref 138–453)
PLATELET ESTIMATE: NORMAL
PMV BLD AUTO: 10.7 FL (ref 8.1–13.5)
POTASSIUM SERPL-SCNC: 3.9 MMOL/L (ref 3.7–5.3)
RBC # BLD: 5 M/UL (ref 3.95–5.11)
RBC # BLD: NORMAL 10*6/UL
SEG NEUTROPHILS: 49 % (ref 36–65)
SEGMENTED NEUTROPHILS ABSOLUTE COUNT: 2.98 K/UL (ref 1.5–8.1)
SODIUM BLD-SCNC: 138 MMOL/L (ref 135–144)
WBC # BLD: 6.1 K/UL (ref 3.5–11.3)
WBC # BLD: NORMAL 10*3/UL

## 2020-02-10 PROCEDURE — 6370000000 HC RX 637 (ALT 250 FOR IP): Performed by: INTERNAL MEDICINE

## 2020-02-10 PROCEDURE — C1769 GUIDE WIRE: HCPCS

## 2020-02-10 PROCEDURE — 80048 BASIC METABOLIC PNL TOTAL CA: CPT

## 2020-02-10 PROCEDURE — 85025 COMPLETE CBC W/AUTO DIFF WBC: CPT

## 2020-02-10 PROCEDURE — B2151ZZ FLUOROSCOPY OF LEFT HEART USING LOW OSMOLAR CONTRAST: ICD-10-PCS | Performed by: INTERNAL MEDICINE

## 2020-02-10 PROCEDURE — 6370000000 HC RX 637 (ALT 250 FOR IP): Performed by: NURSE PRACTITIONER

## 2020-02-10 PROCEDURE — 2500000003 HC RX 250 WO HCPCS

## 2020-02-10 PROCEDURE — 93458 L HRT ARTERY/VENTRICLE ANGIO: CPT | Performed by: INTERNAL MEDICINE

## 2020-02-10 PROCEDURE — 2709999900 HC NON-CHARGEABLE SUPPLY

## 2020-02-10 PROCEDURE — 4A023N7 MEASUREMENT OF CARDIAC SAMPLING AND PRESSURE, LEFT HEART, PERCUTANEOUS APPROACH: ICD-10-PCS | Performed by: INTERNAL MEDICINE

## 2020-02-10 PROCEDURE — 6360000002 HC RX W HCPCS

## 2020-02-10 PROCEDURE — 36415 COLL VENOUS BLD VENIPUNCTURE: CPT

## 2020-02-10 PROCEDURE — 99232 SBSQ HOSP IP/OBS MODERATE 35: CPT | Performed by: INTERNAL MEDICINE

## 2020-02-10 PROCEDURE — C1894 INTRO/SHEATH, NON-LASER: HCPCS

## 2020-02-10 PROCEDURE — B2111ZZ FLUOROSCOPY OF MULTIPLE CORONARY ARTERIES USING LOW OSMOLAR CONTRAST: ICD-10-PCS | Performed by: INTERNAL MEDICINE

## 2020-02-10 PROCEDURE — 82947 ASSAY GLUCOSE BLOOD QUANT: CPT

## 2020-02-10 PROCEDURE — 6360000004 HC RX CONTRAST MEDICATION

## 2020-02-10 RX ORDER — SODIUM CHLORIDE 0.9 % (FLUSH) 0.9 %
10 SYRINGE (ML) INJECTION EVERY 12 HOURS SCHEDULED
Status: DISCONTINUED | OUTPATIENT
Start: 2020-02-10 | End: 2020-02-10 | Stop reason: HOSPADM

## 2020-02-10 RX ORDER — SODIUM CHLORIDE 9 MG/ML
INJECTION, SOLUTION INTRAVENOUS CONTINUOUS
Status: DISCONTINUED | OUTPATIENT
Start: 2020-02-10 | End: 2020-02-10 | Stop reason: HOSPADM

## 2020-02-10 RX ORDER — SODIUM CHLORIDE 0.9 % (FLUSH) 0.9 %
10 SYRINGE (ML) INJECTION PRN
Status: DISCONTINUED | OUTPATIENT
Start: 2020-02-10 | End: 2020-02-10 | Stop reason: HOSPADM

## 2020-02-10 RX ADMIN — HYDROCHLOROTHIAZIDE 25 MG: 25 TABLET ORAL at 08:05

## 2020-02-10 RX ADMIN — METOPROLOL TARTRATE 25 MG: 25 TABLET ORAL at 08:05

## 2020-02-10 RX ADMIN — AMLODIPINE BESYLATE 5 MG: 5 TABLET ORAL at 08:05

## 2020-02-10 RX ADMIN — LOSARTAN POTASSIUM 100 MG: 100 TABLET, FILM COATED ORAL at 08:05

## 2020-02-10 RX ADMIN — TICAGRELOR 90 MG: 90 TABLET ORAL at 08:05

## 2020-02-10 RX ADMIN — ASPIRIN 81 MG: 81 TABLET, COATED ORAL at 08:04

## 2020-02-10 RX ADMIN — TRAMADOL HYDROCHLORIDE 50 MG: 50 TABLET, FILM COATED ORAL at 17:22

## 2020-02-10 RX ADMIN — ATORVASTATIN CALCIUM 80 MG: 80 TABLET, FILM COATED ORAL at 08:05

## 2020-02-10 ASSESSMENT — PAIN SCALES - GENERAL
PAINLEVEL_OUTOF10: 0
PAINLEVEL_OUTOF10: 3
PAINLEVEL_OUTOF10: 6

## 2020-02-10 ASSESSMENT — PAIN DESCRIPTION - PROGRESSION
CLINICAL_PROGRESSION: NOT CHANGED

## 2020-02-10 ASSESSMENT — PAIN - FUNCTIONAL ASSESSMENT: PAIN_FUNCTIONAL_ASSESSMENT: PREVENTS OR INTERFERES SOME ACTIVE ACTIVITIES AND ADLS

## 2020-02-10 ASSESSMENT — PAIN DESCRIPTION - LOCATION: LOCATION: RIB CAGE

## 2020-02-10 ASSESSMENT — PAIN DESCRIPTION - PAIN TYPE: TYPE: ACUTE PAIN

## 2020-02-10 ASSESSMENT — PAIN DESCRIPTION - DESCRIPTORS: DESCRIPTORS: TIGHTNESS

## 2020-02-10 ASSESSMENT — PAIN DESCRIPTION - FREQUENCY: FREQUENCY: CONTINUOUS

## 2020-02-10 ASSESSMENT — PAIN DESCRIPTION - ONSET: ONSET: ON-GOING

## 2020-02-10 NOTE — PROGRESS NOTES
[facility]        Date:   2/10/2020  Patient name: Jean Borden  Date of admission:  2/7/2020  5:29 PM  MRN:   [id number]  YOB: 1965  PCP: Azeb Gonzalez MD    Reason for Consult:       Subjective: No new cardiac issues. No overnight events. Chart reviewed. Physical Exam:   Vitals: /65   Pulse 68   Temp 96.7 °F (35.9 °C) (Temporal)   Resp 16   Ht 5' 4\" (1.626 m)   Wt 228 lb 6 oz (103.6 kg)   SpO2 93%   BMI 39.20 kg/m²   General appearance: alert and cooperative with exam  HEENT: Head: Normocephalic, no lesions, without obvious abnormality. Neck: no adenopathy, no carotid bruit, no JVD, supple, symmetrical, trachea midline and thyroid not enlarged, symmetric, no tenderness/mass/nodules  Lungs: clear to auscultation bilaterally  Heart: regular rate and rhythm, S1, S2 normal, no murmur, click, rub or gallop  Abdomen: soft, non-tender; bowel sounds normal; no masses,  no organomegaly  Extremities: extremities normal, atraumatic, no cyanosis or edema  Neurologic: Mental status: Alert, oriented, thought content appropriate      Lab work, imaging, nursing, and chart reviewed extensively.     Medications:   Scheduled Meds:   sodium chloride flush  10 mL Intravenous 2 times per day    amLODIPine  5 mg Oral Daily    aspirin  81 mg Oral Daily with breakfast    atorvastatin  80 mg Oral Daily    hydrochlorothiazide  25 mg Oral Daily    losartan  100 mg Oral Daily    metoprolol tartrate  25 mg Oral BID    pantoprazole  40 mg Oral QAM AC    ticagrelor  90 mg Oral BID     Continuous Infusions:   sodium chloride 75 mL/hr at 02/10/20 1700         Labs:     CBC:   Recent Labs     02/08/20  0358 02/09/20  1034 02/10/20  0549   WBC 6.2 7.0 6.1   HGB 12.8 13.8 13.8    245 240     BMP:    Recent Labs     02/08/20  0358 02/09/20  1034 02/10/20  0549    139 138   K 3.8 3.9 3.9    102 102   CO2 25 26 26   BUN 14 12 16   CREATININE 0.69 0.76 0.65   GLUCOSE 94 116* 121*     Hepatic:   Recent Labs     02/08/20  0358   AST 20   ALT 28   BILITOT 0.39   ALKPHOS 106*     Troponin: No results for input(s): TROPONINI in the last 72 hours. BNP: No results for input(s): BNP in the last 72 hours. Lipids:   Recent Labs     02/08/20  0358   CHOL 140   HDL 59     INR:   Recent Labs     02/09/20  1034   INR 1.0       Other active acute problems:  Patient Active Problem List   Diagnosis    Chronic back pain    Hypertension    SRI on CPAP    Mixed hyperlipidemia    S/P drug eluting coronary stent placement - Mid LAD 7/25/18-Dr. lea    Coronary artery disease involving native coronary artery of native heart with unstable angina pectoris (HCC)    Class 2 obesity due to excess calories with body mass index (BMI) of 39.0 to 39.9 in adult    Acute bronchitis due to other specified organisms    Hyperplastic colon polyp    Unstable angina (HCC)    BMI 38.0-38.9,adult    Post PTCA    IFG (impaired fasting glucose)    Former smoker    Hypokalemia    Hyperglycemia    Murmur, cardiac     Cath 2/10/2020  Left main: NL  LAD: Patent mid stent with luminal irregularities of 20-30%. Ostial Diagonal: 70% jailed by the stent. Very small vessel. LCX: Luminal irregularities of 20-30%. RCA: uminal irregularities of 20-30%. The LV gram was performed in the [address] position. LVEF: 60%. LV Wall Motion: Normal.    IMPRESSION & Recommendations:    S/p cardiac cath. Non obstructive CAD  Ok to DC home  Removed wrist pressure bandage      Discussed with patient, family, and Nurse. Electronically signed by Sherley Nichols DO on 2/10/2020 at 5:51 PM    Sherley Nichols, [address] [facility]  [email or web address] [email or web address]  [phone]

## 2020-02-10 NOTE — FLOWSHEET NOTE
Dr. Meredith Ashford arrived to the patient's bedside for evaluation and was updated on the patient's current status via RN. RN instructed to check with cardiology if patient is okay for discharge and if any medications need to be added or adjusted.

## 2020-02-10 NOTE — FLOWSHEET NOTE
Patient arrived to room 2028 via bed post cardiac catheterization. Patient is alert and oriented and denies pain. Bed locked and placed in lowest position. Side rails up x2. Call light placed at the patient's bedside. Telephone report completed prior. Right radial assessed. No redness, drainage, swelling, or hematoma present. TR band remains clean, dry and intact. Armboard applied. Pulses palpable bilaterally. Vital signs obtained (see flowsheet). RN educated the patient on position orders and activity restrictions for cath recovery. Patient verbalizes understanding. Will continue to monitor closely.

## 2020-02-10 NOTE — PROGRESS NOTES
[address]     Progress Note    2/10/2020    7:55 AM    Name:   Ced Yeung  MRN:     [id number]     Acct:      [de-identified]   Room:   2028/2028-01  IP Day:  2  Admit Date:  2/7/2020  5:29 PM    PCP:   Iris Aase, MD  Code Status:  Full Code    Subjective:     C/C:   Chief Complaint   Patient presents with    Chest Pain     Interval History Status: improved. Continued mild chest pain with nitro drip, pain currently a 3-4 out of 10. Scheduled for heart catheterization today. Denies any shortness of breath, nausea vomiting, fevers or chills. Brief History: This is a 76-year-old white female with known history of coronary artery disease with prior heart catheterization and stent that presents with recurrent anginal symptoms. Generally serial cardiac enzymes in the emergency room which were negative and had improvement in her pain. She initially was a scheduled for a stress test however developed recurrent anginal symptoms. The stress test was canceled per cardiology and she was placed on nitroglycerin drip. Her pain is improved with the nitroglycerin drip and she is scheduled for heart catheterization on the 10th    Review of Systems:     Constitutional:  negative for chills, fevers, sweats  Respiratory:  negative for cough, dyspnea on exertion, shortness of breath, wheezing  Cardiovascular:  negative for chest pain, chest pressure/discomfort, lower extremity edema, palpitations  Gastrointestinal:  negative for abdominal pain, constipation, diarrhea, nausea, vomiting  Neurological:  negative for dizziness, headache    Medications: Allergies:     Allergies   Allergen Reactions    Bee Venom Hives    Tetracyclines & Related Nausea Only and Other (See Comments)     dizziness    Ace Inhibitors Other (See Comments)     cough       Current Meds:   Scheduled Meds:    enoxaparin  1 mg/kg Subcutaneous BID    amLODIPine  5 mg Oral Daily

## 2020-02-10 NOTE — FLOWSHEET NOTE
Glorious Omi arrived to the patient's bedside. Patient reports Dr. Cathryn Yates removed armboard and TR band and left site open to air. RN was not present. RN applied pressure dressing upon arrival to room. Bruising present at right radial. No drianage, swelling,or hematoma present. Pulses remain palpable bilaterally.

## 2020-02-10 NOTE — CARE COORDINATION
[address] Transitions Interview     2/10/2020    Patient: Linnette Hahn Patient : 1965   MRN: [id number]  Reason for Admission: CP, cath 2/10/20  RARS: Readmission Risk Score: 11         Spoke with: Beata    Met with pt in her room. Plan is cath today. Pt states she is aware of cath procedure as she has had a few. Encouragement offered  Writer reviewed role of CTN following discharge- v/u. Contact information provided. Agreeable to transition calls       Readmission Risk  Patient Active Problem List   Diagnosis    Chronic back pain    Hypertension    SRI on CPAP    Mixed hyperlipidemia    S/P drug eluting coronary stent placement - Mid LAD 18-Dr. lea    Coronary artery disease involving native coronary artery of native heart with unstable angina pectoris (HCC)    Class 2 obesity due to excess calories with body mass index (BMI) of 39.0 to 39.9 in adult    Acute bronchitis due to other specified organisms    Hyperplastic colon polyp    Unstable angina (HCC)    BMI 38.0-38.9,adult    Post PTCA    IFG (impaired fasting glucose)    Former smoker    Hypokalemia    Hyperglycemia    Murmur, cardiac       Inpatient Assessment  Care Transitions Summary    Care Transitions Inpatient Review  Medication Review  Are you able to afford your medications?:  Yes  How often do you have difficulty taking your medications?:  I always take them as prescribed. Housing Review  Who do you live with?:  Partner/Spouse/SO  Social Support  Do you have a ?:  No  Do you have a [address] [place] [address]?:  No  Durable Medical Equipment  Patient Home Equipment:  CPAP  Functional Review  Ability to seek help/take action for Emergent/Urgent situations i.e. fire, crime, inclement weather or health crisis. :  Independent  Ability handle personal hygiene needs (bathing/dressing/grooming): Independent  Ability to manage medications:   Independent  Ability to prepare food:  Independent  Ability to maintain home (clean home, laundry): Independent  Ability to drive and/or has transportation:  Independent  Ability to do shopping:  Independent  Ability to manage finances:   Independent  Is patient able to live independently?:  Yes  Hearing and Vision  Care Transitions Interventions                                 Follow Up  Future Appointments   Date Time Provider Radha Galarza   2/10/2020  1:00 PM STA CATH LAB RM 1 [facility] CATHLAB [facility]   3/5/2020  9:30 AM [facility] PAT RM 2 [facility] PAT [facility]   4/2/2020  9:15 AM BRITTON Howard - CNP [facility]   5/18/2020  9:30 AM Tito Jason [facility] [address] Maintenance  Health Maintenance Due   Topic Date Due    Diabetic microalbuminuria test  04/28/1982    A1C test (Diabetic or Prediabetic)  01/05/2018       Dora Chavarria RN

## 2020-02-10 NOTE — DISCHARGE SUMMARY
[facility]    Discharge Summary     Patient ID: Debbie Shirley  :  1965   MRN: [id number]     ACCOUNT:  [de-identified]   Patient's PCP: Divina Blackmon MD  Admit Date: 2020   Discharge Date: 2/10/2020     Length of Stay: 2  Code Status:  Full Code  Admitting Physician: Nelson Marie DO  Discharge Physician: Nelson Marie DO     Active Discharge Diagnoses:     Hospital Problem Lists:  Principal Problem:    Unstable angina ([facility] [address]  Active Problems:    S/P drug eluting coronary stent placement - Mid LAD 18-Dr. Vinecnt Avilez    Hypertension    SRI on CPAP    Mixed hyperlipidemia    Coronary artery disease involving native coronary artery of native heart with unstable angina pectoris ([facility] [address]    Class 2 obesity due to excess calories with body mass index (BMI) of 39.0 to 39.9 in adult    BMI 38.0-38.9,adult    Former smoker    Hypokalemia    Hyperglycemia    Murmur, cardiac  Resolved Problems:    * No resolved hospital problems. *      Admission Condition:  fair     Discharged Condition: stable    Hospital Stay:     Hospital Course:  Debbie Shirley is a 53 y.o. female who was admitted for the management of  Unstable angina [facility]) , presented to ER with Chest Pain    This is a 21-year-old white female with known history of coronary disease and prior heart catheterization and stent. She presents at this time with recurrent anginal symptoms. Initially her pain improved and cardiac enzymes are negative and she was scheduled for stress testing. Ultimately she developed recurrent pain and was placed on nitroglycerin drip and the stress test was canceled. Cardiology evaluated and decided to proceed with heart catheterization. Heart catheterization was completed on the  without evidence of recurrent disease. The nitroglycerin drip was discontinued status post heart catheterization and she was cleared by cardiology for discharge home.       Significant

## 2020-02-11 ENCOUNTER — CARE COORDINATION (OUTPATIENT)
Dept: CASE MANAGEMENT | Age: 55
End: 2020-02-11

## 2020-02-11 NOTE — CARE COORDINATION
Susana 45 Transitions Initial Follow Up Call    Call within 2 business days of discharge: Yes    Patient: Nnamdi Oshea Patient : 1965   MRN: [id number]  Reason for Admission:  CP, cath 2/10/20   Discharge Date: 2/10/20 RARS: Readmission Risk Score: 10      Last Discharge [facility]       Complaint Diagnosis Description Type Department Provider    20 Chest Pain Chest pain, unspecified type ED to Hosp-Admission (Discharged) (ADMITTED) EVELYN Marino DO; Star Chamberlain... Spoke with: Beata     Call to pt who states she is tired but denies CP or SOB  States she will call to schedule with Dr Uriel Milner but their office is closed for lunch. States she has appt with cardiology 2020 but will call to have this moved up s/p cath  States right wrist is sore but denies leaking, swelling or heat to site. States bruised but otherwise okay. States she slept with the splint on last night   States she did not take ntg before coming to hospital- states understanding how to take this and states her bottle is only a few months old.  States understanding it expires after a year  States appreciation for Ced Isbell (RN) & Joellen (PCT)  Denies needs  Encouraged to call writer/ CTC or providers if questions or concerns- v/u     Message to scheduling pool to call pt for TCM        Facility: [facility]     Non-face-to-face services provided:  Scheduled appointment with PCP-pt states she will call after their office opens from lunch  Scheduled appointment with Specialist-states she has one  but is calling to get in sooner since she had heart cath- needs follow up   Obtained and reviewed discharge summary and/or continuity of care documents  Assessment and support for treatment adherence and medication management-[id number] complete    Care Transitions 24 Hour Call    Patient Home Equipment:  CPAP  Do you have support at home?:  Partner/Spouse/SO  Care Transitions Interventions                                 Follow Up  Future Appointments   Date Time Provider Radha Galarza   3/5/2020  9:30 AM [facility] PAT RM 2 [facility] PAT [facility]   4/2/2020  9:15 AM BRITTON Rebolledo - CNP [facility]   5/18/2020  9:30 AM Shamir Saavedra DO [facility] Bette Neville RN

## 2020-02-11 NOTE — PROGRESS NOTES
IV removed, pt given education and DC instructions. No further questions at this time.     Electronically signed by Chris Light RN on 2/10/2020 at 7:27 PM

## 2020-02-13 ENCOUNTER — OFFICE VISIT (OUTPATIENT)
Dept: FAMILY MEDICINE CLINIC | Age: 55
End: 2020-02-13
Payer: COMMERCIAL

## 2020-02-13 VITALS
HEART RATE: 67 BPM | BODY MASS INDEX: 39.31 KG/M2 | DIASTOLIC BLOOD PRESSURE: 80 MMHG | OXYGEN SATURATION: 97 % | SYSTOLIC BLOOD PRESSURE: 134 MMHG | WEIGHT: 229 LBS

## 2020-02-13 PROCEDURE — 99213 OFFICE O/P EST LOW 20 MIN: CPT | Performed by: FAMILY MEDICINE

## 2020-02-13 ASSESSMENT — PATIENT HEALTH QUESTIONNAIRE - PHQ9
SUM OF ALL RESPONSES TO PHQ QUESTIONS 1-9: 0
SUM OF ALL RESPONSES TO PHQ QUESTIONS 1-9: 0
2. FEELING DOWN, DEPRESSED OR HOPELESS: 0
1. LITTLE INTEREST OR PLEASURE IN DOING THINGS: 0
SUM OF ALL RESPONSES TO PHQ9 QUESTIONS 1 & 2: 0

## 2020-02-18 ENCOUNTER — CARE COORDINATION (OUTPATIENT)
Dept: CASE MANAGEMENT | Age: 55
End: 2020-02-18

## 2020-02-18 NOTE — CARE COORDINATION
[facility]ana 45 Transitions Follow Up Call- 1st attempt     2020    Patient: Espinoza Little  Patient : 1965   MRN: [id number]  Reason for Admission: CP, cath 2/10/20   Discharge Date: 2/10/20 RARS: Readmission Risk Score: 10         Attempted to reach patient for subsequent transitional call. VM left to return call to [phone].     Follow Up  Future Appointments   Date Time Provider Radha Galarza   3/5/2020  9:30 AM [facility] PAT RM 2 [facility] PAT [facility]   2020  9:15 AM BRITTON Montez - CNP [facility]   2020  9:30 AM DO [facility] Kirill Garland RN

## 2020-02-19 ENCOUNTER — CARE COORDINATION (OUTPATIENT)
Dept: CASE MANAGEMENT | Age: 55
End: 2020-02-19

## 2020-02-19 NOTE — CARE COORDINATION
[facility]ana 45 Transitions Follow Up Call- 2nd attempt     2020    Patient: Silas Buenrostro  Patient : 1965   MRN: [id number]  Reason for Admission: CP, cath 2/10/20    Discharge Date: 2/10/20 RARS: Readmission Risk Score: 10         Attempted to reach patient for subsequent transitional call. VM left to return call to [phone].     Follow Up  Future Appointments   Date Time Provider Radha Galarza   3/5/2020  9:30 AM [facility] PAT RM 2 [facility]Z PAT [facility]   2020  9:15 AM BRITTON Bergeron CNP Neuro [facility]   2020  9:30 AM DO [facility] Louann Forman RN

## 2020-02-19 NOTE — CARE COORDINATION
Susana 45 Transitions Follow Up Call    2020    Patient: Chantelle Boles  Patient : 1965   MRN: [id number]  Reason for Admission: CP, cath 2/10/20   Discharge Date: 2/10/20 RARS: Readmission Risk Score: 10         Spoke with: Beata    Patient returned call. Denies CP but states short of breath with activity- states she has appt with pulm 3/16 and will follow up with them   States Dr Waldrop Speaks referred her to GI doctor since she has stomach pain- RUQ (which could be the reason for what was originally thought of as chest pain (negative heart cath)- she will call to schedule this soon  States cath insertion point on right wrist has healed  Denies needs  Encouraged to call writer/ CTC or providers if questions or concerns- v/u       Care Transitions Subsequent and Final Call    Subsequent and Final Calls  Do you have any ongoing symptoms?:  No  Have your medications changed?:  No  Do you have any questions related to your medications?:  No  Do you currently have any active services?:  No  Do you have any needs or concerns that I can assist you with?:  No  Identified Barriers:  Lack of Education  Care Transitions Interventions                          Other Interventions:             Follow Up  Future Appointments   Date Time Provider Radha Galarza   3/5/2020  9:30 AM [facility] PAT RM 2 [facility]Z PAT [facility]   2020  9:15 AM BRITTON Murphy - CNP [facility]   2020  9:30 AM DO [facility] Haylee Prince RN

## 2020-02-21 ENCOUNTER — TELEPHONE (OUTPATIENT)
Dept: NEUROSURGERY | Age: 55
End: 2020-02-21

## 2020-02-21 NOTE — TELEPHONE ENCOUNTER
[place]  paperwork was received. Blank copy of form has been scanned.  Patient notified it will take up to 7-10 business days for completion

## 2020-02-24 ENCOUNTER — FOLLOWUP TELEPHONE ENCOUNTER (OUTPATIENT)
Dept: CARDIOVASCULAR ICU | Age: 55
End: 2020-02-24

## 2020-02-24 ENCOUNTER — CARE COORDINATION (OUTPATIENT)
Dept: CASE MANAGEMENT | Age: 55
End: 2020-02-24

## 2020-03-05 ENCOUNTER — HOSPITAL ENCOUNTER (OUTPATIENT)
Dept: PREADMISSION TESTING | Age: 55
Discharge: HOME | End: 2020-03-09
Payer: COMMERCIAL

## 2020-03-05 VITALS
HEIGHT: 64 IN | BODY MASS INDEX: 39.97 KG/M2 | SYSTOLIC BLOOD PRESSURE: 113 MMHG | DIASTOLIC BLOOD PRESSURE: 71 MMHG | WEIGHT: 234.13 LBS | TEMPERATURE: 97.5 F | RESPIRATION RATE: 20 BRPM | HEART RATE: 73 BPM | OXYGEN SATURATION: 96 %

## 2020-03-05 LAB
ANION GAP SERPL CALCULATED.3IONS-SCNC: 12 MMOL/L (ref 9–17)
BUN BLDV-MCNC: 11 MG/DL (ref 6–20)
CHLORIDE BLD-SCNC: 103 MMOL/L (ref 98–107)
CO2: 24 MMOL/L (ref 20–31)
CREAT SERPL-MCNC: 0.58 MG/DL (ref 0.5–0.9)
GFR AFRICAN AMERICAN: >60 ML/MIN
GFR NON-AFRICAN AMERICAN: >60 ML/MIN
GFR SERPL CREATININE-BSD FRML MDRD: NORMAL ML/MIN/{1.73_M2}
GFR SERPL CREATININE-BSD FRML MDRD: NORMAL ML/MIN/{1.73_M2}
GLUCOSE BLD-MCNC: 187 MG/DL (ref 70–99)
HCT VFR BLD CALC: 40.9 % (ref 36.3–47.1)
HEMOGLOBIN: 13.4 G/DL (ref 11.9–15.1)
POTASSIUM SERPL-SCNC: 3.7 MMOL/L (ref 3.7–5.3)
SODIUM BLD-SCNC: 139 MMOL/L (ref 135–144)

## 2020-03-05 PROCEDURE — 85018 HEMOGLOBIN: CPT

## 2020-03-05 PROCEDURE — 85014 HEMATOCRIT: CPT

## 2020-03-05 PROCEDURE — 86850 RBC ANTIBODY SCREEN: CPT

## 2020-03-05 PROCEDURE — 82565 ASSAY OF CREATININE: CPT

## 2020-03-05 PROCEDURE — 86900 BLOOD TYPING SEROLOGIC ABO: CPT

## 2020-03-05 PROCEDURE — 80051 ELECTROLYTE PANEL: CPT

## 2020-03-05 PROCEDURE — 84520 ASSAY OF UREA NITROGEN: CPT

## 2020-03-05 PROCEDURE — 36415 COLL VENOUS BLD VENIPUNCTURE: CPT

## 2020-03-05 PROCEDURE — 82947 ASSAY GLUCOSE BLOOD QUANT: CPT

## 2020-03-05 PROCEDURE — 86901 BLOOD TYPING SEROLOGIC RH(D): CPT

## 2020-03-05 RX ORDER — SODIUM CHLORIDE, SODIUM LACTATE, POTASSIUM CHLORIDE, CALCIUM CHLORIDE 600; 310; 30; 20 MG/100ML; MG/100ML; MG/100ML; MG/100ML
1000 INJECTION, SOLUTION INTRAVENOUS CONTINUOUS
Status: CANCELLED | OUTPATIENT
Start: 2020-03-05

## 2020-03-05 RX ORDER — LANOLIN ALCOHOL/MO/W.PET/CERES
3 CREAM (GRAM) TOPICAL NIGHTLY PRN
Status: ON HOLD | COMMUNITY
End: 2021-01-15 | Stop reason: HOSPADM

## 2020-03-05 NOTE — H&P
by mouth once daily, Disp: 30 tablet, Rfl: 5    amLODIPine (NORVASC) 5 MG tablet, Take by mouth daily, Disp: , Rfl:     hydrochlorothiazide (HYDRODIURIL) 25 MG tablet, Take 25 mg by mouth daily, Disp: , Rfl:     Multiple Vitamins-Minerals (ONE-A-DAY 50 PLUS PO), Take 1 tablet by mouth daily, Disp: , Rfl:     nitroGLYCERIN (NITROSTAT) 0.4 MG SL tablet, up to max of 3 total doses. If no relief after 1 dose, call 911., Disp: 25 tablet, Rfl: 1    ticagrelor (BRILINTA) 90 MG TABS tablet, Take 1 tablet by mouth 2 times daily, Disp: 60 tablet, Rfl: 0    traMADol (ULTRAM) 50 MG tablet, take 1 tablet by mouth once daily if needed, Disp: 30 tablet, Rfl: 0    aspirin 81 MG tablet, Take 1 tablet by mouth daily (with breakfast), Disp: 30 tablet, Rfl: 11  Allergies  is allergic to bee venom; tetracyclines & related; and ace inhibitors. Family History  family history includes Diabetes in her maternal grandmother; Other in her sister; Stroke in her father. Social History   reports that she quit smoking about 19 months ago. Her smoking use included cigarettes. She quit after 30.00 years of use. She has never used smokeless tobacco.   reports no history of alcohol use. reports no history of drug use. Marital Status:   Children: two sons, one daughter, six grandchildren with another one due in July  Occupation: finance at [employer] and [address]:  negative   EYES:  glasses  HENT:  negative   RESPIRATORY:  Sleep apnea  CARDIOVASCULAR:  chest pain, dyspnea  GASTROINTESTINAL:  negative   GENITOURINARY:  negative   INTEGUMENT/BREAST:  redness  HEMATOLOGIC/LYMPHATIC:  negative   ALLERGIC/IMMUNOLOGIC:  drug reactions  ENDOCRINE:  negative   MUSCULOSKELETAL:  myalgias, arthralgias and pain  NEUROLOGICAL:  gait problems  BEHAVIOR/PSYCH:  negative     OBJECTIVE:     VITALS:  height is 5' 4\" (1.626 m) and weight is 234 lb 2.1 oz (106.2 kg). Her temporal temperature is 97.5 °F (36.4 °C).  Her

## 2020-03-05 NOTE — PROGRESS NOTES
Anesthesia Focused Assessment    STOP-BANG Sleep Apnea Questionnaire    SNORE loudly (heard through closed doors)? No  TIRED, fatigued, sleepy during daytime? No  OBSERVED stopping breathing during sleep? No  High blood PRESSURE being treated? Yes    BMI over 35? Yes  AGE over 48? Yes  NECK circumference over 16\"? No  GENDER (male)? No             Total 3  High risk 5-8  Intermediate risk 3-4  Low risk 0-2    Obstructive Sleep Apnea: yes  If YES, machine used: yes     Type 1 DM:   no  T2DM:  no    Coronary Artery Disease:  yes  Hypertension:  yes    Active smoker: smoked for 30 years, quit for 12 years then smoked for 3 years - peak of 2 packs per day  Drinks Alcohol:  no    Dentition: WDL    Defib / AICD / Pacemaker: no      Renal Failure/dialysis:  no    Patient was evaluated in PAT & anesthesia guidelines were applied. NPO guidelines, medication instructions and scheduled arrival time were reviewed with patient.     Hx of anesthesia complications:  no  Family hx of anesthesia complications:  no                                                                                                                     Anesthesia contacted:   no  Medical or cardiac clearance ordered: on file    HERB Lancaster  3/5/20  9:47 AM

## 2020-03-13 ENCOUNTER — OFFICE VISIT (OUTPATIENT)
Dept: FAMILY MEDICINE CLINIC | Age: 55
End: 2020-03-13
Payer: COMMERCIAL

## 2020-03-13 ENCOUNTER — HOSPITAL ENCOUNTER (OUTPATIENT)
Age: 55
Setting detail: SPECIMEN
Discharge: HOME | End: 2020-03-13
Payer: COMMERCIAL

## 2020-03-13 ENCOUNTER — TELEPHONE (OUTPATIENT)
Dept: GASTROENTEROLOGY | Age: 55
End: 2020-03-13

## 2020-03-13 VITALS
HEART RATE: 90 BPM | SYSTOLIC BLOOD PRESSURE: 134 MMHG | OXYGEN SATURATION: 94 % | WEIGHT: 233.38 LBS | DIASTOLIC BLOOD PRESSURE: 86 MMHG | BODY MASS INDEX: 40.06 KG/M2

## 2020-03-13 LAB
ESTIMATED AVERAGE GLUCOSE: 148 MG/DL
GLUCOSE BLD-MCNC: 151 MG/DL (ref 70–99)
HBA1C MFR BLD: 6.8 % (ref 4–6)

## 2020-03-13 PROCEDURE — 99214 OFFICE O/P EST MOD 30 MIN: CPT | Performed by: FAMILY MEDICINE

## 2020-03-13 NOTE — PATIENT INSTRUCTIONS
Patient Education        Prediabetes: Care Instructions  Your Care Instructions    Prediabetes is a warning sign that you are at risk for getting type 2 diabetes. It means that your blood sugar is higher than it should be. The food you eat turns into sugar, which your body uses for energy. Normally, an organ called the pancreas makes insulin, which allows the sugar in your blood to get into your body's cells. But when your body can't use insulin the right way, the sugar doesn't move into cells. It stays in your blood instead. This is called insulin resistance. The buildup of sugar in the blood causes prediabetes. The good news is that lifestyle changes may help you get your blood sugar back to normal and help you avoid or delay diabetes. Follow-up care is a key part of your treatment and safety. Be sure to make and go to all appointments, and call your doctor if you are having problems. It's also a good idea to know your test results and keep a list of the medicines you take. How can you care for yourself at home? · Watch your weight. A healthy weight helps your body use insulin properly. · Limit the amount of calories, sweets, and unhealthy fat you eat. Ask your doctor if you should see a dietitian. A registered dietitian can help you create meal plans that fit your lifestyle. · Get at least 30 minutes of exercise on most days of the week. Exercise helps control your blood sugar. It also helps you maintain a healthy weight. Walking is a good choice. You also may want to do other activities, such as running, swimming, cycling, or playing tennis or team sports. · Do not smoke. Smoking can make prediabetes worse. If you need help quitting, talk to your doctor about stop-smoking programs and medicines. These can increase your chances of quitting for good. · If your doctor prescribed medicines, take them exactly as prescribed. Call your doctor if you think you are having a problem with your medicine.  You will get more details on the specific medicines your doctor prescribes. When should you call for help? Watch closely for changes in your health, and be sure to contact your doctor if:    · You have any symptoms of diabetes. These may include:  ? Being thirsty more often. ? Urinating more. ? Being hungrier. ? Losing weight. ? Being very tired. ? Having blurry vision.     · You have a wound that will not heal.     · You have an infection that will not go away.     · You have problems with your blood pressure.     · You want more information about diabetes and how you can keep from getting it. Where can you learn more? Go to https://[email or web address] [email or web address].[employer]. org and sign in to your [employer] account. Enter I222 in the [employer] box to learn more about \"Prediabetes: Care Instructions. \"     If you do not have an account, please click on the \"Sign Up Now\" link. Current as of: April 16, 2019  Content Version: 12.3  © 9552-3099 Healthwise, [employer]. Care instructions adapted under license by [facility] ([facility]). If you have questions about a medical condition or this instruction, always ask your healthcare professional. [address] any warranty or liability for your use of this information. Patient Education        Learning About Diabetes Food Guidelines  Your Care Instructions    Meal planning is important to manage diabetes. It helps keep your blood sugar at a target level (which you set with your doctor). You don't have to eat special foods. You can eat what your family eats, including sweets once in a while. But you do have to pay attention to how often you eat and how much you eat of certain foods. You may want to work with a dietitian or a certified diabetes educator (CDE) to help you plan meals and snacks. A dietitian or CDE can also help you lose weight if that is one of your goals. What should you know about eating carbs?   Managing the amount of carbohydrate (carbs) you eat is an important part of healthy meals when you have diabetes. Carbohydrate is found in many foods. · Learn which foods have carbs. And learn the amounts of carbs in different foods. ? Bread, cereal, pasta, and rice have about 15 grams of carbs in a serving. A serving is 1 slice of bread (1 ounce), ½ cup of cooked cereal, or 1/3 cup of cooked pasta or rice. ? Fruits have 15 grams of carbs in a serving. A serving is 1 small fresh fruit, such as an apple or orange; ½ of a banana; ½ cup of cooked or canned fruit; ½ cup of fruit juice; 1 cup of melon or raspberries; or 2 tablespoons of dried fruit. ? Milk and no-sugar-added yogurt have 15 grams of carbs in a serving. A serving is 1 cup of milk or 2/3 cup of no-sugar-added yogurt. ? Starchy vegetables have 15 grams of carbs in a serving. A serving is ½ cup of mashed potatoes or sweet potato; 1 cup winter squash; ½ of a small baked potato; ½ cup of cooked beans; or ½ cup cooked corn or green peas. · Learn how much carbs to eat each day and at each meal. A dietitian or CDE can teach you how to keep track of the amount of carbs you eat. This is called carbohydrate counting. · If you are not sure how to count carbohydrate grams, use the Plate Method to plan meals. It is a good, quick way to make sure that you have a balanced meal. It also helps you spread carbs throughout the day. ? Divide your plate by types of foods. Put non-starchy vegetables on half the plate, meat or other protein food on one-quarter of the plate, and a grain or starchy vegetable in the final quarter of the plate. To this you can add a small piece of fruit and 1 cup of milk or yogurt, depending on how many carbs you are supposed to eat at a meal.  · Try to eat about the same amount of carbs at each meal. Do not \"save up\" your daily allowance of carbs to eat at one meal.  · Proteins have very little or no carbs per serving.  Examples of proteins are beef, chicken, turkey, fish, the Search Health Information box to learn more about \"Learning About Diabetes Food Guidelines. \"     If you do not have an account, please click on the \"Sign Up Now\" link. Current as of: April 16, 2019  Content Version: 12.3  © 3020-8560 Healthwise, Inc[employer]. Care instructions adapted under license by [facility] ([facility]). If you have questions about a medical condition or this instruction, always ask your healthcare professional. [address] any warranty or liability for your use of this information. Patient Education        Learning About Meal Planning for Diabetes  Why plan your meals? Meal planning can be a key part of managing diabetes. Planning meals and snacks with the right balance of carbohydrate, protein, and fat can help you keep your blood sugar at the target level you set with your doctor. You don't have to eat special foods. You can eat what your family eats, including sweets once in a while. But you do have to pay attention to how often you eat and how much you eat of certain foods. You may want to work with a dietitian or a certified diabetes educator. He or she can give you tips and meal ideas and can answer your questions about meal planning. This health professional can also help you reach a healthy weight if that is one of your goals. What plan is right for you? Your dietitian or diabetes educator may suggest that you start with the plate format or carbohydrate counting. The plate format  The plate format is a simple way to help you manage how you eat. You plan meals by learning how much space each food should take on a plate. Using the plate format helps you spread carbohydrate throughout the day. It can make it easier to keep your blood sugar level within your target range. It also helps you see if you're eating healthy portion sizes. To use the plate format, you put non-starchy vegetables on half your plate.  Add meat or meat substitutes on one-quarter of need to adjust the other numbers. Total carbohydrate is the next thing you need to look for on the label. If you count carbohydrate servings, one serving of carbohydrate is 15 grams. For foods that don't come with labels, such as fresh fruits and vegetables, you'll need a guide that lists carbohydrate in these foods. Ask your doctor, dietitian, or diabetes educator about books or other nutrition guides you can use. If you take insulin, you need to know how many grams of carbohydrate are in a meal. This lets you know how much rapid-acting insulin to take before you eat. If you use an insulin pump, you get a constant rate of insulin during the day. So the pump must be programmed at meals to give you extra insulin to cover the rise in blood sugar after meals. When you know how much carbohydrate you will eat, you can take the right amount of insulin. Or, if you always use the same amount of insulin, you need to make sure that you eat the same amount of carbohydrate at meals. If you need more help to understand carbohydrate counting and food labels, ask your doctor, dietitian, or diabetes educator. How do you get started with meal planning? Here are some tips to get started:  · Plan your meals a week at a time. Don't forget to include snacks too. · Use cookbooks or online recipes to plan several main meals. Plan some quick meals for busy nights. You also can double some recipes that freeze well. Then you can save half for other busy nights when you don't have time to cook. · Make sure you have the ingredients you need for your recipes. If you're running low on basic items, put these items on your shopping list too. · List foods that you use to make breakfasts, lunches, and snacks. List plenty of fruits and vegetables. · Post this list on the refrigerator. Add to it as you think of more things you need. · Take the list to the store to do your weekly shopping.   Follow-up care is a key part of your treatment and safety. Be sure to make and go to all appointments, and call your doctor if you are having problems. It's also a good idea to know your test results and keep a list of the medicines you take. Where can you learn more? Go to https://[employer]. org and sign in to your [employer] account. Enter U037 in the [employer] box to learn more about \"Learning About Meal Planning for Diabetes. \"     If you do not have an account, please click on the \"Sign Up Now\" link. Current as of: April 16, 2019  Content Version: 12.3  © 8238-0275 Healthwise, Incorporated. Care instructions adapted under license by [facility] ([facility]). If you have questions about a medical condition or this instruction, always ask your healthcare professional. [address] any warranty or liability for your use of this information.

## 2020-03-13 NOTE — PROGRESS NOTES
Eyes:conjunctiva appear normal.  Nose: pink, non-edematous mucosa. No polyps. No septal deviation  Throat: no erythema, tonsillar hypertrophy or exudate. No ulcerations noted. Lips/Teeth/Gums all appear normal.  Neck: Normal range of motion. Neck supple. No tracheal deviation present. No abnormal lymphadenopathy. No JVD noted. Carotids are clear bilaterally. No thyroid masses noted. Heart: RRR without murmur. No S3, S4, or gallop noted. Chest: Clear to auscultation bilaterally. Good breath sounds noted. No rales, wheezes, or rhonchi noted. No respiratory retractions noted. Wall has symmetrical movement with respirations. Abdomen: No distension noted.  + bowel sounds in all quadrants which are normoactive. No bruits noted. No masses could be palpated. No unusual pulsatile masses noted. To deep palpation, patient denied any significant pain. No rebound, guarding or rigidity noted to my exam.        Left forearm - has discomfort of the proximal forearm but NOT the epciondyles. No swelling or discoloration    Assessment:   Encounter Diagnoses   Name Primary?  Elevated glucose Yes    Pain of left forearm          Plan:   There are no discontinued medications. THE ABOVE NOTED DISCONTINUED MEDS MAY ONLY BE FROM 'CLEANING UP' THE MED LIST AND WERE NOT ACTUALLY CANCELED;  SEE CHART FOR DETAILS! No orders of the defined types were placed in this encounter. Orders Placed This Encounter   Procedures    Hemoglobin A1C     Standing Status:   Future     Standing Expiration Date:   3/13/2021    Glucose, random     Standing Status:   Future     Standing Expiration Date:   3/13/2021     No follow-ups on file. Patient Instructions       Patient Education        Prediabetes: Care Instructions  Your Care Instructions    Prediabetes is a warning sign that you are at risk for getting type 2 diabetes. It means that your blood sugar is higher than it should be.  The food you eat turns into sugar, which your body uses for energy. Normally, an organ called the pancreas makes insulin, which allows the sugar in your blood to get into your body's cells. But when your body can't use insulin the right way, the sugar doesn't move into cells. It stays in your blood instead. This is called insulin resistance. The buildup of sugar in the blood causes prediabetes. The good news is that lifestyle changes may help you get your blood sugar back to normal and help you avoid or delay diabetes. Follow-up care is a key part of your treatment and safety. Be sure to make and go to all appointments, and call your doctor if you are having problems. It's also a good idea to know your test results and keep a list of the medicines you take. How can you care for yourself at home? · Watch your weight. A healthy weight helps your body use insulin properly. · Limit the amount of calories, sweets, and unhealthy fat you eat. Ask your doctor if you should see a dietitian. A registered dietitian can help you create meal plans that fit your lifestyle. · Get at least 30 minutes of exercise on most days of the week. Exercise helps control your blood sugar. It also helps you maintain a healthy weight. Walking is a good choice. You also may want to do other activities, such as running, swimming, cycling, or playing tennis or team sports. · Do not smoke. Smoking can make prediabetes worse. If you need help quitting, talk to your doctor about stop-smoking programs and medicines. These can increase your chances of quitting for good. · If your doctor prescribed medicines, take them exactly as prescribed. Call your doctor if you think you are having a problem with your medicine. You will get more details on the specific medicines your doctor prescribes. When should you call for help? Watch closely for changes in your health, and be sure to contact your doctor if:    · You have any symptoms of diabetes.  These may include:  ? Being thirsty more often.  ? Urinating more. ? Being hungrier. ? Losing weight. ? Being very tired. ? Having blurry vision.     · You have a wound that will not heal.     · You have an infection that will not go away.     · You have problems with your blood pressure.     · You want more information about diabetes and how you can keep from getting it. Where can you learn more? Go to [email or web address] and sign in to your [employer] account. Enter I222 in the [facility] box to learn more about \"Prediabetes: Care Instructions. \"     If you do not have an account, please click on the \"Sign Up Now\" link. Current as of: April 16, 2019  Content Version: 12.3  © 7981-5377 Healthwise, [employer]. Care instructions adapted under license by [facility] ([facility]). If you have questions about a medical condition or this instruction, always ask your healthcare professional. [place] [address] any warranty or liability for your use of this information. Patient Education        Learning About Diabetes Food Guidelines  Your Care Instructions    Meal planning is important to manage diabetes. It helps keep your blood sugar at a target level (which you set with your doctor). You don't have to eat special foods. You can eat what your family eats, including sweets once in a while. But you do have to pay attention to how often you eat and how much you eat of certain foods. You may want to work with a dietitian or a certified diabetes educator (CDE) to help you plan meals and snacks. A dietitian or CDE can also help you lose weight if that is one of your goals. What should you know about eating carbs? Managing the amount of carbohydrate (carbs) you eat is an important part of healthy meals when you have diabetes. Carbohydrate is found in many foods. · Learn which foods have carbs. And learn the amounts of carbs in different foods.   ? Bread, cereal, pasta, and rice have about 15 grams of carbs in a Care instructions adapted under license by [facility] ([facility]). If you have questions about a medical condition or this instruction, always ask your healthcare professional. [address] any warranty or liability for your use of this information. Patient Education        Learning About Meal Planning for Diabetes  Why plan your meals? Meal planning can be a key part of managing diabetes. Planning meals and snacks with the right balance of carbohydrate, protein, and fat can help you keep your blood sugar at the target level you set with your doctor. You don't have to eat special foods. You can eat what your family eats, including sweets once in a while. But you do have to pay attention to how often you eat and how much you eat of certain foods. You may want to work with a dietitian or a certified diabetes educator. He or she can give you tips and meal ideas and can answer your questions about meal planning. This health professional can also help you reach a healthy weight if that is one of your goals. What plan is right for you? Your dietitian or diabetes educator may suggest that you start with the plate format or carbohydrate counting. The plate format  The plate format is a simple way to help you manage how you eat. You plan meals by learning how much space each food should take on a plate. Using the plate format helps you spread carbohydrate throughout the day. It can make it easier to keep your blood sugar level within your target range. It also helps you see if you're eating healthy portion sizes. To use the plate format, you put non-starchy vegetables on half your plate. Add meat or meat substitutes on one-quarter of the plate. Put a grain or starchy vegetable (such as brown rice or a potato) on the final quarter of the plate.  You can add a small piece of fruit and some low-fat or fat-free milk or yogurt, depending on your carbohydrate goal for each meal.  Here are some tips for using the plate format:  · Make sure that you are not using an oversized plate. A 9-inch plate is best. Many restaurants use larger plates. · Get used to using the plate format at home. Then you can use it when you eat out. · Write down your questions about using the plate format. Talk to your doctor, a dietitian, or a diabetes educator about your concerns. Carbohydrate counting  With carbohydrate counting, you plan meals based on the amount of carbohydrate in each food. Carbohydrate raises blood sugar higher and more quickly than any other nutrient. It is found in desserts, breads and cereals, and fruit. It's also found in starchy vegetables such as potatoes and corn, grains such as rice and pasta, and milk and yogurt. Spreading carbohydrate throughout the day helps keep your blood sugar levels within your target range. Your daily amount depends on several things, including your weight, how active you are, which diabetes medicines you take, and what your goals are for your blood sugar levels. A registered dietitian or diabetes educator can help you plan how much carbohydrate to include in each meal and snack. A guideline for your daily amount of carbohydrate is:  · 45 to 60 grams at each meal. That's about the same as 3 to 4 carbohydrate servings. · 15 to 20 grams at each snack. That's about the same as 1 carbohydrate serving. The Nutrition Facts label on packaged foods tells you how much carbohydrate is in a serving of the food. First, look at the serving size on the food label. Is that the amount you eat in a serving? All of the nutrition information on a food label is based on that serving size. So if you eat more or less than that, you'll need to adjust the other numbers. Total carbohydrate is the next thing you need to look for on the label. If you count carbohydrate servings, one serving of carbohydrate is 15 grams.   For foods that don't come with labels, such as fresh fruits and vegetables, you'll need a guide that lists carbohydrate in these foods. Ask your doctor, dietitian, or diabetes educator about books or other nutrition guides you can use. If you take insulin, you need to know how many grams of carbohydrate are in a meal. This lets you know how much rapid-acting insulin to take before you eat. If you use an insulin pump, you get a constant rate of insulin during the day. So the pump must be programmed at meals to give you extra insulin to cover the rise in blood sugar after meals. When you know how much carbohydrate you will eat, you can take the right amount of insulin. Or, if you always use the same amount of insulin, you need to make sure that you eat the same amount of carbohydrate at meals. If you need more help to understand carbohydrate counting and food labels, ask your doctor, dietitian, or diabetes educator. How do you get started with meal planning? Here are some tips to get started:  · Plan your meals a week at a time. Don't forget to include snacks too. · Use cookbooks or online recipes to plan several main meals. Plan some quick meals for busy nights. You also can double some recipes that freeze well. Then you can save half for other busy nights when you don't have time to cook. · Make sure you have the ingredients you need for your recipes. If you're running low on basic items, put these items on your shopping list too. · List foods that you use to make breakfasts, lunches, and snacks. List plenty of fruits and vegetables. · Post this list on the refrigerator. Add to it as you think of more things you need. · Take the list to the store to do your weekly shopping. Follow-up care is a key part of your treatment and safety. Be sure to make and go to all appointments, and call your doctor if you are having problems. It's also a good idea to know your test results and keep a list of the medicines you take. Where can you learn more? Go to [email or web address] and sign in to your [employer] account. Enter Z923 in the [facility] box to learn more about \"Learning About Meal Planning for Diabetes. \"     If you do not have an account, please click on the \"Sign Up Now\" link. Current as of: April 16, 2019  Content Version: 12.3  © 0462-4163 Healthwise, [employer]. Care instructions adapted under license by [facility] [address] If you have questions about a medical condition or this instruction, always ask your healthcare professional. [place] [address] any warranty or liability for your use of this information. Data Unavailable      Will call her with resutls of glucose/hba1c. May need oral meds to get thourgh  the surgery.     Will add pt for arm when she does pt for back    Use heat QID for 30 minutes on arm    Discussed diet for diabetes and handouts given

## 2020-03-16 ENCOUNTER — TELEPHONE (OUTPATIENT)
Dept: NEUROSURGERY | Age: 55
End: 2020-03-16

## 2020-03-16 PROBLEM — E11.9 TYPE 2 DIABETES MELLITUS WITHOUT COMPLICATION, WITHOUT LONG-TERM CURRENT USE OF INSULIN (HCC): Chronic | Status: ACTIVE | Noted: 2020-03-16

## 2020-03-16 RX ORDER — METFORMIN HYDROCHLORIDE 500 MG/1
TABLET, EXTENDED RELEASE ORAL
Qty: 60 TABLET | Refills: 11 | Status: SHIPPED | OUTPATIENT
Start: 2020-03-16 | End: 2021-02-22

## 2020-03-16 NOTE — TELEPHONE ENCOUNTER
Spoke to patient and explained that as of right now her surgery would have to be cancelled. That we will update her as soon as we have answers.

## 2020-03-17 LAB
ABO/RH: NORMAL
ANTIBODY SCREEN: NEGATIVE
ARM BAND NUMBER: NORMAL
EXPIRATION DATE: NORMAL

## 2020-03-19 ENCOUNTER — PATIENT MESSAGE (OUTPATIENT)
Dept: FAMILY MEDICINE CLINIC | Age: 55
End: 2020-03-19

## 2020-03-20 ENCOUNTER — TELEPHONE (OUTPATIENT)
Dept: DIABETES SERVICES | Age: 55
End: 2020-03-20

## 2020-03-20 SDOH — ECONOMIC STABILITY: FOOD INSECURITY: ADDITIONAL INFORMATION: NO

## 2020-03-20 NOTE — LETTER
[facility]  [address] [facility]  Phone: [phone]         March 20, 2020    To :Miranda Epley, MD    From: Lorena Burns RN    Patient: Lobito Ignacio   YOB: 1965   Date of Visit: 3/20/20 - via phone call     An initial assessment to determine diabetes education needs was completed. Education plan includes:interpretation of lab results, blood sugar goals, complications of diabetes mellitus, hypoglycemia prevention and treatment, exercise, illness management, nutrition, carbohydrate counting and role of diabetes medications. An ongoing plan was created to include: follow up per phone calls ( face to face after May 27 2020)     Thank you for the opportunity to provide Diabetes Self Management Education to your patient.

## 2020-03-20 NOTE — PROGRESS NOTES
meal planning techniques & current nutrition guideline   At home is 66year old mom and her   Up at 6 am     Skips BK    10 30 am and 10 30 pm for medication     Lunch - varies ( 11 30 - 1230 )   Fast food - salad from [employer] or burgers- coffee or coke - 12 ox can - 2 per day   1 bottle water   ( maybe)     Dinner -  varies - may skip   10 15 or 1030 after work - more take out    Works 2 jobs   Now off for next 2 weeks - due to covid 19    sensitive to artifical sweetners    What to eat - Food groups, When to eat - timing of meals and snacks, and How much to eat - portions control. calories/ day   CHO choices/ meal   CHO choices/  day   grams of protein /day   gram of fat /day     Correctly read food labels & demonstrate CHO counting & portion control with personalized meal plan. Identify dining out strategies, & dietary sick day guidelines. 1       Incorporating physical activity into lifestyle:   Verbalize effect of exercise on blood glucose levels; benefits of regular exercise; safety considerations; contraindications; maintenance of activity. 1    Bad back  - but feels better with walking   Dog at home and big yard   Using medications safely:  Identify effects of diabetes medicines on blood glucose levels; List diabetes medication taken, action & side effects;    1    To start metformin XR 500mg qd - for 2 weeks and then up to BId    Insulin / Injectable - Appropriate injection sites; proper storage; supplies needed; proper technique; safe needle disposal guidelines. 1       Monitoring blood glucose, interpreting and using results:  Identify recommended & personal blood glucose targets; importance of testing; testing supplies; HgbA1C target levels; Factors affecting blood glucose;  Importance of logging blood glucose levels for pattern recognition; ketone testing; safe lancet disposal.       Not checking now - plan to following with pcp - aic June 2020   Prevention, detection & treatment of acute complications:  Identify symptoms of hyper & hypoglycemia, and prevention & treatment strategies. Describe sick day guidelines & indications for  physician notification. Identify short term consequences of poor control. Prevention, detection & treatment of chronic complications:  Define the natural course of diabetes & describe the relationship of blood glucose levels to long term complications of diabetes. Identify preventative measures & standards of care. - obesity   And HTN and B/p  Poor sleep and works 2 jobs  - follow also with podiatry for plantar fasciitis    Developing strategies to address psychosocial issues:  Describe feelings about living with diabetes; Describe how stress, depression & anxiety affect blood glucose; Identify coping strategies; Identify support needed & support network available. Developing strategies to promote health/change behavior: Identify 7 self-care behaviors; Personal health risk factors; Benefits, challenges & strategies for behavioral change; Individualized goal selection. My goal , to help me improve my health, I will:   1. Healthy eating - try to cut back/  Stop drinking regular pop    2. Healthy eating - start to count CHO in meals    Start to look at home cooked meals - view [address] planned - via phone next wed 3/ 25/2020 at 3 pm    Instruction Method: [x]Lecture/Discussion  []Power Point Presentation  []Handouts  []Return Demonstration    Education Materials/Equipment Provided (VIA Mail for phone visits)  :    [x]Self-Management - Initial assessment - Enrolment in to ADA  Where do I Begin, Living with Type 2 diabetes ADA home support program and  handout on diabetes education classes.  -- sent out 3/20/20 + mychart note sent with website and you tube links      []Self-Management  Class 1 - \"Diabetes - your take control guide\" - ADA booklet   Understanding Type 2 Diabetes from Animated Diabetes Patient [email or web address]  -- sent out 3/20/20 rs    [] Self-Management  Class 2 - Meal Plan and handout for serving sizes, smarter snacking, Ready Set Carb Counting / Plate Method, Nutrient Conversion and International Diabetes [address] Eating for People with Diabetes and Nutrition in the [employer] - fast facts about fast food    [] Self-Management  Class 3 -  Diabetes ID card,  foot care tips sheet, Healthy I  Continuing Your Journey with Diabetes map handout, Individualized Diabetes report card    [] Self-Management Class 4 - BD Booklet  Sick Day Rules and  Dinning Out Guide , recipe hand outs and tips, diabetes Cookbooks  ( when available)     []Self-Management - 3 month follow -  AADE7 Self care behaviors work sheets, 2020 [employer],  Online resource list - March 2020      []Self-Management  Gestational - RN class -Resource materials sent out : care booklet - \" Gestational Diabetes Mellitus ( GDM) toolkit form [place] gestational diabetes postpartum care learning collaborative 2018. \"Simple Guidelines for meal planning with gestational diabetes. SMBG sheets to fax back to M weekly. BD  healthy injection site selection and rotation with 6 mm insulin syringe and 4 mm pen needle. Gestational diabetes handout from [facility] 2016. Did you have gestational diabetes when you were pregnant? Handout from [facility]  April 2014    []Self-Management Gestational - RD class - My Food Plan for Gestational diabetes    []Glucose Meter     []Insulin Kit     []Other      Encounter Type Date Start Time End Time Comments No Show Dates   Assessment   3/20/20 rs    2 30   3 30   []In Person  [x]Telephone    Class 1 - Understanding diabetes     American Diabetes Association  www. diabetes. org    Class 2- Nutrition and diabetes      Healthy Eating with Diabetes- Automatic Data of Diabetes and Digestive and Kidney dates       [] [address]  Free class   [facility]  [address] [facility]  [facility], [address] Tuesday and Thursday -   9 :30 am- 10:30am - ongoing   [] [address] classes   [facility]  [address] [facility]  [facility], [address] [address] [facility] Thursday 11:00am - 11:45am     [] Third Wednesday Cooking  Class  Free  Registration is required     [address] [facility]. [address] [facility], [facility] [phone] or   Email [email or web address]   Wednesdays-5:00- 6:00 pm       [] Eat Smart  Be Active & Learn How - Free   Families & grandparents with children in home 0- 25 & pregnant moms          Various sites in community - call to find next session near you. [facility] extension office for future sessions - Guy Klein  Email : [email or web address]  Phone: [phone]     [] (SNAP-Ed)   - free nutrition education   - adults and youth, who are eligible for the Supplemental Nutrition Assistance Program    Various sites in community - call to find next session near you. [facility] SNAP-Ed  contact Samina Ernst, Email:[email or web address]           Post Education Referrals:      [] [place] Tobacco Quit information sheet and [address]       [] Dental care - [facility]     [] [facility] ([facility]) link  phone number - for information and referral to [facility]  Clinically  [address], WEIGHT MANAGEMENT        []Other  Willie Leyva RN

## 2020-03-25 ENCOUNTER — TELEPHONE (OUTPATIENT)
Dept: DIABETES SERVICES | Age: 55
End: 2020-03-25

## 2020-03-25 NOTE — TELEPHONE ENCOUNTER
Diabetes Self- Management Education Program Assessment -   Also see Diabetic Screening  Patient, Surya Gil, contacted via phone call encounters for diabetes self-management education  visit/ assessment on 3/20/20        MEDICAL HISTORY:  Past Medical History:   Diagnosis Date    Abnormal stress test     Allergic rhinitis     Arthritis     CAD (coronary artery disease)     Dr. Howard Phillips last seen 2/20/2020    Chronic back pain     Former smoker 2/7/2020    30-year history. Quit in 2018    Hyperlipidemia     Dr. Felix Levi Hypertension     Dr. Sylvia Reardon, cardiac 2/8/2020    SRI on CPAP     instructed to bring Dr. Kathya Reeves Wears prescription eyeglasses     Wellness examination     Dr. Kary Hernandez last seen 2/13/2020     Family History   Problem Relation Age of Onset    Stroke Father     Other Sister         homeless    Diabetes Maternal Grandmother      Bee venom; Tetracyclines & related; and Ace inhibitors   There is no immunization history for the selected administration types on file for this patient. Current Medications  Current Outpatient Medications   Medication Sig Dispense Refill    metFORMIN (GLUCOPHAGE-XR) 500 MG extended release tablet Take 500 mg a day for 2 weeks, then increase the dose to twice a day.  60 tablet 11    melatonin 3 MG TABS tablet Take 3 mg by mouth nightly as needed Stopping 3/10      atorvastatin (LIPITOR) 80 MG tablet Take 1 tablet by mouth daily 30 tablet 11    omeprazole (PRILOSEC) 40 MG delayed release capsule take 1 capsule by mouth once daily 30 capsule 5    metoprolol tartrate (LOPRESSOR) 25 MG tablet take 1 tablet by mouth twice a day 60 tablet 5    losartan (COZAAR) 100 MG tablet take 1 tablet by mouth once daily 30 tablet 5    amLODIPine (NORVASC) 5 MG tablet Take by mouth daily      hydrochlorothiazide (HYDRODIURIL) 25 MG tablet Take 25 mg by mouth daily      Multiple Vitamins-Minerals (ONE-A-DAY 50 PLUS PO) Take 1 tablet by mouth daily      nitroGLYCERIN (NITROSTAT) 0.4 MG SL tablet up to max of 3 total doses. If no relief after 1 dose, call 911. 25 tablet 1    ticagrelor (BRILINTA) 90 MG TABS tablet Take 1 tablet by mouth 2 times daily 60 tablet 0    traMADol (ULTRAM) 50 MG tablet take 1 tablet by mouth once daily if needed 30 tablet 0    aspirin 81 MG tablet Take 1 tablet by mouth daily (with breakfast) 30 tablet 11     No current facility-administered medications for this visit. :     Comments:  Allergies: Allergies   Allergen Reactions    Bee Venom Hives    Tetracyclines & Related Nausea Only and Other (See Comments)     dizziness    Ace Inhibitors Other (See Comments)     cough       A1C blood level   Lab Results   Component Value Date    LABA1C 6.8 (H) 03/13/2020    LABA1C 6.6 (H) 02/07/2020    LABA1C 5.9 01/05/2017     Lab Results   Component Value Date    CREATININE 0.58 03/05/2020       Blood pressure   BP Readings from Last 3 Encounters:   03/13/20 134/86   03/05/20 113/71   02/13/20 134/80        Cholesterol  Lab Results   Component Value Date    LDLCHOLESTEROL 65 02/08/2020        Diabetes Self- Management Education Record    Participant Name: Surya Poster  Referring Provider: Svetlaan Morales MD   Assessment/Evaluation Ratings:  1=Needs Instruction   4=Demonstrates Understanding/Competency  2=Needs Review   NC=Not Covered    3=Comprehends Key Points  N/A=Not Applicable  Topics/Learning Objectives Pre-session Assess Date:  3/20/20 rs Instr. Date Reinforce Date Post- session Eval Comments   Diabetes disease process & Treatment process: Define diabetes & pre-diabetes; Identify own type of diabetes; role of the pancreas; signs/symptoms; diagnostic criteria; prevention & treatment options; contributing factors.  1 3/25/20rs   New Dx of type 2 DM with A1C of 6.8% found pre adm testing - mom hx of type 2 DM    Incorporating nutritional management into lifestyle: Describe effect of type, amount & timing of food on blood glucose; HgbA1C target levels; Factors affecting blood glucose; Importance of logging blood glucose levels for pattern recognition; ketone testing; safe lancet disposal.   1 3/25/20RS   Not checking now - plan to following with pcp - [facility] June 2020   Prevention, detection & treatment of acute complications:  Identify symptoms of hyper & hypoglycemia, and prevention & treatment strategies. 1 3/25/20RS   If wait too long eat, or if eating breakfast will have low BG feelings  No high BG symptoms    Describe sick day guidelines & indications for  physician notification. Identify short term consequences of poor control. Prevention, detection & treatment of chronic complications:  Define the natural course of diabetes & describe the relationship of blood glucose levels to long term complications of diabetes. Identify preventative measures & standards of care. 1    - obesity   And HTN and B/p  Poor sleep and works 2 jobs  - follow also with podiatry for plantar fasciitis    Developing strategies to address psychosocial issues:  Describe feelings about living with diabetes; Describe how stress, depression & anxiety affect blood glucose; Identify coping strategies; Identify support needed & support network available. 1 3/25/20RS   3/25/20RS- surprised but expected dx of diabetes - not over worried at this time   Developing strategies to promote health/change behavior: Identify 7 self-care behaviors; Personal health risk factors; Benefits, challenges & strategies for behavioral change;    1 3/25/20RS     - 3/25/20RS- trying to figure out \" good foods vs bad food\" - expressed reading a lot but not sure what to do next with diet - working on cutting back on regular pop- but still drinking some daily    Individualized goal selection. My goal , to help me improve my health, I will:   1. Healthy eating - try to cut back/  Stop drinking regular pop    2.  Healthy eating - start to count CHO in meals web sites - ADAWeb site: www.diabetes. org       [] [facility] opens at 8 30 am daily for walkers, shops open at 10 am   [address] [facility], [address] [facility]   [phone] Daily - 8:30am - 10am    3rd Tuesday of every month [facility] expert speakers visit from 9 - 10am        [] [address] [facility], [facility], [facility] [place], [facility] mar[place] (site rotate)  Call [phone] or visit   website: [email or web address] for more details   Check web site for updated times/ dates       [] [address]  [address] [facility], [id number] [place] [address] Tuesday and Thursday -   9 :30 am- 10:30am - ongoing   [] [address]  [address] [facility], [address] [address] [facility] Thursday 11:00am - 11:45am     [] Third Wednesday Cooking  Class  Free  Registration is required     [address] [facility]. [address] [facility], 125 [facility] [phone] or   Email [email or web address]   Wednesdays-5:00- 6:00 pm       [] Eat Smart  Be Active & Learn How - Free   Families & grandparents with children in home 0- 25 & pregnant moms          Various sites in community - call to find next session near you. [facility] extension office for future sessions - Estuardo Amin  Email : [email or web address]  Phone: [phone]     [] (SNAP-Ed)   - free nutrition education   - adults and youth, who are eligible for the Supplemental Nutrition Assistance Program    Various sites in community - call to find next session near you. [facility] PASSCIRILO-CRANBERRY-ER SNAP-Ed  contact Jas Law, Email:[email or web address]. [phone]           Post Education Referrals:      [] [place] Tobacco Quit information sheet and [address] , [address]       [] Dental care -

## 2020-04-01 ENCOUNTER — TELEPHONE (OUTPATIENT)
Dept: DIABETES SERVICES | Age: 55
End: 2020-04-01

## 2020-04-01 NOTE — PROGRESS NOTES
Diabetes Self- Management Education Program Assessment -   Also see Diabetic Screening  Patient, Osvaldo Wellington, contacted via phone call encounters for diabetes self-management education  visit/ assessment on 3/20/20        MEDICAL HISTORY:  Past Medical History:   Diagnosis Date    Abnormal stress test     Allergic rhinitis     Arthritis     CAD (coronary artery disease)     Dr. Roula Pool last seen 2/20/2020    Chronic back pain     Former smoker 2/7/2020    30-year history. Quit in 2018    Hyperlipidemia     Dr. Larkin Sis Hypertension     Dr. Nicolas Cerna, cardiac 2/8/2020    SRI on CPAP     instructed to bring Dr. Gurjit Brizuela Wears prescription eyeglasses     Wellness examination     Dr. Adams Bamberger last seen 2/13/2020     Family History   Problem Relation Age of Onset    Stroke Father     Other Sister         homeless    Diabetes Maternal Grandmother      Bee venom; Tetracyclines & related; and Ace inhibitors   There is no immunization history for the selected administration types on file for this patient. Current Medications  Current Outpatient Medications   Medication Sig Dispense Refill    metFORMIN (GLUCOPHAGE-XR) 500 MG extended release tablet Take 500 mg a day for 2 weeks, then increase the dose to twice a day.  60 tablet 11    melatonin 3 MG TABS tablet Take 3 mg by mouth nightly as needed Stopping 3/10      atorvastatin (LIPITOR) 80 MG tablet Take 1 tablet by mouth daily 30 tablet 11    omeprazole (PRILOSEC) 40 MG delayed release capsule take 1 capsule by mouth once daily 30 capsule 5    metoprolol tartrate (LOPRESSOR) 25 MG tablet take 1 tablet by mouth twice a day 60 tablet 5    losartan (COZAAR) 100 MG tablet take 1 tablet by mouth once daily 30 tablet 5    amLODIPine (NORVASC) 5 MG tablet Take by mouth daily      hydrochlorothiazide (HYDRODIURIL) 25 MG tablet Take 25 mg by mouth daily      Multiple Vitamins-Minerals (ONE-A-DAY 50 PLUS PO) Take 1 tablet by mouth daily      nitroGLYCERIN (NITROSTAT) 0.4 MG SL tablet up to max of 3 total doses. If no relief after 1 dose, call 911. 25 tablet 1    ticagrelor (BRILINTA) 90 MG TABS tablet Take 1 tablet by mouth 2 times daily 60 tablet 0    traMADol (ULTRAM) 50 MG tablet take 1 tablet by mouth once daily if needed 30 tablet 0    aspirin 81 MG tablet Take 1 tablet by mouth daily (with breakfast) 30 tablet 11     No current facility-administered medications for this visit. :     Comments:  Allergies: Allergies   Allergen Reactions    Bee Venom Hives    Tetracyclines & Related Nausea Only and Other (See Comments)     dizziness    Ace Inhibitors Other (See Comments)     cough       A1C blood level   Lab Results   Component Value Date    LABA1C 6.8 (H) 03/13/2020    LABA1C 6.6 (H) 02/07/2020    LABA1C 5.9 01/05/2017     Lab Results   Component Value Date    CREATININE 0.58 03/05/2020       Blood pressure   BP Readings from Last 3 Encounters:   03/13/20 134/86   03/05/20 113/71   02/13/20 134/80        Cholesterol  Lab Results   Component Value Date    LDLCHOLESTEROL 65 02/08/2020        Diabetes Self- Management Education Record    Participant Name: Yaima Villavicencio  Referring Provider: Sanchez Phelps MD   Assessment/Evaluation Ratings:  1=Needs Instruction   4=Demonstrates Understanding/Competency  2=Needs Review   NC=Not Covered    3=Comprehends Key Points  N/A=Not Applicable  Topics/Learning Objectives Pre-session Assess Date:  3/20/20 rs Instr. Date Reinforce Date Post- session Eval Comments   Diabetes disease process & Treatment process: Define diabetes & pre-diabetes; Identify own type of diabetes; role of the pancreas; signs/symptoms; diagnostic criteria; prevention & treatment options; contributing factors.  1 3/25/20rs   New Dx of type 2 DM with A1C of 6.8% found pre adm testing - mom hx of type 2 DM    Incorporating nutritional management into lifestyle: Describe effect of type, amount & timing of food on blood glucose; Describe basic meal planning techniques & current nutrition guideline   At home is 66year old mom and her   Up at 6 am     Skips BK    10 30 am and 10 30 pm for medication     Lunch - varies ( 11 30 - 1230 )   Fast food - salad from [employer] or burgers- coffee or coke - 12 ox can - 2 per day   1 bottle water   ( maybe)     Dinner -  varies - may skip   10 15 or 1030 after work - more take out    Works 2 jobs   Now off for next 2 weeks - due to covid 19    sensitive to artifical sweetners     4/1/20 JW- with being off work from 1 job and working from home with other, pt reports taking time to focus on herself and her health. Eating breakfast (oatmeal)  and making healthier choices. Is tracking with myCardLabnesspal and finds it's helping but eating closer to 1800 calories. What to eat - Food groups, When to eat - timing of meals and snacks, and How much to eat - portions control. 1500 calories/ day   CHO choices/ meal 3,1,3,1,3,1   CHO choices/  day   grams of protein /day   gram of fat /day      Praised changes pt is making and not to worry about calories being off with myfitnesspal but looking at big picture changes she's making. Not eating out, cooking more and more fruits/veges and H20. Has cut out pop but occasional can. Correctly read food labels & demonstrate CHO counting & portion control with personalized meal plan. Identify dining out strategies, & dietary sick day guidelines. 1  Start to look at home cooked meals - view diabetesfoodhub.org -   3/25/20- tried but has a hard time with site - looking for crock pot meals  4/1/20 JW      Incorporating physical activity into lifestyle:   Verbalize effect of exercise on blood glucose levels; benefits of regular exercise; safety considerations; contraindications; maintenance of activity.    1 4/1/20 JW   Bad back  - but feels better with walking   Dog at home and big yard   Using medications safely:  Identify effects of diabetes medicines on blood glucose levels; List diabetes medication taken, action & side effects;    1    To start metformin XR 500mg qd - for 2 weeks and then up to BId -   3/25/20 rs - Startted metformin and tolerated well q day - will go up to BID  ( 1st week of April)    Insulin / Injectable - Appropriate injection sites; proper storage; supplies needed; proper technique; safe needle disposal guidelines. 1       Monitoring blood glucose, interpreting and using results:  Identify recommended & personal blood glucose targets; importance of testing; testing supplies; HgbA1C target levels; Factors affecting blood glucose; Importance of logging blood glucose levels for pattern recognition; ketone testing; safe lancet disposal.   1 3/25/20RS   Not checking now - plan to following with pcp - [facility] June 2020   Prevention, detection & treatment of acute complications:  Identify symptoms of hyper & hypoglycemia, and prevention & treatment strategies. 1 3/25/20RS   If wait too long eat, or if eating breakfast will have low BG feelings  No high BG symptoms    Describe sick day guidelines & indications for  physician notification. Identify short term consequences of poor control. Prevention, detection & treatment of chronic complications:  Define the natural course of diabetes & describe the relationship of blood glucose levels to long term complications of diabetes. Identify preventative measures & standards of care. 1    - obesity   And HTN and B/p  Poor sleep and works 2 jobs  - follow also with podiatry for plantar fasciitis    Developing strategies to address psychosocial issues:  Describe feelings about living with diabetes; Describe how stress, depression & anxiety affect blood glucose; Identify coping strategies; Identify support needed & support network available.  1 3/25/20RS   3/25/20RS- surprised but expected dx of diabetes - not over worried at this time   Developing strategies to promote health/change behavior: Identify 7 behaviors work sheets, 2020 [employer],  Online resource list - March 2020      []Self-Management  Gestational - RN class -Resource materials sent out : care booklet - \" Gestational Diabetes Mellitus ( GDM) toolkit form [place] gestational diabetes postpartum care learning collaborative 2018. \"Simple Guidelines for meal planning with gestational diabetes. SMBG sheets to fax back to [facility] weekly. BD  healthy injection site selection and rotation with 6 mm insulin syringe and 4 mm pen needle. Gestational diabetes handout from [facility] 2016. Did you have gestational diabetes when you were pregnant? Handout from [facility]  April 2014    []Self-Management Gestational - RD class - My Food Plan for Gestational diabetes    []Glucose Meter     []Insulin Kit     []Other      Encounter Type Date Start Time End Time Comments No Show Dates   Assessment   3/20/20 rs    2 30   3 30   []In Person  [x]Telephone    Class 1 - Understanding diabetes 3/25/20rs 300   330   American Diabetes Association  www. diabetes. org    Class 2- Nutrition and diabetes   4/1/20 JW 3:00 4:15 Healthy Eating with Diabetes- Automatic Data of Diabetes and Digestive and Kidney   [email or web address]    Class 3 - Preventing Complications         Class 4 -  In depth Nutrition and sick day care    Diabetes Food hub  www. diabetesfoodhub.org     Class 5 - 3 month follow up / goal reassessment        Gestational - RN         Gestational - RD        Individual MNT         Shared Med Appt         Yearly Follow-up        Meter Instrx      How to Measure Your Blood Sugar - [facility] Patient Education  [email or web address]    Insulin Instrx      []Pen  []Vial & Syringe   BD Diabetes Care: How to Inject Insulin with a Pen Needle  [email or web address]    Diabetes Care: How to Inject Insulin with a Syringe  [email or web address]       DSMS Support :   [] MNT      [] Annual update     [] Starting Fresh adults living with diabetes or pre diabetes. [address] [place], [place]    [id number]-7:30pm- on 6 week rotation  Free -  REGISTRATION IS REQUIRED - EZAX [phone] call for dates    []  Diabetes Group at  [address] - Free 6 week diabetes education support   classes - contact : Cathryn Colvin [phone]  for dates and locations      []ADA  Where do I Begin, Living with Type 2 diabetes ADA home support program    Web site: diabetes. org/living    Call: 1800 DIABETES  e-mail: [email or web address]     []  Internet web sites - ADAWeb site: www.diabetes. org       [] [facility] opens at 8 30 am daily for walkers, shops open at 10 am   [address] [facility], [address] [place] [address]   [phone] Daily - 8:30am - 10am    3rd Tuesday of every month Maximilian Guy expert speakers visit from 9 - 10am        [] [address] [facility], [facility], [facility], [place], [facility] mar[place]aga (site rotate)  Call [phone] or visit   website: [email or web address] for more details   Check web site for updated times/ dates       [] [address]  [address] [facility], [address] [address] [place] [address] Tuesday and Thursday -   9 :30 am- 10:30am - ongoing   [] [address], [address] [facility] [address] [address] [facility] Thursday 11:00am - 11:45am     [] Third Wednesday Cooking  Class  Free  Registration is required     [address] [facility]. 1100 [facility], 125 [facility] [phone] or   Email [email or web address]   Wednesdays-5:00- 6:00 pm       [] Eat Smart  Be Active & Learn How - Free   Families & grandparents with children in home 0- 25 & pregnant moms          Various sites in community - call to find next session near you. [facility] extension office for future sessions - Óscar iGll  Email : Germán Stern@"[email or web address] edu  Phone: [phone]     [] (SNAP-Ed)   - free nutrition education   - adults and youth, who are eligible for the Supplemental Nutrition Assistance Program    Various sites in community - call to find next session near you. [facility] DULCE MARIA-CRANBERRY- SNAP-Ed  contact Sahara Mcmahon, Email:yessica Cadena@"[employer] [phone]           Post Education Referrals:      [] [place] Tobacco Quit information sheet and [address] [facility] , [address]       [] Dental care - [facility]     [] [employer] link  phone number - for information and referral to [address], WOUND, WEIGHT MANAGEMENT        []Other  Nesha Winter, RD, LD

## 2020-04-02 ENCOUNTER — TELEPHONE (OUTPATIENT)
Dept: DIABETES SERVICES | Age: 55
End: 2020-04-02

## 2020-04-08 ENCOUNTER — TELEPHONE (OUTPATIENT)
Dept: DIABETES SERVICES | Age: 55
End: 2020-04-08

## 2020-04-08 NOTE — PROGRESS NOTES
Diabetes Self- Management Education Program Assessment -   Also see Diabetic Screening  Patient, Marko Hodges, contacted via phone call encounters for diabetes self-management education  visit/ assessment on 3/20/20        MEDICAL HISTORY:  Past Medical History:   Diagnosis Date    Abnormal stress test     Allergic rhinitis     Arthritis     CAD (coronary artery disease)     Dr. Kaylene Madison last seen 2/20/2020    Chronic back pain     Former smoker 2/7/2020    30-year history. Quit in 2018    Hyperlipidemia     Dr. Sal Pack Hypertension     Dr. Alexandria Hines, cardiac 2/8/2020    SRI on CPAP     instructed to bring Dr. Khalida Florian Wears prescription eyeglasses     Wellness examination     Dr. Kati Farias last seen 2/13/2020     Family History   Problem Relation Age of Onset    Stroke Father     Other Sister         homeless    Diabetes Maternal Grandmother      Bee venom; Tetracyclines & related; and Ace inhibitors   There is no immunization history for the selected administration types on file for this patient. Current Medications  Current Outpatient Medications   Medication Sig Dispense Refill    metFORMIN (GLUCOPHAGE-XR) 500 MG extended release tablet Take 500 mg a day for 2 weeks, then increase the dose to twice a day.  60 tablet 11    melatonin 3 MG TABS tablet Take 3 mg by mouth nightly as needed Stopping 3/10      atorvastatin (LIPITOR) 80 MG tablet Take 1 tablet by mouth daily 30 tablet 11    omeprazole (PRILOSEC) 40 MG delayed release capsule take 1 capsule by mouth once daily 30 capsule 5    metoprolol tartrate (LOPRESSOR) 25 MG tablet take 1 tablet by mouth twice a day 60 tablet 5    losartan (COZAAR) 100 MG tablet take 1 tablet by mouth once daily 30 tablet 5    amLODIPine (NORVASC) 5 MG tablet Take by mouth daily      hydrochlorothiazide (HYDRODIURIL) 25 MG tablet Take 25 mg by mouth daily      Multiple Vitamins-Minerals (ONE-A-DAY 50 PLUS PO) Take 1 tablet by mouth daily      nitroGLYCERIN (NITROSTAT) 0.4 MG SL tablet up to max of 3 total doses. If no relief after 1 dose, call 911. 25 tablet 1    ticagrelor (BRILINTA) 90 MG TABS tablet Take 1 tablet by mouth 2 times daily 60 tablet 0    traMADol (ULTRAM) 50 MG tablet take 1 tablet by mouth once daily if needed 30 tablet 0    aspirin 81 MG tablet Take 1 tablet by mouth daily (with breakfast) 30 tablet 11     No current facility-administered medications for this visit. :     Comments:  Allergies: Allergies   Allergen Reactions    Bee Venom Hives    Tetracyclines & Related Nausea Only and Other (See Comments)     dizziness    Ace Inhibitors Other (See Comments)     cough       A1C blood level   Lab Results   Component Value Date    LABA1C 6.8 (H) 03/13/2020    LABA1C 6.6 (H) 02/07/2020    LABA1C 5.9 01/05/2017     Lab Results   Component Value Date    CREATININE 0.58 03/05/2020       Blood pressure   BP Readings from Last 3 Encounters:   03/13/20 134/86   03/05/20 113/71   02/13/20 134/80        Cholesterol  Lab Results   Component Value Date    LDLCHOLESTEROL 65 02/08/2020        Diabetes Self- Management Education Record    Participant Name: Edith Norton  Referring Provider: Ruby Gonsalves MD   Assessment/Evaluation Ratings:  1=Needs Instruction   4=Demonstrates Understanding/Competency  2=Needs Review   NC=Not Covered    3=Comprehends Key Points  N/A=Not Applicable  Topics/Learning Objectives Pre-session Assess Date:  3/20/20 rs Instr. Date Reinforce Date Post- session Eval Comments   Diabetes disease process & Treatment process: Define diabetes & pre-diabetes; Identify own type of diabetes; role of the pancreas; signs/symptoms; diagnostic criteria; prevention & treatment options; contributing factors.  1 3/25/20rs   New Dx of type 2 DM with A1C of 6.8% found pre adm testing - mom hx of type 2 DM    Incorporating nutritional management into lifestyle: Describe effect of type, amount & timing of food on blood glucose; Describe basic meal planning techniques & current nutrition guideline   At home is 66year old mom and her   Up at 6 am     Skips BK    10 30 am and 10 30 pm for medication     Lunch - varies ( 11 30 - 1230 )   Fast food - salad from [employer] or burgers- coffee or coke - 12 ox can - 2 per day   1 bottle water   ( maybe)     Dinner -  varies - may skip   10 15 or 1030 after work - more take out    Works 2 jobs   Now off for next 2 weeks - due to covid 19    sensitive to artifical sweetners   4/1/20 JW- with being off work from 1 job and working from home with other, pt reports taking time to focus on herself and her health. Eating breakfast (oatmeal)  and making healthier choices. Is tracking with myfitnesspal and finds it's helping but eating closer to 1800 calories. 4/8/20 JW- avoiding sugary beverages has slipped and had a can pop. Trying stevia in coffee. Using fitness pal still and around 1800 calories  What to eat - Food groups, When to eat - timing of meals and snacks, and How much to eat - portions control. 1500 calories/ day   CHO choices/ meal 3,1,3,1,3,1   CHO choices/  day   grams of protein /day   gram of fat /day      Praised changes pt is making and not to worry about calories being off with myfitnesspal but looking at big picture changes she's making. Not eating out, cooking more and more fruits/veges and H20. Has cut out pop but occasional can. Correctly read food labels & demonstrate CHO counting & portion control with personalized meal plan. Identify dining out strategies, & dietary sick day guidelines. 1  Start to look at home cooked meals - view [email or web address].org -   3/25/20- tried but has a hard time with site - looking for crock pot meals  4/1/20 JW 4/8/20 JW     Incorporating physical activity into lifestyle:   Verbalize effect of exercise on blood glucose levels; benefits of regular exercise; safety considerations; contraindications; maintenance of activity.    1 4/1/20 JW [address] [facility], [address] [place] [address] [phone] or   Email [email or web address]   Wednesdays-5:00- 6:00 pm       [] Eat Smart  Be Active & Learn How - Free   Families & grandparents with children in home 0- 25 & pregnant moms          Various sites in community - call to find next session near you. [facility] extension office for future sessions - Caryl Ormond  Email : [email or web address]. [email or web address]  Phone: [phone]     [] (SNAP-Ed)   - free nutrition education   - adults and youth, who are eligible for the Supplemental Nutrition Assistance Program    Various sites in community - call to find next session near you. [facility] DULCE MARIA-CRANBERRY- SNAP-Ed  contact Mery Godfrey, Email:[email or web address]           Post Education Referrals:      [] [place] Tobacco Quit information sheet and [address] [facility] , [address]       [] Dental care - [facility]     [] [facility] ([facility]) link  phone number - for information and referral to [address], WOUND, WEIGHT MANAGEMENT        []Other  Ova DEENA Goetz, LD

## 2020-04-14 ENCOUNTER — TELEPHONE (OUTPATIENT)
Dept: DIABETES SERVICES | Age: 55
End: 2020-04-14

## 2020-04-26 ENCOUNTER — HOSPITAL ENCOUNTER (EMERGENCY)
Age: 55
Discharge: HOME | End: 2020-04-26
Attending: EMERGENCY MEDICINE
Payer: COMMERCIAL

## 2020-04-26 VITALS
HEART RATE: 81 BPM | WEIGHT: 235 LBS | OXYGEN SATURATION: 97 % | RESPIRATION RATE: 16 BRPM | HEIGHT: 64 IN | DIASTOLIC BLOOD PRESSURE: 69 MMHG | SYSTOLIC BLOOD PRESSURE: 149 MMHG | BODY MASS INDEX: 40.12 KG/M2 | TEMPERATURE: 98 F

## 2020-04-26 PROCEDURE — 99282 EMERGENCY DEPT VISIT SF MDM: CPT

## 2020-04-26 PROCEDURE — 6360000002 HC RX W HCPCS: Performed by: NURSE PRACTITIONER

## 2020-04-26 PROCEDURE — 90471 IMMUNIZATION ADMIN: CPT | Performed by: NURSE PRACTITIONER

## 2020-04-26 PROCEDURE — 90715 TDAP VACCINE 7 YRS/> IM: CPT | Performed by: NURSE PRACTITIONER

## 2020-04-26 RX ADMIN — TETANUS TOXOID, REDUCED DIPHTHERIA TOXOID AND ACELLULAR PERTUSSIS VACCINE, ADSORBED 0.5 ML: 5; 2.5; 8; 8; 2.5 SUSPENSION INTRAMUSCULAR at 11:50

## 2020-04-26 ASSESSMENT — PAIN DESCRIPTION - LOCATION: LOCATION: FINGER (COMMENT WHICH ONE)

## 2020-04-26 ASSESSMENT — PAIN SCALES - GENERAL: PAINLEVEL_OUTOF10: 4

## 2020-04-26 ASSESSMENT — PAIN DESCRIPTION - FREQUENCY: FREQUENCY: CONTINUOUS

## 2020-04-26 ASSESSMENT — PAIN DESCRIPTION - DESCRIPTORS: DESCRIPTORS: THROBBING;CONSTANT

## 2020-04-26 ASSESSMENT — ENCOUNTER SYMPTOMS: COLOR CHANGE: 1

## 2020-04-26 ASSESSMENT — PAIN DESCRIPTION - ORIENTATION: ORIENTATION: RIGHT

## 2020-04-26 NOTE — ED PROVIDER NOTES
SURGERY      HAND TENDON SURGERY Right     [facility] [place], [facility]. INJECTION PROCEDURE FOR SACROILIAC JOINT Left 12/12/2019    SACROILIAC JOINT INJECTION - #1 performed by Jazmyn Alexander MD at [address] [place] [address]     Current Discharge Medication List      CONTINUE these medications which have NOT CHANGED    Details   metFORMIN (GLUCOPHAGE-XR) 500 MG extended release tablet Take 500 mg a day for 2 weeks, then increase the dose to twice a day. Qty: 60 tablet, Refills: 11      melatonin 3 MG TABS tablet Take 3 mg by mouth nightly as needed Stopping 3/10      atorvastatin (LIPITOR) 80 MG tablet Take 1 tablet by mouth daily  Qty: 30 tablet, Refills: 11      omeprazole (PRILOSEC) 40 MG delayed release capsule take 1 capsule by mouth once daily  Qty: 30 capsule, Refills: 5      metoprolol tartrate (LOPRESSOR) 25 MG tablet take 1 tablet by mouth twice a day  Qty: 60 tablet, Refills: 5      losartan (COZAAR) 100 MG tablet take 1 tablet by mouth once daily  Qty: 30 tablet, Refills: 5      amLODIPine (NORVASC) 5 MG tablet Take by mouth daily      hydrochlorothiazide (HYDRODIURIL) 25 MG tablet Take 25 mg by mouth daily      Multiple Vitamins-Minerals (ONE-A-DAY 50 PLUS PO) Take 1 tablet by mouth daily      ticagrelor (BRILINTA) 90 MG TABS tablet Take 1 tablet by mouth 2 times daily  Qty: 60 tablet, Refills: 0      traMADol (ULTRAM) 50 MG tablet take 1 tablet by mouth once daily if needed  Qty: 30 tablet, Refills: 0      aspirin 81 MG tablet Take 1 tablet by mouth daily (with breakfast)  Qty: 30 tablet, Refills: 11      nitroGLYCERIN (NITROSTAT) 0.4 MG SL tablet up to max of 3 total doses. If no relief after 1 dose, call 911. Qty: 25 tablet, Refills: 1             ALLERGIES     Bee venom;  Tetracyclines & related; and Ace inhibitors    FAMILY HISTORY       Family History   Problem Relation Age of Onset    Stroke Father     Other Sister         homeless    Diabetes Maternal Grandmother           SOCIAL HISTORY       Social History     Socioeconomic History    Marital status:      Spouse name: None    Number of children: None    Years of education: None    Highest education level: None   Occupational History    None   Social Needs    Financial resource strain: None    Food insecurity     Worry: None     Inability: None    Transportation needs     Medical: None     Non-medical: None   Tobacco Use    Smoking status: Former Smoker     Years: 30.00     Types: Cigarettes     Last attempt to quit: 2018     Years since quittin.7    Smokeless tobacco: Never Used   Substance and Sexual Activity    Alcohol use: No    Drug use: No    Sexual activity: None   Lifestyle    Physical activity     Days per week: None     Minutes per session: None    Stress: None   Relationships    Social connections     Talks on phone: None     Gets together: None     Attends Zoroastrianism service: None     Active member of club or organization: None     Attends meetings of clubs or organizations: None     Relationship status: None    Intimate partner violence     Fear of current or ex partner: None     Emotionally abused: None     Physically abused: None     Forced sexual activity: None   Other Topics Concern    None   Social History Narrative    None         REVIEW OF SYSTEMS    (2-9 systems for level 4, 10 or more for level 5)     Review of Systems   Skin: Positive for color change. All other systems reviewed and are negative. Except as noted above the remainder of the review of systems was reviewed and negative. PHYSICAL EXAM    (up to 7 for level 4, 8 or more for level 5)     ED Triage Vitals [20 1131]   BP Temp Temp Source Pulse Resp SpO2 Height Weight   (!) 149/69 98 °F (36.7 °C) Oral 81 16 97 % 5' 4\" (1.626 m) 235 lb (106.6 kg)       Physical Exam  Constitutional:       Appearance: Normal appearance. She is normal weight.

## 2020-05-15 ENCOUNTER — HOSPITAL ENCOUNTER (EMERGENCY)
Age: 55
Discharge: HOME | End: 2020-05-15
Attending: EMERGENCY MEDICINE
Payer: COMMERCIAL

## 2020-05-15 ENCOUNTER — APPOINTMENT (OUTPATIENT)
Dept: CT IMAGING | Age: 55
End: 2020-05-15
Payer: COMMERCIAL

## 2020-05-15 VITALS
SYSTOLIC BLOOD PRESSURE: 179 MMHG | TEMPERATURE: 99 F | BODY MASS INDEX: 40.73 KG/M2 | RESPIRATION RATE: 16 BRPM | DIASTOLIC BLOOD PRESSURE: 92 MMHG | OXYGEN SATURATION: 94 % | WEIGHT: 237.31 LBS | HEART RATE: 83 BPM

## 2020-05-15 PROCEDURE — 2500000003 HC RX 250 WO HCPCS: Performed by: NURSE PRACTITIONER

## 2020-05-15 PROCEDURE — 70486 CT MAXILLOFACIAL W/O DYE: CPT

## 2020-05-15 PROCEDURE — 12011 RPR F/E/E/N/L/M 2.5 CM/<: CPT

## 2020-05-15 PROCEDURE — 99283 EMERGENCY DEPT VISIT LOW MDM: CPT

## 2020-05-15 RX ORDER — LIDOCAINE HYDROCHLORIDE 10 MG/ML
10 INJECTION, SOLUTION INFILTRATION; PERINEURAL ONCE
Status: COMPLETED | OUTPATIENT
Start: 2020-05-15 | End: 2020-05-15

## 2020-05-15 RX ADMIN — LIDOCAINE HYDROCHLORIDE 10 ML: 10 INJECTION, SOLUTION INFILTRATION; PERINEURAL at 21:48

## 2020-05-15 ASSESSMENT — ENCOUNTER SYMPTOMS
FACIAL SWELLING: 1
COLOR CHANGE: 1

## 2020-05-15 ASSESSMENT — PAIN SCALES - GENERAL
PAINLEVEL_OUTOF10: 9
PAINLEVEL_OUTOF10: 9

## 2020-05-15 ASSESSMENT — PAIN DESCRIPTION - LOCATION: LOCATION: FACE

## 2020-05-15 ASSESSMENT — PAIN DESCRIPTION - FREQUENCY: FREQUENCY: CONTINUOUS

## 2020-05-15 ASSESSMENT — PAIN DESCRIPTION - DESCRIPTORS: DESCRIPTORS: CONSTANT;THROBBING

## 2020-05-15 ASSESSMENT — PAIN DESCRIPTION - PAIN TYPE: TYPE: ACUTE PAIN

## 2020-05-16 NOTE — ED PROVIDER NOTES
Weight   05/15/20 1956 05/15/20 1956 05/15/20 1956 05/15/20 1956 05/15/20 1956 05/15/20 1956 -- 05/15/20 2134   (!) 179/92 99 °F (37.2 °C) Oral 83 16 94 %  237 lb 5 oz (107.6 kg)       Physical Exam  Constitutional:       Appearance: Normal appearance. She is well-developed, well-groomed and overweight. HENT:      Head: Normocephalic. Abrasion and laceration present. Comments: 2 abrasions noted to the face. There is a 1.5 cm laceration noted above the upper lip just below the nose. Patient does go through to the inside of the mouth. Teeth are intact. Right Ear: Hearing and external ear normal.      Left Ear: Hearing and external ear normal.      Nose: Nose normal.      Comments: There is dried blood noted in both nares but no active bleeding. No septal hematoma. Mouth/Throat:      Mouth: Mucous membranes are moist.      Dentition: Normal dentition. No dental tenderness or dental caries. Pharynx: Oropharynx is clear. Eyes:      Extraocular Movements: Extraocular movements intact. Conjunctiva/sclera: Conjunctivae normal.      Pupils: Pupils are equal, round, and reactive to light. Neck:      Musculoskeletal: Full passive range of motion without pain, normal range of motion and neck supple. Pulmonary:      Effort: Pulmonary effort is normal. No respiratory distress. Musculoskeletal: Normal range of motion. Skin:     General: Skin is dry. Neurological:      Mental Status: She is alert and oriented to person, place, and time. GCS: GCS eye subscore is 4. GCS verbal subscore is 5. GCS motor subscore is 6. Cranial Nerves: Cranial nerves are intact. Sensory: Sensation is intact. Motor: Motor function is intact. Coordination: Coordination is intact. Gait: Gait is intact. Psychiatric:         Mood and Affect: Mood normal.         Behavior: Behavior normal.         Thought Content:  Thought content normal.         Judgment: Judgment normal. time of this note:    CT FACIAL BONES WO CONTRAST   Final Result   Mild soft tissue swelling and few gas bubbles in the right malar and upper   lip area. No fractures visualized. EDBEDSIDE ULTRASOUND:   Performed by Evert Madrid - none    LABS:  Labs Reviewed - No data to display    All other labs were within normal range or not returned as of this dictation. EMERGENCY DEPARTMENT COURSE andDIFFERENTIAL DIAGNOSIS/MDM:   Laceration repaired. See procedure note. CT facial bones negative for fracture. Patient discharged home instructed to return to emergency department in 6 days for suture removal.  Return precautions provided. Vitals:    Vitals:    05/15/20 1956 05/15/20 2134   BP: (!) 179/92    Pulse: 83    Resp: 16    Temp: 99 °F (37.2 °C)    TempSrc: Oral    SpO2: 94%    Weight:  237 lb 5 oz (107.6 kg)         CONSULTS:  None    RES:  Lac Repair  Date/Time: 5/15/2020 10:18 PM  Performed by: BRITTON Fink - CNP  Authorized by: Jo-Ann Sampson MD     Consent:     Consent obtained:  Verbal    Consent given by:  Patient    Risks discussed:  Pain, infection and poor cosmetic result  Anesthesia (see MAR for exact dosages):      Anesthesia method:  Local infiltration    Local anesthetic:  Lidocaine 1% w/o epi  Laceration details:     Location:  Face    Face location:  R cheek    Length (cm):  1.5    Depth (mm):  2  Repair type:     Repair type:  Simple  Exploration:     Wound exploration: wound explored through full range of motion and entire depth of wound probed and visualized      Wound extent: no nerve damage noted and no underlying fracture noted    Treatment:     Area cleansed with:  Betadine    Irrigation solution:  Sterile saline  Skin repair:     Repair method:  Sutures    Suture size:  6-0    Suture material:  Nylon    Suture technique:  Simple interrupted    Number of sutures:  3  Approximation:     Approximation:  Close  Post-procedure details:     Dressing:  Open (no dressing)

## 2020-05-16 NOTE — ED NOTES
Pt to ED via private auto with c/o of striking face on entertainment center while falling. Pt reports her tooth went into her lip causing a laceration like area. Pt arrives ambulatory, skin warm & dry, A&Ox4, eupneic on RA, all VS stable - HTN noted. Pt rates pain 9/10. Will monitor.          Kriss Doran RN  05/15/20 [id number]

## 2020-05-18 ENCOUNTER — TELEPHONE (OUTPATIENT)
Dept: FAMILY MEDICINE CLINIC | Age: 55
End: 2020-05-18

## 2020-05-21 ENCOUNTER — OFFICE VISIT (OUTPATIENT)
Dept: FAMILY MEDICINE CLINIC | Age: 55
End: 2020-05-21
Payer: COMMERCIAL

## 2020-05-21 VITALS
HEART RATE: 72 BPM | SYSTOLIC BLOOD PRESSURE: 134 MMHG | DIASTOLIC BLOOD PRESSURE: 84 MMHG | OXYGEN SATURATION: 98 % | TEMPERATURE: 98.3 F

## 2020-05-21 PROCEDURE — 99214 OFFICE O/P EST MOD 30 MIN: CPT | Performed by: FAMILY MEDICINE

## 2020-05-26 ENCOUNTER — TELEPHONE (OUTPATIENT)
Dept: DIABETES SERVICES | Age: 55
End: 2020-05-26

## 2020-06-10 ENCOUNTER — OFFICE VISIT (OUTPATIENT)
Dept: NEUROSURGERY | Age: 55
End: 2020-06-10
Payer: COMMERCIAL

## 2020-06-10 VITALS
OXYGEN SATURATION: 95 % | TEMPERATURE: 98.1 F | WEIGHT: 230 LBS | HEIGHT: 64 IN | HEART RATE: 69 BPM | BODY MASS INDEX: 39.27 KG/M2 | DIASTOLIC BLOOD PRESSURE: 71 MMHG | SYSTOLIC BLOOD PRESSURE: 121 MMHG

## 2020-06-10 PROCEDURE — 99213 OFFICE O/P EST LOW 20 MIN: CPT | Performed by: NEUROLOGICAL SURGERY

## 2020-06-12 ENCOUNTER — TELEPHONE (OUTPATIENT)
Dept: PAIN MANAGEMENT | Age: 55
End: 2020-06-12

## 2020-06-18 ENCOUNTER — PATIENT MESSAGE (OUTPATIENT)
Dept: FAMILY MEDICINE CLINIC | Age: 55
End: 2020-06-18

## 2020-06-18 NOTE — TELEPHONE ENCOUNTER
From: Rojelio Flannery  To: Alee Navarro MD  Sent: 6/18/2020 1:49 PM EDT  Subject: Non-Urgent Medical Question    The last few days for sure, maybe longer that I haven't paid attention, my ankles have been significantly swollen. 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 Should I be concerned?

## 2020-06-24 ENCOUNTER — TELEPHONE (OUTPATIENT)
Dept: INTERVENTIONAL RADIOLOGY/VASCULAR | Age: 55
End: 2020-06-24

## 2020-06-29 ENCOUNTER — TELEPHONE (OUTPATIENT)
Dept: NEUROSURGERY | Age: 55
End: 2020-06-29

## 2020-06-29 NOTE — TELEPHONE ENCOUNTER
Patient would like clarification on if you want her to try the injections first to see if it relieves the pain and then do the Discogram if it doesn't work or is she to do the injections and the discogram regardless of the injections working or not? Please Advise thanks.

## 2020-07-02 ENCOUNTER — ANESTHESIA EVENT (OUTPATIENT)
Dept: OPERATING ROOM | Age: 55
End: 2020-07-02
Payer: COMMERCIAL

## 2020-07-10 NOTE — PROGRESS NOTES
Preoperative Instructions:    Stop eating solid foods at midnight the night prior to your surgery. Stop drinking clear liquids at midnight the night prior to your surgery. Arrive at the surgery center (3rd entrance) on ___[phone]____________ by ___[id number]____________. Please stop any blood thinning medications as directed by your surgeon or prescribing physician. Failure to stop certain medications may interfere with your scheduled surgery. These may include: Aspirin, Coumadin, Plavix, NSAIDS (Motrin, Aleve, Advil, Mobic, Celebrex), Eliquis, Pradaxa, Xarelto, Fish oil, and herbal supplements. HOLD BRILINTA and ASPIRIN only if directed to by Dr. Stephan Barraza may continue the rest of your medications through the night before surgery unless instructed otherwise. Day of surgery please take only the following medication(s) with a small sip of water:Metoprolol Omeprazole      PLEASE shower with antibacterial soap and water. Reminders:  -If you are going home the day of your procedure, you will need a family member or friend to stay during the procedure and drive you home after your procedure. Your  must be 25years of age or older and able to sign off on your discharge instructions.    -If you are going home the same day of your surgery, someone must remain with you for the first 24 hours after your surgery if you receive sedation or anesthesia.      -Please do not wear any jewelery or body piercing the day of surgery

## 2020-07-14 ENCOUNTER — HOSPITAL ENCOUNTER (OUTPATIENT)
Dept: PREADMISSION TESTING | Age: 55
Setting detail: SPECIMEN
Discharge: HOME | End: 2020-07-18
Payer: COMMERCIAL

## 2020-07-14 PROCEDURE — U0004 COV-19 TEST NON-CDC HGH THRU: HCPCS

## 2020-07-15 LAB
SARS-COV-2, PCR: NOT DETECTED
SARS-COV-2, RAPID: NORMAL
SARS-COV-2: NORMAL
SOURCE: NORMAL

## 2020-07-15 RX ORDER — OMEPRAZOLE 40 MG/1
CAPSULE, DELAYED RELEASE ORAL
Qty: 30 CAPSULE | Refills: 5 | Status: ON HOLD | OUTPATIENT
Start: 2020-07-15 | End: 2021-01-15 | Stop reason: HOSPADM

## 2020-07-15 RX ORDER — LOSARTAN POTASSIUM 100 MG/1
TABLET ORAL
Qty: 30 TABLET | Refills: 5 | Status: SHIPPED | OUTPATIENT
Start: 2020-07-15 | End: 2021-07-11

## 2020-07-17 ENCOUNTER — APPOINTMENT (OUTPATIENT)
Dept: GENERAL RADIOLOGY | Age: 55
End: 2020-07-17
Attending: PAIN MEDICINE
Payer: COMMERCIAL

## 2020-07-17 ENCOUNTER — HOSPITAL ENCOUNTER (OUTPATIENT)
Age: 55
Setting detail: OUTPATIENT SURGERY
Discharge: HOME | End: 2020-07-17
Attending: PAIN MEDICINE | Admitting: PAIN MEDICINE
Payer: COMMERCIAL

## 2020-07-17 ENCOUNTER — ANESTHESIA (OUTPATIENT)
Dept: OPERATING ROOM | Age: 55
End: 2020-07-17
Payer: COMMERCIAL

## 2020-07-17 VITALS
BODY MASS INDEX: 40.67 KG/M2 | RESPIRATION RATE: 16 BRPM | HEIGHT: 64 IN | DIASTOLIC BLOOD PRESSURE: 81 MMHG | OXYGEN SATURATION: 94 % | WEIGHT: 238.2 LBS | SYSTOLIC BLOOD PRESSURE: 135 MMHG | HEART RATE: 68 BPM | TEMPERATURE: 97.3 F

## 2020-07-17 VITALS — OXYGEN SATURATION: 98 % | SYSTOLIC BLOOD PRESSURE: 156 MMHG | DIASTOLIC BLOOD PRESSURE: 77 MMHG

## 2020-07-17 PROCEDURE — 3700000000 HC ANESTHESIA ATTENDED CARE: Performed by: PAIN MEDICINE

## 2020-07-17 PROCEDURE — 3700000001 HC ADD 15 MINUTES (ANESTHESIA): Performed by: PAIN MEDICINE

## 2020-07-17 PROCEDURE — 2709999900 HC NON-CHARGEABLE SUPPLY: Performed by: PAIN MEDICINE

## 2020-07-17 PROCEDURE — 64494 INJ PARAVERT F JNT L/S 2 LEV: CPT | Performed by: PAIN MEDICINE

## 2020-07-17 PROCEDURE — 7100000011 HC PHASE II RECOVERY - ADDTL 15 MIN: Performed by: PAIN MEDICINE

## 2020-07-17 PROCEDURE — 7100000010 HC PHASE II RECOVERY - FIRST 15 MIN: Performed by: PAIN MEDICINE

## 2020-07-17 PROCEDURE — 64493 INJ PARAVERT F JNT L/S 1 LEV: CPT | Performed by: PAIN MEDICINE

## 2020-07-17 PROCEDURE — 6360000002 HC RX W HCPCS: Performed by: NURSE ANESTHETIST, CERTIFIED REGISTERED

## 2020-07-17 PROCEDURE — 3600000012 HC SURGERY LEVEL 2 ADDTL 15MIN: Performed by: PAIN MEDICINE

## 2020-07-17 PROCEDURE — 3600000002 HC SURGERY LEVEL 2 BASE: Performed by: PAIN MEDICINE

## 2020-07-17 PROCEDURE — 3209999900 FLUORO FOR SURGICAL PROCEDURES

## 2020-07-17 PROCEDURE — 2500000003 HC RX 250 WO HCPCS: Performed by: PAIN MEDICINE

## 2020-07-17 RX ORDER — PROMETHAZINE HYDROCHLORIDE 25 MG/ML
6.25 INJECTION, SOLUTION INTRAMUSCULAR; INTRAVENOUS
Status: DISCONTINUED | OUTPATIENT
Start: 2020-07-17 | End: 2020-07-17 | Stop reason: HOSPADM

## 2020-07-17 RX ORDER — MORPHINE SULFATE 1 MG/ML
1 INJECTION, SOLUTION EPIDURAL; INTRATHECAL; INTRAVENOUS EVERY 5 MIN PRN
Status: DISCONTINUED | OUTPATIENT
Start: 2020-07-17 | End: 2020-07-17 | Stop reason: HOSPADM

## 2020-07-17 RX ORDER — ONDANSETRON 2 MG/ML
4 INJECTION INTRAMUSCULAR; INTRAVENOUS
Status: DISCONTINUED | OUTPATIENT
Start: 2020-07-17 | End: 2020-07-17 | Stop reason: HOSPADM

## 2020-07-17 RX ORDER — DIPHENHYDRAMINE HYDROCHLORIDE 50 MG/ML
12.5 INJECTION INTRAMUSCULAR; INTRAVENOUS
Status: DISCONTINUED | OUTPATIENT
Start: 2020-07-17 | End: 2020-07-17 | Stop reason: HOSPADM

## 2020-07-17 RX ORDER — HYDRALAZINE HYDROCHLORIDE 20 MG/ML
5 INJECTION INTRAMUSCULAR; INTRAVENOUS EVERY 10 MIN PRN
Status: DISCONTINUED | OUTPATIENT
Start: 2020-07-17 | End: 2020-07-17 | Stop reason: HOSPADM

## 2020-07-17 RX ORDER — SODIUM CHLORIDE 0.9 % (FLUSH) 0.9 %
10 SYRINGE (ML) INJECTION PRN
Status: DISCONTINUED | OUTPATIENT
Start: 2020-07-17 | End: 2020-07-17 | Stop reason: HOSPADM

## 2020-07-17 RX ORDER — MIDAZOLAM HYDROCHLORIDE 1 MG/ML
INJECTION INTRAMUSCULAR; INTRAVENOUS PRN
Status: DISCONTINUED | OUTPATIENT
Start: 2020-07-17 | End: 2020-07-17 | Stop reason: SDUPTHER

## 2020-07-17 RX ORDER — FENTANYL CITRATE 50 UG/ML
INJECTION, SOLUTION INTRAMUSCULAR; INTRAVENOUS PRN
Status: DISCONTINUED | OUTPATIENT
Start: 2020-07-17 | End: 2020-07-17 | Stop reason: SDUPTHER

## 2020-07-17 RX ORDER — FENTANYL CITRATE 50 UG/ML
25 INJECTION, SOLUTION INTRAMUSCULAR; INTRAVENOUS EVERY 5 MIN PRN
Status: DISCONTINUED | OUTPATIENT
Start: 2020-07-17 | End: 2020-07-17 | Stop reason: HOSPADM

## 2020-07-17 RX ORDER — BUPIVACAINE HYDROCHLORIDE 2.5 MG/ML
INJECTION, SOLUTION INFILTRATION; PERINEURAL PRN
Status: DISCONTINUED | OUTPATIENT
Start: 2020-07-17 | End: 2020-07-17 | Stop reason: ALTCHOICE

## 2020-07-17 RX ORDER — HYDROCODONE BITARTRATE AND ACETAMINOPHEN 5; 325 MG/1; MG/1
2 TABLET ORAL PRN
Status: DISCONTINUED | OUTPATIENT
Start: 2020-07-17 | End: 2020-07-17 | Stop reason: HOSPADM

## 2020-07-17 RX ORDER — SODIUM CHLORIDE 0.9 % (FLUSH) 0.9 %
10 SYRINGE (ML) INJECTION EVERY 12 HOURS SCHEDULED
Status: DISCONTINUED | OUTPATIENT
Start: 2020-07-17 | End: 2020-07-17 | Stop reason: HOSPADM

## 2020-07-17 RX ORDER — HYDROCODONE BITARTRATE AND ACETAMINOPHEN 5; 325 MG/1; MG/1
1 TABLET ORAL PRN
Status: DISCONTINUED | OUTPATIENT
Start: 2020-07-17 | End: 2020-07-17 | Stop reason: HOSPADM

## 2020-07-17 RX ADMIN — FENTANYL CITRATE 100 MCG: 50 INJECTION INTRAMUSCULAR; INTRAVENOUS at 09:18

## 2020-07-17 RX ADMIN — MIDAZOLAM HYDROCHLORIDE 2 MG: 1 INJECTION, SOLUTION INTRAMUSCULAR; INTRAVENOUS at 09:15

## 2020-07-17 ASSESSMENT — PULMONARY FUNCTION TESTS
PIF_VALUE: 0

## 2020-07-17 ASSESSMENT — PAIN - FUNCTIONAL ASSESSMENT
PAIN_FUNCTIONAL_ASSESSMENT: ACTIVITIES ARE NOT PREVENTED
PAIN_FUNCTIONAL_ASSESSMENT: 0-10

## 2020-07-17 ASSESSMENT — PAIN SCALES - GENERAL
PAINLEVEL_OUTOF10: 0

## 2020-07-17 ASSESSMENT — PAIN DESCRIPTION - DESCRIPTORS: DESCRIPTORS: THROBBING

## 2020-07-17 NOTE — OP NOTE
Lumbar Facet Nerve Block Injection:  Surgeon: Stan Castro     PRE-OP DIAGNOSIS: M47.817 (lumbosacral spondylosis), M54.5 (low back pain)    POST-OP DIAGNOSIS: Same. PROCEDURE PERFORMED: Lumbar Facet Nerve Block Multiple Levels  Bilateral L4 - 5 and L5 - S1. Physician confirmed and marked the surgical site. EBL: minimal      CONSENT: Patient has undergone the educational process with this procedure, is aware and fully understands the risks involved: potential damage to any and all body organs including possible bleeding, infection and nerve injury, allergic reaction and headache. Patient also understands that the procedure will be undertaken in a safe, controlled, and monitored setting. Patient recognizes that the benefits include relief from pain and reduction in the oral use of medications. Patient agreed to proceed. The patient was counseled at length about the risks of mason Covid-19 during their perioperative period and any recovery window from their procedure. The patient was made aware that mason Covid-19  may worsen their prognosis for recovering from their procedure  and lend to a higher morbidity and/or mortality risk. All material risks, benefits, and reasonable alternatives including postponing the procedure were discussed. The patient does wish to proceed with the procedure at this time. PREP: Timeout was performed prior to starting the procedure. The patient's back was prepped with chloroprep and draped appropriately. 5ml of 0.5% lidocaine was used to anesthetize the skin and subcutaneous tissue. PROCEDURE NOTE: A 25 gauge 5 inch spinal needle was advanced under  fluoroscopic guidance to the appropriate anatomic location for the medial branches corresponding to the facets at the base of the appropriate superior articular process and/or sacral ala . Aspiration was negative for blood, CSF and producing pain.  1 ml of 0.25% marcaine was then injected at each site to block medial branch nerve innervating the  Bilateral L4 - 5 and L5 - S1 facet joints. Patient tolerated the procedure well, no complications occurred. At the end of the injection the physician withdrew the needle and the nurse applied a sterile bandage to the site. Patient transferred to the recovery room in satisfactory condition. Appropriate written discharge instructions were given to the patient. If good results are obtained, Patient would be a candidate for Radiofrequency Ablation.       Constantin Wright

## 2020-07-17 NOTE — ANESTHESIA PRE PROCEDURE
Department of Anesthesiology  Preprocedure Note       Name:  Laquita Shah   Age:  63 y.o.  :  1965                                          MRN:  [id number]         Date:  2020      Surgeon: Liliam Cortes):  [address] V[address] Alexander MD    Procedure: Procedure(s):  NERVE BLOCK BILATERAL - B MBB # 1 L4-5, L5-S1    Medications prior to admission:   Prior to Admission medications    Medication Sig Start Date End Date Taking? Authorizing Provider   omeprazole (PRILOSEC) 40 MG delayed release capsule take 1 capsule by mouth once daily 7/15/20  Yes Shade Orr MD   metoprolol tartrate (LOPRESSOR) 25 MG tablet take 1 tablet by mouth twice a day 7/15/20  Yes Shade Orr MD   losartan (COZAAR) 100 MG tablet take 1 tablet by mouth once daily 7/15/20  Yes Shade Orr MD   metFORMIN (GLUCOPHAGE-XR) 500 MG extended release tablet Take 500 mg a day for 2 weeks, then increase the dose to twice a day. 3/16/20  Yes Shade Orr MD   melatonin 3 MG TABS tablet Take 3 mg by mouth nightly as needed Stopping 3/10   Yes Historical Provider, MD   atorvastatin (LIPITOR) 80 MG tablet Take 1 tablet by mouth daily 20  Yes Shade Orr MD   amLODIPine (NORVASC) 5 MG tablet Take by mouth daily 3/4/19  Yes Historical Provider, MD   hydrochlorothiazide (HYDRODIURIL) 25 MG tablet Take 25 mg by mouth daily   Yes Historical Provider, MD   Multiple Vitamins-Minerals (ONE-A-DAY 50 PLUS PO) Take 1 tablet by mouth daily   Yes Historical Provider, MD   ticagrelor (BRILINTA) 90 MG TABS tablet Take 1 tablet by mouth 2 times daily 18  Yes Wally Babin MD   aspirin 81 MG tablet Take 1 tablet by mouth daily (with breakfast) 16  Yes Shade Orr MD   nitroGLYCERIN (NITROSTAT) 0.4 MG SL tablet up to max of 3 total doses.  If no relief after 1 dose, call 911. 18   Wally Babin MD       Current medications:    Current Facility-Administered Medications   Medication Dose Route Frequency Provider Last Rate Last Dose    sodium chloride flush 0.9 % injection 10 mL  10 mL Intravenous 2 times per day Yady Catalan MD        sodium chloride flush 0.9 % injection 10 mL  10 mL Intravenous PRN Yady Catalan MD           Allergies: Allergies   Allergen Reactions    Bee Venom Hives    Tetracyclines & Related Nausea Only and Other (See Comments)     dizziness    Ace Inhibitors Other (See Comments)     cough       Problem List:    Patient Active Problem List   Diagnosis Code    Chronic back pain M54.9, G89.29    Hypertension I10    SRI on CPAP G47.33, Z99.89    Mixed hyperlipidemia E78.2    S/P drug eluting coronary stent placement - Mid LAD 7/25/18-Dr. lea Z95.5    Coronary artery disease involving native coronary artery of native heart with unstable angina pectoris (HCC) I25.110    Class 2 obesity due to excess calories with body mass index (BMI) of 39.0 to 39.9 in adult E66.09, Z68.39    Acute bronchitis due to other specified organisms J20.8    Hyperplastic colon polyp K63.5    Unstable angina (HCC) I20.0    BMI 38.0-38.9,adult Z68.38    Post PTCA Z80.64    Former smoker Z87.891    Hypokalemia E87.6    Hyperglycemia R73.9    Murmur, cardiac R01.1    Type 2 diabetes mellitus without complication, without long-term current use of insulin (HCC) E11.9       Past Medical History:        Diagnosis Date    Abnormal stress test     Allergic rhinitis     Arthritis     CAD (coronary artery disease)     Dr. Kervin Degroot last seen 2/20/2020    Chronic back pain     Former smoker 2/7/2020    30-year history.   Quit in 2018    Hyperlipidemia     Dr. Darlene Infante Hypertension     Dr. Srini Cullen, cardiac 2/8/2020    SRI on CPAP     instructed to bring Dr. Terrance Catalan Wears prescription eyeglasses     Wellness examination     Dr. Vivian Rudolph last seen 2/13/2020       Past Surgical History:        Procedure Laterality Date    ANESTHESIA NERVE BLOCK Left 12/19/2019    NERVE BLOCK (DEFINE) - # 1 L5-S1 performed by Nimo Oquendo MD [place] at [facility]  2017    CARDIAC CATHETERIZATION  2019    CARDIAC CATHETERIZATION  2018    1 stent mid LAD    CARDIAC CATHETERIZATION  02/10/2020    for chest pain no new blockage    CARPAL TUNNEL RELEASE Right      SECTION      x3    COLONOSCOPY N/A 2018    COLONOSCOPY WITH BIOPSY performed by Vaishali España MD at [place] [address]      cataract surgery left eye    FOOT SURGERY      HAND TENDON SURGERY Right     [place] INJECTION PROCEDURE FOR SACROILIAC JOINT Left 2019    SACROILIAC JOINT INJECTION - #1 performed by Raul Ga MD at [address]         Social History:    Social History     Tobacco Use    Smoking status: Former Smoker     Packs/day: 0.25     Years: 30.00     Pack years: 7.50     Types: Cigarettes     Last attempt to quit: 2018     Years since quittin.9    Smokeless tobacco: Never Used   Substance Use Topics    Alcohol use: No                                Counseling given: Not Answered      Vital Signs (Current):   Vitals:    07/10/20 0845   Weight: 235 lb (106.6 kg)   Height: 5' 4\" (1.626 m)                                              BP Readings from Last 3 Encounters:   06/10/20 121/71   20 134/84   05/15/20 (!) 179/92       NPO Status: Time of last liquid consumption:                         Time of last solid consumption:                         Date of last liquid consumption: 20                        Date of last solid food consumption: 20    BMI:   Wt Readings from Last 3 Encounters:   07/10/20 235 lb (106.6 kg)   06/10/20 230 lb (104.3 kg)   05/15/20 237 lb 5 oz (107.6 kg)     Body mass index is 40.34 kg/m².     CBC:   Lab Results   Component Value Date    WBC 6.1 02/10/2020    RBC 5.00 02/10/2020    HGB 13.4 2020    HCT 40.9 change, , : arthritis:., .                 Abdominal:   (+) obese,         Vascular: negative vascular ROS. Anesthesia Plan      MAC     ASA 3             Anesthetic plan and risks discussed with patient. Plan discussed with CRNA.                   Poli Rodas MD   7/17/2020

## 2020-07-17 NOTE — ANESTHESIA POSTPROCEDURE EVALUATION
POST- ANESTHESIA EVALUATION       Pt Name: Celeste Ambrosio  MRN: [id number]  [place]: 1965  Date of evaluation: 7/17/2020  Time:  10:28 AM      /81   Pulse 68   Temp 97.3 °F (36.3 °C)   Resp 16   Ht 5' 4\" (1.626 m)   Wt 238 lb 3.2 oz (108 kg)   SpO2 94%   BMI 40.89 kg/m²      Consciousness Level  Awake  Cardiopulmonary Status  Stable  Pain Adequately Treated YES  Nausea / Vomiting  NO  Adequate Hydration  YES  Anesthesia Related Complications NONE      Electronically signed by Queen Silas MD on 7/17/2020 at 10:28 AM       Department of Anesthesiology  Postprocedure Note    Patient: Celeste Ambrosio  MRN: [id number]  YOB: 1965  Date of evaluation: 7/17/2020  Time:  10:28 AM     Procedure Summary     Date:  07/17/20 Room / Location:  [address] [place] [address]    Anesthesia Start:  7207 Anesthesia Stop:  6373    Procedure:  NERVE BLOCK BILATERAL - B MBB # 1 L4-5, L5-S1 (Bilateral ) Diagnosis:  (SPONDYLOSIS)    Surgeon:  Emanuel Rivers MD Responsible Provider:  BRITTON Sanchez CRNA    Anesthesia Type:  MAC ASA Status:  3          Anesthesia Type: MAC    Lowell Phase I: Lowell Score: 10    Lowell Phase II: Lowell Score: 10    Last vitals: Reviewed and per EMR flowsheets.        Anesthesia Post Evaluation

## 2020-07-17 NOTE — H&P
Pain Pre-Op H&P Note    Sharyn Castro    HPI: Lilly Pérez  presents with low back pain. Has seen surgeon, requested diagnostic MBB. Past Medical History:   Diagnosis Date    Abnormal stress test     Allergic rhinitis     Arthritis     CAD (coronary artery disease)     Dr. Jil Braga last seen 2020    Chronic back pain     Former smoker 2020    30-year history.   Quit in 2018    Hyperlipidemia     Dr. Aquino Lowers Hypertension     Dr. Rhoda Estevez, cardiac 2020    SRI on CPAP     instructed to bring Dr. Gael Holliday Wears prescription eyeglasses     Wellness examination     Dr. Sadi Jain last seen 2020       Past Surgical History:   Procedure Laterality Date    ANESTHESIA NERVE BLOCK Left 2019    NERVE BLOCK (DEFINE) - # 1 L5-S1 performed by Jude Haque MD at [address] [place] [address]  2017    CARDIAC CATHETERIZATION  2019    CARDIAC CATHETERIZATION  2018    1 stent mid LAD   [address]  02/10/2020    for chest pain no new blockage    CARPAL TUNNEL RELEASE Right      SECTION      x3    COLONOSCOPY N/A 2018    COLONOSCOPY WITH BIOPSY performed by Adelaide Lam MD at [place] [address]      cataract surgery left eye    FOOT SURGERY      HAND TENDON SURGERY [facility] INJECTION PROCEDURE FOR SACROILIAC JOINT Left 2019    SACROILIAC JOINT INJECTION - #1 performed by Jude Haque MD at [address] [place] [address]      TONSILLECTOMY      TUBAL LIGATION         Family History   Problem Relation Age of Onset    Stroke Father     Other Sister         homeless    Diabetes Maternal Grandmother        Allergies   Allergen Reactions    Bee Venom Hives    Tetracyclines & Related Nausea Only and Other (See Comments)     dizziness    Ace Inhibitors Other (See Comments)     cough         Current Facility-Administered

## 2020-07-20 ENCOUNTER — TELEPHONE (OUTPATIENT)
Dept: PAIN MANAGEMENT | Age: 55
End: 2020-07-20

## 2020-07-21 NOTE — TELEPHONE ENCOUNTER
Pain level before procedure with activity 7-9-8. Pain with activity after procedure 2. What activities done the day of procedure  Walking and bending. How much pain relief from procedure did you get 85 %  Success yes  OR Scheduled  8/14/2020 @9:20 am  Patient requested this date.

## 2020-08-03 ENCOUNTER — HOSPITAL ENCOUNTER (OUTPATIENT)
Dept: PREADMISSION TESTING | Age: 55
Setting detail: SPECIMEN
Discharge: HOME | End: 2020-08-07
Payer: COMMERCIAL

## 2020-08-03 PROCEDURE — U0003 INFECTIOUS AGENT DETECTION BY NUCLEIC ACID (DNA OR RNA); SEVERE ACUTE RESPIRATORY SYNDROME CORONAVIRUS 2 (SARS-COV-2) (CORONAVIRUS DISEASE [COVID-19]), AMPLIFIED PROBE TECHNIQUE, MAKING USE OF HIGH THROUGHPUT TECHNOLOGIES AS DESCRIBED BY CMS-2020-01-R: HCPCS

## 2020-08-05 LAB — SARS-COV-2, NAA: NOT DETECTED

## 2020-08-06 RX ORDER — SODIUM CHLORIDE 0.9 % (FLUSH) 0.9 %
10 SYRINGE (ML) INJECTION PRN
Status: CANCELLED | OUTPATIENT
Start: 2020-08-06

## 2020-08-07 ENCOUNTER — ANESTHESIA EVENT (OUTPATIENT)
Dept: OPERATING ROOM | Age: 55
End: 2020-08-07
Payer: COMMERCIAL

## 2020-08-07 ENCOUNTER — HOSPITAL ENCOUNTER (OUTPATIENT)
Dept: CT IMAGING | Age: 55
Discharge: HOME | End: 2020-08-09
Payer: COMMERCIAL

## 2020-08-07 VITALS
HEIGHT: 64 IN | WEIGHT: 235 LBS | DIASTOLIC BLOOD PRESSURE: 64 MMHG | RESPIRATION RATE: 20 BRPM | OXYGEN SATURATION: 100 % | HEART RATE: 76 BPM | TEMPERATURE: 97.3 F | BODY MASS INDEX: 40.12 KG/M2 | SYSTOLIC BLOOD PRESSURE: 122 MMHG

## 2020-08-07 LAB
INR BLD: 0.9
PARTIAL THROMBOPLASTIN TIME: 29.1 SEC (ref 24–36)
PLATELET # BLD: 228 K/UL (ref 150–450)
PROTHROMBIN TIME: 12.3 SEC (ref 11.8–14.6)

## 2020-08-07 PROCEDURE — 6360000004 HC RX CONTRAST MEDICATION: Performed by: RADIOLOGY

## 2020-08-07 PROCEDURE — 62290 NJX PX DISCOGRAPHY LUMBAR: CPT | Performed by: RADIOLOGY

## 2020-08-07 PROCEDURE — 36415 COLL VENOUS BLD VENIPUNCTURE: CPT

## 2020-08-07 PROCEDURE — 85049 AUTOMATED PLATELET COUNT: CPT

## 2020-08-07 PROCEDURE — 85610 PROTHROMBIN TIME: CPT

## 2020-08-07 PROCEDURE — 7100000010 HC PHASE II RECOVERY - FIRST 15 MIN

## 2020-08-07 PROCEDURE — 72295 X-RAY OF LOWER SPINE DISK: CPT | Performed by: RADIOLOGY

## 2020-08-07 PROCEDURE — 85730 THROMBOPLASTIN TIME PARTIAL: CPT

## 2020-08-07 PROCEDURE — 7100000031 HC ASPR PHASE II RECOVERY - ADDTL 15 MIN

## 2020-08-07 PROCEDURE — 7100000011 HC PHASE II RECOVERY - ADDTL 15 MIN

## 2020-08-07 PROCEDURE — 62290 NJX PX DISCOGRAPHY LUMBAR: CPT

## 2020-08-07 PROCEDURE — 94761 N-INVAS EAR/PLS OXIMETRY MLT: CPT

## 2020-08-07 PROCEDURE — 7100000030 HC ASPR PHASE II RECOVERY - FIRST 15 MIN

## 2020-08-07 PROCEDURE — 6360000002 HC RX W HCPCS: Performed by: RADIOLOGY

## 2020-08-07 PROCEDURE — 2500000003 HC RX 250 WO HCPCS: Performed by: RADIOLOGY

## 2020-08-07 PROCEDURE — 72132 CT LUMBAR SPINE W/DYE: CPT

## 2020-08-07 RX ORDER — SODIUM CHLORIDE 0.9 % (FLUSH) 0.9 %
10 SYRINGE (ML) INJECTION PRN
Status: DISCONTINUED | OUTPATIENT
Start: 2020-08-07 | End: 2020-08-10 | Stop reason: HOSPADM

## 2020-08-07 RX ORDER — FENTANYL CITRATE 50 UG/ML
INJECTION, SOLUTION INTRAMUSCULAR; INTRAVENOUS
Status: COMPLETED | OUTPATIENT
Start: 2020-08-07 | End: 2020-08-07

## 2020-08-07 RX ORDER — ACETAMINOPHEN 325 MG/1
650 TABLET ORAL EVERY 4 HOURS PRN
Status: DISCONTINUED | OUTPATIENT
Start: 2020-08-07 | End: 2020-08-10 | Stop reason: HOSPADM

## 2020-08-07 RX ORDER — LIDOCAINE HYDROCHLORIDE 10 MG/ML
INJECTION, SOLUTION INFILTRATION; PERINEURAL
Status: COMPLETED | OUTPATIENT
Start: 2020-08-07 | End: 2020-08-07

## 2020-08-07 RX ADMIN — Medication 50 MCG: at 12:11

## 2020-08-07 RX ADMIN — IOHEXOL 15 ML: 240 INJECTION, SOLUTION INTRATHECAL; INTRAVASCULAR; INTRAVENOUS; ORAL at 12:25

## 2020-08-07 RX ADMIN — LIDOCAINE HYDROCHLORIDE 2 ML: 10 INJECTION, SOLUTION INFILTRATION; PERINEURAL at 11:50

## 2020-08-07 RX ADMIN — LIDOCAINE HYDROCHLORIDE 2 ML: 10 INJECTION, SOLUTION INFILTRATION; PERINEURAL at 12:02

## 2020-08-07 ASSESSMENT — PAIN DESCRIPTION - LOCATION: LOCATION: BACK

## 2020-08-07 ASSESSMENT — PAIN - FUNCTIONAL ASSESSMENT: PAIN_FUNCTIONAL_ASSESSMENT: 0-10

## 2020-08-07 ASSESSMENT — PAIN SCALES - GENERAL
PAINLEVEL_OUTOF10: 6

## 2020-08-07 NOTE — POST SEDATION
Sedation Post Procedure Note    Patient Name: Luis Rios   YOB: 1965  Room/Bed: Room/bed info not found  Medical Record Number: [id number]  Date: 8/7/2020   Time: 12:43 PM         Physicians/Assistants: Derrick Mccord MD    Procedure Performed:  discogram    Post-Sedation Vital Signs:  Vitals:    08/07/20 1230   BP: (!) 169/79   Pulse: 66   Resp: 26   Temp:    SpO2: 97%      Vital signs were reviewed and were stable after the procedure (see flow sheet for vitals)            Post-Sedation Exam: stable           Complications: none    Electronically signed by Derrick Mccord MD on 8/7/2020 at 12:43 PM

## 2020-08-07 NOTE — OP NOTE
Brief Postoperative Note    Meño Guillen  YOB: 1965  [id number]    Pre-operative Diagnosis: back pain    Post-operative Diagnosis: Same    Procedure: discogram lumbar    Anesthesia: Local with sedation    Surgeons/Assistants: Ana Rosa Avalos MD     Estimated Blood Loss: minimal    Complications: none immediate    Specimens: were not obtained      Electronically signed by Ana Rosa Avalos MD on 8/7/2020 at 12:43 PM

## 2020-08-07 NOTE — PRE SEDATION
Sedation Pre-Procedure Note    Patient Name: Juan Arias   YOB: 1965  Room/Bed: Room/bed info not found  Medical Record Number: [id number]  Date: 2020   Time: 12:42 PM       Indication:  Back pain    Consent: I have discussed with the patient and/or the patient representative the indication, alternatives, and the possible risks and/or complications of the planned procedure and the anesthesia methods. The patient and/or patient representative appear to understand and agree to proceed. Vital Signs:   Vitals:    20 1230   BP: (!) 169/79   Pulse: 66   Resp: 26   Temp:    SpO2: 97%       Past Medical History:   has a past medical history of Abnormal stress test, Allergic rhinitis, Arthritis, CAD (coronary artery disease), Chronic back pain, Diabetes mellitus ([address] Former smoker, Hyperlipidemia, Hypertension, Murmur, cardiac, SRI on CPAP, Wears prescription eyeglasses, and Wellness examination. Past Surgical History:   has a past surgical history that includes Tonsillectomy; Foot surgery; Lumbar disc surgery; Endometrial ablation; Tubal ligation; Hysterectomy; Carpal tunnel release (Right); Colonoscopy (N/A, 2018); Injection Procedure For Sacroiliac Joint (Left, 2019);  section; Hand tendon surgery (Right); Anesthesia Nerve Block (Left, 2019); Cardiac catheterization (2017); Cardiac catheterization (2019); Cardiac catheterization (2018); Cardiac catheterization (02/10/2020); Nerve Block (2020); Anesthesia Nerve Block (Bilateral, 2020); and eye surgery. Medications:   Scheduled Meds:   Continuous Infusions:   PRN Meds: sodium chloride flush  Home Meds:   Prior to Admission medications    Medication Sig Start Date End Date Taking?  Authorizing Provider   omeprazole (PRILOSEC) 40 MG delayed release capsule take 1 capsule by mouth once daily 7/15/20  Yes Denise Carrillo MD   metoprolol tartrate (LOPRESSOR) 25 MG tablet take 1 tablet by mouth twice a day 7/15/20  Yes Titus Botello MD   losartan (COZAAR) 100 MG tablet take 1 tablet by mouth once daily 7/15/20  Yes Titus Botello MD   metFORMIN (GLUCOPHAGE-XR) 500 MG extended release tablet Take 500 mg a day for 2 weeks, then increase the dose to twice a day. 3/16/20  Yes Titus Botello MD   melatonin 3 MG TABS tablet Take 3 mg by mouth nightly as needed Stopping 3/10   Yes Historical Provider, MD   atorvastatin (LIPITOR) 80 MG tablet Take 1 tablet by mouth daily 1/24/20  Yes Titus Botello MD   amLODIPine (NORVASC) 5 MG tablet Take by mouth daily 3/4/19  Yes Historical Provider, MD   hydrochlorothiazide (HYDRODIURIL) 25 MG tablet Take 25 mg by mouth daily   Yes Historical Provider, MD   Multiple Vitamins-Minerals (ONE-A-DAY 50 PLUS PO) Take 1 tablet by mouth daily   Yes Historical Provider, MD   nitroGLYCERIN (NITROSTAT) 0.4 MG SL tablet up to max of 3 total doses. If no relief after 1 dose, call 911. 7/26/18   Alphonso Pelaez MD   ticagrelor (BRILINTA) 90 MG TABS tablet Take 1 tablet by mouth 2 times daily 7/26/18   Alphonso Pelaez MD   aspirin 81 MG tablet Take 1 tablet by mouth daily (with breakfast) 8/19/16   Titus Botello MD     Coumadin Use Last 7 Days:  no  Antiplatelet drug therapy use last 7 days: no  Other anticoagulant use last 7 days: no  Additional Medication Information:  na      Pre-Sedation Documentation and Exam:   Vital signs have been reviewed (see flow sheet for vitals).     Mallampati Airway Assessment:  dentition not prohibitive    Prior History of Anesthesia Complications:   none    ASA Classification:  Class 1 - A normal healthy patient   Sedation/ Anesthesia Plan:   intravenous sedation    Medications Planned:   fentanyl intravenously    Patient is an appropriate candidate for plan of sedation: yes    Electronically signed by Elham Orozco MD on 8/7/2020 at 12:42 PM

## 2020-08-07 NOTE — H&P
HISTORY and [address]       NAME:  Susannah Westfall  MRN: [id number]   YOB: 1965   Date: 8/7/2020   Age: 63 y.o. Gender: female       COMPLAINT AND PRESENT HISTORY:     Susannah Westfall is 63 y.o.,  female, here for lumbar back pain with scheduled IR discography. Pt has history of spondylosis with radiculopathy of lumbar spine. Pt is followed by neurosurgery Dr. Regina Caraballo. The symptoms started over 20 years ago. Pt c/o pain to lower back. Describes pain as dull aching, sharp at times. Rating pain 5-7/10. Used to take tramadol with good relief. Reports she hasn't taken in a few years due to side effects from pain medications such as lethargy. Does take tylenol at times with minimal relief. Changing positions aggravates pain. Heat helps alleviate symptoms. Reports several back surgeries in the past. Pain does radiate to left lower extremity to calf. Pt does have numbness to left leg. Has numbness to dorsal surface of bilateral feet. Has tried injections, physical therapy with some temporary relief with last injection. No recent falls, injury or trauma. No redness, swelling or rashes. PAST MEDICAL HISTORY     Past Medical History:   Diagnosis Date    Abnormal stress test     Allergic rhinitis     Arthritis     CAD (coronary artery disease)     Dr. Leona Pratt last seen 2/20/2020    Chronic back pain     Diabetes mellitus ([address]     Former smoker 2/7/2020    30-year history.   Quit in 2018    Hyperlipidemia     Dr. Beltran Mean Hypertension     Dr. Irais Kirby, cardiac 2/8/2020    SRI on CPAP     instructed to bring Dr. Marguerita Hodgkins Wears prescription eyeglasses     Wellness examination     Dr. Ethan Ruiz last seen 2/13/2020       SURGICAL HISTORY       Past Surgical History:   Procedure Laterality Date    ANESTHESIA NERVE BLOCK Left 12/19/2019    NERVE BLOCK (DEFINE) - # 1 L5-S1 performed by Elli Love MD at [address] BLOCK Bilateral 2020    NERVE BLOCK BILATERAL - B MBB # 1 L4-5, L5-S1 performed by Carlos Eduardo Banda MD at [address]  2017    CARDIAC CATHETERIZATION  2019    CARDIAC CATHETERIZATION  2018    1 stent mid LAD   [address]  02/10/2020    for chest pain no new blockage    CARPAL TUNNEL RELEASE Right      SECTION      x3    COLONOSCOPY N/A 2018    COLONOSCOPY WITH BIOPSY performed by Luisa Luo MD at [place] [address]      cataract surgery left eye    FOOT SURGERY      HAND TENDON SURGERY [facility] INJECTION PROCEDURE FOR SACROILIAC JOINT Left 2019    SACROILIAC JOINT INJECTION - #1 performed by Carlos Eduardo Banda MD at [facility]  2020     NERVE BLOCK BILATERAL - B MBB # 1 L4-5, L5-S1 (Bilateral     TONSILLECTOMY      TUBAL LIGATION         FAMILY HISTORY       Family History   Problem Relation Age of Onset    Stroke Father     Other Sister         homeless    Diabetes Maternal Grandmother        SOCIAL HISTORY       Social History     Socioeconomic History    Marital status:      Spouse name: None    Number of children: None    Years of education: None    Highest education level: None   Occupational History    None   Social Needs    Financial resource strain: None    Food insecurity     Worry: None     Inability: None    Transportation needs     Medical: None     Non-medical: None   Tobacco Use    Smoking status: Former Smoker     Packs/day: 0.25     Years: 30.00     Pack years: 7.50     Types: Cigarettes     Last attempt to quit: 2018     Years since quittin.0    Smokeless tobacco: Never Used   Substance and Sexual Activity    Alcohol use: No    Drug use: No    Sexual activity: None   Lifestyle    Physical activity     Days per week: None     Minutes per session: None  Stress: None   Relationships    Social connections     Talks on phone: None     Gets together: None     Attends Nondenominational service: None     Active member of club or organization: None     Attends meetings of clubs or organizations: None     Relationship status: None    Intimate partner violence     Fear of current or ex partner: None     Emotionally abused: None     Physically abused: None     Forced sexual activity: None   Other Topics Concern    None   Social History Narrative    None        REVIEW OF SYSTEMS      Allergies   Allergen Reactions    Bee Venom Hives    Tetracyclines & Related Nausea Only and Other (See Comments)     dizziness    Ace Inhibitors Other (See Comments)     cough       Current Outpatient Medications on File Prior to Encounter   Medication Sig Dispense Refill    omeprazole (PRILOSEC) 40 MG delayed release capsule take 1 capsule by mouth once daily 30 capsule 5    metoprolol tartrate (LOPRESSOR) 25 MG tablet take 1 tablet by mouth twice a day 60 tablet 5    losartan (COZAAR) 100 MG tablet take 1 tablet by mouth once daily 30 tablet 5    metFORMIN (GLUCOPHAGE-XR) 500 MG extended release tablet Take 500 mg a day for 2 weeks, then increase the dose to twice a day. 60 tablet 11    melatonin 3 MG TABS tablet Take 3 mg by mouth nightly as needed Stopping 3/10      atorvastatin (LIPITOR) 80 MG tablet Take 1 tablet by mouth daily 30 tablet 11    amLODIPine (NORVASC) 5 MG tablet Take by mouth daily      hydrochlorothiazide (HYDRODIURIL) 25 MG tablet Take 25 mg by mouth daily      Multiple Vitamins-Minerals (ONE-A-DAY 50 PLUS PO) Take 1 tablet by mouth daily      nitroGLYCERIN (NITROSTAT) 0.4 MG SL tablet up to max of 3 total doses.  If no relief after 1 dose, call 911. 25 tablet 1    ticagrelor (BRILINTA) 90 MG TABS tablet Take 1 tablet by mouth 2 times daily 60 tablet 0    aspirin 81 MG tablet Take 1 tablet by mouth daily (with breakfast) 30 tablet 11     No current facility-administered medications on file prior to encounter. Negative except for what is mentioned in the HPI. GENERAL PHYSICAL EXAM     Vitals: BP (!) 170/92   Pulse 72   Temp 97.9 °F (36.6 °C) (Temporal)   Resp 16   Ht 5' 4\" (1.626 m)   Wt 235 lb (106.6 kg)   SpO2 99%   BMI 40.34 kg/m²  Body mass index is 40.34 kg/m². GENERAL APPEARANCE:   Lilly Pérez is 63 y.o.,  female, moderately obese, nourished, conscious, alert. Does not appear to be in distress or pain at this time. SKIN:  Warm, dry, no cyanosis or jaundice. HEAD:  Normocephalic, atraumatic. EYES:  Pupils equal, reactive to light. EARS:  No discharge, no marked hearing loss. NOSE:  No rhinorrhea, epistaxis or septal deformity. THROAT:  Not congested. No ulceration bleeding or discharge. NECK:  No stiffness, trachea central.  No palpable masses or L.N.                 CHEST:  Symmetrical and equal on expansion. HEART:  Hypertensive. RRR S1 > S2. No audible murmurs or gallops. LUNGS:  Equal on expansion, normal breath sounds. No wheezing, rhonchi or rales. ABDOMEN:  Obese. NABS x 4 quads. Soft on palpation. No localized tenderness. No guarding or rigidity. LOCOMOTOR, BACK AND SPINE:  No tenderness or deformities. EXTREMITIES:  Symmetrical, no pretibial edema. No calf tenderness. No discoloration or ulcerations. NEUROLOGIC:  The patient is conscious, alert, oriented. Speech clear. No facial drooping. No apparent focal sensory or motor deficits.              PROVISIONAL DIAGNOSES / SURGERY:      Lumbar back pain    IR Discography Lumbar    Patient Active Problem List    Diagnosis Date Noted    Post PTCA 03/12/2019     Priority: High    S/P drug eluting coronary stent placement - Mid LAD 7/25/18-Dr. Rahel Burnett 07/25/2018     Priority: High    Type 2 diabetes mellitus without complication, without long-term current use of insulin ([email or web address] 03/16/2020    Unstable angina ([facility] 02/08/2020    Murmur, cardiac 02/08/2020    Former smoker 02/07/2020    Hypokalemia 02/07/2020    Hyperglycemia 02/07/2020    BMI 38.0-38.9,adult     Hyperplastic colon polyp 09/13/2018    Acute bronchitis due to other specified organisms 07/26/2018    Coronary artery disease involving native coronary artery of native heart with unstable angina pectoris ([email or web address] 07/25/2018    Class 2 obesity due to excess calories with body mass index (BMI) of 39.0 to 39.9 in adult 07/25/2018    Mixed hyperlipidemia 01/08/2017    Chronic back pain     Hypertension     SRI on CPAP            BRITTON Saunders CNP on 8/7/2020 at 10:40 AM

## 2020-08-10 ENCOUNTER — HOSPITAL ENCOUNTER (OUTPATIENT)
Dept: PREADMISSION TESTING | Age: 55
Setting detail: SPECIMEN
Discharge: HOME | End: 2020-08-14
Payer: COMMERCIAL

## 2020-08-10 PROCEDURE — U0003 INFECTIOUS AGENT DETECTION BY NUCLEIC ACID (DNA OR RNA); SEVERE ACUTE RESPIRATORY SYNDROME CORONAVIRUS 2 (SARS-COV-2) (CORONAVIRUS DISEASE [COVID-19]), AMPLIFIED PROBE TECHNIQUE, MAKING USE OF HIGH THROUGHPUT TECHNOLOGIES AS DESCRIBED BY CMS-2020-01-R: HCPCS

## 2020-08-11 LAB — SARS-COV-2, NAA: NOT DETECTED

## 2020-08-14 ENCOUNTER — APPOINTMENT (OUTPATIENT)
Dept: GENERAL RADIOLOGY | Age: 55
End: 2020-08-14
Attending: PAIN MEDICINE
Payer: COMMERCIAL

## 2020-08-14 ENCOUNTER — HOSPITAL ENCOUNTER (OUTPATIENT)
Age: 55
Setting detail: OUTPATIENT SURGERY
Discharge: HOME | End: 2020-08-14
Attending: PAIN MEDICINE | Admitting: PAIN MEDICINE
Payer: COMMERCIAL

## 2020-08-14 ENCOUNTER — ANESTHESIA (OUTPATIENT)
Dept: OPERATING ROOM | Age: 55
End: 2020-08-14
Payer: COMMERCIAL

## 2020-08-14 VITALS
HEIGHT: 64 IN | BODY MASS INDEX: 39.82 KG/M2 | HEART RATE: 64 BPM | DIASTOLIC BLOOD PRESSURE: 58 MMHG | RESPIRATION RATE: 16 BRPM | TEMPERATURE: 98.1 F | SYSTOLIC BLOOD PRESSURE: 122 MMHG | OXYGEN SATURATION: 93 % | WEIGHT: 233.25 LBS

## 2020-08-14 VITALS — OXYGEN SATURATION: 98 % | DIASTOLIC BLOOD PRESSURE: 79 MMHG | SYSTOLIC BLOOD PRESSURE: 144 MMHG

## 2020-08-14 LAB — GLUCOSE BLD-MCNC: 120 MG/DL (ref 65–105)

## 2020-08-14 PROCEDURE — 82947 ASSAY GLUCOSE BLOOD QUANT: CPT

## 2020-08-14 PROCEDURE — 3700000000 HC ANESTHESIA ATTENDED CARE: Performed by: PAIN MEDICINE

## 2020-08-14 PROCEDURE — 7100000011 HC PHASE II RECOVERY - ADDTL 15 MIN: Performed by: PAIN MEDICINE

## 2020-08-14 PROCEDURE — 7100000010 HC PHASE II RECOVERY - FIRST 15 MIN: Performed by: PAIN MEDICINE

## 2020-08-14 PROCEDURE — 64494 INJ PARAVERT F JNT L/S 2 LEV: CPT | Performed by: PAIN MEDICINE

## 2020-08-14 PROCEDURE — 6360000002 HC RX W HCPCS: Performed by: NURSE ANESTHETIST, CERTIFIED REGISTERED

## 2020-08-14 PROCEDURE — 3209999900 FLUORO FOR SURGICAL PROCEDURES

## 2020-08-14 PROCEDURE — 3600000002 HC SURGERY LEVEL 2 BASE: Performed by: PAIN MEDICINE

## 2020-08-14 PROCEDURE — 64493 INJ PARAVERT F JNT L/S 1 LEV: CPT | Performed by: PAIN MEDICINE

## 2020-08-14 PROCEDURE — 2500000003 HC RX 250 WO HCPCS: Performed by: PAIN MEDICINE

## 2020-08-14 PROCEDURE — 2709999900 HC NON-CHARGEABLE SUPPLY: Performed by: PAIN MEDICINE

## 2020-08-14 RX ORDER — PROMETHAZINE HYDROCHLORIDE 25 MG/ML
6.25 INJECTION, SOLUTION INTRAMUSCULAR; INTRAVENOUS
Status: DISCONTINUED | OUTPATIENT
Start: 2020-08-14 | End: 2020-08-14 | Stop reason: HOSPADM

## 2020-08-14 RX ORDER — HYDROCODONE BITARTRATE AND ACETAMINOPHEN 5; 325 MG/1; MG/1
2 TABLET ORAL PRN
Status: DISCONTINUED | OUTPATIENT
Start: 2020-08-14 | End: 2020-08-14 | Stop reason: HOSPADM

## 2020-08-14 RX ORDER — SODIUM CHLORIDE 0.9 % (FLUSH) 0.9 %
10 SYRINGE (ML) INJECTION PRN
Status: DISCONTINUED | OUTPATIENT
Start: 2020-08-14 | End: 2020-08-14 | Stop reason: HOSPADM

## 2020-08-14 RX ORDER — MIDAZOLAM HYDROCHLORIDE 1 MG/ML
INJECTION INTRAMUSCULAR; INTRAVENOUS PRN
Status: DISCONTINUED | OUTPATIENT
Start: 2020-08-14 | End: 2020-08-14 | Stop reason: SDUPTHER

## 2020-08-14 RX ORDER — SODIUM CHLORIDE 0.9 % (FLUSH) 0.9 %
10 SYRINGE (ML) INJECTION EVERY 12 HOURS SCHEDULED
Status: DISCONTINUED | OUTPATIENT
Start: 2020-08-14 | End: 2020-08-14 | Stop reason: HOSPADM

## 2020-08-14 RX ORDER — MEPERIDINE HYDROCHLORIDE 50 MG/ML
12.5 INJECTION INTRAMUSCULAR; INTRAVENOUS; SUBCUTANEOUS EVERY 5 MIN PRN
Status: DISCONTINUED | OUTPATIENT
Start: 2020-08-14 | End: 2020-08-14 | Stop reason: HOSPADM

## 2020-08-14 RX ORDER — BUPIVACAINE HYDROCHLORIDE 2.5 MG/ML
INJECTION, SOLUTION INFILTRATION; PERINEURAL PRN
Status: DISCONTINUED | OUTPATIENT
Start: 2020-08-14 | End: 2020-08-14 | Stop reason: ALTCHOICE

## 2020-08-14 RX ORDER — MORPHINE SULFATE 1 MG/ML
1 INJECTION, SOLUTION EPIDURAL; INTRATHECAL; INTRAVENOUS EVERY 5 MIN PRN
Status: DISCONTINUED | OUTPATIENT
Start: 2020-08-14 | End: 2020-08-14 | Stop reason: HOSPADM

## 2020-08-14 RX ORDER — FENTANYL CITRATE 50 UG/ML
INJECTION, SOLUTION INTRAMUSCULAR; INTRAVENOUS PRN
Status: DISCONTINUED | OUTPATIENT
Start: 2020-08-14 | End: 2020-08-14 | Stop reason: SDUPTHER

## 2020-08-14 RX ORDER — HYDRALAZINE HYDROCHLORIDE 20 MG/ML
5 INJECTION INTRAMUSCULAR; INTRAVENOUS EVERY 10 MIN PRN
Status: DISCONTINUED | OUTPATIENT
Start: 2020-08-14 | End: 2020-08-14 | Stop reason: HOSPADM

## 2020-08-14 RX ORDER — FENTANYL CITRATE 50 UG/ML
25 INJECTION, SOLUTION INTRAMUSCULAR; INTRAVENOUS EVERY 5 MIN PRN
Status: DISCONTINUED | OUTPATIENT
Start: 2020-08-14 | End: 2020-08-14 | Stop reason: HOSPADM

## 2020-08-14 RX ORDER — DIPHENHYDRAMINE HYDROCHLORIDE 50 MG/ML
12.5 INJECTION INTRAMUSCULAR; INTRAVENOUS
Status: DISCONTINUED | OUTPATIENT
Start: 2020-08-14 | End: 2020-08-14 | Stop reason: HOSPADM

## 2020-08-14 RX ORDER — HYDROCODONE BITARTRATE AND ACETAMINOPHEN 5; 325 MG/1; MG/1
1 TABLET ORAL PRN
Status: DISCONTINUED | OUTPATIENT
Start: 2020-08-14 | End: 2020-08-14 | Stop reason: HOSPADM

## 2020-08-14 RX ORDER — ONDANSETRON 2 MG/ML
4 INJECTION INTRAMUSCULAR; INTRAVENOUS
Status: DISCONTINUED | OUTPATIENT
Start: 2020-08-14 | End: 2020-08-14 | Stop reason: HOSPADM

## 2020-08-14 RX ADMIN — FENTANYL CITRATE 100 MCG: 50 INJECTION INTRAMUSCULAR; INTRAVENOUS at 09:03

## 2020-08-14 RX ADMIN — MIDAZOLAM HYDROCHLORIDE 2 MG: 1 INJECTION, SOLUTION INTRAMUSCULAR; INTRAVENOUS at 09:03

## 2020-08-14 ASSESSMENT — PAIN DESCRIPTION - PAIN TYPE
TYPE: SURGICAL PAIN

## 2020-08-14 ASSESSMENT — PAIN DESCRIPTION - DESCRIPTORS: DESCRIPTORS: PRESSURE;CONSTANT;ACHING

## 2020-08-14 ASSESSMENT — PAIN SCALES - GENERAL
PAINLEVEL_OUTOF10: 3

## 2020-08-14 ASSESSMENT — PULMONARY FUNCTION TESTS
PIF_VALUE: 0
PIF_VALUE: 1
PIF_VALUE: 1
PIF_VALUE: 0
PIF_VALUE: 0

## 2020-08-14 ASSESSMENT — PAIN - FUNCTIONAL ASSESSMENT: PAIN_FUNCTIONAL_ASSESSMENT: 0-10

## 2020-08-14 ASSESSMENT — PAIN DESCRIPTION - LOCATION: LOCATION: BACK

## 2020-08-14 NOTE — ANESTHESIA PRE PROCEDURE
Department of Anesthesiology  Preprocedure Note       Name:  Juan Arias   Age:  63 y.o.  :  1965                                          MRN:  [id number]         Date:  2020      Surgeon: Serene Fowler):  Xena Donnelly MD    Procedure: Procedure(s):  NERVE BLOCK BILATERAL - L-5, L5-S1    Medications prior to admission:   Prior to Admission medications    Medication Sig Start Date End Date Taking? Authorizing Provider   omeprazole (PRILOSEC) 40 MG delayed release capsule take 1 capsule by mouth once daily 7/15/20   Denise Carrillo MD   metoprolol tartrate (LOPRESSOR) 25 MG tablet take 1 tablet by mouth twice a day 7/15/20   Denise Carrillo MD   losartan (COZAAR) 100 MG tablet take 1 tablet by mouth once daily 7/15/20   Denise Carrillo MD   metFORMIN (GLUCOPHAGE-XR) 500 MG extended release tablet Take 500 mg a day for 2 weeks, then increase the dose to twice a day. 3/16/20   Denise Carrillo MD   melatonin 3 MG TABS tablet Take 3 mg by mouth nightly as needed Stopping 3/10    Historical Provider, MD   atorvastatin (LIPITOR) 80 MG tablet Take 1 tablet by mouth daily 20   Denise Carrillo MD   amLODIPine (NORVASC) 5 MG tablet Take by mouth daily 3/4/19   Historical Provider, MD   hydrochlorothiazide (HYDRODIURIL) 25 MG tablet Take 25 mg by mouth daily    Historical Provider, MD   Multiple Vitamins-Minerals (ONE-A-DAY 50 PLUS PO) Take 1 tablet by mouth daily    Historical Provider, MD   nitroGLYCERIN (NITROSTAT) 0.4 MG SL tablet up to max of 3 total doses. If no relief after 1 dose, call 911. 18   Johnna Hernandez MD   ticagrelor (BRILINTA) 90 MG TABS tablet Take 1 tablet by mouth 2 times daily 18   Johnna Hernandez MD   aspirin 81 MG tablet Take 1 tablet by mouth daily (with breakfast) 16   Denise Carrillo MD       Current medications:    No current facility-administered medications for this encounter. Allergies:     Allergies   Allergen Reactions    Bee Venom Hives    Tetracyclines & Related Nausea Only and Other (See Comments)     dizziness    Ace Inhibitors Other (See Comments)     cough       Problem List:    Patient Active Problem List   Diagnosis Code    Chronic back pain M54.9, G89.29    Hypertension I10    SRI on CPAP G47.33, Z99.89    Mixed hyperlipidemia E78.2    S/P drug eluting coronary stent placement - Mid LAD 7/25/18-Dr. Ashtyn Choudhary Z95.5    Coronary artery disease involving native coronary artery of native heart with unstable angina pectoris (HCC) I25.110    Class 2 obesity due to excess calories with body mass index (BMI) of 39.0 to 39.9 in adult E66.09, Z68.39    Acute bronchitis due to other specified organisms J20.8    Hyperplastic colon polyp K63.5    Unstable angina (HCC) I20.0    BMI 38.0-38.9,adult Z68.38    Post PTCA Z80.64    Former smoker Z87.891    Hypokalemia E87.6    Hyperglycemia R73.9    Murmur, cardiac R01.1    Type 2 diabetes mellitus without complication, without long-term current use of insulin (HCC) E11.9       Past Medical History:        Diagnosis Date    Abnormal stress test     Allergic rhinitis     Arthritis     CAD (coronary artery disease)     Dr. Thomas Venegas last seen 2/20/2020    Chronic back pain     Diabetes mellitus ([address]     Former smoker 2/7/2020    30-year history.   Quit in 2018    Hyperlipidemia     Dr. Henry Lord Hypertension     Dr. High Emmalena, cardiac 2/8/2020    SRI on CPAP     instructed to bring Dr. Elie Voss Wears prescription eyeglasses     Wellness examination     Dr. Paulino Chiu last seen 2/13/2020       Past Surgical History:        Procedure Laterality Date    ANESTHESIA NERVE BLOCK Left 12/19/2019    NERVE BLOCK (DEFINE) - # 1 L5-S1 performed by Xena Donnelly MD at [facility] Bilateral 7/17/2020    NERVE BLOCK BILATERAL - B MBB # 1 L4-5, L5-S1 performed by Xena Donnelly MD at [address] [place] [address]  06/13/2017    CARDIAC CATHETERIZATION  2019    CARDIAC CATHETERIZATION  2018    1 stent mid LAD    CARDIAC CATHETERIZATION  02/10/2020    for chest pain no new blockage    CARPAL TUNNEL RELEASE Right      SECTION      x3    COLONOSCOPY N/A 2018    COLONOSCOPY WITH BIOPSY performed by Adelaide Lam MD at [place] [address]      cataract surgery left eye    FOOT SURGERY      HAND TENDON SURGERY [facility] INJECTION PROCEDURE FOR SACROILIAC JOINT Left 2019    SACROILIAC JOINT INJECTION - #1 performed by Jude Haque MD at [facility]  2020     NERVE BLOCK BILATERAL - B MBB # 1 L4-5, L5-S1 (Bilateral     TONSILLECTOMY      TUBAL LIGATION         Social History:    Social History     Tobacco Use    Smoking status: Former Smoker     Packs/day: 0.25     Years: 30.00     Pack years: 7.50     Types: Cigarettes     Last attempt to quit: 2018     Years since quittin.0    Smokeless tobacco: Never Used   Substance Use Topics    Alcohol use: No                                Counseling given: Not Answered      Vital Signs (Current): There were no vitals filed for this visit.                                            BP Readings from Last 3 Encounters:   20 122/64   20 (!) 156/77   20 135/81       NPO Status:                                                                                 BMI:   Wt Readings from Last 3 Encounters:   20 235 lb (106.6 kg)   20 238 lb 3.2 oz (108 kg)   06/10/20 230 lb (104.3 kg)     There is no height or weight on file to calculate BMI.    CBC:   Lab Results   Component Value Date    WBC 6.1 02/10/2020    RBC 5.00 02/10/2020    HGB 13.4 2020    HCT 40.9 2020    MCV 85.8 02/10/2020    RDW 13.5 02/10/2020     2020       CMP:   Lab Results   Component Value Date     2020    K 3.7 2020    CL 103 03/05/2020    CO2 24 03/05/2020    BUN 11 03/05/2020    CREATININE 0.58 03/05/2020    GFRAA >60 03/05/2020    LABGLOM >60 03/05/2020    GLUCOSE 151 03/13/2020    PROT 6.5 02/08/2020    CALCIUM 9.5 02/10/2020    BILITOT 0.39 02/08/2020    ALKPHOS 106 02/08/2020    AST 20 02/08/2020    ALT 28 02/08/2020       POC Tests: No results for input(s): POCGLU, POCNA, POCK, POCCL, POCBUN, POCHEMO, POCHCT in the last 72 hours. Coags:   Lab Results   Component Value Date    PROTIME 12.3 08/07/2020    INR 0.9 08/07/2020    APTT 29.1 08/07/2020       HCG (If Applicable): No results found for: PREGTESTUR, PREGSERUM, HCG, HCGQUANT     ABGs: No results found for: PHART, PO2ART, GBR5JEA, RWS5WZP, BEART, W5HFXTRD     Type & Screen (If Applicable):  No results found for: LABABO, LABRH    Drug/Infectious Status (If Applicable):  No results found for: HIV, HEPCAB    COVID-19 Screening (If Applicable):   Lab Results   Component Value Date    COVID19 Not Detected 08/10/2020    COVID19 Not Detected 07/14/2020         Anesthesia Evaluation  Patient summary reviewed and Nursing notes reviewed no history of anesthetic complications:   Airway: Mallampati: III  TM distance: >3 FB   Neck ROM: full  Mouth opening: > = 3 FB Dental: normal exam         Pulmonary:normal exam  breath sounds clear to auscultation  (+) sleep apnea: on CPAP,                             Cardiovascular:  Exercise tolerance: no interval change,   (+) hypertension: no interval change, angina: no interval change, CAD: no interval change, hyperlipidemia        Rhythm: regular  Rate: normal           Beta Blocker:  Dose within 24 Hrs         Neuro/Psych:                ROS comment: SPONDYLOSIS  GI/Hepatic/Renal:   (+) morbid obesity          Endo/Other:    (+) DiabetesType II DM, no interval change, , .                 Abdominal:   (+) obese,     Abdomen: soft. Vascular: negative vascular ROS.                                    Anesthesia Plan      MAC     ASA 3 Anesthetic plan and risks discussed with patient. Plan discussed with CRNA.                   Sydni Anguiano MD   8/14/2020

## 2020-08-14 NOTE — H&P
Pain Pre-Op H&P Note    Jodi Castro    HPI: Bryan Lincoln  presents with low back pain. Past Medical History:   Diagnosis Date    Abnormal stress test     Allergic rhinitis     Arthritis     CAD (coronary artery disease)     Dr. Luciano Caraballo last seen 2020    Chronic back pain     Diabetes mellitus ([address]     Former smoker 2020    30-year history.   Quit in 2018    Hyperlipidemia     Dr. Khloe Elise Hypertension     Dr. Rosemary Barbour, cardiac 2020    SRI on CPAP     instructed to bring Dr. Daniella Benavides Wears prescription eyeglasses     Wellness examination     Dr. Joseph Eye last seen 2020       Past Surgical History:   Procedure Laterality Date    ANESTHESIA NERVE BLOCK Left 2019    NERVE BLOCK (DEFINE) - # 1 L5-S1 performed by [address], MD at [facility] 2020    NERVE BLOCK BILATERAL - B MBB # 1 L4-5, L5-S1 performed by Jazmyn Alexander MD at [facility]  2017    CARDIAC CATHETERIZATION  2019    CARDIAC CATHETERIZATION  2018    1 stent mid LAD   [address]  02/10/2020    for chest pain no new blockage    CARPAL TUNNEL RELEASE Right      SECTION      x3    COLONOSCOPY N/A 2018    COLONOSCOPY WITH BIOPSY performed by Yue Dean MD at [place] [address]      cataract surgery left eye    FOOT SURGERY      HAND TENDON SURGERY Right     [facility] INJECTION PROCEDURE FOR SACROILIAC JOINT Left 2019    SACROILIAC JOINT INJECTION - #1 performed by Jazmyn Alexander MD at [facility]  2020     NERVE BLOCK BILATERAL - B MBB # 1 L4-5, L5-S1 (Bilateral     TONSILLECTOMY      TUBAL LIGATION         Family History   Problem Relation Age of Onset    Stroke Father     Other Sister         homeless    Diabetes Maternal Grandmother Allergies   Allergen Reactions    Bee Venom Hives    Tetracyclines & Related Nausea Only and Other (See Comments)     dizziness    Ace Inhibitors Other (See Comments)     cough       No current facility-administered medications for this encounter. Social History     Tobacco Use    Smoking status: Former Smoker     Packs/day: 0.25     Years: 30.00     Pack years: 7.50     Types: Cigarettes     Last attempt to quit: 2018     Years since quittin.0    Smokeless tobacco: Never Used   Substance Use Topics    Alcohol use: No       Review of Systems:   Focused review of systems was performed, and negative as pertinent to diagnosis, except as stated in HPI. Physical Exam:     Physical Exam  Vitals signs reviewed. Musculoskeletal:      Lumbar back: She exhibits tenderness. She exhibits no edema and no deformity. Neurological:      Mental Status: She is alert and oriented to person, place, and time. Patient's current physical status, medications, medical history, and HPI have been reviewed and updated as appropriate on this date: 20    Risk/Benefit(s): The risks, benefits, alternatives, and potential complications have been discussed with the patient/family and informed consent has been obtained for the procedure/sedation.     Diagnosis:   SPONDYLOSIS      Plan: lumbar MBB        [facility]

## 2020-08-14 NOTE — OP NOTE
Lumbar Facet Nerve Block Injection:  Surgeon: Rd Castro     PRE-OP DIAGNOSIS: M47.817 (lumbosacral spondylosis), M54.5 (low back pain)    POST-OP DIAGNOSIS: Same. PROCEDURE PERFORMED: Lumbar Facet Nerve Block Multiple Levels  Bilateral L4 - 5 and L5 - S1. Physician confirmed and marked the surgical site. EBL: minimal      CONSENT: Patient has undergone the educational process with this procedure, is aware and fully understands the risks involved: potential damage to any and all body organs including possible bleeding, infection and nerve injury, allergic reaction and headache. Patient also understands that the procedure will be undertaken in a safe, controlled, and monitored setting. Patient recognizes that the benefits include relief from pain and reduction in the oral use of medications. Patient agreed to proceed. The patient was counseled at length about the risks of mason Covid-19 during their perioperative period and any recovery window from their procedure. The patient was made aware that mason Covid-19  may worsen their prognosis for recovering from their procedure  and lend to a higher morbidity and/or mortality risk. All material risks, benefits, and reasonable alternatives including postponing the procedure were discussed. The patient does wish to proceed with the procedure at this time. PREP: Timeout was performed prior to starting the procedure. The patient's back was prepped with chloroprep and draped appropriately. 5ml of 0.5% lidocaine was used to anesthetize the skin and subcutaneous tissue. PROCEDURE NOTE: A 25 gauge 3.5 inch spinal needle was advanced under  fluoroscopic guidance to the appropriate anatomic location for the medial branches corresponding to the facets at the base of the appropriate superior articular process and/or sacral ala . Aspiration was negative for blood, CSF and producing pain.  1 ml of 0.25% marcaine was then injected at each site to block medial branch nerve innervating the  Bilateral L4 - 5 and L5 - S1 facet joints. Patient tolerated the procedure well, no complications occurred. At the end of the injection the physician withdrew the needle and the nurse applied a sterile bandage to the site. Patient transferred to the recovery room in satisfactory condition. Appropriate written discharge instructions were given to the patient. If good results are obtained, Patient would be a candidate for Radiofrequency Ablation.       Constantin Wright

## 2020-08-14 NOTE — ANESTHESIA POSTPROCEDURE EVALUATION
POST- ANESTHESIA EVALUATION       Pt Name: Clover Shook  MRN: [id number]  [place]: 1965  Date of evaluation: 8/14/2020  Time:  9:37 AM      BP (!) 122/58   Pulse 64   Temp 98.1 °F (36.7 °C) (Temporal)   Resp 16   Ht 5' 4\" (1.626 m)   Wt 233 lb 4 oz (105.8 kg)   SpO2 93%   BMI 40.04 kg/m²      Consciousness Level  Awake  Cardiopulmonary Status  Stable  Pain Adequately Treated YES  Nausea / Vomiting  NO  Adequate Hydration  YES  Anesthesia Related Complications NONE      Electronically signed by Sydni Anguiano MD on 8/14/2020 at 9:37 AM       Department of Anesthesiology  Postprocedure Note    Patient: Clover Shook  MRN: [id number]  YOB: 1965  Date of evaluation: 8/14/2020  Time:  9:37 AM     Procedure Summary     Date:  08/14/20 Room / Location:  [address] [place] [address] / [address] [place] [address]    Anesthesia Start:  0900 Anesthesia Stop:  0741    Procedure:  NERVE BLOCK BILATERAL - L-5, L5-S1 (Bilateral ) Diagnosis:  (SPONDYLOSIS)    Surgeon:  [facility] Vitor Alexander MD Responsible Provider:  BRITTON Sheppard CRNA    Anesthesia Type:  MAC ASA Status:  3          Anesthesia Type: MAC    Lowell Phase I: Lowell Score: 10    Lowell Phase II: Lowell Score: 10    Last vitals: Reviewed and per EMR flowsheets.        Anesthesia Post Evaluation

## 2020-08-19 ENCOUNTER — OFFICE VISIT (OUTPATIENT)
Dept: NEUROSURGERY | Age: 55
End: 2020-08-19
Payer: COMMERCIAL

## 2020-08-19 VITALS
BODY MASS INDEX: 40.12 KG/M2 | HEIGHT: 64 IN | TEMPERATURE: 94.1 F | HEART RATE: 69 BPM | SYSTOLIC BLOOD PRESSURE: 108 MMHG | WEIGHT: 235 LBS | DIASTOLIC BLOOD PRESSURE: 68 MMHG | OXYGEN SATURATION: 95 %

## 2020-08-19 PROCEDURE — 99213 OFFICE O/P EST LOW 20 MIN: CPT | Performed by: NEUROLOGICAL SURGERY

## 2020-08-19 NOTE — PROGRESS NOTES
[place]  [address] [facility] [address] [facility] [address]  Dept: [phone]    Patient:  Neeta Maravilla  YOB: 1965  Date: 8/19/20    The patient is a 63 y.o. female who presents today for consult of the following problems:     Chief Complaint   Patient presents with    Follow-up     6 week, spondylosis with radiculopathy,lumbar region             HPI:     Neeta Maravilla is a 63 y.o. female on whom neurosurgical consultation was requested by Angelica Sands MD for management of significant axial back pain that is achy spasmodic nature rated approximately 5-6 out of 10 on a daily basis. Exacerbations approximately once every few months to 8-9 out of 10. She has been through 3 months of therapy as well as multiple injections including SI joint facet blocks that have provided her modest relief. Also with radiation down bilateral lower extremities approximately S1 distribution to the mid calf region. Denies any distal numbness tingling of the toes. No associated saddle anesthesia bowel bladder incontinence. Did undergo a discogram that did trigger pain that was non-concordant with her baseline symptoms. In addition she did have multiple facet blocks and SI joint junctions. As I do injection as well as previous L5-S1 facet block was unsuccessful. However she did have a medial branch block at L4-5 and L5-S1 most recently which gave her significant relief to over 50%. Unfortunately on repetition the second time around she did not get any relief at whatsoever. Dejan Charlton History:     Past Medical History:   Diagnosis Date    Abnormal stress test     Allergic rhinitis     Arthritis     CAD (coronary artery disease)     Dr. Dickerson Night last seen 2/20/2020    Chronic back pain     Diabetes mellitus ([facility] [address] [facility]     Former smoker 2/7/2020    30-year history.   Quit in 2018    Hyperlipidemia     Dr. Uzma Mauro Hypertension     Dr. Jinny Perez, cardiac 2020    SRI on CPAP     instructed to bring Dr. Dot Sequeira Wears prescription eyeglasses     Wellness examination     Dr. Sydnie Jackson last seen 2020     Past Surgical History:   Procedure Laterality Date    ANESTHESIA NERVE BLOCK Left 2019    NERVE BLOCK (DEFINE) - # 1 L5-S1 performed by Raul Ga MD at [facility] Bilateral 2020    NERVE BLOCK BILATERAL - B MBB # 1 L4-5, L5-S1 performed by Raul Ga MD at [facility] Bilateral 2020    NERVE BLOCK BILATERAL - L-5, L5-S1 performed by Raul Ga MD at [facility]  2017    CARDIAC CATHETERIZATION  2019    CARDIAC CATHETERIZATION  2018    1 stent mid LAD   [address]  02/10/2020    for chest pain no new blockage    CARPAL TUNNEL RELEASE Right      SECTION      x3    COLONOSCOPY N/A 2018    COLONOSCOPY WITH BIOPSY performed by Vaishali España MD at [place] [address]      cataract surgery left eye    FOOT SURGERY      HAND TENDON SURGERY [facility] INJECTION PROCEDURE FOR SACROILIAC JOINT Left 2019    SACROILIAC JOINT INJECTION - #1 performed by Raul Ga MD at [facility]  2020     NERVE BLOCK BILATERAL - B MBB # 1 L4-5, L5-S1 (Bilateral     NERVE BLOCK Bilateral 2020    NERVE BLOCK BILATERAL - L-5, L5-S1 (Bilateral )     TONSILLECTOMY      TUBAL LIGATION       Family History   Problem Relation Age of Onset    Stroke Father     Other Sister         homeless    Diabetes Maternal Grandmother      Current Outpatient Medications on File Prior to Visit   Medication Sig Dispense Refill    omeprazole (PRILOSEC) 40 MG delayed release capsule take 1 capsule by mouth once daily 30 capsule 5    metoprolol tartrate (LOPRESSOR) 25 MG tablet intolerance. Genitourinary: Negative for frequency and flank pain. Musculoskeletal: Negative for myalgias and joint swelling. Skin: Negative for rash and wound. Allergic/Immunologic: Negative for environmental allergies and food allergies. Hematological: Negative for adenopathy. Does not bruise/bleed easily. Psychiatric/Behavioral: Negative for self-injury. The patient is not nervous/anxious. Physical Exam:      /68 (Site: Left Upper Arm, Position: Sitting, Cuff Size: Medium Adult)   Pulse 69   Temp 94.1 °F (34.5 °C) (Temporal)   Ht 5' 4\" (1.626 m)   Wt 235 lb (106.6 kg)   SpO2 95%   BMI 40.34 kg/m²   Estimated body mass index is 40.34 kg/m² as calculated from the following:    Height as of this encounter: 5' 4\" (1.626 m). Weight as of this encounter: 235 lb (106.6 kg). General:  Emeterio Ruby is a 63y.o. year old female who appears her stated age. HEENT: Normocephalic atraumatic. Neck supple. Chest: regular rate; pulses equal  Abdomen: Soft nontender nondistended. Normoactive bowel sounds.   Ext: DP and PT pulses 2+, good cap refill  Neuro    Mentation  Appropriate affect  Registration intact  Orientation intact  3 item recall intact  Judgement intact to situation    Cranial Nerves:   Pupils equal and reactive to light  Extraocular motion intact  Face and shrug symmetric  Tongue midline  No dysarthria  v1-3 sensation symmetric, masseter tone symmetric  Hearing symmetric and intact to finger rub    Sensation:   Intact    Motor  L deltoid 5/5; R deltoid 5/5  L biceps 5/5; R biceps 5/5  L triceps 5/5; R triceps 5/5  L wrist extension 5/5; R wrist extension 5/5  L intrinsics 5/5; R intrinsics 5/5     L iliopsoas 5/5 , R iliopsoas 5/5  L quadriceps 5/5; R quadriceps 5/5  L Dorsiflexion 5/5; R dorsiflexion 5/5  L Plantarflexion 5/5; R plantarflexion 5/5  L EHL 5/5; R EHL 5/5    Reflexes  L Brachioradialis 2+/4; R brachioradialis 2+/4  L Biceps 2+/4; R Biceps 2+/4  L Triceps 2+/4; R Triceps 2+/4  L Patellar 2+/4: R Patellar 2+/4  L Achilles 2+/4; R Achilles 2+/4    hoffmans L: neg  hoffmans R: neg  Clonus L: neg  Clonus R: neg  Babinski L: up  Babinski R; up    Studies Review:     Discogram shows some egress of the disc material posteriorly at L3-4. Assessment and Plan:      1. Other spondylosis with radiculopathy, lumbar region          Plan: I did discuss with her at length and the prospects for relief with interlaminar interbody fusion at L4-5 and L5-S1 considering that she did have significant relief x1 with a medial branch block. I do believe that more than likely she does have facet agenic pain that is originating from L4-5 and L5-S1 that may get relief from an anterior or posterior fusion at these levels. Unfortunate with multilevel discogenic disease disease and predominantly axial symptoms I quoted approximately 60 to 70% success rate with the surgery despite the exhaustive investigative work-up. Based on her baseline pain being approximate 5-6 out of 10 and relatively tolerable the patient would like to hold off on aggressive surgical intervention at this time. I have counseled her other options including the option of a spinal cord stimulator for which she would investigate with pain management for a trial.  In addition I have also counseled her on overall lifestyle maneuvers including weight loss, core strengthening postural training and insoles. I am not sure if she is a candidate for an RFA with only one medial branch block that is successful but have also told her to inquire with pain management regarding this factor. Followup: No follow-ups on file. Prescriptions Ordered:  No orders of the defined types were placed in this encounter. Orders Placed:  No orders of the defined types were placed in this encounter.        Electronically signed by Ananya Moya DO on 8/19/2020 at 9:25 AM    Please note that this chart was generated using voice recognition Dragon dictation software. Although every effort was made to ensure the accuracy of this automated transcription, some errors in transcription may have occurred.

## 2020-08-27 ENCOUNTER — OFFICE VISIT (OUTPATIENT)
Dept: PAIN MANAGEMENT | Age: 55
End: 2020-08-27
Payer: COMMERCIAL

## 2020-08-27 ENCOUNTER — TELEPHONE (OUTPATIENT)
Dept: NEUROLOGY | Age: 55
End: 2020-08-27

## 2020-08-27 VITALS
TEMPERATURE: 98 F | HEART RATE: 84 BPM | BODY MASS INDEX: 39.95 KG/M2 | DIASTOLIC BLOOD PRESSURE: 76 MMHG | SYSTOLIC BLOOD PRESSURE: 133 MMHG | WEIGHT: 234 LBS | HEIGHT: 64 IN

## 2020-08-27 PROCEDURE — 99214 OFFICE O/P EST MOD 30 MIN: CPT | Performed by: PAIN MEDICINE

## 2020-08-27 ASSESSMENT — ENCOUNTER SYMPTOMS
COUGH: 0
BACK PAIN: 1

## 2020-08-27 NOTE — PROGRESS NOTES
HPI:     Back Pain   This is a chronic problem. The current episode started more than 1 year ago. The problem occurs constantly. The problem has been gradually worsening since onset. The pain is present in the lumbar spine. The quality of the pain is described as aching. The pain is moderate. The pain is the same all the time. The symptoms are aggravated by position. Stiffness is present all day. Pertinent negatives include no chest pain. MRI lumbar spine with multilevel degenerative changes. Had previous lumbar decompression. Continues to have significant nonradiating low back pain. Did have medial branch block x2 with excellent temporary benefit. She has seen a surgeon, nothing surgical at this time. She does have coronary artery disease and is on Brilinta    Patient denies any new neurological symptoms. Nobowel or bladder incontinence, no weakness, and no falling. Review of OARRS does not show any aberrant prescription behavior. Past Medical History:   Diagnosis Date    Abnormal stress test     Allergic rhinitis     Arthritis     CAD (coronary artery disease)     Dr. Luciano Caraballo last seen 2/20/2020    Chronic back pain     Diabetes mellitus ([address]     Former smoker 2/7/2020    30-year history.   Quit in 2018    Hyperlipidemia     Dr. Khloe Elise Hypertension     Dr. Rosemary Barbour, cardiac 2/8/2020    SRI on CPAP     instructed to bring Dr. Daniella Benavides Wears prescription eyeglasses     Wellness examination     Dr. Joseph Eye last seen 2/13/2020       Past Surgical History:   Procedure Laterality Date    ANESTHESIA NERVE BLOCK Left 12/19/2019    NERVE BLOCK (DEFINE) - # 1 L5-S1 performed by Shea Huerta MD at [facility] Bilateral 7/17/2020    NERVE BLOCK BILATERAL - B MBB # 1 L4-5, L5-S1 performed by Shea Huerta MD at [facility] Bilateral 8/14/2020    NERVE BLOCK BILATERAL - L-5, L5-S1 performed by Jodi Aguilar MD [facility] at [facility]  2017    CARDIAC CATHETERIZATION  2019    CARDIAC CATHETERIZATION  2018    1 stent mid LAD    CARDIAC CATHETERIZATION  02/10/2020    for chest pain no new blockage    CARPAL TUNNEL RELEASE Right      SECTION      x3    COLONOSCOPY N/A 2018    COLONOSCOPY WITH BIOPSY performed by Marla Rodriguez MD at [place] [address]      cataract surgery left eye    FOOT SURGERY      HAND TENDON SURGERY Right     [facility] INJECTION PROCEDURE FOR SACROILIAC JOINT Left 2019    SACROILIAC JOINT INJECTION - #1 performed by Karin Zhu MD at [facility]  2020     NERVE BLOCK BILATERAL - B MBB # 1 L4-5, L5-S1 (Bilateral     NERVE BLOCK Bilateral 2020    NERVE BLOCK BILATERAL - L-5, L5-S1 (Bilateral )     TONSILLECTOMY      TUBAL LIGATION         Allergies   Allergen Reactions    Bee Venom Hives    Tetracyclines & Related Nausea Only and Other (See Comments)     dizziness    Ace Inhibitors Other (See Comments)     cough         Current Outpatient Medications:     omeprazole (PRILOSEC) 40 MG delayed release capsule, take 1 capsule by mouth once daily, Disp: 30 capsule, Rfl: 5    metoprolol tartrate (LOPRESSOR) 25 MG tablet, take 1 tablet by mouth twice a day, Disp: 60 tablet, Rfl: 5    losartan (COZAAR) 100 MG tablet, take 1 tablet by mouth once daily, Disp: 30 tablet, Rfl: 5    metFORMIN (GLUCOPHAGE-XR) 500 MG extended release tablet, Take 500 mg a day for 2 weeks, then increase the dose to twice a day., Disp: 60 tablet, Rfl: 11    melatonin 3 MG TABS tablet, Take 3 mg by mouth nightly as needed Stopping 3/10, Disp: , Rfl:     atorvastatin (LIPITOR) 80 MG tablet, Take 1 tablet by mouth daily, Disp: 30 tablet, Rfl: 11    amLODIPine (NORVASC) 5 MG tablet, Take by mouth daily, Disp: , Rfl:     hydrochlorothiazide (HYDRODIURIL) 25 MG tablet, Take 25 mg by mouth daily, Disp: , Rfl:     Multiple Vitamins-Minerals (ONE-A-DAY 50 PLUS PO), Take 1 tablet by mouth daily, Disp: , Rfl:     nitroGLYCERIN (NITROSTAT) 0.4 MG SL tablet, up to max of 3 total doses.  If no relief after 1 dose, call 911., Disp: 25 tablet, Rfl: 1    ticagrelor (BRILINTA) 90 MG TABS tablet, Take 1 tablet by mouth 2 times daily, Disp: 60 tablet, Rfl: 0    aspirin 81 MG tablet, Take 1 tablet by mouth daily (with breakfast), Disp: 30 tablet, Rfl: 11    Family History   Problem Relation Age of Onset    Stroke Father     Other Sister         homeless    Diabetes Maternal Grandmother        Social History     Socioeconomic History    Marital status:      Spouse name: Not on file    Number of children: Not on file    Years of education: Not on file    Highest education level: Not on file   Occupational History    Not on file   Social Needs    Financial resource strain: Not on file    Food insecurity     Worry: Not on file     Inability: Not on file    Transportation needs     Medical: Not on file     Non-medical: Not on file   Tobacco Use    Smoking status: Former Smoker     Packs/day: 0.25     Years: 30.00     Pack years: 7.50     Types: Cigarettes     Last attempt to quit: 2018     Years since quittin.0    Smokeless tobacco: Never Used   Substance and Sexual Activity    Alcohol use: No    Drug use: No    Sexual activity: Not on file   Lifestyle    Physical activity     Days per week: Not on file     Minutes per session: Not on file    Stress: Not on file   Relationships    Social connections     Talks on phone: Not on file     Gets together: Not on file     Attends Quaker service: Not on file     Active member of club or organization: Not on file     Attends meetings of clubs or organizations: Not on file     Relationship status: Not on file    Intimate partner violence     Fear of current or ex partner: Not on file L5-S1.  She wishes to proceed with radiofrequency ablation I think that is reasonable. We will go ahead and set her up with RFA at L4-5, L5-S1. Will start on the left and then move to the right. She is on Brilinta, okay for lumbar radiofrequency ablation, however we would need clearance to hold for certain interventions. If no sustained benefit from lumbar radiofrequency ablation may be candidate for spinal cord stimulation. Janna Aguilar M.D. I have reviewed the chief complaint and history of present illness (including ROS and PFSH) and vital documentation by my staff and I agree with their documentation and have added where applicable.

## 2020-08-27 NOTE — TELEPHONE ENCOUNTER
Pt saw Dr Huff Likens today and they decided to schedule RFA Ablation - she just wanted to keep you in the loop

## 2020-09-10 ENCOUNTER — PATIENT MESSAGE (OUTPATIENT)
Dept: FAMILY MEDICINE CLINIC | Age: 55
End: 2020-09-10

## 2020-09-11 NOTE — TELEPHONE ENCOUNTER
From: Cecilia Garcia  To: Maikel Lim MD  Sent: 9/10/2020 9:30 PM EDT  Subject: Non-Urgent Medical Question    I have had nausea about 75% of each day for a week now with loss of appetite. No other symptoms. I've tried pepto, ginger ale, crackers but not helping. Any suggestions?

## 2020-09-13 RX ORDER — NITROGLYCERIN 0.4 MG/1
TABLET SUBLINGUAL
Qty: 25 TABLET | Refills: 1 | Status: SHIPPED | OUTPATIENT
Start: 2020-09-13

## 2020-09-14 ENCOUNTER — HOSPITAL ENCOUNTER (OUTPATIENT)
Dept: PREADMISSION TESTING | Age: 55
Discharge: HOME | End: 2020-09-18
Payer: COMMERCIAL

## 2020-09-14 PROCEDURE — U0003 INFECTIOUS AGENT DETECTION BY NUCLEIC ACID (DNA OR RNA); SEVERE ACUTE RESPIRATORY SYNDROME CORONAVIRUS 2 (SARS-COV-2) (CORONAVIRUS DISEASE [COVID-19]), AMPLIFIED PROBE TECHNIQUE, MAKING USE OF HIGH THROUGHPUT TECHNOLOGIES AS DESCRIBED BY CMS-2020-01-R: HCPCS

## 2020-09-17 ENCOUNTER — ANESTHESIA EVENT (OUTPATIENT)
Dept: OPERATING ROOM | Age: 55
End: 2020-09-17
Payer: COMMERCIAL

## 2020-09-17 LAB — SARS-COV-2, NAA: NOT DETECTED

## 2020-09-18 ENCOUNTER — HOSPITAL ENCOUNTER (OUTPATIENT)
Age: 55
Setting detail: OUTPATIENT SURGERY
Discharge: HOME | End: 2020-09-18
Attending: PAIN MEDICINE | Admitting: PAIN MEDICINE
Payer: COMMERCIAL

## 2020-09-18 ENCOUNTER — ANESTHESIA (OUTPATIENT)
Dept: OPERATING ROOM | Age: 55
End: 2020-09-18
Payer: COMMERCIAL

## 2020-09-18 ENCOUNTER — APPOINTMENT (OUTPATIENT)
Dept: GENERAL RADIOLOGY | Age: 55
End: 2020-09-18
Attending: PAIN MEDICINE
Payer: COMMERCIAL

## 2020-09-18 VITALS
OXYGEN SATURATION: 100 % | DIASTOLIC BLOOD PRESSURE: 85 MMHG | SYSTOLIC BLOOD PRESSURE: 169 MMHG | RESPIRATION RATE: 22 BRPM

## 2020-09-18 VITALS
DIASTOLIC BLOOD PRESSURE: 81 MMHG | SYSTOLIC BLOOD PRESSURE: 134 MMHG | WEIGHT: 232.4 LBS | HEIGHT: 64 IN | BODY MASS INDEX: 39.67 KG/M2 | HEART RATE: 66 BPM | OXYGEN SATURATION: 96 % | RESPIRATION RATE: 18 BRPM | TEMPERATURE: 97.5 F

## 2020-09-18 LAB — GLUCOSE BLD-MCNC: 119 MG/DL (ref 65–105)

## 2020-09-18 PROCEDURE — 3600000012 HC SURGERY LEVEL 2 ADDTL 15MIN: Performed by: PAIN MEDICINE

## 2020-09-18 PROCEDURE — 7100000010 HC PHASE II RECOVERY - FIRST 15 MIN: Performed by: PAIN MEDICINE

## 2020-09-18 PROCEDURE — 2720000010 HC SURG SUPPLY STERILE: Performed by: PAIN MEDICINE

## 2020-09-18 PROCEDURE — 3600000002 HC SURGERY LEVEL 2 BASE: Performed by: PAIN MEDICINE

## 2020-09-18 PROCEDURE — 3700000001 HC ADD 15 MINUTES (ANESTHESIA): Performed by: PAIN MEDICINE

## 2020-09-18 PROCEDURE — 3209999900 FLUORO FOR SURGICAL PROCEDURES

## 2020-09-18 PROCEDURE — 64636 DESTROY L/S FACET JNT ADDL: CPT | Performed by: PAIN MEDICINE

## 2020-09-18 PROCEDURE — 6360000002 HC RX W HCPCS: Performed by: NURSE ANESTHETIST, CERTIFIED REGISTERED

## 2020-09-18 PROCEDURE — 7100000011 HC PHASE II RECOVERY - ADDTL 15 MIN: Performed by: PAIN MEDICINE

## 2020-09-18 PROCEDURE — 64635 DESTROY LUMB/SAC FACET JNT: CPT | Performed by: PAIN MEDICINE

## 2020-09-18 PROCEDURE — 82947 ASSAY GLUCOSE BLOOD QUANT: CPT

## 2020-09-18 PROCEDURE — 3700000000 HC ANESTHESIA ATTENDED CARE: Performed by: PAIN MEDICINE

## 2020-09-18 PROCEDURE — 2709999900 HC NON-CHARGEABLE SUPPLY: Performed by: PAIN MEDICINE

## 2020-09-18 PROCEDURE — 2500000003 HC RX 250 WO HCPCS: Performed by: PAIN MEDICINE

## 2020-09-18 RX ORDER — LIDOCAINE HYDROCHLORIDE 10 MG/ML
1 INJECTION, SOLUTION EPIDURAL; INFILTRATION; INTRACAUDAL; PERINEURAL
Status: DISCONTINUED | OUTPATIENT
Start: 2020-09-18 | End: 2020-09-18 | Stop reason: HOSPADM

## 2020-09-18 RX ORDER — OXYCODONE HYDROCHLORIDE AND ACETAMINOPHEN 5; 325 MG/1; MG/1
1 TABLET ORAL PRN
Status: DISCONTINUED | OUTPATIENT
Start: 2020-09-18 | End: 2020-09-18 | Stop reason: HOSPADM

## 2020-09-18 RX ORDER — MORPHINE SULFATE 2 MG/ML
2 INJECTION, SOLUTION INTRAMUSCULAR; INTRAVENOUS EVERY 5 MIN PRN
Status: DISCONTINUED | OUTPATIENT
Start: 2020-09-18 | End: 2020-09-18 | Stop reason: HOSPADM

## 2020-09-18 RX ORDER — FENTANYL CITRATE 50 UG/ML
INJECTION, SOLUTION INTRAMUSCULAR; INTRAVENOUS PRN
Status: DISCONTINUED | OUTPATIENT
Start: 2020-09-18 | End: 2020-09-18 | Stop reason: SDUPTHER

## 2020-09-18 RX ORDER — SODIUM CHLORIDE, SODIUM LACTATE, POTASSIUM CHLORIDE, CALCIUM CHLORIDE 600; 310; 30; 20 MG/100ML; MG/100ML; MG/100ML; MG/100ML
INJECTION, SOLUTION INTRAVENOUS CONTINUOUS
Status: DISCONTINUED | OUTPATIENT
Start: 2020-09-18 | End: 2020-09-18 | Stop reason: HOSPADM

## 2020-09-18 RX ORDER — DIPHENHYDRAMINE HYDROCHLORIDE 50 MG/ML
12.5 INJECTION INTRAMUSCULAR; INTRAVENOUS
Status: DISCONTINUED | OUTPATIENT
Start: 2020-09-18 | End: 2020-09-18 | Stop reason: HOSPADM

## 2020-09-18 RX ORDER — SODIUM CHLORIDE 0.9 % (FLUSH) 0.9 %
10 SYRINGE (ML) INJECTION PRN
Status: DISCONTINUED | OUTPATIENT
Start: 2020-09-18 | End: 2020-09-18 | Stop reason: HOSPADM

## 2020-09-18 RX ORDER — MEPERIDINE HYDROCHLORIDE 50 MG/ML
12.5 INJECTION INTRAMUSCULAR; INTRAVENOUS; SUBCUTANEOUS EVERY 5 MIN PRN
Status: DISCONTINUED | OUTPATIENT
Start: 2020-09-18 | End: 2020-09-18 | Stop reason: HOSPADM

## 2020-09-18 RX ORDER — PROMETHAZINE HYDROCHLORIDE 25 MG/ML
12.5 INJECTION, SOLUTION INTRAMUSCULAR; INTRAVENOUS
Status: DISCONTINUED | OUTPATIENT
Start: 2020-09-18 | End: 2020-09-18 | Stop reason: HOSPADM

## 2020-09-18 RX ORDER — 0.9 % SODIUM CHLORIDE 0.9 %
500 INTRAVENOUS SOLUTION INTRAVENOUS
Status: DISCONTINUED | OUTPATIENT
Start: 2020-09-18 | End: 2020-09-18 | Stop reason: HOSPADM

## 2020-09-18 RX ORDER — LABETALOL 20 MG/4 ML (5 MG/ML) INTRAVENOUS SYRINGE
5 EVERY 10 MIN PRN
Status: DISCONTINUED | OUTPATIENT
Start: 2020-09-18 | End: 2020-09-18 | Stop reason: HOSPADM

## 2020-09-18 RX ORDER — MIDAZOLAM HYDROCHLORIDE 1 MG/ML
INJECTION INTRAMUSCULAR; INTRAVENOUS PRN
Status: DISCONTINUED | OUTPATIENT
Start: 2020-09-18 | End: 2020-09-18 | Stop reason: SDUPTHER

## 2020-09-18 RX ORDER — OXYCODONE HYDROCHLORIDE AND ACETAMINOPHEN 5; 325 MG/1; MG/1
2 TABLET ORAL PRN
Status: DISCONTINUED | OUTPATIENT
Start: 2020-09-18 | End: 2020-09-18 | Stop reason: HOSPADM

## 2020-09-18 RX ORDER — SODIUM CHLORIDE 0.9 % (FLUSH) 0.9 %
10 SYRINGE (ML) INJECTION EVERY 12 HOURS SCHEDULED
Status: DISCONTINUED | OUTPATIENT
Start: 2020-09-18 | End: 2020-09-18 | Stop reason: HOSPADM

## 2020-09-18 RX ORDER — BUPIVACAINE HYDROCHLORIDE 2.5 MG/ML
INJECTION, SOLUTION EPIDURAL; INFILTRATION; INTRACAUDAL PRN
Status: DISCONTINUED | OUTPATIENT
Start: 2020-09-18 | End: 2020-09-18 | Stop reason: ALTCHOICE

## 2020-09-18 RX ORDER — ONDANSETRON 2 MG/ML
4 INJECTION INTRAMUSCULAR; INTRAVENOUS
Status: DISCONTINUED | OUTPATIENT
Start: 2020-09-18 | End: 2020-09-18 | Stop reason: HOSPADM

## 2020-09-18 RX ADMIN — MIDAZOLAM HYDROCHLORIDE 2 MG: 1 INJECTION, SOLUTION INTRAMUSCULAR; INTRAVENOUS at 10:12

## 2020-09-18 RX ADMIN — FENTANYL CITRATE 100 MCG: 50 INJECTION INTRAMUSCULAR; INTRAVENOUS at 10:12

## 2020-09-18 ASSESSMENT — PAIN DESCRIPTION - PAIN TYPE: TYPE: CHRONIC PAIN

## 2020-09-18 ASSESSMENT — PULMONARY FUNCTION TESTS
PIF_VALUE: 0
PIF_VALUE: 1
PIF_VALUE: 0

## 2020-09-18 ASSESSMENT — PAIN - FUNCTIONAL ASSESSMENT
PAIN_FUNCTIONAL_ASSESSMENT: PREVENTS OR INTERFERES SOME ACTIVE ACTIVITIES AND ADLS
PAIN_FUNCTIONAL_ASSESSMENT: 0-10

## 2020-09-18 ASSESSMENT — PAIN SCALES - GENERAL: PAINLEVEL_OUTOF10: 6

## 2020-09-18 ASSESSMENT — PAIN DESCRIPTION - DESCRIPTORS: DESCRIPTORS: PRESSURE;ACHING

## 2020-09-18 ASSESSMENT — PAIN DESCRIPTION - LOCATION: LOCATION: BACK

## 2020-09-18 NOTE — H&P
Update History & Physical    The patient's History and Physical of August 27, 2020 was reviewed with the patient and I examined the patient. There was no change. The surgical site was confirmed by the patient and me. Plan: The risks, benefits, expected outcome, and alternative to the recommended procedure have been discussed with the patient. Patient understands and wants to proceed with the procedure. Electronically signed by Cheyenne Drake MD on 9/18/2020 at 9:35 AM        HPI:      Back Pain   This is a chronic problem. The current episode started more than 1 year ago. The problem occurs constantly. The problem has been gradually worsening since onset. The pain is present in the lumbar spine. The quality of the pain is described as aching. The pain is moderate. The pain is the same all the time. The symptoms are aggravated by position. Stiffness is present all day. Pertinent negatives include no chest pain.      MRI lumbar spine with multilevel degenerative changes. Had previous lumbar decompression. Continues to have significant nonradiating low back pain. Did have medial branch block x2 with excellent temporary benefit. She has seen a surgeon, nothing surgical at this time. She does have coronary artery disease and is on Brilinta     Patient denies any new neurological symptoms. Nobowel or bladder incontinence, no weakness, and no falling. Review of OARRS does not show any aberrant prescription behavior.      Past Medical History        Past Medical History:   Diagnosis Date    Abnormal stress test      Allergic rhinitis      Arthritis      CAD (coronary artery disease)       Dr. Johnny Mortensen last seen 2/20/2020    Chronic back pain      Diabetes mellitus ([address]      Former smoker 2/7/2020     30-year history.   Quit in 2018    Hyperlipidemia       Dr. Evgeny Wayne Hypertension       Dr. Annia Primrose, cardiac 2/8/2020    SRI on CPAP       instructed to bring Dr. Azael Gallegos prescription eyeglasses      Wellness examination       Dr. Liam Recio last seen 2020           Past Surgical History         Past Surgical History:   Procedure Laterality Date    ANESTHESIA NERVE BLOCK Left 2019     NERVE BLOCK (DEFINE) - # 1 L5-S1 performed by Rebecca Loredo MD at [facility] Bilateral 2020     NERVE BLOCK BILATERAL - B MBB # 1 L4-5, L5-S1 performed by Rebecca Loredo MD at [facility] Bilateral 2020     NERVE BLOCK BILATERAL - L-5, L5-S1 performed by Rebecca Loredo MD at [address] [place] [address]   2017    CARDIAC CATHETERIZATION   2019    CARDIAC CATHETERIZATION   2018     1 stent mid LAD    CARDIAC CATHETERIZATION   02/10/2020     for chest pain no new blockage    CARPAL TUNNEL RELEASE Right       SECTION         x3    COLONOSCOPY N/A 2018     COLONOSCOPY WITH BIOPSY performed by Kaya Mccartney MD at [address] [place] [address]         cataract surgery left eye    FOOT SURGERY        HAND TENDON SURGERY Right      [facility] INJECTION PROCEDURE FOR SACROILIAC JOINT Left 2019     SACROILIAC JOINT INJECTION - #1 performed by Rebecca Loredo MD at [place] [address]   2020      NERVE BLOCK BILATERAL - B MBB # 1 L4-5, L5-S1 (Bilateral     NERVE BLOCK Bilateral 2020     NERVE BLOCK BILATERAL - L-5, L5-S1 (Bilateral )     TONSILLECTOMY        TUBAL LIGATION                     Allergies   Allergen Reactions    Bee Venom Hives    Tetracyclines & Related Nausea Only and Other (See Comments)       dizziness    Ace Inhibitors Other (See Comments)       cough        Current Medication     Current Outpatient Medications:     omeprazole (PRILOSEC) 40 MG delayed release capsule, take 1 capsule by mouth once daily, Disp: 30 capsule, Rfl: 5   metoprolol tartrate (LOPRESSOR) 25 MG tablet, take 1 tablet by mouth twice a day, Disp: 60 tablet, Rfl: 5    losartan (COZAAR) 100 MG tablet, take 1 tablet by mouth once daily, Disp: 30 tablet, Rfl: 5    metFORMIN (GLUCOPHAGE-XR) 500 MG extended release tablet, Take 500 mg a day for 2 weeks, then increase the dose to twice a day., Disp: 60 tablet, Rfl: 11    melatonin 3 MG TABS tablet, Take 3 mg by mouth nightly as needed Stopping 3/10, Disp: , Rfl:     atorvastatin (LIPITOR) 80 MG tablet, Take 1 tablet by mouth daily, Disp: 30 tablet, Rfl: 11    amLODIPine (NORVASC) 5 MG tablet, Take by mouth daily, Disp: , Rfl:     hydrochlorothiazide (HYDRODIURIL) 25 MG tablet, Take 25 mg by mouth daily, Disp: , Rfl:     Multiple Vitamins-Minerals (ONE-A-DAY 50 PLUS PO), Take 1 tablet by mouth daily, Disp: , Rfl:     nitroGLYCERIN (NITROSTAT) 0.4 MG SL tablet, up to max of 3 total doses.  If no relief after 1 dose, call 911., Disp: 25 tablet, Rfl: 1    ticagrelor (BRILINTA) 90 MG TABS tablet, Take 1 tablet by mouth 2 times daily, Disp: 60 tablet, Rfl: 0    aspirin 81 MG tablet, Take 1 tablet by mouth daily (with breakfast), Disp: 30 tablet, Rfl: 11        Family History         Family History   Problem Relation Age of Onset    Stroke Father      Other Sister           homeless    Diabetes Maternal Grandmother             Social History               Socioeconomic History    Marital status:        Spouse name: Not on file    Number of children: Not on file    Years of education: Not on file    Highest education level: Not on file   Occupational History    Not on file   Social Needs    Financial resource strain: Not on file    Food insecurity       Worry: Not on file       Inability: Not on file    Transportation needs       Medical: Not on file       Non-medical: Not on file   Tobacco Use    Smoking status: Former Smoker       Packs/day: 0.25       Years: 30.00       Pack years: 7.50       Types: Cigarettes       Last attempt to quit: 2018       Years since quittin.0    Smokeless tobacco: Never Used   Substance and Sexual Activity    Alcohol use: No    Drug use: No    Sexual activity: Not on file   Lifestyle    Physical activity       Days per week: Not on file       Minutes per session: Not on file    Stress: Not on file   Relationships    Social connections       Talks on phone: Not on file       Gets together: Not on file       Attends Yazidism service: Not on file       Active member of club or organization: Not on file       Attends meetings of clubs or organizations: Not on file       Relationship status: Not on file    Intimate partner violence       Fear of current or ex partner: Not on file       Emotionally abused: Not on file       Physically abused: Not on file       Forced sexual activity: Not on file   Other Topics Concern    Not on file   Social History Narrative    Not on file           Review of Systems:  Review of Systems   Constitution: Negative for chills. Cardiovascular: Negative for chest pain. Respiratory: Negative for cough. Musculoskeletal: Positive for back pain.         Physical Exam:  /76   Pulse 84   Temp 98 °F (36.7 °C)   Ht 5' 4\" (1.626 m)   Wt 234 lb (106.1 kg)   BMI 40.17 kg/m²      Physical Exam  Vitals signs reviewed. Musculoskeletal:      Lumbar back: She exhibits tenderness. She exhibits no edema and no deformity. Neurological:      Mental Status: She is alert and oriented to person, place, and time. Gait: Gait normal.          Record/Diagnostics Review:    As above, I did review the imaging     Orders:      Orders Placed This Encounter   Procedures    FACET JOINT L/S SINGLE    RADIOFREQUENCY L/S ADDITIONAL        Assessment:  1. Chronic bilateral low back pain, unspecified whether sciatica present    2. Lumbosacral spondylosis without myelopathy    3. DDD (degenerative disc disease), lumbar    4.  Postlaminectomy syndrome, lumbar region    5. S/P drug eluting coronary stent placement - Mid LAD 7/25/18-Dr. lea         Treatment Plan:  DISCUSSION: Treatment options discussed with patient and all questions answered to patient's satisfaction.     OARRS Review: Reviewed and acceptable for medications prescribed. TREATMENT OPTIONS:      Discussed different treatment options including continued conservative care such as physical therapy, chiropractic care, acupuncture. Discussed different interventional options such as epidural steroids or medial branch blocks. Also discussed neuromodulation in the form of spinal cord stimulation. Also discussed surgical evaluation.     Did have excellent temporary benefit (>80%) with diagnostic medial branch block x 2 at b/l L4-5 and L5-S1. She wishes to proceed with radiofrequency ablation I think that is reasonable. We will go ahead and set her up with RFA at L4-5, L5-S1. Will start on the left and then move to the right. She is on Brilinta, okay for lumbar radiofrequency ablation, however we would need clearance to hold for certain interventions. If no sustained benefit from lumbar radiofrequency ablation may be candidate for spinal cord stimulation. .  Lauren Casey M.D.

## 2020-09-18 NOTE — ANESTHESIA PRE PROCEDURE
Department of Anesthesiology  Preprocedure Note       Name:  Lia Chin   Age:  63 y.o.  :  1965                                          MRN:  [id number]         Date:  2020      Surgeon: Kelvin Blunt):  Darin Perry MD    Procedure: Procedure(s):  NERVE RADIOFREQUENCY ABLATION- L4-5, L5-S1    Medications prior to admission:   Prior to Admission medications    Medication Sig Start Date End Date Taking? Authorizing Provider   omeprazole (PRILOSEC) 40 MG delayed release capsule take 1 capsule by mouth once daily 7/15/20  Yes Juan Acuña MD   metoprolol tartrate (LOPRESSOR) 25 MG tablet take 1 tablet by mouth twice a day 7/15/20  Yes Juan Acuña MD   losartan (COZAAR) 100 MG tablet take 1 tablet by mouth once daily 7/15/20  Yes Juan Acuña MD   metFORMIN (GLUCOPHAGE-XR) 500 MG extended release tablet Take 500 mg a day for 2 weeks, then increase the dose to twice a day.  3/16/20  Yes Juan Acuña MD   melatonin 3 MG TABS tablet Take 3 mg by mouth nightly as needed Stopping 3/10   Yes Historical Provider, MD   atorvastatin (LIPITOR) 80 MG tablet Take 1 tablet by mouth daily 20  Yes Juan Acuña MD   amLODIPine (NORVASC) 5 MG tablet Take by mouth daily 3/4/19  Yes Historical Provider, MD   hydrochlorothiazide (HYDRODIURIL) 25 MG tablet Take 25 mg by mouth daily   Yes Historical Provider, MD   Multiple Vitamins-Minerals (ONE-A-DAY 50 PLUS PO) Take 1 tablet by mouth daily   Yes Historical Provider, MD   ticagrelor (BRILINTA) 90 MG TABS tablet Take 1 tablet by mouth 2 times daily 18  Yes Monica Lane MD   aspirin 81 MG tablet Take 1 tablet by mouth daily (with breakfast) 16  Yes Juan Acuña MD   nitroGLYCERIN (NITROSTAT) 0.4 MG SL tablet place 1 tablet under the tongue if needed every 5 minutes for chest pain for 3 doses IF NO RELIEF AFTER FIRST DOSE CALL PRESCRIBER . 20   Juan Acuña MD       Current medications:    Current Facility-Administered Medications   Medication Dose Route Frequency Provider Last Rate Last Dose    lactated ringers infusion   Intravenous Continuous Farnaz Estrada MD        sodium chloride flush 0.9 % injection 10 mL  10 mL Intravenous 2 times per day Farnaz Estrada MD        sodium chloride flush 0.9 % injection 10 mL  10 mL Intravenous PRN Farnaz Estrada MD        lidocaine PF 1 % injection 1 mL  1 mL Intradermal Once PRN Farnaz Estrada MD           Allergies: Allergies   Allergen Reactions    Bee Venom Hives    Tetracyclines & Related Nausea Only and Other (See Comments)     dizziness    Ace Inhibitors Other (See Comments)     cough       Problem List:    Patient Active Problem List   Diagnosis Code    Chronic back pain M54.9, G89.29    Hypertension I10    SRI on CPAP G47.33, Z99.89    Mixed hyperlipidemia E78.2    S/P drug eluting coronary stent placement - Mid LAD 7/25/18-Dr. lea Z95.5    Coronary artery disease involving native coronary artery of native heart with unstable angina pectoris (HCC) I25.110    Class 2 obesity due to excess calories with body mass index (BMI) of 39.0 to 39.9 in adult E66.09, Z68.39    Acute bronchitis due to other specified organisms J20.8    Hyperplastic colon polyp K63.5    Unstable angina (HCC) I20.0    BMI 38.0-38.9,adult Z68.38    Post PTCA Z80.64    Former smoker Z87.891    Hypokalemia E87.6    Hyperglycemia R73.9    Murmur, cardiac R01.1    Type 2 diabetes mellitus without complication, without long-term current use of insulin (HCC) E11.9       Past Medical History:        Diagnosis Date    Abnormal stress test     Allergic rhinitis     Arthritis     CAD (coronary artery disease)     Dr. Darrel Rod last seen 2/20/2020    Chronic back pain     Diabetes mellitus ([facility] [address]     Former smoker 2/7/2020    30-year history.   Quit in 2018    Hyperlipidemia     Dr. Maggie Peterson Hypertension     Dr. Eren Mccracken, cardiac 2/8/2020    SRI on CPAP     instructed to bring Dr. Daniel Parham Wears prescription eyeglasses     Wellness examination     Dr. Mane Ugarte last seen 2020       Past Surgical History:        Procedure Laterality Date    ANESTHESIA NERVE BLOCK Left 2019    NERVE BLOCK (DEFINE) - # 1 L5-S1 performed by Christiano Bo MD at [facility] Bilateral 2020    NERVE BLOCK BILATERAL - B MBB # 1 L4-5, L5-S1 performed by Christiano Bo MD at [facility] Bilateral 2020    NERVE BLOCK BILATERAL - L-5, L5-S1 performed by Christiano Bo MD at [address] [place] [address]  2017    CARDIAC CATHETERIZATION  2019    CARDIAC CATHETERIZATION  2018    1 stent mid LAD    CARDIAC CATHETERIZATION  02/10/2020    for chest pain no new blockage    CARPAL TUNNEL RELEASE Right      SECTION      x3    COLONOSCOPY N/A 2018    COLONOSCOPY WITH BIOPSY performed by Sarah Quan MD at [place] [address]      cataract surgery left eye    FOOT SURGERY      HAND TENDON SURGERY Right     [facility] INJECTION PROCEDURE FOR SACROILIAC JOINT Left 2019    SACROILIAC JOINT INJECTION - #1 performed by Christiano Bo MD at [facility]  2020     NERVE BLOCK BILATERAL - B MBB # 1 L4-5, L5-S1 (Bilateral     NERVE BLOCK Bilateral 2020    NERVE BLOCK BILATERAL - L-5, L5-S1 (Bilateral )     TONSILLECTOMY      TUBAL LIGATION         Social History:    Social History     Tobacco Use    Smoking status: Former Smoker     Packs/day: 0.25     Years: 30.00     Pack years: 7.50     Types: Cigarettes     Last attempt to quit: 2018     Years since quittin.1    Smokeless tobacco: Never Used   Substance Use Topics    Alcohol use:  No                                Counseling given: Not Answered      Vital Signs (Current):   Vitals: 09/18/20 0925   Pulse: 74   Resp: 18   Temp: 97.8 °F (36.6 °C)   TempSrc: Temporal   SpO2: 97%   Weight: 232 lb 6.4 oz (105.4 kg)   Height: 5' 4\" (1.626 m)                                              BP Readings from Last 3 Encounters:   08/27/20 133/76   08/19/20 108/68   08/14/20 (!) 144/79       NPO Status: Time of last liquid consumption: 2230                        Time of last solid consumption: 2230                        Date of last liquid consumption: 09/17/20                        Date of last solid food consumption: 09/17/20    BMI:   Wt Readings from Last 3 Encounters:   09/18/20 232 lb 6.4 oz (105.4 kg)   08/27/20 234 lb (106.1 kg)   08/19/20 235 lb (106.6 kg)     Body mass index is 39.89 kg/m². CBC:   Lab Results   Component Value Date    WBC 6.1 02/10/2020    RBC 5.00 02/10/2020    HGB 13.4 03/05/2020    HCT 40.9 03/05/2020    MCV 85.8 02/10/2020    RDW 13.5 02/10/2020     08/07/2020       CMP:   Lab Results   Component Value Date     03/05/2020    K 3.7 03/05/2020     03/05/2020    CO2 24 03/05/2020    BUN 11 03/05/2020    CREATININE 0.58 03/05/2020    GFRAA >60 03/05/2020    LABGLOM >60 03/05/2020    GLUCOSE 151 03/13/2020    PROT 6.5 02/08/2020    CALCIUM 9.5 02/10/2020    BILITOT 0.39 02/08/2020    ALKPHOS 106 02/08/2020    AST 20 02/08/2020    ALT 28 02/08/2020       POC Tests: No results for input(s): POCGLU, POCNA, POCK, POCCL, POCBUN, POCHEMO, POCHCT in the last 72 hours.     Coags:   Lab Results   Component Value Date    PROTIME 12.3 08/07/2020    INR 0.9 08/07/2020    APTT 29.1 08/07/2020       HCG (If Applicable): No results found for: PREGTESTUR, PREGSERUM, HCG, HCGQUANT     ABGs: No results found for: PHART, PO2ART, SPB4ISM, GWJ8FRU, BEART, X7RFNRGH     Type & Screen (If Applicable):  No results found for: LABABO, LABRH    Drug/Infectious Status (If Applicable):  No results found for: HIV, HEPCAB    COVID-19 Screening (If Applicable):   Lab Results   Component Value Date    COVID19 Not Detected 09/14/2020    COVID19 Not Detected 07/14/2020         Anesthesia Evaluation  Patient summary reviewed and Nursing notes reviewed  Airway: Mallampati: IV  TM distance: >3 FB   Neck ROM: full  Mouth opening: > = 3 FB Dental: normal exam         Pulmonary:normal exam    (+) COPD:  sleep apnea: on CPAP,                            ROS comment: -QUIT SMOKING 2018 - 40 PACK YEARS   Cardiovascular:    (+) hypertension:, CAD:, CABG/stent:,                ROS comment: -CARDIAC STENTS     Neuro/Psych:                ROS comment: -DDD GI/Hepatic/Renal:   (+) GERD:, morbid obesity          Endo/Other:    (+) Diabetes, : arthritis:., .                  ROS comment: -NPO AFTER MIDNIGHT  -ALLERGIES - TETRACYCLINES, ACE INHIBITORS Abdominal:           Vascular:           ROS comment: -ANTICOAGULATED - HAS NOT STOPPED BRILINTA. Anesthesia Plan      MAC     ASA 3       Induction: intravenous. MIPS: Postoperative opioids intended and Prophylactic antiemetics administered. Anesthetic plan and risks discussed with patient. Plan discussed with CRNA.     Attending anesthesiologist reviewed and agrees with Pre Eval content              Ronnie Flores MD   9/18/2020

## 2020-09-18 NOTE — ANESTHESIA POSTPROCEDURE EVALUATION
Department of Anesthesiology  Postprocedure Note    Patient: Radu Larose  MRN: [id number]  YOB: 1965  Date of evaluation: 9/18/2020  Time:  11:25 AM     Procedure Summary     Date:  09/18/20 Room / Location:  [address] [place] [address] [facility]    Anesthesia Start:  1010 Anesthesia Stop:  1030    Procedure:  NERVE RADIOFREQUENCY ABLATION- L4-5, L5-S1 (Left ) Diagnosis:       (M47.817 LUMBOSACRAL SPONDYLOSIS WITHOUT MYELOPATHY)      (M51.36 DEGENERATIVE [address])      (M54.5, G89.29 CHRONIC LOW BACK PAIN)    Surgeon:  Felix Tirado MD Responsible Provider:  BRITTON Melissa CRNA    Anesthesia Type:  MAC ASA Status:  3          Anesthesia Type: MAC    Lowell Phase I:      Lowell Phase II: Lowell Score: 10    Last vitals: Reviewed and per EMR flowsheets.        Anesthesia Post Evaluation    Patient location during evaluation: PACU  Patient participation: complete - patient participated  Level of consciousness: awake and alert  Airway patency: patent  Nausea & Vomiting: no nausea and no vomiting  Complications: no  Cardiovascular status: hemodynamically stable  Respiratory status: nasal cannula and spontaneous ventilation  Hydration status: euvolemic

## 2020-09-21 ENCOUNTER — HOSPITAL ENCOUNTER (OUTPATIENT)
Dept: PREADMISSION TESTING | Age: 55
Discharge: HOME | End: 2020-09-25
Payer: COMMERCIAL

## 2020-09-21 PROCEDURE — U0003 INFECTIOUS AGENT DETECTION BY NUCLEIC ACID (DNA OR RNA); SEVERE ACUTE RESPIRATORY SYNDROME CORONAVIRUS 2 (SARS-COV-2) (CORONAVIRUS DISEASE [COVID-19]), AMPLIFIED PROBE TECHNIQUE, MAKING USE OF HIGH THROUGHPUT TECHNOLOGIES AS DESCRIBED BY CMS-2020-01-R: HCPCS

## 2020-09-22 ENCOUNTER — ANESTHESIA EVENT (OUTPATIENT)
Dept: OPERATING ROOM | Age: 55
End: 2020-09-22
Payer: COMMERCIAL

## 2020-09-22 RX ORDER — SODIUM CHLORIDE 0.9 % (FLUSH) 0.9 %
10 SYRINGE (ML) INJECTION EVERY 12 HOURS SCHEDULED
Status: CANCELLED | OUTPATIENT
Start: 2020-09-22

## 2020-09-22 RX ORDER — SODIUM CHLORIDE 0.9 % (FLUSH) 0.9 %
10 SYRINGE (ML) INJECTION PRN
Status: CANCELLED | OUTPATIENT
Start: 2020-09-22

## 2020-09-23 LAB — SARS-COV-2, NAA: NOT DETECTED

## 2020-09-23 NOTE — PLAN OF CARE
Spoke with pt concerning arrival time of 930 on 9/25. Aware of location, npo after midnight and need for . Denies being on antibx but takes Brilinta and Aspirin.  Dr. Laura Andersen instructed pt no need to stop unless instructed by ordering MD.

## 2020-09-25 ENCOUNTER — APPOINTMENT (OUTPATIENT)
Dept: GENERAL RADIOLOGY | Age: 55
End: 2020-09-25
Attending: PAIN MEDICINE
Payer: COMMERCIAL

## 2020-09-25 ENCOUNTER — ANESTHESIA (OUTPATIENT)
Dept: OPERATING ROOM | Age: 55
End: 2020-09-25
Payer: COMMERCIAL

## 2020-09-25 ENCOUNTER — HOSPITAL ENCOUNTER (OUTPATIENT)
Age: 55
Setting detail: OUTPATIENT SURGERY
Discharge: HOME | End: 2020-09-25
Attending: PAIN MEDICINE | Admitting: PAIN MEDICINE
Payer: COMMERCIAL

## 2020-09-25 VITALS — DIASTOLIC BLOOD PRESSURE: 67 MMHG | OXYGEN SATURATION: 99 % | SYSTOLIC BLOOD PRESSURE: 130 MMHG

## 2020-09-25 VITALS
TEMPERATURE: 97.7 F | HEART RATE: 65 BPM | HEIGHT: 64 IN | WEIGHT: 229 LBS | BODY MASS INDEX: 39.09 KG/M2 | OXYGEN SATURATION: 96 % | RESPIRATION RATE: 14 BRPM | DIASTOLIC BLOOD PRESSURE: 64 MMHG | SYSTOLIC BLOOD PRESSURE: 117 MMHG

## 2020-09-25 LAB — GLUCOSE BLD-MCNC: 127 MG/DL (ref 65–105)

## 2020-09-25 PROCEDURE — 64635 DESTROY LUMB/SAC FACET JNT: CPT | Performed by: PAIN MEDICINE

## 2020-09-25 PROCEDURE — 64636 DESTROY L/S FACET JNT ADDL: CPT | Performed by: PAIN MEDICINE

## 2020-09-25 PROCEDURE — 7100000011 HC PHASE II RECOVERY - ADDTL 15 MIN: Performed by: PAIN MEDICINE

## 2020-09-25 PROCEDURE — 3700000000 HC ANESTHESIA ATTENDED CARE: Performed by: PAIN MEDICINE

## 2020-09-25 PROCEDURE — 82947 ASSAY GLUCOSE BLOOD QUANT: CPT

## 2020-09-25 PROCEDURE — 3700000001 HC ADD 15 MINUTES (ANESTHESIA): Performed by: PAIN MEDICINE

## 2020-09-25 PROCEDURE — 2500000003 HC RX 250 WO HCPCS: Performed by: PAIN MEDICINE

## 2020-09-25 PROCEDURE — 3600000002 HC SURGERY LEVEL 2 BASE: Performed by: PAIN MEDICINE

## 2020-09-25 PROCEDURE — 6360000002 HC RX W HCPCS: Performed by: NURSE ANESTHETIST, CERTIFIED REGISTERED

## 2020-09-25 PROCEDURE — 2720000010 HC SURG SUPPLY STERILE: Performed by: PAIN MEDICINE

## 2020-09-25 PROCEDURE — 2709999900 HC NON-CHARGEABLE SUPPLY: Performed by: PAIN MEDICINE

## 2020-09-25 PROCEDURE — 3600000012 HC SURGERY LEVEL 2 ADDTL 15MIN: Performed by: PAIN MEDICINE

## 2020-09-25 PROCEDURE — 3209999900 FLUORO FOR SURGICAL PROCEDURES

## 2020-09-25 PROCEDURE — 7100000010 HC PHASE II RECOVERY - FIRST 15 MIN: Performed by: PAIN MEDICINE

## 2020-09-25 RX ORDER — FENTANYL CITRATE 50 UG/ML
25 INJECTION, SOLUTION INTRAMUSCULAR; INTRAVENOUS EVERY 5 MIN PRN
Status: DISCONTINUED | OUTPATIENT
Start: 2020-09-25 | End: 2020-09-25 | Stop reason: HOSPADM

## 2020-09-25 RX ORDER — PROMETHAZINE HYDROCHLORIDE 25 MG/ML
6.25 INJECTION, SOLUTION INTRAMUSCULAR; INTRAVENOUS
Status: DISCONTINUED | OUTPATIENT
Start: 2020-09-25 | End: 2020-09-25 | Stop reason: HOSPADM

## 2020-09-25 RX ORDER — MIDAZOLAM HYDROCHLORIDE 1 MG/ML
INJECTION INTRAMUSCULAR; INTRAVENOUS PRN
Status: DISCONTINUED | OUTPATIENT
Start: 2020-09-25 | End: 2020-09-25 | Stop reason: SDUPTHER

## 2020-09-25 RX ORDER — DIPHENHYDRAMINE HYDROCHLORIDE 50 MG/ML
12.5 INJECTION INTRAMUSCULAR; INTRAVENOUS
Status: DISCONTINUED | OUTPATIENT
Start: 2020-09-25 | End: 2020-09-25 | Stop reason: HOSPADM

## 2020-09-25 RX ORDER — HYDRALAZINE HYDROCHLORIDE 20 MG/ML
5 INJECTION INTRAMUSCULAR; INTRAVENOUS EVERY 10 MIN PRN
Status: DISCONTINUED | OUTPATIENT
Start: 2020-09-25 | End: 2020-09-25 | Stop reason: HOSPADM

## 2020-09-25 RX ORDER — MEPERIDINE HYDROCHLORIDE 50 MG/ML
12.5 INJECTION INTRAMUSCULAR; INTRAVENOUS; SUBCUTANEOUS EVERY 5 MIN PRN
Status: DISCONTINUED | OUTPATIENT
Start: 2020-09-25 | End: 2020-09-25 | Stop reason: HOSPADM

## 2020-09-25 RX ORDER — FENTANYL CITRATE 50 UG/ML
INJECTION, SOLUTION INTRAMUSCULAR; INTRAVENOUS PRN
Status: DISCONTINUED | OUTPATIENT
Start: 2020-09-25 | End: 2020-09-25 | Stop reason: SDUPTHER

## 2020-09-25 RX ORDER — HYDROCODONE BITARTRATE AND ACETAMINOPHEN 5; 325 MG/1; MG/1
1 TABLET ORAL PRN
Status: DISCONTINUED | OUTPATIENT
Start: 2020-09-25 | End: 2020-09-25 | Stop reason: HOSPADM

## 2020-09-25 RX ORDER — HYDROCODONE BITARTRATE AND ACETAMINOPHEN 5; 325 MG/1; MG/1
2 TABLET ORAL PRN
Status: DISCONTINUED | OUTPATIENT
Start: 2020-09-25 | End: 2020-09-25 | Stop reason: HOSPADM

## 2020-09-25 RX ORDER — BUPIVACAINE HYDROCHLORIDE 2.5 MG/ML
INJECTION, SOLUTION EPIDURAL; INFILTRATION; INTRACAUDAL PRN
Status: DISCONTINUED | OUTPATIENT
Start: 2020-09-25 | End: 2020-09-25 | Stop reason: ALTCHOICE

## 2020-09-25 RX ORDER — MORPHINE SULFATE 1 MG/ML
1 INJECTION, SOLUTION EPIDURAL; INTRATHECAL; INTRAVENOUS EVERY 5 MIN PRN
Status: DISCONTINUED | OUTPATIENT
Start: 2020-09-25 | End: 2020-09-25 | Stop reason: HOSPADM

## 2020-09-25 RX ORDER — ONDANSETRON 2 MG/ML
4 INJECTION INTRAMUSCULAR; INTRAVENOUS
Status: DISCONTINUED | OUTPATIENT
Start: 2020-09-25 | End: 2020-09-25 | Stop reason: HOSPADM

## 2020-09-25 RX ADMIN — FENTANYL CITRATE 100 MCG: 50 INJECTION INTRAMUSCULAR; INTRAVENOUS at 10:07

## 2020-09-25 RX ADMIN — MIDAZOLAM HYDROCHLORIDE 2 MG: 1 INJECTION, SOLUTION INTRAMUSCULAR; INTRAVENOUS at 10:07

## 2020-09-25 ASSESSMENT — PULMONARY FUNCTION TESTS
PIF_VALUE: 0

## 2020-09-25 ASSESSMENT — PAIN DESCRIPTION - LOCATION
LOCATION: BACK
LOCATION: BACK

## 2020-09-25 ASSESSMENT — PAIN SCALES - GENERAL
PAINLEVEL_OUTOF10: 4
PAINLEVEL_OUTOF10: 4

## 2020-09-25 ASSESSMENT — PAIN DESCRIPTION - PROGRESSION: CLINICAL_PROGRESSION: NOT CHANGED

## 2020-09-25 ASSESSMENT — PAIN DESCRIPTION - FREQUENCY
FREQUENCY: CONTINUOUS
FREQUENCY: CONTINUOUS

## 2020-09-25 ASSESSMENT — PAIN DESCRIPTION - PAIN TYPE
TYPE: CHRONIC PAIN
TYPE: CHRONIC PAIN

## 2020-09-25 ASSESSMENT — PAIN - FUNCTIONAL ASSESSMENT
PAIN_FUNCTIONAL_ASSESSMENT: 0-10
PAIN_FUNCTIONAL_ASSESSMENT: 0-10

## 2020-09-25 NOTE — H&P
Pain Pre-Op H&P Note    Milagros Castro    HPI: Pawan Mai  presents with low back pain. Past Medical History:   Diagnosis Date    Abnormal stress test     Allergic rhinitis     Arthritis     CAD (coronary artery disease)     Dr. Paula Armstrong last seen 2020    Chronic back pain     Diabetes mellitus ([facility] [address]     Former smoker 2020    30-year history.   Quit in 2018    Hyperlipidemia     Dr. Verner Sauce Hypertension     Dr. Delio Cook, cardiac 2020    SRI on CPAP     instructed to bring Dr. Chey Smalls Wears prescription eyeglasses     Wellness examination     Dr. Misael Moeller last seen 2020       Past Surgical History:   Procedure Laterality Date    ANESTHESIA NERVE BLOCK Left 2019    NERVE BLOCK (DEFINE) - # 1 L5-S1 performed by Radha [facility]aly MD at [facility] Bilateral 2020    NERVE BLOCK BILATERAL - B MBB # 1 L4-5, L5-S1 performed by Radha Healy MD at [facility] Bilateral 2020    NERVE BLOCK BILATERAL - L-5, L5-S1 [facility] [facility]  2017    CARDIAC CATHETERIZATION  2019    CARDIAC CATHETERIZATION  2018    1 stent mid LAD   [address]  02/10/2020    for chest pain no new blockage    CARPAL TUNNEL RELEASE Right      SECTION      x3    COLONOSCOPY N/A 2018    COLONOSCOPY WITH BIOPSY performed by David Palacios MD at [place] [address]      cataract surgery left eye    FOOT SURGERY      HAND TENDON SURGERY [facility]. INJECTION PROCEDURE FOR SACROILIAC JOINT Left 2019    SACROILIAC JOINT INJECTION - #1 performed by Radha Healy MD at [facility]  2020     NERVE BLOCK BILATERAL - B MBB # 1 L4-5, L5-S1 (Bilateral     NERVE BLOCK Bilateral 2020    NERVE BLOCK BILATERAL - L-5, L5-S1 (Bilateral )     NERVE BLOCK Left 2020    NERVE RADIOFREQUENCY ABLATION- L4-5, L5-S1    PAIN MANAGEMENT PROCEDURE Left 2020    NERVE RADIOFREQUENCY ABLATION- L4-5, L5-S1 performed by Jazmyn Alexander MD at [address] [place] [address]         Family History   Problem Relation Age of Onset    Stroke Father     Other Sister         homeless    Diabetes Maternal Grandmother        Allergies   Allergen Reactions    Bee Venom Hives    Tetracyclines & Related Nausea Only and Other (See Comments)     dizziness    Ace Inhibitors Other (See Comments)     cough         Current Facility-Administered Medications:     morphine (PF) injection 1 mg, 1 mg, Intravenous, Q5 Min PRN, Alma Vincent MD    HYDROmorphone (DILAUDID) injection 0.5 mg, 0.5 mg, Intravenous, Q5 Min PRN, Alma Vincent MD    fentaNYL (SUBLIMAZE) injection 25 mcg, 25 mcg, Intravenous, Q5 Min PRN, Alma Vincent MD    HYDROcodone-acetaminophen (NORCO) 5-325 MG per tablet 1 tablet, 1 tablet, Oral, PRN **OR** HYDROcodone-acetaminophen (NORCO) 5-325 MG per tablet 2 tablet, 2 tablet, Oral, PRN, Alma Vincent MD    diphenhydrAMINE (BENADRYL) injection 12.5 mg, 12.5 mg, Intravenous, Once PRN, Alma Vincent MD    ondansetron [facility]) injection 4 mg, 4 mg, Intravenous, Once PRN, Alma Vincent MD    promethazine (PHENERGAN) injection 6.25 mg, 6.25 mg, Intramuscular, Once PRN, Alma Vincent MD    hydrALAZINE (APRESOLINE) injection 5 mg, 5 mg, Intravenous, Q10 Min PRN, Alma Vincent MD    meperidine (DEMEROL) injection 12.5 mg, 12.5 mg, Intravenous, Q5 Min PRN, Alma Vincent MD    Social History     Tobacco Use    Smoking status: Former Smoker     Packs/day: 0.25     Years: 30.00     Pack years: 7.50     Types: Cigarettes     Last attempt to quit: 2018     Years since quittin.1    Smokeless tobacco: Never Used   Substance Use Topics    Alcohol use: No       Review of Systems:   Focused review of systems was performed, and negative as pertinent to diagnosis, except as stated in HPI. Physical Exam:     Ht 5' 4\" (1.626 m)   Wt 229 lb (103.9 kg)   BMI 39.31 kg/m²     Physical Exam  Constitutional:       Appearance: Normal appearance. HENT:      Head: Normocephalic and atraumatic. Eyes:      General: Lids are normal.   Neck:      Musculoskeletal: Normal range of motion. Pulmonary:      Effort: Pulmonary effort is normal.   Skin:     Comments: Visualized skin with no rash   Neurological:      Mental Status: She is alert. Psychiatric:         Attention and Perception: Attention and perception normal.         Mood and Affect: Mood and affect normal.            Patient's current physical status, medications, medical history, and HPI have been reviewed and updated as appropriate on this date: 09/25/20    Risk/Benefit(s): The risks, benefits, alternatives, and potential complications have been discussed with the patient/family and informed consent has been obtained for the procedure/sedation.     Diagnosis:   M47.817 LUMBOSACRAL SPONDYLOSIS WITHOUT MYELOPATHY  M54.5, G89.29 CHRONIC LOW BACK PAIN      Plan: lumbar RFA        Valorie Barrett

## 2020-09-25 NOTE — ANESTHESIA PRE PROCEDURE
Department of Anesthesiology  Preprocedure Note       Name:  Kirk [place]ille   Age:  63 y.o.  :  1965                                          MRN:  [id number]         Date:  2020      Surgeon: Roz Garza):  Monica Hanks MD    Procedure: Procedure(s):  NERVE RADIOFREQUENCY ABLATION    Medications prior to admission:   Prior to Admission medications    Medication Sig Start Date End Date Taking? Authorizing Provider   nitroGLYCERIN (NITROSTAT) 0.4 MG SL tablet place 1 tablet under the tongue if needed every 5 minutes for chest pain for 3 doses IF NO RELIEF AFTER FIRST DOSE CALL PRESCRIBER . 20   Vergie Hatchet, MD   omeprazole (PRILOSEC) 40 MG delayed release capsule take 1 capsule by mouth once daily 7/15/20   Vergie Hatchet, MD   metoprolol tartrate (LOPRESSOR) 25 MG tablet take 1 tablet by mouth twice a day 7/15/20   Vergie Hatchet, MD   losartan (COZAAR) 100 MG tablet take 1 tablet by mouth once daily 7/15/20   Vergie Hatchet, MD   metFORMIN (GLUCOPHAGE-XR) 500 MG extended release tablet Take 500 mg a day for 2 weeks, then increase the dose to twice a day. 3/16/20   Vergie Hatchet, MD   melatonin 3 MG TABS tablet Take 3 mg by mouth nightly as needed Stopping 3/10    Historical Provider, MD   atorvastatin (LIPITOR) 80 MG tablet Take 1 tablet by mouth daily 20   Vergie Hatchet, MD   amLODIPine (NORVASC) 5 MG tablet Take by mouth daily 3/4/19   Historical Provider, MD   hydrochlorothiazide (HYDRODIURIL) 25 MG tablet Take 25 mg by mouth daily    Historical Provider, MD   Multiple Vitamins-Minerals (ONE-A-DAY 50 PLUS PO) Take 1 tablet by mouth daily    Historical Provider, MD   ticagrelor (BRILINTA) 90 MG TABS tablet Take 1 tablet by mouth 2 times daily 18   Tyler Jacob MD   aspirin 81 MG tablet Take 1 tablet by mouth daily (with breakfast) 16   Vergie Hatchet, MD       Current medications:    No current facility-administered medications for this encounter. Allergies: Allergies   Allergen Reactions    Bee Venom Hives    Tetracyclines & Related Nausea Only and Other (See Comments)     dizziness    Ace Inhibitors Other (See Comments)     cough       Problem List:    Patient Active Problem List   Diagnosis Code    Chronic back pain M54.9, G89.29    Hypertension I10    SRI on CPAP G47.33, Z99.89    Mixed hyperlipidemia E78.2    S/P drug eluting coronary stent placement - Mid LAD 7/25/18-Dr. lea Z95.5    Coronary artery disease involving native coronary artery of native heart with unstable angina pectoris (HCC) I25.110    Class 2 obesity due to excess calories with body mass index (BMI) of 39.0 to 39.9 in adult E66.09, Z68.39    Acute bronchitis due to other specified organisms J20.8    Hyperplastic colon polyp K63.5    Unstable angina (HCC) I20.0    BMI 38.0-38.9,adult Z68.38    Post PTCA Z80.64    Former smoker Z87.891    Hypokalemia E87.6    Hyperglycemia R73.9    Murmur, cardiac R01.1    Type 2 diabetes mellitus without complication, without long-term current use of insulin (HCC) E11.9       Past Medical History:        Diagnosis Date    Abnormal stress test     Allergic rhinitis     Arthritis     CAD (coronary artery disease)     Dr. America Lockett last seen 2/20/2020    Chronic back pain     Diabetes mellitus ([address]     Former smoker 2/7/2020    30-year history.   Quit in 2018    Hyperlipidemia     Dr. Boyd Carvalho Hypertension     Dr. Romero Rey, cardiac 2/8/2020    SRI on CPAP     instructed to bring Dr. Larry Wilson Wears prescription eyeglasses     Wellness examination     Dr. Maci Carbone last seen 2/13/2020       Past Surgical History:        Procedure Laterality Date    ANESTHESIA NERVE BLOCK Left 12/19/2019    NERVE BLOCK (DEFINE) - # 1 L5-S1 performed by Harsha Mckeon MD at [facility] Bilateral 7/17/2020    NERVE BLOCK BILATERAL - B MBB # 1 L4-5, L5-S1 performed by Harsha Mckeon MD BMI:   Wt Readings from Last 3 Encounters:   09/25/20 229 lb (103.9 kg)   09/18/20 232 lb 6.4 oz (105.4 kg)   08/27/20 234 lb (106.1 kg)     Body mass index is 39.31 kg/m². CBC:   Lab Results   Component Value Date    WBC 6.1 02/10/2020    RBC 5.00 02/10/2020    HGB 13.4 03/05/2020    HCT 40.9 03/05/2020    MCV 85.8 02/10/2020    RDW 13.5 02/10/2020     08/07/2020       CMP:   Lab Results   Component Value Date     03/05/2020    K 3.7 03/05/2020     03/05/2020    CO2 24 03/05/2020    BUN 11 03/05/2020    CREATININE 0.58 03/05/2020    GFRAA >60 03/05/2020    LABGLOM >60 03/05/2020    GLUCOSE 151 03/13/2020    PROT 6.5 02/08/2020    CALCIUM 9.5 02/10/2020    BILITOT 0.39 02/08/2020    ALKPHOS 106 02/08/2020    AST 20 02/08/2020    ALT 28 02/08/2020       POC Tests: No results for input(s): POCGLU, POCNA, POCK, POCCL, POCBUN, POCHEMO, POCHCT in the last 72 hours.     Coags:   Lab Results   Component Value Date    PROTIME 12.3 08/07/2020    INR 0.9 08/07/2020    APTT 29.1 08/07/2020       HCG (If Applicable): No results found for: PREGTESTUR, PREGSERUM, HCG, HCGQUANT     ABGs: No results found for: PHART, PO2ART, PLP6SGJ, COH3XGD, BEART, N4YXDHPX     Type & Screen (If Applicable):  No results found for: LABABO, LABRH    Drug/Infectious Status (If Applicable):  No results found for: HIV, HEPCAB    COVID-19 Screening (If Applicable):   Lab Results   Component Value Date    COVID19 Not Detected 09/21/2020    COVID19 Not Detected 07/14/2020         Anesthesia Evaluation  Patient summary reviewed and Nursing notes reviewed no history of anesthetic complications:   Airway: Mallampati: III  TM distance: >3 FB   Neck ROM: full  Mouth opening: > = 3 FB Dental: normal exam         Pulmonary:normal exam  breath sounds clear to auscultation  (+) sleep apnea: on CPAP,                             Cardiovascular:  Exercise tolerance: no interval change,   (+) hypertension: no interval change, angina: no interval change, CAD: no interval change,         Rhythm: regular  Rate: normal           Beta Blocker:  Dose within 24 Hrs         Neuro/Psych:   Negative Neuro/Psych ROS              GI/Hepatic/Renal:   (+) morbid obesity          Endo/Other:    (+) DiabetesType II DM, no interval change, , : arthritis: OA and no interval change. , . Abdominal:   (+) obese,     Abdomen: soft. Vascular: negative vascular ROS. Anesthesia Plan      MAC     ASA 3             Anesthetic plan and risks discussed with patient. Plan discussed with CRNA.                   Ron Avina MD   9/25/2020

## 2020-09-25 NOTE — ANESTHESIA POSTPROCEDURE EVALUATION
POST- ANESTHESIA EVALUATION       Pt Name: Apolinar Chaves  MRN: [id number]  [place]: 1965  Date of evaluation: 9/25/2020  Time:  10:57 AM      /64   Pulse 65   Temp 97.7 °F (36.5 °C) (Temporal)   Resp 14   Ht 5' 4\" (1.626 m)   Wt 229 lb (103.9 kg)   SpO2 96%   BMI 39.31 kg/m²      Consciousness Level  Awake  Cardiopulmonary Status  Stable  Pain Adequately Treated YES  Nausea / Vomiting  NO  Adequate Hydration  YES  Anesthesia Related Complications NONE      Electronically signed by Rosalba Low MD on 9/25/2020 at 10:57 AM       Department of Anesthesiology  Postprocedure Note    Patient: Apolinar Chaves  MRN: [id number]  YOB: 1965  Date of evaluation: 9/25/2020  Time:  10:57 AM     Procedure Summary     Date:  09/25/20 Room / Location:  [place] [address] / [facility]    Anesthesia Start:  1006 Anesthesia Stop:  1022    Procedure:  NERVE RADIOFREQUENCY ABLATION (Right ) Diagnosis:       (M47.817 LUMBOSACRAL SPONDYLOSIS WITHOUT MYELOPATHY)      (M54.5, G89.29 CHRONIC LOW BACK PAIN)    Surgeon:  Kimani Allen MD Responsible Provider:  BRITTON Nazario CRNA    Anesthesia Type:  MAC ASA Status:  3          Anesthesia Type: MAC    Lowell Phase I: Lowell Score: 10    Lowell Phase II: Lowell Score: 9    Last vitals: Reviewed and per EMR flowsheets.        Anesthesia Post Evaluation

## 2020-09-25 NOTE — OP NOTE
Lumbar Radiofrequency Ablation:  SURGEON: Shruthi Castro    PRE-OP DIAGNOSIS: M47.817 (lumbosacral spondylosis), M54.5 (low back pain)    POST-OP DIAGNOSIS: Same. PROCEDURE PERFORMED: Radiofrequency Ablation Medial Branch Nerve at Right L4 - 5 and L5 - S1 facet joint(s). HISTORY AND INDICATIONS: Satisfactory response with previous RFA/ medial branch blocks at the indicated levels. Recurrence of painful symptoms attributed to the above diagnosis. The planned treatment is medically necessary to relieve pain, restore function and or reduce reliance on pain medication. EBL: minimal    CONSENT: Patient has undergone the educational process with this procedure, is aware and fully understands the risks involved: potential damage to any and all body organs including possible bleeding, infection, nerve injury, paralysis, allergic reaction and headache. Patient also understands that the procedure will be undertaken in a safe, controlled and monitored setting. Patient recognizes that the benefits may include relief from pain and reduction in the oral use of medications. Patient agreed to proceed. The patient was counseled at length about the risks of mason Covid-19 during their perioperative period and any recovery window from their procedure. The patient was made aware that mason Covid-19  may worsen their prognosis for recovering from their procedure  and lend to a higher morbidity and/or mortality risk. All material risks, benefits, and reasonable alternatives including postponing the procedure were discussed. The patient does wish to proceed with the procedure at this time. MONITORS INSTITUTED INCLUDE but not limited to:   Pulse Oximetry  Non-invasive blood pressure monitoring    PROCEDURE NOTE: The patient was taken to the procedure room and placed prone with the appropriate padding and positioning to assure patient comfort and physician access to the procedure site.  Time out was performed prior to starting the procedure. Fluoroscopic evaluation was utilized to target the appropriate treatment areas. The skin was prepped with antiseptic solution and draped sterilely. Then 0.5% lidocaine was used to anesthetize the skin and subcutaneous tissue. Under fluoroscopic guidance a 22 gauge x 10cm x 10mm active tip was advanced to the medial branch nerve at the indicated levels below to innervate the following facet joints Right L4 - 5 and L5 - S1 . The needles were placed sequentially. Position confirmed radiographically with the fluoroscope. Active tip corresponding at the base of the superior articulating process and/or sacral ala for the lumbar RFA. Motor stimulation checked at 2hz up to 3V and confirmed negative for radicular stimulation. After confirmation of the needle placement the patient received 1 ml of 0.25% marcaine to provide anesthesia. Radiofrequency was delivered to the lumbar region at 80 degrees for a limit of 90 seconds in length with no ill effect. The patient tolerated the procedure well and was transported to the recovery room where the patient was monitored with no complications and with stable vital signs. Patient was discharged with appropriate written discharge instructions. Follow-up discussed.       Tony Alpha

## 2020-10-06 ASSESSMENT — ENCOUNTER SYMPTOMS: BACK PAIN: 1

## 2020-10-06 NOTE — PROGRESS NOTES
HPI:     Back Pain   This is a chronic problem. The current episode started more than 1 year ago. The problem occurs constantly. The problem has been gradually improving since onset. The pain is present in the lumbar spine. The quality of the pain is described as aching and burning. The pain radiates to the left thigh and right knee. The pain is at a severity of 3/10. The pain is mild. The pain is worse during the night. The symptoms are aggravated by bending, sitting, twisting and position. Stiffness is present in the morning and at night. Pertinent negatives include no bowel incontinence, chest pain or fever. She has tried NSAIDs, ice and heat for the symptoms. The treatment provided moderate relief. History of 3 previous back surgeries. Continues have some lingering back pain. Had lumbar RFA which did seem to help her back pain but is now having pain at the bottom of her buttock. Does not seem to go down the legs. Denies any weakness or bowel or bladder incontinence but has some constant aching pain in the buttock area. She also had a discogram several months ago and has been having some pain and numbness since then. Patient denies any new neurological symptoms. Nobowel or bladder incontinence, no weakness, and no falling. Review of OARRS does not show any aberrant prescription behavior. Medication is helping the patient stay active. Patient denies any side effects and reports adequate analgesia. No sign of misuse/abuse. Past Medical History:   Diagnosis Date    Abnormal stress test     Allergic rhinitis     Arthritis     CAD (coronary artery disease)     Dr. Jef Álvarez last seen 2/20/2020    Chronic back pain     Diabetes mellitus ([facility] [address]     Former smoker 2/7/2020    30-year history.   Quit in 2018    Hyperlipidemia     Dr. Michelle Gonzalez Hypertension     Dr. Samuel Little, cardiac 2/8/2020    SRI on CPAP     instructed to bring Dr. Marcus Wong Wears prescription eyeglasses     Wellness  Tetracyclines & Related Nausea Only and Other (See Comments)     dizziness    Ace Inhibitors Other (See Comments)     cough         Current Outpatient Medications:     nitroGLYCERIN (NITROSTAT) 0.4 MG SL tablet, place 1 tablet under the tongue if needed every 5 minutes for chest pain for 3 doses IF NO RELIEF AFTER FIRST DOSE CALL PRESCRIBER ., Disp: 25 tablet, Rfl: 1    omeprazole (PRILOSEC) 40 MG delayed release capsule, take 1 capsule by mouth once daily, Disp: 30 capsule, Rfl: 5    metoprolol tartrate (LOPRESSOR) 25 MG tablet, take 1 tablet by mouth twice a day, Disp: 60 tablet, Rfl: 5    losartan (COZAAR) 100 MG tablet, take 1 tablet by mouth once daily, Disp: 30 tablet, Rfl: 5    metFORMIN (GLUCOPHAGE-XR) 500 MG extended release tablet, Take 500 mg a day for 2 weeks, then increase the dose to twice a day., Disp: 60 tablet, Rfl: 11    melatonin 3 MG TABS tablet, Take 3 mg by mouth nightly as needed Stopping 3/10, Disp: , Rfl:     atorvastatin (LIPITOR) 80 MG tablet, Take 1 tablet by mouth daily, Disp: 30 tablet, Rfl: 11    amLODIPine (NORVASC) 5 MG tablet, Take by mouth daily, Disp: , Rfl:     hydrochlorothiazide (HYDRODIURIL) 25 MG tablet, Take 25 mg by mouth daily, Disp: , Rfl:     Multiple Vitamins-Minerals (ONE-A-DAY 50 PLUS PO), Take 1 tablet by mouth daily, Disp: , Rfl:     ticagrelor (BRILINTA) 90 MG TABS tablet, Take 1 tablet by mouth 2 times daily, Disp: 60 tablet, Rfl: 0    aspirin 81 MG tablet, Take 1 tablet by mouth daily (with breakfast), Disp: 30 tablet, Rfl: 11    Family History   Problem Relation Age of Onset    Stroke Father     Other Sister         homeless    Diabetes Maternal Grandmother        Social History     Socioeconomic History    Marital status:      Spouse name: Not on file    Number of children: Not on file    Years of education: Not on file    Highest education level: Not on file   Occupational History    Not on file   Social Needs    Financial resource strain: Not on file    Food insecurity     Worry: Not on file     Inability: Not on file    Transportation needs     Medical: Not on file     Non-medical: Not on file   Tobacco Use    Smoking status: Former Smoker     Packs/day: 0.25     Years: 30.00     Pack years: 7.50     Types: Cigarettes     Last attempt to quit: 2018     Years since quittin.2    Smokeless tobacco: Never Used   Substance and Sexual Activity    Alcohol use: No    Drug use: No    Sexual activity: Not on file   Lifestyle    Physical activity     Days per week: Not on file     Minutes per session: Not on file    Stress: Not on file   Relationships    Social connections     Talks on phone: Not on file     Gets together: Not on file     Attends [employer] service: Not on file     Active member of club or organization: Not on file     Attends meetings of clubs or organizations: Not on file     Relationship status: Not on file    Intimate partner violence     Fear of current or ex partner: Not on file     Emotionally abused: Not on file     Physically abused: Not on file     Forced sexual activity: Not on file   Other Topics Concern    Not on file   Social History Narrative    Not on file       Review of Systems:  Review of Systems   Constitution: Negative for fever. Cardiovascular: Negative for chest pain. Respiratory: Negative for cough. Musculoskeletal: Positive for back pain. Gastrointestinal: Negative for bowel incontinence. Physical Exam:  BP (!) 161/92   Pulse 73   Temp 97.2 °F (36.2 °C) (Temporal)   Ht 5' 4\" (1.626 m)   Wt 235 lb (106.6 kg)   BMI 40.34 kg/m²     Physical Exam  Vitals signs reviewed. Musculoskeletal:      Lumbar back: She exhibits tenderness. She exhibits no edema and no deformity. Neurological:      Mental Status: She is alert and oriented to person, place, and time.       Gait: Gait normal.         Record/Diagnostics Review:    As above, I did review the imaging    Orders:  Orders Placed This Encounter   Procedures    MRI LUMBAR SPINE W WO CONTRAST    MRI THORACIC SPINE WO CONTRAST       Assessment:  1. Postlaminectomy syndrome, lumbar region    2. S/P drug eluting coronary stent placement - Mid LAD 7/25/18-Dr. lea    3. Sacroiliitis ([facility] [address]    4. Lumbar radiculopathy        Treatment Plan:  DISCUSSION: Treatment options discussed with patient and all questions answered to patient's satisfaction. OARRS Review: Reviewed and acceptable for medications prescribed. TREATMENT OPTIONS:     Discussed different treatment options including continued conservative care such as physical therapy, chiropractic care, acupuncture. Discussed different interventional options such as epidural steroids or medial branch blocks. Also discussed neuromodulation in the form of spinal cord stimulation. Also discussed surgical evaluation. On Brilinta for coronary artery disease -would need clearance to hold for certain interventions  Having some continued pain in her buttock since the RFA. Will obtain MRI lumbar spine with and without to reevaluate. We will also obtain MRI of her thoracic spine for possible consideration of spinal cord stimulation. Ciro Dozier M.D. I have reviewed the chief complaint and history of present illness (including ROS and PFSH) and vital documentation by my staff and I agree with their documentation and have added where applicable.

## 2020-10-08 ENCOUNTER — OFFICE VISIT (OUTPATIENT)
Dept: PAIN MANAGEMENT | Age: 55
End: 2020-10-08
Payer: COMMERCIAL

## 2020-10-08 VITALS
WEIGHT: 235 LBS | TEMPERATURE: 97.2 F | HEART RATE: 73 BPM | BODY MASS INDEX: 40.12 KG/M2 | HEIGHT: 64 IN | DIASTOLIC BLOOD PRESSURE: 92 MMHG | SYSTOLIC BLOOD PRESSURE: 161 MMHG

## 2020-10-08 PROCEDURE — 99214 OFFICE O/P EST MOD 30 MIN: CPT | Performed by: PAIN MEDICINE

## 2020-10-08 ASSESSMENT — ENCOUNTER SYMPTOMS
COUGH: 0
BOWEL INCONTINENCE: 0

## 2020-10-27 ENCOUNTER — HOSPITAL ENCOUNTER (OUTPATIENT)
Dept: MRI IMAGING | Age: 55
Discharge: HOME | End: 2020-10-29
Payer: COMMERCIAL

## 2020-10-27 LAB
GFR NON-AFRICAN AMERICAN: >60 ML/MIN
GFR SERPL CREATININE-BSD FRML MDRD: >60 ML/MIN
GFR SERPL CREATININE-BSD FRML MDRD: NORMAL ML/MIN/{1.73_M2}
POC CREATININE: 0.91 MG/DL (ref 0.51–1.19)

## 2020-10-27 PROCEDURE — 82565 ASSAY OF CREATININE: CPT

## 2020-10-27 PROCEDURE — 72146 MRI CHEST SPINE W/O DYE: CPT

## 2020-10-27 PROCEDURE — A9576 INJ PROHANCE MULTIPACK: HCPCS | Performed by: PAIN MEDICINE

## 2020-10-27 PROCEDURE — 6360000004 HC RX CONTRAST MEDICATION: Performed by: PAIN MEDICINE

## 2020-10-27 PROCEDURE — 72158 MRI LUMBAR SPINE W/O & W/DYE: CPT

## 2020-10-27 RX ORDER — SODIUM CHLORIDE 0.9 % (FLUSH) 0.9 %
10 SYRINGE (ML) INJECTION 2 TIMES DAILY
Status: DISCONTINUED | OUTPATIENT
Start: 2020-10-27 | End: 2020-10-30 | Stop reason: HOSPADM

## 2020-10-27 RX ADMIN — GADOTERIDOL 20 ML: 279.3 INJECTION, SOLUTION INTRAVENOUS at 09:09

## 2020-11-05 ENCOUNTER — OFFICE VISIT (OUTPATIENT)
Dept: PAIN MANAGEMENT | Age: 55
End: 2020-11-05
Payer: COMMERCIAL

## 2020-11-05 VITALS
BODY MASS INDEX: 40.12 KG/M2 | HEIGHT: 64 IN | TEMPERATURE: 97.2 F | SYSTOLIC BLOOD PRESSURE: 146 MMHG | WEIGHT: 235 LBS | HEART RATE: 81 BPM | DIASTOLIC BLOOD PRESSURE: 77 MMHG

## 2020-11-05 PROCEDURE — 99213 OFFICE O/P EST LOW 20 MIN: CPT | Performed by: PAIN MEDICINE

## 2020-11-05 ASSESSMENT — ENCOUNTER SYMPTOMS
BACK PAIN: 1
RESPIRATORY NEGATIVE: 1
BOWEL INCONTINENCE: 0

## 2020-11-05 NOTE — PROGRESS NOTES
HPI:     Back Pain   This is a chronic problem. The current episode started more than 1 year ago. The problem occurs constantly. The problem has been gradually improving since onset. The pain is present in the lumbar spine. The quality of the pain is described as aching and burning. The pain radiates to the left thigh and right thigh. The pain is at a severity of 4/10. The pain is mild. The pain is the same all the time. The symptoms are aggravated by bending, twisting and position. Stiffness is present all day. Pertinent negatives include no bladder incontinence, bowel incontinence, numbness, tingling or weakness. She has tried ice, heat and NSAIDs for the symptoms. The treatment provided mild relief. Chronic back pain. MRI with previous decompression multilevel degenerative changes. Recent RFA with some benefit. She has had epidurals in the past not benefit. She is on Brilinta for coronary artery disease. Was having some burning pain in her buttock after the RFA but this seems to have improved. Patient denies any new neurological symptoms. Nobowel or bladder incontinence, no weakness, and no falling. Review of OARRS does not show any aberrant prescription behavior. Medication is helping the patient stay active. Patient denies any side effects and reports adequate analgesia. No sign of misuse/abuse. Past Medical History:   Diagnosis Date    Abnormal stress test     Allergic rhinitis     Arthritis     CAD (coronary artery disease)     Dr. Renato Dave last seen 2/20/2020    Chronic back pain     Diabetes mellitus ([facility] [address]     Former smoker 2/7/2020    30-year history.   Quit in 2018    Hyperlipidemia     Dr. Angel Resendez Hypertension     Dr. Gayle Casas, cardiac 2/8/2020    SRI on CPAP     instructed to bring Dr. Devan Farley Wears prescription eyeglasses     Wellness examination     Dr. Paulette Cobb last seen 2/13/2020       Past Surgical History:   Procedure Laterality Date    ANESTHESIA NERVE BLOCK Left 2019    NERVE BLOCK (DEFINE) - # 1 L5-S1 performed by Arlette Young MD at [facility] Bilateral 2020    NERVE BLOCK BILATERAL - B MBB # 1 L4-5, L5-S1 performed by Arlette Young MD at [facility] Bilateral 2020    NERVE BLOCK BILATERAL - L-5, L5-S1 performed by Arlette Young MD at [address]  2017    CARDIAC CATHETERIZATION  2019    CARDIAC CATHETERIZATION  2018    1 stent mid LAD PT HAS XIENCE ALPINE HEART STENT, MRI CONDITIONAL 3T OK, SAFE IMMEDIATELY POST IMPLANT.  CARDIAC CATHETERIZATION  02/10/2020    for chest pain no new blockage    CARPAL TUNNEL RELEASE Right      SECTION      x3    COLONOSCOPY N/A 2018    COLONOSCOPY WITH BIOPSY performed by Pineda Colindres MD at [place] [address]  2014    cataract surgery left eye WITH IMPLANT. MRI OK.      FOOT SURGERY      HAND TENDON SURGERY Right     [facility] INJECTION PROCEDURE FOR SACROILIAC JOINT Left 2019    SACROILIAC JOINT INJECTION - #1 performed by Arlette Young MD at [facility]  2020     NERVE BLOCK BILATERAL - B MBB # 1 L4-5, L5-S1 (Bilateral     NERVE BLOCK Bilateral 2020    NERVE BLOCK BILATERAL - L-5, L5-S1 (Bilateral )     NERVE BLOCK Left 2020    NERVE RADIOFREQUENCY ABLATION- L4-5, L5-S1    NERVE BLOCK Right 2020     NERVE RADIOFREQUENCY ABLATION (Right )     PAIN MANAGEMENT PROCEDURE Left 2020    NERVE RADIOFREQUENCY ABLATION- L4-5, L5-S1 performed by Arlette Young MD at [address] [place] [address] Right 2020    NERVE RADIOFREQUENCY ABLATION performed by Arlette Young MD at [address] [place] [address]         Allergies   Allergen Reactions    Bee Venom Hives  Tetracyclines & Related Nausea Only and Other (See Comments)     dizziness    Ace Inhibitors Other (See Comments)     cough         Current Outpatient Medications:     nitroGLYCERIN (NITROSTAT) 0.4 MG SL tablet, place 1 tablet under the tongue if needed every 5 minutes for chest pain for 3 doses IF NO RELIEF AFTER FIRST DOSE CALL PRESCRIBER ., Disp: 25 tablet, Rfl: 1    omeprazole (PRILOSEC) 40 MG delayed release capsule, take 1 capsule by mouth once daily, Disp: 30 capsule, Rfl: 5    metoprolol tartrate (LOPRESSOR) 25 MG tablet, take 1 tablet by mouth twice a day, Disp: 60 tablet, Rfl: 5    losartan (COZAAR) 100 MG tablet, take 1 tablet by mouth once daily, Disp: 30 tablet, Rfl: 5    metFORMIN (GLUCOPHAGE-XR) 500 MG extended release tablet, Take 500 mg a day for 2 weeks, then increase the dose to twice a day., Disp: 60 tablet, Rfl: 11    melatonin 3 MG TABS tablet, Take 3 mg by mouth nightly as needed Stopping 3/10, Disp: , Rfl:     atorvastatin (LIPITOR) 80 MG tablet, Take 1 tablet by mouth daily, Disp: 30 tablet, Rfl: 11    amLODIPine (NORVASC) 5 MG tablet, Take by mouth daily, Disp: , Rfl:     hydrochlorothiazide (HYDRODIURIL) 25 MG tablet, Take 25 mg by mouth daily, Disp: , Rfl:     Multiple Vitamins-Minerals (ONE-A-DAY 50 PLUS PO), Take 1 tablet by mouth daily, Disp: , Rfl:     ticagrelor (BRILINTA) 90 MG TABS tablet, Take 1 tablet by mouth 2 times daily, Disp: 60 tablet, Rfl: 0    aspirin 81 MG tablet, Take 1 tablet by mouth daily (with breakfast), Disp: 30 tablet, Rfl: 11    Family History   Problem Relation Age of Onset    Stroke Father     Other Sister         homeless    Diabetes Maternal Grandmother        Social History     Socioeconomic History    Marital status:      Spouse name: Not on file    Number of children: Not on file    Years of education: Not on file    Highest education level: Not on file   Occupational History    Not on file   Social Needs    Financial resource strain: Not on file    Food insecurity     Worry: Not on file     Inability: Not on file    Transportation needs     Medical: Not on file     Non-medical: Not on file   Tobacco Use    Smoking status: Former Smoker     Packs/day: 0.25     Years: 30.00     Pack years: 7.50     Types: Cigarettes     Last attempt to quit: 2018     Years since quittin.2    Smokeless tobacco: Never Used   Substance and Sexual Activity    Alcohol use: No    Drug use: No    Sexual activity: Not on file   Lifestyle    Physical activity     Days per week: Not on file     Minutes per session: Not on file    Stress: Not on file   Relationships    Social connections     Talks on phone: Not on file     Gets together: Not on file     Attends Yarsanism service: Not on file     Active member of club or organization: Not on file     Attends meetings of clubs or organizations: Not on file     Relationship status: Not on file    Intimate partner violence     Fear of current or ex partner: Not on file     Emotionally abused: Not on file     Physically abused: Not on file     Forced sexual activity: Not on file   Other Topics Concern    Not on file   Social History Narrative    Not on file       Review of Systems:  Review of Systems   Constitution: Negative. Cardiovascular: Negative. Respiratory: Negative. Musculoskeletal: Positive for back pain. Gastrointestinal: Negative for bowel incontinence. Genitourinary: Negative for bladder incontinence. Neurological: Negative. Negative for numbness, tingling and weakness. Physical Exam:  BP (!) 146/77   Pulse 81   Temp 97.2 °F (36.2 °C)   Ht 5' 4\" (1.626 m)   Wt 235 lb (106.6 kg)   BMI 40.34 kg/m²     Physical Exam  Constitutional:       Appearance: Normal appearance. HENT:      Head: Normocephalic and atraumatic. Eyes:      General: Lids are normal.   Neck:      Musculoskeletal: Normal range of motion.    Pulmonary:      Effort: Pulmonary effort is normal.   Skin:     Comments: Visualized skin with no rash   Neurological:      Mental Status: She is alert. Psychiatric:         Attention and Perception: Attention and perception normal.         Mood and Affect: Mood and affect normal.         Record/Diagnostics Review:    As above, I did review the imaging      Assessment:  1. Lumbosacral spondylosis without myelopathy    2. Chronic bilateral low back pain, unspecified whether sciatica present    3. Postlaminectomy syndrome, lumbar region    4. S/P drug eluting coronary stent placement - Mid LAD 7/25/18-Dr. lea        Treatment Plan:  DISCUSSION: Treatment options discussed with patient and all questions answered to patient's satisfaction. OARRS Review: Reviewed and acceptable for medications prescribed. TREATMENT OPTIONS:     Discussed different treatment options including continued conservative care such as physical therapy, chiropractic care, acupuncture. Discussed different interventional options such as epidural steroids or medial branch blocks. Also discussed neuromodulation in the form of spinal cord stimulation. Also discussed surgical evaluation. Pain tolerable at this point, she will hold off on further treatment at this point. Discussed the importance of continued therapy exercise program.  Consider SCS  Repeat RFA glendy Navarro M.D. I have reviewed the chief complaint and history of present illness (including ROS and PFSH) and vital documentation by my staff and I agree with their documentation and have added where applicable.    [address]

## 2021-01-14 ENCOUNTER — HOSPITAL ENCOUNTER (OUTPATIENT)
Age: 56
Setting detail: OBSERVATION
Discharge: HOME | End: 2021-01-15
Attending: EMERGENCY MEDICINE | Admitting: FAMILY MEDICINE
Payer: COMMERCIAL

## 2021-01-14 ENCOUNTER — APPOINTMENT (OUTPATIENT)
Dept: GENERAL RADIOLOGY | Age: 56
End: 2021-01-14
Payer: COMMERCIAL

## 2021-01-14 DIAGNOSIS — R07.2 PRECORDIAL PAIN: Primary | ICD-10-CM

## 2021-01-14 PROBLEM — R07.9 CHEST PAIN: Status: ACTIVE | Noted: 2021-01-14

## 2021-01-14 LAB
ABSOLUTE EOS #: 0.11 K/UL (ref 0–0.44)
ABSOLUTE IMMATURE GRANULOCYTE: <0.03 K/UL (ref 0–0.3)
ABSOLUTE LYMPH #: 3.05 K/UL (ref 1.1–3.7)
ABSOLUTE MONO #: 0.69 K/UL (ref 0.1–1.2)
ANION GAP SERPL CALCULATED.3IONS-SCNC: 10 MMOL/L (ref 9–17)
BASOPHILS # BLD: 1 % (ref 0–2)
BASOPHILS ABSOLUTE: 0.05 K/UL (ref 0–0.2)
BUN BLDV-MCNC: 17 MG/DL (ref 6–20)
BUN/CREAT BLD: ABNORMAL (ref 9–20)
CALCIUM SERPL-MCNC: 9.5 MG/DL (ref 8.6–10.4)
CHLORIDE BLD-SCNC: 107 MMOL/L (ref 98–107)
CO2: 24 MMOL/L (ref 20–31)
CREAT SERPL-MCNC: 0.76 MG/DL (ref 0.5–0.9)
D-DIMER QUANTITATIVE: 0.25 MG/L FEU
DIFFERENTIAL TYPE: NORMAL
EOSINOPHILS RELATIVE PERCENT: 2 % (ref 1–4)
GFR AFRICAN AMERICAN: >60 ML/MIN
GFR NON-AFRICAN AMERICAN: >60 ML/MIN
GFR SERPL CREATININE-BSD FRML MDRD: ABNORMAL ML/MIN/{1.73_M2}
GFR SERPL CREATININE-BSD FRML MDRD: ABNORMAL ML/MIN/{1.73_M2}
GLUCOSE BLD-MCNC: 157 MG/DL (ref 70–99)
HCT VFR BLD CALC: 42.2 % (ref 36.3–47.1)
HEMOGLOBIN: 13.6 G/DL (ref 11.9–15.1)
IMMATURE GRANULOCYTES: 0 %
LYMPHOCYTES # BLD: 41 % (ref 24–43)
MCH RBC QN AUTO: 27.4 PG (ref 25.2–33.5)
MCHC RBC AUTO-ENTMCNC: 32.2 G/DL (ref 28.4–34.8)
MCV RBC AUTO: 85.1 FL (ref 82.6–102.9)
MONOCYTES # BLD: 9 % (ref 3–12)
NRBC AUTOMATED: 0 PER 100 WBC
PDW BLD-RTO: 13.2 % (ref 11.8–14.4)
PLATELET # BLD: 252 K/UL (ref 138–453)
PLATELET ESTIMATE: NORMAL
PMV BLD AUTO: 10.9 FL (ref 8.1–13.5)
POTASSIUM SERPL-SCNC: 4 MMOL/L (ref 3.7–5.3)
RBC # BLD: 4.96 M/UL (ref 3.95–5.11)
RBC # BLD: NORMAL 10*6/UL
SEG NEUTROPHILS: 47 % (ref 36–65)
SEGMENTED NEUTROPHILS ABSOLUTE COUNT: 3.45 K/UL (ref 1.5–8.1)
SODIUM BLD-SCNC: 141 MMOL/L (ref 135–144)
TROPONIN INTERP: NORMAL
TROPONIN INTERP: NORMAL
TROPONIN T: NORMAL NG/ML
TROPONIN T: NORMAL NG/ML
TROPONIN, HIGH SENSITIVITY: <6 NG/L (ref 0–14)
TROPONIN, HIGH SENSITIVITY: <6 NG/L (ref 0–14)
WBC # BLD: 7.4 K/UL (ref 3.5–11.3)
WBC # BLD: NORMAL 10*3/UL

## 2021-01-14 PROCEDURE — 85379 FIBRIN DEGRADATION QUANT: CPT

## 2021-01-14 PROCEDURE — G0378 HOSPITAL OBSERVATION PER HR: HCPCS

## 2021-01-14 PROCEDURE — 71046 X-RAY EXAM CHEST 2 VIEWS: CPT

## 2021-01-14 PROCEDURE — 93005 ELECTROCARDIOGRAM TRACING: CPT | Performed by: STUDENT IN AN ORGANIZED HEALTH CARE EDUCATION/TRAINING PROGRAM

## 2021-01-14 PROCEDURE — 6370000000 HC RX 637 (ALT 250 FOR IP): Performed by: STUDENT IN AN ORGANIZED HEALTH CARE EDUCATION/TRAINING PROGRAM

## 2021-01-14 PROCEDURE — 99285 EMERGENCY DEPT VISIT HI MDM: CPT

## 2021-01-14 PROCEDURE — 85025 COMPLETE CBC W/AUTO DIFF WBC: CPT

## 2021-01-14 PROCEDURE — 84484 ASSAY OF TROPONIN QUANT: CPT

## 2021-01-14 PROCEDURE — 80048 BASIC METABOLIC PNL TOTAL CA: CPT

## 2021-01-14 PROCEDURE — 99223 1ST HOSP IP/OBS HIGH 75: CPT | Performed by: NURSE PRACTITIONER

## 2021-01-14 PROCEDURE — G0378 HOSPITAL OBSERVATION PER HR: HCPCS | Performed by: NURSE PRACTITIONER

## 2021-01-14 RX ORDER — ASPIRIN 81 MG/1
324 TABLET, CHEWABLE ORAL ONCE
Status: COMPLETED | OUTPATIENT
Start: 2021-01-14 | End: 2021-01-14

## 2021-01-14 RX ORDER — CARVEDILOL 12.5 MG/1
6.25 TABLET ORAL ONCE
Status: COMPLETED | OUTPATIENT
Start: 2021-01-14 | End: 2021-01-14

## 2021-01-14 RX ORDER — AMLODIPINE BESYLATE 10 MG/1
5 TABLET ORAL ONCE
Status: COMPLETED | OUTPATIENT
Start: 2021-01-14 | End: 2021-01-14

## 2021-01-14 RX ORDER — NITROGLYCERIN 0.4 MG/1
0.4 TABLET SUBLINGUAL ONCE
Status: COMPLETED | OUTPATIENT
Start: 2021-01-14 | End: 2021-01-14

## 2021-01-14 RX ADMIN — TICAGRELOR 90 MG: 90 TABLET ORAL at 22:48

## 2021-01-14 RX ADMIN — NITROGLYCERIN 0.4 MG: 0.4 TABLET SUBLINGUAL at 21:50

## 2021-01-14 RX ADMIN — AMLODIPINE BESYLATE 5 MG: 10 TABLET ORAL at 21:50

## 2021-01-14 RX ADMIN — ASPIRIN 324 MG: 81 TABLET, CHEWABLE ORAL at 21:50

## 2021-01-14 RX ADMIN — CARVEDILOL 6.25 MG: 12.5 TABLET, FILM COATED ORAL at 22:48

## 2021-01-14 ASSESSMENT — PAIN DESCRIPTION - ORIENTATION
ORIENTATION: RIGHT
ORIENTATION: RIGHT

## 2021-01-14 ASSESSMENT — PAIN DESCRIPTION - FREQUENCY: FREQUENCY: CONTINUOUS

## 2021-01-14 ASSESSMENT — PAIN DESCRIPTION - LOCATION
LOCATION: CHEST
LOCATION: CHEST

## 2021-01-14 ASSESSMENT — PAIN SCALES - GENERAL
PAINLEVEL_OUTOF10: 3
PAINLEVEL_OUTOF10: 5

## 2021-01-15 ENCOUNTER — APPOINTMENT (OUTPATIENT)
Dept: CARDIAC CATH/INVASIVE PROCEDURES | Age: 56
End: 2021-01-15
Payer: COMMERCIAL

## 2021-01-15 VITALS
DIASTOLIC BLOOD PRESSURE: 87 MMHG | OXYGEN SATURATION: 98 % | BODY MASS INDEX: 39.59 KG/M2 | SYSTOLIC BLOOD PRESSURE: 141 MMHG | TEMPERATURE: 98.4 F | RESPIRATION RATE: 14 BRPM | WEIGHT: 231.9 LBS | HEIGHT: 64 IN | HEART RATE: 81 BPM

## 2021-01-15 PROBLEM — T82.897A CORONARY STENT OCCLUSION: Status: ACTIVE | Noted: 2021-01-15

## 2021-01-15 PROBLEM — E11.9 TYPE 2 DIABETES MELLITUS WITHOUT COMPLICATION, WITHOUT LONG-TERM CURRENT USE OF INSULIN (HCC): Status: ACTIVE | Noted: 2020-03-16

## 2021-01-15 PROBLEM — R01.1 MURMUR, CARDIAC: Status: ACTIVE | Noted: 2020-02-08

## 2021-01-15 PROBLEM — F17.200 SMOKER: Status: RESOLVED | Noted: 2018-08-02 | Resolved: 2019-02-05

## 2021-01-15 LAB
ALBUMIN SERPL-MCNC: 3.6 G/DL (ref 3.5–5.2)
ALBUMIN/GLOBULIN RATIO: 1.2 (ref 1–2.5)
ALP BLD-CCNC: 119 U/L (ref 35–104)
ALT SERPL-CCNC: 25 U/L (ref 5–33)
ANION GAP SERPL CALCULATED.3IONS-SCNC: 11 MMOL/L (ref 9–17)
AST SERPL-CCNC: 18 U/L
BILIRUB SERPL-MCNC: 0.26 MG/DL (ref 0.3–1.2)
BUN BLDV-MCNC: 17 MG/DL (ref 6–20)
BUN/CREAT BLD: ABNORMAL (ref 9–20)
CALCIUM SERPL-MCNC: 9.2 MG/DL (ref 8.6–10.4)
CHLORIDE BLD-SCNC: 107 MMOL/L (ref 98–107)
CHOLESTEROL/HDL RATIO: 2.9
CHOLESTEROL: 172 MG/DL
CO2: 21 MMOL/L (ref 20–31)
CREAT SERPL-MCNC: 0.63 MG/DL (ref 0.5–0.9)
EKG ATRIAL RATE: 75 BPM
EKG ATRIAL RATE: 79 BPM
EKG ATRIAL RATE: 82 BPM
EKG ATRIAL RATE: 91 BPM
EKG P AXIS: 52 DEGREES
EKG P AXIS: 52 DEGREES
EKG P AXIS: 57 DEGREES
EKG P AXIS: 61 DEGREES
EKG P-R INTERVAL: 140 MS
EKG P-R INTERVAL: 146 MS
EKG P-R INTERVAL: 150 MS
EKG P-R INTERVAL: 150 MS
EKG Q-T INTERVAL: 368 MS
EKG Q-T INTERVAL: 396 MS
EKG Q-T INTERVAL: 412 MS
EKG Q-T INTERVAL: 436 MS
EKG QRS DURATION: 86 MS
EKG QRS DURATION: 90 MS
EKG QTC CALCULATION (BAZETT): 452 MS
EKG QTC CALCULATION (BAZETT): 462 MS
EKG QTC CALCULATION (BAZETT): 472 MS
EKG QTC CALCULATION (BAZETT): 486 MS
EKG R AXIS: 21 DEGREES
EKG R AXIS: 26 DEGREES
EKG R AXIS: 31 DEGREES
EKG R AXIS: 40 DEGREES
EKG T AXIS: 141 DEGREES
EKG T AXIS: 151 DEGREES
EKG T AXIS: 156 DEGREES
EKG T AXIS: 157 DEGREES
EKG VENTRICULAR RATE: 75 BPM
EKG VENTRICULAR RATE: 79 BPM
EKG VENTRICULAR RATE: 82 BPM
EKG VENTRICULAR RATE: 91 BPM
ESTIMATED AVERAGE GLUCOSE: 134 MG/DL
GFR AFRICAN AMERICAN: >60 ML/MIN
GFR NON-AFRICAN AMERICAN: >60 ML/MIN
GFR SERPL CREATININE-BSD FRML MDRD: ABNORMAL ML/MIN/{1.73_M2}
GFR SERPL CREATININE-BSD FRML MDRD: ABNORMAL ML/MIN/{1.73_M2}
GLUCOSE BLD-MCNC: 127 MG/DL (ref 70–99)
HBA1C MFR BLD: 6.3 % (ref 4–6)
HCT VFR BLD CALC: 40.1 % (ref 36.3–47.1)
HDLC SERPL-MCNC: 59 MG/DL
HEMOGLOBIN: 12.7 G/DL (ref 11.9–15.1)
LDL CHOLESTEROL: 92 MG/DL (ref 0–130)
MAGNESIUM: 2 MG/DL (ref 1.6–2.6)
MCH RBC QN AUTO: 27.2 PG (ref 25.2–33.5)
MCHC RBC AUTO-ENTMCNC: 31.7 G/DL (ref 28.4–34.8)
MCV RBC AUTO: 85.9 FL (ref 82.6–102.9)
NRBC AUTOMATED: 0 PER 100 WBC
PDW BLD-RTO: 13.2 % (ref 11.8–14.4)
PLATELET # BLD: 225 K/UL (ref 138–453)
PMV BLD AUTO: 10.6 FL (ref 8.1–13.5)
POTASSIUM SERPL-SCNC: 4.1 MMOL/L (ref 3.7–5.3)
RBC # BLD: 4.67 M/UL (ref 3.95–5.11)
SODIUM BLD-SCNC: 139 MMOL/L (ref 135–144)
TOTAL PROTEIN: 6.5 G/DL (ref 6.4–8.3)
TRIGL SERPL-MCNC: 105 MG/DL
TROPONIN INTERP: NORMAL
TROPONIN INTERP: NORMAL
TROPONIN T: NORMAL NG/ML
TROPONIN T: NORMAL NG/ML
TROPONIN, HIGH SENSITIVITY: 6 NG/L (ref 0–14)
TROPONIN, HIGH SENSITIVITY: <6 NG/L (ref 0–14)
VLDLC SERPL CALC-MCNC: NORMAL MG/DL (ref 1–30)
WBC # BLD: 6.8 K/UL (ref 3.5–11.3)

## 2021-01-15 PROCEDURE — 93010 ELECTROCARDIOGRAM REPORT: CPT | Performed by: INTERNAL MEDICINE

## 2021-01-15 PROCEDURE — 99217 PR OBSERVATION CARE DISCHARGE MANAGEMENT: CPT | Performed by: FAMILY MEDICINE

## 2021-01-15 PROCEDURE — C1769 GUIDE WIRE: HCPCS

## 2021-01-15 PROCEDURE — 2500000003 HC RX 250 WO HCPCS

## 2021-01-15 PROCEDURE — 93005 ELECTROCARDIOGRAM TRACING: CPT | Performed by: NURSE PRACTITIONER

## 2021-01-15 PROCEDURE — 93454 CORONARY ARTERY ANGIO S&I: CPT | Performed by: INTERNAL MEDICINE

## 2021-01-15 PROCEDURE — 80061 LIPID PANEL: CPT

## 2021-01-15 PROCEDURE — 6360000002 HC RX W HCPCS: Performed by: NURSE PRACTITIONER

## 2021-01-15 PROCEDURE — 6360000004 HC RX CONTRAST MEDICATION

## 2021-01-15 PROCEDURE — 80053 COMPREHEN METABOLIC PANEL: CPT

## 2021-01-15 PROCEDURE — 6370000000 HC RX 637 (ALT 250 FOR IP): Performed by: NURSE PRACTITIONER

## 2021-01-15 PROCEDURE — G0378 HOSPITAL OBSERVATION PER HR: HCPCS

## 2021-01-15 PROCEDURE — 83036 HEMOGLOBIN GLYCOSYLATED A1C: CPT

## 2021-01-15 PROCEDURE — C1725 CATH, TRANSLUMIN NON-LASER: HCPCS

## 2021-01-15 PROCEDURE — 84484 ASSAY OF TROPONIN QUANT: CPT

## 2021-01-15 PROCEDURE — C1894 INTRO/SHEATH, NON-LASER: HCPCS

## 2021-01-15 PROCEDURE — 6360000002 HC RX W HCPCS

## 2021-01-15 PROCEDURE — 85027 COMPLETE CBC AUTOMATED: CPT

## 2021-01-15 PROCEDURE — 2580000003 HC RX 258: Performed by: NURSE PRACTITIONER

## 2021-01-15 PROCEDURE — 83735 ASSAY OF MAGNESIUM: CPT

## 2021-01-15 PROCEDURE — 96372 THER/PROPH/DIAG INJ SC/IM: CPT

## 2021-01-15 PROCEDURE — 2709999900 HC NON-CHARGEABLE SUPPLY

## 2021-01-15 PROCEDURE — 36415 COLL VENOUS BLD VENIPUNCTURE: CPT

## 2021-01-15 PROCEDURE — 6370000000 HC RX 637 (ALT 250 FOR IP): Performed by: INTERNAL MEDICINE

## 2021-01-15 RX ORDER — SODIUM CHLORIDE 0.9 % (FLUSH) 0.9 %
10 SYRINGE (ML) INJECTION EVERY 12 HOURS SCHEDULED
Status: DISCONTINUED | OUTPATIENT
Start: 2021-01-15 | End: 2021-01-15 | Stop reason: HOSPADM

## 2021-01-15 RX ORDER — UREA 10 %
3 LOTION (ML) TOPICAL NIGHTLY PRN
Status: DISCONTINUED | OUTPATIENT
Start: 2021-01-15 | End: 2021-01-15 | Stop reason: HOSPADM

## 2021-01-15 RX ORDER — ACETAMINOPHEN 325 MG/1
650 TABLET ORAL EVERY 4 HOURS PRN
Status: DISCONTINUED | OUTPATIENT
Start: 2021-01-15 | End: 2021-01-15 | Stop reason: HOSPADM

## 2021-01-15 RX ORDER — CARVEDILOL 6.25 MG/1
6.25 TABLET ORAL 2 TIMES DAILY WITH MEALS
Qty: 60 TABLET | Refills: 3 | Status: SHIPPED | OUTPATIENT
Start: 2021-01-15

## 2021-01-15 RX ORDER — ASPIRIN 81 MG/1
81 TABLET ORAL
Status: DISCONTINUED | OUTPATIENT
Start: 2021-01-15 | End: 2021-01-15 | Stop reason: HOSPADM

## 2021-01-15 RX ORDER — PROMETHAZINE HYDROCHLORIDE 12.5 MG/1
12.5 TABLET ORAL EVERY 6 HOURS PRN
Status: DISCONTINUED | OUTPATIENT
Start: 2021-01-15 | End: 2021-01-15 | Stop reason: HOSPADM

## 2021-01-15 RX ORDER — POTASSIUM CHLORIDE 7.45 MG/ML
10 INJECTION INTRAVENOUS PRN
Status: DISCONTINUED | OUTPATIENT
Start: 2021-01-15 | End: 2021-01-15 | Stop reason: HOSPADM

## 2021-01-15 RX ORDER — ACETAMINOPHEN 650 MG/1
650 SUPPOSITORY RECTAL EVERY 6 HOURS PRN
Status: DISCONTINUED | OUTPATIENT
Start: 2021-01-15 | End: 2021-01-15 | Stop reason: HOSPADM

## 2021-01-15 RX ORDER — ACETAMINOPHEN 325 MG/1
650 TABLET ORAL EVERY 6 HOURS PRN
Status: DISCONTINUED | OUTPATIENT
Start: 2021-01-15 | End: 2021-01-15 | Stop reason: SDUPTHER

## 2021-01-15 RX ORDER — PANTOPRAZOLE SODIUM 40 MG/1
40 TABLET, DELAYED RELEASE ORAL
Status: DISCONTINUED | OUTPATIENT
Start: 2021-01-15 | End: 2021-01-15 | Stop reason: HOSPADM

## 2021-01-15 RX ORDER — POTASSIUM CHLORIDE 20 MEQ/1
40 TABLET, EXTENDED RELEASE ORAL PRN
Status: DISCONTINUED | OUTPATIENT
Start: 2021-01-15 | End: 2021-01-15 | Stop reason: HOSPADM

## 2021-01-15 RX ORDER — AMLODIPINE BESYLATE 5 MG/1
5 TABLET ORAL DAILY
Status: DISCONTINUED | OUTPATIENT
Start: 2021-01-15 | End: 2021-01-15 | Stop reason: HOSPADM

## 2021-01-15 RX ORDER — NITROGLYCERIN 0.4 MG/1
0.4 TABLET SUBLINGUAL EVERY 5 MIN PRN
Status: DISCONTINUED | OUTPATIENT
Start: 2021-01-15 | End: 2021-01-15 | Stop reason: HOSPADM

## 2021-01-15 RX ORDER — ATORVASTATIN CALCIUM 80 MG/1
80 TABLET, FILM COATED ORAL DAILY
Status: DISCONTINUED | OUTPATIENT
Start: 2021-01-15 | End: 2021-01-15 | Stop reason: HOSPADM

## 2021-01-15 RX ORDER — MAGNESIUM SULFATE 1 G/100ML
1 INJECTION INTRAVENOUS PRN
Status: DISCONTINUED | OUTPATIENT
Start: 2021-01-15 | End: 2021-01-15 | Stop reason: HOSPADM

## 2021-01-15 RX ORDER — CARVEDILOL 6.25 MG/1
6.25 TABLET ORAL 2 TIMES DAILY WITH MEALS
Status: DISCONTINUED | OUTPATIENT
Start: 2021-01-15 | End: 2021-01-15 | Stop reason: HOSPADM

## 2021-01-15 RX ORDER — RANOLAZINE 500 MG/1
500 TABLET, EXTENDED RELEASE ORAL 2 TIMES DAILY
Status: DISCONTINUED | OUTPATIENT
Start: 2021-01-15 | End: 2021-01-15 | Stop reason: HOSPADM

## 2021-01-15 RX ORDER — RANOLAZINE 500 MG/1
500 TABLET, EXTENDED RELEASE ORAL 2 TIMES DAILY
Qty: 60 TABLET | Refills: 3 | Status: SHIPPED | OUTPATIENT
Start: 2021-01-15

## 2021-01-15 RX ORDER — SODIUM CHLORIDE 0.9 % (FLUSH) 0.9 %
10 SYRINGE (ML) INJECTION PRN
Status: DISCONTINUED | OUTPATIENT
Start: 2021-01-15 | End: 2021-01-15 | Stop reason: HOSPADM

## 2021-01-15 RX ORDER — HYDROCHLOROTHIAZIDE 25 MG/1
25 TABLET ORAL DAILY
Status: DISCONTINUED | OUTPATIENT
Start: 2021-01-15 | End: 2021-01-15 | Stop reason: HOSPADM

## 2021-01-15 RX ORDER — CARVEDILOL 6.25 MG/1
6.25 TABLET ORAL DAILY
Status: ON HOLD | COMMUNITY
End: 2021-01-15 | Stop reason: HOSPADM

## 2021-01-15 RX ORDER — LOSARTAN POTASSIUM 50 MG/1
100 TABLET ORAL DAILY
Status: DISCONTINUED | OUTPATIENT
Start: 2021-01-15 | End: 2021-01-15 | Stop reason: HOSPADM

## 2021-01-15 RX ORDER — ONDANSETRON 2 MG/ML
4 INJECTION INTRAMUSCULAR; INTRAVENOUS EVERY 6 HOURS PRN
Status: DISCONTINUED | OUTPATIENT
Start: 2021-01-15 | End: 2021-01-15 | Stop reason: HOSPADM

## 2021-01-15 RX ADMIN — Medication 81 MG: at 09:05

## 2021-01-15 RX ADMIN — RANOLAZINE 500 MG: 500 TABLET, FILM COATED, EXTENDED RELEASE ORAL at 15:16

## 2021-01-15 RX ADMIN — HYDROCHLOROTHIAZIDE 25 MG: 25 TABLET ORAL at 09:05

## 2021-01-15 RX ADMIN — Medication 10 ML: at 09:05

## 2021-01-15 RX ADMIN — NITROGLYCERIN 0.4 MG: 0.4 TABLET SUBLINGUAL at 00:35

## 2021-01-15 RX ADMIN — PANTOPRAZOLE SODIUM 40 MG: 40 TABLET, DELAYED RELEASE ORAL at 06:26

## 2021-01-15 RX ADMIN — TICAGRELOR 90 MG: 90 TABLET ORAL at 09:05

## 2021-01-15 RX ADMIN — ENOXAPARIN SODIUM 105 MG: 120 INJECTION SUBCUTANEOUS at 00:57

## 2021-01-15 ASSESSMENT — PAIN SCALES - GENERAL
PAINLEVEL_OUTOF10: 3
PAINLEVEL_OUTOF10: 5

## 2021-01-15 ASSESSMENT — ENCOUNTER SYMPTOMS
STRIDOR: 0
CONSTIPATION: 0
VOMITING: 0
NAUSEA: 0
COUGH: 0
ABDOMINAL PAIN: 0
DIARRHEA: 0
SHORTNESS OF BREATH: 0
BACK PAIN: 1
WHEEZING: 0
BLOOD IN STOOL: 0

## 2021-01-15 ASSESSMENT — PAIN DESCRIPTION - LOCATION: LOCATION: CHEST

## 2021-01-15 ASSESSMENT — PAIN DESCRIPTION - FREQUENCY: FREQUENCY: CONTINUOUS

## 2021-01-15 NOTE — H&P
is \"somewhat similar\" to her experiences with previous cardiac events however previous events she had bilateral epigastric right upper quadrant pain with pressure as if there was a band across to her however at this time only she had this experience to her right side and this in addition is sharp and stabbing. She admits that the past 3 days she was having intermittent midsternal chest pressure and pain with exertion and resting or stopping activity would resolve the pain. She admits that she has not been able to climb a flight of stairs without shortness of breath since her cardiac problems initially started and has had multiple episodes of chest pain that required cardiac cath without troponin elevation. Nesha Weinstein Her work-up in the emergency room showed a metabolic panel which was essentially normal with the exception of elevated glucose of 157. Her cardiac markers were normal with a high sensitive troponin less than 6. And a D-dimer within normal limits 0.25 . Her hemogram was unremarkable without acute leukocytosis or anemias. Her chest x-ray showed  No acute cardiopulmonary process, and the EKG showed sinus rhythm rate of 94, normal axis, unchanged prior T wave inversions in inferior lateral and precordial leads, QTC within normal limits. While in the emergency room patient was given home medications and sublingual nitro which did help resolve the chest pain and upon transfer to the floor she was chest pain-free. During my evaluation patient developed chest pain with new symptom of left epigastric left sided chest pain without radiation with the associated shortness of breath which she verbalizes is very similar to her original and routine symptoms/chest pain requiring intervention. She was given 1 sublingual nitro which did resolve the pain. Patient's last cardiac cath 2/2020 with a patent LAD stent and minimal CAD and small branch vessel disease with preserved LV function.           Past Medical History:     Past Medical History:   Diagnosis Date    Abnormal stress test     Allergic rhinitis     Arthritis     CAD (coronary artery disease)     Dr. Scott Rolle last seen 2020    Chronic back pain     Diabetes mellitus ([address]     Former smoker 2020    30-year history. Quit in 2018    Hyperlipidemia     Dr. Reese Garcia Hypertension     Dr. Mak Vargas, cardiac 2020    SRI on CPAP     instructed to bring Dr. Lucero Minaya Wears prescription eyeglasses     Wellness examination     Dr. Aaron Peres last seen 2020        Past Surgical History:     Past Surgical History:   Procedure Laterality Date    ANESTHESIA NERVE BLOCK Left 2019    NERVE BLOCK (DEFINE) - # 1 L5-S1 performed by Anthony Braswell MD at [facility] 2020    NERVE BLOCK BILATERAL - B MBB # 1 L4-5, L5-S1 performed by Anthony Braswell MD at [facility] 2020    NERVE BLOCK BILATERAL - L-5, L5-S1 [facility] [place]  2017    CARDIAC CATHETERIZATION  2019    CARDIAC CATHETERIZATION  2018    1 stent mid LAD PT HAS XIENCE ALPINE HEART STENT, MRI CONDITIONAL 3T OK, SAFE IMMEDIATELY POST IMPLANT.  CARDIAC CATHETERIZATION  02/10/2020    for chest pain no new blockage    CARPAL TUNNEL RELEASE Right      SECTION      x3    COLONOSCOPY N/A 2018    COLONOSCOPY WITH BIOPSY performed by Shira Guevara MD at [place] [address]  2014    cataract surgery left eye WITH IMPLANT. MRI OK.      FOOT SURGERY      HAND TENDON SURGERY Right     [facility] [place]. INJECTION PROCEDURE FOR SACROILIAC JOINT Left 2019    SACROILIAC JOINT INJECTION - #1 performed by Anthony Braswell MD at [facility]  2020     NERVE BLOCK BILATERAL - B MBB # 1 L4-5, L5-S1 (Bilateral     NERVE BLOCK Bilateral 08/14/2020    NERVE BLOCK BILATERAL - L-5, L5-S1 (Bilateral )     NERVE BLOCK Left 09/18/2020    NERVE RADIOFREQUENCY ABLATION- L4-5, L5-S1    NERVE BLOCK Right 09/25/2020     NERVE RADIOFREQUENCY ABLATION (Right )     PAIN MANAGEMENT PROCEDURE Left 9/18/2020    NERVE RADIOFREQUENCY ABLATION- L4-5, L5-S1 performed by Lesly Decker MD at [address] [place] [address] Right 9/25/2020    NERVE RADIOFREQUENCY ABLATION performed by Lesly Decker MD at [address] [place] [address]          Medications Prior to Admission:     Prior to Admission medications    Medication Sig Start Date End Date Taking? Authorizing Provider   nitroGLYCERIN (NITROSTAT) 0.4 MG SL tablet place 1 tablet under the tongue if needed every 5 minutes for chest pain for 3 doses IF NO RELIEF AFTER FIRST DOSE CALL PRESCRIBER . 9/13/20   Dulce Hollingsworth MD   omeprazole (PRILOSEC) 40 MG delayed release capsule take 1 capsule by mouth once daily 7/15/20   Dulce Hollingsworth MD   metoprolol tartrate (LOPRESSOR) 25 MG tablet take 1 tablet by mouth twice a day 7/15/20   Dulce Hollingsworth MD   losartan (COZAAR) 100 MG tablet take 1 tablet by mouth once daily 7/15/20   Dulce Hollingsworth MD   metFORMIN (GLUCOPHAGE-XR) 500 MG extended release tablet Take 500 mg a day for 2 weeks, then increase the dose to twice a day.  3/16/20   Dulce Hollingsworth MD   melatonin 3 MG TABS tablet Take 3 mg by mouth nightly as needed Stopping 3/10    Historical Provider, MD   atorvastatin (LIPITOR) 80 MG tablet Take 1 tablet by mouth daily 1/24/20   Dulce Hollingsworth MD   amLODIPine (NORVASC) 5 MG tablet Take by mouth daily 3/4/19   Historical Provider, MD   hydrochlorothiazide (HYDRODIURIL) 25 MG tablet Take 25 mg by mouth daily    Historical Provider, MD   Multiple Vitamins-Minerals (ONE-A-DAY 50 PLUS PO) Take 1 tablet by mouth daily    Historical Provider, MD   ticagrelor (BRILINTA) 90 MG TABS tablet Take 1 tablet by mouth 2 times daily 18   Dexter Castillo MD   aspirin 81 MG tablet Take 1 tablet by mouth daily (with breakfast) 16   Ramsey Singh MD        Allergies:     Bee venom, Tetracyclines & related, and Ace inhibitors    Social History:     Tobacco:    reports that she quit smoking about 2 years ago. Her smoking use included cigarettes. She has a 7.50 pack-year smoking history. She has never used smokeless tobacco.  Alcohol:      reports no history of alcohol use. Drug Use:  reports no history of drug use. Family History:     Family History   Problem Relation Age of Onset    Stroke Father     Other Sister         homeless    Diabetes Maternal Grandmother        Review of Systems:     Positive and Negative as described in HPI. Review of Systems   Constitutional: Negative for chills, diaphoresis and fever. HENT: Negative for congestion and hearing loss. Eyes: Positive for visual disturbance (blurring of vision of distance, x 2 weeks ). Respiratory: Negative for cough, shortness of breath, wheezing and stridor. Cardiovascular: Positive for chest pain. Negative for palpitations and leg swelling. Gastrointestinal: Negative for abdominal pain, blood in stool, constipation, diarrhea, nausea and vomiting. Endocrine: Positive for polydipsia and polyuria. Genitourinary: Negative for dysuria and frequency. Musculoskeletal: Positive for back pain. Negative for myalgias. Skin: Negative for rash. Neurological: Negative for dizziness, seizures and headaches. Psychiatric/Behavioral: The patient is not nervous/anxious. Physical Exam:   BP (!) 159/77   Pulse 85   Temp 97.8 °F (36.6 °C) (Temporal)   Resp 23   Ht 5' 4\" (1.626 m)   Wt 235 lb (106.6 kg)   SpO2 97%   BMI 40.34 kg/m²   Temp (24hrs), Av.8 °F (36.6 °C), Min:97.8 °F (36.6 °C), Max:97.8 °F (36.6 °C)    No results for input(s): POCGLU in the last 72 hours.   No intake or output data in the 24 hours ending 01/14/21 2841    Physical Exam  Vitals signs and nursing note reviewed. Constitutional:       General: She is not in acute distress. Appearance: She is well-developed. She is not diaphoretic. HENT:      Head: Normocephalic and atraumatic. Right Ear: Hearing normal.      Left Ear: Hearing normal.      Nose: Nose normal. No rhinorrhea. Eyes:      General: Lids are normal.      Extraocular Movements:      Right eye: Normal extraocular motion. Left eye: Normal extraocular motion. Conjunctiva/sclera: Conjunctivae normal.      Right eye: Right conjunctiva is not injected. Left eye: Left conjunctiva is not injected. Pupils: Pupils are equal, round, and reactive to light. Pupils are equal.      Right eye: Pupil is reactive. Left eye: Pupil is reactive. Neck:      Musculoskeletal: Neck supple. Thyroid: No thyromegaly. Vascular: No carotid bruit. Trachea: Trachea and phonation normal. No tracheal deviation. Cardiovascular:      Rate and Rhythm: Normal rate and regular rhythm. Pulses: Normal pulses. Heart sounds: Normal heart sounds. No murmur. Comments: Return of chest pain, with new symptom of left epigastric chest pain pressure. Non-reproducible  Improved with sublingual nitro  Pulmonary:      Effort: Pulmonary effort is normal. No respiratory distress. Breath sounds: Normal breath sounds. No stridor. No decreased breath sounds. Abdominal:      General: Bowel sounds are normal. There is no distension. Palpations: Abdomen is soft. There is no mass. Tenderness: There is no abdominal tenderness. There is no guarding. Musculoskeletal:         General: No tenderness. Right lower leg: No edema. Left lower leg: No edema. Skin:     General: Skin is warm and dry. Findings: No erythema, lesion or rash. Neurological:      General: No focal deficit present.       Mental Status: She is alert and oriented to person, place, and time. She is not disoriented. GCS: GCS eye subscore is 4. GCS verbal subscore is 5. GCS motor subscore is 6. Cranial Nerves: No cranial nerve deficit. Sensory: No sensory deficit. Psychiatric:         Attention and Perception: Attention normal.         Mood and Affect: Mood normal.         Speech: Speech normal.         Behavior: Behavior normal. Behavior is cooperative.          Cognition and Memory: Cognition normal.         Judgment: Judgment normal.         Investigations:      Laboratory Testing:  Recent Results (from the past 24 hour(s))   Troponin    Collection Time: 01/14/21  9:50 PM   Result Value Ref Range    Troponin, High Sensitivity <6 0 - 14 ng/L    Troponin T NOT REPORTED <0.03 ng/mL    Troponin Interp NOT REPORTED    D-DIMER, QUANTITATIVE    Collection Time: 01/14/21  9:50 PM   Result Value Ref Range    D-Dimer, Quant 0.25 mg/L FEU   CBC WITH AUTO DIFFERENTIAL    Collection Time: 01/14/21  9:50 PM   Result Value Ref Range    WBC 7.4 3.5 - 11.3 k/uL    RBC 4.96 3.95 - 5.11 m/uL    Hemoglobin 13.6 11.9 - 15.1 g/dL    Hematocrit 42.2 36.3 - 47.1 %    MCV 85.1 82.6 - 102.9 fL    MCH 27.4 25.2 - 33.5 pg    MCHC 32.2 28.4 - 34.8 g/dL    RDW 13.2 11.8 - 14.4 %    Platelets 946 150 - 659 k/uL    MPV 10.9 8.1 - 13.5 fL    NRBC Automated 0.0 0.0 per 100 WBC    Differential Type NOT REPORTED     Seg Neutrophils 47 36 - 65 %    Lymphocytes 41 24 - 43 %    Monocytes 9 3 - 12 %    Eosinophils % 2 1 - 4 %    Basophils 1 0 - 2 %    Immature Granulocytes 0 0 %    Segs Absolute 3.45 1.50 - 8.10 k/uL    Absolute Lymph # 3.05 1.10 - 3.70 k/uL    Absolute Mono # 0.69 0.10 - 1.20 k/uL    Absolute Eos # 0.11 0.00 - 0.44 k/uL    Basophils Absolute 0.05 0.00 - 0.20 k/uL    Absolute Immature Granulocyte <0.03 0.00 - 0.30 k/uL    WBC Morphology NOT REPORTED     RBC Morphology NOT REPORTED     Platelet Estimate NOT REPORTED    BASIC METABOLIC PANEL    Collection Time: 01/14/21 9:50 PM   Result Value Ref Range    Glucose 157 (H) 70 - 99 mg/dL    BUN 17 6 - 20 mg/dL    CREATININE 0.76 0.50 - 0.90 mg/dL    Bun/Cre Ratio NOT REPORTED 9 - 20    Calcium 9.5 8.6 - 10.4 mg/dL    Sodium 141 135 - 144 mmol/L    Potassium 4.0 3.7 - 5.3 mmol/L    Chloride 107 98 - 107 mmol/L    CO2 24 20 - 31 mmol/L    Anion Gap 10 9 - 17 mmol/L    GFR Non-African American >60 >60 mL/min    GFR African American >60 >60 mL/min    GFR Comment          GFR Staging NOT REPORTED    Troponin    Collection Time: 01/14/21 11:02 PM   Result Value Ref Range    Troponin, High Sensitivity <6 0 - 14 ng/L    Troponin T NOT REPORTED <0.03 ng/mL    Troponin Interp NOT REPORTED        Imaging/Diagnostics:  Xr Chest (2 Vw)    Result Date: 1/14/2021  No acute cardiopulmonary disease       CARDIAC CATHETERIZATION'S  2/10/2020   · Left main: NL  · LAD: Patent mid stent with luminal irregularities of 20-30%. · Ostial Diagonal: 70% jailed by the stent. Very small vessel. · LCX: Luminal irregularities of 20-30%. · RCA: uminal irregularities of 20-30%. · LVEF: 60%. LV Wall Motion: Normal    03/12/2019  · LMCA: Normal 0% stenosis. · LAD: has mid 20% stenosis stenosis followed by previously places stent with hazy 70% instent restenosis. The distal and apical LAD is atretic. The D1 originates from this segment also and is jailed to 99%. It is small vessel. ·  Lesion on Mid LAD: Mid subsection. 70% stenosis 12 mm length reduced to 0%. Pre procedure EMMY III flow was noted. Post Procedure EMMY III flow  was present. Good runoff was present. The lesion was diagnosed as Moderate Risk (B). The lesion was discrete and eccentric. The lesion showed mild angulation and mild tortuosity. Successful POBA of mid LAD stent. · LCx: has ostial 40% stenosis. The OM1 is normal.  · RCA: has proximal 30% stenosis and mid 30% stenosis. The RPDA 10% stenosis    7/25/2018  · Left main: NL  · LAD: Mid area 80-90% stenosis. Required PTCA -NASIR 2.5/12.   · LCX: NL  · RCA: Minimal disease  · The LV gram was performed in the [address] 30 position. · LVEF: 65%. LV Wall Motion: Hyperdynamic      6/13/2017  · Left main: NL  · LAD: 25%  · LCX: NL  · RCA: 30% stenosis  · The LV gram was performed in the [address] position. · LVEF: 55%. LV Wall Motion: Normal, hyperactive function  Assessment :      Hospital Problems           Last Modified POA    * (Principal) Unstable angina ([address] 1/15/2021 Yes    S/P drug eluting coronary stent placement - Mid LAD 7/25/18-Dr. Lucien Donohue 1/15/2021 Yes    Coronary artery disease involving native coronary artery of native heart with unstable angina pectoris ([address] 1/15/2021 Yes    Coronary stent occlusion 1/15/2021 Yes    Essential hypertension 1/15/2021 Yes    SRI on CPAP 1/15/2021 Yes    Mixed hyperlipidemia 1/15/2021 Yes    Murmur, cardiac 1/15/2021 Yes    Type 2 diabetes mellitus without complication, without long-term current use of insulin ([address] 1/15/2021 Yes          Plan:     Patient status inpatient in the Med/Surge/after my evaluation converted to stepdown bed    1. [place]/ CAD/HLD-consultation to cardiology, currently having active chest pain, at rest,  during exam which has new symptom of involvement of the left epigastric attacks. Relieved with sublingual nitro. Instructed nursing to update cardiology. I ordered EKG and altered Lovenox to 1 mg/kg twice daily for ACS/ anticoagulation dosing given her history even though troponins have been muted ; which It is common in her past, even with stent occlusion. Instructed nursing to also update cardiology on Lovenox dose prior to giving. Optimization of medical management to reduce comorbid risk factor and continue with aspirin, , high-dose statin, Brilinta. N.p.o. after 5 AM except morning meds with sips of water. 2. ESSENTIAL HYPERTENSION-continue on Diovan, Coreg, hydrochlorothiazide and Norvasc. Monitor blood pressures.     3. DIABETES TYPE 2-on Metformin-hold in consideration of the potential for cardiac cath and or contrast dye.   4. SRI WITH CPAP-RT protocol with CPAP as needed per patient's tolerance   5. GI PROPHYLAXIS-Protonix  6. DVT PROPHYLAXIS-on Lovenox for anticoagulation ACS chest pain    Consultations:   IP CONSULT TO CARDIOLOGY  IP CONSULT TO HOSPITALIST     Patient is admitted as inpatient status because of co-morbidities listed above, severity of signs and symptoms as outlined, requirement for current medical therapies and most importantly because of direct risk to patient if care not provided in a hospital setting. Expected length of stay > 48 hours.     Deven Deutsch, APRN - [id number]  1/14/2021  11:43 PM    Copy sent to Dr. Bertha Cardozo MD

## 2021-01-15 NOTE — CONSULTS
[facility]    Consult / H&P               Today's Date: 1/15/2021  Patient Name: Dianna Leyva  Date of admission: 1/14/2021  9:23 PM  Patient's age: 63 y.o., 1965  Admission Dx: Chest pain [R07.9]    Reason for Admission / Consult: Chest pain    Requesting Physician: Destiney Concepcion MD    CHIEF COMPLAINT: Chest pain    History Obtained From:  patient, electronic medical record    HISTORY OF PRESENT ILLNESS:      The patient is a 63 y.o.  female with a history of CAD s/p NASIR to LAD, hypertension, hyperlipidemia and SRI who presented with the chief complaint of chest pain. Patient states that she has been having episodes of chest pain for the last 1 year which occur mostly with exertion but states that they have worsened in the last few months and are associated with shortness of breath on exertion. However, yesterday she started having right-sided lower rib pain and right-sided chest pain which has been constant since yesterday. Patient states that she had similar symptoms at the time of her stent placement in 2018 and again in 2019 when she had balloon angioplasty of her previous mid LAD stent. She denies any diaphoresis, lightheadedness or syncope she endorses mild lower extremity swelling for which she takes HCTZ. In the ED, patient was hemodynamically stable. EKG showed T wave inversions in lateral leads with LVH, unchanged from previous EKGs. Troponins x3 were normal.  Patient was started on full dose anticoagulation with Lovenox and cardiology was consulted for possible unstable angina. Patient states that she had similar symptoms at the time of her previous stent placement and that her work-up including EKG and troponins were normal even during that visit. She does not want to undergo a stress test and directly wants a cath for conclusive answers.     Past Medical History:   has a past medical history of Abnormal stress test, Allergic rhinitis, Arthritis, CAD (coronary artery disease), Chronic back pain, Diabetes mellitus ([facility] [address] Former smoker, Hyperlipidemia, Hypertension, Murmur, cardiac, SRI on CPAP, Wears prescription eyeglasses, and Wellness examination. Past Surgical History:   has a past surgical history that includes Tonsillectomy; Foot surgery; Lumbar disc surgery; Endometrial ablation; Tubal ligation; Hysterectomy; Carpal tunnel release (Right); Colonoscopy (N/A, 2018); Injection Procedure For Sacroiliac Joint (Left, 2019);  section; Hand tendon surgery (Right); Anesthesia Nerve Block (Left, 2019); Cardiac catheterization (2017); Cardiac catheterization (2019); Cardiac catheterization (2018); Cardiac catheterization (02/10/2020); Nerve Block (2020); Anesthesia Nerve Block (Bilateral, 2020); eye surgery (2014); Nerve Block (Bilateral, 2020); Anesthesia Nerve Block (Bilateral, 2020); Nerve Block (Left, 2020); Pain management procedure (Left, 2020); Nerve Block (Right, 2020); and Pain management procedure (Right, 2020). Home Medications:    Prior to Admission medications    Medication Sig Start Date End Date Taking? Authorizing Provider   carvedilol (COREG) 6.25 MG tablet Take 6.25 mg by mouth daily   Yes Historical Provider, MD   nitroGLYCERIN (NITROSTAT) 0.4 MG SL tablet place 1 tablet under the tongue if needed every 5 minutes for chest pain for 3 doses IF NO RELIEF AFTER FIRST DOSE CALL PRESCRIBER . 20   Len Maravilla MD   omeprazole (PRILOSEC) 40 MG delayed release capsule take 1 capsule by mouth once daily 7/15/20   Len Maravilla MD   losartan (COZAAR) 100 MG tablet take 1 tablet by mouth once daily 7/15/20   Len Maravilla MD   metFORMIN (GLUCOPHAGE-XR) 500 MG extended release tablet Take 500 mg a day for 2 weeks, then increase the dose to twice a day.  3/16/20   Len Maravilla MD   melatonin 3 MG TABS tablet Take 3 mg by mouth congestion or hives. PHYSICAL EXAM:      BP (!) 142/90   Pulse 81   Temp 97.7 °F (36.5 °C) (Oral)   Resp 14   Ht 5' 4\" (1.626 m)   Wt 231 lb 14.4 oz (105.2 kg)   SpO2 97%   BMI 39.81 kg/m²    No intake or output data in the 24 hours ending 01/15/21 0855      Constitutional and General Appearance: alert, cooperative, no distress and appears stated age  HEENT: PERRL, no cervical lymphadenopathy. No masses palpable. Normal oral mucosa  Respiratory:  · Normal excursion and expansion without use of accessory muscles  · Resp Auscultation: Good respiratory effort. No for increased work of breathing. On auscultation: clear to auscultation bilaterally  Cardiovascular:  · The apical impulse is not displaced  · Heart tones are crisp and normal. regular S1 and S2.  · Right-sided chest pain reproducible on palpation    Abdomen:   · No masses or tenderness  · Bowel sounds present  Extremities:  ·  No Cyanosis or Clubbing  ·  Lower extremity edema: No  ·  Skin: Warm and dry  Neurological:  · Alert and oriented. · Moves all extremities well  · No abnormalities of mood, affect, memory, mentation, or behavior are noted    DATA:    Diagnostics:    EKG: T wave inversions in lateral leads, unchanged from previous EKGs      STRESS 12/9/2020: No ischemia. Moderate size basal lateral non transmural infarction. EF 68%.      CATH 2/10/2020: Patent LAD stent. Minimal CAD and branch small vessel disease. EF 60%.      ECHO 7/25/18: EF 60%, no regurg/stenosis. Labs:     CBC:   Recent Labs     01/14/21  2150 01/15/21  0424   WBC 7.4 6.8   HGB 13.6 12.7   HCT 42.2 40.1    225       BMP:   Recent Labs     01/14/21  2150 01/15/21  0424    139   K 4.0 4.1   CO2 24 21   BUN 17 17   CREATININE 0.76 0.63   LABGLOM >60 >60   GLUCOSE 157* 127*     Pro-BNP:  No results for input(s): PROBNP in the last 72 hours. BNP: No results for input(s): BNP in the last 72 hours.   PT/INR: No results for input(s): PROTIME, INR in the last 72 hours.  APTT:No results for input(s): APTT in the last 72 hours. CARDIAC ENZYMES:No results for input(s): CKTOTAL, CKMB, CKMBINDEX, TROPONINI in the last 72 hours. Invalid input(s):  [address]  Recent Labs     01/14/21  2302 01/15/21  0052 01/15/21  0424   TROPONINT NOT REPORTED NOT REPORTED NOT REPORTED      Recent Labs     01/14/21  2302 01/15/21  0052 01/15/21  0424   TROPHS <6 6 <6     FASTING LIPID PANEL:  Lab Results   Component Value Date    HDL 59 01/15/2021    TRIG 105 01/15/2021     LIVER PROFILE:  Recent Labs     01/15/21  0424   AST 18   ALT 25   LABALBU 3.6         Patient's Active Problem List  Principal Problem:    Unstable angina ([address]  Active Problems:    S/P drug eluting coronary stent placement - Mid LAD 7/25/18-Dr. Fausto Thorpe    Essential hypertension    SRI on CPAP    Mixed hyperlipidemia    Coronary artery disease involving native coronary artery of native heart with unstable angina pectoris (HCC)    Murmur, cardiac    Type 2 diabetes mellitus without complication, without long-term current use of insulin ([address]    Coronary stent occlusion  Resolved Problems:    * No resolved hospital problems. *        IMPRESSION:    1. Atypical chest pain  2. Chronic stable angina s/p recent negative stress test in December 2020  3. CAD s/p PCI to LAD in 2018 and POBA to the mid LAD stent in 2019  4. Hypertension  5. Hyperlipidemia  6. SRI  7. Obesity    RECOMMENDATIONS:  1. Due to unchanged EKG, recent negative stress test and normal troponins, stress test was recommended, however, patient insisted that she wanted a cath as she had similar symptoms at the time of her previous stent and that her previous stress tests have been negative despite obstructive CAD. Due to this reason, patient was scheduled for cardiac cath  2. If no obstructive CAD is found on cath, Ranexa 500 mg bid can be added to her antianginal regimen. 3. Continue aspirin, Brilinta, Lipitor, Coreg, losartan and amlodipine  4.  Keep patient n.p.o. Discussed with patient and Nurse. Hakan Slade M.D. Fellow, [id number] [facility]        Attestation signed by      Attending Physician Statement:    I have discussed the care of  Zena Cooper , including pertinent history and exam findings, with the Cardiology fellow/resident. I have seen and examined the patient and the key elements of all parts of the encounter have been performed by me. I agree with the assessment, plan and orders as documented by the fellow/resident, after I modified exam findings and plan of treatments, and the final version is my approved version of the assessment. Additional Comments:   Chest pain, concern for angina  Per patient this is same pain prior to PCI  Known CAD s/p PCI to LAD in 2018, then ISR s/p POBA  Negative Stress 2020  -D/w patient different options, med tx vs stress vs cath. -She only wants to proceed cath  -I have recommend left heart catheterization with possible PCI. I have discussed risks (including but not limited to vascular injury, infection, hematoma, contrast induced kidney dysfunction, CVA and MI), benefits, alternatives in detail. All questions answered. Patient agrees to proceed.   -if cath low risk, add ranexa and okay for d/c once site injection is stable. Discussed with patient and nursing. Thank you for allowing me to participate in the care of this patient, please do not hesitate to call if you have any questions. Remy Esparza DO, [facility], [address], [address] [facility]  [email or web address]  [phone]

## 2021-01-15 NOTE — ED PROVIDER NOTES
[facility]     Emergency Department     Faculty Note/ Attestation      Pt Name: Ute Sanchez                                       MRN: [id number]  [place] 1965  Date of evaluation: 1/14/2021    Patients PCP:    Jackie Sever, MD    Attestation  I performed a history and physical examination of the patient and discussed management with the resident. I reviewed the residents note and agree with the documented findings and plan of care. Any areas of disagreement are noted on the chart. I was personally present for the key portions of any procedures. I have documented in the chart those procedures where I was not present during the key portions. I have reviewed the emergency nurses triage note. I agree with the chief complaint, past medical history, past surgical history, allergies, medications, social and family history as documented unless otherwise noted below. For Physician Assistant/ Nurse Practitioner cases/documentation I have personally evaluated this patient and have completed at least one if not all key elements of the E/M (history, physical exam, and MDM). Additional findings are as noted. Initial Screens:        Timpson Coma Scale  Eye Opening: Spontaneous  Best Verbal Response: Oriented  Best Motor Response: Obeys commands  Yaa Coma Scale Score: 15    Vitals:    Vitals:    01/14/21 2130 01/14/21 2131 01/14/21 2150 01/14/21 2217   BP: (!) 206/88  (!) 182/89 (!) 169/91   Pulse:    85   Resp:    28   Temp:       TempSrc:       SpO2:  98%  97%   Weight:       Height:           CHIEF COMPLAINT       Chief Complaint   Patient presents with    Chest Pain       Exertional SOB and chest pressure now constant and at rest.  Hx of stents and feels similar.   NO cough no wheezing no fevers no leg swelling     DIAGNOSTIC RESULTS     RADIOLOGY:   XR CHEST (2 VW)    (Results Pending)     EKG Interpretation   Interpreted by Ruba Barroso DO    Rhythm: normal sinus   Rate: normal  Axis: normal  Ectopy: none  Conduction: normal  ST Segments: new depression V4  T Waves: lateral inversions an dV4-6 inversion  Q Waves: none    Clinical Impression: nonspecific      LABS:  Labs Reviewed   BASIC METABOLIC PANEL - Abnormal; Notable for the following components:       Result Value    Glucose 157 (*)     All other components within normal limits   TROPONIN   D-DIMER, QUANTITATIVE   CBC WITH AUTO DIFFERENTIAL   TROPONIN       EMERGENCY DEPARTMENT COURSE:     -------------------------  BP: (!) 169/91, Temp: 97.8 °F (36.6 °C), Pulse: 85, Resp: 28  Physical Exam  Constitutional:       Appearance: She is well-developed. She is not diaphoretic. HENT:      Head: Normocephalic and atraumatic. Right Ear: External ear normal.      Left Ear: External ear normal.   Eyes:      General: No scleral icterus. Right eye: No discharge. Left eye: No discharge. Neck:      Musculoskeletal: Normal range of motion. Trachea: No tracheal deviation. Cardiovascular:      Pulses: Normal pulses. Pulmonary:      Effort: Pulmonary effort is normal. No respiratory distress. Breath sounds: No stridor. Musculoskeletal: Normal range of motion. Right lower leg: No edema. Left lower leg: No edema. Skin:     General: Skin is warm and dry. Neurological:      Mental Status: She is alert and oriented to person, place, and time. Coordination: Coordination normal.   Psychiatric:         Behavior: Behavior normal.           Comments    Given the pt risk factors patient will need admission for cardiology evaluation    Ascencio DO, RDMS.   Attending Emergency Physician          Edda Boothe DO  01/14/21 0657

## 2021-01-15 NOTE — ED NOTES
Report called to [facility] - MARU TORRES RN  01/14/21 8545

## 2021-01-15 NOTE — PROGRESS NOTES
[facility]  Office: [address], DO, Merissa Ames, DO, Lorriealexis Guevaras, DO, Giovana Howelln Blood, DO, Adelita Boswell MD, Perry Steen MD, Monica Cadet MD, Emmy Carlin MD, Debra Sales MD, Joslyn Patten MD, Macario Alba MD, Kareen Coulter MD, Grazyna Cueto MD, Masha Tiwari DO, Roxy Mcgee MD, Donta Lamas MD, Arielle Zuluaga DO, Cassie Dias MD,  Cecil Santos DO, Karin Armando MD, Alvarez Helton MD, Darcy Saucedo, CNP, [facility], CNP, Mira Alcantar CNP, Latrice Keller, CNS, Nae Perdue, CNP, Mohan Forde, CNP, Richard Conn, CNP, Loren Rand, CNP, Yasmani Holbrook, CNP, Adan Palomino PA-C, Ashutosh Enriquez DNP, Carol Slade, CNP, David Palafox, CNP, Jin Castillo, CNP, Julissa Naqvi, CNP, Rose Womack, [address] [place] [address]    Progress Note    1/15/2021    10:14 AM    Name:   Martine Calderon  MRN:     [id number]     Acct:      [de-identified]   Room:   [address] [place] [address] Day:  0  Admit Date:  1/14/2021  9:23 PM    PCP:   Hemalatha Parham MD  Code Status:  Full Code    Subjective:     C/C:   Chief Complaint   Patient presents with    Chest Pain     Interval History Status: Patient denies any pain  Patient is asymptomatic denies any chest pain and discussed about lifestyle issues with her exercise nutrition stress management sleep addictions and social relationships. Patient wanted a cardiac cath which was negative for any significant lesion and patient was advised to be discharged home      Brief History:      This is a 59-year-old female patient who has a past medical history significant for coronary artery disease last seen on 2/20/2020 with abnormal stress test, diabetes mellitus, former smoker, hyperlipidemia, hypertension was well until yesterday when she started to have left-sided chest pain while watching television and also had intermittent chest pain for the last few days with increased exertion and climbing stairs. In the past she has had cardiac cath with no troponin elevation and hence she was brought to the ER where and troponin was less than 6 and D-dimer was within normal limits. Her pain was resolved with nitro. Last cardiac cath in February was patent LAD stent with minimal CAD. Her stress test on 12/9/2020 had ejection fraction of 60% with moderate size basal lateral nontransmural infarction. Echo on 7/25/2018 EF of 60% with no regurg or stenosis. Patient was seen by cardiology and she will have a Cardiolite stress test to rule out ischemia. She was added Imdur. She is already on beta-blocker angiotensin receptor blocker-HCTZ and calcium channel blocker. Review of Systems:     Constitutional:  negative for chills, fevers, sweats  Respiratory:  negative for cough, dyspnea on exertion, shortness of breath, wheezing  Cardiovascular:  negative for chest pain, chest pressure/discomfort, lower extremity edema, palpitations  Gastrointestinal:  negative for abdominal pain, constipation, diarrhea, nausea, vomiting  Neurological:  negative for dizziness, headache    Medications: Allergies:     Allergies   Allergen Reactions    Bee Venom Hives    Tetracyclines & Related Nausea Only and Other (See Comments)     dizziness    Ace Inhibitors Other (See Comments)     cough       Current Meds:   Scheduled Meds:    amLODIPine  5 mg Oral Daily    aspirin  81 mg Oral Daily with breakfast    atorvastatin  80 mg Oral Daily    hydroCHLOROthiazide  25 mg Oral Daily    losartan  100 mg Oral Daily    metoprolol tartrate  25 mg Oral BID    pantoprazole  40 mg Oral QAM AC    ticagrelor  90 mg Oral BID    sodium chloride flush  10 mL Intravenous 2 times per day    enoxaparin  1 mg/kg Subcutaneous BID     Continuous Infusions:   PRN Meds: melatonin, sodium chloride flush, promethazine **OR** ondansetron, acetaminophen **OR** acetaminophen, magnesium hydroxide, potassium chloride **OR** potassium alternative oral replacement **OR** potassium chloride, potassium chloride, magnesium sulfate, nitroGLYCERIN    Data:     Past Medical History:   has a past medical history of Abnormal stress test, Allergic rhinitis, Arthritis, CAD (coronary artery disease), Chronic back pain, Diabetes mellitus ([address] Former smoker, Hyperlipidemia, Hypertension, Murmur, cardiac, SRI on CPAP, Wears prescription eyeglasses, and Wellness examination. Social History:   reports that she quit smoking about 2 years ago. Her smoking use included cigarettes. She has a 7.50 pack-year smoking history. She has never used smokeless tobacco. She reports that she does not drink alcohol or use drugs. Family History:   Family History   Problem Relation Age of Onset    Stroke Father     Other Sister         homeless    Diabetes Maternal Grandmother        Vitals:  BP (!) 142/90   Pulse 81   Temp 97.7 °F (36.5 °C) (Oral)   Resp 14   Ht 5' 4\" (1.626 m)   Wt 231 lb 14.4 oz (105.2 kg)   SpO2 97%   BMI 39.81 kg/m²   Temp (24hrs), Av.7 °F (36.5 °C), Min:97.5 °F (36.4 °C), Max:97.8 °F (36.6 °C)    No results for input(s): POCGLU in the last 72 hours. I/O (24Hr):   No intake or output data in the 24 hours ending 01/15/21 1014    Labs:  Hematology:  Recent Labs     01/14/21  2150 01/15/21  0424   WBC 7.4 6.8   RBC 4.96 4.67   HGB 13.6 12.7   HCT 42.2 40.1   MCV 85.1 85.9   MCH 27.4 27.2   MCHC 32.2 31.7   RDW 13.2 13.2    225   MPV 10.9 10.6   DDIMER 0.25  --      Chemistry:  Recent Labs     21  2302 01/15/21  0052 01/15/21  0424     --   --  139   K 4.0  --   --  4.1     --   --  107   CO2 24  --   --  21   GLUCOSE 157*  --   --  127*   BUN 17  --   --  17   CREATININE 0.76  --   --  0.63   MG  --   --   --  2.0   ANIONGAP 10  --   --  11   LABGLOM >60  --   --  >60   GFRAA >60  --   --  >60   CALCIUM 9.5  --   --  9.2   TROPHS <6 <6 6 <6     Recent Labs     01/15/21  0052 willing to follow through with the plan. 2. ESSENTIAL HYPERTENSION-continue on Diovan, Coreg, hydrochlorothiazide and Norvasc. Monitor blood pressures. 3. DIABETES TYPE 2-patient will be discharged home with current medication   4. SRI WITH CPAP-RT protocol with CPAP as needed per patient's tolerance   5. GI PROPHYLAXIS-Protonix  6. DVT PROPHYLAXIS-on Lovenox for anticoagulation ACS chest pain  7.  Patient will be discharged home today and follow-up with PCP in 1 to 7 weeks.  Amalia Fink MD  1/15/2021  10:14 AM

## 2021-01-15 NOTE — PROGRESS NOTES
Pt arrived to [facility] via wheelchair. Ambulated to bed independently. Writer oriented pt to room and call light. No complaints at this time. Will continue to monitor.

## 2021-01-15 NOTE — ED PROVIDER NOTES
[facility]. 1  Emergency Department Encounter  EmergencyMedicine Resident     Pt Venus Rick  MRN: [id number]  [place] 1965  Date of evaluation: 1/14/21  PCP:  Vishal Chamorro MD    CHIEF COMPLAINT       Chief Complaint   Patient presents with    Chest Pain       HISTORY OF PRESENT ILLNESS  (Location/Symptom, Timing/Onset, Context/Setting, Quality, Duration, Modifying Factors, Severity.)      Isi Myles is a 63 y.o. female who presents with chest pain for the past 5 hours at rest.  Patient has chest pain under the right rib cage and anterior sternum, states it is at rest slightly worse with exertion. Patient is also had shortness of breath today which has been brought about with exertion. She has no numbness in arms or legs, no near syncope no vision changes no difficulty walking, no weakness in extremities no abdominal pain no back pain. Medical history significant for prior coronary disease with prior LAD stent. Patient is on Brilinta and 81 mg aspirin. She is on multiple antihypertensives including Norvasc and Coreg for which she did not take yet tonight. She states pain is somewhat similar to her prior symptoms when she had an occluded LAD stent. She is seen by Dr. Jonnathan Solano group at our hospital here, last cardiac catheterization in February showed patent LAD stent, adequate global LV function      PAST MEDICAL / SURGICAL / SOCIAL / FAMILY HISTORY      has a past medical history of Abnormal stress test, Allergic rhinitis, Arthritis, CAD (coronary artery disease), Chronic back pain, Diabetes mellitus ([facility] [address] Former smoker, Hyperlipidemia, Hypertension, Murmur, cardiac, SRI on CPAP, Wears prescription eyeglasses, and Wellness examination. has a past surgical history that includes Tonsillectomy; Foot surgery; Lumbar disc surgery; Endometrial ablation; Tubal ligation; Hysterectomy; Carpal tunnel release (Right); Colonoscopy (N/A, 8/16/2018);  Injection Procedure For Sacroiliac Joint (Left, 2019);  section; Hand tendon surgery (Right); Anesthesia Nerve Block (Left, 2019); Cardiac catheterization (2017); Cardiac catheterization (2019); Cardiac catheterization (2018); Cardiac catheterization (02/10/2020); Nerve Block (2020); Anesthesia Nerve Block (Bilateral, 2020); eye surgery (2014); Nerve Block (Bilateral, 2020); Anesthesia Nerve Block (Bilateral, 2020); Nerve Block (Left, 2020); Pain management procedure (Left, 2020); Nerve Block (Right, 2020); and Pain management procedure (Right, 2020).     Social History     Socioeconomic History    Marital status:      Spouse name: Not on file    Number of children: Not on file    Years of education: Not on file    Highest education level: Not on file   Occupational History    Not on file   Social Needs    Financial resource strain: Not on file    Food insecurity     Worry: Not on file     Inability: Not on file    Transportation needs     Medical: Not on file     Non-medical: Not on file   Tobacco Use    Smoking status: Former Smoker     Packs/day: 0.25     Years: 30.00     Pack years: 7.50     Types: Cigarettes     Quit date: 2018     Years since quittin.4    Smokeless tobacco: Never Used   Substance and Sexual Activity    Alcohol use: No    Drug use: No    Sexual activity: Not on file   Lifestyle    Physical activity     Days per week: Not on file     Minutes per session: Not on file    Stress: Not on file   Relationships    Social connections     Talks on phone: Not on file     Gets together: Not on file     Attends Cheondoism service: Not on file     Active member of club or organization: Not on file     Attends meetings of clubs or organizations: Not on file     Relationship status: Not on file    Intimate partner violence     Fear of current or ex partner: Not on file     Emotionally abused: Not on file     Physically abused: Not on file     Forced sexual activity: Not on file   Other Topics Concern    Not on file   Social History Narrative    Not on file       Family History   Problem Relation Age of Onset    Stroke Father     Other Sister         homeless    Diabetes Maternal Grandmother        Allergies:  Bee venom, Tetracyclines & related, and Ace inhibitors    Home Medications:  Prior to Admission medications    Medication Sig Start Date End Date Taking? Authorizing Provider   carvedilol (COREG) 6.25 MG tablet Take 6.25 mg by mouth daily   Yes Historical Provider, MD   nitroGLYCERIN (NITROSTAT) 0.4 MG SL tablet place 1 tablet under the tongue if needed every 5 minutes for chest pain for 3 doses IF NO RELIEF AFTER FIRST DOSE CALL PRESCRIBER . 9/13/20   Radha Braswell MD   omeprazole (PRILOSEC) 40 MG delayed release capsule take 1 capsule by mouth once daily 7/15/20   Radha Braswell MD   losartan (COZAAR) 100 MG tablet take 1 tablet by mouth once daily 7/15/20   Radha Braswell MD   metFORMIN (GLUCOPHAGE-XR) 500 MG extended release tablet Take 500 mg a day for 2 weeks, then increase the dose to twice a day.  3/16/20   Radha Braswell MD   melatonin 3 MG TABS tablet Take 3 mg by mouth nightly as needed Stopping 3/10    Historical Provider, MD   atorvastatin (LIPITOR) 80 MG tablet Take 1 tablet by mouth daily 1/24/20   Radha Braswell MD   amLODIPine (NORVASC) 5 MG tablet Take by mouth daily 3/4/19   Historical Provider, MD   hydrochlorothiazide (HYDRODIURIL) 25 MG tablet Take 25 mg by mouth daily    Historical Provider, MD   Multiple Vitamins-Minerals (ONE-A-DAY 50 PLUS PO) Take 1 tablet by mouth daily    Historical Provider, MD   ticagrelor (BRILINTA) 90 MG TABS tablet Take 1 tablet by mouth 2 times daily  Patient taking differently: Take 60 mg by mouth 2 times daily  7/26/18   Johnnie Little MD   aspirin 81 MG tablet Take 1 tablet by mouth daily (with breakfast) 8/19/16   Radha Braswell MD       REVIEW OF SYSTEMS    (2-9 systems for level 4, 10 or more for level 5)      General ROS - No fevers, No chills, no gradual weight loss, no night sweats  Ophthalmic ROS - No discharge, No changes in vision  ENT ROS -  No sore throat, No rhinorrhea,   Respiratory ROS - positive shortness of breath, no cough, no  wheezing  Cardiovascular ROS - positive chest pain, no dyspnea on exertion  Gastrointestinal ROS - No abdominal pain, no nausea, no vomiting, no change in bowel habits, no black or bloody stools  Genito-Urinary ROS - No dysuria, trouble voiding, or hematuria  Musculoskeletal ROS - No myalgias, No arthalgias  Neurological ROS - No headache, no dizziness/lightheadedness, No focal weakness, no loss of sensation  Dermatological ROS - No lesions, No rash         PHYSICAL EXAM   (up to 7 for level 4, 8 or more for level 5)      INITIAL VITALS:   BP (!) 154/86   Pulse 81   Temp 97.7 °F (36.5 °C) (Oral)   Resp 20   Ht 5' 4\" (1.626 m)   Wt 231 lb 14.4 oz (105.2 kg)   SpO2 97%   BMI 39.81 kg/m²     General Appearance: Well-appearing, in no acute distress  HEENT: Head: normocephalic/atraumatic eyes: PERRLA, EOMT, conjunctiva not injected, sclerae nonicteric ears: External canals patent nose: Nares patent, no rhinorrhea, throat:mucous membranes moist, oropharynx clear     Neck: Trachea midline, no JVD. Lungs: No evidence of increased work of breathing. CTA B/L, no wheezes/rhonchi     Cardiovascular: RRR, no murmur, 2+ peripheral pulses bilaterally. Cap refill less than 2 seconds. No lower extremity edema noted      Abdomen: Soft, nontender. No guarding or rebound tenderness. Neurologic: CALLE  to person, place, time, and event. No sensation deficits. Moving all extremities    Extremities: Skin warm, dry and intact.     Equal pulses in all extremities      DIFFERENTIAL  DIAGNOSIS     PLAN (LABS / IMAGING / EKG):  Orders Placed This Encounter   Procedures    XR CHEST (2 VW)    Troponin    D-DIMER, QUANTITATIVE    CBC WITH AUTO DIFFERENTIAL    BASIC METABOLIC PANEL    Troponin    Troponin    Comprehensive Metabolic Panel w/ Reflex to MG    Magnesium    CBC    Lipid panel - fasting    Hemoglobin A1C    Troponin    DIET GENERAL; No Caffeine    Vital signs per unit routine    Notify physician    Up as tolerated    Daily weights    Intake and output    Place intermittent pneumatic compression device    Telemetry monitoring - 48 hour duration    Telemetry Monitoring    Full Code    Inpatient consult to Cardiology    Inpatient consult to Hospitalist    Initiate Oxygen Therapy Protocol    Pulse Oximetry Spot Check    Initiate Adult NIVProtocol    Respiratory care evaluation only    POCT Glucose    EKG 12 Lead    EKG 12 Lead    EKG 12 lead    EKG 12 lead    EKG 12 Lead    PATIENT STATUS (FROM ED OR OR/PROCEDURAL) Observation    Transfer patient       MEDICATIONS ORDERED:  Orders Placed This Encounter   Medications    aspirin chewable tablet 324 mg    nitroGLYCERIN (NITROSTAT) SL tablet 0.4 mg    amLODIPine (NORVASC) tablet 5 mg    ticagrelor (BRILINTA) tablet 90 mg    carvedilol (COREG) tablet 6.25 mg    amLODIPine (NORVASC) tablet 5 mg    aspirin EC tablet 81 mg    atorvastatin (LIPITOR) tablet 80 mg    hydroCHLOROthiazide (HYDRODIURIL) tablet 25 mg    losartan (COZAAR) tablet 100 mg    melatonin tablet 3 mg    metoprolol tartrate (LOPRESSOR) tablet 25 mg    pantoprazole (PROTONIX) tablet 40 mg    ticagrelor (BRILINTA) tablet 90 mg    sodium chloride flush 0.9 % injection 10 mL    sodium chloride flush 0.9 % injection 10 mL    OR Linked Order Group     promethazine (PHENERGAN) tablet 12.5 mg     ondansetron (ZOFRAN) injection 4 mg    OR Linked Order Group     acetaminophen (TYLENOL) tablet 650 mg     acetaminophen (TYLENOL) suppository 650 mg    magnesium hydroxide (MILK OF MAGNESIA) 400 MG/5ML suspension 30 mL    DISCONTD: enoxaparin (LOVENOX) injection 40 mg    OR Linked Order Group    [facility] potassium chloride (KLOR-CON M) extended release tablet 40 mEq     potassium bicarb-citric acid (EFFER-K) effervescent tablet 40 mEq     potassium chloride 10 mEq/100 mL IVPB (Peripheral Line)    potassium chloride 10 mEq/100 mL IVPB (Peripheral Line)    magnesium sulfate 1 g in dextrose 5% 100 mL IVPB    nitroGLYCERIN (NITROSTAT) SL tablet 0.4 mg    enoxaparin (LOVENOX) injection 105 mg           DIAGNOSTIC RESULTS / EMERGENCY DEPARTMENT COURSE / MDM     LABS:  Results for orders placed or performed during the hospital encounter of 01/14/21   Troponin   Result Value Ref Range    Troponin, High Sensitivity <6 0 - 14 ng/L    Troponin T NOT REPORTED <0.03 ng/mL    Troponin Interp NOT REPORTED    D-DIMER, QUANTITATIVE   Result Value Ref Range    D-Dimer, Quant 0.25 mg/L FEU   CBC WITH AUTO DIFFERENTIAL   Result Value Ref Range    WBC 7.4 3.5 - 11.3 k/uL    RBC 4.96 3.95 - 5.11 m/uL    Hemoglobin 13.6 11.9 - 15.1 g/dL    Hematocrit 42.2 36.3 - 47.1 %    MCV 85.1 82.6 - 102.9 fL    MCH 27.4 25.2 - 33.5 pg    MCHC 32.2 28.4 - 34.8 g/dL    RDW 13.2 11.8 - 14.4 %    Platelets 792 137 - 941 k/uL    MPV 10.9 8.1 - 13.5 fL    NRBC Automated 0.0 0.0 per 100 WBC    Differential Type NOT REPORTED     Seg Neutrophils 47 36 - 65 %    Lymphocytes 41 24 - 43 %    Monocytes 9 3 - 12 %    Eosinophils % 2 1 - 4 %    Basophils 1 0 - 2 %    Immature Granulocytes 0 0 %    Segs Absolute 3.45 1.50 - 8.10 k/uL    Absolute Lymph # 3.05 1.10 - 3.70 k/uL    Absolute Mono # 0.69 0.10 - 1.20 k/uL    Absolute Eos # 0.11 0.00 - 0.44 k/uL    Basophils Absolute 0.05 0.00 - 0.20 k/uL    Absolute Immature Granulocyte <0.03 0.00 - 0.30 k/uL    WBC Morphology NOT REPORTED     RBC Morphology NOT REPORTED     Platelet Estimate NOT REPORTED    BASIC METABOLIC PANEL   Result Value Ref Range    Glucose 157 (H) 70 - 99 mg/dL    BUN 17 6 - 20 mg/dL    CREATININE 0.76 0.50 - 0.90 mg/dL    Bun/Cre Ratio NOT REPORTED 9 - 20    Calcium 9.5 8.6 - 10.4 mg/dL Sodium 141 135 - 144 mmol/L    Potassium 4.0 3.7 - 5.3 mmol/L    Chloride 107 98 - 107 mmol/L    CO2 24 20 - 31 mmol/L    Anion Gap 10 9 - 17 mmol/L    GFR Non-African American >60 >60 mL/min    GFR African American >60 >60 mL/min    GFR Comment          GFR Staging NOT REPORTED    Troponin   Result Value Ref Range    Troponin, High Sensitivity <6 0 - 14 ng/L    Troponin T NOT REPORTED <0.03 ng/mL    Troponin Interp NOT REPORTED    Troponin   Result Value Ref Range    Troponin, High Sensitivity 6 0 - 14 ng/L    Troponin T NOT REPORTED <0.03 ng/mL    Troponin Interp NOT REPORTED    Comprehensive Metabolic Panel w/ Reflex to MG   Result Value Ref Range    Glucose 127 (H) 70 - 99 mg/dL    BUN 17 6 - 20 mg/dL    CREATININE 0.63 0.50 - 0.90 mg/dL    Bun/Cre Ratio NOT REPORTED 9 - 20    Calcium 9.2 8.6 - 10.4 mg/dL    Sodium 139 135 - 144 mmol/L    Potassium 4.1 3.7 - 5.3 mmol/L    Chloride 107 98 - 107 mmol/L    CO2 21 20 - 31 mmol/L    Anion Gap 11 9 - 17 mmol/L    Alkaline Phosphatase 119 (H) 35 - 104 U/L    ALT 25 5 - 33 U/L    AST 18 <32 U/L    Total Bilirubin 0.26 (L) 0.3 - 1.2 mg/dL    Total Protein 6.5 6.4 - 8.3 g/dL    Alb 3.6 3.5 - 5.2 g/dL    Albumin/Globulin Ratio 1.2 1.0 - 2.5    GFR Non-African American >60 >60 mL/min    GFR African American >60 >60 mL/min    GFR Comment          GFR Staging NOT REPORTED    Magnesium   Result Value Ref Range    Magnesium 2.0 1.6 - 2.6 mg/dL   CBC   Result Value Ref Range    WBC 6.8 3.5 - 11.3 k/uL    RBC 4.67 3.95 - 5.11 m/uL    Hemoglobin 12.7 11.9 - 15.1 g/dL    Hematocrit 40.1 36.3 - 47.1 %    MCV 85.9 82.6 - 102.9 fL    MCH 27.2 25.2 - 33.5 pg    MCHC 31.7 28.4 - 34.8 g/dL    RDW 13.2 11.8 - 14.4 %    Platelets 244 551 - 661 k/uL    MPV 10.6 8.1 - 13.5 fL    NRBC Automated 0.0 0.0 per 100 WBC   Lipid panel - fasting   Result Value Ref Range    Cholesterol 172 <200 mg/dL    HDL 59 >40 mg/dL    LDL Cholesterol 92 0 - 130 mg/dL    Chol/HDL Ratio 2.9 <5    Triglycerides 105 <150 mg/dL    VLDL NOT REPORTED 1 - 30 mg/dL   Troponin   Result Value Ref Range    Troponin, High Sensitivity <6 0 - 14 ng/L    Troponin T NOT REPORTED <0.03 ng/mL    Troponin Interp NOT REPORTED    EKG 12 Lead   Result Value Ref Range    Ventricular Rate 82 BPM    Atrial Rate 82 BPM    P-R Interval 150 ms    QRS Duration 86 ms    Q-T Interval 396 ms    QTc Calculation (Bazett) 462 ms    P Axis 52 degrees    R Axis 21 degrees    T Axis 141 degrees   EKG 12 lead   Result Value Ref Range    Ventricular Rate 75 BPM    Atrial Rate 75 BPM    P-R Interval 150 ms    QRS Duration 90 ms    Q-T Interval 436 ms    QTc Calculation (Bazett) 486 ms    P Axis 52 degrees    R Axis 26 degrees    T Axis 156 degrees   EKG 12 Lead   Result Value Ref Range    Ventricular Rate 79 BPM    Atrial Rate 79 BPM    P-R Interval 146 ms    QRS Duration 86 ms    Q-T Interval 412 ms    QTc Calculation (Bazett) 472 ms    P Axis 57 degrees    R Axis 31 degrees    T Axis 151 degrees           RADIOLOGY:  No results found. EKG      EKG shows sinus rhythm rate of 94, normal axis, unchanged prior T wave inversions in inferior lateral and precordial leads, QTC within normal limits,     All T wave inversions unchanged, there is ST  1 mm depression in V4 new from previous EKG    All EKG's are interpreted by the Emergency Department Physician who either signs or Co-signs this chart in the absence of a cardiologist.    EMERGENCY DEPARTMENT COURSE/IMPRESSION:    Patient with chest pain at rest significant coronary artery disease history with prior stent, also associated shortness of breath. Patient is otherwise stable nontoxic-appearing, based on exam findings and history most concern would be ACS, rethrombosis of LAD stent which has happened in the past with somewhat similar symptoms.   Patient is hypertensive however did not take her home blood pressure meds, in addition to nitro give her her home blood pressure meds per night Brilinta in addition to PM      PATIENT REFERRED TO:  No follow-up provider specified. DISCHARGE MEDICATIONS:  Current Discharge Medication List          DO Patel Guerra D.O.   Emergency Medicine Resident    (Please note that portions of this note were completed with a voice recognition program.  Efforts were made to edit the dictations but occasionally words aremis-transcribed.)       Deisi Fink DO  Resident  01/15/21 1333

## 2021-01-15 NOTE — ED NOTES
The following labs labeled with pt sticker and tubed:     [] Lavender   [] on ice   [] Blue   [x] Green/yellow (repeat trop)  [] Green/black [] on ice  [] Pink  [] Red  [] Yellow     [] COVID-19 swab    [] Rapid     [] Urine Sample  [] Pelvic Cultures    [] Blood Cultures            Jenn Anthony RN  01/14/21 [id number]

## 2021-01-15 NOTE — PROGRESS NOTES
[facility]  Documentation Note                Admission Dx: Chest pain [R07.9]    Past Medical History:   has a past medical history of Abnormal stress test, Allergic rhinitis, Arthritis, CAD (coronary artery disease), Chronic back pain, Diabetes mellitus ([address] Former smoker, Hyperlipidemia, Hypertension, Murmur, cardiac, SRI on CPAP, Wears prescription eyeglasses, and Wellness examination. Previous Testing:     STRESS 2020: No ischemia. Moderate size basal lateral non transmural infarction. EF 68%. CATH 2/10/2020: Patent LAD stent. Minimal CAD and branch small vessel disease. EF 60%. ECHO 18: EF 60%, no regurg/stenosis. Previous office/hospital visit:   Herlinda Chavira NP 2020:   1. Hypertension. 2. Hyperlipidemia. Lipid panel: 19: Chol 183, HDL 49, , Tri 114  3. Morbid obesity. 4. Obstructive sleep apnea, on CPAP. 5. Shortness of breath on exertion. 6. Persantine Cardiolite stress test on 2016 showed no fixed or reversible perfusion defect to suggest myocardial ischemia or infarction. Left ventricular ejection fraction was 65%. Transient ischemic dilatation number was 0.98.  7. Echocardiogram done on 10/03/2011 showed normal left ventricular systolic function and diastolic function. No significant valve problems. 8. Strongly positive family history of coronary artery disease. Her father  of massive myocardial infarction in his 49s. 9. Previous history of smoking. 10. Gastroesophageal reflux disease. 11. Cardiac cath 18 NASIR to LAD EF 65%  12. MPI stress test 19 for CP. Stress negative, low risk. EF 70%  13. Cardiac cath 3/12/19 Moderate ostial LCX disease, POBA mid LAD stent. Transient ST elevation for which a RCA was injected again and there was no lesion. She was started on Norvasc. 14. Lipid panel dated 2019 showed a total cholesterol 133, LDL 62, HDL 53  15. Cardiac catheterization dated 02/10/2020 showed main.  [employer] mid stent patent 20 30% stenosis ostial 70% jailed stent small unchanged. Left circumflex RCA 20 30% stenosis. Ejection fraction 60%. Plan --   1. Chest pain: Occasional CP h/o CAD with PCI 2/2020 Will order cardiolite stress test to evaluate for ischemia. Will add IMDUR  2. HTN controlled. Continue BB, ARB, HCTZ, and CCB. 3. CAD s/p cath with PCI 2/2020 Continue ASA, statin, BB, ARB, Brilinta, and SL NTG  4. HLD Continue statin  5. Obesity Recommend healthy diet and exercise.   6. Follow up after stress test.    [facility]

## 2021-01-15 NOTE — OP NOTE
[facility]        Date:   1/15/2021  Patient name:  Joey Beard  Date of admission:  1/14/2021  9:23 PM  MRN:   [id number]  YOB: 1965    CARDIAC CATHETERIZATION      Operators:    Giulia Paul MD    Procedure performed:       [] Left Heart Catheterization. [] Graft Angiography.  [] Left Ventriculography. [] Right Heart Catheterization. [x] Coronary Angiography. [] Aortic Valve Studies. [] PCI:      [] Other:         Pre Procedure Conscious Sedation Data:    ASA Class:    [] I [x] II [] III [] IV    Mallampati Class:  [] I [x] II [] III [] IV        Indication:  [] STEMI      [] + Stress test  [] ACS      [x] + EKG Changes  [] Non Q MI       [x] Significant Risk Factors  [x] Recurrent Angina             [] Diabetes Mellitus    [] New LBBB      [] Uncontrolled HTN. [] CHF / Low EF changes     [] Abnormal CTA / Ca Score  [] Other:       Procedure:  Access:  [] Femoral  [x] Radial  artery       [x] Right  [] Left    After informed consent was obtained with explanation of the risks and benefits, patient was brought to the cath lab. The right wrist was prepped and draped in sterile fashion. 1% lidocaine was used for local block. The  right radial artery was cannulated with 6  Fr sheath with brisk arterial blood return. A premixed injection of Verapamil, Heparin and Nitrogycerin was injected through the side port. The side port was frequently flushed and aspirated with normal saline. Findings:       LMCA: Normal 0% stenosis.     LAD: Patent mid stent area with 30% stenosis  D2: Small, jailed ostial lesion 90%     LCx: Ostial 30% stenosis  OM1: Proximal 20% stenosis     RCA: Mild irregularities 10-20%.     Coronary Tree      Dominance: Right    The LV gram was not performed    Estimated blood loss: 5 ml    Conclusions:  1. Patent LAD stent  2. Minimal disease otherwise  3. Same as 2/2020    Recommendations:  1. Medical treatment.  Start Ranexa and uptitrate if needed  2. If failed to consider EECP  3. Risk Factors Modification. 4. Post Cath Protocol           History and Risk Factors    [x] Hypertension     [] Family history of CAD  [x] Hyperlipidemia     [] Cerebrovascular Disease   [] Prior MI       [] Peripheral Vascular disease   [x] Prior PCI              [] Diabetes Mellitus    [] Left Main PCI. [] Currently on Dialysis. [] Prior CABG. [] Currently smoker. [] Cardiac Arrest outside of healthcare facility. [] Yes    [x] No        Witnessed     [] Yes   [] No     Arrest after arrival of EMS  [] Yes   [] No     [] Cardiac Arrest at other Facility. [] Yes   [x] No    Pre-Procedure Information. Heart Failure       [] Yes    [x] No        Class  [] I      [] II  [] III    [] IV. New Diagnosis    [] Yes  [] No    HF Type      [] Systolic   [] Diastolic          [] Unknown. Diagnostic Test:   EKG       [x] Normal   [] Abnormal    New antiarrhythmia medications:    [] Yes   [] No   New onset atrial fibrillation / Flutter     [] Yes   [] No   ECG Abnormalities:      [] V. Fib   [] Nasra V. Tach           [] NS V. T   [] New LBBB           [] T. Inv  []  ST dev > 0.5 mm         [] PVC's freq  [] PVC's infrequent    Stress Test Performed:   [] Yes    [x] No     Type:     [] Stress Echo   [] Exercise Stress Test (no imaging)      [] Stress Nuclear  [] Stress Imaging     Results   [] Negative   [] Positive        [] Indeterminate  [] Unavailable     If Positive/ Risk / Extent of Ischemia:       [] Low  [] Intermediate         [] High  [] Unavailable      Cardiac CTA Performed:  [] Yes    [x] No      Results   [] CAD   [] Non obstructive CAD      [] No CAD   [] Uncertain      [] Unknown   [] Structural Disease.      Pre Procedure Medications: [x] Yes    [] No         [x] ASA  [x] Beta Blockers      [] Nitrate  [] Ca Channel Blockers      [] Ranolazine  [x] Statin       [] Plavix/Others antiplatelets      Mariya Mora MD  Fellow, Cardiovascular Diseases [id number] [facility]              I have reviewed the case / procedure with resident / fellow  I have examined the patient personally  Patient agree with treatment plan as discussed before, final arrangement based on my evaluation and exam.    Risk and benefit of procedure planned were explained in details. Procedure was performed by me personally, with all aspect of the procedure being done using standard protocol. Note was modified based on my own assessment and treatment.     Jong Clinton MD  [facility]

## 2021-01-15 NOTE — CARE COORDINATION
Discharge 1 [facility] Case Management Department  Written by: Ion Rojas RN    Patient Name: Ciarra Huerta  Attending Provider: Jill Barahona*  Admit Date: 2021  9:23 PM  MRN: [id number]  Account: [de-identified]                     : 1965  Discharge Date:  1/15/2021        Disposition: home    Ion Rojas RN

## 2021-01-15 NOTE — ED NOTES
Pt arrived to ED with c/o chest pain, pt reports chest pressure intermittent for a \"couple day\" but reports sharp pain started approx 45 min ago, pt reports SOB on exertion, pt denies headache currently but states she has had headaches often the past two weeks, pt denies N/V/D, RR even/unlabored, pt attached to full cardiac monitor, EKG completed, IV initiated & labs drawn, call light within reach, will cont to monitor           Katrina Whitten RN  01/14/21 [id number]

## 2021-01-15 NOTE — CARE COORDINATION
Case Management Initial Discharge Plan  Karole Babinski,             Met with:patient to discuss discharge plans. Information verified: address, contacts, phone number, , insurance Yes    Emergency Contact/Next of Kin name & number: Brigid Ledbetter (spouse) [phone]    PCP: Mary Kate Gonzalez MD  Date of last visit: on Tuesday    Insurance Provider: [employer]    Discharge Planning    Living Arrangements:  Spouse/Significant Other   Support Systems:  Spouse/Significant Other    Home has 1 stories  1 stairs to climb to get into front door,   Location of bedroom/bathroom in home main    Patient able to perform ADL's:Independent    Current Services (outpatient & in home) none  DME equipment: cpap  DME provider:     Receiving oral anticoagulation therapy? No    If indicated:   Physician managing anticoagulation treatment:   Where does patient obtain lab work for ATC treatment? Potential Assistance Needed:  N/A    Patient agreeable to home care: No  [facility] of choice provided:  no    Prior SNF/Rehab Placement and Facility: no  Agreeable to SNF/Rehab: No  [facility] of choice provided: no     Evaluation: no    Expected Discharge date:       Patient expects to be discharged to:  home  Follow Up Appointment: Best Day/ Time:      Transportation provider: family  Transportation arrangements needed for discharge: No    Readmission Risk              Risk of Unplanned Readmission:        0             Does patient have a readmission risk score greater than 14?: No  If yes, follow-up appointment must be made within 7 days of discharge.      Goals of Care:       Discharge Plan: home with , already on [facility] [address]          Electronically signed by Luis Burr RN on 1/15/21 at 11:05 AM EST

## 2021-01-16 NOTE — DISCHARGE SUMMARY
[facility]  Office: [address], DO, Trey Dear, DO, Kevon Fernández, DO, Sulema Hansen Blood, DO, Swati Urrutia MD, Meng Tyson MD, Hemalatha Figueroa MD, Dorian Osorio MD, Lisa Maldonado MD, Carla Bamberger, MD, Flavia Mercado MD, Robert Downey MD, Grazyna Camargo MD, Andria Jansen DO, David Tipton MD, Sneha Gunderson MD, Bhumi Martinez DO, Nisreen Loving MD,  Mili Flores, DO, Bambi Paz MD, Alfredo Birch MD, [facility], [facility], [facility], CNP, Fausto Marquez, CNP, Roxana Pérez, CNS, Deanna Cotto, CNP, Aleshia Patrick, CNP, Derrick Yan, CNP, Derrick Yung, CNP, Aleksandar Morales, CNP, Nicole Garcia PA-C, Juanpablo Patel [facility], Spenser Hernandez, CNP, Tayo Haile, CNP, Nancy Garcias, CNP, Otf Monge, CNP, Diandra Sadler,  [facility]    Discharge Summary     Patient ID: Gretel Bello  :  1965   MRN: [id number]     ACCOUNT:  [de-identified]   Patient's PCP: Vilma Goss MD  Admit Date: 2021   Discharge Date: 1/15/2021     Length of Stay: 0  Code Status:  Prior  Admitting Physician: Aleida Allen MD  Discharge Physician: Marco Hollingsworth MD     Active Discharge Diagnoses:     Hospital Problem Lists:  Principal Problem:    [facility]  Active Problems:    S/P drug eluting coronary stent placement - Mid LAD 18-Dr. Casey Armando    Essential hypertension    SRI on CPAP    Mixed hyperlipidemia    Coronary artery disease involving native coronary artery of native heart with unstable angina pectoris (HCC)    Murmur, cardiac    Type 2 diabetes mellitus without complication, without long-term current use of insulin ([facility]    Coronary stent occlusion  Resolved Problems:    * No resolved hospital problems. *      Admission Condition:  fair     Discharged Condition: good    Hospital Stay:     Hospital Course:     This is a 42-year-old female patient has a past medical history significant for diabetes mellitus, former smoker, hypertension hyperlipidemia, obesity was admitted for chest pain and her troponins were negative and but patient insisted that she had a stent placement in the past when she felt similar chest pain and she wanted a cardiac catheterization which was done which showed patent LAD stent minimal disease otherwise same as in February 2020 patient was started on Ranexa and and if in case her symptoms persist there was suggestion by the cardiology to consider EECP risk factor modification so I discussed extensively about lifestyle changes especially switching to a plant-based whole food diet and its evidence on reversal of atherosclerosis patient seem to understand and willing to follow through with the plan      Significant therapeutic interventions: Cardiac catheterization    Significant Diagnostic Studies:   Labs / Micro:  CBC:   Lab Results   Component Value Date    WBC 6.8 01/15/2021    RBC 4.67 01/15/2021    HGB 12.7 01/15/2021    HCT 40.1 01/15/2021    MCV 85.9 01/15/2021    MCH 27.2 01/15/2021    MCHC 31.7 01/15/2021    RDW 13.2 01/15/2021     01/15/2021     BMP:    Lab Results   Component Value Date    GLUCOSE 127 01/15/2021     01/15/2021    K 4.1 01/15/2021     01/15/2021    CO2 21 01/15/2021    ANIONGAP 11 01/15/2021    BUN 17 01/15/2021    CREATININE 0.63 01/15/2021    BUNCRER NOT REPORTED 01/15/2021    CALCIUM 9.2 01/15/2021    LABGLOM >60 01/15/2021    GFRAA >60 01/15/2021    GFR      01/15/2021    GFR NOT REPORTED 01/15/2021     HFP:    Lab Results   Component Value Date    PROT 6.5 01/15/2021     CMP:    Lab Results   Component Value Date    GLUCOSE 127 01/15/2021     01/15/2021    K 4.1 01/15/2021     01/15/2021    CO2 21 01/15/2021    BUN 17 01/15/2021    CREATININE 0.63 01/15/2021    ANIONGAP 11 01/15/2021    ALKPHOS 119 01/15/2021    ALT 25 01/15/2021    AST 18 01/15/2021    BILITOT 0.26 01/15/2021    LABALBU 3.6 01/15/2021 ALBUMIN 1.2 01/15/2021    LABGLOM >60 01/15/2021    GFRAA >60 01/15/2021    GFR      01/15/2021    GFR NOT REPORTED 01/15/2021    PROT 6.5 01/15/2021    CALCIUM 9.2 01/15/2021     PT/INR:    Lab Results   Component Value Date    PROTIME 12.3 08/07/2020    INR 0.9 08/07/2020     PTT:   Lab Results   Component Value Date    APTT 29.1 08/07/2020     FLP:    Lab Results   Component Value Date    CHOL 172 01/15/2021    TRIG 105 01/15/2021    HDL 59 01/15/2021     U/A:  No results found for: Atilio Hilts, SPECGRAV, HGBUR, PHUR, PROTEINU, GLUCOSEU, KETUA, BILIRUBINUR, UROBILINOGEN, NITRU, LEUKOCYTESUR  TSH:    Lab Results   Component Value Date    TSH 3.08 07/25/2018        Radiology:  Xr Chest (2 Vw)    Result Date: 1/14/2021  No acute cardiopulmonary disease       Consultations:    Consults:     Final Specialist Recommendations/Findings:   IP CONSULT TO CARDIOLOGY  IP CONSULT TO HOSPITALIST      The patient was seen and examined on day of discharge and this discharge summary is in conjunction with any daily progress note from day of discharge. Discharge plan:     Disposition: Home    Physician Follow Up: Follow up PCP 7 days. Requiring Further Evaluation/Follow Up POST HOSPITALIZATION/Incidental Findings: Discussed extensively about patient lifestyle modifications with daily exercise and plant-based whole food diet and gave her resources and patient seems to understand is willing to follow through with the plan. Diet: Discussed extensively about plant-based whole food whole-grain diet and stop saturated fat animal and dairy products d stay away from processed food and refined products    Activity: 30 to 60 minutes of aerobic and resistance training for 6 days a week. Instructions to Patient: Patient seems to understand the instructions and is willing to follow through with the plan and in 3 months she has a goal to lose weight and change her lifestyle.     Discharge Medications:      Medication List START taking these medications    ranolazine 500 MG extended release tablet  Commonly known as: RANEXA  Take 1 tablet by mouth 2 times daily        CHANGE how you take these medications    carvedilol 6.25 MG tablet  Commonly known as: COREG  Take 1 tablet by mouth 2 times daily (with meals)  What changed: when to take this     ticagrelor 90 MG Tabs tablet  Commonly known as: BRILINTA  Take 1 tablet by mouth 2 times daily  What changed: how much to take        CONTINUE taking these medications    amLODIPine 5 MG tablet  Commonly known as: NORVASC     aspirin 81 MG tablet  Take 1 tablet by mouth daily (with breakfast)     atorvastatin 80 MG tablet  Commonly known as: LIPITOR  Take 1 tablet by mouth daily     hydroCHLOROthiazide 25 MG tablet  Commonly known as: HYDRODIURIL     losartan 100 MG tablet  Commonly known as: COZAAR  take 1 tablet by mouth once daily     metFORMIN 500 MG extended release tablet  Commonly known as: GLUCOPHAGE-XR  Take 500 mg a day for 2 weeks, then increase the dose to twice a day. nitroGLYCERIN 0.4 MG SL tablet  Commonly known as: NITROSTAT  place 1 tablet under the tongue if needed every 5 minutes for chest pain for 3 doses IF NO RELIEF AFTER FIRST DOSE CALL PRESCRIBER . STOP taking these medications    melatonin 3 MG Tabs tablet     omeprazole 40 MG delayed release capsule  Commonly known as: PRILOSEC     ONE-A-DAY 50 PLUS PO           Where to Get Your Medications      These medications were sent to [address] [place] [address], [address] [place] [id number]    Phone: [phone]   · carvedilol 6.25 MG tablet  · ranolazine 500 MG extended release tablet         No discharge procedures on file. Time Spent on discharge is  35 mins in patient examination, evaluation, counseling as well as medication reconciliation, prescriptions for required medications, discharge plan and follow up.     Electronically signed by   Citlaly Mccarthy MD  1/15/2021  11:44 PM      Thank you Dr. Hien Forrest MD for the opportunity to be involved in this patient's care.

## 2021-01-19 ENCOUNTER — OFFICE VISIT (OUTPATIENT)
Dept: FAMILY MEDICINE CLINIC | Age: 56
End: 2021-01-19
Payer: COMMERCIAL

## 2021-01-19 VITALS
TEMPERATURE: 97 F | BODY MASS INDEX: 39.48 KG/M2 | DIASTOLIC BLOOD PRESSURE: 62 MMHG | SYSTOLIC BLOOD PRESSURE: 110 MMHG | OXYGEN SATURATION: 97 % | WEIGHT: 230 LBS | HEART RATE: 78 BPM

## 2021-01-19 DIAGNOSIS — Z09 HOSPITAL DISCHARGE FOLLOW-UP: ICD-10-CM

## 2021-01-19 DIAGNOSIS — R07.9 CHEST PAIN, UNSPECIFIED TYPE: Primary | ICD-10-CM

## 2021-01-19 PROBLEM — L71.8 ACNE ROSACEA, ERYTHEMATOUS TELANGIECTATIC TYPE: Status: ACTIVE | Noted: 2021-01-19

## 2021-01-19 PROCEDURE — 99214 OFFICE O/P EST MOD 30 MIN: CPT | Performed by: NURSE PRACTITIONER

## 2021-01-19 PROCEDURE — 1111F DSCHRG MED/CURRENT MED MERGE: CPT | Performed by: NURSE PRACTITIONER

## 2021-01-19 ASSESSMENT — PATIENT HEALTH QUESTIONNAIRE - PHQ9
SUM OF ALL RESPONSES TO PHQ9 QUESTIONS 1 & 2: 0
SUM OF ALL RESPONSES TO PHQ QUESTIONS 1-9: 0
SUM OF ALL RESPONSES TO PHQ QUESTIONS 1-9: 0
1. LITTLE INTEREST OR PLEASURE IN DOING THINGS: 0

## 2021-01-19 NOTE — PROGRESS NOTES
[facility]  [id number] [facility]. [place] [facility], [place] [address]  [phone]  [phone]    Magdaleno Atkinson is a 63 y.o. female who is here with c/o of:    Chief Complaint: Medication Refill (discuss omeprazole, melatonin, multi vitamin) and Follow-Up from Hospital (StVs chest pain 1/14 -1/15)      Patient Accompanied by: n/a    HPI - Magdaleno Atkinson is here today with c/o:    Patient here for hospital follow up from [facility] for chest pain. She was admitted 1/14-1/15. She does follow with Cardiology, [facility]. She does have hx of stent placement in 2018. She has been having ongoing intermittent chest pressure. She had stress test in December that was unremarkable. While in the hospital she did have heart cath that was normal.     They increased her Coreg and added Ranexa. Reports she feels better but still having shortness of breath that is unchanged. Denies any chest pain, dizziness, edema. She reports headaches for the last few weeks.      Has follow up with Cardiology on 1/4    Health Maintenance Due   Topic Date Due    Diabetic retinal exam  04/28/1974    HIV screen  04/28/1979    Diabetic microalbuminuria test  04/28/1982    Hepatitis B vaccine (1 of 3 - Risk 3-dose series) 04/28/1983    Cervical cancer screen  04/28/1985        Patient Active Problem List:     Chronic back pain     Essential hypertension     SRI on CPAP     Mixed hyperlipidemia     S/P drug eluting coronary stent placement - [place] LAD 7/25/18-Dr. lea     Coronary artery disease involving native coronary artery of native heart with unstable angina pectoris (HCC)     Class 2 obesity due to excess calories with body mass index (BMI) of 39.0 to 39.9 in adult     Acute bronchitis due to other specified organisms     Hyperplastic colon polyp     Unstable angina (HCC)     BMI 38.0-38.9,adult     Post PTCA     Former smoker     Hypokalemia     Hyperglycemia     Murmur, cardiac     Type 2 MD [facility] at [facility]  2020     NERVE BLOCK BILATERAL - B MBB # 1 L4-5, L5-S1 (Bilateral     NERVE BLOCK Bilateral 2020    NERVE BLOCK BILATERAL - L-5, L5-S1 (Bilateral )     NERVE BLOCK Left 2020    NERVE RADIOFREQUENCY ABLATION- L4-5, L5-S1    NERVE BLOCK Right 2020     NERVE RADIOFREQUENCY ABLATION (Right )     PAIN MANAGEMENT PROCEDURE Left 2020    NERVE RADIOFREQUENCY ABLATION- L4-5, L5-S1 performed by Ira Bajwa MD at [address] [place] [address] Right 2020    NERVE RADIOFREQUENCY ABLATION performed by Ira Bajwa MD at [address] [place] [address]       Family History   Problem Relation Age of Onset    Stroke Father     Other Sister         homeless    Diabetes Maternal Grandmother      Social History     Tobacco Use    Smoking status: Former Smoker     Packs/day: 0.25     Years: 30.00     Pack years: 7.50     Types: Cigarettes     Quit date: 2018     Years since quittin.4    Smokeless tobacco: Never Used   Substance Use Topics    Alcohol use: No     ALLERGIES:    Allergies   Allergen Reactions    Bee Venom Hives    Tetracyclines & Related Nausea Only and Other (See Comments)     dizziness    Ace Inhibitors Other (See Comments)     cough          Subjective   Review of Systems   · Constitutional:  Negative for activity change, appetite change,unexpected weight change, chills, fever, and fatigue. · HENT: Negative for ear pain, sore throat,  Rhinorrhea, sinus pain, sinus pressure, congestion. · Eyes:  Negative for pain and discharge. · Respiratory:  Negative for chest tightness, shortness of breath, wheezing, and cough. · Cardiovascular:  Negative for chest pain, palpitations and leg swelling. · Gastrointestinal: Negative for abdominal pain, blood in stool, constipation,diarrhea, nausea and vomiting. · Endocrine: Negative for cold intolerance, heat intolerance, polydipsia, polyphagia and polyuria. · Genitourinary: Negative for difficulty urinating, dysuria, flank pain, frequency, hematuria and urgency. · Musculoskeletal: Negative for arthralgias, back pain, joint swelling, myalgias, neck pain and neck stiffness. · Skin: Negative for rash and wound. · Allergic/Immunologic: Negative for environmental allergies and food allergies. · Neurological:  Negative for dizziness, light-headedness, numbness and headaches. · Hematological:  Negative for adenopathy. Does not bruise/bleed easily. · Psychiatric/Behavioral: Negative for self-injury, sleep disturbance and suicidal ideas. Objective   Physical Exam   PHYSICAL EXAM:   · Constitutional: Evaristo Hardy is oriented to person, place, and time. Vital signs are normal. Appears well-developed and well-nourished. She is obese  · HEENT:   · Head: Normocephalic and atraumatic. Eyes:PERRL, EOMI, Conjunctiva normal, No discharge. · Neck: Full passive range of motion. Non-tender on palpation. Neck supple. No thyromegaly present. Trachea normal.  · Cardiovascular: Normal rate, regular rhythm, S1, S2,  Murmur detected, no gallop, no friction rub, intact distal pulses. · Pulmonary/Chest: Breath sounds are clear throughout, No respiratory distress, No wheezing, No chest tenderness. Effort normal  · Abdominal: Soft. Normal appearance, bowel sounds are present and normoactive. There is no hepatosplenomegaly. There is no tenderness. There is no CVA tenderness. · Musculoskeletal: Extremities appear regular and symmetric. No evident masses, lesions, foreign bodies, or other abnormalities. No edema. No tenderness on palpation. Joints are stable. Full ROM, strength and tone are within normal limits. · Neurological: Alert and oriented to person, place, and time. Normal motor function, Normal sensory function, No focal deficits noted. He has normal strength.   · Skin: Skin is warm, dry and intact. No obvious lesions on exposed skin  · Psychiatric: Normal mood and affect. Speech is normal and behavior is normal.     Nursing note and vitals reviewed. Blood pressure 110/62, pulse 78, temperature 97 °F (36.1 °C), temperature source Temporal, weight 230 lb (104.3 kg), SpO2 97 %. Body mass index is 39.48 kg/m². Wt Readings from Last 3 Encounters:   01/19/21 230 lb (104.3 kg)   01/15/21 231 lb 14.4 oz (105.2 kg)   11/05/20 235 lb (106.6 kg)     BP Readings from Last 3 Encounters:   01/19/21 110/62   01/15/21 (!) 141/87   11/05/20 (!) 146/77       Admission on 01/14/2021, Discharged on 01/15/2021   Component Date Value Ref Range Status    Ventricular Rate 01/14/2021 91  BPM Final    Atrial Rate 01/14/2021 91  BPM Final    P-R Interval 01/14/2021 140  ms Final    QRS Duration 01/14/2021 86  ms Final    Q-T Interval 01/14/2021 368  ms Final    QTc Calculation (Bazett) 01/14/2021 452  ms Final    P Axis 01/14/2021 61  degrees Final    R Axis 01/14/2021 40  degrees Final    T Axis 01/14/2021 157  degrees Final    Troponin, High Sensitivity 01/14/2021 <6  0 - 14 ng/L Final    Comment:       High Sensitivity Troponin values cannot be compared with other Troponin methodologies. Patients with high levels of Biotin oral intake (i.e >5mg/day) may have falsely decreased   Troponin levels. Samples collected within 8 hours of biotin intake may require additional   information for diagnosis.  Troponin T 01/14/2021 NOT REPORTED  <0.03 ng/mL Final    Troponin Interp 01/14/2021 NOT REPORTED   Final    D-Dimer, Quant 01/14/2021 0.25  mg/L FEU Final    Comment:        When combined with a low clinical probability, a D dimer value of <0.50 mg/L FEU is   considered negative for DVT and PE (negative predictive value of 98%, sensitivity of 97%).    If this test is not being used to help rule out DVT and PE, then the following reference   range should be utilized: 0.00 - 1.02 mg/L FEU.        The [employer] D-Dimer assay is intended for use as an aid in the diagnosis of venous   thromboembolism (DVT and PE) and the results should be interpreted in conjunction with the   patient's medical history, clinical presentation, and other findings. Elevated levels of D-dimer activity can be seen in any state of coagulation activation and   is not recommended in patients with therapeutic dose anticoagulant therapy for >24 hours,   fibrinolytic therapy within the previous 7 days, trauma or surgery within the previous 4   weeks,   disseminated malignancies, aortic aneurysm, sepsis, severe infections, pneumonia, severe   skin infections, liver cirrhosis, advance                           d age, coronary disease, diabetes, and pregnancy. A very low percentage of patients with DVT may yield D-dimer results below the cutoff of   0.5 mg/L FEU. This is known to be more prevalent in patients with distal DVT.             WBC 01/14/2021 7.4  3.5 - 11.3 k/uL Final    RBC 01/14/2021 4.96  3.95 - 5.11 m/uL Final    Hemoglobin 01/14/2021 13.6  11.9 - 15.1 g/dL Final    Hematocrit 01/14/2021 42.2  36.3 - 47.1 % Final    MCV 01/14/2021 85.1  82.6 - 102.9 fL Final    MCH 01/14/2021 27.4  25.2 - 33.5 pg Final    MCHC 01/14/2021 32.2  28.4 - 34.8 g/dL Final    RDW 01/14/2021 13.2  11.8 - 14.4 % Final    Platelets 27/24/1844 252  138 - 453 k/uL Final    MPV 01/14/2021 10.9  8.1 - 13.5 fL Final    NRBC Automated 01/14/2021 0.0  0.0 per 100 WBC Final    Differential Type 01/14/2021 NOT REPORTED   Final    Seg Neutrophils 01/14/2021 47  36 - 65 % Final    Lymphocytes 01/14/2021 41  24 - 43 % Final    Monocytes 01/14/2021 9  3 - 12 % Final    Eosinophils % 01/14/2021 2  1 - 4 % Final    Basophils 01/14/2021 1  0 - 2 % Final    Immature Granulocytes 01/14/2021 0  0 % Final    Segs Absolute 01/14/2021 3.45  1.50 - 8.10 k/uL Final    Absolute Lymph # 01/14/2021 3.05  1.10 - 3.70 k/uL Final    Absolute Mono # 01/14/2021 0.69  0.10 - 1.20 k/uL Final    Absolute Eos # 01/14/2021 0.11  0.00 - 0.44 k/uL Final    Basophils Absolute 01/14/2021 0.05  0.00 - 0.20 k/uL Final    Absolute Immature Granulocyte 01/14/2021 <0.03  0.00 - 0.30 k/uL Final    WBC Morphology 01/14/2021 NOT REPORTED   Final    RBC Morphology 01/14/2021 NOT REPORTED   Final    Platelet Estimate 31/61/4301 NOT REPORTED   Final    Glucose 01/14/2021 157* 70 - 99 mg/dL Final    BUN 01/14/2021 17  6 - 20 mg/dL Final    CREATININE 01/14/2021 0.76  0.50 - 0.90 mg/dL Final    Bun/Cre Ratio 01/14/2021 NOT REPORTED  9 - 20 Final    Calcium 01/14/2021 9.5  8.6 - 10.4 mg/dL Final    Sodium 01/14/2021 141  135 - 144 mmol/L Final    Potassium 01/14/2021 4.0  3.7 - 5.3 mmol/L Final    Chloride 01/14/2021 107  98 - 107 mmol/L Final    CO2 01/14/2021 24  20 - 31 mmol/L Final    Anion Gap 01/14/2021 10  9 - 17 mmol/L Final    GFR Non- 01/14/2021 >60  >60 mL/min Final    GFR  01/14/2021 >60  >60 mL/min Final    GFR Comment 01/14/2021        Final    Comment: Average GFR for 52-63 years old:   80 mL/min/1.73sq m  Chronic Kidney Disease:   <60 mL/min/1.73sq m  Kidney failure:   <15 mL/min/1.73sq m              eGFR calculated using average adult body mass. Additional eGFR calculator available at:        [employer]            GFR Staging 01/14/2021 NOT REPORTED   Final    Troponin, High Sensitivity 01/14/2021 <6  0 - 14 ng/L Final    Comment:       High Sensitivity Troponin values cannot be compared with other Troponin methodologies. Patients with high levels of Biotin oral intake (i.e >5mg/day) may have falsely decreased   Troponin levels. Samples collected within 8 hours of biotin intake may require additional   information for diagnosis.       Troponin T 01/14/2021 NOT REPORTED  <0.03 ng/mL Final    Troponin Interp 01/14/2021 NOT REPORTED   Final    Ventricular Rate 01/14/2021 82  BPM Final    Atrial Rate 01/14/2021 82  BPM Final    P-R Interval 01/14/2021 150  ms Final    QRS Duration 01/14/2021 86  ms Final    Q-T Interval 01/14/2021 396  ms Final    QTc Calculation (Bazett) 01/14/2021 462  ms Final    P Axis 01/14/2021 52  degrees Final    R Axis 01/14/2021 21  degrees Final    T Axis 01/14/2021 141  degrees Final    Troponin, High Sensitivity 01/15/2021 6  0 - 14 ng/L Final    Comment:       High Sensitivity Troponin values cannot be compared with other Troponin methodologies. Patients with high levels of Biotin oral intake (i.e >5mg/day) may have falsely decreased   Troponin levels. Samples collected within 8 hours of biotin intake may require additional   information for diagnosis.       Troponin T 01/15/2021 NOT REPORTED  <0.03 ng/mL Final    Troponin Interp 01/15/2021 NOT REPORTED   Final    Glucose 01/15/2021 127* 70 - 99 mg/dL Final    BUN 01/15/2021 17  6 - 20 mg/dL Final    CREATININE 01/15/2021 0.63  0.50 - 0.90 mg/dL Final    Bun/Cre Ratio 01/15/2021 NOT REPORTED  9 - 20 Final    Calcium 01/15/2021 9.2  8.6 - 10.4 mg/dL Final    Sodium 01/15/2021 139  135 - 144 mmol/L Final    Potassium 01/15/2021 4.1  3.7 - 5.3 mmol/L Final    Chloride 01/15/2021 107  98 - 107 mmol/L Final    CO2 01/15/2021 21  20 - 31 mmol/L Final    Anion Gap 01/15/2021 11  9 - 17 mmol/L Final    Alkaline Phosphatase 01/15/2021 119* 35 - 104 U/L Final    ALT 01/15/2021 25  5 - 33 U/L Final    AST 01/15/2021 18  <32 U/L Final    Total Bilirubin 01/15/2021 0.26* 0.3 - 1.2 mg/dL Final    Total Protein 01/15/2021 6.5  6.4 - 8.3 g/dL Final    Alb 01/15/2021 3.6  3.5 - 5.2 g/dL Final    Albumin/Globulin Ratio 01/15/2021 1.2  1.0 - 2.5 Final    GFR Non- 01/15/2021 >60  >60 mL/min Final    GFR  01/15/2021 >60  >60 mL/min Final    GFR Comment 01/15/2021        Final    Comment: Average GFR for 52-63 years old:   80 mL/min/1.73sq m  Chronic Kidney Disease:   <60 mL/min/1.73sq m  Kidney failure:   <15 mL/min/1.73sq m              eGFR calculated using average adult body mass. Additional eGFR calculator available at:        [employer]            GFR Staging 01/15/2021 NOT REPORTED   Final    Magnesium 01/15/2021 2.0  1.6 - 2.6 mg/dL Final    WBC 01/15/2021 6.8  3.5 - 11.3 k/uL Final    RBC 01/15/2021 4.67  3.95 - 5.11 m/uL Final    Hemoglobin 01/15/2021 12.7  11.9 - 15.1 g/dL Final    Hematocrit 01/15/2021 40.1  36.3 - 47.1 % Final    MCV 01/15/2021 85.9  82.6 - 102.9 fL Final    MCH 01/15/2021 27.2  25.2 - 33.5 pg Final    MCHC 01/15/2021 31.7  28.4 - 34.8 g/dL Final    RDW 01/15/2021 13.2  11.8 - 14.4 % Final    Platelets 77/87/2890 225  138 - 453 k/uL Final    MPV 01/15/2021 10.6  8.1 - 13.5 fL Final    NRBC Automated 01/15/2021 0.0  0.0 per 100 WBC Final    Cholesterol 01/15/2021 172  <200 mg/dL Final    Comment:    Cholesterol Guidelines:      <200  Desirable   200-240  Borderline      >240  Undesirable         HDL 01/15/2021 59  >40 mg/dL Final    Comment:    HDL Guidelines:    <40     Undesirable   40-59    Borderline    >59     Desirable         LDL Cholesterol 01/15/2021 92  0 - 130 mg/dL Final    Comment:    LDL Guidelines:     <100    Desirable   100-129   Near to/above Desirable   130-159   Borderline      >159   Undesirable     Direct (measured) LDL and calculated LDL are not interchangeable tests.  Chol/HDL Ratio 01/15/2021 2.9  <5 Final            Triglycerides 01/15/2021 105  <150 mg/dL Final    Comment:    Triglyceride Guidelines:     <150   Desirable   150-199  Borderline   200-499  High     >499   Very high   Based on AHA Guidelines for fasting triglyceride, October 2012.          VLDL 01/15/2021 NOT REPORTED  1 - 30 mg/dL Final    Ventricular Rate 01/15/2021 75  BPM Final    Atrial Rate 01/15/2021 75  BPM Final    P-R Interval 01/15/2021 150  ms Final    QRS Duration 01/15/2021 90  ms Final    Q-T Interval 01/15/2021 436  ms Final    QTc Calculation (Bazett) 01/15/2021 486  ms Final    P Axis 01/15/2021 52  degrees Final    R Axis 01/15/2021 26  degrees Final    T Axis 01/15/2021 156  degrees Final    Ventricular Rate 01/15/2021 79  BPM Final    Atrial Rate 01/15/2021 79  BPM Final    P-R Interval 01/15/2021 146  ms Final    QRS Duration 01/15/2021 86  ms Final    Q-T Interval 01/15/2021 412  ms Final    QTc Calculation (Bazett) 01/15/2021 472  ms Final    P Axis 01/15/2021 57  degrees Final    R Axis 01/15/2021 31  degrees Final    T Axis 01/15/2021 151  degrees Final    Hemoglobin A1C 01/15/2021 6.3* 4.0 - 6.0 % Final    Estimated Avg Glucose 01/15/2021 134  mg/dL Final    Comment: The ADA and AACC recommend providing the estimated average glucose result to permit better   patient understanding of their HBA1c result.  Troponin, High Sensitivity 01/15/2021 <6  0 - 14 ng/L Final    Comment:       High Sensitivity Troponin values cannot be compared with other Troponin methodologies. Patients with high levels of Biotin oral intake (i.e >5mg/day) may have falsely decreased   Troponin levels. Samples collected within 8 hours of biotin intake may require additional   information for diagnosis.  Troponin T 01/15/2021 NOT REPORTED  <0.03 ng/mL Final    Troponin Interp 01/15/2021 NOT REPORTED   Final     No results found for this visit on 01/19/21. Completed Orders/Prescriptions   No orders of the defined types were placed in this encounter. AssessmentPlan/Medical Decision Making     1. Chest pain, unspecified type    - Follows with cardiology and has upcoming appointment on 2/4.     1912 [facility] 157 discharge follow-up    - [place] DISCHARGE MEDS RECONCILED W/ CURRENT OUTPATIENT MED LIST  - ok to resume melatonin, multi vitamin, and omeprazole      Return in about 6 weeks (around 3/2/2021) for chronic conditions.     1.  Beata received counseling on the

## 2021-02-11 ENCOUNTER — OFFICE VISIT (OUTPATIENT)
Dept: PAIN MANAGEMENT | Age: 56
End: 2021-02-11
Payer: COMMERCIAL

## 2021-02-11 VITALS
DIASTOLIC BLOOD PRESSURE: 73 MMHG | HEART RATE: 72 BPM | HEIGHT: 64 IN | BODY MASS INDEX: 38.93 KG/M2 | SYSTOLIC BLOOD PRESSURE: 141 MMHG | WEIGHT: 228 LBS

## 2021-02-11 DIAGNOSIS — M54.16 LUMBAR RADICULOPATHY: ICD-10-CM

## 2021-02-11 DIAGNOSIS — M47.817 LUMBOSACRAL SPONDYLOSIS WITHOUT MYELOPATHY: Primary | ICD-10-CM

## 2021-02-11 PROCEDURE — 99213 OFFICE O/P EST LOW 20 MIN: CPT | Performed by: PAIN MEDICINE

## 2021-02-11 RX ORDER — GABAPENTIN 100 MG/1
100 CAPSULE ORAL 3 TIMES DAILY
Qty: 90 CAPSULE | Refills: 1 | Status: SHIPPED | OUTPATIENT
Start: 2021-02-11 | End: 2021-11-08 | Stop reason: ALTCHOICE

## 2021-02-11 ASSESSMENT — ENCOUNTER SYMPTOMS
BACK PAIN: 1
BOWEL INCONTINENCE: 0
SHORTNESS OF BREATH: 1
ABDOMINAL PAIN: 0
EYES NEGATIVE: 1

## 2021-02-11 NOTE — PROGRESS NOTES
HPI:     Back Pain  This is a chronic problem. The current episode started more than 1 year ago. The problem occurs constantly. The problem has been gradually worsening since onset. The pain is present in the lumbar spine. The quality of the pain is described as aching (pressure ). The pain does not radiate. The pain is at a severity of 7/10. The pain is moderate. The pain is the same all the time. The symptoms are aggravated by bending, twisting, sitting and standing (walking ). Stiffness is present in the morning. Associated symptoms include leg pain, numbness and tingling. Pertinent negatives include no abdominal pain, bladder incontinence, bowel incontinence, chest pain, dysuria, fever, headaches, paresis, paresthesias, pelvic pain, perianal numbness, weakness or weight loss. Risk factors include history of osteoporosis. She has tried heat (injections,physical therapy, ablation , 3 back surgeries) for the symptoms. The treatment provided no relief. Chronic back pain. Multiple different treatment modalities has had previous back surgery. Continues to have lingering pain pain MRI degenerative disc bulging. EMG with chronic radiculopathy. RFA with minimal benefit in the past.  Considering surgery however ended up not going that route. She is on Brilinta for coronary artery disease. Patient denies any new neurological symptoms. Nobowel or bladder incontinence, no weakness, and no falling. Review of OARRS does not show any aberrant prescription behavior. Past Medical History:   Diagnosis Date    Abnormal stress test     Allergic rhinitis     Arthritis     CAD (coronary artery disease)     Dr. Joselyn Leyden last seen 2/20/2020    Chronic back pain     Diabetes mellitus ([facility] [address]     Former smoker 2/7/2020    30-year history.   Quit in 2018    Hyperlipidemia     Dr. Glorietta Schaumann Hypertension     Dr. Oscar Saavedra, cardiac 2/8/2020    SRI on CPAP     instructed to bring Dr. Nisha Moreau  Wears prescription eyeglasses     Wellness examination     Dr. Bhumi Tolliver last seen 2020       Past Surgical History:   Procedure Laterality Date    ANESTHESIA NERVE BLOCK Left 2019    NERVE BLOCK (DEFINE) - # 1 L5-S1 performed by Momo Hernandez MD at [facility] Bilateral 2020    NERVE BLOCK BILATERAL - B MBB # 1 L4-5, L5-S1 performed by Momo Hernandez MD at [facility] Bilateral 2020    NERVE BLOCK BILATERAL - L-5, L5-S1 [facility]  2017    CARDIAC CATHETERIZATION  2019    CARDIAC CATHETERIZATION  2018    1 stent mid LAD PT HAS XIENCE ALPINE HEART STENT, MRI CONDITIONAL 3T OK, SAFE IMMEDIATELY POST IMPLANT.  CARDIAC CATHETERIZATION  02/10/2020    for chest pain no new blockage    CARPAL TUNNEL RELEASE Right      SECTION      x3    COLONOSCOPY N/A 2018    COLONOSCOPY WITH BIOPSY performed by Colby Callejas MD at [place] [address]  2014    cataract surgery left eye WITH IMPLANT. MRI OK.      FOOT SURGERY      HAND TENDON SURGERY Right     [facility]. INJECTION PROCEDURE FOR SACROILIAC JOINT Left 2019    SACROILIAC JOINT INJECTION - #1 performed by Momo Hernandez MD at [facility]  2020     NERVE BLOCK BILATERAL - B MBB # 1 L4-5, L5-S1 (Bilateral     NERVE BLOCK Bilateral 2020    NERVE BLOCK BILATERAL - L-5, L5-S1 (Bilateral )     NERVE BLOCK Left 2020    NERVE RADIOFREQUENCY ABLATION- L4-5, L5-S1    NERVE BLOCK Right 2020     NERVE RADIOFREQUENCY ABLATION (Right )     PAIN MANAGEMENT PROCEDURE Left 2020    NERVE RADIOFREQUENCY ABLATION- L4-5, L5-S1 performed by Momo Hernandez MD at [address] [place] [address] Right 2020 NERVE RADIOFREQUENCY ABLATION performed by Nicolasa Cr MD at [address] [place] [address]         Allergies   Allergen Reactions    Bee Venom Hives    Tetracyclines & Related Nausea Only and Other (See Comments)     dizziness    Ace Inhibitors Other (See Comments)     cough         Current Outpatient Medications:     gabapentin (NEURONTIN) 100 MG capsule, Take 1 capsule by mouth 3 times daily for 30 days. , Disp: 90 capsule, Rfl: 1    ticagrelor (BRILINTA) 60 MG TABS tablet, Take 1 tablet by mouth 2 times daily, Disp: 30 tablet, Rfl: 0    ranolazine (RANEXA) 500 MG extended release tablet, Take 1 tablet by mouth 2 times daily, Disp: 60 tablet, Rfl: 3    carvedilol (COREG) 6.25 MG tablet, Take 1 tablet by mouth 2 times daily (with meals), Disp: 60 tablet, Rfl: 3    nitroGLYCERIN (NITROSTAT) 0.4 MG SL tablet, place 1 tablet under the tongue if needed every 5 minutes for chest pain for 3 doses IF NO RELIEF AFTER FIRST DOSE CALL PRESCRIBER ., Disp: 25 tablet, Rfl: 1    losartan (COZAAR) 100 MG tablet, take 1 tablet by mouth once daily, Disp: 30 tablet, Rfl: 5    metFORMIN (GLUCOPHAGE-XR) 500 MG extended release tablet, Take 500 mg a day for 2 weeks, then increase the dose to twice a day., Disp: 60 tablet, Rfl: 11    atorvastatin (LIPITOR) 80 MG tablet, Take 1 tablet by mouth daily, Disp: 30 tablet, Rfl: 11    amLODIPine (NORVASC) 5 MG tablet, Take by mouth daily, Disp: , Rfl:     hydrochlorothiazide (HYDRODIURIL) 25 MG tablet, Take 25 mg by mouth daily, Disp: , Rfl:     aspirin 81 MG tablet, Take 1 tablet by mouth daily (with breakfast), Disp: 30 tablet, Rfl: 11    Family History   Problem Relation Age of Onset    Stroke Father     Other Sister         homeless    Diabetes Maternal Grandmother        Social History     Socioeconomic History    Marital status:      Spouse name: Not on file    Number of children: Not on file  Years of education: Not on file    Highest education level: Not on file   Occupational History    Not on file   Social Needs    Financial resource strain: Not on file    Food insecurity     Worry: Not on file     Inability: Not on file    Transportation needs     Medical: Not on file     Non-medical: Not on file   Tobacco Use    Smoking status: Former Smoker     Packs/day: 0.25     Years: 30.00     Pack years: 7.50     Types: Cigarettes     Quit date: 2018     Years since quittin.5    Smokeless tobacco: Never Used   Substance and Sexual Activity    Alcohol use: No    Drug use: No    Sexual activity: Not on file   Lifestyle    Physical activity     Days per week: Not on file     Minutes per session: Not on file    Stress: Not on file   Relationships    Social connections     Talks on phone: Not on file     Gets together: Not on file     Attends Religion service: Not on file     Active member of club or organization: Not on file     Attends meetings of clubs or organizations: Not on file     Relationship status: Not on file    Intimate partner violence     Fear of current or ex partner: Not on file     Emotionally abused: Not on file     Physically abused: Not on file     Forced sexual activity: Not on file   Other Topics Concern    Not on file   Social History Narrative    Not on file       Review of Systems:  Review of Systems   Constitution: Negative for fever and weight loss. Eyes: Negative. Cardiovascular: Negative for chest pain. Respiratory: Positive for shortness of breath. Endocrine: Positive for cold intolerance. Musculoskeletal: Positive for back pain. Gastrointestinal: Negative for abdominal pain and bowel incontinence. Genitourinary: Negative for bladder incontinence, dysuria and pelvic pain. Neurological: Positive for numbness and tingling. Negative for headaches, paresthesias and weakness.        Physical Exam: BP (!) 141/73   Pulse 72   Ht 5' 4\" (1.626 m)   Wt 228 lb (103.4 kg)   BMI 39.14 kg/m²     Physical Exam  Constitutional:       Appearance: Normal appearance. Pulmonary:      Effort: Pulmonary effort is normal.   Neurological:      Mental Status: She is alert. Psychiatric:         Attention and Perception: Attention and perception normal.         Mood and Affect: Mood and affect normal.         Record/Diagnostics Review:    As above, I did review the imaging    Orders:    Orders Placed This Encounter   Medications    gabapentin (NEURONTIN) 100 MG capsule     Sig: Take 1 capsule by mouth 3 times daily for 30 days. Dispense:  90 capsule     Refill:  1       Assessment:  1. Lumbosacral spondylosis without myelopathy    2. Lumbar radiculopathy        Treatment Plan:  DISCUSSION: Treatment options discussed with patient and all questions answered to patient's satisfaction. OARRS Review: Reviewed and acceptable for medications prescribed. TREATMENT OPTIONS:     Discussed different treatment options including continued conservative care such as physical therapy, chiropractic care, acupuncture. Discussed different interventional options such as epidural steroids or medial branch blocks. Also discussed neuromodulation in the form of spinal cord stimulation. Also discussed surgical evaluation. Neurontin 100 mg 3 times daily and titrate further if tolerates. Kaylee Sheehan M.D. I have reviewed the chief complaint and history of present illness (including ROS and PFSH) and vital documentation by my staff and I agree with their documentation and have added where applicable.

## 2021-02-22 RX ORDER — METFORMIN HYDROCHLORIDE 500 MG/1
TABLET, EXTENDED RELEASE ORAL
Qty: 60 TABLET | Refills: 11 | Status: SHIPPED | OUTPATIENT
Start: 2021-02-22

## 2021-03-03 ENCOUNTER — OFFICE VISIT (OUTPATIENT)
Dept: FAMILY MEDICINE CLINIC | Age: 56
End: 2021-03-03
Payer: COMMERCIAL

## 2021-03-03 ENCOUNTER — TELEPHONE (OUTPATIENT)
Dept: PAIN MANAGEMENT | Age: 56
End: 2021-03-03

## 2021-03-03 VITALS
HEART RATE: 76 BPM | BODY MASS INDEX: 39.72 KG/M2 | DIASTOLIC BLOOD PRESSURE: 80 MMHG | SYSTOLIC BLOOD PRESSURE: 124 MMHG | TEMPERATURE: 96.8 F | OXYGEN SATURATION: 98 % | WEIGHT: 231.38 LBS

## 2021-03-03 DIAGNOSIS — M25.561 RIGHT KNEE PAIN, UNSPECIFIED CHRONICITY: ICD-10-CM

## 2021-03-03 DIAGNOSIS — E11.9 TYPE 2 DIABETES MELLITUS WITHOUT COMPLICATION, WITHOUT LONG-TERM CURRENT USE OF INSULIN (HCC): Primary | ICD-10-CM

## 2021-03-03 DIAGNOSIS — I10 ESSENTIAL HYPERTENSION: ICD-10-CM

## 2021-03-03 PROCEDURE — 99214 OFFICE O/P EST MOD 30 MIN: CPT | Performed by: FAMILY MEDICINE

## 2021-03-03 RX ORDER — LANOLIN ALCOHOL/MO/W.PET/CERES
3 CREAM (GRAM) TOPICAL DAILY
COMMUNITY
End: 2021-03-04 | Stop reason: ALTCHOICE

## 2021-03-03 RX ORDER — EZETIMIBE 10 MG/1
10 TABLET ORAL DAILY
COMMUNITY

## 2021-03-03 RX ORDER — OMEPRAZOLE 40 MG/1
40 CAPSULE, DELAYED RELEASE ORAL DAILY
COMMUNITY
End: 2021-10-22 | Stop reason: SDUPTHER

## 2021-03-03 ASSESSMENT — PATIENT HEALTH QUESTIONNAIRE - PHQ9: SUM OF ALL RESPONSES TO PHQ QUESTIONS 1-9: 0

## 2021-03-03 NOTE — PROGRESS NOTES
Subjective: Chris Low presents for   Chief Complaint   Patient presents with    Diabetes    Knee Pain     rt. knee pain x 2 weeks. no injury. pain is constant. has taken tylenol and tried topical icy hot. No trauma. No discoloration. she thinks there may be swelling    No angianla sx since they adjusted the meds 8 weeks ago. Is seeing pain management    Patient Active Problem List   Diagnosis    Chronic back pain    Essential hypertension    SRI on CPAP    Mixed hyperlipidemia    S/P drug eluting coronary stent placement - Mid LAD 7/25/18-Dr. lea    Coronary artery disease involving native coronary artery of native heart with unstable angina pectoris (HCC)    Class 2 obesity due to excess calories with body mass index (BMI) of 39.0 to 39.9 in adult    Acute bronchitis due to other specified organisms    Hyperplastic colon polyp    [facility]    BMI 38.0-38.9,adult    Post PTCA    Former smoker    Hypokalemia    Hyperglycemia    Murmur, cardiac    Type 2 diabetes mellitus without complication, without long-term current use of insulin ([facility])    Chest pain    Coronary stent occlusion    Acne rosacea, erythematous telangiectatic type         Review of Systems:  · General: no significant weight changes. · Respiratory: no cough, pleuritic chest pain, dyspnea, or wheezing  · Cardiovascular: no pain, THOMAS, orthopnea, palpitations or claudication  · Gastrointestinal: no chronic nausea, vomiting, heartburn, diarrhea, constipation, bloating, or abdominal pain. No bloody or black stools.     Objective:  Physical Exam   Vitals:   Vitals:    03/03/21 1035   BP: 124/80   Pulse: 76   Temp: 96.8 °F (36 °C)   TempSrc: Temporal   SpO2: 98%   Weight: 231 lb 6 oz (105 kg)     Wt Readings from Last 3 Encounters:   03/03/21 231 lb 6 oz (105 kg)   02/11/21 228 lb (103.4 kg)   01/19/21 230 lb (104.3 kg)     Ht Readings from Last 3 Encounters:   02/11/21 5' 4\" (1.626 m)   01/15/21 5' 4\" (1.626 m) 11/05/20 5' 4\" (1.626 m)     Body mass index is 39.72 kg/m². Constitutional: She is oriented to person, place, and time. She appears well-developed and well-nourished and in no acute distress. Answers all my questions appropriately. Head: Normocephalic and atraumatic. Eyes:conjunctiva appear normal.  Nose: pink, non-edematous mucosa. No polyps. No septal deviation  Throat: no erythema, tonsillar hypertrophy or exudate. No ulcerations noted. Lips/Teeth/Gums all appear normal.  Neck: Normal range of motion. Neck supple. No tracheal deviation present. No abnormal lymphadenopathy. No JVD noted. Carotids are clear bilaterally. No thyroid masses noted. Heart: RRR without murmur. No S3, S4, or gallop noted. Chest: Clear to auscultation bilaterally. Good breath sounds noted. No rales, wheezes, or rhonchi noted. No respiratory retractions noted. Wall has symmetrical movement with respirations. Right knee barely noticalbe sweeling notice on the medial knee. No discolroation. Has FROM  . Has plable discomfort along the ant medial joint eda and the post edge of patella. Neg lachmans sign. Assessment:   Encounter Diagnoses   Name Primary?  Type 2 diabetes mellitus without complication, without long-term current use of insulin (HCC) Yes    Right knee pain, unspecified chronicity     Essential hypertension          Plan:   There are no discontinued medications. THE ABOVE NOTED DISCONTINUED MEDS MAY ONLY BE FROM 'CLEANING UP' THE MED LIST AND WERE NOT ACTUALLY CANCELED;  SEE CHART FOR DETAILS! No orders of the defined types were placed in this encounter.     Orders Placed This Encounter   Procedures    Hemoglobin A1C     Standing Status:   Future     Standing Expiration Date:   3/3/2022    Lipid Panel     Standing Status:   Future     Standing Expiration Date:   3/3/2022     Order Specific Question:   Is Patient Fasting?/# of Hours     Answer:   0    Hepatic Function Panel Standing Status:   Future     Standing Expiration Date:   3/3/2022     No follow-ups on file. Patient Instructions   Use voltaren gel topically on the right knee 4 times a day    Data Unavailable      Call us in 2 weeks about the voltaren gel.     Use heat qid    Avoid oral nsaids    rv in 6 months

## 2021-03-04 ENCOUNTER — OFFICE VISIT (OUTPATIENT)
Dept: PAIN MANAGEMENT | Age: 56
End: 2021-03-04
Payer: COMMERCIAL

## 2021-03-04 VITALS — HEIGHT: 64 IN | BODY MASS INDEX: 39.44 KG/M2 | WEIGHT: 231 LBS | TEMPERATURE: 97.2 F

## 2021-03-04 DIAGNOSIS — M54.16 LUMBAR RADICULOPATHY: Primary | ICD-10-CM

## 2021-03-04 DIAGNOSIS — G89.29 OTHER CHRONIC PAIN: ICD-10-CM

## 2021-03-04 PROCEDURE — 99214 OFFICE O/P EST MOD 30 MIN: CPT | Performed by: PAIN MEDICINE

## 2021-03-04 RX ORDER — PREGABALIN 50 MG/1
50 CAPSULE ORAL 2 TIMES DAILY
Qty: 60 CAPSULE | Refills: 1 | Status: SHIPPED | OUTPATIENT
Start: 2021-03-04 | End: 2021-10-08 | Stop reason: SDUPTHER

## 2021-03-04 ASSESSMENT — ENCOUNTER SYMPTOMS
BOWEL INCONTINENCE: 0
ABDOMINAL PAIN: 0
BACK PAIN: 1
RESPIRATORY NEGATIVE: 1

## 2021-03-04 NOTE — PROGRESS NOTES
 ANESTHESIA NERVE BLOCK Left 2019    NERVE BLOCK (DEFINE) - # 1 L5-S1 performed by Momo Hernandez MD at [facility] Bilateral 2020    NERVE BLOCK BILATERAL - B MBB # 1 L4-5, L5-S1 performed by Momo Hernandez MD at [facility] Bilateral 2020    NERVE BLOCK BILATERAL - L-5, L5-S1 [address] [facility] [facility] [address]  2017    CARDIAC CATHETERIZATION  2019    CARDIAC CATHETERIZATION  2018    1 stent mid LAD PT HAS XIENCE ALPINE HEART STENT, MRI CONDITIONAL 3T OK, SAFE IMMEDIATELY POST IMPLANT.  CARDIAC CATHETERIZATION  02/10/2020    for chest pain no new blockage    CARPAL TUNNEL RELEASE Right      SECTION      x3    COLONOSCOPY N/A 2018    COLONOSCOPY WITH BIOPSY performed by Colby Callejas MD at [place] [address]  2014    cataract surgery left eye WITH IMPLANT. MRI OK.      FOOT SURGERY      HAND TENDON SURGERY Right     [facility]. INJECTION PROCEDURE FOR SACROILIAC JOINT Left 2019    SACROILIAC JOINT INJECTION - #1 performed by Momo Hernandez MD at [facility]  2020     NERVE BLOCK BILATERAL - B MBB # 1 L4-5, L5-S1 (Bilateral     NERVE BLOCK Bilateral 2020    NERVE BLOCK BILATERAL - L-5, L5-S1 (Bilateral )     NERVE BLOCK Left 2020    NERVE RADIOFREQUENCY ABLATION- L4-5, L5-S1    NERVE BLOCK Right 2020     NERVE RADIOFREQUENCY ABLATION (Right )     PAIN MANAGEMENT PROCEDURE Left 2020    NERVE RADIOFREQUENCY ABLATION- L4-5, L5-S1 performed by Momo Hernandez MD at [address] [place] [address] Right 2020    NERVE RADIOFREQUENCY ABLATION performed by Momo Hernandez MD at [address] [place] [address]         Allergies   Allergen Reactions  Bee Venom Hives    Tetracyclines & Related Nausea Only and Other (See Comments)     dizziness    Ace Inhibitors Other (See Comments)     cough         Current Outpatient Medications:     pregabalin (LYRICA) 50 MG capsule, Take 1 capsule by mouth 2 times daily for 30 days. , Disp: 60 capsule, Rfl: 1    omeprazole (PRILOSEC) 40 MG delayed release capsule, Take 40 mg by mouth daily, Disp: , Rfl:     ezetimibe (ZETIA) 10 MG tablet, Take 10 mg by mouth daily, Disp: , Rfl:     metFORMIN (GLUCOPHAGE-XR) 500 MG extended release tablet, take 1 tablet by mouth once daily for 2 weeks then 1 tablet twice a day, Disp: 60 tablet, Rfl: 11    gabapentin (NEURONTIN) 100 MG capsule, Take 1 capsule by mouth 3 times daily for 30 days. , Disp: 90 capsule, Rfl: 1    ticagrelor (BRILINTA) 60 MG TABS tablet, Take 1 tablet by mouth 2 times daily, Disp: 30 tablet, Rfl: 0    ranolazine (RANEXA) 500 MG extended release tablet, Take 1 tablet by mouth 2 times daily (Patient taking differently: Take 1,000 mg by mouth 2 times daily ), Disp: 60 tablet, Rfl: 3    carvedilol (COREG) 6.25 MG tablet, Take 1 tablet by mouth 2 times daily (with meals), Disp: 60 tablet, Rfl: 3    nitroGLYCERIN (NITROSTAT) 0.4 MG SL tablet, place 1 tablet under the tongue if needed every 5 minutes for chest pain for 3 doses IF NO RELIEF AFTER FIRST DOSE CALL PRESCRIBER ., Disp: 25 tablet, Rfl: 1    losartan (COZAAR) 100 MG tablet, take 1 tablet by mouth once daily, Disp: 30 tablet, Rfl: 5    atorvastatin (LIPITOR) 80 MG tablet, Take 1 tablet by mouth daily, Disp: 30 tablet, Rfl: 11    amLODIPine (NORVASC) 5 MG tablet, Take by mouth daily, Disp: , Rfl:     hydrochlorothiazide (HYDRODIURIL) 25 MG tablet, Take 25 mg by mouth daily, Disp: , Rfl:     aspirin 81 MG tablet, Take 1 tablet by mouth daily (with breakfast), Disp: 30 tablet, Rfl: 11    Family History   Problem Relation Age of Onset    Stroke Father     Other Sister         homeless  Diabetes Maternal Grandmother        Social History     Socioeconomic History    Marital status:      Spouse name: Not on file    Number of children: Not on file    Years of education: Not on file    Highest education level: Not on file   Occupational History    Not on file   Social Needs    Financial resource strain: Not on file    Food insecurity     Worry: Not on file     Inability: Not on file    Transportation needs     Medical: Not on file     Non-medical: Not on file   Tobacco Use    Smoking status: Former Smoker     Packs/day: 0.25     Years: 30.00     Pack years: 7.50     Types: Cigarettes     Quit date: 2018     Years since quittin.6    Smokeless tobacco: Never Used   Substance and Sexual Activity    Alcohol use: No    Drug use: No    Sexual activity: Not on file   Lifestyle    Physical activity     Days per week: Not on file     Minutes per session: Not on file    Stress: Not on file   Relationships    Social connections     Talks on phone: Not on file     Gets together: Not on file     Attends Yazidism service: Not on file     Active member of club or organization: Not on file     Attends meetings of clubs or organizations: Not on file     Relationship status: Not on file    Intimate partner violence     Fear of current or ex partner: Not on file     Emotionally abused: Not on file     Physically abused: Not on file     Forced sexual activity: Not on file   Other Topics Concern    Not on file   Social History Narrative    Not on file       Review of Systems:  Review of Systems   Constitution: Negative. Negative for fever and weight loss. Cardiovascular: Negative for chest pain. Respiratory: Negative. Musculoskeletal: Positive for back pain. Gastrointestinal: Negative for abdominal pain and bowel incontinence. Genitourinary: Negative for bladder incontinence, dysuria and pelvic pain. Neurological: Positive for light-headedness. Negative for headaches, numbness, paresthesias, tingling and weakness. Physical Exam:  Temp 97.2 °F (36.2 °C)   Ht 5' 4\" (1.626 m)   Wt 231 lb (104.8 kg)   BMI 39.65 kg/m²     Physical Exam  Constitutional:       Appearance: Normal appearance. Pulmonary:      Effort: Pulmonary effort is normal.   Neurological:      Mental Status: She is alert. Psychiatric:         Attention and Perception: Attention and perception normal.         Mood and Affect: Mood and affect normal.         Record/Diagnostics Review:    As above, I did review the imaging    Orders:    Orders Placed This Encounter   Medications    pregabalin (LYRICA) 50 MG capsule     Sig: Take 1 capsule by mouth 2 times daily for 30 days. Dispense:  60 capsule     Refill:  1       Assessment:  1. Lumbar radiculopathy    2. Other chronic pain        Treatment Plan:  DISCUSSION: Treatment options discussed with patient and all questions answered to patient's satisfaction. OARRS Review: Reviewed and acceptable for medications prescribed. TREATMENT OPTIONS:     Discussed the importance of continued physical therapy and exercise program as well. Could not tolerate Neurontin. Will try Lyrica and see how she does. Start Lyrica 50 mg at bedtime for 1 week and if tolerates increase to 50mg twice a day. She has tried multiple interventions with no sustained benefit. We will have her touch base with surgeon to discuss options. May also consider spinal cord stimulation in the future. Margaret Ingram M.D. I have reviewed the chief complaint and history of present illness (including ROS and PFSH) and vital documentation by my staff and I agree with their documentation and have added where applicable.

## 2021-03-30 ASSESSMENT — ENCOUNTER SYMPTOMS
ABDOMINAL PAIN: 0
BOWEL INCONTINENCE: 0
BACK PAIN: 1

## 2021-03-30 NOTE — PROGRESS NOTES
HPI:     Back Pain  This is a chronic problem. The current episode started more than 1 year ago. The problem occurs constantly. The problem has been gradually improving since onset. The pain is present in the lumbar spine. The quality of the pain is described as aching. The pain does not radiate. The pain is at a severity of 3/10. The pain is mild. The pain is the same all the time. The symptoms are aggravated by bending and sitting. Stiffness is present all day. Pertinent negatives include no abdominal pain, bladder incontinence, bowel incontinence, chest pain, dysuria, fever, headaches, leg pain, numbness, paresis, paresthesias, pelvic pain, perianal numbness, tingling, weakness or weight loss. Treatments tried: PT, injections, RFA, surgery, medications. The treatment provided mild relief. Lyrica 50 mg twice a day with benefit. Patient denies any new neurological symptoms. Nobowel or bladder incontinence, no weakness, and no falling. Review of OARRS does not show any aberrant prescription behavior. Past Medical History:   Diagnosis Date    Abnormal stress test     Allergic rhinitis     Arthritis     CAD (coronary artery disease)     Dr. Can Mcpherson last seen 2/20/2020    Chronic back pain     Diabetes mellitus ([facility] [address]     Former smoker 2/7/2020    30-year history.   Quit in 2018    Hyperlipidemia     Dr. Burkett Has Hypertension     Dr. Nathalie Mcallister, cardiac 2/8/2020    SRI on CPAP     instructed to bring Dr. Nicole Cleary Wears prescription eyeglasses     Wellness examination     Dr. Gato Purdy last seen 2/13/2020       Past Surgical History:   Procedure Laterality Date    ANESTHESIA NERVE BLOCK Left 12/19/2019    NERVE BLOCK (DEFINE) - # 1 L5-S1 performed by Andre Dumont MD at [facility] Bilateral 7/17/2020    NERVE BLOCK BILATERAL - B MBB # 1 L4-5, L5-S1 performed by Andre Dumont MD at [facility] Bilateral 2020    NERVE BLOCK BILATERAL - L-5, L5-S1 performed by Shaun Haque MD at [address]  2017    CARDIAC CATHETERIZATION  2019    CARDIAC CATHETERIZATION  2018    1 stent mid LAD PT HAS [facility] ALPINE HEART STENT, MRI CONDITIONAL 3T OK, SAFE IMMEDIATELY POST IMPLANT.  CARDIAC CATHETERIZATION  02/10/2020    for chest pain no new blockage    CARPAL TUNNEL RELEASE Right      SECTION      x3    COLONOSCOPY N/A 2018    COLONOSCOPY WITH BIOPSY performed by Mleany Jackson MD at [place] [address]  2014    cataract surgery left eye WITH IMPLANT. MRI OK.  FOOT SURGERY      HAND TENDON SURGERY Right     [facility]. INJECTION PROCEDURE FOR SACROILIAC JOINT Left 2019    SACROILIAC JOINT INJECTION - #1 performed by Shaun Haque MD at [facility]  2020     NERVE BLOCK BILATERAL - B MBB # 1 L4-5, L5-S1 (Bilateral     NERVE BLOCK Bilateral 2020    NERVE BLOCK BILATERAL - L-5, L5-S1 (Bilateral )     NERVE BLOCK Left 2020    NERVE RADIOFREQUENCY ABLATION- L4-5, L5-S1    NERVE BLOCK Right 2020     NERVE RADIOFREQUENCY ABLATION (Right )     PAIN MANAGEMENT PROCEDURE Left 2020    NERVE RADIOFREQUENCY ABLATION- L4-5, L5-S1 performed by Shaun Haque MD at [address] [place] [address] Right 2020    NERVE RADIOFREQUENCY ABLATION performed by Shaun Haque MD at [address] [place] [address]         Allergies   Allergen Reactions    Bee Venom Hives    Tetracyclines & Related Nausea Only and Other (See Comments)     dizziness    Ace Inhibitors Other (See Comments)     cough         Current Outpatient Medications:     pregabalin (LYRICA) 50 MG capsule, Take 1 capsule by mouth 2 times daily for 30 days. , Disp: 60 capsule, Rfl: 2    pregabalin (LYRICA) 50 MG capsule, Take 1 capsule by mouth 2 times daily for 30 days. , Disp: 60 capsule, Rfl: 1    omeprazole (PRILOSEC) 40 MG delayed release capsule, Take 40 mg by mouth daily, Disp: , Rfl:     ezetimibe (ZETIA) 10 MG tablet, Take 10 mg by mouth daily, Disp: , Rfl:     metFORMIN (GLUCOPHAGE-XR) 500 MG extended release tablet, take 1 tablet by mouth once daily for 2 weeks then 1 tablet twice a day, Disp: 60 tablet, Rfl: 11    ticagrelor (BRILINTA) 60 MG TABS tablet, Take 1 tablet by mouth 2 times daily, Disp: 30 tablet, Rfl: 0    ranolazine (RANEXA) 500 MG extended release tablet, Take 1 tablet by mouth 2 times daily (Patient taking differently: Take 1,000 mg by mouth 2 times daily ), Disp: 60 tablet, Rfl: 3    carvedilol (COREG) 6.25 MG tablet, Take 1 tablet by mouth 2 times daily (with meals), Disp: 60 tablet, Rfl: 3    nitroGLYCERIN (NITROSTAT) 0.4 MG SL tablet, place 1 tablet under the tongue if needed every 5 minutes for chest pain for 3 doses IF NO RELIEF AFTER FIRST DOSE CALL PRESCRIBER ., Disp: 25 tablet, Rfl: 1    losartan (COZAAR) 100 MG tablet, take 1 tablet by mouth once daily, Disp: 30 tablet, Rfl: 5    atorvastatin (LIPITOR) 80 MG tablet, Take 1 tablet by mouth daily, Disp: 30 tablet, Rfl: 11    amLODIPine (NORVASC) 5 MG tablet, Take by mouth daily, Disp: , Rfl:     hydrochlorothiazide (HYDRODIURIL) 25 MG tablet, Take 25 mg by mouth daily, Disp: , Rfl:     aspirin 81 MG tablet, Take 1 tablet by mouth daily (with breakfast), Disp: 30 tablet, Rfl: 11    gabapentin (NEURONTIN) 100 MG capsule, Take 1 capsule by mouth 3 times daily for 30 days.  (Patient not taking: Reported on 3/30/2021), Disp: 90 capsule, Rfl: 1    Family History   Problem Relation Age of Onset    Stroke Father     Other Sister         homeless    Diabetes Maternal Grandmother        Social History     Socioeconomic History    Marital status:      Spouse name: Not on file    Number of children: Not on file    Years of education: Not on file    Highest education level: Not on file   Occupational History    Not on file   Social Needs    Financial resource strain: Not on file    Food insecurity     Worry: Not on file     Inability: Not on file    Transportation needs     Medical: Not on file     Non-medical: Not on file   Tobacco Use    Smoking status: Former Smoker     Packs/day: 0.25     Years: 30.00     Pack years: 7.50     Types: Cigarettes     Quit date: 2018     Years since quittin.7    Smokeless tobacco: Never Used   Substance and Sexual Activity    Alcohol use: No    Drug use: No    Sexual activity: Not on file   Lifestyle    Physical activity     Days per week: Not on file     Minutes per session: Not on file    Stress: Not on file   Relationships    Social connections     Talks on phone: Not on file     Gets together: Not on file     Attends Anabaptist service: Not on file     Active member of club or organization: Not on file     Attends meetings of clubs or organizations: Not on file     Relationship status: Not on file    Intimate partner violence     Fear of current or ex partner: Not on file     Emotionally abused: Not on file     Physically abused: Not on file     Forced sexual activity: Not on file   Other Topics Concern    Not on file   Social History Narrative    Not on file       Review of Systems:  Review of Systems   Constitution: Negative for fever and weight loss. Cardiovascular: Negative for chest pain. Musculoskeletal: Positive for back pain. Gastrointestinal: Negative for abdominal pain and bowel incontinence. Genitourinary: Negative for bladder incontinence, dysuria and pelvic pain. Neurological: Negative for headaches, numbness, paresthesias, tingling and weakness. Physical Exam:  BP (!) 167/88   Ht 5' 4\" (1.626 m)   Wt 230 lb (104.3 kg)   BMI 39.48 kg/m²     Physical Exam  Constitutional:       Appearance: Normal appearance.    Pulmonary:      Effort: Pulmonary effort is normal.   Neurological:      Mental Status: She is alert. Psychiatric:         Attention and Perception: Attention and perception normal.         Mood and Affect: Mood and affect normal.         Record/Diagnostics Review:    As above, I did review the imaging    Orders:    Orders Placed This Encounter   Medications    pregabalin (LYRICA) 50 MG capsule     Sig: Take 1 capsule by mouth 2 times daily for 30 days. Dispense:  60 capsule     Refill:  2       Assessment:  1. Lumbosacral spondylosis without myelopathy    2. Lumbar radiculopathy    3. Other chronic pain        Treatment Plan:  DISCUSSION: Treatment options discussed with patient and all questions answered to patient's satisfaction. OARRS Review: Reviewed and acceptable for medications prescribed. TREATMENT OPTIONS:     Continue Lyrica 50mg BID  Consider increase in the future. Discussed the importance of continued physical therapy and exercise program as well.  f/u 3 months      Annie Maldonado M.D. I have reviewed the chief complaint and history of present illness (including ROS and PFSH) and vital documentation by my staff and I agree with their documentation and have added where applicable.

## 2021-04-08 ENCOUNTER — OFFICE VISIT (OUTPATIENT)
Dept: PAIN MANAGEMENT | Age: 56
End: 2021-04-08
Payer: COMMERCIAL

## 2021-04-08 VITALS
SYSTOLIC BLOOD PRESSURE: 167 MMHG | HEIGHT: 64 IN | BODY MASS INDEX: 39.27 KG/M2 | DIASTOLIC BLOOD PRESSURE: 88 MMHG | WEIGHT: 230 LBS

## 2021-04-08 DIAGNOSIS — G89.29 OTHER CHRONIC PAIN: ICD-10-CM

## 2021-04-08 DIAGNOSIS — M54.16 LUMBAR RADICULOPATHY: ICD-10-CM

## 2021-04-08 DIAGNOSIS — M47.817 LUMBOSACRAL SPONDYLOSIS WITHOUT MYELOPATHY: Primary | ICD-10-CM

## 2021-04-08 PROCEDURE — 99213 OFFICE O/P EST LOW 20 MIN: CPT | Performed by: PAIN MEDICINE

## 2021-04-08 RX ORDER — PREGABALIN 50 MG/1
50 CAPSULE ORAL 2 TIMES DAILY
Qty: 60 CAPSULE | Refills: 2 | Status: SHIPPED | OUTPATIENT
Start: 2021-04-08 | End: 2022-02-08

## 2021-05-21 RX ORDER — ATORVASTATIN CALCIUM 80 MG/1
TABLET, FILM COATED ORAL
Qty: 30 TABLET | Refills: 11 | Status: SHIPPED | OUTPATIENT
Start: 2021-05-21

## 2021-05-22 ENCOUNTER — HOSPITAL ENCOUNTER (OUTPATIENT)
Age: 56
Discharge: HOME | End: 2021-05-22
Payer: COMMERCIAL

## 2021-05-22 DIAGNOSIS — I10 ESSENTIAL HYPERTENSION: ICD-10-CM

## 2021-05-22 DIAGNOSIS — E11.9 TYPE 2 DIABETES MELLITUS WITHOUT COMPLICATION, WITHOUT LONG-TERM CURRENT USE OF INSULIN (HCC): ICD-10-CM

## 2021-05-22 LAB
ALBUMIN SERPL-MCNC: 4.2 G/DL (ref 3.5–5.2)
ALBUMIN/GLOBULIN RATIO: 1.5 (ref 1–2.5)
ALP BLD-CCNC: 126 U/L (ref 35–104)
ALT SERPL-CCNC: 21 U/L (ref 5–33)
AST SERPL-CCNC: 18 U/L
BILIRUB SERPL-MCNC: 0.47 MG/DL (ref 0.3–1.2)
BILIRUBIN DIRECT: 0.1 MG/DL
BILIRUBIN, INDIRECT: 0.37 MG/DL (ref 0–1)
CHOLESTEROL/HDL RATIO: 2.2
CHOLESTEROL: 153 MG/DL
GLOBULIN: ABNORMAL G/DL (ref 1.5–3.8)
HDLC SERPL-MCNC: 71 MG/DL
LDL CHOLESTEROL: 68 MG/DL (ref 0–130)
TOTAL PROTEIN: 7 G/DL (ref 6.4–8.3)
TRIGL SERPL-MCNC: 70 MG/DL
VLDLC SERPL CALC-MCNC: NORMAL MG/DL (ref 1–30)

## 2021-05-22 PROCEDURE — 83036 HEMOGLOBIN GLYCOSYLATED A1C: CPT

## 2021-05-22 PROCEDURE — 80076 HEPATIC FUNCTION PANEL: CPT

## 2021-05-22 PROCEDURE — 36415 COLL VENOUS BLD VENIPUNCTURE: CPT

## 2021-05-22 PROCEDURE — 80061 LIPID PANEL: CPT

## 2021-05-23 LAB
ESTIMATED AVERAGE GLUCOSE: 134 MG/DL
HBA1C MFR BLD: 6.3 % (ref 4–6)

## 2021-05-24 DIAGNOSIS — E11.9 TYPE 2 DIABETES MELLITUS WITHOUT COMPLICATION, WITHOUT LONG-TERM CURRENT USE OF INSULIN (HCC): Primary | ICD-10-CM

## 2021-05-31 ENCOUNTER — HOSPITAL ENCOUNTER (EMERGENCY)
Age: 56
Discharge: HOME | End: 2021-05-31
Attending: EMERGENCY MEDICINE
Payer: COMMERCIAL

## 2021-05-31 ENCOUNTER — APPOINTMENT (OUTPATIENT)
Dept: GENERAL RADIOLOGY | Age: 56
End: 2021-05-31
Payer: COMMERCIAL

## 2021-05-31 VITALS
TEMPERATURE: 97.2 F | RESPIRATION RATE: 19 BRPM | HEART RATE: 84 BPM | SYSTOLIC BLOOD PRESSURE: 145 MMHG | DIASTOLIC BLOOD PRESSURE: 85 MMHG | OXYGEN SATURATION: 97 %

## 2021-05-31 DIAGNOSIS — M79.89 LEG SWELLING: Primary | ICD-10-CM

## 2021-05-31 LAB
-: NORMAL
ABSOLUTE EOS #: 0.13 K/UL (ref 0–0.44)
ABSOLUTE IMMATURE GRANULOCYTE: <0.03 K/UL (ref 0–0.3)
ABSOLUTE LYMPH #: 2.11 K/UL (ref 1.1–3.7)
ABSOLUTE MONO #: 0.6 K/UL (ref 0.1–1.2)
ALBUMIN SERPL-MCNC: 4.3 G/DL (ref 3.5–5.2)
ALBUMIN SERPL-MCNC: NORMAL G/DL (ref 3.5–5.2)
ALBUMIN/GLOBULIN RATIO: 1.4 (ref 1–2.5)
ALBUMIN/GLOBULIN RATIO: NORMAL (ref 1–2.5)
ALP BLD-CCNC: 143 U/L (ref 35–104)
ALP BLD-CCNC: NORMAL U/L (ref 35–104)
ALT SERPL-CCNC: 19 U/L (ref 5–33)
ALT SERPL-CCNC: NORMAL U/L (ref 5–33)
ANION GAP SERPL CALCULATED.3IONS-SCNC: 8 MMOL/L (ref 9–17)
ANION GAP SERPL CALCULATED.3IONS-SCNC: NORMAL MMOL/L (ref 9–17)
AST SERPL-CCNC: 21 U/L
AST SERPL-CCNC: NORMAL U/L
BASOPHILS # BLD: 1 % (ref 0–2)
BASOPHILS ABSOLUTE: 0.04 K/UL (ref 0–0.2)
BILIRUB SERPL-MCNC: 0.59 MG/DL (ref 0.3–1.2)
BILIRUB SERPL-MCNC: NORMAL MG/DL (ref 0.3–1.2)
BNP INTERPRETATION: NORMAL
BUN BLDV-MCNC: 13 MG/DL (ref 6–20)
BUN BLDV-MCNC: NORMAL MG/DL (ref 6–20)
BUN/CREAT BLD: ABNORMAL (ref 9–20)
BUN/CREAT BLD: NORMAL (ref 9–20)
CALCIUM SERPL-MCNC: 9.9 MG/DL (ref 8.6–10.4)
CALCIUM SERPL-MCNC: NORMAL MG/DL (ref 8.6–10.4)
CHLORIDE BLD-SCNC: 103 MMOL/L (ref 98–107)
CHLORIDE BLD-SCNC: NORMAL MMOL/L (ref 98–107)
CO2: 27 MMOL/L (ref 20–31)
CO2: NORMAL MMOL/L (ref 20–31)
CREAT SERPL-MCNC: 0.76 MG/DL (ref 0.5–0.9)
CREAT SERPL-MCNC: NORMAL MG/DL (ref 0.5–0.9)
DIFFERENTIAL TYPE: NORMAL
EOSINOPHILS RELATIVE PERCENT: 2 % (ref 1–4)
GFR AFRICAN AMERICAN: >60 ML/MIN
GFR AFRICAN AMERICAN: NORMAL ML/MIN
GFR NON-AFRICAN AMERICAN: >60 ML/MIN
GFR NON-AFRICAN AMERICAN: NORMAL ML/MIN
GFR SERPL CREATININE-BSD FRML MDRD: ABNORMAL ML/MIN/{1.73_M2}
GFR SERPL CREATININE-BSD FRML MDRD: ABNORMAL ML/MIN/{1.73_M2}
GFR SERPL CREATININE-BSD FRML MDRD: NORMAL ML/MIN/{1.73_M2}
GFR SERPL CREATININE-BSD FRML MDRD: NORMAL ML/MIN/{1.73_M2}
GLUCOSE BLD-MCNC: 135 MG/DL (ref 70–99)
GLUCOSE BLD-MCNC: NORMAL MG/DL (ref 70–99)
HCT VFR BLD CALC: 41 % (ref 36.3–47.1)
HEMOGLOBIN: 13.2 G/DL (ref 11.9–15.1)
IMMATURE GRANULOCYTES: 0 %
LYMPHOCYTES # BLD: 35 % (ref 24–43)
MCH RBC QN AUTO: 27.3 PG (ref 25.2–33.5)
MCHC RBC AUTO-ENTMCNC: 32.2 G/DL (ref 28.4–34.8)
MCV RBC AUTO: 84.9 FL (ref 82.6–102.9)
MONOCYTES # BLD: 10 % (ref 3–12)
NRBC AUTOMATED: 0 PER 100 WBC
PDW BLD-RTO: 13.2 % (ref 11.8–14.4)
PLATELET # BLD: 259 K/UL (ref 138–453)
PLATELET ESTIMATE: NORMAL
PMV BLD AUTO: 10.1 FL (ref 8.1–13.5)
POTASSIUM SERPL-SCNC: 4.2 MMOL/L (ref 3.7–5.3)
POTASSIUM SERPL-SCNC: NORMAL MMOL/L (ref 3.7–5.3)
PRO-BNP: 85 PG/ML
RBC # BLD: 4.83 M/UL (ref 3.95–5.11)
RBC # BLD: NORMAL 10*6/UL
REASON FOR REJECTION: NORMAL
SEG NEUTROPHILS: 52 % (ref 36–65)
SEGMENTED NEUTROPHILS ABSOLUTE COUNT: 3.2 K/UL (ref 1.5–8.1)
SODIUM BLD-SCNC: 138 MMOL/L (ref 135–144)
SODIUM BLD-SCNC: NORMAL MMOL/L (ref 135–144)
TOTAL PROTEIN: 7.4 G/DL (ref 6.4–8.3)
TOTAL PROTEIN: NORMAL G/DL (ref 6.4–8.3)
TROPONIN INTERP: NORMAL
TROPONIN T: NORMAL NG/ML
TROPONIN, HIGH SENSITIVITY: <6 NG/L (ref 0–14)
WBC # BLD: 6.1 K/UL (ref 3.5–11.3)
WBC # BLD: NORMAL 10*3/UL
ZZ NTE CLEAN UP: ORDERED TEST: NORMAL
ZZ NTE WITH NAME CLEAN UP: SPECIMEN SOURCE: NORMAL

## 2021-05-31 PROCEDURE — 85025 COMPLETE CBC W/AUTO DIFF WBC: CPT

## 2021-05-31 PROCEDURE — 84484 ASSAY OF TROPONIN QUANT: CPT

## 2021-05-31 PROCEDURE — 83880 ASSAY OF NATRIURETIC PEPTIDE: CPT

## 2021-05-31 PROCEDURE — 71045 X-RAY EXAM CHEST 1 VIEW: CPT

## 2021-05-31 PROCEDURE — 93005 ELECTROCARDIOGRAM TRACING: CPT | Performed by: STUDENT IN AN ORGANIZED HEALTH CARE EDUCATION/TRAINING PROGRAM

## 2021-05-31 PROCEDURE — 93970 EXTREMITY STUDY: CPT

## 2021-05-31 PROCEDURE — 99284 EMERGENCY DEPT VISIT MOD MDM: CPT

## 2021-05-31 PROCEDURE — 80053 COMPREHEN METABOLIC PANEL: CPT

## 2021-05-31 ASSESSMENT — ENCOUNTER SYMPTOMS
COUGH: 0
VOMITING: 0
SHORTNESS OF BREATH: 0
ABDOMINAL PAIN: 0
WHEEZING: 0
BACK PAIN: 0
NAUSEA: 0

## 2021-05-31 NOTE — ED PROVIDER NOTES
[facility]     Emergency Department     Faculty Note/ Attestation      Pt Name: Stacie Garay                                       MRN: [id number]  Leni 1965  Date of evaluation: 5/31/2021    Patients PCP:    Vini Diaz MD    Attestation  I performed a history and physical examination of the patient and discussed management with the resident. I reviewed the residents note and agree with the documented findings and plan of care. Any areas of disagreement are noted on the chart. I was personally present for the key portions of any procedures. I have documented in the chart those procedures where I was not present during the key portions. I have reviewed the emergency nurses triage note. I agree with the chief complaint, past medical history, past surgical history, allergies, medications, social and family history as documented unless otherwise noted below. For Physician Assistant/ Nurse Practitioner cases/documentation I have personally evaluated this patient and have completed at least one if not all key elements of the E/M (history, physical exam, and MDM). Additional findings are as noted. Initial Screens:             Vitals:    Vitals:    05/31/21 1227 05/31/21 1232 05/31/21 1233   BP:   (!) 145/85   Pulse:  84    Resp:  19    Temp: 97.2 °F (36.2 °C)     TempSrc: Tympanic     SpO2:  97%        CHIEF COMPLAINT       Chief Complaint   Patient presents with    Leg Swelling     bilat leg swelling with a small rash noted to lower right leg. Has h/o EECP therapy for blood circulation. Denies pain.        Patient is a 50-year-old pleasant female who presents with right leg swelling more than the left patient was concerned because one of the risk factors of her current EECP treatment is for blood clots patient currently denying pain no chest pain shortness of breath difficulty breathing no exertional symptoms at this time worse than normal infact pt notes less need to rest with exertion. EMERGENCY DEPARTMENT COURSE:     -------------------------  BP: (!) 145/85, Temp: 97.2 °F (36.2 °C), Pulse: 84, Resp: 19  Physical Exam  Constitutional:       Appearance: She is well-developed. She is not diaphoretic. HENT:      Head: Normocephalic and atraumatic. Right Ear: External ear normal.      Left Ear: External ear normal.   Eyes:      General: No scleral icterus. Right eye: No discharge. Left eye: No discharge. Neck:      Trachea: No tracheal deviation. Pulmonary:      Effort: Pulmonary effort is normal. No respiratory distress. Breath sounds: No stridor. Musculoskeletal:         General: Normal range of motion. Cervical back: Normal range of motion. Right lower leg: Edema (slightly more swollen on the right compared to left) present. Left lower leg: Edema present. Skin:     General: Skin is warm and dry. Neurological:      Mental Status: She is alert and oriented to person, place, and time.       Coordination: Coordination normal.   Psychiatric:         Behavior: Behavior normal.           Comments  Because the pt has symptoms that could be construed as being due to a DVT a Wells score was determined to risk stratify the patient:    Active cancer (treatment in the last 6 months included)  1  Paralysis or immobilization of the lower extremity   1  Bedridden for > 3 Days because of surgery (within 4 weeks) 1  Localized tenderness along Deep veins    1  Entire Leg swollen       1  Unilateral calf swelling of > 3cm (below tibial tuberosity)  1  Collateral Superficial veins      1  Alternative diagnosis likely or as more likely to DVT   -2    Low risk: < 1  Moderate risk: 1-2  High risk: 3 or more    Doppler ultrasound team calledWe will see if we can get the ultrasound today otherwise may need enoxaparin until tomorrow when ultrasound can be performed      EKG Interpretation    Interpreted by emergency department physician    Rhythm: normal sinus   Rate: normal  Axis: normal  Ectopy: none  Conduction: normal  ST Segments: no acute change  T Waves: no acute change  Q Waves: none    EKG  Impression: no acute changes intact T waves may look improved since January. US negative for DVT       Yvette East DO,, , RDMS.   Attending Emergency Physician          Yvette East DO  05/31/21 4201

## 2021-05-31 NOTE — ED NOTES
Pt to the ER with c/o bilat leg swelling with a small rash noted to lower right leg, denies pain. Has h/o EECP therapy for blood circulation. No h/o DVT's, on Brilinta for cardiac stent placement. Pitting edema noted to bilat legs, left worse than right. Denies chest pain, SOB. No acute distress noted, rr even and NL.  Dr. Verdell Alpers at the bedside to evaluate the pt     Lizet Palacios RN  05/31/21 1714

## 2021-05-31 NOTE — ED PROVIDER NOTES
eyeglasses, and Wellness examination. has a past surgical history that includes Tonsillectomy; Foot surgery; Lumbar disc surgery; Endometrial ablation; Tubal ligation; Hysterectomy; Carpal tunnel release (Right); Colonoscopy (N/A, 2018); Injection Procedure For Sacroiliac Joint (Left, 2019);  section; Hand tendon surgery (Right); Anesthesia Nerve Block (Left, 2019); Cardiac catheterization (2017); Cardiac catheterization (2019); Cardiac catheterization (2018); Cardiac catheterization (02/10/2020); Nerve Block (2020); Anesthesia Nerve Block (Bilateral, 2020); eye surgery (2014); Nerve Block (Bilateral, 2020); Anesthesia Nerve Block (Bilateral, 2020); Nerve Block (Left, 2020); Pain management procedure (Left, 2020); Nerve Block (Right, 2020); and Pain management procedure (Right, 2020). Social History     Socioeconomic History    Marital status:      Spouse name: Not on file    Number of children: Not on file    Years of education: Not on file    Highest education level: Not on file   Occupational History    Not on file   Tobacco Use    Smoking status: Former Smoker     Packs/day: 0.25     Years: 30.00     Pack years: 7.50     Types: Cigarettes     Quit date: 2018     Years since quittin.8    Smokeless tobacco: Never Used   Vaping Use    Vaping Use: Never used   Substance and Sexual Activity    Alcohol use: No    Drug use: No    Sexual activity: Not on file   Other Topics Concern    Not on file   Social History Narrative    Not on file     Social Determinants of Health     Financial Resource Strain:     Difficulty of Paying Living Expenses:    Food Insecurity:     Worried About Running Out of Food in the Last Year:     [address] in the Last Year:    Transportation Needs:     Lack of Transportation (Medical):      Lack of Transportation (Non-Medical):    Physical Activity:     Days of Exercise per Week:     Minutes of Exercise per Session:    Stress:     Feeling of Stress :    Social Connections:     Frequency of Communication with Friends and Family:     Frequency of Social Gatherings with Friends and Family:     Attends Gnosticism Services:     Active Member of Clubs or Organizations:     Attends Club or Organization Meetings:     Marital Status:    Intimate Partner Violence:     Fear of Current or Ex-Partner:     Emotionally Abused:     Physically Abused:     Sexually Abused:        Family History   Problem Relation Age of Onset    Stroke Father     Other Sister         homeless    Diabetes Maternal Grandmother         Allergies:  Bee venom, Tetracyclines & related, and Ace inhibitors    Home Medications:  Prior to Admission medications    Medication Sig Start Date End Date Taking? Authorizing Provider   atorvastatin (LIPITOR) 80 MG tablet take 1 tablet by mouth once daily 5/21/21   Ifeanyi Marx MD   pregabalin (LYRICA) 50 MG capsule Take 1 capsule by mouth 2 times daily for 30 days. 4/8/21 5/8/21  Emely Lafleur MD   pregabalin (LYRICA) 50 MG capsule Take 1 capsule by mouth 2 times daily for 30 days. 3/4/21 4/3/21  Emely Lafleur MD   omeprazole (PRILOSEC) 40 MG delayed release capsule Take 40 mg by mouth daily    Historical Provider, MD   ezetimibe (ZETIA) 10 MG tablet Take 10 mg by mouth daily    Historical Provider, MD   metFORMIN (GLUCOPHAGE-XR) 500 MG extended release tablet take 1 tablet by mouth once daily for 2 weeks then 1 tablet twice a day 2/22/21   Ifeanyi Marx MD   gabapentin (NEURONTIN) 100 MG capsule Take 1 capsule by mouth 3 times daily for 30 days.   Patient not taking: Reported on 3/30/2021 2/11/21 3/13/21  Emely Lafleur MD   ticagrelor (BRILINTA) 60 MG TABS tablet Take 1 tablet by mouth 2 times daily 1/19/21   BRITTON Sigala - CNP   ranolazine (RANEXA) 500 MG extended release tablet Take 1 tablet by mouth 2 times daily  Patient taking differently: Take 1,000 mg by mouth 2 times daily  1/15/21   Beryl Nageotte, MD   carvedilol (COREG) 6.25 MG tablet Take 1 tablet by mouth 2 times daily (with meals) 1/15/21   Aviva Mccarthy MD   nitroGLYCERIN (NITROSTAT) 0.4 MG SL tablet place 1 tablet under the tongue if needed every 5 minutes for chest pain for 3 doses IF NO RELIEF AFTER FIRST DOSE CALL PRESCRIBER . 9/13/20   Savannah Price MD   losartan (COZAAR) 100 MG tablet take 1 tablet by mouth once daily 7/15/20   Savannah Price MD   amLODIPine (NORVASC) 5 MG tablet Take by mouth daily 3/4/19   Historical Provider, MD   hydrochlorothiazide (HYDRODIURIL) 25 MG tablet Take 25 mg by mouth daily    Historical Provider, MD   aspirin 81 MG tablet Take 1 tablet by mouth daily (with breakfast) 8/19/16   Savannah Price MD       REVIEW OFSYSTEMS    (2-9 systems for level 4, 10 or more for level 5)      Review of Systems   Constitutional: Negative for chills and fever. HENT: Negative. Respiratory: Negative for cough, shortness of breath and wheezing. Cardiovascular: Positive for leg swelling. Negative for chest pain and palpitations. Gastrointestinal: Negative for abdominal pain, nausea and vomiting. Genitourinary: Negative for dysuria and hematuria. Musculoskeletal: Negative for back pain. Skin: Positive for rash. Negative for wound. Neurological: Negative for dizziness, syncope, weakness, light-headedness, numbness and headaches. Hematological:        On Brilinta. PHYSICAL EXAM   (up to 7 for level 4, 8 or more forlevel 5)      INITIAL VITALS:   ED Triage Vitals   BP Temp Temp Source Pulse Resp SpO2 Height Weight   05/31/21 1233 05/31/21 1227 05/31/21 1227 05/31/21 1232 05/31/21 1232 05/31/21 1232 -- --   (!) 145/85 97.2 °F (36.2 °C) Tympanic 84 19 97 %         Physical Exam  Vitals and nursing note reviewed. Constitutional:       General: She is not in acute distress.      Appearance: Normal appearance. She is not ill-appearing, toxic-appearing or diaphoretic. Cardiovascular:      Rate and Rhythm: Normal rate and regular rhythm. Heart sounds: No murmur heard. No gallop. Pulmonary:      Effort: Pulmonary effort is normal. No respiratory distress. Breath sounds: Normal breath sounds. No stridor. No wheezing, rhonchi or rales. Abdominal:      General: There is no distension. Palpations: Abdomen is soft. Tenderness: There is no abdominal tenderness. There is no guarding. Musculoskeletal:         General: No tenderness. Comments: Mild pitting edema to bilateral lower extremities right greater than left. No calf or popliteal tenderness present. Skin:     General: Skin is warm and dry. Comments: Small patch of petechiae over bilateral anterior distal shins that appear traumatic. Neurological:      Mental Status: She is alert. DIFFERENTIAL  DIAGNOSIS     PLAN (LABS / IMAGING / EKG):  Orders Placed This Encounter   Procedures    VL DUP LOWER EXTREMITY VENOUS BILATERAL    XR CHEST PORTABLE    Comprehensive Metabolic Panel    Troponin    Brain Natriuretic Peptide    D-Dimer, Quantitative    CBC Auto Differential    PREVIOUS SPECIMEN    Comprehensive Metabolic Panel    SPECIMEN REJECTION    EKG 12 Lead       MEDICATIONS ORDERED:  No orders of the defined types were placed in this encounter. DDX: DVT, venous congestion, CHF, liver or kidney failure    Initial MDM/Plan/ED course: 61 y.o. female who presents with leg swelling. On exam vitals normal patient is in no acute distress. Physical exam reveals mild pitting edema to bilateral lower extremities right greater than left with small patches of petechiae over the distal anterior shins. However, patient recently has started [facility] therapy which involves compressive force to the legs synchronous with heartbeats that does use a lot of force, therefore petechiae are likely traumatic in nature. Venous Doppler of the bilateral lower extremities was obtained to rule out DVT and was negative. Basic cardiac lab work was obtained and showed no evidence of elevation of BNP, liver failure, or renal failure. Chest x-ray showed no pulmonary congestion. Symptoms likely secondary to simple venous congestion and patient discharged with follow-up with PCP and instructed to return if symptoms worsen or she develops chest pain or shortness of breath. Patient was offered compression stockings however she already has some at home that she has not been using. DIAGNOSTIC RESULTS / EMERGENCY DEPARTMENT COURSE / MDM     LABS:  Labs Reviewed   COMPREHENSIVE METABOLIC PANEL - Abnormal; Notable for the following components:       Result Value    Glucose 135 (*)     Anion Gap 8 (*)     Alkaline Phosphatase 143 (*)     All other components within normal limits   COMPREHENSIVE METABOLIC PANEL   TROPONIN   BRAIN NATRIURETIC PEPTIDE   CBC WITH AUTO DIFFERENTIAL   SPECIMEN REJECTION   D-DIMER, QUANTITATIVE   PREVIOUS SPECIMEN         RADIOLOGY:  XR CHEST PORTABLE    Result Date: 5/31/2021  EXAMINATION: ONE XRAY VIEW OF THE CHEST 5/31/2021 1:08 pm COMPARISON: 14 January 2021 HISTORY: ORDERING SYSTEM PROVIDED HISTORY: leg swelling TECHNOLOGIST PROVIDED HISTORY: leg swelling FINDINGS: AP portable view of the chest time stamped at 1257 hours is submitted. Heart size is normal.  No vascular congestion, focal consolidation, effusion, or pneumothorax is noted. Osseous and mediastinal structures are age-appropriate. No acute cardiopulmonary process. EKG      All EKG's are interpreted by the [facility] Physician who either signs or Co-signs this chart in the absence of a cardiologist.      PROCEDURES:  None    CONSULTS:  None    CRITICAL CARE:  Please see attending note    FINAL IMPRESSION      1.  Leg swelling          DISPOSITION / PLAN     DISPOSITION Decision To Discharge 05/31/2021 03:16:34 PM      PATIENT REFERRED TO:  Lucio Guido MD  [address] [place] [address]  [phone]    Schedule an appointment as soon as possible for a visit   As needed, If symptoms worsen      DISCHARGE MEDICATIONS:  Discharge Medication List as of 5/31/2021  3:18 PM          Serene Fields DO  Emergency Medicine Resident    (Please note that portions of this note were completed with a voice recognition program.Efforts were made to edit the dictations but occasionally words are mis-transcribed.)        Jose Orellana DO  Resident  05/31/21 5975

## 2021-06-01 LAB
EKG ATRIAL RATE: 74 BPM
EKG P AXIS: 45 DEGREES
EKG P-R INTERVAL: 148 MS
EKG Q-T INTERVAL: 424 MS
EKG QRS DURATION: 92 MS
EKG QTC CALCULATION (BAZETT): 470 MS
EKG R AXIS: 25 DEGREES
EKG T AXIS: 142 DEGREES
EKG VENTRICULAR RATE: 74 BPM

## 2021-06-01 PROCEDURE — 93010 ELECTROCARDIOGRAM REPORT: CPT | Performed by: INTERNAL MEDICINE

## 2021-07-11 RX ORDER — LOSARTAN POTASSIUM 100 MG/1
TABLET ORAL
Qty: 30 TABLET | Refills: 5 | Status: SHIPPED | OUTPATIENT
Start: 2021-07-11 | End: 2022-01-18 | Stop reason: SDUPTHER

## 2021-08-20 ENCOUNTER — OFFICE VISIT (OUTPATIENT)
Dept: PRIMARY CARE CLINIC | Age: 56
End: 2021-08-20
Payer: COMMERCIAL

## 2021-08-20 VITALS
TEMPERATURE: 98.5 F | BODY MASS INDEX: 39.27 KG/M2 | HEIGHT: 64 IN | OXYGEN SATURATION: 98 % | WEIGHT: 230 LBS | HEART RATE: 72 BPM | SYSTOLIC BLOOD PRESSURE: 152 MMHG | DIASTOLIC BLOOD PRESSURE: 88 MMHG

## 2021-08-20 DIAGNOSIS — J01.90 ACUTE NON-RECURRENT SINUSITIS, UNSPECIFIED LOCATION: Primary | ICD-10-CM

## 2021-08-20 PROCEDURE — 99214 OFFICE O/P EST MOD 30 MIN: CPT | Performed by: FAMILY MEDICINE

## 2021-08-20 RX ORDER — AMOXICILLIN AND CLAVULANATE POTASSIUM 875; 125 MG/1; MG/1
1 TABLET, FILM COATED ORAL 2 TIMES DAILY
Qty: 14 TABLET | Refills: 0 | Status: SHIPPED | OUTPATIENT
Start: 2021-08-20 | End: 2021-08-27

## 2021-08-20 ASSESSMENT — ENCOUNTER SYMPTOMS
SORE THROAT: 0
COUGH: 1
NAUSEA: 0
RHINORRHEA: 1
SINUS PRESSURE: 1
WHEEZING: 0
VOMITING: 0
SHORTNESS OF BREATH: 0
ABDOMINAL PAIN: 0
DIARRHEA: 0

## 2021-08-20 NOTE — PROGRESS NOTES
[facility] [address] [facility]  [address] [facility]  [place] [address]  Dept: [phone]  Dept Fax: [phone]    Rosalinda Mccullough is a 61 y.o. female who presents today for her medical conditions/complaintsas noted below. Rosalinda Mccullough is c/o of Sinusitis (pt has been having sinus congestion and pressure cough right side of face is swelling and ear pain )        HPI:     Sinusitis  This is a new problem. The current episode started in the past 7 days (3 days). The problem is unchanged. There has been no fever. The pain is moderate. Associated symptoms include congestion, coughing, ear pain and sinus pressure. Pertinent negatives include no chills, shortness of breath or sore throat. Treatments tried: benadryl congestion, nyquil. The treatment provided mild relief.  has similar symptoms  Denies concern for COVID at this time  PMHx of allergic rhinitis, CAD, DM, SRI, chromic sinus issues  Past Medical History:   Diagnosis Date    Abnormal stress test     Allergic rhinitis     Arthritis     CAD (coronary artery disease)     Dr. Gisel Roman last seen 2/20/2020    Chronic back pain     Diabetes mellitus ([address]     Former smoker 2/7/2020    30-year history.   Quit in 2018    Hyperlipidemia     Dr. Haja Godoy Hypertension     Dr. Jerrell Kessler, cardiac 2/8/2020    SRI on CPAP     instructed to bring Dr. Gio Billings Wears prescription eyeglasses     Wellness examination     Dr. Melissa Rogers last seen 2/13/2020    Past medical history reviewed and pertinent positives/negatives in the HPI    Past Surgical History:   Procedure Laterality Date    ANESTHESIA NERVE BLOCK Left 12/19/2019    NERVE BLOCK (DEFINE) - # 1 L5-S1 performed by Sophie Fair MD at [facility] Bilateral 7/17/2020    NERVE BLOCK BILATERAL - B MBB # 1 L4-5, L5-S1 performed by Sophie Fair MD at [facility] Bilateral 8/14/2020    NERVE BLOCK BILATERAL - L-5, L5-S1 performed by Sophie Fair MD at [address]  2017    CARDIAC CATHETERIZATION  2019    CARDIAC CATHETERIZATION  2018    1 stent mid LAD PT HAS XIENCE ALPINE HEART STENT, MRI CONDITIONAL 3T OK, SAFE IMMEDIATELY POST IMPLANT.  CARDIAC CATHETERIZATION  02/10/2020    for chest pain no new blockage    CARPAL TUNNEL RELEASE Right      SECTION      x3    COLONOSCOPY N/A 2018    COLONOSCOPY WITH BIOPSY performed by Jefferson Johnston MD at [place] [address]  2014    cataract surgery left eye WITH IMPLANT. MRI OK.      FOOT SURGERY      HAND TENDON SURGERY Right     [facility]. INJECTION PROCEDURE FOR SACROILIAC JOINT Left 2019    SACROILIAC JOINT INJECTION - #1 performed by Sophie Fair MD at [facility]  2020     NERVE BLOCK BILATERAL - B MBB # 1 L4-5, L5-S1 (Bilateral     NERVE BLOCK Bilateral 2020    NERVE BLOCK BILATERAL - L-5, L5-S1 (Bilateral )     NERVE BLOCK Left 2020    NERVE RADIOFREQUENCY ABLATION- L4-5, L5-S1    NERVE BLOCK Right 2020     NERVE RADIOFREQUENCY ABLATION (Right )     PAIN MANAGEMENT PROCEDURE Left 2020    NERVE RADIOFREQUENCY ABLATION- L4-5, L5-S1 performed by Sophie Fair MD at [address] [place] [address] Right 2020    NERVE RADIOFREQUENCY ABLATION performed by Sophie Fair MD at [address] [place] [address]         Family History   Problem Relation Age of Onset    Stroke Father     Other Sister         homeless    Diabetes Maternal Grandmother        Social History     Tobacco Use    Smoking status: Former Smoker     Packs/day: 0.25     Years: 30.00     Pack years: 7.50     Types: Cigarettes     Quit date: 2018     Years since quitting: 3.0    Smokeless tobacco: Never Used Substance Use Topics    Alcohol use: No      Current Outpatient Medications   Medication Sig Dispense Refill    amoxicillin-clavulanate (AUGMENTIN) 875-125 MG per tablet Take 1 tablet by mouth 2 times daily for 7 days 14 tablet 0    losartan (COZAAR) 100 MG tablet take 1 tablet by mouth once daily 30 tablet 5    atorvastatin (LIPITOR) 80 MG tablet take 1 tablet by mouth once daily 30 tablet 11    pregabalin (LYRICA) 50 MG capsule Take 1 capsule by mouth 2 times daily for 30 days. 60 capsule 2    pregabalin (LYRICA) 50 MG capsule Take 1 capsule by mouth 2 times daily for 30 days. 60 capsule 1    omeprazole (PRILOSEC) 40 MG delayed release capsule Take 40 mg by mouth daily      ezetimibe (ZETIA) 10 MG tablet Take 10 mg by mouth daily      metFORMIN (GLUCOPHAGE-XR) 500 MG extended release tablet take 1 tablet by mouth once daily for 2 weeks then 1 tablet twice a day 60 tablet 11    gabapentin (NEURONTIN) 100 MG capsule Take 1 capsule by mouth 3 times daily for 30 days. (Patient not taking: Reported on 3/30/2021) 90 capsule 1    ticagrelor (BRILINTA) 60 MG TABS tablet Take 1 tablet by mouth 2 times daily 30 tablet 0    ranolazine (RANEXA) 500 MG extended release tablet Take 1 tablet by mouth 2 times daily (Patient taking differently: Take 1,000 mg by mouth 2 times daily ) 60 tablet 3    carvedilol (COREG) 6.25 MG tablet Take 1 tablet by mouth 2 times daily (with meals) 60 tablet 3    nitroGLYCERIN (NITROSTAT) 0.4 MG SL tablet place 1 tablet under the tongue if needed every 5 minutes for chest pain for 3 doses IF NO RELIEF AFTER FIRST DOSE CALL PRESCRIBER . 25 tablet 1    amLODIPine (NORVASC) 5 MG tablet Take by mouth daily      hydrochlorothiazide (HYDRODIURIL) 25 MG tablet Take 25 mg by mouth daily      aspirin 81 MG tablet Take 1 tablet by mouth daily (with breakfast) 30 tablet 11     No current facility-administered medications for this visit.      Allergies   Allergen Reactions    Bee Venom Hives    Tetracyclines & Related Nausea Only and Other (See Comments)     dizziness    Ace Inhibitors Other (See Comments)     cough       Health Maintenance   Topic Date Due    Diabetic retinal exam  Never done    HIV screen  Never done    Diabetic microalbuminuria test  Never done    Hepatitis B vaccine (1 of 3 - Risk 3-dose series) Never done    Cervical cancer screen  Never done    Diabetic foot exam  01/19/2022 (Originally 4/28/1975)    Shingles Vaccine (1 of 2) 01/19/2022 (Originally 4/28/2014)    Pneumococcal 0-64 years Vaccine (1 of 2 - PPSV23) 01/19/2022 (Originally 4/28/1971)    Hepatitis C screen  01/19/2022 (Originally 1965)    Flu vaccine (1) 09/01/2021    Breast cancer screen  11/02/2021    A1C test (Diabetic or Prediabetic)  05/22/2022    Lipid screen  05/22/2022    Potassium monitoring  05/31/2022    Creatinine monitoring  05/31/2022    Colon cancer screen colonoscopy  08/16/2028    DTaP/Tdap/Td vaccine (2 - Td or Tdap) 04/26/2030    COVID-19 Vaccine  Completed    Hepatitis A vaccine  Aged Out    Hib vaccine  Aged Out    Meningococcal (ACWY) vaccine  Aged Out       :      Review of Systems   Constitutional: Negative for chills and fever. HENT: Positive for congestion, ear pain, rhinorrhea and sinus pressure. Negative for sore throat. Respiratory: Positive for cough. Negative for shortness of breath and wheezing. Gastrointestinal: Negative for abdominal pain, diarrhea, nausea and vomiting. Musculoskeletal: Negative for myalgias. Hematological: Negative for adenopathy. Objective:     Physical Exam  Vitals and nursing note reviewed. Constitutional:       Appearance: Normal appearance. She is obese. HENT:      Head: Normocephalic and atraumatic. Right Ear: Hearing, ear canal and external ear normal. A middle ear effusion is present. Left Ear: Hearing, ear canal and external ear normal. A middle ear effusion is present.       Nose: Mucosal edema and congestion present. Right Sinus: Maxillary sinus tenderness and frontal sinus tenderness present. Left Sinus: Maxillary sinus tenderness present. Mouth/Throat:      Lips: Pink. Mouth: Mucous membranes are moist.      Pharynx: Oropharynx is clear. Eyes:      Extraocular Movements: Extraocular movements intact. Conjunctiva/sclera: Conjunctivae normal.   Cardiovascular:      Rate and Rhythm: Normal rate and regular rhythm. Heart sounds: Normal heart sounds. Pulmonary:      Effort: Pulmonary effort is normal.      Breath sounds: Normal breath sounds. Musculoskeletal:      Cervical back: Normal range of motion. No muscular tenderness. Skin:     General: Skin is warm and dry. Neurological:      Mental Status: She is alert and oriented to person, place, and time. Mental status is at baseline. Psychiatric:         Mood and Affect: Mood normal.         Behavior: Behavior normal.         Thought Content: Thought content normal.         Judgment: Judgment normal.       BP (!) 152/88 (Site: Left Upper Arm, Position: Sitting, Cuff Size: Large Adult)   Pulse 72   Temp 98.5 °F (36.9 °C) (Tympanic)   Ht 5' 4\" (1.626 m)   Wt 230 lb (104.3 kg)   SpO2 98%   Breastfeeding No   BMI 39.48 kg/m²     Assessment:       Diagnosis Orders   1. Acute non-recurrent sinusitis, unspecified location         Plan:    Take antibiotic as prescribed for sinus infection  May continue over the counter cough/cold medications as needed  If symptoms worsen or do not improve please follow-up with PCP or return to clinic          No orders of the defined types were placed in this encounter. Orders Placed This Encounter   Medications    amoxicillin-clavulanate (AUGMENTIN) 875-125 MG per tablet     Sig: Take 1 tablet by mouth 2 times daily for 7 days     Dispense:  14 tablet     Refill:  0      Patient given educational materials - see patient instructions.   Discussed use, benefit, and side effects of prescribed medications. All patient questions answered. Pt voiced understanding. Patient agreed with treatment plan. Follow up as directed.      Electronicallysigned by Jason Saini MD on 8/20/2021 at 1:48 PM

## 2021-08-20 NOTE — PATIENT INSTRUCTIONS
Patient Education        Sinusitis: Care Instructions  Your Care Instructions     Sinusitis is an infection of the lining of the sinus cavities in your head. Sinusitis often follows a cold. It causes pain and pressure in your head and face. In most cases, sinusitis gets better on its own in 1 to 2 weeks. But some mild symptoms may last for several weeks. Sometimes antibiotics are needed. Follow-up care is a key part of your treatment and safety. Be sure to make and go to all appointments, and call your doctor if you are having problems. It's also a good idea to know your test results and keep a list of the medicines you take. How can you care for yourself at home? · Take an over-the-counter pain medicine, such as acetaminophen (Tylenol), ibuprofen (Advil, Motrin), or naproxen (Aleve). Read and follow all instructions on the label. · If the doctor prescribed antibiotics, take them as directed. Do not stop taking them just because you feel better. You need to take the full course of antibiotics. · Be careful when taking over-the-counter cold or flu medicines and Tylenol at the same time. Many of these medicines have acetaminophen, which is Tylenol. Read the labels to make sure that you are not taking more than the recommended dose. Too much acetaminophen (Tylenol) can be harmful. · Breathe warm, moist air from a steamy shower, a hot bath, or a sink filled with hot water. Avoid cold, dry air. Using a humidifier in your home may help. Follow the directions for cleaning the machine. · Use saline (saltwater) nasal washes. This can help keep your nasal passages open and wash out mucus and bacteria. You can buy saline nose drops at a grocery store or drugstore. Or you can make your own at home by adding 1 teaspoon of salt and 1 teaspoon of baking soda to 2 cups of distilled water. If you make your own, fill a bulb syringe with the solution, insert the tip into your nostril, and squeeze gently.  Zack Britt your nose.  · Put a hot, wet towel or a warm gel pack on your face 3 or 4 times a day for 5 to 10 minutes each time. · Try a decongestant nasal spray like oxymetazoline (Afrin). Do not use it for more than 3 days in a row. Using it for more than 3 days can make your congestion worse. When should you call for help? Call your doctor now or seek immediate medical care if:    · You have new or worse swelling or redness in your face or around your eyes.     · You have a new or higher fever. Watch closely for changes in your health, and be sure to contact your doctor if:    · You have new or worse facial pain.     · The mucus from your nose becomes thicker (like pus) or has new blood in it.     · You are not getting better as expected. Where can you learn more? Go to [email or web address]://[email or web address]. org and sign in to your [employer] account. Enter M857 in the [employer] box to learn more about \"Sinusitis: Care Instructions. \"     If you do not have an account, please click on the \"Sign Up Now\" link. Current as of: December 2, 2020               Content Version: 12.9  © 2006-2021 Healthwise, [employer]. Care instructions adapted under license by [facility] ([facility]). If you have questions about a medical condition or this instruction, always ask your healthcare professional. [address] any warranty or liability for your use of this information.        Take antibiotic as prescribed for sinus infection  May continue over the counter cough/cold medications as needed  If symptoms worsen or do not improve please follow-up with PCP or return to clinic

## 2021-09-09 ENCOUNTER — TELEPHONE (OUTPATIENT)
Dept: PAIN MANAGEMENT | Age: 56
End: 2021-09-09

## 2021-09-15 ENCOUNTER — HOSPITAL ENCOUNTER (INPATIENT)
Age: 56
LOS: 1 days | Discharge: HOME | DRG: 251 | End: 2021-09-17
Attending: EMERGENCY MEDICINE | Admitting: EMERGENCY MEDICINE
Payer: COMMERCIAL

## 2021-09-15 ENCOUNTER — APPOINTMENT (OUTPATIENT)
Dept: GENERAL RADIOLOGY | Age: 56
DRG: 251 | End: 2021-09-15
Payer: COMMERCIAL

## 2021-09-15 DIAGNOSIS — R07.9 ACUTE CHEST PAIN: Primary | ICD-10-CM

## 2021-09-15 LAB
ABSOLUTE EOS #: 0.15 K/UL (ref 0–0.44)
ABSOLUTE IMMATURE GRANULOCYTE: <0.03 K/UL (ref 0–0.3)
ABSOLUTE LYMPH #: 2.87 K/UL (ref 1.1–3.7)
ABSOLUTE MONO #: 0.57 K/UL (ref 0.1–1.2)
BASOPHILS # BLD: 1 % (ref 0–2)
BASOPHILS ABSOLUTE: 0.04 K/UL (ref 0–0.2)
DIFFERENTIAL TYPE: ABNORMAL
EOSINOPHILS RELATIVE PERCENT: 3 % (ref 1–4)
HCT VFR BLD CALC: 44 % (ref 36.3–47.1)
HEMOGLOBIN: 13.6 G/DL (ref 11.9–15.1)
IMMATURE GRANULOCYTES: 0 %
LYMPHOCYTES # BLD: 47 % (ref 24–43)
MCH RBC QN AUTO: 27.1 PG (ref 25.2–33.5)
MCHC RBC AUTO-ENTMCNC: 30.9 G/DL (ref 28.4–34.8)
MCV RBC AUTO: 87.6 FL (ref 82.6–102.9)
MONOCYTES # BLD: 9 % (ref 3–12)
NRBC AUTOMATED: 0 PER 100 WBC
PDW BLD-RTO: 13.7 % (ref 11.8–14.4)
PLATELET # BLD: 183 K/UL (ref 138–453)
PLATELET ESTIMATE: ABNORMAL
PMV BLD AUTO: 10.5 FL (ref 8.1–13.5)
RBC # BLD: 5.02 M/UL (ref 3.95–5.11)
RBC # BLD: ABNORMAL 10*6/UL
SEG NEUTROPHILS: 40 % (ref 36–65)
SEGMENTED NEUTROPHILS ABSOLUTE COUNT: 2.42 K/UL (ref 1.5–8.1)
WBC # BLD: 6.1 K/UL (ref 3.5–11.3)
WBC # BLD: ABNORMAL 10*3/UL

## 2021-09-15 PROCEDURE — 6370000000 HC RX 637 (ALT 250 FOR IP): Performed by: STUDENT IN AN ORGANIZED HEALTH CARE EDUCATION/TRAINING PROGRAM

## 2021-09-15 PROCEDURE — 85025 COMPLETE CBC W/AUTO DIFF WBC: CPT

## 2021-09-15 PROCEDURE — 99285 EMERGENCY DEPT VISIT HI MDM: CPT

## 2021-09-15 PROCEDURE — 83690 ASSAY OF LIPASE: CPT

## 2021-09-15 PROCEDURE — 84484 ASSAY OF TROPONIN QUANT: CPT

## 2021-09-15 PROCEDURE — 87635 SARS-COV-2 COVID-19 AMP PRB: CPT

## 2021-09-15 PROCEDURE — 93005 ELECTROCARDIOGRAM TRACING: CPT | Performed by: STUDENT IN AN ORGANIZED HEALTH CARE EDUCATION/TRAINING PROGRAM

## 2021-09-15 PROCEDURE — 80053 COMPREHEN METABOLIC PANEL: CPT

## 2021-09-15 PROCEDURE — 71045 X-RAY EXAM CHEST 1 VIEW: CPT

## 2021-09-15 RX ORDER — ASPIRIN 81 MG/1
324 TABLET, CHEWABLE ORAL ONCE
Status: COMPLETED | OUTPATIENT
Start: 2021-09-15 | End: 2021-09-15

## 2021-09-15 RX ORDER — NITROGLYCERIN 0.4 MG/1
0.4 TABLET SUBLINGUAL EVERY 5 MIN PRN
Status: DISCONTINUED | OUTPATIENT
Start: 2021-09-15 | End: 2021-09-16 | Stop reason: SDUPTHER

## 2021-09-15 RX ORDER — ACETAMINOPHEN 325 MG/1
650 TABLET ORAL ONCE
Status: COMPLETED | OUTPATIENT
Start: 2021-09-15 | End: 2021-09-15

## 2021-09-15 RX ORDER — FAMOTIDINE 20 MG/1
20 TABLET, FILM COATED ORAL ONCE
Status: COMPLETED | OUTPATIENT
Start: 2021-09-15 | End: 2021-09-15

## 2021-09-15 RX ADMIN — FAMOTIDINE 20 MG: 20 TABLET, FILM COATED ORAL at 22:51

## 2021-09-15 RX ADMIN — NITROGLYCERIN 0.4 MG: 0.4 TABLET SUBLINGUAL at 22:51

## 2021-09-15 RX ADMIN — ASPIRIN 324 MG: 81 TABLET, CHEWABLE ORAL at 22:51

## 2021-09-15 RX ADMIN — ACETAMINOPHEN 650 MG: 325 TABLET ORAL at 23:22

## 2021-09-15 ASSESSMENT — PAIN DESCRIPTION - LOCATION: LOCATION: ABDOMEN;CHEST

## 2021-09-15 ASSESSMENT — PAIN SCALES - GENERAL
PAINLEVEL_OUTOF10: 6
PAINLEVEL_OUTOF10: 5

## 2021-09-16 ENCOUNTER — APPOINTMENT (OUTPATIENT)
Dept: CARDIAC CATH/INVASIVE PROCEDURES | Age: 56
DRG: 251 | End: 2021-09-16
Payer: COMMERCIAL

## 2021-09-16 PROBLEM — R07.9 CHEST PAIN WITH HIGH RISK OF ACUTE CORONARY SYNDROME: Status: ACTIVE | Noted: 2021-09-16

## 2021-09-16 LAB
ALBUMIN SERPL-MCNC: 4.1 G/DL (ref 3.5–5.2)
ALBUMIN/GLOBULIN RATIO: 1.4 (ref 1–2.5)
ALP BLD-CCNC: 136 U/L (ref 35–104)
ALT SERPL-CCNC: 33 U/L (ref 5–33)
ANION GAP SERPL CALCULATED.3IONS-SCNC: 14 MMOL/L (ref 9–17)
AST SERPL-CCNC: 32 U/L
BILIRUB SERPL-MCNC: 0.25 MG/DL (ref 0.3–1.2)
BUN BLDV-MCNC: 9 MG/DL (ref 6–20)
BUN/CREAT BLD: ABNORMAL (ref 9–20)
CALCIUM SERPL-MCNC: 9.4 MG/DL (ref 8.6–10.4)
CHLORIDE BLD-SCNC: 103 MMOL/L (ref 98–107)
CO2: 20 MMOL/L (ref 20–31)
CREAT SERPL-MCNC: 0.74 MG/DL (ref 0.5–0.9)
GFR AFRICAN AMERICAN: >60 ML/MIN
GFR NON-AFRICAN AMERICAN: >60 ML/MIN
GFR SERPL CREATININE-BSD FRML MDRD: ABNORMAL ML/MIN/{1.73_M2}
GFR SERPL CREATININE-BSD FRML MDRD: ABNORMAL ML/MIN/{1.73_M2}
GLUCOSE BLD-MCNC: 146 MG/DL (ref 70–99)
LIPASE: 29 U/L (ref 13–60)
POTASSIUM SERPL-SCNC: 4.3 MMOL/L (ref 3.7–5.3)
SARS-COV-2, RAPID: NOT DETECTED
SODIUM BLD-SCNC: 137 MMOL/L (ref 135–144)
SPECIMEN DESCRIPTION: NORMAL
TOTAL PROTEIN: 7 G/DL (ref 6.4–8.3)
TROPONIN INTERP: NORMAL
TROPONIN INTERP: NORMAL
TROPONIN T: NORMAL NG/ML
TROPONIN T: NORMAL NG/ML
TROPONIN, HIGH SENSITIVITY: <6 NG/L (ref 0–14)
TROPONIN, HIGH SENSITIVITY: <6 NG/L (ref 0–14)

## 2021-09-16 PROCEDURE — 6370000000 HC RX 637 (ALT 250 FOR IP): Performed by: STUDENT IN AN ORGANIZED HEALTH CARE EDUCATION/TRAINING PROGRAM

## 2021-09-16 PROCEDURE — C1753 CATH, INTRAVAS ULTRASOUND: HCPCS

## 2021-09-16 PROCEDURE — C1725 CATH, TRANSLUMIN NON-LASER: HCPCS

## 2021-09-16 PROCEDURE — G0378 HOSPITAL OBSERVATION PER HR: HCPCS

## 2021-09-16 PROCEDURE — 2580000003 HC RX 258: Performed by: INTERNAL MEDICINE

## 2021-09-16 PROCEDURE — 92978 ENDOLUMINL IVUS OCT C 1ST: CPT | Performed by: INTERNAL MEDICINE

## 2021-09-16 PROCEDURE — 96361 HYDRATE IV INFUSION ADD-ON: CPT

## 2021-09-16 PROCEDURE — B240ZZ3 ULTRASONOGRAPHY OF SINGLE CORONARY ARTERY, INTRAVASCULAR: ICD-10-PCS | Performed by: INTERNAL MEDICINE

## 2021-09-16 PROCEDURE — 92920 PRQ TRLUML C ANGIOP 1ART&/BR: CPT | Performed by: INTERNAL MEDICINE

## 2021-09-16 PROCEDURE — 02703ZZ DILATION OF CORONARY ARTERY, ONE ARTERY, PERCUTANEOUS APPROACH: ICD-10-PCS | Performed by: INTERNAL MEDICINE

## 2021-09-16 PROCEDURE — 6360000004 HC RX CONTRAST MEDICATION

## 2021-09-16 PROCEDURE — 2500000003 HC RX 250 WO HCPCS

## 2021-09-16 PROCEDURE — 7100000000 HC PACU RECOVERY - FIRST 15 MIN

## 2021-09-16 PROCEDURE — 4A023N7 MEASUREMENT OF CARDIAC SAMPLING AND PRESSURE, LEFT HEART, PERCUTANEOUS APPROACH: ICD-10-PCS | Performed by: INTERNAL MEDICINE

## 2021-09-16 PROCEDURE — 6360000002 HC RX W HCPCS

## 2021-09-16 PROCEDURE — 6370000000 HC RX 637 (ALT 250 FOR IP)

## 2021-09-16 PROCEDURE — 93454 CORONARY ARTERY ANGIO S&I: CPT | Performed by: INTERNAL MEDICINE

## 2021-09-16 PROCEDURE — 84484 ASSAY OF TROPONIN QUANT: CPT

## 2021-09-16 PROCEDURE — 7100000001 HC PACU RECOVERY - ADDTL 15 MIN

## 2021-09-16 PROCEDURE — B2151ZZ FLUOROSCOPY OF LEFT HEART USING LOW OSMOLAR CONTRAST: ICD-10-PCS | Performed by: INTERNAL MEDICINE

## 2021-09-16 PROCEDURE — C1894 INTRO/SHEATH, NON-LASER: HCPCS

## 2021-09-16 PROCEDURE — C1887 CATHETER, GUIDING: HCPCS

## 2021-09-16 PROCEDURE — 2580000003 HC RX 258: Performed by: STUDENT IN AN ORGANIZED HEALTH CARE EDUCATION/TRAINING PROGRAM

## 2021-09-16 PROCEDURE — 2709999900 HC NON-CHARGEABLE SUPPLY

## 2021-09-16 PROCEDURE — 96374 THER/PROPH/DIAG INJ IV PUSH: CPT

## 2021-09-16 PROCEDURE — 6360000002 HC RX W HCPCS: Performed by: NURSE PRACTITIONER

## 2021-09-16 PROCEDURE — C1769 GUIDE WIRE: HCPCS

## 2021-09-16 PROCEDURE — B2111ZZ FLUOROSCOPY OF MULTIPLE CORONARY ARTERIES USING LOW OSMOLAR CONTRAST: ICD-10-PCS | Performed by: INTERNAL MEDICINE

## 2021-09-16 RX ORDER — SODIUM CHLORIDE 9 MG/ML
25 INJECTION, SOLUTION INTRAVENOUS PRN
Status: DISCONTINUED | OUTPATIENT
Start: 2021-09-16 | End: 2021-09-17 | Stop reason: HOSPADM

## 2021-09-16 RX ORDER — AMLODIPINE BESYLATE 10 MG/1
5 TABLET ORAL DAILY
Status: DISCONTINUED | OUTPATIENT
Start: 2021-09-16 | End: 2021-09-17 | Stop reason: HOSPADM

## 2021-09-16 RX ORDER — ONDANSETRON 2 MG/ML
4 INJECTION INTRAMUSCULAR; INTRAVENOUS EVERY 6 HOURS PRN
Status: DISCONTINUED | OUTPATIENT
Start: 2021-09-16 | End: 2021-09-17 | Stop reason: HOSPADM

## 2021-09-16 RX ORDER — LANOLIN ALCOHOL/MO/W.PET/CERES
3 CREAM (GRAM) TOPICAL DAILY
Status: ON HOLD | COMMUNITY
End: 2021-09-16

## 2021-09-16 RX ORDER — EZETIMIBE 10 MG/1
10 TABLET ORAL DAILY
Status: DISCONTINUED | OUTPATIENT
Start: 2021-09-16 | End: 2021-09-17 | Stop reason: HOSPADM

## 2021-09-16 RX ORDER — ATORVASTATIN CALCIUM 80 MG/1
80 TABLET, FILM COATED ORAL DAILY
Status: DISCONTINUED | OUTPATIENT
Start: 2021-09-16 | End: 2021-09-17 | Stop reason: HOSPADM

## 2021-09-16 RX ORDER — CARVEDILOL 12.5 MG/1
6.25 TABLET ORAL 2 TIMES DAILY WITH MEALS
Status: DISCONTINUED | OUTPATIENT
Start: 2021-09-16 | End: 2021-09-17 | Stop reason: HOSPADM

## 2021-09-16 RX ORDER — SODIUM CHLORIDE 0.9 % (FLUSH) 0.9 %
5-40 SYRINGE (ML) INJECTION PRN
Status: DISCONTINUED | OUTPATIENT
Start: 2021-09-16 | End: 2021-09-17 | Stop reason: HOSPADM

## 2021-09-16 RX ORDER — ASPIRIN 81 MG/1
81 TABLET, CHEWABLE ORAL
Status: DISCONTINUED | OUTPATIENT
Start: 2021-09-16 | End: 2021-09-17 | Stop reason: HOSPADM

## 2021-09-16 RX ORDER — NITROGLYCERIN 0.4 MG/1
0.4 TABLET SUBLINGUAL EVERY 5 MIN PRN
Status: DISCONTINUED | OUTPATIENT
Start: 2021-09-16 | End: 2021-09-17 | Stop reason: HOSPADM

## 2021-09-16 RX ORDER — SODIUM CHLORIDE 0.9 % (FLUSH) 0.9 %
5-40 SYRINGE (ML) INJECTION EVERY 12 HOURS SCHEDULED
Status: DISCONTINUED | OUTPATIENT
Start: 2021-09-16 | End: 2021-09-17 | Stop reason: HOSPADM

## 2021-09-16 RX ORDER — RANOLAZINE 500 MG/1
1000 TABLET, EXTENDED RELEASE ORAL 2 TIMES DAILY
Status: DISCONTINUED | OUTPATIENT
Start: 2021-09-16 | End: 2021-09-17 | Stop reason: HOSPADM

## 2021-09-16 RX ORDER — HYDROCHLOROTHIAZIDE 25 MG/1
25 TABLET ORAL DAILY
Status: DISCONTINUED | OUTPATIENT
Start: 2021-09-16 | End: 2021-09-17 | Stop reason: HOSPADM

## 2021-09-16 RX ORDER — PANTOPRAZOLE SODIUM 40 MG/1
40 TABLET, DELAYED RELEASE ORAL
Status: DISCONTINUED | OUTPATIENT
Start: 2021-09-16 | End: 2021-09-17 | Stop reason: HOSPADM

## 2021-09-16 RX ORDER — LOSARTAN POTASSIUM 50 MG/1
100 TABLET ORAL DAILY
Status: DISCONTINUED | OUTPATIENT
Start: 2021-09-16 | End: 2021-09-17 | Stop reason: HOSPADM

## 2021-09-16 RX ORDER — SODIUM CHLORIDE 9 MG/ML
INJECTION, SOLUTION INTRAVENOUS CONTINUOUS
Status: DISCONTINUED | OUTPATIENT
Start: 2021-09-16 | End: 2021-09-17 | Stop reason: HOSPADM

## 2021-09-16 RX ORDER — ACETAMINOPHEN 325 MG/1
650 TABLET ORAL EVERY 4 HOURS PRN
Status: DISCONTINUED | OUTPATIENT
Start: 2021-09-16 | End: 2021-09-16

## 2021-09-16 RX ADMIN — CARVEDILOL 6.25 MG: 12.5 TABLET, FILM COATED ORAL at 20:39

## 2021-09-16 RX ADMIN — RANOLAZINE 1000 MG: 500 TABLET, FILM COATED, EXTENDED RELEASE ORAL at 22:13

## 2021-09-16 RX ADMIN — PANTOPRAZOLE SODIUM 40 MG: 40 TABLET, DELAYED RELEASE ORAL at 06:40

## 2021-09-16 RX ADMIN — SODIUM CHLORIDE, PRESERVATIVE FREE 10 ML: 5 INJECTION INTRAVENOUS at 20:40

## 2021-09-16 RX ADMIN — SODIUM CHLORIDE: 9 INJECTION, SOLUTION INTRAVENOUS at 12:35

## 2021-09-16 RX ADMIN — ONDANSETRON 4 MG: 2 INJECTION INTRAMUSCULAR; INTRAVENOUS at 20:49

## 2021-09-16 RX ADMIN — NITROGLYCERIN 0.4 MG: 0.4 TABLET SUBLINGUAL at 04:28

## 2021-09-16 RX ADMIN — NITROGLYCERIN 0.4 MG: 0.4 TABLET SUBLINGUAL at 04:22

## 2021-09-16 RX ADMIN — TICAGRELOR 90 MG: 90 TABLET ORAL at 20:39

## 2021-09-16 ASSESSMENT — ENCOUNTER SYMPTOMS
COUGH: 0
VOMITING: 0
SHORTNESS OF BREATH: 1
WHEEZING: 0
NAUSEA: 0
STRIDOR: 0
RHINORRHEA: 0
ABDOMINAL PAIN: 0
DIARRHEA: 1
SORE THROAT: 0

## 2021-09-16 ASSESSMENT — PAIN DESCRIPTION - PAIN TYPE
TYPE: ACUTE PAIN
TYPE_2: CHRONIC PAIN

## 2021-09-16 ASSESSMENT — PAIN DESCRIPTION - PROGRESSION
CLINICAL_PROGRESSION_2: NOT CHANGED
CLINICAL_PROGRESSION: NOT CHANGED

## 2021-09-16 ASSESSMENT — PAIN SCALES - GENERAL
PAINLEVEL_OUTOF10: 6
PAINLEVEL_OUTOF10: 4
PAINLEVEL_OUTOF10: 7
PAINLEVEL_OUTOF10: 7

## 2021-09-16 ASSESSMENT — PAIN DESCRIPTION - LOCATION
LOCATION: GROIN;CHEST
LOCATION: MEDIASTINUM
LOCATION: GROIN
LOCATION_2: BACK

## 2021-09-16 ASSESSMENT — PAIN DESCRIPTION - FREQUENCY
FREQUENCY: CONTINUOUS
FREQUENCY: CONTINUOUS

## 2021-09-16 ASSESSMENT — PAIN DESCRIPTION - INTENSITY: RATING_2: 7

## 2021-09-16 ASSESSMENT — PAIN DESCRIPTION - ONSET
ONSET: ON-GOING
ONSET_2: ON-GOING

## 2021-09-16 ASSESSMENT — PAIN DESCRIPTION - DESCRIPTORS
DESCRIPTORS: PRESSURE
DESCRIPTORS: PRESSURE;ACHING;CONSTANT
DESCRIPTORS: DISCOMFORT
DESCRIPTORS_2: ACHING;CONSTANT

## 2021-09-16 ASSESSMENT — PAIN DESCRIPTION - ORIENTATION
ORIENTATION: RIGHT
ORIENTATION_2: LOWER
ORIENTATION: RIGHT
ORIENTATION: MID

## 2021-09-16 ASSESSMENT — PAIN DESCRIPTION - DURATION: DURATION_2: CONTINUOUS

## 2021-09-16 NOTE — ED NOTES
Pt is a&o x4 in NAD with all vitals stable. Pt has both side rails up and call light within reach. Pt denies any needs at this time.         Michelle Mata, MARU  09/16/21 [id number]

## 2021-09-16 NOTE — PROGRESS NOTES
elevation for which a RCA was injected again and there was no lesion. She was started on Norvasc. 14. Lipid panel dated 05/18/2019 showed a total cholesterol 133, LDL 62, HDL 53  15. Cardiac catheterization 02/10/2020 showed normal left main, patent mid LAD stent with luminal irregularities 20-30%, ostial diagonal 70% jailed by the stent small vessel, left circumflex 20-30% LI, RCA 20-30% LI, EF 60%. 16. Stress test 12/09/2020 showed no ischemia, moderate size basal lateral infarct, EF 60%, no wall motion abnormalities, intermediate risk  17. Cardiac catheterization 01/15/2021 showed normal left main, patent mid LAD stent with 30% stenosis, Jewel D2 small with 90% ostial lesion, left circumflex 30% ostial stenosis, OM1 proximal 20% stenosis, RCA 10-20%. No significant change as compared to last cath. 18. Lipid panel 11/15/2021 showed LDL 92, cholesterol 172, HDL 59, triglyceride 105  19. Transthoracic echocardiogram 02/23/2021 showed LV ejection fraction 60-65%. No significant valve abnormalities. Carotid ultrasound dated 02/23/2021 showed mild less than 50% stenosis of internal carotid arteries bilaterally. Bilateral antegrade vertebral artery flow noted. Plan --   Vara Harada is stable from cardiac perspective. She has chronic stable angina class 2. Her symptoms have improved with an increased dose of amlodipine and Ranexa. She also participated in [facility]. I recommended her to continue [facility] maintain its program which she declined. She reports that she wants to monitor symptoms and if her symptoms started worsening she will enroll in maintenance program. Continue aspirin Brilinta for secondary prevention. Continue carvedilol, Cozaar, hydrochlorothiazide and amlodipine for hypertension. She is on amlodipine, carvedilol and Ranexa as antianginal medications as well. Continue atorvastatin with Zetia. Her last LDL was 68 on May 22, 2021. Diet and exercise recommended in order to lose weight.  Return to clinic in 6 months time for follow-up.     Lila Bernal [facility]

## 2021-09-16 NOTE — CARE COORDINATION
Case Management Initial Discharge Plan  Rosalinda Mccullough,             Met with:patient to discuss discharge plans. Information verified: address, contacts, phone number, , insurance Yes  Insurance Provider: garcia    Emergency Contact/Next of Kin name & number: spouse Ruthy Wilson as per face sheet  Who are involved in patient's support system? spouse    PCP: Sanchez Hunt MD  Date of last visit: last year      Discharge Planning    Living Arrangements:  Spouse/Significant Other     Home has 1 stories  no stairs to climb to get into front door, na stairs to climb to reach second floor  Location of bedroom/bathroom in home main    Patient able to perform ADL's:Independent    Current Services (outpatient & in home) DME  DME equipment: has a cane and walker that were her mothers but does not use  DME provider: na    Is patient receiving oral anticoagulation therapy? Yes    If indicated: Brilinta  Physician managing anticoagulation treatment: [facility]  Where does patient obtain lab work for [employer] treatment? [address]      Potential Assistance Needed:  N/A    Patient agreeable to home care: No  [place] of choice provided:  n/a    Prior SNF/Rehab Placement and Facility: none  Agreeable to SNF/Rehab: No  [place] of choice provided: n/a     Evaluation: n/a    Expected Discharge date:  21    Patient expects to be discharged to: If home: is the family and/or caregiver wiling & able to provide support at home? yes  Who will be providing this support? spouse    Follow Up Appointment: Best Day/ Time: Tuesday AM    Transportation provider: self, has car in lot  Transportation arrangements needed for discharge: No    Readmission Risk              Risk of Unplanned Readmission:  0             Does patient have a readmission risk score greater than 14?: No  If yes, follow-up appointment must be made within 7 days of discharge.      Goals of Care: pain control      Educated pt on transitional options, provided freedom of choice and are agreeable with plan      Discharge Plan: home independent, has car in lot for transportation home          Electronically signed by Brandon Collins RN on 9/16/21 at 9:30 AM EDT

## 2021-09-16 NOTE — ED NOTES
Pt is a&o x4 in NAD with all vitals stable. Pt has both side rails up and call light within reach. Pt denies any needs at this time.         Janina Holt RN  09/16/21 [id number]

## 2021-09-16 NOTE — CONSULTS
Attestation signed by      Attending Physician Statement:    I have discussed the care of  Ethan Ni , including pertinent history and exam findings, with the Cardiology fellow/resident. I have seen and examined the patient and the key elements of all parts of the encounter have been performed by me. I agree with the assessment, plan and orders as documented by the fellow/resident, after I modified exam findings and plan of treatments, and the final version is my approved version of the assessment. Additional Comments: Angina despite medical therapy and EECP. H/o NASIR to mid LAD in 2018 and required POBA only in 2019 with symptoms improvement at that time. Now having symptoms of angina again. Concern of progression of mid LAD ISR. Need cardiac cath for risk stratification. Risk and benefits of cardiac catheterization were discussed in detail. Risk of bleeding, requiring blood transfusion, vascular complication requiring surgery, renal insufficieny with need of dialysis, CVA, MI, death and anesthesia complications including intubation were discussed. Patient agrees to proceed and verbalizes understanding.     Dany Ordaz, [facility]   Consultation Note               Today's Date: 9/16/2021  Patient Name: Ethan Ni  Date of admission: 9/15/2021 10:17 PM  Patient's age: 61 y.o., 1965  Admission Dx: Acute chest pain [R07.9]  Chest pain with high risk of acute coronary syndrome [R07.9]    Reason for Consult: chest pain    Requesting Physician: King Delmer MD    CHIEF COMPLAINT:    Chief Complaint   Patient presents with    Chest Pain       History Obtained From:  patient    HISTORY OF PRESENT ILLNESS:      Ethan Ni is a 61 y.o. female who presents chest pain and SOB, midsternal nonradiating, She has hx of LAD NASIR in 2018, her last cath was in 1/2021 shich showed normal left main, patent mid LAD stent with 30% stenosis, Jewel D2 small with 90% ostial daily   Yes Historical Provider, MD   metFORMIN (GLUCOPHAGE-XR) 500 MG extended release tablet take 1 tablet by mouth once daily for 2 weeks then 1 tablet twice a day 2/22/21  Yes Sanchez Hunt MD   ticagrelor (BRILINTA) 60 MG TABS tablet Take 1 tablet by mouth 2 times daily 1/19/21  Yes BRITTON Henning - CNP   ranolazine (RANEXA) 500 MG extended release tablet Take 1 tablet by mouth 2 times daily  Patient taking differently: Take 1,000 mg by mouth 2 times daily  1/15/21  Yes Taye Julio MD   carvedilol (COREG) 6.25 MG tablet Take 1 tablet by mouth 2 times daily (with meals) 1/15/21  Yes Taye Julio MD   nitroGLYCERIN (NITROSTAT) 0.4 MG SL tablet place 1 tablet under the tongue if needed every 5 minutes for chest pain for 3 doses IF NO RELIEF AFTER FIRST DOSE CALL PRESCRIBER . 9/13/20  Yes Sanchez Hunt MD   amLODIPine (NORVASC) 5 MG tablet Take by mouth daily 3/4/19  Yes Historical Provider, MD   hydrochlorothiazide (HYDRODIURIL) 25 MG tablet Take 25 mg by mouth daily   Yes Historical Provider, MD   aspirin 81 MG tablet Take 1 tablet by mouth daily (with breakfast) 8/19/16  Yes Sanchez Hunt MD   melatonin 3 MG TABS tablet Take 3 mg by mouth daily    Historical Provider, MD   pregabalin (LYRICA) 50 MG capsule Take 1 capsule by mouth 2 times daily for 30 days. 4/8/21 5/8/21  Sophie Fair MD   gabapentin (NEURONTIN) 100 MG capsule Take 1 capsule by mouth 3 times daily for 30 days.   Patient not taking: Reported on 3/30/2021 2/11/21 3/13/21  Sophie Fair MD      Current Facility-Administered Medications: amLODIPine (NORVASC) tablet 5 mg, 5 mg, Oral, Daily  aspirin chewable tablet 81 mg, 81 mg, Oral, Daily with breakfast  atorvastatin (LIPITOR) tablet 80 mg, 80 mg, Oral, Daily  carvedilol (COREG) tablet 6.25 mg, 6.25 mg, Oral, BID WC  ezetimibe (ZETIA) tablet 10 mg, 10 mg, Oral, Daily  hydroCHLOROthiazide (HYDRODIURIL) tablet 25 mg, 25 mg, Oral, Daily  nitroGLYCERIN (NITROSTAT) SL tablet 0.4 mg, 0.4 mg, SubLINGual, Q5 Min PRN  ranolazine (RANEXA) extended release tablet 1,000 mg, 1,000 mg, Oral, BID  ticagrelor (BRILINTA) tablet 60 mg, 60 mg, Oral, BID  pantoprazole (PROTONIX) tablet 40 mg, 40 mg, Oral, QAM AC  losartan (COZAAR) tablet 100 mg, 100 mg, Oral, Daily      Allergies:  Bee venom, Tetracyclines & related, and Ace inhibitors      Social History:   reports that she quit smoking about 3 years ago. Her smoking use included cigarettes. She has a 7.50 pack-year smoking history. She has never used smokeless tobacco. She reports that she does not drink alcohol and does not use drugs. Family History: family history includes Diabetes in her maternal grandmother; Other in her sister; Stroke in her father. No h/o sudden cardiac death. REVIEW OF SYSTEMS:    · Constitutional: there has been no unanticipated weight loss. · Eyes: No visual changes or diplopia. · ENT: No Headaches  · Cardiovascular: see above  · Respiratory: No previous pulmonary problems, No cough  · Gastrointestinal: No abdominal pain. No change in bowel or bladder habits. · Genitourinary: No dysuria, trouble voiding, or hematuria. · Musculoskeletal:  No gait disturbance, No weakness or joint complaints. · Integumentary: No rash or pruritis. · Neurological: No headache, diplopia      PHYSICAL EXAM:      /82   Pulse 75   Temp 97.2 °F (36.2 °C) (Oral)   Resp 18   SpO2 97%    Constitutional and General Appearance: Alert, no distress  Respiratory:  · No increased work of breathing. · Clear to auscultation bilaterally. No wheeze or crackles. Cardiovascular:  · Normal S1 and S2.   · Jugular venous pulsation Normal  Abdomen:   · Soft  · No tenderness  Extremities:  · No lower extremity edema  Neurologic:  · Alert and oriented.   · Moves all extremities well      DATA:    Diagnostics:    Labs:     CBC:   Recent Labs     09/15/21  2333   WBC 6.1   HGB 13.6   HCT 44.0   PLT 183     BMP:   Recent Labs     09/15/21  2333      K 4.3   CO2 20   BUN 9   CREATININE 0.74   LABGLOM >60   GLUCOSE 146*     BNP: No results for input(s): BNP in the last 72 hours. PT/INR: No results for input(s): PROTIME, INR in the last 72 hours. APTT:No results for input(s): APTT in the last 72 hours. CARDIAC ENZYMES:  Recent Labs     09/15/21  2333 09/16/21  0031   TROPHS <6 <6     No results for input(s): CKTOTAL, CKMB, CKMBINDEX, TROPONINI in the last 72 hours. Invalid input(s):  [address]  Recent Labs     09/15/21  2333 09/16/21  0031   TROPONINT NOT REPORTED NOT REPORTED     FASTING LIPID PANEL:  Lab Results   Component Value Date    HDL 71 05/22/2021    TRIG 70 05/22/2021     LIVER PROFILE:  Recent Labs     09/15/21  2333   AST 32*   ALT 33   LABALBU 4.1             ECHO 2/23/2021: EF 60-65%, indeterminate diastolic function, no regurg/stenosis.      STRESS 12/9/2020: No ischemia. Moderate size basal lateral non transmural infarction. EF 68%. CATH:   Cardiac Arteries and Lesion Findings     LMCA: Normal 0% stenosis.     LAD: Patent mid stent area with 30% stenosis  D2: Small, jailed ostial lesion 90%     LCx: Ostial 30% stenosis  OM1: Proximal 20% stenosis     RCA: Mild irregularities 10-20%.     Coronary Tree      Dominance: Right     Procedure Data  Procedure Start Time: 01/15/2021 12:37. Procedure End Time: 01/15/2021  12:44. IMPRESSION:    1. CAD s/p LAD NASIR stent  2. Unstable Angina  3. HTN  4. Completed EECP        RECOMMENDATIONS:  1. Resume home antihypertensives  2. Keep NPO. Given on-going chest pain, will potentially plan for cardiac cath. Thank you for allowing us to participate in [address]. Will follow with you.       Electronically signed on 09/16/21 at 9:20 AM by:    Berna Petersen MD, MD   Fellow, [address] [place] [address]

## 2021-09-16 NOTE — ED NOTES
Pt ambulated to restroom by self with a even strong gait. Pt is a&o x4 in NAD with all vitals stable. Pt has both side rails up and call light within reach. Pt denies any needs at this time.           Kendra Salgado RN  09/16/21 0399

## 2021-09-16 NOTE — PROGRESS NOTES
[facility]  CDU / OBSERVATION ENCOUNTER  ATTENDING NOTE       I performed a history and physical examination of the patient and discussed management with the resident or midlevel provider. I reviewed the resident or midlevel provider's note and agree with the documented findings and plan of care. Any areas of disagreement are noted on the chart. I was personally present for the key portions of any procedures. I have documented in the chart those procedures where I was not present during the key portions. I have reviewed the nurses notes. I agree with the chief complaint, past medical history, past surgical history, allergies, medications, social and family history as documented unless otherwise noted below. The Family history, social history, and ROS are effectively unchanged since admission unless noted elsewhere in the chart. Patient with prior history of recent cardiac stenting with history of angina refractory to medical treatment. Patient just finished EECP. Patient had episode of angina occurring again. Patient feels pressure with pain in her low sternal border wrapping around her right ribs and up into the right upper quadrant. Patient has been on Brilinta and then compliance also with Ranexa. Patient for cardiology evaluation here.     Pt for cardiac cath per cardiology eval.  Will reevaluate after procedure    Matti Aldridge MD  Attending Emergency  Physician

## 2021-09-16 NOTE — ED PROVIDER NOTES
Faculty Sign-Out Attestation  Handoff taken on the following patient from prior Attending Physician: Ronald Parr    I was available and discussed any additional care issues that arose and coordinated the management plans with the resident(s) caring for the patient during my duty period. Any areas of disagreement with residents documentation of care or procedures are noted on the chart. I was personally present for the key portions of any/all procedures during my duty period. I have documented in the chart those procedures where I was not present during the key portions.     Cp, hx of cad, lab pending, xr pending, needing admission,     Patricia Zazueta DO  Attending Physician     Patricia Zazueta DO  09/15/21 0498  Was given asa 324 mg, will admit per plan     Patricia Zazueta DO  09/16/21 0111

## 2021-09-16 NOTE — H&P
[facility]  CDU / OBSERVATION eNCOUnter  Resident Note     Pt Name: Susanna Storm  MRN: [id number]  [place] 1965  Date of evaluation: 9/16/21  Patient's PCP is :  Ericka Fuller MD    CHIEF COMPLAINT       Chief Complaint   Patient presents with    Chest Pain         HISTORY OF PRESENT ILLNESS    Susanna Storm is a 61 y.o. female who presents chest pain and SOB, midsternal nonradiating, Also c/o pain at base of right ribs. Extensive cardiac history including CAD and stent placed in 2018 and stent occlusion in 2019 with revision. Recent cardiac cath back in 2014. No history of DVT or PE. Denies abdominal pain, nausea, vomiting, diaphoresis, headache, changes in vision, lightheadedness. Location/Symptom: Midsternal chest pain  Timing/Onset: Acute  Provocation: None  Quality: Pressure  Radiation: Nonradiating  Severity: 3/10  Timing/Duration: Intermittent  Modifying Factors: None    REVIEW OF SYSTEMS       General ROS - No fevers, No malaise   Ophthalmic ROS - No discharge, No changes in vision  ENT ROS -  No sore throat, No rhinorrhea,   Respiratory ROS - no shortness of breath, no cough, no  wheezing  Cardiovascular ROS -  chest pain, no dyspnea on exertion  Gastrointestinal ROS - No abdominal pain, no nausea or vomiting, nonbloody diarrhea for the past 3 weeks, no black or bloody stools  Genito-Urinary ROS - No dysuria, trouble voiding, or hematuria  Musculoskeletal ROS - No myalgias, No arthalgias  Neurological ROS - No headache, no dizziness/lightheadedness, No focal weakness, no loss of sensation  Dermatological ROS - No lesions, No rash     (PQRS) Advance directives on face sheet per hospital policy.  No change unless specifically mentioned in chart    PAST MEDICAL HISTORY    has a past medical history of Abnormal stress test, Allergic rhinitis, Arthritis, CAD (coronary artery disease), Chronic back pain, Diabetes mellitus ([address] Former smoker, Hyperlipidemia, Hypertension, Murmur, cardiac, SRI on CPAP, Wears prescription eyeglasses, and Wellness examination. I have reviewed the past medical history with the patient and it is pertinent to this complaint. SURGICAL HISTORY      has a past surgical history that includes Tonsillectomy; Foot surgery; Lumbar disc surgery; Endometrial ablation; Tubal ligation; Hysterectomy; Carpal tunnel release (Right); Colonoscopy (N/A, 2018); Injection Procedure For Sacroiliac Joint (Left, 2019);  section; Hand tendon surgery (Right); Anesthesia Nerve Block (Left, 2019); Cardiac catheterization (2017); Cardiac catheterization (2019); Cardiac catheterization (2018); Cardiac catheterization (02/10/2020); Nerve Block (2020); Anesthesia Nerve Block (Bilateral, 2020); eye surgery (2014); Nerve Block (Bilateral, 2020); Anesthesia Nerve Block (Bilateral, 2020); Nerve Block (Left, 2020); Pain management procedure (Left, 2020); Nerve Block (Right, 2020); and Pain management procedure (Right, 2020). I have reviewed and agree with Surgical History entered and it is pertinent to this complaint. CURRENT MEDICATIONS     amLODIPine (NORVASC) tablet 5 mg, Daily  aspirin chewable tablet 81 mg, Daily with breakfast  atorvastatin (LIPITOR) tablet 80 mg, Daily  carvedilol (COREG) tablet 6.25 mg, BID WC  ezetimibe (ZETIA) tablet 10 mg, Daily  hydroCHLOROthiazide (HYDRODIURIL) tablet 25 mg, Daily  nitroGLYCERIN (NITROSTAT) SL tablet 0.4 mg, Q5 Min PRN  ranolazine (RANEXA) extended release tablet 1,000 mg, BID  ticagrelor (BRILINTA) tablet 60 mg, BID  pantoprazole (PROTONIX) tablet 40 mg, QAM AC  losartan (COZAAR) tablet 100 mg, Daily        All medication charted and reviewed. ALLERGIES     is allergic to bee venom, tetracyclines & related, and ace inhibitors. FAMILY HISTORY     She indicated that her mother is alive. She indicated that her father is .  She indicated that the status of her sister is unknown. She indicated that her brother is alive. She indicated that her maternal grandmother is . She indicated that her maternal grandfather is . She indicated that her paternal grandmother is . She indicated that her paternal grandfather is . family history includes Diabetes in her maternal grandmother; Other in her sister; Stroke in her father. The patient denies any pertinent family history. I have reviewed and agree with the family history entered. I have reviewed the Family History and it is not significant to the case    SOCIAL HISTORY      reports that she quit smoking about 3 years ago. Her smoking use included cigarettes. She has a 7.50 pack-year smoking history. She has never used smokeless tobacco. She reports that she does not drink alcohol and does not use drugs. I have reviewed and agree with all Social.  There are no concerns for substance abuse/use. PHYSICAL EXAM     INITIAL VITALS:  oral temperature is 97 °F (36.1 °C). Her blood pressure is 158/83 (abnormal) and her pulse is 79. Her respiration is 18 and oxygen saturation is 96%.       CONSTITUTIONAL: AOx4, no apparent distress, appears stated age    HEAD: normocephalic, atraumatic   EYES: PERRLA, EOMI    ENT: moist mucous membranes, uvula midline   NECK: supple, symmetric   BACK: symmetric   LUNGS: clear to auscultation bilaterally   CARDIOVASCULAR: regular rate and rhythm, no murmurs, rubs or gallops   ABDOMEN: soft, non-tender, non-distended with normal active bowel sounds   NEUROLOGIC:  MAEx4, no focal sensory or motor deficits   MUSCULOSKELETAL: no clubbing, cyanosis or edema   SKIN: no rash or wounds       DIFFERENTIAL DIAGNOSIS/MDM:     Chest Pain:  DDX: Emergent: ACS/NSTEMI/STEMI/angina, arrhythmia, trauma, aortic dissection,  PE, PNA, pneumothroax, esophageal rupture, tamponade, Cocaine use  Nonemergent: pneumonia, pericarditis, GERD, MSK, Endocarditis, anxiety  Evaluated for: diaphoresis, present chest pain, tachypnea, BP both arms, heart sounds, JVD, tender chest wall, wheezing     tion, PE, nonspecific      DIAGNOSTIC RESULTS     RADIOLOGY:   I directly visualized the following  images and reviewed the radiologist interpretations:    XR CHEST PORTABLE    Result Date: 9/16/2021  EXAMINATION: ONE XRAY VIEW OF THE CHEST 9/15/2021 5:42 pm COMPARISON: May 31, 2021 HISTORY: ORDERING SYSTEM PROVIDED HISTORY: chest pain TECHNOLOGIST PROVIDED HISTORY: chest pain Reason for Exam: port upright FINDINGS: Marginal inspiration is present. No focal area of consolidation or pneumothorax is noted. Heart size and mediastinal contours appear normal for the level of inspiration. Osseous structures appear normal.     No evidence of acute cardiopulmonary disease       LABS:  I have reviewed and interpreted all available lab results.   Labs Reviewed   CBC WITH AUTO DIFFERENTIAL - Abnormal; Notable for the following components:       Result Value    Lymphocytes 47 (*)     All other components within normal limits   COMPREHENSIVE METABOLIC PANEL W/ REFLEX TO MG FOR LOW K - Abnormal; Notable for the following components:    Glucose 146 (*)     Alkaline Phosphatase 136 (*)     AST 32 (*)     Total Bilirubin 0.25 (*)     All other components within normal limits   COVID-19, RAPID   LIPASE   TROPONIN   TROPONIN       SCREENING TOOLS:    HEART Risk Score for Chest Pain Patients   History and Physical Exam Suspicion Level  (Nausea, Vomiting, Diaphoresis, Radiation, Exertion)   Slightly Suspicious (0 pts)   Moderately Suspicious (1 pt)   Highly Suspicious (2 pts)   EKG Interpretation   Normal (0 pts)   Non-Specific Repolarization Disturbance (1 pt)   Significant ST-Depression (2 pts)   Age of Patient (in years)   = 45 (0 pts)   46-64 (1 pt)   = 65 (2 pts)   Risk Factors   No Risk Factors (0 pts)   1-2 Risk Factors (1 pt)   = 3 Risk Factors (2 pts)   Risk Factors Include:   Hypercholesterolemia   Hypertension   Diabetes Mellitus   Cigarette smoking   Positive family history   Obesity   CAD   (SLE, CKDz, HIV, Cocaine abuse)   Troponin Levels   = Normal Limit (0 pts)   1-3 Times Normal Limit (1 pt)   > 3 Times Normal Limit (2 pts)  TOTAL: 5    Percent Risk for Major Adverse Cardiac Event (MACE)  0-3 pts indicates low risk for MACE   2.5% (DISCHARGE)   4-7 pts indicates moderate risk for MACE  20.3% (OBS)  8-10 pts indicates high risk for MACE  72.7% (EARLY INVASIVE [place])    CDU IMPRESSION / Versalysha Zambrano is a 61 y.o. female who presents with midsternal chest pain w/ hx of MI     · Resolved with 3 doses NTG, negative troponins  · Previous MI also had negative trops initially, similar symptoms  · Cards consult-patient will receive balloon angioplasty today  · Will consider inpatient admission due to high risk cath  · Continue home medications and pain control  · Monitor vitals, labs, and imaging  · DISPO: pending consults and clinical improvement    CONSULTS:    IP CONSULT TO CARDIOLOGY    PROCEDURES:  Not indicated      PATIENT REFERRED TO:    No follow-up provider specified. --  Chinmay Carrizales MD   Emergency Medicine Resident     This dictation was generated by voice recognition computer software. Although all attempts are made to edit the dictation for accuracy, there may be errors in the transcription that are not intended.

## 2021-09-16 NOTE — ED NOTES
Bed: 07  Expected date:   Expected time:   Means of arrival:   Comments:  [facility] [address] [place] [address], RN  09/16/21 [id number]

## 2021-09-16 NOTE — ED PROVIDER NOTES
[facility]  ED  Emergency Department Encounter  EmergencyMedicine Resident     Pt Kathryn Gomez  MRN: [id number]  [place] 1965  Date of evaluation: 9/15/21  PCP:  Michoacano Beckham MD    This patient was evaluated in the Emergency Department for symptoms described in the history of present illness. The patient was evaluated in the context of the global COVID-19 pandemic, which necessitated consideration that the patient might be at risk for infection with the SARS-CoV-2 virus that causes COVID-19. Institutional protocols and algorithms that pertain to the evaluation of patients at risk for COVID-19 are in a state of rapid change based on information released by regulatory bodies including the CDC and federal and state organizations. These policies and algorithms were followed during the patient's care in the ED. CHIEF COMPLAINT       Chief Complaint   Patient presents with    Chest Pain       HISTORY OF PRESENT ILLNESS  (Location/Symptom, Timing/Onset, Context/Setting, Quality, Duration, Modifying Factors, Severity.)      Osvaldo Wellington is a 61 y.o. female who presents with chest pain and shortness of breath x 1 hour. Patient states the chest pain is midsternal and nonradiating, and \"surges\" and then remits. Patient also endorses pain at the base of her right ribs. Patient has an extensive cardiac history, including CAD with stent placed in 2018 and a stent occlusion in 2019 with subsequent revision. Most recent cardiac catheterization on 1/14. Patient recently completed a course of EECP, which she states somewhat helped alleviate her baseline unstable angina. Patient is currently taking Brilinta and aspirin 81 mg daily along with her regular medications, and denies missing any dosages of medication. Patient is vaccinated for Covid, denies any known exposures. No history of DVT or PE.  Denies abdominal pain, nausea, vomiting, diaphoresis, headache, changes in vision, lightheadedness. PAST MEDICAL / SURGICAL / SOCIAL / FAMILY HISTORY      has a past medical history of Abnormal stress test, Allergic rhinitis, Arthritis, CAD (coronary artery disease), Chronic back pain, Diabetes mellitus ([facility] [address] Former smoker, Hyperlipidemia, Hypertension, Murmur, cardiac, SRI on CPAP, Wears prescription eyeglasses, and Wellness examination. has a past surgical history that includes Tonsillectomy; Foot surgery; Lumbar disc surgery; Endometrial ablation; Tubal ligation; Hysterectomy; Carpal tunnel release (Right); Colonoscopy (N/A, 2018); Injection Procedure For Sacroiliac Joint (Left, 2019);  section; Hand tendon surgery (Right); Anesthesia Nerve Block (Left, 2019); Cardiac catheterization (2017); Cardiac catheterization (2019); Cardiac catheterization (2018); Cardiac catheterization (02/10/2020); Nerve Block (2020); Anesthesia Nerve Block (Bilateral, 2020); eye surgery (2014); Nerve Block (Bilateral, 2020); Anesthesia Nerve Block (Bilateral, 2020); Nerve Block (Left, 2020); Pain management procedure (Left, 2020); Nerve Block (Right, 2020); and Pain management procedure (Right, 2020).     Social History     Socioeconomic History    Marital status:      Spouse name: Not on file    Number of children: Not on file    Years of education: Not on file    Highest education level: Not on file   Occupational History    Not on file   Tobacco Use    Smoking status: Former Smoker     Packs/day: 0.25     Years: 30.00     Pack years: 7.50     Types: Cigarettes     Quit date: 2018     Years since quitting: 3.1    Smokeless tobacco: Never Used   Vaping Use    Vaping Use: Never used   Substance and Sexual Activity    Alcohol use: No    Drug use: No    Sexual activity: Not on file   Other Topics Concern    Not on file   Social History Narrative    Not on file     Social Determinants of Health Financial Resource Strain:     Difficulty of Paying Living Expenses:    Food Insecurity:     Worried About Running Out of Food in the Last Year:     [address] in the Last Year:    Transportation Needs:     Lack of Transportation (Medical):  Lack of Transportation (Non-Medical):    Physical Activity:     Days of Exercise per Week:     Minutes of Exercise per Session:    Stress:     Feeling of Stress :    Social Connections:     Frequency of Communication with Friends and Family:     Frequency of Social Gatherings with Friends and Family:     Attends [employer] Services:     Active Member of Clubs or Organizations:     Attends Club or Organization Meetings:     Marital Status:    Intimate Partner Violence:     Fear of Current or Ex-Partner:     Emotionally Abused:     Physically Abused:     Sexually Abused:        Family History   Problem Relation Age of Onset    Stroke Father     Other Sister         homeless    Diabetes Maternal Grandmother        Allergies:  Bee venom, Tetracyclines & related, and Ace inhibitors    Home Medications:  Prior to Admission medications    Medication Sig Start Date End Date Taking? Authorizing Provider   losartan (COZAAR) 100 MG tablet take 1 tablet by mouth once daily 7/11/21   Johnnie Vela MD   atorvastatin (LIPITOR) 80 MG tablet take 1 tablet by mouth once daily 5/21/21   Johnnie Vela MD   pregabalin (LYRICA) 50 MG capsule Take 1 capsule by mouth 2 times daily for 30 days. 4/8/21 5/8/21  Gurjit Wright MD   pregabalin (LYRICA) 50 MG capsule Take 1 capsule by mouth 2 times daily for 30 days.  3/4/21 4/3/21  Gurjit Wright MD   omeprazole (PRILOSEC) 40 MG delayed release capsule Take 40 mg by mouth daily    Historical Provider, MD   ezetimibe (ZETIA) 10 MG tablet Take 10 mg by mouth daily    Historical Provider, MD   metFORMIN (GLUCOPHAGE-XR) 500 MG extended release tablet take 1 tablet by mouth once daily for 2 weeks then 1 tablet twice a day 2/22/21   Carmen Anderson MD   gabapentin (NEURONTIN) 100 MG capsule Take 1 capsule by mouth 3 times daily for 30 days. Patient not taking: Reported on 3/30/2021 2/11/21 3/13/21  Candice Monahan MD   ticagrelor (BRILINTA) 60 MG TABS tablet Take 1 tablet by mouth 2 times daily 1/19/21   BRITTON Ocampo - CNP   ranolazine (RANEXA) 500 MG extended release tablet Take 1 tablet by mouth 2 times daily  Patient taking differently: Take 1,000 mg by mouth 2 times daily  1/15/21   Espiridion Agent, MD   carvedilol (COREG) 6.25 MG tablet Take 1 tablet by mouth 2 times daily (with meals) 1/15/21   Aviva Brady MD   nitroGLYCERIN (NITROSTAT) 0.4 MG SL tablet place 1 tablet under the tongue if needed every 5 minutes for chest pain for 3 doses IF NO RELIEF AFTER FIRST DOSE CALL PRESCRIBER . 9/13/20   Carmen Anderson MD   amLODIPine (NORVASC) 5 MG tablet Take by mouth daily 3/4/19   Historical Provider, MD   hydrochlorothiazide (HYDRODIURIL) 25 MG tablet Take 25 mg by mouth daily    Historical Provider, MD   aspirin 81 MG tablet Take 1 tablet by mouth daily (with breakfast) 8/19/16   Carmen Anderson MD     REVIEW OF SYSTEMS    (2-9 systems for level 4, 10 or more for level 5)      Review of Systems   Constitutional: Negative for activity change, appetite change, diaphoresis, fatigue and fever. HENT: Negative for congestion, rhinorrhea and sore throat. Eyes: Negative for visual disturbance. Respiratory: Positive for shortness of breath. Negative for cough, wheezing and stridor. Cardiovascular: Positive for chest pain (Midsternal nonradiating chest pain x 1 hour, comes and goes, with \"surges\" in intensity and then remits. Slightly lower down than her baseline CP per patient). Negative for palpitations and leg swelling. Gastrointestinal: Positive for diarrhea (Nonbloody diarrhea x 3 weeks). Negative for abdominal pain, nausea and vomiting.    Genitourinary: Negative for dysuria and hematuria. Musculoskeletal: Negative for arthralgias, myalgias, neck pain and neck stiffness. Skin: Negative for pallor and rash. Neurological: Negative for syncope, light-headedness, numbness and headaches. PHYSICAL EXAM   (up to 7 for level 4, 8 or more for level 5)      INITIAL VITALS:   BP (!) 154/74   Pulse 75   Temp 97 °F (36.1 °C) (Oral)   Resp 18   SpO2 97%     Physical Exam  Constitutional:       General: She is not in acute distress. Appearance: She is not diaphoretic. HENT:      Head: Normocephalic and atraumatic. Nose: No congestion or rhinorrhea. Mouth/Throat:      Mouth: Mucous membranes are moist.      Pharynx: Oropharynx is clear. Eyes:      Extraocular Movements: Extraocular movements intact. Pupils: Pupils are equal, round, and reactive to light. Neck:      Vascular: No carotid bruit. Cardiovascular:      Rate and Rhythm: Normal rate and regular rhythm. Heart sounds: No murmur heard. Pulmonary:      Breath sounds: No stridor. No wheezing, rhonchi or rales. Chest:      Chest wall: No tenderness. Abdominal:      Palpations: Abdomen is soft. There is no mass. Tenderness: There is no abdominal tenderness. There is no guarding or rebound. Musculoskeletal:         General: Normal range of motion. Cervical back: Neck supple. Right lower leg: No edema. Left lower leg: No edema. Skin:     Capillary Refill: Capillary refill takes less than 2 seconds. Coloration: Skin is not pale. Findings: No rash. Neurological:      General: No focal deficit present. Mental Status: She is alert and oriented to person, place, and time.        DIFFERENTIAL  DIAGNOSIS     PLAN (LABS / IMAGING / EKG):  Orders Placed This Encounter   Procedures    COVID-19, Rapid    XR CHEST PORTABLE    CBC Auto Differential    Comprehensive Metabolic Panel w/ Reflex to MG    Lipase    Troponin    Troponin    Telemetry monitoring - 24 hour duration    Inpatient consult to Cardiology    PATIENT STATUS (FROM ED OR OR/PROCEDURAL) Observation     MEDICATIONS ORDERED:  Orders Placed This Encounter   Medications    aspirin chewable tablet 324 mg    nitroGLYCERIN (NITROSTAT) SL tablet 0.4 mg    famotidine (PEPCID) tablet 20 mg    acetaminophen (TYLENOL) tablet 650 mg     DDX: NSTEMI vs ACS vs exacerbation of 3 vessel disease vs stent occlusion vs unstable angina vs GERD vs intraabdominal pathology    DIAGNOSTIC RESULTS / EMERGENCY DEPARTMENT COURSE / MDM   LAB RESULTS:  Results for orders placed or performed during the hospital encounter of 09/15/21   COVID-19, Rapid    Specimen: Nasopharyngeal Swab   Result Value Ref Range    Specimen Description . NASOPHARYNGEAL SWAB     SARS-CoV-2, Rapid Not Detected Not Detected   CBC Auto Differential   Result Value Ref Range    WBC 6.1 3.5 - 11.3 k/uL    RBC 5.02 3.95 - 5.11 m/uL    Hemoglobin 13.6 11.9 - 15.1 g/dL    Hematocrit 44.0 36.3 - 47.1 %    MCV 87.6 82.6 - 102.9 fL    MCH 27.1 25.2 - 33.5 pg    MCHC 30.9 28.4 - 34.8 g/dL    RDW 13.7 11.8 - 14.4 %    Platelets 514 373 - 658 k/uL    MPV 10.5 8.1 - 13.5 fL    NRBC Automated 0.0 0.0 per 100 WBC    Differential Type NOT REPORTED     Seg Neutrophils 40 36 - 65 %    Lymphocytes 47 (H) 24 - 43 %    Monocytes 9 3 - 12 %    Eosinophils % 3 1 - 4 %    Basophils 1 0 - 2 %    Immature Granulocytes 0 0 %    Segs Absolute 2.42 1.50 - 8.10 k/uL    Absolute Lymph # 2.87 1.10 - 3.70 k/uL    Absolute Mono # 0.57 0.10 - 1.20 k/uL    Absolute Eos # 0.15 0.00 - 0.44 k/uL    Basophils Absolute 0.04 0.00 - 0.20 k/uL    Absolute Immature Granulocyte <0.03 0.00 - 0.30 k/uL    WBC Morphology NOT REPORTED     RBC Morphology NOT REPORTED     Platelet Estimate NOT REPORTED    Comprehensive Metabolic Panel w/ Reflex to MG   Result Value Ref Range    Glucose 146 (H) 70 - 99 mg/dL    BUN 9 6 - 20 mg/dL    CREATININE 0.74 0.50 - 0.90 mg/dL    Bun/Cre Ratio NOT REPORTED 9 - 20    Calcium 9.4 8.6 - 10.4 mg/dL    Sodium 137 135 - 144 mmol/L    Potassium 4.3 3.7 - 5.3 mmol/L    Chloride 103 98 - 107 mmol/L    CO2 20 20 - 31 mmol/L    Anion Gap 14 9 - 17 mmol/L    Alkaline Phosphatase 136 (H) 35 - 104 U/L    ALT 33 5 - 33 U/L    AST 32 (H) <32 U/L    Total Bilirubin 0.25 (L) 0.3 - 1.2 mg/dL    Total Protein 7.0 6.4 - 8.3 g/dL    Albumin 4.1 3.5 - 5.2 g/dL    Albumin/Globulin Ratio 1.4 1.0 - 2.5    GFR Non-African American >60 >60 mL/min    GFR African American >60 >60 mL/min    GFR Comment          GFR Staging NOT REPORTED    Lipase   Result Value Ref Range    Lipase 29 13 - 60 U/L   Troponin   Result Value Ref Range    Troponin, High Sensitivity <6 0 - 14 ng/L    Troponin T NOT REPORTED <0.03 ng/mL    Troponin Interp NOT REPORTED    Troponin   Result Value Ref Range    Troponin, High Sensitivity <6 0 - 14 ng/L    Troponin T NOT REPORTED <0.03 ng/mL    Troponin Interp NOT REPORTED        IMPRESSION: Trop <6 x 2. Unremarkable CBC. Lipase unremarkable. RADIOLOGY:  XR CHEST PORTABLE    Result Date: 9/16/2021  EXAMINATION: ONE XRAY VIEW OF THE CHEST 9/15/2021 5:42 pm COMPARISON: May 31, 2021 HISTORY: ORDERING SYSTEM PROVIDED HISTORY: chest pain TECHNOLOGIST PROVIDED HISTORY: chest pain Reason for Exam: port upright FINDINGS: Marginal inspiration is present. No focal area of consolidation or pneumothorax is noted. Heart size and mediastinal contours appear normal for the level of inspiration. Osseous structures appear normal.     No evidence of acute cardiopulmonary disease    EKG  Sinus rhythm, rate 8. Inverted T waves in aVR and V1, with V1 less than aVR.   Similar to previous tracing    All EKG's are interpreted by the Emergency Department Physician who either signs or Co-signs this chart in the absence of a cardiologist.    EMERGENCY DEPARTMENT COURSE:  ED Course as of Sep 16 0133   Wed Sep 15, 2021   2304 Patient with extensive cardiac history including CAD with stent placement in 2018 and occlusion and revision in 2019, most recent cardiac cath 1/14/21. Patient with CP with SOB x 1 hour. CP is low and midsternal, nonradiating, and comes and goes. Patient describes it as \"surging\" then remitting. On Brilinta and ASA 81 mg, denies missing any doses. Prescribed nitro PRN, has not taken any. Endorses nonbloody diarrhea x 3 weeks. Vaccinated for covid, no known exposures. EKG showing sinus rhythm, rate 81, with inverted T waves in AVR and V1 with V1 less than AVR. Labs and CXR pending. Will give  mg, pepcid and nitroglycerin, and monitor closely. /85. HR 75. R 18. SpO2 98 on RA. T 36.1    [JG]   2316 Patient has received 2 doses of sublingual nitroglycerin at this time, states her midsternal chest pain is slightly improved. Patient does endorse a new onset right temporal headache, but states this headache feels the same as the headache she gets every time she takes nitroglycerin. Will give Tylenol, and monitor closely    [JG]   2325 Difficulty with patient's IV, however, we were able to obtain some blood and send down. Labs pending    [JG]   Thu Sep 16, 2021   0006 CXR showing no acute cardiopulmonary disease    [JG]   0036 Trop <6    [JG]   0120 Patient's midsternal chest pain has resolved with 3 doses of nitroglycerin, however patient still endorses a midsternal pressure and some mild pain under her right rib. Trop < 6 x 2. CBC unremarkable, mildly elevated alk phos. EKG shows T wave inversions in AVR and V1 with V1 less than AVR, however it is unchanged from previous EKG on 1/14/2021. Patient's last stent occlusion occurred under similar circumstances on 1/14 - her chest pain resolved with nitro SL, trop was <6, and cardiology decided to do a cath anyway, at which point they found the NASIR occluded. Given patient's significant cardiac history and concerning EKG, will admit to observation for cardiology consult in the morning.  Cardiology will be contacted tonight so they are aware of patient    [JG]      ED Course User Index  [JG] Fracisco Sanon MD     PROCEDURES:  None    CONSULTS:  IP CONSULT TO CARDIOLOGY    CRITICAL CARE:  None    FINAL IMPRESSION      1. Acute chest pain          DISPOSITION / PLAN     DISPOSITION  Admitted 9/16/2021 1:33 AM    PATIENT REFERRED TO:  No follow-up provider specified.     DISCHARGE MEDICATIONS:  Current Discharge Medication List          Fracisco Sanon MD  Emergency Medicine Resident    (Please note that portions of thisnote were completed with a voice recognition program.  Efforts were made to edit the dictations but occasionally words are mis-transcribed.)       Fracisco Sanon MD  Resident  09/16/21 [id number]

## 2021-09-16 NOTE — OP NOTE
[facility]    CARDIAC CATHETERIZATION    Date:   9/16/2021  Patient name:  Cherry Velazquez  Date of admission:  9/15/2021 10:17 PM  MRN:   [id number]  YOB: 1965    Operators:  Primary:   Jza Wills MD (Attending Physician)    Assistant/CV fellow:  David Washburn MD      Procedure performed:       [x] Left Heart Catheterization. [] Graft Angiography. [x] Left Ventriculography. [] Right Heart Catheterization. [] Coronary Angiography. [] Aortic Valve Studies. [] PCI:      [] Other:       Pre Procedure Conscious Sedation Data:  ASA Class:    [] I [] II [x] III [] IV    Mallampati Class:  [x] I [] II [] III [] IV      Indication:  [] STEMI      [] + Stress test  [] ACS      [] + EKG Changes  [] Non Q MI       [] Significant Risk Factors  [x] Recurrent Angina             [] Diabetes Mellitus    [] New LBBB      [] Uncontrolled HTN. [] CHF / Low EF changes     [] Abnormal CTA / Ca Score      Procedure:  Access:  [] Femoral  [] Radial  artery       [] Right  [] Left    Procedure: After informed consent was obtained with explanation of the risks and benefits, patient was brought to the cath lab. The access area was prepped and draped in sterile fashion. 1% lidocaine was used for local block. The artery was cannulated with 6  Fr sheath with brisk arterial blood return. The side port was frequently flushed and aspirated with normal saline. Findings:  Cardiac Arteries and Lesion Findings       LMCA: Mild irregularities 10-20%. LAD: Other. Mild disease with patent middle stent, Mal apposition of the   stent. PD using 3 mm NC balloon. Lesion on Mid LAD: Mid subsection. 50% stenosis 12 mm length reduced to     0%. Pre procedure EMMY III flow was noted. Post Procedure EMMY III flow     was present. Good runoff was present. The lesion was diagnosed as     Moderate Risk (B). LCx: Mild irregularities 10-20%. RCA: Mild irregularities 10-20%.       Coronary Tree Conclusions        Procedure Summary        Mild disease with patent middle stent, Mal apposition of the stent. PD    using 3 mm NC balloon. Mild disease of other arteries. Recommendations        DAPT and risk factor modifications. Estimated Blood Loss: 5 mL    ____________________________________________________________________    History and Risk Factors    [x] Hypertension     [] Family history of CAD  [x] Hyperlipidemia     [] Cerebrovascular Disease   [] Prior MI       [] Peripheral Vascular disease   [] Prior PCI              [] Diabetes Mellitus    [] Left Main PCI. [] Currently on Dialysis. [] Prior CABG. [] Currently smoker. [] Cardiac Arrest outside of healthcare facility. [] Yes    [] No        Witnessed     [] Yes   [] No     Arrest after arrival of EMS  [] Yes   [] No     [] Cardiac Arrest at other Facility. [] Yes   [] No    Pre-Procedure Information. Heart Failure       [] Yes    [x] No        Class  [] I      [] II  [] III    [] IV. New Diagnosis    [] Yes  [] No    HF Type      [] Systolic   [] Diastolic          [] Unknown. Diagnostic Test:   EKG       [] Normal   [x] Abnormal    New antiarrhythmia medications:    [] Yes   [] No   New onset atrial fibrillation / Flutter     [] Yes   [] No   ECG Abnormalities:      [] V. Fib   [] Nasra V. Tach           [] NS V. T   [] New LBBB           [] T.  Inv  []  ST dev > 0.5 mm         [] PVC's freq  [] PVC's infrequent    Stress Test Performed:      [] Yes    [x] No     Type:     [] Stress Echo   [] Exercise Stress Test (no imaging)      [] Stress Nuclear  [] Stress Imaging     Results   [] Negative   [] Positive        [] Indeterminate  [] Unavailable     If Positive/ Risk / Extent of Ischemia:       [] Low  [] Intermediate         [] High  [] Unavailable      Cardiac CTA Performed:     [] Yes    [x] No      Results   [] CAD   [] Non obstructive CAD      [] No CAD   [] Uncertain      [] Unknown   [] Structural

## 2021-09-16 NOTE — ED PROVIDER NOTES
[address] [facility]                                      Emergency Department                                      Faculty Attestation                                         I performed a history and physical examination of the patient and discussed management with the resident. I reviewed the residents note and agree with the documented findings and plan of care. Any areas of disagreement are noted on the chart. I was personally present for the key portions of any procedures. I have documented in the chart those procedures where I was not present during the key portions. I agree with the chief complaint, past medical history, past surgical history, allergies, medications, social and family history as documented unless otherwise noted below. For mid-level providers such as nurse practitioners as well as physicians assistants:    I have personally seen and evaluated the patient. I find the patient's history and physical exam are consistent with NP/PA documentation. I agree with the care provided, treatment rendered, disposition, & follow-up plan. Additional findings are as noted  -year-old female with a significant heart history comes in with chest pain for approximately 1 hour. Feels like it is a squeezing around her chest with a sharper pain over the precordial area. She states she is also short of breath with it. Denies nausea, or diaphoresis. Hypertensive not requiring emergent treatment, rest of the vital signs are appropriate. Heart regular rate and rhythm. Lungs clear to station bilaterally. Abdomen soft, nontender, no masses, guarding, rebound, or peritoneal signs. No significant lower extremity edema and no tenderness to palpation.     EKG interpretation by me:  Normal sinus rhythm  Ventricular rate of 81 bpm  Normal axis  Normal intervals  Biphasic inverted T wave in V3 through V6 as well as 1 inverted T waves in aVL upright T waves in aVR greater than upright T waves in V1  This EKG is is substantially no different than previous     Janel Black DO  09/15/21 [id number]

## 2021-09-16 NOTE — PROGRESS NOTES
Patient admitted, consent signed and questions answered. Patient ready for procedure. Call light to reach with side rails up 2 of 2. sites clipped.   at bedside with patient. History and physical *complete*.

## 2021-09-16 NOTE — ED NOTES
Pt xame to ED for chest and abdominal pain that sh=tarted about 45 minutes pta. Pt states it is a pain that \"surges\" as it will come on quickly and then back off but still be noticeable. Pt has a cardiac hx and thought she should be seen. Pt mis a&o x4 in AND with all vitals stable.       Liliam Briggs RN  09/15/21 [id number]

## 2021-09-16 NOTE — ED PROVIDER NOTES
[address]  ED  Emergency Department  Senior Resident Attestation     Primary Care Physician  Sheron Dandy, MD    I performed a history and physical examination of the patient and discussed management with the yordy resident. I reviewed the yordy residents note and agree with the documented findings and plan of care. Any areas of disagreement are noted on the chart. Case was then discussed with Faculty Attending Supervisor for additional medical management. PERTINENT ATTENDING PHYSICIAN COMMENTS:    HISTORY:   Cherry Velazquez is a 61 y.o. female who  has a past medical history of Abnormal stress test, Allergic rhinitis, Arthritis, CAD (coronary artery disease), Chronic back pain, Diabetes mellitus ([facility] [address] Former smoker (2020), Hyperlipidemia, Hypertension, Murmur, cardiac (2020), SRI on CPAP, Wears prescription eyeglasses, and Wellness examination. and presents with complaint of Chest pain. Patient has a significant cardiac history including CAD with stenting of LAD, recent cardiac cath was earlier this year showed no progression of disease. Has significant angina refractory to medical management, just finished [facility] stated that help for few weeks but episodes of angina are occurring more frequently again. This episode she states feels like a pressure, it is looking a lower sternal border wrap around her right ribs to her right upper quadrant. Patient did not take any thing prior to coming in as her nitroglycerin was . Is taking aspirin and Brilinta 60 mg twice daily. Reports compliance to these medications and her Ranexa. Unclear of the chest pain was exertional as she states she did not have opportunity to exert herself. Associate with some shortness of breath at rest.    PHYSICAL:   Temp: 97 °F (36.1 °C),  Pulse: 75, Resp: 18, BP: (!) 187/85, SpO2: 97 %  Gen: Non-toxic, Afebrile. Well-appearing, speaking in full sentences, no acute distress.   Neck: Supple  Cards: Regular rate and rhythm  Pulm: Lung sounds clear to auscultation  Abdomen: Soft, non-tender, non-distended  Skin: warm, dry  Extremities: pulses 2+ radial / dorsalis pedis, no clubbing, cyanosis, edema    IMPRESSION:   Angina    PLAN:   Patient here with unstable angina as it is occurring at rest which is different from baseline. Possible NSTEMI if troponins come back elevated. EKG showing global ischemic pattern versus multivessel disease as there is ST elevation AVR and diffuse T wave inversions. Despite how concerning this ECG looks when compared to previous ECG there are no changes however. ASA given. Her chest pain is improving after x2 and sublingual nitroglycerin. We will give her 1 more sublingual nitroglycerin to consider nitroglycerin infusion if chest pain continues to recur, consider heparin drip if troponins elevated. Plan for cardiac work-up, cardiac consultation and admission.     CRITICAL CARE TIME:    None    Damon Monsivais DO  Senior Resident Physician    (Please note that portions of this note were completed with a voice recognition program.  Efforts were made to edit the dictations but occasionally words are mis-transcribed.)        Damon Monsivais DO  Resident  09/15/21 1522

## 2021-09-16 NOTE — PROGRESS NOTES
Tried calling family to update cardiac cath results. No response.  Will try again tomorrow    Holly Hanna   CV fellow

## 2021-09-16 NOTE — ED NOTES
Pt is a&o x4 in NAD with all vitals stable. Pt has both side rails up and call light within reach. Pt denies any needs at this time.         Margaret Villatoro, MARU  09/16/21 4862

## 2021-09-17 VITALS
HEART RATE: 78 BPM | RESPIRATION RATE: 20 BRPM | OXYGEN SATURATION: 94 % | SYSTOLIC BLOOD PRESSURE: 121 MMHG | DIASTOLIC BLOOD PRESSURE: 61 MMHG | TEMPERATURE: 97.2 F

## 2021-09-17 LAB
ANION GAP SERPL CALCULATED.3IONS-SCNC: 11 MMOL/L (ref 9–17)
BUN BLDV-MCNC: 12 MG/DL (ref 6–20)
BUN/CREAT BLD: ABNORMAL (ref 9–20)
CALCIUM SERPL-MCNC: 9.6 MG/DL (ref 8.6–10.4)
CHLORIDE BLD-SCNC: 104 MMOL/L (ref 98–107)
CO2: 23 MMOL/L (ref 20–31)
CREAT SERPL-MCNC: 0.71 MG/DL (ref 0.5–0.9)
GFR AFRICAN AMERICAN: >60 ML/MIN
GFR NON-AFRICAN AMERICAN: >60 ML/MIN
GFR SERPL CREATININE-BSD FRML MDRD: ABNORMAL ML/MIN/{1.73_M2}
GFR SERPL CREATININE-BSD FRML MDRD: ABNORMAL ML/MIN/{1.73_M2}
GLUCOSE BLD-MCNC: 136 MG/DL (ref 70–99)
MAGNESIUM: 2.2 MG/DL (ref 1.6–2.6)
POTASSIUM SERPL-SCNC: 4 MMOL/L (ref 3.7–5.3)
SODIUM BLD-SCNC: 138 MMOL/L (ref 135–144)

## 2021-09-17 PROCEDURE — 6370000000 HC RX 637 (ALT 250 FOR IP): Performed by: STUDENT IN AN ORGANIZED HEALTH CARE EDUCATION/TRAINING PROGRAM

## 2021-09-17 PROCEDURE — G0378 HOSPITAL OBSERVATION PER HR: HCPCS

## 2021-09-17 PROCEDURE — 1200000000 HC SEMI PRIVATE

## 2021-09-17 PROCEDURE — 80048 BASIC METABOLIC PNL TOTAL CA: CPT

## 2021-09-17 PROCEDURE — 96361 HYDRATE IV INFUSION ADD-ON: CPT

## 2021-09-17 PROCEDURE — 93005 ELECTROCARDIOGRAM TRACING: CPT | Performed by: EMERGENCY MEDICINE

## 2021-09-17 PROCEDURE — 2580000003 HC RX 258: Performed by: STUDENT IN AN ORGANIZED HEALTH CARE EDUCATION/TRAINING PROGRAM

## 2021-09-17 PROCEDURE — 83735 ASSAY OF MAGNESIUM: CPT

## 2021-09-17 PROCEDURE — 93926 LOWER EXTREMITY STUDY: CPT

## 2021-09-17 RX ADMIN — ASPIRIN 81 MG: 81 TABLET, CHEWABLE ORAL at 09:07

## 2021-09-17 RX ADMIN — EZETIMIBE 10 MG: 10 TABLET ORAL at 09:10

## 2021-09-17 RX ADMIN — SODIUM CHLORIDE, PRESERVATIVE FREE 10 ML: 5 INJECTION INTRAVENOUS at 09:12

## 2021-09-17 RX ADMIN — RANOLAZINE 1000 MG: 500 TABLET, FILM COATED, EXTENDED RELEASE ORAL at 09:10

## 2021-09-17 RX ADMIN — PANTOPRAZOLE SODIUM 40 MG: 40 TABLET, DELAYED RELEASE ORAL at 09:09

## 2021-09-17 RX ADMIN — HYDROCHLOROTHIAZIDE 25 MG: 25 TABLET ORAL at 09:08

## 2021-09-17 RX ADMIN — TICAGRELOR 90 MG: 90 TABLET ORAL at 09:10

## 2021-09-17 RX ADMIN — LOSARTAN POTASSIUM 100 MG: 50 TABLET, FILM COATED ORAL at 09:08

## 2021-09-17 RX ADMIN — AMLODIPINE BESYLATE 5 MG: 10 TABLET ORAL at 09:07

## 2021-09-17 RX ADMIN — ATORVASTATIN CALCIUM 80 MG: 80 TABLET, FILM COATED ORAL at 09:08

## 2021-09-17 RX ADMIN — CARVEDILOL 6.25 MG: 12.5 TABLET, FILM COATED ORAL at 09:10

## 2021-09-17 ASSESSMENT — PAIN SCALES - GENERAL: PAINLEVEL_OUTOF10: 0

## 2021-09-17 NOTE — PROGRESS NOTES
Physician Progress Note      Eusebio Alamo  CSN #:                  [id number]  :                       1965  ADMIT DATE:       9/15/2021 10:17 PM  [address] [place] DATE:  RESPONDING  PROVIDER #:        Todd OLVERA          QUERY TEXT:    Pt admitted with chest pain . Noted documentation of CAD with unstable angina    on  by ordered cardiology  consultant. left heart Cath done with PTCA   found mild disease with patent middle stent   If possible, please document in   progress notes and discharge summary:      The medical record reflects the following:  Risk Factors: history of CAD with angina with previous stent insertion  Clinical Indicators: cardiology consult on  documents cad unstable angina. Awa Allen left heart Cath with ptca with balloon done . Cath showed mild disease with   patent middle stent  Treatment: NTG SL, ptca with balloon, cardiac monitoring    thank you Call if questions Wade [facility]                                                                                                                                                                                                                                                                                                                                 [phone]  Options provided:  -- chest pain due to CAD with unstable angina  -- chest pain due to CAD with stable angina  -- chest pain unrelated to CAD with angina  -- Other - I will add my own diagnosis  -- Disagree - Not applicable / Not valid  -- Disagree - Clinically unable to determine / Unknown  -- Refer to Clinical Documentation Reviewer    PROVIDER RESPONSE TEXT:    This patient has chest pain due to CAD with unstable angina    Query created by:  Lashawn Caruso on 2021 2:11 PM      Electronically signed by:  Cathy Cottrell 2021 3:02 PM

## 2021-09-17 NOTE — PROGRESS NOTES
CDU Daily Progress Note  Attending Physician       Pt Name: Khanh Rios  MRN: [id number]  [place] 1965  Date of evaluation: 9/17/21    I performed a history and physical examination of the patient and discussed management with the resident. I reviewed the residents note and agree with the documented findings and plan of care. Any areas of disagreement are noted on the chart. I was personally present for the key portions of any procedures. I have documented in the chart those procedures where I was not present during the key portions. I have reviewed the emergency nurses triage note. I agree with the chief complaint, past medical history, past surgical history, allergies, medications, social and family history as documented unless otherwise noted below. Documentation of the HPI, Physical Exam and Medical Decision Making performed by medical students or scribes is based on my personal performance of the HPI, PE and MDM. For Physician Assistant/ Nurse Practitioner cases/documentation I have personally evaluated this patient and have completed at least one if not all key elements of the E/M (history, physical exam, and MDM). Additional findings are as noted. The Family History, Social History and Review of Systems are unchanged from the previous day. No significant events overnight. Patient is still having some pain at the cath site. We are awaiting ultrasound result to check for pseudoaneurysm. Appreciate cardiology input for discharge planning.     Jason Reyes MD,  MD  Attending Physician  Critical Decision Unit

## 2021-09-17 NOTE — PLAN OF CARE
Problem: Safety:  Goal: Free from accidental physical injury  Description: Free from accidental physical injury  9/17/2021 1252 by Sai Aguayo RN  Outcome: Ongoing  9/17/2021 0507 by Serene Mattson RN  Outcome: Ongoing  Goal: Free from intentional harm  Description: Free from intentional harm  9/17/2021 1252 by Sai Aguayo RN  Outcome: Ongoing  9/17/2021 0507 by Serene Mattson RN  Outcome: Ongoing     Problem: Daily Care:  Goal: Daily care needs are met  Description: Daily care needs are met  9/17/2021 1252 by Sai Aguayo RN  Outcome: Ongoing  9/17/2021 0507 by Serene Mattson RN  Outcome: Ongoing     Problem: Pain:  Goal: Patient's pain/discomfort is manageable  Description: Patient's pain/discomfort is manageable  9/17/2021 1252 by Sai Aguayo RN  Outcome: Ongoing  9/17/2021 0507 by Serene Mattson RN  Outcome: Ongoing     Problem: Skin Integrity:  Goal: Skin integrity will stabilize  Description: Skin integrity will stabilize  9/17/2021 1252 by Sai Aguayo RN  Outcome: Ongoing  9/17/2021 0507 by Serene Mattsno RN  Outcome: Ongoing     Problem: Discharge Planning:  Goal: Patients continuum of care needs are met  Description: Patients continuum of care needs are met  9/17/2021 1252 by Sai Aguayo RN  Outcome: Ongoing  9/17/2021 0507 by Serene Mattson RN  Outcome: Ongoing

## 2021-09-17 NOTE — FLOWSHEET NOTE
Assessment: Patient was awake and oriented when  visited. Family was not present at the time. However, patient said her  was on his way. Patient was in good spirit and seemed to be doing well. When asked how she was feeling, patient responded; \"good. I will be discharged today. \" Patient said she was raised Worship and a member of [employer], [place]. Patient declined anointing of the sick. Intervention:  provided presence, offered support and prayed with patient. Outcome: Patient expressed appreciation for the spiritual support she received. Plan: Chaplains would continue to remain available for more prayers and support as needed. 09/17/21 1550   Encounter Summary   Services provided to: Patient   Support System Family members   [facility]   (09/17/2021)   Complexity of Encounter Moderate   Length of Encounter 15 minutes   Spiritual Assessment Completed Yes   Routine   Type Follow up   Intervention [place]   Spiritual/Episcopal   Type Spiritual support   Assessment Calm; Approachable; Hopeless   Intervention Active listening;Prayer   Outcome Expressed gratitude   Sacraments   Sacrament of Sick-Anointing Patient declined anointing

## 2021-09-17 NOTE — PROGRESS NOTES
OBS/CDU   RESIDENT NOTE      Patients PCP is:  Eben Marks MD        SUBJECTIVE      No acute events overnight. Patient s/p cath with ptca yesterday, cath insertion site pain with ecchymosis without signs of infection Patient states she is feeling better today but sore in her groin and chest. She denies any exertional chest pain or dyspnea, abdominal pain, n/v/d. Pending cardiology recommendations    PHYSICAL EXAM      General: NAD, AO X 3  Heent: EMOI, PERRL  Neck: SUPPLE, NO JVD  Cardiovascular: RRR, S1S2  Pulmonary: CTAB, NO SOB  Abdomen: SOFT, NTTP, ND, +BS  Extremities: +2/4 PULSES DISTAL, NO SWELLING  Neuro / Psych: NO NUMBNESS OR TINGLING, MENTATION AT BASELINE    PERTINENT TEST /EXAMS      I have reviewed all available laboratory results. MEDICATIONS CURRENT   amLODIPine (NORVASC) tablet 5 mg, Daily  aspirin chewable tablet 81 mg, Daily with breakfast  atorvastatin (LIPITOR) tablet 80 mg, Daily  carvedilol (COREG) tablet 6.25 mg, BID WC  ezetimibe (ZETIA) tablet 10 mg, Daily  hydroCHLOROthiazide (HYDRODIURIL) tablet 25 mg, Daily  nitroGLYCERIN (NITROSTAT) SL tablet 0.4 mg, Q5 Min PRN  ranolazine (RANEXA) extended release tablet 1,000 mg, BID  pantoprazole (PROTONIX) tablet 40 mg, QAM AC  losartan (COZAAR) tablet 100 mg, Daily  0.9 % sodium chloride infusion, Continuous  ticagrelor (BRILINTA) tablet 90 mg, BID  sodium chloride flush 0.9 % injection 5-40 mL, 2 times per day  sodium chloride flush 0.9 % injection 5-40 mL, PRN  0.9 % sodium chloride infusion, PRN  ondansetron (ZOFRAN) injection 4 mg, Q6H PRN        All medication charted and reviewed. CONSULTS      IP CONSULT TO CARDIOLOGY    ASSESSMENT/PLAN     Roxy Hamilton is a 61 y.o. female who presents with midsternal chest pain w/ hx of MI      · S/p cath with ptca.  Pending further cards recommendations   · Right groin u/s r/o hematoma or pseudoaneurysm   · Resolved with 3 doses NTG, negative troponins  · Previous MI also had negative trops initially, similar symptoms  · Will consider inpatient admission due to high risk cath  · Continue home medications and pain control  · Monitor vitals, labs, and imaging  · DISPO: pending consults and clinical improvement       --  Octavio Backer, DO  Emergency Medicine Resident Physician     This dictation was generated by voice recognition computer software. Although all attempts are made to edit the dictation for accuracy, there may be errors in the transcription that are not intended.

## 2021-09-17 NOTE — FLOWSHEET NOTE
SPIRITUAL CARE PROGRESS NOTE     Spiritual Assessment: Patient appeared to be approachable and coping with what is happening. Patient engaged in conversation and shared feelings.  Intervention:  listened and validated patient's feelings and concerns;  facilitated discussion related to zay issues and prayed with patient     Outcome: Patient accepted prayer and gratitude for visit. Electronically signed by Eloisa Shown on 9/17/2021 at 3:22 PM       09/17/21 1522   Encounter Summary   Services provided to: Patient   Referral/Consult From: [facility]   (9/17/21)   Complexity of Encounter Low   Length of Encounter 15 minutes   Spiritual Assessment Completed Yes   Routine   Type Initial   Assessment Approachable; Hopeful;Coping   Intervention Active listening;Explored feelings, thoughts, concerns;Prayer;Sustaining presence/ Ministry of presence   Outcome Expressed gratitude

## 2021-09-17 NOTE — DISCHARGE SUMMARY
CDU Discharge Summary        Patient:  Cherry Velazquez  YOB: 1965    MRN: [id number]   Acct: [de-identified]    Primary Care Physician: Sheron Dandy, MD    Admit date:  9/15/2021 10:17 PM  Discharge date: 9/17/21    Discharge Diagnoses:     Acute chest due to unstable angina  Improved with fluids, rest and analgesia    Follow-up:  Call today/tomorrow for a follow up appointment with Sheron Dandy, MD , or return to the Emergency Room with worsening symptoms    Stressed to patient the importance of following up with primary care doctor for further workup/management of symptoms. Pt verbalizes understanding and agrees with plan. Discharge Medications:  Changes to medications        Abhi Falling   Home Medication Instructions [id number]:[id number]    Printed on:09/17/21 5504   Medication Information                      amLODIPine (NORVASC) 5 MG tablet  Take 10 mg by mouth daily              aspirin 81 MG tablet  Take 1 tablet by mouth daily (with breakfast)             atorvastatin (LIPITOR) 80 MG tablet  take 1 tablet by mouth once daily             carvedilol (COREG) 6.25 MG tablet  Take 1 tablet by mouth 2 times daily (with meals)             ezetimibe (ZETIA) 10 MG tablet  Take 10 mg by mouth daily             gabapentin (NEURONTIN) 100 MG capsule  Take 1 capsule by mouth 3 times daily for 30 days.              hydrochlorothiazide (HYDRODIURIL) 25 MG tablet  Take 25 mg by mouth daily             losartan (COZAAR) 100 MG tablet  take 1 tablet by mouth once daily             metFORMIN (GLUCOPHAGE-XR) 500 MG extended release tablet  take 1 tablet by mouth once daily for 2 weeks then 1 tablet twice a day             nitroGLYCERIN (NITROSTAT) 0.4 MG SL tablet  place 1 tablet under the tongue if needed every 5 minutes for chest pain for 3 doses IF NO RELIEF AFTER FIRST DOSE CALL PRESCRIBER .             omeprazole (PRILOSEC) 40 MG delayed release capsule  Take 40 mg by mouth daily pregabalin (LYRICA) 50 MG capsule  Take 1 capsule by mouth 2 times daily for 30 days. pregabalin (LYRICA) 50 MG capsule  Take 1 capsule by mouth 2 times daily for 30 days. ranolazine (RANEXA) 500 MG extended release tablet  Take 1 tablet by mouth 2 times daily             ticagrelor (BRILINTA) 90 MG TABS tablet  Take 1 tablet by mouth 2 times daily                 Diet:  ADULT DIET; Regular , Advance as tolerated     Activity:  As tolerated    Consultants: IP CONSULT TO CARDIOLOGY    Procedures:  Cardiac cath    Diagnostic Test:   Results for orders placed or performed during the hospital encounter of 09/15/21   COVID-19, Rapid    Specimen: Nasopharyngeal Swab   Result Value Ref Range    Specimen Description . NASOPHARYNGEAL SWAB     SARS-CoV-2, Rapid Not Detected Not Detected   CBC Auto Differential   Result Value Ref Range    WBC 6.1 3.5 - 11.3 k/uL    RBC 5.02 3.95 - 5.11 m/uL    Hemoglobin 13.6 11.9 - 15.1 g/dL    Hematocrit 44.0 36.3 - 47.1 %    MCV 87.6 82.6 - 102.9 fL    MCH 27.1 25.2 - 33.5 pg    MCHC 30.9 28.4 - 34.8 g/dL    RDW 13.7 11.8 - 14.4 %    Platelets 968 196 - 986 k/uL    MPV 10.5 8.1 - 13.5 fL    NRBC Automated 0.0 0.0 per 100 WBC    Differential Type NOT REPORTED     Seg Neutrophils 40 36 - 65 %    Lymphocytes 47 (H) 24 - 43 %    Monocytes 9 3 - 12 %    Eosinophils % 3 1 - 4 %    Basophils 1 0 - 2 %    Immature Granulocytes 0 0 %    Segs Absolute 2.42 1.50 - 8.10 k/uL    Absolute Lymph # 2.87 1.10 - 3.70 k/uL    Absolute Mono # 0.57 0.10 - 1.20 k/uL    Absolute Eos # 0.15 0.00 - 0.44 k/uL    Basophils Absolute 0.04 0.00 - 0.20 k/uL    Absolute Immature Granulocyte <0.03 0.00 - 0.30 k/uL    WBC Morphology NOT REPORTED     RBC Morphology NOT REPORTED     Platelet Estimate NOT REPORTED    Comprehensive Metabolic Panel w/ Reflex to MG   Result Value Ref Range    Glucose 146 (H) 70 - 99 mg/dL    BUN 9 6 - 20 mg/dL    CREATININE 0.74 0.50 - 0.90 mg/dL    Bun/Cre Ratio NOT REPORTED 9 - 20    Calcium 9.4 8.6 - 10.4 mg/dL    Sodium 137 135 - 144 mmol/L    Potassium 4.3 3.7 - 5.3 mmol/L    Chloride 103 98 - 107 mmol/L    CO2 20 20 - 31 mmol/L    Anion Gap 14 9 - 17 mmol/L    Alkaline Phosphatase 136 (H) 35 - 104 U/L    ALT 33 5 - 33 U/L    AST 32 (H) <32 U/L    Total Bilirubin 0.25 (L) 0.3 - 1.2 mg/dL    Total Protein 7.0 6.4 - 8.3 g/dL    Albumin 4.1 3.5 - 5.2 g/dL    Albumin/Globulin Ratio 1.4 1.0 - 2.5    GFR Non-African American >60 >60 mL/min    GFR African American >60 >60 mL/min    GFR Comment          GFR Staging NOT REPORTED    Lipase   Result Value Ref Range    Lipase 29 13 - 60 U/L   Troponin   Result Value Ref Range    Troponin, High Sensitivity <6 0 - 14 ng/L    Troponin T NOT REPORTED <0.03 ng/mL    Troponin Interp NOT REPORTED    Troponin   Result Value Ref Range    Troponin, High Sensitivity <6 0 - 14 ng/L    Troponin T NOT REPORTED <0.03 ng/mL    Troponin Interp NOT REPORTED    Basic Metabolic Panel   Result Value Ref Range    Glucose 136 (H) 70 - 99 mg/dL    BUN 12 6 - 20 mg/dL    CREATININE 0.71 0.50 - 0.90 mg/dL    Bun/Cre Ratio NOT REPORTED 9 - 20    Calcium 9.6 8.6 - 10.4 mg/dL    Sodium 138 135 - 144 mmol/L    Potassium 4.0 3.7 - 5.3 mmol/L    Chloride 104 98 - 107 mmol/L    CO2 23 20 - 31 mmol/L    Anion Gap 11 9 - 17 mmol/L    GFR Non-African American >60 >60 mL/min    GFR African American >60 >60 mL/min    GFR Comment          GFR Staging NOT REPORTED    MAGNESIUM   Result Value Ref Range    Magnesium 2.2 1.6 - 2.6 mg/dL   EKG 12 Lead   Result Value Ref Range    Ventricular Rate 81 BPM    Atrial Rate 81 BPM    P-R Interval 150 ms    QRS Duration 82 ms    Q-T Interval 396 ms    QTc Calculation (Bazett) 460 ms    P Axis 51 degrees    R Axis 29 degrees    T Axis 148 degrees     XR CHEST PORTABLE    Result Date: 9/16/2021  EXAMINATION: ONE XRAY VIEW OF THE CHEST 9/15/2021 5:42 pm COMPARISON: May 31, 2021 HISTORY: ORDERING SYSTEM PROVIDED HISTORY: chest pain TECHNOLOGIST PROVIDED HISTORY: chest pain Reason for Exam: port upright FINDINGS: Marginal inspiration is present. No focal area of consolidation or pneumothorax is noted. Heart size and mediastinal contours appear normal for the level of inspiration. Osseous structures appear normal.     No evidence of acute cardiopulmonary disease           Physical Exam:    General appearance - NAD, AOx 3   Lungs -CTAB, no R/R/R  Heart - RRR, no M/R/G  Abdomen - Soft, NT/ND  Neurological:  MAEx4, No focal motor deficit, sensory loss  Extremities - Cap refil <2 sec in all ext., no edema  Skin -warm, dry      Hospital Course:  Clinical course has improved, labs and imaging reviewed. Sonido Gibson originally presented to the hospital on 9/15/2021 10:17 PM. with chest pain. At that time it was determined that She required further observation and treatment. She was admitted and labs and imaging were followed daily. Imaging results as above. She is medically stable to be discharged. Disposition: Home    Patient stated that they will not drive themselves home from the hospital if they have gotten pain killers/ narcotics earlier that day and that they will arrange for transportation on their own or work with the  for a ride. Patient counseled NOT to drive while under the influence of narcotics/ pain killers. Condition: Good    Patient stable and ready for discharge home. I have discussed plan of care with patient and they are in understanding. They were instructed to read discharge paperwork. All of their questions and concerns were addressed. Time Spent: 3 day      --  Omari Villa DO  Emergency Medicine Resident Physician    This dictation was generated by voice recognition computer software. Although all attempts are made to edit the dictation for accuracy, there may be errors in the transcription that are not intended.

## 2021-09-17 NOTE — PLAN OF CARE
Problem: Safety:  Goal: Free from accidental physical injury  Description: Free from accidental physical injury  9/17/2021 1546 by Clara Gary RN  Outcome: Completed  9/17/2021 1252 by Clara Gary RN  Outcome: Ongoing  9/17/2021 0507 by Zenia Shea RN  Outcome: Ongoing  Goal: Free from intentional harm  Description: Free from intentional harm  9/17/2021 1546 by Clara Gary RN  Outcome: Completed  9/17/2021 1252 by Clara Gary RN  Outcome: Ongoing  9/17/2021 0507 by Zenia Shea RN  Outcome: Ongoing     Problem: Daily Care:  Goal: Daily care needs are met  Description: Daily care needs are met  9/17/2021 1546 by Clraa Gary RN  Outcome: Completed  9/17/2021 1252 by Clara Gary RN  Outcome: Ongoing  9/17/2021 0507 by Zenia Shea RN  Outcome: Ongoing     Problem: Pain:  Goal: Patient's pain/discomfort is manageable  Description: Patient's pain/discomfort is manageable  9/17/2021 1546 by Clara Gary RN  Outcome: Completed  9/17/2021 1252 by Clara Gary RN  Outcome: Ongoing  9/17/2021 0507 by Zenia Shea RN  Outcome: Ongoing     Problem: Skin Integrity:  Goal: Skin integrity will stabilize  Description: Skin integrity will stabilize  9/17/2021 1546 by Clara Gary RN  Outcome: Completed  9/17/2021 1252 by Clara Gary RN  Outcome: Ongoing  9/17/2021 0507 by Zenia Shea RN  Outcome: Ongoing     Problem: Discharge Planning:  Goal: Patients continuum of care needs are met  Description: Patients continuum of care needs are met  9/17/2021 1546 by Clara Gary RN  Outcome: Completed  9/17/2021 1252 by Clara Gary RN  Outcome: Ongoing  9/17/2021 0507 by Zenia Shea RN  Outcome: Ongoing

## 2021-09-17 NOTE — PROGRESS NOTES
[facility]  Progress Note                   Date:   9/17/2021  Patient name: Kayla Butts  Date of admission:  9/15/2021 10:17 PM  MRN:   [id number]  YOB: 1965  PCP: Salud Ramesh MD    Reason for Admission: Acute chest pain [R07.9]  Chest pain with high risk of acute coronary syndrome [R07.9]    Subjective:       Clinical Changes /Abnormalities:  Patient seen and examined in room after discussion with RN. Denies chest pain or SOB. S/p CATH with PTCA yesterday. Reports right groin pain   No CV concerns overnight. SR on tele HR 74. Review of Systems    Medications:   Scheduled Meds:   amLODIPine  5 mg Oral Daily    aspirin  81 mg Oral Daily with breakfast    atorvastatin  80 mg Oral Daily    carvedilol  6.25 mg Oral BID WC    ezetimibe  10 mg Oral Daily    hydroCHLOROthiazide  25 mg Oral Daily    ranolazine  1,000 mg Oral BID    pantoprazole  40 mg Oral QAM AC    losartan  100 mg Oral Daily    ticagrelor  90 mg Oral BID    sodium chloride flush  5-40 mL IntraVENous 2 times per day     Continuous Infusions:   sodium chloride 75 mL/hr at 09/16/21 1235    sodium chloride       CBC:   Recent Labs     09/15/21  2333   WBC 6.1   HGB 13.6        BMP:    Recent Labs     09/15/21  2333      K 4.3      CO2 20   BUN 9   CREATININE 0.74   GLUCOSE 146*     Hepatic:  Recent Labs     09/15/21  2333   AST 32*   ALT 33   BILITOT 0.25*   ALKPHOS 136*     Troponin:   Recent Labs     09/15/21  2333 09/16/21  0031   TROPHS <6 <6     BNP: No results for input(s): BNP in the last 72 hours. Lipids: No results for input(s): CHOL, HDL in the last 72 hours. Invalid input(s): LDLCALCU  INR: No results for input(s): INR in the last 72 hours. DIAGNOSTIC DATA  ECHO 2/23/2021: EF 60-65%, indeterminate diastolic function, no regurg/stenosis.       CATH 9/16/2021  Findings:  Cardiac Arteries and Lesion Findings       LMCA: Mild irregularities 10-20%.      LAD: Other.Mild disease with patent middle stent, Mal apposition of the   stent. PD using 3 mm NC balloon.         Lesion on Mid LAD: Mid subsection. 50% stenosis 12 mm length reduced to     0%. Pre procedure EMMY III flow was noted. Post Procedure EMMY III flow     was present. Good runoff was present. The lesion was diagnosed as     Moderate Risk (B).     LCx: Mild irregularities 10-20%. RCA: Mild irregularities 10-20%.      Coronary Tree         Conclusions        Procedure Summary        Mild disease with patent middle stent, Mal apposition of the stent. PD    using 3 mm NC balloon.    Mild disease of other arteries.        Recommendations        DAPT and risk factor modifications. STRESS 12/9/2020: No ischemia. Moderate size basal lateral non transmural infarction. EF 68%.      CATH 1/15/2021   Cardiac Arteries and Lesion Findings     LMCA: Normal 0% stenosis.     LAD: Patent mid stent area with 30% stenosis  D2: Small, jailed ostial lesion 90%     LCx: Ostial 30% stenosis  OM1: Proximal 20% stenosis     RCA: Mild irregularities 10-20%.     Coronary Tree      Dominance: Right    Objective:   Vitals: /61   Pulse 78   Temp 97.2 °F (36.2 °C) (Oral)   Resp 20   SpO2 94%   General appearance: alert and cooperative with exam  HEENT: Head: Normocephalic, no lesions, without obvious abnormality. Neck:no JVD, trachea midline, no adenopathy  Lungs: Clear to auscultation  Heart: Regular rate and rhythm, s1/s2 auscultated, no murmurs  Abdomen: soft, non-tender, bowel sounds active  Extremities: no edema  Neurologic: not done  Right Femoral Artery site:  Firm with tenderness. Mild ecchymosis noted. Coronary Discharge Core Measure: Please indicate the medication given by X, and if not the reasons not given:    Not Given Reason  Given      Beta Blockers X      ACE-I ARB      Statins X      ASA X   Samples, prescription, and discount card given. Escript to pharmacy.   OAP Brilinta X    SL Nitro X

## 2021-09-17 NOTE — PROGRESS NOTES
CLINICAL PHARMACY NOTE: MEDS TO BEDS    Total # of Prescriptions Filled: 1   The following medications were delivered to the patient:  · brilinta 90 mg tab    Additional Documentation:medication deliveredto the patient in room 340 @ 4:25pm

## 2021-09-18 LAB
EKG ATRIAL RATE: 75 BPM
EKG ATRIAL RATE: 81 BPM
EKG P AXIS: 48 DEGREES
EKG P AXIS: 51 DEGREES
EKG P-R INTERVAL: 150 MS
EKG P-R INTERVAL: 150 MS
EKG Q-T INTERVAL: 396 MS
EKG Q-T INTERVAL: 438 MS
EKG QRS DURATION: 82 MS
EKG QRS DURATION: 84 MS
EKG QTC CALCULATION (BAZETT): 460 MS
EKG QTC CALCULATION (BAZETT): 489 MS
EKG R AXIS: 20 DEGREES
EKG R AXIS: 29 DEGREES
EKG T AXIS: 141 DEGREES
EKG T AXIS: 148 DEGREES
EKG VENTRICULAR RATE: 75 BPM
EKG VENTRICULAR RATE: 81 BPM

## 2021-09-18 PROCEDURE — 93010 ELECTROCARDIOGRAM REPORT: CPT | Performed by: INTERNAL MEDICINE

## 2021-09-20 ENCOUNTER — CARE COORDINATION (OUTPATIENT)
Dept: CASE MANAGEMENT | Age: 56
End: 2021-09-20

## 2021-09-20 DIAGNOSIS — R07.9 CHEST PAIN WITH HIGH RISK OF ACUTE CORONARY SYNDROME: Primary | ICD-10-CM

## 2021-09-20 NOTE — CARE COORDINATION
[facility] Transitions Initial Follow Up Call    Call within 2 business days of discharge: Yes    Patient: Corine Gallegos Patient : 1965   MRN: [id number]  Reason for Admission: Acute chest pain  Discharge Date: 21 RARS: Readmission Risk Score: 11      Last Discharge [facility]       Complaint Diagnosis Description Type Department Provider    9/15/21 Chest Pain Acute chest pain ED to Hosp-Admission (Discharged) (ADMITTED) [facility] Ananya Campuzano MD; Krystle Brandon Case. .. Spoke with: Beata introduced to the role of CTN. Patient very fatigued and sore today. Denies chest pain, dizziness, has SOB with exertion and has soreness to right femoral site. Which is swelled and bruised . Patient denies any discharge to area. Patient eating and drinking well and normal bowel and bladder elimination. Has cardiology f/u scheduled. Medications reviewed and taking all as directed. 1111 f complete. No concerns noted. Facility: [facility]    Non-face-to-face services provided:  Obtained and reviewed discharge summary and/or continuity of care documents  Transitions of Care Initial Call    Was this an external facility discharge? No     Challenges to be reviewed by the provider   Additional needs identified to be addressed with provider: Yes  f/u appointment             Method of communication with provider : chart routing      Advance Care Planning:   Does patient have an Advance Directive: reviewed and current, reviewed and needs to be updated, not on file; education provided, patient declined education, decision maker updated and referral to internal ACP facilitator. Was this a readmission? No  Patient stated reason for admission: chest pain  Patients top risk factors for readmission: lack of knowledge about disease and medication management    Care Transition Nurse (CTN) contacted the patient by telephone to perform post hospital discharge assessment.  Verified name and  with patient as Yes  Patient-reported symptoms: Fatigue, Pain  Interventions for patient-reported symptoms: Other  Do you have a copy of your discharge instructions?: Yes  Do you have all of your prescriptions and are they filled?: Yes  Have you been contacted by a [address]?: No  Have you scheduled your follow up appointment?: Yes  How are you going to get to your appointment?: Car - family or friend to transport  Were you discharged with any Home Care or Post Acute Services: No  Patient Home Equipment: CPAP  Do you have support at home?: Partner/Spouse/SO  Do you feel like you have everything you need to keep you well at home?: Yes  Care Transitions Interventions         Follow Up  No future appointments.     Emelia Simons LPN

## 2021-09-28 ENCOUNTER — CARE COORDINATION (OUTPATIENT)
Dept: CASE MANAGEMENT | Age: 56
End: 2021-09-28

## 2021-09-28 NOTE — CARE COORDINATION
[facility] Transitions Follow Up Call    2021    Patient: Remigio Ingram  Patient : 1965   MRN: [id number]  Reason for Admission:  Acute chest pain  Discharge Date: 21 RARS: Readmission Risk Score: 11         Spoke with: Beata she states she is doing well. She denies Chest pain fevers, SOB,dizziness. She does still have a 2 inch hard lump at her right femoral site it still is painful I advised she watch the area and if it does not reduce in size or bothers her more to contact cardiology. Her appointment was changed from the upcoming Friday to 10/5/21. She is eating and drinking well normal urination she has had honey constipation. CTN advised her to increase fluid intake and try colace. V/U . No other concerns noted. Care Transitions Follow Up Call    Needs to be reviewed by the provider   Additional needs identified to be addressed with provider: No  none             Method of communication with provider : none      Care Transition Nurse (CTN) contacted the patient by telephone to follow up after admission on 9/15. Verified name and  with patient as identifiers. Addressed changes since last contact: none  Discussed follow-up appointments. If no appointment was previously scheduled, appointment scheduling offered: Yes. Is follow up appointment scheduled within 7 days of discharge? No.    Advance Care Planning:   Does patient have an Advance Directive: reviewed and current, reviewed and needs to be updated, not on file; education provided, patient declined education, decision maker updated and referral to internal ACP facilitator. CTN reviewed discharge instructions, medical action plan and red flags with patient and discussed any barriers to care and/or understanding of plan of care after discharge.  Discussed appropriate site of care based on symptoms and resources available to patient including: PCP and Specialist. The patient agrees to contact the PCP office for questions related to their healthcare. Patients top risk factors for readmission: lack of knowledge about disease  Interventions to address risk factors: Obtained and reviewed discharge summary and/or continuity of care documents          CTN provided contact information for future needs. Plan for follow-up call in 3-5 days based on severity of symptoms and risk factors. Plan for next call: symptom management-knot at femoral site size            Care Transitions Subsequent and Final Call    Subsequent and Final Calls  Do you have any ongoing symptoms?: Yes  Onset of Patient-reported symptoms: Today  Patient-reported symptoms: Other  Interventions for patient-reported symptoms: Other  Have your medications changed?: No  Do you have any questions related to your medications?: No  Do you currently have any active services?: No  Do you have any needs or concerns that I can assist you with?: No  Identified Barriers: Lack of Education, Other  Care Transitions Interventions  Other Interventions: Follow Up  No future appointments.     Bria Arriaza LPN

## 2021-10-01 ENCOUNTER — CARE COORDINATION (OUTPATIENT)
Dept: CASE MANAGEMENT | Age: 56
End: 2021-10-01

## 2021-10-01 NOTE — CARE COORDINATION
[facility] Trans[facility] Follow Up Call    10/1/2021    Patient: Hang Reyes  Patient : 1965   MRN: [id number]  Reason for Admission: Acute chest pain  Discharge Date: 21 RARS: Readmission Risk Score: 11         Spoke with: Judy Green she is doing well, denies chest pain, SOB, dizziness, the area is still hardened her  checked it and it looks good constipation has improved and no bladder issues. No concerns today. Care Transitions Follow Up Call    Needs to be reviewed by the provider   Additional needs identified to be addressed with provider: No  none             Method of communication with provider : none      Care Transition Nurse (CTN) contacted the patient by telephone to follow up after admission on 9/15/21. Verified name and  with patient as identifiers. Addressed changes since last contact: doing well, area is not as worrisome as it was last week some hardness and tender  Discussed follow-up appointments. If no appointment was previously scheduled, appointment scheduling offered: Yes. Is follow up appointment scheduled within 7 days of discharge? Yes. Advance Care Planning:   Does patient have an Advance Directive: reviewed and current, reviewed and needs to be updated, not on file; education provided, patient declined education, decision maker updated and referral to internal ACP facilitator. CTN reviewed discharge instructions, medical action plan and red flags with patient and discussed any barriers to care and/or understanding of plan of care after discharge. Discussed appropriate site of care based on symptoms and resources available to patient including: PCP and Specialist. The patient agrees to contact the PCP office for questions related to their healthcare.      Patients top risk factors for readmission: lack of knowledge about disease  Interventions to address risk factors: Obtained and reviewed discharge summary and/or continuity of care documents        CTN provided contact information for future needs. Plan for follow-up call in 3-5 days based on severity of symptoms and risk factors. Plan for next call: symptom management-dr follow up          Care Transitions Subsequent and Final Call    Subsequent and Final Calls  Do you have any ongoing symptoms?: No  Have your medications changed?: No  Do you have any questions related to your medications?: No  Do you currently have any active services?: No  Do you have any needs or concerns that I can assist you with?: No  Identified Barriers: Lack of Education, Other  Care Transitions Interventions  Other Interventions: Follow Up  No future appointments.     Dorlene Hatchet, LPN

## 2021-10-06 ENCOUNTER — CARE COORDINATION (OUTPATIENT)
Dept: CASE MANAGEMENT | Age: 56
End: 2021-10-06

## 2021-10-06 NOTE — CARE COORDINATION
[facility] Transitions Follow Up Call    10/6/2021    Patient: Magalys Kuhn  Patient : 1965   MRN: [id number]  Reason for Admission: Acute chest pain  Discharge Date: 21 RARS: Readmission Risk Score: 11         Spoke with: Anette Hyman she is doing well she saw cardiology and the lump in her leg and crease was checked out by NP no concerns noted. Denies SOB, Chest pain, dizziness. Feels good this week. Care Transitions Follow Up Call    Needs to be reviewed by the provider   Additional needs identified to be addressed with provider: No  none             Method of communication with provider : none      Care Transition Nurse (CTN) contacted the patient by telephone to follow up after admission on 9/15/21. Verified name and  with patient as identifiers. Addressed changes since last contact: none  Discussed follow-up appointments. If no appointment was previously scheduled, appointment scheduling offered: Yes. Is follow up appointment scheduled within 7 days of discharge? Yes. Advance Care Planning:   Does patient have an Advance Directive: reviewed and current, reviewed and needs to be updated, not on file; education provided, patient declined education, decision maker updated and referral to internal ACP facilitator. CTN reviewed discharge instructions, medical action plan and red flags with patient and discussed any barriers to care and/or understanding of plan of care after discharge. Discussed appropriate site of care based on symptoms and resources available to patient including: PCP and Specialist. The patient agrees to contact the PCP office for questions related to their healthcare. Patients top risk factors for readmission: lack of knowledge about disease  Interventions to address risk factors: Obtained and reviewed discharge summary and/or continuity of care documents          CTN provided contact information for future needs.  Plan for follow-up call in 7-10 days based on severity of symptoms and risk factors. Plan for next call: routine          Care Transitions Subsequent and Final Call    Subsequent and Final Calls  Do you have any ongoing symptoms?: No  Have your medications changed?: No  Do you have any questions related to your medications?: No  Do you currently have any active services?: No  Do you have any needs or concerns that I can assist you with?: No  Identified Barriers: Lack of Education, Other  Care Transitions Interventions  Other Interventions:            Follow Up  Future Appointments   Date Time Provider Radha Galarza   10/8/2021  1:45 PM Sabino Leger  [facility]

## 2021-10-07 ENCOUNTER — TELEPHONE (OUTPATIENT)
Dept: PAIN MANAGEMENT | Age: 56
End: 2021-10-07

## 2021-10-07 VITALS — WEIGHT: 235 LBS | BODY MASS INDEX: 40.12 KG/M2 | HEIGHT: 64 IN

## 2021-10-07 ASSESSMENT — ENCOUNTER SYMPTOMS
BACK PAIN: 1
BOWEL INCONTINENCE: 0

## 2021-10-07 NOTE — PROGRESS NOTES
HPI:     Back Pain  This is a chronic problem. The current episode started more than 1 year ago. The problem occurs constantly. The pain is present in the lumbar spine. The quality of the pain is described as aching and stabbing. The pain radiates to the right knee, right thigh, left thigh and left knee. The pain is at a severity of 3/10. The pain is the same all the time. The symptoms are aggravated by bending and twisting. Stiffness is present all day. Associated symptoms include leg pain. Pertinent negatives include no bladder incontinence, bowel incontinence, chest pain, fever, headaches, numbness, tingling or weakness. She has tried heat (surgery, PT, injections, RFA) for the symptoms. The treatment provided mild (with heat) relief. MRI lumbar spine with previous back surgery and degenerative changes and disc bulging. She is on Lyrica 50 mg twice a day and doing well on this. She has done physical therapy in the past.  Several interventions with varying levels of benefit. Patient denies any new neurological symptoms. Nobowel or bladder incontinence, no weakness, and no falling. Review of OARRS does not show any aberrant prescription behavior. Past Medical History:   Diagnosis Date    Abnormal stress test     Allergic rhinitis     Arthritis     CAD (coronary artery disease)     Dr. Jil Howard last seen 2/20/2020    Chronic back pain     Diabetes mellitus ([address]     Former smoker 2/7/2020    30-year history.   Quit in 2018    Hyperlipidemia     Dr. Etta Campos Hypertension     Dr. Rickey Banda, cardiac 2/8/2020    SRI on CPAP     instructed to bring Dr. Amy Crandall Wears prescription eyeglasses     Wellness examination     Dr. Vijaya Hilton last seen 2/13/2020       Past Surgical History:   Procedure Laterality Date    ANESTHESIA NERVE BLOCK Left 12/19/2019    NERVE BLOCK (DEFINE) - # 1 L5-S1 performed by Sammi Howard MD [facility] Medications:     pregabalin (LYRICA) 50 MG capsule, Take 1 capsule by mouth 2 times daily for 30 days. , Disp: 60 capsule, Rfl: 3    ticagrelor (BRILINTA) 90 MG TABS tablet, Take 1 tablet by mouth 2 times daily, Disp: 180 tablet, Rfl: 4    losartan (COZAAR) 100 MG tablet, take 1 tablet by mouth once daily, Disp: 30 tablet, Rfl: 5    atorvastatin (LIPITOR) 80 MG tablet, take 1 tablet by mouth once daily, Disp: 30 tablet, Rfl: 11    omeprazole (PRILOSEC) 40 MG delayed release capsule, Take 40 mg by mouth daily, Disp: , Rfl:     ezetimibe (ZETIA) 10 MG tablet, Take 10 mg by mouth daily, Disp: , Rfl:     metFORMIN (GLUCOPHAGE-XR) 500 MG extended release tablet, take 1 tablet by mouth once daily for 2 weeks then 1 tablet twice a day, Disp: 60 tablet, Rfl: 11    ranolazine (RANEXA) 500 MG extended release tablet, Take 1 tablet by mouth 2 times daily (Patient taking differently: Take 1,000 mg by mouth 2 times daily ), Disp: 60 tablet, Rfl: 3    carvedilol (COREG) 6.25 MG tablet, Take 1 tablet by mouth 2 times daily (with meals), Disp: 60 tablet, Rfl: 3    nitroGLYCERIN (NITROSTAT) 0.4 MG SL tablet, place 1 tablet under the tongue if needed every 5 minutes for chest pain for 3 doses IF NO RELIEF AFTER FIRST DOSE CALL PRESCRIBER ., Disp: 25 tablet, Rfl: 1    amLODIPine (NORVASC) 5 MG tablet, Take 10 mg by mouth daily , Disp: , Rfl:     hydrochlorothiazide (HYDRODIURIL) 25 MG tablet, Take 25 mg by mouth daily, Disp: , Rfl:     aspirin 81 MG tablet, Take 1 tablet by mouth daily (with breakfast), Disp: 30 tablet, Rfl: 11    pregabalin (LYRICA) 50 MG capsule, Take 1 capsule by mouth 2 times daily for 30 days. , Disp: 60 capsule, Rfl: 2    gabapentin (NEURONTIN) 100 MG capsule, Take 1 capsule by mouth 3 times daily for 30 days.  (Patient not taking: Reported on 3/30/2021), Disp: 90 capsule, Rfl: 1    Family History   Problem Relation Age of Onset    Stroke Father     Other Sister         homeless    Diabetes Maternal Grandmother        Social History     Socioeconomic History    Marital status:      Spouse name: Not on file    Number of children: Not on file    Years of education: Not on file    Highest education level: Not on file   Occupational History    Not on file   Tobacco Use    Smoking status: Former Smoker     Packs/day: 0.25     Years: 30.00     Pack years: 7.50     Types: Cigarettes     Quit date: 7/24/2018     Years since quitting: 3.2    Smokeless tobacco: Never Used   Vaping Use    Vaping Use: Never used   Substance and Sexual Activity    Alcohol use: No    Drug use: No    Sexual activity: Not on file   Other Topics Concern    Not on file   Social History Narrative    Not on file     Social Determinants of Health     Financial Resource Strain:     Difficulty of Paying Living Expenses:    Food Insecurity:     Worried About Running Out of Food in the Last Year:     [address] in the Last Year:    Transportation Needs:     Lack of Transportation (Medical):  Lack of Transportation (Non-Medical):    Physical Activity:     Days of Exercise per Week:     Minutes of Exercise per Session:    Stress:     Feeling of Stress :    Social Connections:     Frequency of Communication with Friends and Family:     Frequency of Social Gatherings with Friends and Family:     Attends Synagogue Services:     Active Member of Clubs or Organizations:     Attends Club or Organization Meetings:     Marital Status:    Intimate Partner Violence:     Fear of Current or Ex-Partner:     Emotionally Abused:     Physically Abused:     Sexually Abused:        Review of Systems:  Review of Systems   Constitutional: Negative for fever. Cardiovascular: Negative for chest pain. Musculoskeletal: Positive for back pain. Gastrointestinal: Negative for bowel incontinence. Genitourinary: Negative for bladder incontinence. Neurological: Negative for headaches, numbness, tingling and weakness. Physical Exam:  Ht 5' 4\" (1.626 m)   Wt 235 lb (106.6 kg)   BMI 40.34 kg/m²     Physical Exam  Constitutional:       Appearance: Normal appearance. Pulmonary:      Effort: Pulmonary effort is normal.   Neurological:      Mental Status: She is alert. Psychiatric:         Attention and Perception: Attention and perception normal.         Mood and Affect: Mood and affect normal.         Record/Diagnostics Review:    As above, I did review the imaging    Orders:    Orders Placed This Encounter   Medications    pregabalin (LYRICA) 50 MG capsule     Sig: Take 1 capsule by mouth 2 times daily for 30 days. Dispense:  60 capsule     Refill:  3       Assessment:  1. Lumbosacral spondylosis without myelopathy    2. Lumbar radiculopathy    3. Other chronic pain        Treatment Plan:  DISCUSSION: Treatment options discussed with patient and all questions answered to patient's satisfaction. OARRS Review: Reviewed and acceptable for medications prescribed. TREATMENT OPTIONS:     Discussed different treatment options including continued conservative care such as physical therapy, chiropractic care, acupuncture. Discussed different interventional options such as epidural steroids or medial branch blocks. Also discussed neuromodulation in the form of spinal cord stimulation. Also discussed surgical evaluation. Continue current medication management, has been stable and compliant. Continue Lyrica 50mg BID       Ishaan Kovacs M.D. I have reviewed the chief complaint and history of present illness (including ROS and PFSH) and vital documentation by my staff and I agree with their documentation and have added where applicable. Vincent Larsen, was evaluated through a synchronous (real-time) audio-video encounter. The patient (or guardian if applicable) is aware that this is a billable service. Verbal consent to proceed has been obtained within the past 12 months.  The visit was conducted pursuant to the emergency declaration under the [facility], [address] [place] [address] authority and the [place] and [facility] General Act. Patient identification was verified, and a caregiver was present when appropriate. The patient was located in a state where the provider was credentialed to provide care. Total time spent for this encounter: Not billed by time    --Staci Schultz MD on 10/8/2021 at 2:05 PM    An electronic signature was used to authenticate this note.

## 2021-10-08 ENCOUNTER — OFFICE VISIT (OUTPATIENT)
Dept: PAIN MANAGEMENT | Age: 56
End: 2021-10-08
Payer: COMMERCIAL

## 2021-10-08 DIAGNOSIS — G89.29 OTHER CHRONIC PAIN: ICD-10-CM

## 2021-10-08 DIAGNOSIS — M47.817 LUMBOSACRAL SPONDYLOSIS WITHOUT MYELOPATHY: Primary | ICD-10-CM

## 2021-10-08 DIAGNOSIS — M54.16 LUMBAR RADICULOPATHY: ICD-10-CM

## 2021-10-08 PROCEDURE — 99213 OFFICE O/P EST LOW 20 MIN: CPT | Performed by: PAIN MEDICINE

## 2021-10-08 RX ORDER — PREGABALIN 50 MG/1
50 CAPSULE ORAL 2 TIMES DAILY
Qty: 60 CAPSULE | Refills: 3 | Status: SHIPPED | OUTPATIENT
Start: 2021-10-08 | End: 2022-03-07 | Stop reason: SDUPTHER

## 2021-10-13 ENCOUNTER — CARE COORDINATION (OUTPATIENT)
Dept: CASE MANAGEMENT | Age: 56
End: 2021-10-13

## 2021-10-13 NOTE — CARE COORDINATION
[facility] Transitions Follow Up Call    10/13/2021    Patient: Eren Renee  Patient : 1965   MRN: [id number]  Reason for Admission: chest pain  Discharge Date: 21 RARS: Readmission Risk Score: 11         Spoke with: Olegario Gil and she feels really good, denies chest pain, SOB, dizziness states no issues. Care Transitions Follow Up Call    Needs to be reviewed by the provider   Additional needs identified to be addressed with provider: No  none             Method of communication with provider : none      Care Transition Nurse (CTN) contacted the patient by telephone to follow up after admission on . Verified name and  with patient as identifiers. Addressed changes since last contact: feels great no issues  Discussed follow-up appointments. If no appointment was previously scheduled, appointment scheduling offered: Yes. Is follow up appointment scheduled within 7 days of discharge? Yes. Advance Care Planning:   Does patient have an Advance Directive: reviewed and current, reviewed and needs to be updated, not on file; education provided, patient declined education, decision maker updated and referral to internal ACP facilitator. CTN reviewed discharge instructions, medical action plan and red flags with patient and discussed any barriers to care and/or understanding of plan of care after discharge. Discussed appropriate site of care based on symptoms and resources available to patient including: PCP and Specialist. The patient agrees to contact the PCP office for questions related to their healthcare. Patients top risk factors for readmission: medication management  Interventions to address risk factors: Obtained and reviewed discharge summary and/or continuity of care documents      CTN provided contact information for future needs. No further follow-up call indicated based on severity of symptoms and risk factors.       Care Transitions Subsequent and Final Call    Subsequent and

## 2021-10-22 RX ORDER — OMEPRAZOLE 40 MG/1
40 CAPSULE, DELAYED RELEASE ORAL DAILY
Qty: 30 CAPSULE | Refills: 5 | Status: SHIPPED | OUTPATIENT
Start: 2021-10-22 | End: 2022-05-03

## 2021-10-22 NOTE — TELEPHONE ENCOUNTER
Ramandeep Seymour is calling to request a refill on the following medication(s):    Medication Request:  Requested Prescriptions     Pending Prescriptions Disp Refills    omeprazole (PRILOSEC) 40 MG delayed release capsule 30 capsule 5     Sig: Take 1 capsule by mouth daily       Last Visit Date (If Applicable):  6/3/8872    Next Visit Date:    Visit date not found

## 2021-11-08 ENCOUNTER — OFFICE VISIT (OUTPATIENT)
Dept: FAMILY MEDICINE CLINIC | Age: 56
End: 2021-11-08
Payer: COMMERCIAL

## 2021-11-08 VITALS
OXYGEN SATURATION: 98 % | BODY MASS INDEX: 40.51 KG/M2 | WEIGHT: 236 LBS | DIASTOLIC BLOOD PRESSURE: 78 MMHG | TEMPERATURE: 96.7 F | SYSTOLIC BLOOD PRESSURE: 116 MMHG | HEART RATE: 77 BPM

## 2021-11-08 DIAGNOSIS — Z71.3 WEIGHT LOSS COUNSELING, ENCOUNTER FOR: ICD-10-CM

## 2021-11-08 DIAGNOSIS — E11.9 TYPE 2 DIABETES MELLITUS WITHOUT COMPLICATION, WITHOUT LONG-TERM CURRENT USE OF INSULIN (HCC): Primary | ICD-10-CM

## 2021-11-08 LAB
CREATININE URINE POCT: 100
HBA1C MFR BLD: 6.7 %
MICROALBUMIN/CREAT 24H UR: 30 MG/G{CREAT}
MICROALBUMIN/CREAT UR-RTO: <300

## 2021-11-08 PROCEDURE — 83036 HEMOGLOBIN GLYCOSYLATED A1C: CPT | Performed by: STUDENT IN AN ORGANIZED HEALTH CARE EDUCATION/TRAINING PROGRAM

## 2021-11-08 PROCEDURE — 82044 UR ALBUMIN SEMIQUANTITATIVE: CPT | Performed by: STUDENT IN AN ORGANIZED HEALTH CARE EDUCATION/TRAINING PROGRAM

## 2021-11-08 PROCEDURE — 99214 OFFICE O/P EST MOD 30 MIN: CPT | Performed by: STUDENT IN AN ORGANIZED HEALTH CARE EDUCATION/TRAINING PROGRAM

## 2021-11-08 RX ORDER — SEMAGLUTIDE 0.25 MG/.5ML
0.25 INJECTION, SOLUTION SUBCUTANEOUS
Qty: 0.5 ML | Refills: 3 | Status: SHIPPED | OUTPATIENT
Start: 2021-11-08 | End: 2022-02-08

## 2021-11-08 SDOH — ECONOMIC STABILITY: FOOD INSECURITY: WITHIN THE PAST 12 MONTHS, THE FOOD YOU BOUGHT JUST DIDN'T LAST AND YOU DIDN'T HAVE MONEY TO GET MORE.: NEVER TRUE

## 2021-11-08 SDOH — ECONOMIC STABILITY: FOOD INSECURITY: WITHIN THE PAST 12 MONTHS, YOU WORRIED THAT YOUR FOOD WOULD RUN OUT BEFORE YOU GOT MONEY TO BUY MORE.: NEVER TRUE

## 2021-11-08 ASSESSMENT — ENCOUNTER SYMPTOMS
COLOR CHANGE: 0
VOMITING: 0
TROUBLE SWALLOWING: 0
ABDOMINAL PAIN: 0
NAUSEA: 0

## 2021-11-08 ASSESSMENT — PATIENT HEALTH QUESTIONNAIRE - PHQ9
SUM OF ALL RESPONSES TO PHQ QUESTIONS 1-9: 0
SUM OF ALL RESPONSES TO PHQ9 QUESTIONS 1 & 2: 0
SUM OF ALL RESPONSES TO PHQ QUESTIONS 1-9: 0
1. LITTLE INTEREST OR PLEASURE IN DOING THINGS: 0
SUM OF ALL RESPONSES TO PHQ QUESTIONS 1-9: 0
2. FEELING DOWN, DEPRESSED OR HOPELESS: 0

## 2021-11-08 ASSESSMENT — SOCIAL DETERMINANTS OF HEALTH (SDOH): HOW HARD IS IT FOR YOU TO PAY FOR THE VERY BASICS LIKE FOOD, HOUSING, MEDICAL CARE, AND HEATING?: NOT HARD AT ALL

## 2021-11-08 NOTE — PROGRESS NOTES
Subjective: Robert Anders presents for   Chief Complaint   Patient presents with   [facility] Patient     Dr. Fernando Glynn patient     Hand Pain     Right hand pain for last 6 weeks. Not getting any better. Here for follow up on diabetes and to establish care from Dr. Fernando Glynn. Has been on metformin for diabetes. Has had hypoglycemic episodes previously. Has been having right wrist pain on the dorsal aspect of her hand. Denies any trauma or injury, abnormal cyst or growth. Has been there for 6 weeks. Has ongoing paresthesias not related to the pain. Patient Active Problem List   Diagnosis    Chronic back pain    Essential hypertension    SRI on CPAP    Mixed hyperlipidemia    S/P drug eluting coronary stent placement - Mid LAD 7/25/18-Dr. lea    Coronary artery disease involving native coronary artery of native heart with unstable angina pectoris (HCC)    Class 2 obesity due to excess calories with body mass index (BMI) of 39.0 to 39.9 in adult    Acute bronchitis due to other specified organisms    Hyperplastic colon polyp    [facility]    BMI 38.0-38.9,adult    Post PTCA    Former smoker    Hypokalemia    Hyperglycemia    Murmur, cardiac    Type 2 diabetes mellitus without complication, without long-term current use of insulin (HCC)    Chest pain    Coronary stent occlusion    Acne rosacea, erythematous telangiectatic type    Chest pain with high risk of acute coronary syndrome         Review of Systems   Constitutional: Negative for appetite change, chills, fatigue and fever. HENT: Negative for congestion and trouble swallowing. Cardiovascular: Negative. Gastrointestinal: Negative for abdominal pain, nausea and vomiting. Genitourinary: Negative for difficulty urinating, dysuria and flank pain. Musculoskeletal: Negative for arthralgias. Skin: Negative for color change. Neurological: Negative for dizziness and headaches.    Psychiatric/Behavioral: Negative for behavioral problems and confusion. The patient is not nervous/anxious. Objective:  Physical Exam   Vitals:   Vitals:    11/08/21 1109   BP: 116/78   Site: Left Upper Arm   Position: Sitting   Cuff Size: Large Adult   Pulse: 77   Temp: 96.7 °F (35.9 °C)   TempSrc: Temporal   SpO2: 98%   Weight: 236 lb (107 kg)     Wt Readings from Last 3 Encounters:   11/08/21 236 lb (107 kg)   10/07/21 235 lb (106.6 kg)   08/20/21 230 lb (104.3 kg)     Ht Readings from Last 3 Encounters:   10/07/21 5' 4\" (1.626 m)   08/20/21 5' 4\" (1.626 m)   03/30/21 5' 4\" (1.626 m)     Body mass index is 40.51 kg/m². Physical Exam  Vitals reviewed. Constitutional:       General: She is not in acute distress. Appearance: Normal appearance. HENT:      Mouth/Throat:      Mouth: Mucous membranes are moist.   Eyes:      Conjunctiva/sclera: Conjunctivae normal.   Cardiovascular:      Rate and Rhythm: Normal rate and regular rhythm. Heart sounds: Normal heart sounds. Pulmonary:      Effort: Pulmonary effort is normal.      Breath sounds: Normal breath sounds. Musculoskeletal:         General: Tenderness present. No swelling, deformity or signs of injury. Normal range of motion. Skin:     General: Skin is warm and dry. Neurological:      Mental Status: She is alert and oriented to person, place, and time. Psychiatric:         Mood and Affect: Mood normal.            Assessment/Plan:    Diagnosis Orders   1. Type 2 diabetes mellitus without complication, without long-term current use of insulin (HCC)  POCT microalbumin    POCT glycosylated hemoglobin (Hb A1C)   2. Weight loss counseling, encounter for  Semaglutide-Weight Management (WEGOVY) 0.25 MG/0.5ML SOAJ SC injection        Return in about 3 months (around 2/8/2022). A1c today in clinic was 6.7. This is elevated compared to previous results. Will advise to continue with metformin.  Microalbumin in clinic was normal.    Weight loss: Due to inability to exercise and previous history of coronary artery disease and stenosis wants to pursue other weight loss options. I will start her on Wegovy. If this is not covered by insurance consider other options like Ozempic to also help with her diabetes and heart failure prevention. Total time spent was between 30-39 mins. This includes time spent reviewing the patient's medical record (e.g., recent visits, labs, and studies); seeing the patient in the office (face-to-face time); ordering medications, studies, procedures, or referrals; calling the patient or family later in the day with results and further recommendations; and documenting the visit in the medical record.

## 2021-11-08 NOTE — PATIENT INSTRUCTIONS
Wegovy:    0.25 mg weekly for four weeks  0.5 mg weekly for four weeks  1 mg weekly for four weeks  1.7 mg weekly for four weeks  2.4 mg weekly thereon

## 2021-11-10 ENCOUNTER — TELEPHONE (OUTPATIENT)
Dept: FAMILY MEDICINE CLINIC | Age: 56
End: 2021-11-10

## 2021-11-10 DIAGNOSIS — E11.9 TYPE 2 DIABETES MELLITUS WITHOUT COMPLICATION, WITHOUT LONG-TERM CURRENT USE OF INSULIN (HCC): Primary | ICD-10-CM

## 2021-11-10 NOTE — TELEPHONE ENCOUNTER
Patient is okay with that new medication. She would like to know if she should continue her metformin with it?  She would like it to go to [facility] on file

## 2021-11-10 NOTE — TELEPHONE ENCOUNTER
Prior Authorization received from pharmacy for [facility] JOSE. PA was denied due to patient not trying other forms of treatment first. I spoke with patient she is aware. Is there something else that can be called in for her?

## 2021-11-19 ENCOUNTER — TELEPHONE (OUTPATIENT)
Dept: FAMILY MEDICINE CLINIC | Age: 56
End: 2021-11-19

## 2021-11-19 NOTE — TELEPHONE ENCOUNTER
Patient stated that it has been about 8 days. She does not want nausea medication right now. She stated that she will stay hydrated and restrict portion sizes and if it does not improve she will call office.

## 2021-11-22 DIAGNOSIS — R11.0 NAUSEA: Primary | ICD-10-CM

## 2021-11-22 RX ORDER — ONDANSETRON 4 MG/1
4 TABLET, ORALLY DISINTEGRATING ORAL EVERY 8 HOURS PRN
Qty: 10 TABLET | Refills: 1 | Status: SHIPPED | OUTPATIENT
Start: 2021-11-22 | End: 2022-02-08

## 2021-12-10 ENCOUNTER — HOSPITAL ENCOUNTER (OUTPATIENT)
Dept: MAMMOGRAPHY | Age: 56
Discharge: HOME | End: 2021-12-12
Payer: COMMERCIAL

## 2021-12-10 DIAGNOSIS — Z12.31 SCREENING MAMMOGRAM FOR BREAST CANCER: ICD-10-CM

## 2021-12-10 PROCEDURE — 77063 BREAST TOMOSYNTHESIS BI: CPT

## 2022-01-18 RX ORDER — LOSARTAN POTASSIUM 100 MG/1
TABLET ORAL
Qty: 30 TABLET | Refills: 5 | Status: SHIPPED | OUTPATIENT
Start: 2022-01-18

## 2022-01-18 NOTE — TELEPHONE ENCOUNTER
Raj Klein is calling to request a refill on the following medication(s):    Medication Request:  Requested Prescriptions     Pending Prescriptions Disp Refills    losartan (COZAAR) 100 MG tablet 30 tablet 5     Sig: take 1 tablet by mouth once daily       Last Visit Date (If Applicable):  98/7/0548    Next Visit Date:    2/8/2022

## 2022-02-08 ENCOUNTER — OFFICE VISIT (OUTPATIENT)
Dept: FAMILY MEDICINE CLINIC | Age: 57
End: 2022-02-08
Payer: COMMERCIAL

## 2022-02-08 ENCOUNTER — HOSPITAL ENCOUNTER (OUTPATIENT)
Age: 57
Setting detail: SPECIMEN
Discharge: HOME | End: 2022-02-08

## 2022-02-08 VITALS
WEIGHT: 233 LBS | OXYGEN SATURATION: 99 % | SYSTOLIC BLOOD PRESSURE: 134 MMHG | BODY MASS INDEX: 39.99 KG/M2 | TEMPERATURE: 97 F | DIASTOLIC BLOOD PRESSURE: 80 MMHG | HEART RATE: 68 BPM

## 2022-02-08 DIAGNOSIS — E11.9 TYPE 2 DIABETES MELLITUS WITHOUT COMPLICATION, WITHOUT LONG-TERM CURRENT USE OF INSULIN (HCC): ICD-10-CM

## 2022-02-08 DIAGNOSIS — E11.9 TYPE 2 DIABETES MELLITUS WITHOUT COMPLICATION, WITHOUT LONG-TERM CURRENT USE OF INSULIN (HCC): Primary | ICD-10-CM

## 2022-02-08 LAB
ABSOLUTE EOS #: 0.12 K/UL (ref 0–0.44)
ABSOLUTE IMMATURE GRANULOCYTE: <0.03 K/UL (ref 0–0.3)
ABSOLUTE LYMPH #: 2.22 K/UL (ref 1.1–3.7)
ABSOLUTE MONO #: 0.58 K/UL (ref 0.1–1.2)
ALBUMIN SERPL-MCNC: 4.6 G/DL (ref 3.5–5.2)
ALBUMIN/GLOBULIN RATIO: 1.8 (ref 1–2.5)
ALP BLD-CCNC: 145 U/L (ref 35–104)
ALT SERPL-CCNC: 23 U/L (ref 5–33)
ANION GAP SERPL CALCULATED.3IONS-SCNC: 11 MMOL/L (ref 9–17)
AST SERPL-CCNC: 20 U/L
BASOPHILS # BLD: 1 % (ref 0–2)
BASOPHILS ABSOLUTE: 0.04 K/UL (ref 0–0.2)
BILIRUB SERPL-MCNC: 0.26 MG/DL (ref 0.3–1.2)
BUN BLDV-MCNC: 13 MG/DL (ref 6–20)
BUN/CREAT BLD: ABNORMAL (ref 9–20)
CALCIUM SERPL-MCNC: 9.3 MG/DL (ref 8.6–10.4)
CHLORIDE BLD-SCNC: 102 MMOL/L (ref 98–107)
CO2: 27 MMOL/L (ref 20–31)
CREAT SERPL-MCNC: 0.72 MG/DL (ref 0.5–0.9)
DIFFERENTIAL TYPE: NORMAL
EOSINOPHILS RELATIVE PERCENT: 2 % (ref 1–4)
GFR AFRICAN AMERICAN: >60 ML/MIN
GFR NON-AFRICAN AMERICAN: >60 ML/MIN
GFR SERPL CREATININE-BSD FRML MDRD: ABNORMAL ML/MIN/{1.73_M2}
GFR SERPL CREATININE-BSD FRML MDRD: ABNORMAL ML/MIN/{1.73_M2}
GLUCOSE BLD-MCNC: 184 MG/DL (ref 70–99)
HCT VFR BLD CALC: 41.5 % (ref 36.3–47.1)
HEMOGLOBIN: 13.5 G/DL (ref 11.9–15.1)
IMMATURE GRANULOCYTES: 0 %
LYMPHOCYTES # BLD: 38 % (ref 24–43)
MCH RBC QN AUTO: 27.4 PG (ref 25.2–33.5)
MCHC RBC AUTO-ENTMCNC: 32.5 G/DL (ref 28.4–34.8)
MCV RBC AUTO: 84.3 FL (ref 82.6–102.9)
MONOCYTES # BLD: 10 % (ref 3–12)
NRBC AUTOMATED: 0 PER 100 WBC
PDW BLD-RTO: 13.7 % (ref 11.8–14.4)
PLATELET # BLD: 262 K/UL (ref 138–453)
PLATELET ESTIMATE: NORMAL
PMV BLD AUTO: 10.4 FL (ref 8.1–13.5)
POTASSIUM SERPL-SCNC: 4.3 MMOL/L (ref 3.7–5.3)
RBC # BLD: 4.92 M/UL (ref 3.95–5.11)
RBC # BLD: NORMAL 10*6/UL
SEG NEUTROPHILS: 49 % (ref 36–65)
SEGMENTED NEUTROPHILS ABSOLUTE COUNT: 2.93 K/UL (ref 1.5–8.1)
SODIUM BLD-SCNC: 140 MMOL/L (ref 135–144)
TOTAL PROTEIN: 7.1 G/DL (ref 6.4–8.3)
WBC # BLD: 5.9 K/UL (ref 3.5–11.3)
WBC # BLD: NORMAL 10*3/UL

## 2022-02-08 PROCEDURE — 99214 OFFICE O/P EST MOD 30 MIN: CPT | Performed by: STUDENT IN AN ORGANIZED HEALTH CARE EDUCATION/TRAINING PROGRAM

## 2022-02-08 ASSESSMENT — ENCOUNTER SYMPTOMS
NAUSEA: 0
BACK PAIN: 1
COLOR CHANGE: 0
VOMITING: 0
ABDOMINAL PAIN: 0
TROUBLE SWALLOWING: 0

## 2022-02-08 ASSESSMENT — PATIENT HEALTH QUESTIONNAIRE - PHQ9
SUM OF ALL RESPONSES TO PHQ9 QUESTIONS 1 & 2: 0
SUM OF ALL RESPONSES TO PHQ QUESTIONS 1-9: 0
2. FEELING DOWN, DEPRESSED OR HOPELESS: 0
SUM OF ALL RESPONSES TO PHQ QUESTIONS 1-9: 0
1. LITTLE INTEREST OR PLEASURE IN DOING THINGS: 0

## 2022-02-08 NOTE — PROGRESS NOTES
Subjective: Radha Lee presents for   Chief Complaint   Patient presents with    3 Month Follow-Up    Other     handicap placard       HPI   Here for 3-month follow-up on diabetes. She was initially tried on Pawan Rummage for weight loss but this was not covered. She was then switched to Rybelsus given her underlying diabetes. She was unable to tolerate this. Patient Active Problem List   Diagnosis    Chronic back pain    Essential hypertension    SRI on CPAP    Mixed hyperlipidemia    S/P drug eluting coronary stent placement - Mid LAD 7/25/18-Dr. lea    Coronary artery disease involving native coronary artery of native heart with unstable angina pectoris (HCC)    Class 2 obesity due to excess calories with body mass index (BMI) of 39.0 to 39.9 in adult    Acute bronchitis due to other specified organisms    Hyperplastic colon polyp    [facility]    BMI 38.0-38.9,adult    Post PTCA    Former smoker    Hypokalemia    Hyperglycemia    Murmur, cardiac    Type 2 diabetes mellitus without complication, without long-term current use of insulin ([facility])    Chest pain    Coronary stent occlusion    Acne rosacea, erythematous telangiectatic type    Chest pain with high risk of acute coronary syndrome         Review of Systems   Constitutional: Negative for appetite change, chills, fatigue and fever. HENT: Negative for congestion and trouble swallowing. Cardiovascular: Negative. Gastrointestinal: Negative for abdominal pain, nausea and vomiting. Genitourinary: Negative for difficulty urinating, dysuria and flank pain. Musculoskeletal: Positive for back pain. Negative for arthralgias. Skin: Negative for color change. Neurological: Negative for dizziness and headaches. Psychiatric/Behavioral: Negative for behavioral problems and confusion. The patient is not nervous/anxious.          Objective:  Physical Exam   Vitals:   Vitals:    02/08/22 1147   BP: 134/80   Site: Left Upper Arm Position: Sitting   Cuff Size: Large Adult   Pulse: 68   Temp: 97 °F (36.1 °C)   TempSrc: Temporal   SpO2: 99%   Weight: 233 lb (105.7 kg)     Wt Readings from Last 3 Encounters:   02/08/22 233 lb (105.7 kg)   11/08/21 236 lb (107 kg)   10/07/21 235 lb (106.6 kg)     Ht Readings from Last 3 Encounters:   10/07/21 5' 4\" (1.626 m)   08/20/21 5' 4\" (1.626 m)   03/30/21 5' 4\" (1.626 m)     Body mass index is 39.99 kg/m². Physical Exam   Visual inspection:  Deformity/amputation: absent  Skin lesions/pre-ulcerative calluses: absent  Edema: right- negative, left- negative    Sensory exam:  Monofilament sensation: normal  (minimum of 5 random plantar locations tested, avoiding callused areas - > 1 area with absence of sensation is + for neuropathy)    Plus at least one of the following:  Pulses: normal,   Pinprick: Intact  Proprioception: Intact  Vibration (128 Hz): Intact    Assessment/Plan:    Diagnosis Orders   1. Type 2 diabetes mellitus without complication, without long-term current use of insulin (HCC)  Hemoglobin A1C    HM DIABETES FOOT EXAM    CBC With Auto Differential    Comprehensive Metabolic Panel        Return in about 3 months (around 5/8/2022) for Diabetes. Diabetes-will trial Trulicity once weekly. Advised to decrease portion sizes and drinking water in addition to meals. Will also obtain another a1c and labs of CBC and CMP.     Time spent in previsit planning, interview, exam, counseling/education and coordination of care: >30 mins

## 2022-02-09 LAB
ESTIMATED AVERAGE GLUCOSE: 151 MG/DL
HBA1C MFR BLD: 6.9 % (ref 4–6)

## 2022-02-22 ENCOUNTER — TELEPHONE (OUTPATIENT)
Dept: FAMILY MEDICINE CLINIC | Age: 57
End: 2022-02-22

## 2022-02-22 DIAGNOSIS — E11.9 TYPE 2 DIABETES MELLITUS WITHOUT COMPLICATION, WITHOUT LONG-TERM CURRENT USE OF INSULIN (HCC): Primary | ICD-10-CM

## 2022-02-22 NOTE — TELEPHONE ENCOUNTER
----- Message from [facility] sent at 2/22/2022 10:43 AM EST -----  Subject: Message to Provider    QUESTIONS  Information for Provider? Patient said she was told by Dr. Hossein Garcia to call   after she took her 2nd dose of trulicity samples to see how she is doing. Patient said she gets nausea after taking it then when she eats some   crackers she is fine. She said far as she knows it is working. Patient can   be reached at [phone] with any questions. Patient said at this time she   do not need an appointment. Patient said the whole purpose is if Dr. Hossein Garcia   wants to prescribe her the trulicity.  ---------------------------------------------------------------------------  --------------  CALL BACK INFO  What is the best way for the office to contact you? OK to leave message on   voicemail, OK to respond with electronic message via [employer] portal (only   for patients who have registered [employer] account)  Preferred Call Back Phone Number? [phone]  ---------------------------------------------------------------------------  --------------  SCRIPT ANSWERS  Relationship to Patient?  Self

## 2022-03-07 ENCOUNTER — OFFICE VISIT (OUTPATIENT)
Dept: PAIN MANAGEMENT | Age: 57
End: 2022-03-07
Payer: COMMERCIAL

## 2022-03-07 VITALS
WEIGHT: 232.4 LBS | DIASTOLIC BLOOD PRESSURE: 75 MMHG | BODY MASS INDEX: 39.67 KG/M2 | SYSTOLIC BLOOD PRESSURE: 139 MMHG | HEART RATE: 73 BPM | HEIGHT: 64 IN

## 2022-03-07 DIAGNOSIS — G89.29 OTHER CHRONIC PAIN: ICD-10-CM

## 2022-03-07 DIAGNOSIS — M96.1 POSTLAMINECTOMY SYNDROME, LUMBAR REGION: Primary | ICD-10-CM

## 2022-03-07 DIAGNOSIS — M54.16 LUMBAR RADICULOPATHY: ICD-10-CM

## 2022-03-07 PROCEDURE — 99213 OFFICE O/P EST LOW 20 MIN: CPT | Performed by: PAIN MEDICINE

## 2022-03-07 RX ORDER — AMLODIPINE BESYLATE 10 MG/1
TABLET ORAL
COMMUNITY
Start: 2022-01-16 | End: 2022-03-07

## 2022-03-07 RX ORDER — PREGABALIN 50 MG/1
50 CAPSULE ORAL 2 TIMES DAILY
Qty: 60 CAPSULE | Refills: 3 | Status: SHIPPED | OUTPATIENT
Start: 2022-03-07 | End: 2022-07-11 | Stop reason: SDUPTHER

## 2022-03-07 ASSESSMENT — ENCOUNTER SYMPTOMS
BACK PAIN: 1
BOWEL INCONTINENCE: 0
ABDOMINAL PAIN: 1

## 2022-03-07 NOTE — PROGRESS NOTES
HPI:     Back Pain  This is a chronic problem. The current episode started more than 1 year ago. The problem occurs constantly. The problem has been gradually worsening since onset. The pain is present in the lumbar spine. The quality of the pain is described as burning. The pain radiates to the left foot and right foot. The pain is at a severity of 4/10. The pain is mild. The pain is worse during the night. The symptoms are aggravated by bending, coughing, lying down, position, standing and twisting. Stiffness is present in the morning and at night. Associated symptoms include abdominal pain, bladder incontinence, leg pain, numbness, tingling and weakness. Pertinent negatives include no bowel incontinence, chest pain, fever, headaches, pelvic pain or weight loss. Risk factors include history of osteoporosis. She has tried chiropractic manipulation, heat, home exercises, NSAIDs and walking for the symptoms. The treatment provided significant relief. Chronic low back and leg pain. She has history of previous back surgery. MRI degenerative change and disc bulging. Done physical therapy and had several interventions with no sustained benefit. Lyrica 50 mg twice a day seems to be helping. Did have a flareup of pain about a month ago however this has resolved    Patient denies any new neurological symptoms. Nobowel or bladder incontinence, no weakness, and no falling. Review of OARRS does not show any aberrant prescription behavior. Medication is helping the patient stay active. Patient denies any side effects and reports adequate analgesia. No sign of misuse/abuse. Past Medical History:   Diagnosis Date    Abnormal stress test     Allergic rhinitis     Arthritis     CAD (coronary artery disease)     Dr. Francisco Javier Pinon last seen 2/20/2020    Chronic back pain     Diabetes mellitus ([facility]     Former smoker 2/7/2020    30-year history.   Quit in 2018    Hyperlipidemia     Dr. Jerome Jordan Hypertension      [place]    Murmur, cardiac 2020    SRI on CPAP     instructed to bring Dr. Aleks Branham Wears prescription eyeglasses     Wellness examination     Dr. Maria Del Carmen Cartwright last seen 2020       Past Surgical History:   Procedure Laterality Date    ANESTHESIA NERVE BLOCK Left 2019    NERVE BLOCK (DEFINE) - # 1 L5-S1 performed by Angy Genao MD at [facility] Bilateral 2020    NERVE BLOCK BILATERAL - B MBB # 1 L4-5, L5-S1 performed by Angy Genao MD at [facility] Bilateral 2020    NERVE BLOCK BILATERAL - L-5, L5-S1 performed by Angy Genao MD at [address] [place] [address]  2017    CARDIAC CATHETERIZATION  2019    CARDIAC CATHETERIZATION  2018    1 stent mid LAD PT HAS XIENCE ALPINE HEART STENT, MRI CONDITIONAL 3T OK, SAFE IMMEDIATELY POST IMPLANT.  CARDIAC CATHETERIZATION  02/10/2020    for chest pain no new blockage    CARPAL TUNNEL RELEASE Right      SECTION      x3    COLONOSCOPY N/A 2018    COLONOSCOPY WITH BIOPSY performed by Markus Day MD at [place] [address]  2014    cataract surgery left eye WITH IMPLANT. MRI OK.      FOOT SURGERY      HAND TENDON SURGERY Right     [facility]. INJECTION PROCEDURE FOR SACROILIAC JOINT Left 2019    SACROILIAC JOINT INJECTION - #1 performed by Angy Genao MD at [facility]  2020     NERVE BLOCK BILATERAL - B MBB # 1 L4-5, L5-S1 (Bilateral     NERVE BLOCK Bilateral 2020    NERVE BLOCK BILATERAL - L-5, L5-S1 (Bilateral )     NERVE BLOCK Left 2020    NERVE RADIOFREQUENCY ABLATION- L4-5, L5-S1    NERVE BLOCK Right 2020     NERVE RADIOFREQUENCY ABLATION (Right )     PAIN MANAGEMENT PROCEDURE Left 2020    NERVE RADIOFREQUENCY ABLATION- L4-5, L5-S1 performed by Marga Pallas MD [place] at [address] [place] [address] 9/25/2020    NERVE RADIOFREQUENCY ABLATION performed by Andre Dumont MD at [facility]         Allergies   Allergen Reactions    Bee Venom Hives    Tetracyclines & Related Nausea Only and Other (See Comments)     dizziness    Ace Inhibitors Other (See Comments)     cough         Current Outpatient Medications:     pregabalin (LYRICA) 50 MG capsule, Take 1 capsule by mouth 2 times daily for 30 days. , Disp: 60 capsule, Rfl: 3    Dulaglutide 0.75 MG/0.5ML SOPN, Inject 0.75 mg into the skin once a week, Disp: 2 mL, Rfl: 3    losartan (COZAAR) 100 MG tablet, take 1 tablet by mouth once daily, Disp: 30 tablet, Rfl: 5    omeprazole (PRILOSEC) 40 MG delayed release capsule, Take 1 capsule by mouth daily, Disp: 30 capsule, Rfl: 5    ticagrelor (BRILINTA) 90 MG TABS tablet, Take 1 tablet by mouth 2 times daily, Disp: 180 tablet, Rfl: 4    atorvastatin (LIPITOR) 80 MG tablet, take 1 tablet by mouth once daily, Disp: 30 tablet, Rfl: 11    ezetimibe (ZETIA) 10 MG tablet, Take 10 mg by mouth daily, Disp: , Rfl:     metFORMIN (GLUCOPHAGE-XR) 500 MG extended release tablet, take 1 tablet by mouth once daily for 2 weeks then 1 tablet twice a day, Disp: 60 tablet, Rfl: 11    ranolazine (RANEXA) 500 MG extended release tablet, Take 1 tablet by mouth 2 times daily (Patient taking differently: Take 1,000 mg by mouth 2 times daily ), Disp: 60 tablet, Rfl: 3    carvedilol (COREG) 6.25 MG tablet, Take 1 tablet by mouth 2 times daily (with meals), Disp: 60 tablet, Rfl: 3    nitroGLYCERIN (NITROSTAT) 0.4 MG SL tablet, place 1 tablet under the tongue if needed every 5 minutes for chest pain for 3 doses IF NO RELIEF AFTER FIRST DOSE CALL PRESCRIBER ., Disp: 25 tablet, Rfl: 1    amLODIPine (NORVASC) 5 MG tablet, Take 10 mg by mouth daily , Disp: , Rfl:     hydrochlorothiazide (HYDRODIURIL) 25 MG tablet, Take 25 mg by mouth daily, Disp: , Rfl:     aspirin 81 MG tablet, Take 1 tablet by mouth daily (with breakfast), Disp: 30 tablet, Rfl: 11    Family History   Problem Relation Age of Onset    Stroke Father     Breast Cancer Mother 48    Other Sister         homeless    Diabetes Maternal Grandmother        Social History     Socioeconomic History    Marital status:      Spouse name: Not on file    Number of children: Not on file    Years of education: Not on file    Highest education level: Not on file   Occupational History    Not on file   Tobacco Use    Smoking status: Former Smoker     Packs/day: 0.25     Years: 30.00     Pack years: 7.50     Types: Cigarettes     Quit date: 7/24/2018     Years since quitting: 3.6    Smokeless tobacco: Never Used   Vaping Use    Vaping Use: Never used   Substance and Sexual Activity    Alcohol use: No    Drug use: No    Sexual activity: Not on file   Other Topics Concern    Not on file   Social History Narrative    Not on file     Social Determinants of Health     Financial Resource Strain: Low Risk     Difficulty of Paying Living Expenses: Not hard at all   Food Insecurity: No Food Insecurity    Worried About [employer] in the Last Year: Never true    [facility] in the Last Year: Never true   Transportation Needs:     Lack of Transportation (Medical): Not on file    Lack of Transportation (Non-Medical):  Not on file   Physical Activity:     Days of Exercise per Week: Not on file    Minutes of Exercise per Session: Not on file   Stress:     Feeling of Stress : Not on file   Social Connections:     Frequency of Communication with Friends and Family: Not on file    Frequency of Social Gatherings with Friends and Family: Not on file    Attends Restorationism Services: Not on file    Active Member of Clubs or Organizations: Not on file    Attends Club or Organization Meetings: Not on file    Marital Status: Not on file   Intimate Partner Violence:     Fear of Current or Ex-Partner: Not on file    Emotionally Abused: Not on file    Physically Abused: Not on file    Sexually Abused: Not on file   Housing Stability:     Unable to Pay for Housing in the Last Year: Not on file    Number of Places Lived in the Last Year: Not on file    Unstable Housing in the Last Year: Not on file       Review of Systems:  Review of Systems   Constitutional: Negative for fever and weight loss. Cardiovascular: Negative for chest pain. Musculoskeletal: Positive for back pain. Gastrointestinal: Positive for abdominal pain. Negative for bowel incontinence. Genitourinary: Positive for bladder incontinence. Negative for pelvic pain. Neurological: Positive for numbness, tingling and weakness. Negative for headaches. Physical Exam:  /75   Pulse 73   Ht 5' 4\" (1.626 m)   Wt 232 lb 6.4 oz (105.4 kg)   BMI 39.89 kg/m²     Physical Exam  Constitutional:       Appearance: Normal appearance. Pulmonary:      Effort: Pulmonary effort is normal.   Neurological:      Mental Status: She is alert. Psychiatric:         Attention and Perception: Attention and perception normal.         Mood and Affect: Mood and affect normal.         Record/Diagnostics Review:    As above, I did review the imaging    Orders:    Orders Placed This Encounter   Medications    pregabalin (LYRICA) 50 MG capsule     Sig: Take 1 capsule by mouth 2 times daily for 30 days. Dispense:  60 capsule     Refill:  3       Assessment:  1. Postlaminectomy syndrome, lumbar region    2. Lumbar radiculopathy    3. Other chronic pain        Treatment Plan:  DISCUSSION: Treatment options discussed with patient and all questions answered to patient's satisfaction. OARRS Review: Reviewed and acceptable for medications prescribed.   TREATMENT OPTIONS:     Discussed different treatment options including continued conservative care such as physical therapy, chiropractic care, acupuncture. Discussed different interventional options such as epidural steroids or medial branch blocks. Also discussed neuromodulation in the form of spinal cord stimulation. Also discussed surgical evaluation. Discussed updated imaging- declines    At this point she wishes to continue with Lyrica 50 twice a day and hold off on further imaging or treatment  Continue current medication management, has been stable and compliant. Marnie Burrell M.D. I have reviewed the chief complaint and history of present illness (including ROS and PFSH) and vital documentation by my staff and I agree with their documentation and have added where applicable.

## 2022-03-29 ENCOUNTER — HOSPITAL ENCOUNTER (OUTPATIENT)
Age: 57
Setting detail: OBSERVATION
Discharge: HOME | End: 2022-03-30
Attending: EMERGENCY MEDICINE | Admitting: EMERGENCY MEDICINE
Payer: COMMERCIAL

## 2022-03-29 ENCOUNTER — APPOINTMENT (OUTPATIENT)
Dept: GENERAL RADIOLOGY | Age: 57
End: 2022-03-29
Payer: COMMERCIAL

## 2022-03-29 DIAGNOSIS — R07.9 CHEST PAIN, UNSPECIFIED TYPE: Primary | ICD-10-CM

## 2022-03-29 LAB
ABSOLUTE EOS #: 0.11 K/UL (ref 0–0.44)
ABSOLUTE IMMATURE GRANULOCYTE: <0.03 K/UL (ref 0–0.3)
ABSOLUTE LYMPH #: 2.11 K/UL (ref 1.1–3.7)
ABSOLUTE MONO #: 0.48 K/UL (ref 0.1–1.2)
ANION GAP SERPL CALCULATED.3IONS-SCNC: 10 MMOL/L (ref 9–17)
BASOPHILS # BLD: 1 % (ref 0–2)
BASOPHILS ABSOLUTE: 0.04 K/UL (ref 0–0.2)
BUN BLDV-MCNC: 14 MG/DL (ref 6–20)
CALCIUM SERPL-MCNC: 10.5 MG/DL (ref 8.6–10.4)
CHLORIDE BLD-SCNC: 104 MMOL/L (ref 98–107)
CO2: 24 MMOL/L (ref 20–31)
CREAT SERPL-MCNC: 0.72 MG/DL (ref 0.5–0.9)
EOSINOPHILS RELATIVE PERCENT: 2 % (ref 1–4)
GFR AFRICAN AMERICAN: >60 ML/MIN
GFR NON-AFRICAN AMERICAN: >60 ML/MIN
GFR SERPL CREATININE-BSD FRML MDRD: ABNORMAL ML/MIN/{1.73_M2}
GLUCOSE BLD-MCNC: 116 MG/DL (ref 65–105)
GLUCOSE BLD-MCNC: 92 MG/DL (ref 65–105)
GLUCOSE BLD-MCNC: 97 MG/DL (ref 70–99)
HCT VFR BLD CALC: 42.4 % (ref 36.3–47.1)
HEMOGLOBIN: 14.2 G/DL (ref 11.9–15.1)
IMMATURE GRANULOCYTES: 0 %
LYMPHOCYTES # BLD: 39 % (ref 24–43)
MCH RBC QN AUTO: 28 PG (ref 25.2–33.5)
MCHC RBC AUTO-ENTMCNC: 33.5 G/DL (ref 28.4–34.8)
MCV RBC AUTO: 83.5 FL (ref 82.6–102.9)
MONOCYTES # BLD: 9 % (ref 3–12)
NRBC AUTOMATED: 0 PER 100 WBC
PDW BLD-RTO: 13.7 % (ref 11.8–14.4)
PLATELET # BLD: 290 K/UL (ref 138–453)
PMV BLD AUTO: 10 FL (ref 8.1–13.5)
POTASSIUM SERPL-SCNC: 4 MMOL/L (ref 3.7–5.3)
RBC # BLD: 5.08 M/UL (ref 3.95–5.11)
SARS-COV-2, RAPID: NOT DETECTED
SEG NEUTROPHILS: 49 % (ref 36–65)
SEGMENTED NEUTROPHILS ABSOLUTE COUNT: 2.65 K/UL (ref 1.5–8.1)
SODIUM BLD-SCNC: 138 MMOL/L (ref 135–144)
SPECIMEN DESCRIPTION: NORMAL
TROPONIN, HIGH SENSITIVITY: 7 NG/L (ref 0–14)
TROPONIN, HIGH SENSITIVITY: 9 NG/L (ref 0–14)
WBC # BLD: 5.4 K/UL (ref 3.5–11.3)

## 2022-03-29 PROCEDURE — 80048 BASIC METABOLIC PNL TOTAL CA: CPT

## 2022-03-29 PROCEDURE — 87635 SARS-COV-2 COVID-19 AMP PRB: CPT

## 2022-03-29 PROCEDURE — G0378 HOSPITAL OBSERVATION PER HR: HCPCS

## 2022-03-29 PROCEDURE — 6370000000 HC RX 637 (ALT 250 FOR IP): Performed by: STUDENT IN AN ORGANIZED HEALTH CARE EDUCATION/TRAINING PROGRAM

## 2022-03-29 PROCEDURE — 2580000003 HC RX 258: Performed by: STUDENT IN AN ORGANIZED HEALTH CARE EDUCATION/TRAINING PROGRAM

## 2022-03-29 PROCEDURE — 71045 X-RAY EXAM CHEST 1 VIEW: CPT

## 2022-03-29 PROCEDURE — 99283 EMERGENCY DEPT VISIT LOW MDM: CPT

## 2022-03-29 PROCEDURE — 93005 ELECTROCARDIOGRAM TRACING: CPT | Performed by: EMERGENCY MEDICINE

## 2022-03-29 PROCEDURE — 82947 ASSAY GLUCOSE BLOOD QUANT: CPT

## 2022-03-29 PROCEDURE — 85025 COMPLETE CBC W/AUTO DIFF WBC: CPT

## 2022-03-29 PROCEDURE — 84484 ASSAY OF TROPONIN QUANT: CPT

## 2022-03-29 RX ORDER — SODIUM CHLORIDE 0.9 % (FLUSH) 0.9 %
5-40 SYRINGE (ML) INJECTION EVERY 12 HOURS SCHEDULED
Status: DISCONTINUED | OUTPATIENT
Start: 2022-03-29 | End: 2022-03-30 | Stop reason: HOSPADM

## 2022-03-29 RX ORDER — DEXTROSE MONOHYDRATE 50 MG/ML
100 INJECTION, SOLUTION INTRAVENOUS PRN
Status: DISCONTINUED | OUTPATIENT
Start: 2022-03-29 | End: 2022-03-30 | Stop reason: HOSPADM

## 2022-03-29 RX ORDER — AMLODIPINE BESYLATE 10 MG/1
10 TABLET ORAL DAILY
Status: DISCONTINUED | OUTPATIENT
Start: 2022-03-29 | End: 2022-03-29

## 2022-03-29 RX ORDER — DEXTROSE MONOHYDRATE 25 G/50ML
12.5 INJECTION, SOLUTION INTRAVENOUS PRN
Status: DISCONTINUED | OUTPATIENT
Start: 2022-03-29 | End: 2022-03-30 | Stop reason: HOSPADM

## 2022-03-29 RX ORDER — NICOTINE POLACRILEX 4 MG
15 LOZENGE BUCCAL PRN
Status: DISCONTINUED | OUTPATIENT
Start: 2022-03-29 | End: 2022-03-30 | Stop reason: HOSPADM

## 2022-03-29 RX ORDER — ACETAMINOPHEN 650 MG/1
650 SUPPOSITORY RECTAL EVERY 6 HOURS PRN
Status: DISCONTINUED | OUTPATIENT
Start: 2022-03-29 | End: 2022-03-30 | Stop reason: HOSPADM

## 2022-03-29 RX ORDER — ATORVASTATIN CALCIUM 80 MG/1
80 TABLET, FILM COATED ORAL NIGHTLY
Status: DISCONTINUED | OUTPATIENT
Start: 2022-03-29 | End: 2022-03-30 | Stop reason: HOSPADM

## 2022-03-29 RX ORDER — SODIUM CHLORIDE 0.9 % (FLUSH) 0.9 %
5-40 SYRINGE (ML) INJECTION PRN
Status: DISCONTINUED | OUTPATIENT
Start: 2022-03-29 | End: 2022-03-30 | Stop reason: HOSPADM

## 2022-03-29 RX ORDER — CARVEDILOL 6.25 MG/1
6.25 TABLET ORAL 2 TIMES DAILY WITH MEALS
Status: DISCONTINUED | OUTPATIENT
Start: 2022-03-29 | End: 2022-03-30 | Stop reason: HOSPADM

## 2022-03-29 RX ORDER — RANOLAZINE 500 MG/1
1000 TABLET, EXTENDED RELEASE ORAL 2 TIMES DAILY
Status: DISCONTINUED | OUTPATIENT
Start: 2022-03-29 | End: 2022-03-30 | Stop reason: HOSPADM

## 2022-03-29 RX ORDER — SENNA AND DOCUSATE SODIUM 50; 8.6 MG/1; MG/1
1 TABLET, FILM COATED ORAL 2 TIMES DAILY
Status: DISCONTINUED | OUTPATIENT
Start: 2022-03-29 | End: 2022-03-30 | Stop reason: HOSPADM

## 2022-03-29 RX ORDER — LOSARTAN POTASSIUM 50 MG/1
100 TABLET ORAL DAILY
Status: DISCONTINUED | OUTPATIENT
Start: 2022-03-30 | End: 2022-03-30 | Stop reason: HOSPADM

## 2022-03-29 RX ORDER — ACETAMINOPHEN 325 MG/1
650 TABLET ORAL EVERY 6 HOURS PRN
Status: DISCONTINUED | OUTPATIENT
Start: 2022-03-29 | End: 2022-03-30 | Stop reason: HOSPADM

## 2022-03-29 RX ORDER — ONDANSETRON 4 MG/1
4 TABLET, ORALLY DISINTEGRATING ORAL EVERY 8 HOURS PRN
Status: DISCONTINUED | OUTPATIENT
Start: 2022-03-29 | End: 2022-03-30 | Stop reason: HOSPADM

## 2022-03-29 RX ORDER — PANTOPRAZOLE SODIUM 40 MG/1
40 TABLET, DELAYED RELEASE ORAL
Status: DISCONTINUED | OUTPATIENT
Start: 2022-03-30 | End: 2022-03-30 | Stop reason: HOSPADM

## 2022-03-29 RX ORDER — POLYETHYLENE GLYCOL 3350 17 G/17G
17 POWDER, FOR SOLUTION ORAL DAILY
Status: DISCONTINUED | OUTPATIENT
Start: 2022-03-29 | End: 2022-03-30 | Stop reason: HOSPADM

## 2022-03-29 RX ORDER — PREGABALIN 50 MG/1
50 CAPSULE ORAL 2 TIMES DAILY
Status: DISCONTINUED | OUTPATIENT
Start: 2022-03-29 | End: 2022-03-30 | Stop reason: HOSPADM

## 2022-03-29 RX ORDER — AMLODIPINE BESYLATE 10 MG/1
10 TABLET ORAL NIGHTLY
Status: DISCONTINUED | OUTPATIENT
Start: 2022-03-29 | End: 2022-03-30 | Stop reason: HOSPADM

## 2022-03-29 RX ORDER — HYDROCHLOROTHIAZIDE 25 MG/1
25 TABLET ORAL DAILY
Status: DISCONTINUED | OUTPATIENT
Start: 2022-03-29 | End: 2022-03-30 | Stop reason: HOSPADM

## 2022-03-29 RX ORDER — ONDANSETRON 2 MG/ML
4 INJECTION INTRAMUSCULAR; INTRAVENOUS EVERY 6 HOURS PRN
Status: DISCONTINUED | OUTPATIENT
Start: 2022-03-29 | End: 2022-03-30 | Stop reason: HOSPADM

## 2022-03-29 RX ORDER — SODIUM CHLORIDE 9 MG/ML
INJECTION, SOLUTION INTRAVENOUS PRN
Status: DISCONTINUED | OUTPATIENT
Start: 2022-03-29 | End: 2022-03-30 | Stop reason: HOSPADM

## 2022-03-29 RX ORDER — ASPIRIN 81 MG/1
81 TABLET, CHEWABLE ORAL
Status: DISCONTINUED | OUTPATIENT
Start: 2022-03-30 | End: 2022-03-30 | Stop reason: HOSPADM

## 2022-03-29 RX ORDER — CARVEDILOL 6.25 MG/1
6.25 TABLET ORAL 2 TIMES DAILY WITH MEALS
Status: DISCONTINUED | OUTPATIENT
Start: 2022-03-29 | End: 2022-03-29

## 2022-03-29 RX ADMIN — ATORVASTATIN CALCIUM 80 MG: 80 TABLET, FILM COATED ORAL at 21:24

## 2022-03-29 RX ADMIN — AMLODIPINE BESYLATE 10 MG: 10 TABLET ORAL at 21:24

## 2022-03-29 RX ADMIN — CARVEDILOL 6.25 MG: 6.25 TABLET, FILM COATED ORAL at 21:25

## 2022-03-29 RX ADMIN — SODIUM CHLORIDE, PRESERVATIVE FREE 10 ML: 5 INJECTION INTRAVENOUS at 21:25

## 2022-03-29 RX ADMIN — TICAGRELOR 90 MG: 90 TABLET ORAL at 21:25

## 2022-03-29 RX ADMIN — PREGABALIN 50 MG: 50 CAPSULE ORAL at 21:25

## 2022-03-29 RX ADMIN — RANOLAZINE 1000 MG: 500 TABLET, FILM COATED, EXTENDED RELEASE ORAL at 21:25

## 2022-03-29 ASSESSMENT — PAIN DESCRIPTION - PROGRESSION
CLINICAL_PROGRESSION: NOT CHANGED
CLINICAL_PROGRESSION: GRADUALLY IMPROVING
CLINICAL_PROGRESSION: NOT CHANGED

## 2022-03-29 ASSESSMENT — PAIN SCALES - GENERAL
PAINLEVEL_OUTOF10: 7
PAINLEVEL_OUTOF10: 8
PAINLEVEL_OUTOF10: 7

## 2022-03-29 ASSESSMENT — PAIN DESCRIPTION - DESCRIPTORS
DESCRIPTORS: PRESSURE;SQUEEZING
DESCRIPTORS: DISCOMFORT;SQUEEZING
DESCRIPTORS: SQUEEZING;CONSTANT

## 2022-03-29 ASSESSMENT — PAIN - FUNCTIONAL ASSESSMENT
PAIN_FUNCTIONAL_ASSESSMENT: 0-10
PAIN_FUNCTIONAL_ASSESSMENT: ACTIVITIES ARE NOT PREVENTED

## 2022-03-29 ASSESSMENT — ENCOUNTER SYMPTOMS
ABDOMINAL PAIN: 0
SINUS PRESSURE: 0
SORE THROAT: 0
EYE ITCHING: 0
NAUSEA: 0
ABDOMINAL DISTENTION: 0
COUGH: 0
EYE PAIN: 0
SINUS PAIN: 0
SHORTNESS OF BREATH: 0
DIARRHEA: 0
CONSTIPATION: 0

## 2022-03-29 ASSESSMENT — PAIN DESCRIPTION - FREQUENCY
FREQUENCY: INTERMITTENT
FREQUENCY: CONTINUOUS
FREQUENCY: CONTINUOUS

## 2022-03-29 ASSESSMENT — PAIN DESCRIPTION - PAIN TYPE
TYPE: ACUTE PAIN;CHRONIC PAIN
TYPE: ACUTE PAIN;CHRONIC PAIN

## 2022-03-29 ASSESSMENT — PAIN DESCRIPTION - ONSET: ONSET: ON-GOING

## 2022-03-29 ASSESSMENT — PAIN DESCRIPTION - LOCATION
LOCATION: CHEST
LOCATION: CHEST;OTHER (COMMENT)
LOCATION: CHEST

## 2022-03-29 ASSESSMENT — PAIN DESCRIPTION - ORIENTATION
ORIENTATION: RIGHT;LOWER
ORIENTATION: RIGHT
ORIENTATION: RIGHT;LOWER

## 2022-03-29 NOTE — ED TRIAGE NOTES
Patient presented to the ED today with complaints of chest pain. Patient states that she was at work when the chest pain started approximately 1 hour ago. Patient denies SOB, nausea, vomiting, headache or vision changes.

## 2022-03-29 NOTE — ED PROVIDER NOTES
[facility] ED  Emergency Department Encounter  EmergencyMedicine Resident     Pt Radha Chen  MRN: [id number]  [place] 1965  Date of evaluation: 3/29/22  PCP:  Kaveh Castillo MD    This patient was evaluated in the Emergency Department for symptoms described in the history of present illness. The patient was evaluated in the context of the global COVID-19 pandemic, which necessitated consideration that the patient might be at risk for infection with the SARS-CoV-2 virus that causes COVID-19. Institutional protocols and algorithms that pertain to the evaluation of patients at risk for COVID-19 are in a state of rapid change based on information released by regulatory bodies including the CDC and federal and state organizations. These policies and algorithms were followed during the patient's care in the ED. CHIEF COMPLAINT       Chief Complaint   Patient presents with    Chest Pain       HISTORY OF PRESENT ILLNESS  (Location/Symptom, Timing/Onset, Context/Setting, Quality, Duration, Modifying Factors, Severity.)      Ced Padilla is a 61 y.o. female who presents with right-sided chest pain that started while at rest 1 hour prior to presentation. Denies any associated symptoms including diaphoresis, shortness of breath, nausea or vomiting or abdominal pain. She does admit to history of a cardiac stent that was placed last year in July. Reports subsequent episodes of chest pain and last stress test was performed in September. She does not remember the outcome but states that she has been told the existing stent has clotted off or may have become kinked. After her last stress test her Brilinta dose was increased. She reports that all of her episodes of cardiac chest pain have felt the same way. Medical history includes prior tobacco use although reports she is now cigarette free, diabetes and CAD s/p 1 stent.     PAST MEDICAL / SURGICAL / SOCIAL / FAMILY HISTORY      has a past medical history of Abnormal stress test, Allergic rhinitis, Arthritis, CAD (coronary artery disease), Chronic back pain, Diabetes mellitus ([facility] [address] Former smoker, Hyperlipidemia, Hypertension, Murmur, cardiac, SRI on CPAP, Wears prescription eyeglasses, and Wellness examination. has a past surgical history that includes Tonsillectomy; Foot surgery; Lumbar disc surgery; Endometrial ablation; Tubal ligation; Hysterectomy; Carpal tunnel release (Right); Colonoscopy (N/A, 2018); Injection Procedure For Sacroiliac Joint (Left, 2019);  section; Hand tendon surgery (Right); Anesthesia Nerve Block (Left, 2019); Cardiac catheterization (2017); Cardiac catheterization (2019); Cardiac catheterization (2018); Cardiac catheterization (02/10/2020); Nerve Block (2020); Anesthesia Nerve Block (Bilateral, 2020); eye surgery (2014); Nerve Block (Bilateral, 2020); Anesthesia Nerve Block (Bilateral, 2020); Nerve Block (Left, 2020); Pain management procedure (Left, 2020); Nerve Block (Right, 2020); and Pain management procedure (Right, 2020).       Social History     Socioeconomic History    Marital status:      Spouse name: Not on file    Number of children: Not on file    Years of education: Not on file    Highest education level: Not on file   Occupational History    Not on file   Tobacco Use    Smoking status: Former Smoker     Packs/day: 0.25     Years: 30.00     Pack years: 7.50     Types: Cigarettes     Quit date: 2018     Years since quitting: 3.6    Smokeless tobacco: Never Used   Vaping Use    Vaping Use: Never used   Substance and Sexual Activity    Alcohol use: No    Drug use: No    Sexual activity: Not on file   Other Topics Concern    Not on file   Social History Narrative    Not on file     Social Determinants of Health     Financial Resource Strain: Low Risk     Difficulty of Paying Living Expenses: Not hard at all   Food Insecurity: No Food Insecurity    Worried About [address] [place] [address] in the Last Year: Never true    Ran Out of Food in the Last Year: Never true   Transportation Needs:     Lack of Transportation (Medical): Not on file    Lack of Transportation (Non-Medical): Not on file   Physical Activity:     Days of Exercise per Week: Not on file    Minutes of Exercise per Session: Not on file   Stress:     Feeling of Stress : Not on file   Social Connections:     Frequency of Communication with Friends and Family: Not on file    Frequency of Social Gatherings with Friends and Family: Not on file    Attends Adventism Services: Not on file    Active Member of [address] [place] [address] or Organizations: Not on file    Attends Club or Organization Meetings: Not on file    Marital Status: Not on file   Intimate Partner Violence:     Fear of Current or Ex-Partner: Not on file    Emotionally Abused: Not on file    Physically Abused: Not on file    Sexually Abused: Not on file   Housing Stability:     Unable to Pay for Housing in the Last Year: Not on file    Number of [place] in the Last Year: Not on file    Unstable Housing in the Last Year: Not on file       Family History   Problem Relation Age of Onset    Stroke Father     Breast Cancer Mother 48    Other Sister         homeless    Diabetes Maternal Grandmother        Allergies:  Bee venom, Tetracyclines & related, and Ace inhibitors    Home Medications:  Prior to Admission medications    Medication Sig Start Date End Date Taking? Authorizing Provider   pregabalin (LYRICA) 50 MG capsule Take 1 capsule by mouth 2 times daily for 30 days.  3/7/22 4/6/22  Regina Demarco MD   Dulaglutide 0.75 MG/0.5ML SOPN Inject 0.75 mg into the skin once a week 2/22/22   Som Mcduffie MD   losartan (COZAAR) 100 MG tablet take 1 tablet by mouth once daily 1/18/22   Som Mcduffie MD   omeprazole (PRILOSEC) 40 MG delayed release capsule Take 1 capsule by mouth daily 10/22/21   Anitha Fiore MD   ticagrelor (BRILINTA) 90 MG TABS tablet Take 1 tablet by mouth 2 times daily 9/17/21   BRITTON Tate NP   atorvastatin (LIPITOR) 80 MG tablet take 1 tablet by mouth once daily 5/21/21   Anitha Fiore MD   ezetimibe (ZETIA) 10 MG tablet Take 10 mg by mouth daily    Historical Provider, MD   metFORMIN (GLUCOPHAGE-XR) 500 MG extended release tablet take 1 tablet by mouth once daily for 2 weeks then 1 tablet twice a day 2/22/21   Anitha Fiore MD   ranolazine (RANEXA) 500 MG extended release tablet Take 1 tablet by mouth 2 times daily  Patient taking differently: Take 1,000 mg by mouth 2 times daily  1/15/21   Hortensia Devine MD   carvedilol (COREG) 6.25 MG tablet Take 1 tablet by mouth 2 times daily (with meals) 1/15/21   Aviva Banerjee MD   nitroGLYCERIN (NITROSTAT) 0.4 MG SL tablet place 1 tablet under the tongue if needed every 5 minutes for chest pain for 3 doses IF NO RELIEF AFTER FIRST DOSE CALL PRESCRIBER . 9/13/20   Anitha Fiore MD   amLODIPine (NORVASC) 5 MG tablet Take 10 mg by mouth daily  3/4/19   Historical Provider, MD   hydrochlorothiazide (HYDRODIURIL) 25 MG tablet Take 25 mg by mouth daily    Historical Provider, MD   aspirin 81 MG tablet Take 1 tablet by mouth daily (with breakfast) 8/19/16   Anitha Fiore MD       REVIEW OF SYSTEMS    (2-9 systems for level 4, 10 or more for level 5)      Review of Systems   Constitutional: Negative for activity change, chills and fever. HENT: Negative for congestion, sinus pressure, sinus pain and sore throat. Eyes: Negative for pain and itching. Respiratory: Negative for cough and shortness of breath. Cardiovascular: Positive for chest pain. Gastrointestinal: Negative for abdominal distention, abdominal pain, constipation, diarrhea and nausea. Endocrine: Negative for polyuria. Genitourinary: Negative for dysuria and frequency.    Musculoskeletal: Negative for arthralgias. Skin: Negative for rash. Neurological: Negative for light-headedness and headaches. PHYSICAL EXAM   (up to 7 for level 4, 8 or more for level 5)      INITIAL VITALS:   BP (!) 155/78   Pulse 77   Temp 98.4 °F (36.9 °C) (Oral)   Resp 20   Ht 5' 4\" (1.626 m)   Wt 230 lb (104.3 kg)   SpO2 93%   BMI 39.48 kg/m²     Physical Exam  Vitals reviewed. Constitutional:       General: She is not in acute distress. HENT:      Head: Normocephalic and atraumatic. Ears:      Comments: Hearing grossly normal     Nose: Nose normal.      Mouth/Throat:      Mouth: Mucous membranes are moist.      Pharynx: Oropharynx is clear. Eyes:      General: No scleral icterus. Conjunctiva/sclera: Conjunctivae normal.      Pupils: Pupils are equal, round, and reactive to light. Cardiovascular:      Rate and Rhythm: Normal rate and regular rhythm. Pulses: Normal pulses. Comments: Intact symmetrical radial pulses  Pulmonary:      Effort: Pulmonary effort is normal. No respiratory distress. Breath sounds: Normal breath sounds. Comments: CTA BL without wheezes or rhonchi  Abdominal:      General: There is no distension. Tenderness: There is no abdominal tenderness. There is no guarding. Musculoskeletal:      Cervical back: No muscular tenderness. Right lower leg: No edema. Left lower leg: No edema. Skin:     General: Skin is warm and dry. Capillary Refill: Capillary refill takes less than 2 seconds. Neurological:      General: No focal deficit present. Mental Status: She is alert and oriented to person, place, and time. Mental status is at baseline.    Psychiatric:         Mood and Affect: Mood normal.         DIFFERENTIAL  DIAGNOSIS     PLAN (LABS / IMAGING / EKG):  Orders Placed This Encounter   Procedures    COVID-19, Rapid    XR CHEST PORTABLE    Basic Metabolic Panel    CBC with Auto Differential    Troponin    Troponin    Telemetry monitoring - continuous duration    EKG 12 Lead    Place in Observation Service       MEDICATIONS ORDERED:  No orders of the defined types were placed in this encounter. DIAGNOSTIC RESULTS / EMERGENCY DEPARTMENT COURSE / MDM   LAB RESULTS:  Results for orders placed or performed during the hospital encounter of 03/29/22   COVID-19, Rapid    Specimen: Nasopharyngeal Swab   Result Value Ref Range    Specimen Description . NASOPHARYNGEAL SWAB     SARS-CoV-2, Rapid Not Detected Not Detected   Basic Metabolic Panel   Result Value Ref Range    Glucose 97 70 - 99 mg/dL    BUN 14 6 - 20 mg/dL    CREATININE 0.72 0.50 - 0.90 mg/dL    Calcium 10.5 (H) 8.6 - 10.4 mg/dL    Sodium 138 135 - 144 mmol/L    Potassium 4.0 3.7 - 5.3 mmol/L    Chloride 104 98 - 107 mmol/L    CO2 24 20 - 31 mmol/L    Anion Gap 10 9 - 17 mmol/L    GFR Non-African American >60 >60 mL/min    GFR African American >60 >60 mL/min    GFR Comment         CBC with Auto Differential   Result Value Ref Range    WBC 5.4 3.5 - 11.3 k/uL    RBC 5.08 3.95 - 5.11 m/uL    Hemoglobin 14.2 11.9 - 15.1 g/dL    Hematocrit 42.4 36.3 - 47.1 %    MCV 83.5 82.6 - 102.9 fL    MCH 28.0 25.2 - 33.5 pg    MCHC 33.5 28.4 - 34.8 g/dL    RDW 13.7 11.8 - 14.4 %    Platelets 050 783 - 922 k/uL    MPV 10.0 8.1 - 13.5 fL    NRBC Automated 0.0 0.0 per 100 WBC    Seg Neutrophils 49 36 - 65 %    Lymphocytes 39 24 - 43 %    Monocytes 9 3 - 12 %    Eosinophils % 2 1 - 4 %    Basophils 1 0 - 2 %    Immature Granulocytes 0 0 %    Segs Absolute 2.65 1.50 - 8.10 k/uL    Absolute Lymph # 2.11 1.10 - 3.70 k/uL    Absolute Mono # 0.48 0.10 - 1.20 k/uL    Absolute Eos # 0.11 0.00 - 0.44 k/uL    Basophils Absolute 0.04 0.00 - 0.20 k/uL    Absolute Immature Granulocyte <0.03 0.00 - 0.30 k/uL   Troponin   Result Value Ref Range    Troponin, High Sensitivity 9 0 - 14 ng/L   Troponin   Result Value Ref Range    Troponin, High Sensitivity 7 0 - 14 ng/L       IMPRESSION: Gisela Diez is a 61 y.o. woman presenting for chest pain w/+ hx of CAD s/p one stent and possible reported complication of this stent. States her prior chest pain has felt the same. Concern for ACS. Considered PE however now SOB or tachycardia noted. She does have a concerning EKG with ST elevations in V1 and depressions in V3 with inverted T waves in aVL and V2-6, however it does appear unchanged from prior EKG. Even with normal troponins the patient will have a heart score of 5. Will complete ACS work-up and if negative admit to observation unit for further cardiac evaluation. RADIOLOGY:  XR CHEST PORTABLE    Result Date: 3/29/2022  No acute process. EKG  Normal rate, sinus, normal intervals, subtle ST segment elevation in lead V1 and possibly aVR, ST segment depressions 3 3 through V5 with T wave inversions in V3 through V6, and aVL. Axis is normal.  EKG is unchanged from prior. All EKG's are interpreted by the Emergency Department Physician who either signs or Co-signs this chart in the absence of a cardiologist.    EMERGENCY DEPARTMENT COURSE:  Patient seen and evaluated, VSS and nontoxic in appearance. ED work-up demonstrates normal kidney function and unremarkable electrolytes, normal blood counts, troponin of 9 and 7, negative Covid and chest x-ray stable from prior exam.  EKG as stated above is unchanged. Discussed findings with patient and recommended observation admission for further cardiac evaluation. She is agreeable to the plan. She was a difficult IV placement and had multiple IV failures in the ER. No IV medications are currently ordered and so the line was not replaced after most recent failure. Patient understands that she may need to have a new IV placed while in the hospital overnight. PROCEDURES:  none    CONSULTS:  IP CONSULT TO CARDIOLOGY    CRITICAL CARE:  See attending note    FINAL IMPRESSION      1.  Chest pain, unspecified type          DISPOSITION / [address] Admitted 03/29/2022 02:28:31 PM      PATIENT REFERRED TO:  No follow-up provider specified.     DISCHARGE MEDICATIONS:  New Prescriptions    No medications on file       Marizol Ortiz DO  Emergency Medicine Resident    (Please note that portions of thisnote were completed with a voice recognition program.  Efforts were made to edit the dictations but occasionally words are mis-transcribed.)       Marizol Ortiz DO  Resident  03/29/22 [id number]

## 2022-03-29 NOTE — H&P
[facility]  CDU / OBSERVATION ENCOUNTER  ATTENDING NOTE     Pt Name: Rachel Jacques  MRN: [id number]  [place] 1965  Date of evaluation: 3/29/22  Patient's PCP is :  Celena Newman MD    CHIEF COMPLAINT       Chief Complaint   Patient presents with    Chest Pain         HISTORY OF PRESENT ILLNESS    Rachel Jacques is a 61 y.o. female who presents with complaints of chest pain. Patient has history of coronary artery disease and is having similar pain to prior episodes of exacerbations. Patient has been worked up in the emergency department with negative enzymes and EKGs. Patient otherwise well. Patient is being brought in further cardiac evaluation. Location/Symptom: Substernal chest discomfort  Timing/Onset: Hour prior to presentation  Provocation: Uncertain  Quality: Similar to prior cardiac issues  Radiation: None  Severity: Moderate now improved  Timing/Duration: Hours  Modifying Factors: Unclear    REVIEW OF SYSTEMS        General ROS - No fevers, No malaise   Ophthalmic ROS - No discharge, No changes in vision  ENT ROS -  No sore throat, No rhinorrhea,   Respiratory ROS - no shortness of breath, no cough, no  wheezing  Cardiovascular ROS -  chest pain, no dyspnea on exertion  Gastrointestinal ROS - No abdominal pain, no nausea or vomiting, no change in bowel habits, no black or bloody stools  Genito-Urinary ROS - No dysuria, trouble voiding, or hematuria  Musculoskeletal ROS - No myalgias, No arthalgias  Neurological ROS - No headache, no dizziness/lightheadedness, No focal weakness, no loss of sensation  Dermatological ROS - No lesions, No rash     (PQRS) Advance directives on face sheet per hospital policy.  No change unless specifically mentioned in chart    PAST MEDICAL HISTORY    has a past medical history of Abnormal stress test, Allergic rhinitis, Arthritis, CAD (coronary artery disease), Chronic back pain, Diabetes mellitus ([facility] [address] Former smoker, Hyperlipidemia, Hypertension, Murmur, cardiac, SRI on CPAP, Wears prescription eyeglasses, and Wellness examination. I have reviewed the past medical history with the patient and it is  pertinent to this complaint. SURGICAL HISTORY      has a past surgical history that includes Tonsillectomy; Foot surgery; Lumbar disc surgery; Endometrial ablation; Tubal ligation; Hysterectomy; Carpal tunnel release (Right); Colonoscopy (N/A, 2018); Injection Procedure For Sacroiliac Joint (Left, 2019);  section; Hand tendon surgery (Right); Anesthesia Nerve Block (Left, 2019); Cardiac catheterization (2017); Cardiac catheterization (2019); Cardiac catheterization (2018); Cardiac catheterization (02/10/2020); Nerve Block (2020); Anesthesia Nerve Block (Bilateral, 2020); eye surgery (2014); Nerve Block (Bilateral, 2020); Anesthesia Nerve Block (Bilateral, 2020); Nerve Block (Left, 2020); Pain management procedure (Left, 2020); Nerve Block (Right, 2020); and Pain management procedure (Right, 2020). I have reviewed and agree with Surgical History entered and it is  pertinent to this complaint.      CURRENT MEDICATIONS     amLODIPine (NORVASC) tablet 10 mg, Daily  [START ON 3/30/2022] aspirin chewable tablet 81 mg, Daily with breakfast  atorvastatin (LIPITOR) tablet 80 mg, Nightly  carvedilol (COREG) tablet 6.25 mg, BID WC  hydroCHLOROthiazide (HYDRODIURIL) tablet 25 mg, Daily  [START ON 3/30/2022] losartan (COZAAR) tablet 100 mg, Daily  [START ON 3/30/2022] pantoprazole (PROTONIX) tablet 40 mg, QAM AC  pregabalin (LYRICA) capsule 50 mg, BID  ranolazine (RANEXA) extended release tablet 1,000 mg, BID  ticagrelor (BRILINTA) tablet 90 mg, BID  sodium chloride flush 0.9 % injection 5-40 mL, 2 times per day  sodium chloride flush 0.9 % injection 5-40 mL, PRN  0.9 % sodium chloride infusion, PRN  enoxaparin (LOVENOX) injection 30 mg, BID  ondansetron (ZOFRAN-ODT) disintegrating tablet 4 mg, Q8H PRN   Or  ondansetron (ZOFRAN) injection 4 mg, Q6H PRN  acetaminophen (TYLENOL) tablet 650 mg, Q6H PRN   Or  acetaminophen (TYLENOL) suppository 650 mg, Q6H PRN  polyethylene glycol (GLYCOLAX) packet 17 g, Daily  sennosides-docusate sodium (SENOKOT-S) 8.6-50 MG tablet 1 tablet, BID  magnesium hydroxide (MILK OF MAGNESIA) 400 MG/5ML suspension 30 mL, Daily PRN  insulin lispro (HUMALOG) injection vial 0-6 Units, TID WC  insulin lispro (HUMALOG) injection vial 0-3 Units, Nightly  glucose (GLUTOSE) 40 % oral gel 15 g, PRN  dextrose 50 % IV solution, PRN  glucagon (rDNA) injection 1 mg, PRN  dextrose 5 % solution, PRN        All medication charted and reviewed. ALLERGIES     is allergic to bee venom, tetracyclines & related, and ace inhibitors. FAMILY HISTORY     She indicated that her mother is alive. She indicated that her father is . She indicated that the status of her sister is unknown. She indicated that her brother is alive. She indicated that her maternal grandmother is . She indicated that her maternal grandfather is . She indicated that her paternal grandmother is . She indicated that her paternal grandfather is . family history includes Breast Cancer (age of onset: 48) in her mother; Diabetes in her maternal grandmother; Other in her sister; Stroke in her father. The patient denies any pertinent family history. I have reviewed and agree with the family history entered. I have reviewed the Family History and it is not significant to the case    SOCIAL HISTORY      reports that she quit smoking about 3 years ago. Her smoking use included cigarettes. She has a 7.50 pack-year smoking history. She has never used smokeless tobacco. She reports that she does not drink alcohol and does not use drugs. I have reviewed and agree with all Social.  There are no  concerns for substance abuse/use.     PHYSICAL EXAM     INITIAL VITALS: height is 5' 4\" (1.626 m) and weight is 230 lb (104.3 kg). Her oral temperature is 97.7 °F (36.5 °C). Her blood pressure is 154/90 (abnormal) and her pulse is 73. Her respiration is 18 and oxygen saturation is 99%. CONSTITUTIONAL: AOx4, no apparent distress, appears stated age     HEAD: normocephalic, atraumatic   EYES: PERRLA, EOMI    ENT: moist mucous membranes, uvula midline   NECK: supple, symmetric   BACK: symmetric   LUNGS: clear to auscultation bilaterally   CARDIOVASCULAR: regular rate and rhythm, no murmurs, rubs or gallops   ABDOMEN: soft, non-tender, non-distended with normal active bowel sounds   NEUROLOGIC:  MAEx4, no focal sensory or motor deficits   MUSCULOSKELETAL: no clubbing, cyanosis or edema   SKIN: no rash or wounds       DIFFERENTIAL DIAGNOSIS/MDM:     Per ED note: Roxy Hamilton is a 61 y.o. woman presenting for chest pain w/+ hx of CAD s/p one stent and possible reported complication of this stent. States her prior chest pain has felt the same. Concern for ACS. Considered PE however now SOB or tachycardia noted. She does have a concerning EKG with ST elevations in V1 and depressions in V3 with inverted T waves in aVL and V2-6, however it does appear unchanged from prior EKG. Even with normal troponins the patient will have a heart score of 5.   Will complete ACS work-up and if negative admit to observation unit for further cardiac evaluation    DIAGNOSTIC RESULTS     EKG: All EKG's are interpreted by the Observation Physician who either signs or Co-signs this chart in the absence of a cardiologist.    EKG Interpretation    Interpreted by observation physician    Rhythm: normal sinus   Rate: normal  Axis: normal  Ectopy: none  Conduction: normal  ST Segments: no acute change  T Waves: no acute change  Q Waves: none    Clinical Impression: non-specific EKG    Pérez Wolfe MD         RADIOLOGY:   I directly visualized the following  images and reviewed the radiologist interpretations:    XR CHEST PORTABLE    Result Date: 3/29/2022  EXAMINATION: ONE XRAY VIEW OF THE CHEST 3/29/2022 11:55 am COMPARISON: 09/15/2021 HISTORY: ORDERING SYSTEM PROVIDED HISTORY: CP TECHNOLOGIST PROVIDED HISTORY: CP Reason for Exam: Chest pain/  AP erect/ port. FINDINGS: The lungs are without acute focal process. There is no effusion or pneumothorax. The cardiomediastinal silhouette is stable. The osseous structures are stable. No acute process. LABS:  I have reviewed and interpreted all available lab results. Labs Reviewed   BASIC METABOLIC PANEL - Abnormal; Notable for the following components:       Result Value    Calcium 10.5 (*)     All other components within normal limits   COVID-19, RAPID   CBC WITH AUTO DIFFERENTIAL   TROPONIN   TROPONIN   POC GLUCOSE FINGERSTICK   POCT GLUCOSE   POCT GLUCOSE         CDU SANDOVAL / Jose Morris is a 61 y.o. female who presents with known cardiac disease. Patient with similar symptoms to prior episodes of cardiac ischemia. Patient admitted for cardiology    · Chest pain, uncertain etiology, acute, admitted for cardiology eval  · Serial EKGs and troponins  · Ongoing supportive therapy  · Cardiology consult  · Due to known history and prior stenting we will hold off on ordering testing without cardiologist involvement  · Continue home medications and pain control  · Monitor vitals, labs, and imaging  · DISPO: pending consults and clinical improvement    CONSULTS:    IP CONSULT TO CARDIOLOGY    PROCEDURES:  Not indicated        PATIENT REFERRED TO:    No follow-up provider specified. --  Jay Avendaño MD   Emergency Medicine Attending    This dictation was generated by voice recognition computer software. Although all attempts are made to edit the dictation for accuracy, there may be errors in the transcription that are not intended.

## 2022-03-29 NOTE — CARE COORDINATION
Case Management Initial Discharge Plan  Rebecca La             Met with:patient to discuss discharge plans. Information verified: address, contacts, phone number, , insurance Yes  Insurance Provider: Lora Campos    Emergency Contact/Next of Kin name & number: Divina Acuna, spouse [phone]  Who are involved in patient's support system? Spouse     PCP: Funmi Ramos MD  Date of last visit: 7 weeks ago       Discharge Planning    Living Arrangements:  Spouse/Significant Other     Home has 1 stories  1 stairs to climb to get into front door, na stairs to climb to reach second floor  Location of bedroom/bathroom in home main level     Patient able to perform ADL's:Independent    Current Services (outpatient & in home) none   DME equipment: Cpap  DME provider: na     Is patient receiving oral anticoagulation therapy? Brilinta     If indicated:   Physician managing anticoagulation treatment: [facility] cardiology   Where does patient obtain lab work for ATC treatment? na    Does patient have any issues/concerns obtaining medications? no  If yes, what are patient's concerns? Is there a preferred Pharmacy after hours or on weekends? No    If yes, which pharmacy? Potential Assistance Needed:  N/A    Patient agreeable to home care: No  [place] of choice provided:  n/a    Prior SNF/Rehab Placement and Facility: none   Agreeable to SNF/Rehab: No  [place] of choice provided: n/a     Evaluation: no    Expected Discharge date:  22    Patient expects to be discharged to: If home: is the family and/or caregiver wiling & able to provide support at home? Yes   Who will be providing this support?  Spouse     Follow Up Appointment: Best Day/ Time: Monday AM    Transportation provider: spouse   Transportation arrangements needed for discharge: No    Readmission Risk              Risk of Unplanned Readmission:  0             Does patient have a readmission risk score greater than 14?: No  If yes, follow-up appointment must be made within 7 days of discharge. Goals of Care:  For this CP to go away      Educated patient  on transitional options, provided freedom of choice and are agreeable with plan      Discharge Plan: home independently           Electronically signed by Jing Raphael RN on 3/29/22 at 6:15 PM EDT

## 2022-03-29 NOTE — ED PROVIDER NOTES
[address] [facility]     Emergency Department     Faculty Attestation    I performed a history and physical examination of the patient and discussed management with the resident. I reviewed the residents note and agree with the documented findings and plan of care. Any areas of disagreement are noted on the chart. I was personally present for the key portions of any procedures. I have documented in the chart those procedures where I was not present during the key portions. I have reviewed the emergency nurses triage note. I agree with the chief complaint, past medical history, past surgical history, allergies, medications, social and family history as documented unless otherwise noted below. For Physician Assistant/ Nurse Practitioner cases/documentation I have personally evaluated this patient and have completed at least one if not all key elements of the E/M (history, physical exam, and MDM). Additional findings are as noted. Patient with history of coronary artery disease with stent placement in the past presents with chest pain. She says it started while she was at work about 2 hours ago just typing at the computer. She says she was not doing anything stressful or exertional at the time. She says she is still currently having the pain. She denies fever, cough, shortness of breath, abdominal pain, nausea or vomiting. On exam, patient is lying in the bed and appears mildly anxious. Lungs clear to auscultation bilaterally heart sounds are normal.  Abdomen is soft and nontender. No rebound or guarding is present. The bilateral calves are nontender nonswollen. Will get EKG, chest x-ray, labs and plan to admit for further cardiac evaluation.     EKG Interpretation    Interpreted by emergency department physician    Rhythm: normal sinus   Rate: normal  Axis: normal  Ectopy: none  Conduction: normal  ST Segments: no acute change  T Waves: no acute change  Q Waves: nonspecific    Clinical Impression: no acute changes and non-specific EKG    MD Rosana Weir MD  Attending Emergency  Physician              Ralph Garcia MD  03/29/22 [id number]

## 2022-03-30 ENCOUNTER — APPOINTMENT (OUTPATIENT)
Dept: NUCLEAR MEDICINE | Age: 57
End: 2022-03-30
Payer: COMMERCIAL

## 2022-03-30 VITALS
OXYGEN SATURATION: 95 % | TEMPERATURE: 97.4 F | BODY MASS INDEX: 39.27 KG/M2 | HEIGHT: 64 IN | WEIGHT: 230 LBS | SYSTOLIC BLOOD PRESSURE: 135 MMHG | RESPIRATION RATE: 20 BRPM | HEART RATE: 74 BPM | DIASTOLIC BLOOD PRESSURE: 79 MMHG

## 2022-03-30 LAB
EKG ATRIAL RATE: 79 BPM
EKG P AXIS: 54 DEGREES
EKG P-R INTERVAL: 144 MS
EKG Q-T INTERVAL: 408 MS
EKG QRS DURATION: 86 MS
EKG QTC CALCULATION (BAZETT): 467 MS
EKG R AXIS: 22 DEGREES
EKG T AXIS: 154 DEGREES
EKG VENTRICULAR RATE: 79 BPM
GLUCOSE BLD-MCNC: 120 MG/DL (ref 65–105)
LV EF: 53 %
LVEF MODALITY: NORMAL

## 2022-03-30 PROCEDURE — 3430000000 HC RX DIAGNOSTIC RADIOPHARMACEUTICAL: Performed by: INTERNAL MEDICINE

## 2022-03-30 PROCEDURE — 93010 ELECTROCARDIOGRAM REPORT: CPT | Performed by: INTERNAL MEDICINE

## 2022-03-30 PROCEDURE — A9500 TC99M SESTAMIBI: HCPCS | Performed by: INTERNAL MEDICINE

## 2022-03-30 PROCEDURE — 82947 ASSAY GLUCOSE BLOOD QUANT: CPT

## 2022-03-30 PROCEDURE — 6360000002 HC RX W HCPCS: Performed by: INTERNAL MEDICINE

## 2022-03-30 PROCEDURE — 78452 HT MUSCLE IMAGE SPECT MULT: CPT

## 2022-03-30 PROCEDURE — G0378 HOSPITAL OBSERVATION PER HR: HCPCS

## 2022-03-30 PROCEDURE — 93017 CV STRESS TEST TRACING ONLY: CPT

## 2022-03-30 PROCEDURE — 93005 ELECTROCARDIOGRAM TRACING: CPT | Performed by: STUDENT IN AN ORGANIZED HEALTH CARE EDUCATION/TRAINING PROGRAM

## 2022-03-30 PROCEDURE — 6370000000 HC RX 637 (ALT 250 FOR IP): Performed by: STUDENT IN AN ORGANIZED HEALTH CARE EDUCATION/TRAINING PROGRAM

## 2022-03-30 PROCEDURE — 2580000003 HC RX 258: Performed by: INTERNAL MEDICINE

## 2022-03-30 RX ORDER — SODIUM CHLORIDE 9 MG/ML
500 INJECTION, SOLUTION INTRAVENOUS CONTINUOUS PRN
Status: DISCONTINUED | OUTPATIENT
Start: 2022-03-30 | End: 2022-03-30 | Stop reason: ALTCHOICE

## 2022-03-30 RX ORDER — METOPROLOL TARTRATE 5 MG/5ML
5 INJECTION INTRAVENOUS EVERY 5 MIN PRN
Status: DISCONTINUED | OUTPATIENT
Start: 2022-03-30 | End: 2022-03-30 | Stop reason: ALTCHOICE

## 2022-03-30 RX ORDER — SODIUM CHLORIDE 0.9 % (FLUSH) 0.9 %
5-40 SYRINGE (ML) INJECTION PRN
Status: DISCONTINUED | OUTPATIENT
Start: 2022-03-30 | End: 2022-03-30 | Stop reason: ALTCHOICE

## 2022-03-30 RX ORDER — AMINOPHYLLINE DIHYDRATE 25 MG/ML
50 INJECTION, SOLUTION INTRAVENOUS PRN
Status: DISCONTINUED | OUTPATIENT
Start: 2022-03-30 | End: 2022-03-30 | Stop reason: ALTCHOICE

## 2022-03-30 RX ORDER — ATROPINE SULFATE 0.1 MG/ML
0.5 INJECTION INTRAVENOUS EVERY 5 MIN PRN
Status: DISCONTINUED | OUTPATIENT
Start: 2022-03-30 | End: 2022-03-30 | Stop reason: ALTCHOICE

## 2022-03-30 RX ORDER — NITROGLYCERIN 0.4 MG/1
0.4 TABLET SUBLINGUAL EVERY 5 MIN PRN
Status: DISCONTINUED | OUTPATIENT
Start: 2022-03-30 | End: 2022-03-30 | Stop reason: ALTCHOICE

## 2022-03-30 RX ORDER — SODIUM CHLORIDE 0.9 % (FLUSH) 0.9 %
10 SYRINGE (ML) INJECTION PRN
Status: DISCONTINUED | OUTPATIENT
Start: 2022-03-30 | End: 2022-03-30 | Stop reason: HOSPADM

## 2022-03-30 RX ORDER — ALBUTEROL SULFATE 90 UG/1
2 AEROSOL, METERED RESPIRATORY (INHALATION) PRN
Status: DISCONTINUED | OUTPATIENT
Start: 2022-03-30 | End: 2022-03-30 | Stop reason: ALTCHOICE

## 2022-03-30 RX ADMIN — CARVEDILOL 6.25 MG: 6.25 TABLET, FILM COATED ORAL at 14:24

## 2022-03-30 RX ADMIN — HYDROCHLOROTHIAZIDE 25 MG: 25 TABLET ORAL at 14:25

## 2022-03-30 RX ADMIN — DOCUSATE SODIUM 50 MG AND SENNOSIDES 8.6 MG 1 TABLET: 8.6; 5 TABLET, FILM COATED ORAL at 14:24

## 2022-03-30 RX ADMIN — SODIUM CHLORIDE, PRESERVATIVE FREE 10 ML: 5 INJECTION INTRAVENOUS at 10:10

## 2022-03-30 RX ADMIN — TETRAKIS(2-METHOXYISOBUTYLISOCYANIDE)COPPER(I) TETRAFLUOROBORATE 13.1 MILLICURIE: 1 INJECTION, POWDER, LYOPHILIZED, FOR SOLUTION INTRAVENOUS at 10:10

## 2022-03-30 RX ADMIN — PREGABALIN 50 MG: 50 CAPSULE ORAL at 14:25

## 2022-03-30 RX ADMIN — SODIUM CHLORIDE, PRESERVATIVE FREE 10 ML: 5 INJECTION INTRAVENOUS at 11:54

## 2022-03-30 RX ADMIN — RANOLAZINE 1000 MG: 500 TABLET, FILM COATED, EXTENDED RELEASE ORAL at 14:24

## 2022-03-30 RX ADMIN — PANTOPRAZOLE SODIUM 40 MG: 40 TABLET, DELAYED RELEASE ORAL at 14:24

## 2022-03-30 RX ADMIN — ASPIRIN 81 MG: 81 TABLET, CHEWABLE ORAL at 14:24

## 2022-03-30 RX ADMIN — LOSARTAN POTASSIUM 100 MG: 50 TABLET, FILM COATED ORAL at 14:23

## 2022-03-30 RX ADMIN — TICAGRELOR 90 MG: 90 TABLET ORAL at 14:23

## 2022-03-30 RX ADMIN — SODIUM CHLORIDE, PRESERVATIVE FREE 10 ML: 5 INJECTION INTRAVENOUS at 11:34

## 2022-03-30 RX ADMIN — REGADENOSON 0.4 MG: 0.08 INJECTION, SOLUTION INTRAVENOUS at 11:32

## 2022-03-30 RX ADMIN — TETRAKIS(2-METHOXYISOBUTYLISOCYANIDE)COPPER(I) TETRAFLUOROBORATE 36.8 MILLICURIE: 1 INJECTION, POWDER, LYOPHILIZED, FOR SOLUTION INTRAVENOUS at 11:54

## 2022-03-30 ASSESSMENT — PAIN DESCRIPTION - FREQUENCY: FREQUENCY: INTERMITTENT

## 2022-03-30 ASSESSMENT — PAIN SCALES - GENERAL: PAINLEVEL_OUTOF10: 5

## 2022-03-30 ASSESSMENT — PAIN DESCRIPTION - LOCATION: LOCATION: RIB CAGE

## 2022-03-30 ASSESSMENT — PAIN DESCRIPTION - ONSET: ONSET: ON-GOING

## 2022-03-30 ASSESSMENT — PAIN DESCRIPTION - ORIENTATION: ORIENTATION: RIGHT;LOWER

## 2022-03-30 ASSESSMENT — PAIN DESCRIPTION - DESCRIPTORS: DESCRIPTORS: SORE;DISCOMFORT

## 2022-03-30 ASSESSMENT — PAIN DESCRIPTION - PAIN TYPE: TYPE: ACUTE PAIN

## 2022-03-30 NOTE — PROGRESS NOTES
Discharge teaching and instructions completed with patient using teachback method. AVS reviewed. n Patient voiced understanding regarding follow up appointments, and care of self at home. Discharged home with family. All questions answered.

## 2022-03-30 NOTE — PROGRESS NOTES
for CP. Stress negative, low risk. EF 70%  13. Cardiac cath 3/12/19 Moderate ostial LCX disease, POBA mid LAD stent. Transient ST elevation for which a RCA was injected again and there was no lesion. She was started on Norvasc. 14. Lipid panel dated 05/18/2019 showed a total cholesterol 133, LDL 62, HDL 53  15. Cardiac catheterization 02/10/2020 showed normal left main, patent mid LAD stent with luminal irregularities 20-30%, ostial diagonal 70% jailed by the stent small vessel, left circumflex 20-30% LI, RCA 20-30% LI, EF 60%. 16. Stress test 12/09/2020 showed no ischemia, moderate size basal lateral infarct, EF 60%, no wall motion abnormalities, intermediate risk  17. Cardiac catheterization 01/15/2021 showed normal left main, patent mid LAD stent with 30% stenosis, Jewel D2 small with 90% ostial lesion, left circumflex 30% ostial stenosis, OM1 proximal 20% stenosis, RCA 10-20%. No significant change as compared to last cath. 18. Lipid panel 11/15/2021 showed LDL 92, cholesterol 172, HDL 59, triglyceride 105  19. Transthoracic echocardiogram 02/23/2021 showed LV ejection fraction 60-65%. No significant valve abnormalities. 20. Carotid ultrasound dated 02/23/2021 showed mild less than 50% stenosis of internal carotid arteries bilaterally. Bilateral antegrade vertebral artery flow noted. 21. Cath 9/16/21 LM 10-20%, LAD mild dz with patent mid stent, malposition of stent (PTCA), LCx 10-20%, RCA 10-20%    Plan --   Bowen Burroughs is stable from cardiac perspective denies any symptoms which are construed toward angina congestive heart failure. Continue aspirin and Brilinta for secondary prevention. Continue carvedilol Cozaar amlodipine for history of hypertension. Blood pressure stable. She is to continue Ranexa for antianginal management. . Continue atorvastatin and ezetimibe. I will obtain lipid panel.  I have recommended her to discuss Jardiance 10 mg oral once a day with her primary care provider for cardiovascular benefit. Diet and exercise recommended. Return to clinic in 6 months time for follow-up.     [facility]

## 2022-03-30 NOTE — CONSULTS
Attestation signed by      Attending Physician Statement:    I have discussed the care of  Roxy Hamilton , including pertinent history and exam findings, with the Cardiology fellow/resident. I have seen and examined the patient and the key elements of all parts of the encounter have been performed by me. I agree with the assessment, plan and orders as documented by the fellow/resident, after I modified exam findings and plan of treatments, and the final version is my approved version of the assessment. Additional Comments:     Patient with significant history of PCI to LAD and moderate nonobstructive disease in the RCA, last angiogram in September 2021 requiring balloon dilatation of LAD stent, admitted with episode of angina yesterday. ECG with LVH and baseline T wave inversions in anterolateral leads, same as before. Troponins negative. Recommend pharmacological stress test.  If no stress induced ischemia, will optimize antianginal therapy and follow closely in the office. [facility]    Consult / H&P               Today's Date: 3/30/2022  Patient Name: Roxy Hamilton  Date of admission: 3/29/2022 11:28 AM  Patient's age: 61 y.o., 1965  Admission Dx: Chest pain [R07.9]  Chest pain, unspecified type [R07.9]    Reason for Consult:  Cardiac evaluation    Requesting Physician: Pérez Wolfe MD    CHIEF COMPLAINT:  Chest pain     History Obtained From:  patient    HISTORY OF PRESENT ILLNESS:      The patient is a 61 y.o.  female who is admitted to the hospital for Chest Pain  Roxy Hamilton complains of chest pain. Onset was 1 day ago. Symptoms have improved since that time. The patient's pain is constant. The patient describes the pain as tightness and located in the right chest and radiates to left shoulder. Patient rates pain as a 6/10 in intensity. Associated symptoms are: chest pain, chest pressure/discomfort and dyspnea. Aggravating factors are: none. Alleviating factors are: none. Patient's cardiac risk factors are: none. Patient's risk factors for DVT/PE: none. Previous cardiac testing: multiple heart caths in 2018, , . NASIR to LAD with PD 2x . States that this pain is similar to previous chest pain that prompted her to get a stent placed. No smoking, alcohol or illegal drugs. Past Medical History:   has a past medical history of Abnormal stress test, Allergic rhinitis, Arthritis, CAD (coronary artery disease), Chronic back pain, Diabetes mellitus ([address] Former smoker, Hyperlipidemia, Hypertension, Murmur, cardiac, SRI on CPAP, Wears prescription eyeglasses, and Wellness examination. Past Surgical History:   has a past surgical history that includes Tonsillectomy; Foot surgery; Lumbar disc surgery; Endometrial ablation; Tubal ligation; Hysterectomy; Carpal tunnel release (Right); Colonoscopy (N/A, 2018); Injection Procedure For Sacroiliac Joint (Left, 2019);  section; Hand tendon surgery (Right); Anesthesia Nerve Block (Left, 2019); Cardiac catheterization (2017); Cardiac catheterization (2019); Cardiac catheterization (2018); Cardiac catheterization (02/10/2020); Nerve Block (2020); Anesthesia Nerve Block (Bilateral, 2020); eye surgery (2014); Nerve Block (Bilateral, 2020); Anesthesia Nerve Block (Bilateral, 2020); Nerve Block (Left, 2020); Pain management procedure (Left, 2020); Nerve Block (Right, 2020); and Pain management procedure (Right, 2020). Home Medications:    Prior to Admission medications    Medication Sig Start Date End Date Taking? Authorizing Provider   Multiple Vitamins-Minerals (ONE-A-DAY 50 PLUS PO) Take 1 tablet by mouth daily   Yes Historical Provider, MD   pregabalin (LYRICA) 50 MG capsule Take 1 capsule by mouth 2 times daily for 30 days.  3/7/22 4/6/22  Juan Galvez MD   Dulaglutide 0.75 MG/0.5ML SOPN Inject 0.75 mg into the skin once a week 2/22/22   Maxine Milian MD   losartan (COZAAR) 100 MG tablet take 1 tablet by mouth once daily 1/18/22   Maxine Milian MD   omeprazole (PRILOSEC) 40 MG delayed release capsule Take 1 capsule by mouth daily 10/22/21   Carmen Anderson MD   ticagrelor (BRILINTA) 90 MG TABS tablet Take 1 tablet by mouth 2 times daily 9/17/21   BRITTON Amin NP   atorvastatin (LIPITOR) 80 MG tablet take 1 tablet by mouth once daily 5/21/21   Carmen Anderson MD   ezetimibe (ZETIA) 10 MG tablet Take 10 mg by mouth daily    Historical Provider, MD   metFORMIN (GLUCOPHAGE-XR) 500 MG extended release tablet take 1 tablet by mouth once daily for 2 weeks then 1 tablet twice a day 2/22/21   Carmen Anderson MD   ranolazine (RANEXA) 500 MG extended release tablet Take 1 tablet by mouth 2 times daily  Patient taking differently: Take 1,000 mg by mouth 2 times daily  1/15/21   Espiridion MD Darryl   carvedilol (COREG) 6.25 MG tablet Take 1 tablet by mouth 2 times daily (with meals) 1/15/21   Aviva Brady MD   nitroGLYCERIN (NITROSTAT) 0.4 MG SL tablet place 1 tablet under the tongue if needed every 5 minutes for chest pain for 3 doses IF NO RELIEF AFTER FIRST DOSE CALL PRESCRIBER . 9/13/20   Carmen Anderson MD   amLODIPine (NORVASC) 5 MG tablet Take 10 mg by mouth daily  3/4/19   Historical Provider, MD   hydrochlorothiazide (HYDRODIURIL) 25 MG tablet Take 25 mg by mouth daily    Historical Provider, MD   aspirin 81 MG tablet Take 1 tablet by mouth daily (with breakfast) 8/19/16   Carmen Anderson MD      Current Facility-Administered Medications: regadenoson (LEXISCAN) injection 0.4 mg, 0.4 mg, IntraVENous, ONCE PRN  sodium chloride flush 0.9 % injection 5-40 mL, 5-40 mL, IntraVENous, PRN  0.9 % sodium chloride infusion, 500 mL, IntraVENous, Continuous PRN  albuterol sulfate  (90 Base) MCG/ACT inhaler 2 puff, 2 puff, Inhalation, PRN  atropine injection 0.5 mg, 0.5 mg, IntraVENous, Q5 Min PRN  nitroGLYCERIN (NITROSTAT) SL tablet 0.4 mg, 0.4 mg, SubLINGual, Q5 Min PRN  metoprolol (LOPRESSOR) injection 5 mg, 5 mg, IntraVENous, Q5 Min PRN  aminophylline injection 50 mg, 50 mg, IntraVENous, PRN  aspirin chewable tablet 81 mg, 81 mg, Oral, Daily with breakfast  atorvastatin (LIPITOR) tablet 80 mg, 80 mg, Oral, Nightly  hydroCHLOROthiazide (HYDRODIURIL) tablet 25 mg, 25 mg, Oral, Daily  losartan (COZAAR) tablet 100 mg, 100 mg, Oral, Daily  pantoprazole (PROTONIX) tablet 40 mg, 40 mg, Oral, QAM AC  pregabalin (LYRICA) capsule 50 mg, 50 mg, Oral, BID  ranolazine (RANEXA) extended release tablet 1,000 mg, 1,000 mg, Oral, BID  ticagrelor (BRILINTA) tablet 90 mg, 90 mg, Oral, BID  sodium chloride flush 0.9 % injection 5-40 mL, 5-40 mL, IntraVENous, 2 times per day  sodium chloride flush 0.9 % injection 5-40 mL, 5-40 mL, IntraVENous, PRN  0.9 % sodium chloride infusion, , IntraVENous, PRN  enoxaparin (LOVENOX) injection 30 mg, 30 mg, SubCUTAneous, BID  ondansetron (ZOFRAN-ODT) disintegrating tablet 4 mg, 4 mg, Oral, Q8H PRN **OR** ondansetron (ZOFRAN) injection 4 mg, 4 mg, IntraVENous, Q6H PRN  acetaminophen (TYLENOL) tablet 650 mg, 650 mg, Oral, Q6H PRN **OR** acetaminophen (TYLENOL) suppository 650 mg, 650 mg, Rectal, Q6H PRN  polyethylene glycol (GLYCOLAX) packet 17 g, 17 g, Oral, Daily  sennosides-docusate sodium (SENOKOT-S) 8.6-50 MG tablet 1 tablet, 1 tablet, Oral, BID  magnesium hydroxide (MILK OF MAGNESIA) 400 MG/5ML suspension 30 mL, 30 mL, Oral, Daily PRN  insulin lispro (HUMALOG) injection vial 0-6 Units, 0-6 Units, SubCUTAneous, TID WC  insulin lispro (HUMALOG) injection vial 0-3 Units, 0-3 Units, SubCUTAneous, Nightly  glucose (GLUTOSE) 40 % oral gel 15 g, 15 g, Oral, PRN  dextrose 50 % IV solution, 12.5 g, IntraVENous, PRN  glucagon (rDNA) injection 1 mg, 1 mg, IntraMUSCular, PRN  dextrose 5 % solution, 100 mL/hr, IntraVENous, PRN  amLODIPine (NORVASC) tablet 10 mg, 10 mg, Oral, Nightly  carvedilol (COREG) tablet 6.25 mg, 6.25 mg, Oral, BID WC    Allergies:  Bee venom, Tetracyclines & related, and Ace inhibitors    Social History:   reports that she quit smoking about 3 years ago. Her smoking use included cigarettes. She has a 7.50 pack-year smoking history. She has never used smokeless tobacco. She reports that she does not drink alcohol and does not use drugs. Family History: family history includes Breast Cancer (age of onset: 48) in her mother; Diabetes in her maternal grandmother; Other in her sister; Stroke in her father. REVIEW OF SYSTEMS:    · Constitutional: there has been no unanticipated weight loss. There's been No change in energy level, No change in activity level. · Eyes: No visual changes or diplopia. No scleral icterus. · ENT: No Headaches  · Cardiovascular: as above   · Respiratory: No previous pulmonary problems, No cough  · Gastrointestinal: No abdominal pain. No change in bowel or bladder habits. · Genitourinary: No dysuria, trouble voiding, or hematuria. · Musculoskeletal:  No gait disturbance, No weakness or joint complaints. · Integumentary: No rash or pruritis. · Neurological: No headache, diplopia, change in muscle strength, numbness or tingling. No change in gait, balance, coordination, mood, affect, memory, mentation, behavior. · Psychiatric: No anxiety, or depression. · Endocrine: No temperature intolerance. No excessive thirst, fluid intake, or urination. No tremor. · Hematologic/Lymphatic: No abnormal bruising or bleeding, blood clots or swollen lymph nodes. · Allergic/Immunologic: No nasal congestion or hives. PHYSICAL EXAM:      /79   Pulse 74   Temp 97.4 °F (36.3 °C) (Oral)   Resp 20   Ht 5' 4\" (1.626 m)   Wt 230 lb (104.3 kg)   SpO2 95%   BMI 39.48 kg/m²    Constitutional and General Appearance: alert, cooperative, no distress and appears stated age  HEENT: PERRL, no cervical lymphadenopathy. No masses palpable.  Normal oral mucosa  Respiratory:  · Normal excursion and expansion without use of accessory muscles  · Resp Auscultation: Good respiratory effort. No for increased work of breathing. On auscultation: clear to auscultation bilaterally  Cardiovascular:  · The apical impulse is not displaced  · Heart tones are crisp and normal. regular S1 and S2.  · Jugular venous pulsation Normal  · The carotid upstroke is normal in amplitude and contour without delay or bruit  · Peripheral pulses are symmetrical and full   Abdomen:   · No masses or tenderness  · Bowel sounds present  Extremities:  ·  No Cyanosis or Clubbing  ·  Lower extremity edema: No  ·  Skin: Warm and dry  Neurological:  · Alert and oriented. · Moves all extremities well  · No abnormalities of mood, affect, memory, mentation, or behavior are noted    DATA:    Diagnostics:    EKG: normal sinus rhythm, unchanged from previous tracings. ECHO: not obtained. Stress Test: reviewed. Cardiac Angiography: reviewed. Labs:     CBC:   Recent Labs     03/29/22  1214   WBC 5.4   HGB 14.2   HCT 42.4        BMP:   Recent Labs     03/29/22  1214      K 4.0   CO2 24   BUN 14   CREATININE 0.72   LABGLOM >60   GLUCOSE 97     BNP: No results for input(s): BNP in the last 72 hours. PT/INR: No results for input(s): PROTIME, INR in the last 72 hours. APTT:No results for input(s): APTT in the last 72 hours. CARDIAC ENZYMES:No results for input(s): CKTOTAL, CKMB, CKMBINDEX, TROPONINI in the last 72 hours. FASTING LIPID PANEL:  Lab Results   Component Value Date    HDL 71 05/22/2021    TRIG 70 05/22/2021     LIVER PROFILE:No results for input(s): AST, ALT, LABALBU in the last 72 hours. IMPRESSION:    -Chest pain with typical and atypical features. S/p mulitple heart caths  - HTN  - T2DM  - SRI on cpap  RECOMMENDATIONS:  1. Discussed with patient will hold off cath for now and perform stress test. Will make adjustments to meds pending results.  If sx are not improved after then will consider repeat cardiac cath. Discussed with patient and Nurse.     Electronically signed by Farhad King MD on 3/30/2022 at 10:35 [address] [place] [address] [facility]      [phone]

## 2022-03-30 NOTE — PROGRESS NOTES
OBS/CDU   RESIDENT NOTE      Patients PCP is:  Celena Newman MD        SUBJECTIVE      No acute events overnight. Has been able to tolerate a full diet without nausea or vomiting. Patient states her chest pain is drastically improved since admission. She had stress test today, and states she feels very well at this time. Stress test showing low risk, discussed with cardiology recommending outpatient follow-up at this time. PHYSICAL EXAM      General: NAD, AO X 3  Heent: EMOI, PERRL  Neck: SUPPLE, NO JVD  Cardiovascular: RRR, S1S2  Pulmonary: CTAB, NO SOB  Abdomen: SOFT, NTTP, ND, +BS  Extremities: +2/4 PULSES DISTAL, NO SWELLING  Neuro / Psych: NO NUMBNESS OR TINGLING, MENTATION AT BASELINE    PERTINENT TEST /EXAMS      I have reviewed all available laboratory results.     MEDICATIONS CURRENT   sodium chloride flush 0.9 % injection 10 mL, PRN  sodium chloride flush 0.9 % injection 10 mL, PRN  aspirin chewable tablet 81 mg, Daily with breakfast  atorvastatin (LIPITOR) tablet 80 mg, Nightly  hydroCHLOROthiazide (HYDRODIURIL) tablet 25 mg, Daily  losartan (COZAAR) tablet 100 mg, Daily  pantoprazole (PROTONIX) tablet 40 mg, QAM AC  pregabalin (LYRICA) capsule 50 mg, BID  ranolazine (RANEXA) extended release tablet 1,000 mg, BID  ticagrelor (BRILINTA) tablet 90 mg, BID  sodium chloride flush 0.9 % injection 5-40 mL, 2 times per day  sodium chloride flush 0.9 % injection 5-40 mL, PRN  0.9 % sodium chloride infusion, PRN  enoxaparin (LOVENOX) injection 30 mg, BID  ondansetron (ZOFRAN-ODT) disintegrating tablet 4 mg, Q8H PRN   Or  ondansetron (ZOFRAN) injection 4 mg, Q6H PRN  acetaminophen (TYLENOL) tablet 650 mg, Q6H PRN   Or  acetaminophen (TYLENOL) suppository 650 mg, Q6H PRN  polyethylene glycol (GLYCOLAX) packet 17 g, Daily  sennosides-docusate sodium (SENOKOT-S) 8.6-50 MG tablet 1 tablet, BID  magnesium hydroxide (MILK OF MAGNESIA) 400 MG/5ML suspension 30 mL, Daily PRN  insulin lispro (HUMALOG) injection vial 0-6 Units, TID WC  insulin lispro (HUMALOG) injection vial 0-3 Units, Nightly  glucose (GLUTOSE) 40 % oral gel 15 g, PRN  dextrose 50 % IV solution, PRN  glucagon (rDNA) injection 1 mg, PRN  dextrose 5 % solution, PRN  amLODIPine (NORVASC) tablet 10 mg, Nightly  carvedilol (COREG) tablet 6.25 mg, BID WC        All medication charted and reviewed. CONSULTS      IP CONSULT TO CARDIOLOGY    ASSESSMENT/PLAN       Reji Moser is a 61 y.o. female who presents with atypical chest pain with history of multiple stress test and cardiac catheterizations. Patient had a stress test today, which showed low risk stratification. Patient states her chest pain is now resolved. Discussed with cardiology, recommending no change in home medication at this time and close outpatient follow-up. Patient is in agreement with plan. · Plan for discharge home  · Continue home medications and pain control  · Monitor vitals, labs, and imaging  · DISPO: pending consults and clinical improvement    --  Charity Steele MD  Emergency Medicine Resident Physician     This dictation was generated by voice recognition computer software. Although all attempts are made to edit the dictation for accuracy, there may be errors in the transcription that are not intended.

## 2022-03-30 NOTE — PROCEDURES
[address] [facility]                              CARDIAC STRESS TEST    PATIENT NAME: Noel Garcia                     :        1965  MED REC NO:   [id number]                             ROOM:       0526  ACCOUNT NO:   [de-identified]                           ADMIT DATE: 2022  PROVIDER:     Venus Woods    DATE OF STUDY:  2022    ORDERING PROVIDER:  Jerry Kuo MD  PRIMARY CARE PROVIDER:  Sage Hester MD  INTERPRETING PHYSICIAN:  Venus Woods MD      LEXISCAN STRESS STUDY:  Indication:  chest pain    Procedure was explained and consent form was signed    Medications:  Lexiscan, 0.4 mg  Resting heart rate:  78 bpm  Resting blood pressure:  132/81 mm/Hg  Infusion heart rate:   107 bpm  Infusion blood pressure: 131/64 mm/Hg  Resting EKG:  Abnormal (Sinus rhythm @ 78 bpm/repolarization  changes/left ventricular hypertrophy)  Stress heart response:  Normal response  Stress BP response:  Appropriate  Stress EKG(S):  No changes seen (heart rate increased to 100 bpm)  Chest discomfort:  Mild chest discomfort and shortness of breath  Ischemic EKG changes:  Uninterpretable    Comments:  Abnormal pre-test ECG precludes ECG criteria for myocardia  ischemia. No arrhythmia. IMPRESSION:  Electrocardiographically uninterpretable Lexiscan stress study. Radio-isotope results to follow from the department of Nuclear Medicine.         Dexter Morales    D: 2022 13:45:05       T: 2022 13:46:41     AS/JOANNE  Job#: [id number]     Doc#: Unknown    CC:    ()

## 2022-03-30 NOTE — DISCHARGE SUMMARY
CDU Discharge Summary        Patient:  Izabela Muñoz  YOB: 1965    MRN: [id number]   Acct: [de-identified]    Primary Care Physician: Win Peña MD    Admit date:  3/29/2022 11:28 AM  Discharge date: 3/30/2022 4:00 PM    Discharge Diagnoses:     Acute chest pain due to unstable angina  Improved with analgesia, cardiac risk stratification    Follow-up:  Call today/tomorrow for a follow up appointment with Win Pñea MD , or return to the Emergency Room with worsening symptoms    Stressed to patient the importance of following up with primary care doctor for further workup/management of symptoms. Pt verbalizes understanding and agrees with plan. Discharge Medications:  Changes to medications            Medication List      CHANGE how you take these medications    ranolazine 500 MG extended release tablet  Commonly known as: RANEXA  Take 1 tablet by mouth 2 times daily  What changed: how much to take        CONTINUE taking these medications    amLODIPine 5 MG tablet  Commonly known as: NORVASC     aspirin 81 MG tablet  Take 1 tablet by mouth daily (with breakfast)     atorvastatin 80 MG tablet  Commonly known as: LIPITOR  take 1 tablet by mouth once daily     carvedilol 6.25 MG tablet  Commonly known as: COREG  Take 1 tablet by mouth 2 times daily (with meals)     Dulaglutide 0.75 MG/0.5ML Sopn  Inject 0.75 mg into the skin once a week     ezetimibe 10 MG tablet  Commonly known as: ZETIA     hydroCHLOROthiazide 25 MG tablet  Commonly known as: HYDRODIURIL     losartan 100 MG tablet  Commonly known as: COZAAR  take 1 tablet by mouth once daily     metFORMIN 500 MG extended release tablet  Commonly known as: GLUCOPHAGE-XR  take 1 tablet by mouth once daily for 2 weeks then 1 tablet twice a day     nitroGLYCERIN 0.4 MG SL tablet  Commonly known as: NITROSTAT  place 1 tablet under the tongue if needed every 5 minutes for chest pain for 3 doses IF NO RELIEF AFTER FIRST DOSE CALL PRESCRIBER . omeprazole 40 MG delayed release capsule  Commonly known as: PRILOSEC  Take 1 capsule by mouth daily     ONE-A-DAY 50 PLUS PO     pregabalin 50 MG capsule  Commonly known as: Lyrica  Take 1 capsule by mouth 2 times daily for 30 days. ticagrelor 90 MG Tabs tablet  Commonly known as: BRILINTA  Take 1 tablet by mouth 2 times daily            Diet:  Diet NPO  ADULT DIET; Regular , Advance as tolerated     Activity:  As tolerated    Consultants: IP CONSULT TO CARDIOLOGY    Procedures:  Stress test    Diagnostic Test:   Results for orders placed or performed during the hospital encounter of 03/29/22   COVID-19, Rapid    Specimen: Nasopharyngeal Swab   Result Value Ref Range    Specimen Description . NASOPHARYNGEAL SWAB     SARS-CoV-2, Rapid Not Detected Not Detected   Basic Metabolic Panel   Result Value Ref Range    Glucose 97 70 - 99 mg/dL    BUN 14 6 - 20 mg/dL    CREATININE 0.72 0.50 - 0.90 mg/dL    Calcium 10.5 (H) 8.6 - 10.4 mg/dL    Sodium 138 135 - 144 mmol/L    Potassium 4.0 3.7 - 5.3 mmol/L    Chloride 104 98 - 107 mmol/L    CO2 24 20 - 31 mmol/L    Anion Gap 10 9 - 17 mmol/L    GFR Non-African American >60 >60 mL/min    GFR African American >60 >60 mL/min    GFR Comment         CBC with Auto Differential   Result Value Ref Range    WBC 5.4 3.5 - 11.3 k/uL    RBC 5.08 3.95 - 5.11 m/uL    Hemoglobin 14.2 11.9 - 15.1 g/dL    Hematocrit 42.4 36.3 - 47.1 %    MCV 83.5 82.6 - 102.9 fL    MCH 28.0 25.2 - 33.5 pg    MCHC 33.5 28.4 - 34.8 g/dL    RDW 13.7 11.8 - 14.4 %    Platelets 665 168 - 696 k/uL    MPV 10.0 8.1 - 13.5 fL    NRBC Automated 0.0 0.0 per 100 WBC    Seg Neutrophils 49 36 - 65 %    Lymphocytes 39 24 - 43 %    Monocytes 9 3 - 12 %    Eosinophils % 2 1 - 4 %    Basophils 1 0 - 2 %    Immature Granulocytes 0 0 %    Segs Absolute 2.65 1.50 - 8.10 k/uL    Absolute Lymph # 2.11 1.10 - 3.70 k/uL    Absolute Mono # 0.48 0.10 - 1.20 k/uL    Absolute Eos # 0.11 0.00 - 0.44 k/uL    Basophils Absolute 0.04 0.00 - 0.20 k/uL    Absolute Immature Granulocyte <0.03 0.00 - 0.30 k/uL   Troponin   Result Value Ref Range    Troponin, High Sensitivity 9 0 - 14 ng/L   Troponin   Result Value Ref Range    Troponin, High Sensitivity 7 0 - 14 ng/L   POC Glucose Fingerstick   Result Value Ref Range    POC Glucose 92 65 - 105 mg/dL   POC Glucose Fingerstick   Result Value Ref Range    POC Glucose 116 (H) 65 - 105 mg/dL   POC Glucose Fingerstick   Result Value Ref Range    POC Glucose 120 (H) 65 - 105 mg/dL   EKG 12 Lead   Result Value Ref Range    Ventricular Rate 79 BPM    Atrial Rate 79 BPM    P-R Interval 144 ms    QRS Duration 86 ms    Q-T Interval 408 ms    QTc Calculation (Bazett) 467 ms    P Axis 54 degrees    R Axis 22 degrees    T Axis 154 degrees   EKG 12 Lead   Result Value Ref Range    Ventricular Rate 80 BPM    Atrial Rate 80 BPM    P-R Interval 152 ms    QRS Duration 86 ms    Q-T Interval 410 ms    QTc Calculation (Bazett) 472 ms    P Axis 61 degrees    R Axis 33 degrees    T Axis 153 degrees     XR CHEST PORTABLE    Result Date: 3/29/2022  EXAMINATION: ONE XRAY VIEW OF THE CHEST 3/29/2022 11:55 am COMPARISON: 09/15/2021 HISTORY: ORDERING SYSTEM PROVIDED HISTORY: CP TECHNOLOGIST PROVIDED HISTORY: CP Reason for Exam: Chest pain/  AP erect/ port. FINDINGS: The lungs are without acute focal process. There is no effusion or pneumothorax. The cardiomediastinal silhouette is stable. The osseous structures are stable. No acute process. NM Cardiac Stress Test Nuclear Imaging    Result Date: 3/30/2022  EXAMINATION: MYOCARDIAL PERFUSION IMAGING 3/30/2022 11:10 am TECHNIQUE: For the rest study, 13.1 mCi of Tc 99 labeled sestamibi were injected. SPECT images were acquired. Under cardiology supervision, 0.4mg [place] was infused. After pharmacologic stress, 36.8 mCi of Tc 99 labeled sestamibi were injected. SPECT images with ECG gating were acquired. COMPARISON: None Available.  HISTORY: ORDERING SYSTEM PROVIDED HISTORY: Chest pain TECHNOLOGIST PROVIDED HISTORY: Reason for Exam: Chest pain Procedure Type->Rx chest pain Reason for Exam: chest pain, Former smoker, Hyperlipidemia, Hypertension, Murmur, 9/2021 cardiac cath=  Mild disease with patent middle stent, Mal apposition of the LAD stent. PD using 3 mm NC balloon Additional signs and symptoms: coronary artery disease and is having similar pain to prior episodes of exacerbations Relevant Medical/Surgical History: Chronic back pain FINDINGS: Images interpreted utilizing [employer] system and [employer]. There is no evidence for a significant reversible or fixed perfusion defect. The gated images show no wall motion abnormalities. Normal myocardial thickening. Perfusion scores are visually adjusted to account for artifact. Summed stress score:  0 Summed rest score:  0 Summed reversibility score:  0 Function: End diastolic volume:  45II Left ventricular ejection fraction:  53% TID score:  1.37 (scores greater than 1.39 are considered elevated for Lexiscan stress with Tc99m) Notes concerning risk stratification: Risk stratification incorporates both clinical history and some testing results. Final risk determination is the responsibility of the ordering provider as other patient information and test results may increase or decrease the risk assessment reported for this examination. Risk stratification criteria are adapted from \"Noninvasive Risk Stratification\" criteria from [employer]. Al, ACC/AATS/AHA/ASE/ASNC/SCAI/SCCT/STS 2017 Appropriate Use Criteria For Coronary Revascularization in Patients With Stable Ischemic Heart Disease [facility] Volume 69, Issue 17, May 2017 High risk (>3% annual death or MI) 1. Severe resting LV dysfunction (LVEF <35%) not readily explained by non coronary causes 2. Resting perfusion abnormalities greater than 10% of the myocardium in patients without prior history or evidence of MI 3.  Stress-induced perfusion abnormalities encumbering greater than or equal to 10% myocardium or stress segmental scores indicating multiple vascular territories with abnormalities 4. Stress-induced LV dilatation (TID ratio greater than 1.19 for exercise and greater than 1.39 for regadenoson) Intermediate risk (1% to 3% annual death or MI) 1. Mild/moderate resting LV dysfunction (LVEF 35% to 49%) not readily explained by non coronary causes. 2. Resting perfusion abnormalities in 5%-9.9% of the myocardium in patients without a history or prior evidence of MI 3. Stress-induced perfusion abnormality encumbering 5%-9.9% of the myocardium or stress segmental scores indicating 1 vascular territory with abnormalities but without LV dilation 4. Small wall motion abnormality involving 1-2 segments and only 1 coronary bed. Low Risk (Less than 1% annual death or MI) 1. Normal or small myocardial perfusion defect at rest or with stress encumbering less than 5% of the myocardium. No stress-induced ischemia. Normal LVEF. TID score:  1.37. (Scores greater than 1.39 are considered elevated for Lexiscan stress with Tc99m) Risk stratification: Low           Physical Exam:    General appearance - NAD, AOx 3   Lungs -CTAB, no R/R/R  Heart - RRR, no M/R/G  Abdomen - Soft, NT/ND  Neurological:  MAEx4, No focal motor deficit, sensory loss  Extremities - Cap refil <2 sec in all ext., no edema  Skin -warm, dry      Hospital Course:  Clinical course has improved, labs and imaging reviewed. Chela Sandy originally presented to the hospital on 3/29/2022 11:28 AM. with chest pain. She has history of atypical chest pain symptoms with multiple stress tests and cardiac catheterizations. .  At that time it was determined that She required further observation and cardiac or stratification. She was admitted and labs and imaging were followed daily. Imaging results as above.   Stress test showing low risk medication, per cardiology patient does not require any changes in her medications at this time and may follow-up outpatient. Patient is in agreement with plan, and will follow up with primary care provider and cardiology in the next few days. She is medically stable to be discharged. Patient voiced understanding and all questions were answered. Disposition: Home    Patient stated that they will not drive themselves home from the hospital if they have gotten pain killers/ narcotics earlier that day and that they will arrange for transportation on their own or work with the  for a ride. Patient counseled NOT to drive while under the influence of narcotics/ pain killers. Condition: Good    Patient stable and ready for discharge home. I have discussed plan of care with patient and they are in understanding. They were instructed to read discharge paperwork. All of their questions and concerns were addressed. Time Spent: 0 day      --  Charity Steele MD  Emergency Medicine Resident Physician    This dictation was generated by voice recognition computer software. Although all attempts are made to edit the dictation for accuracy, there may be errors in the transcription that are not intended.

## 2022-03-31 LAB
EKG ATRIAL RATE: 80 BPM
EKG P AXIS: 61 DEGREES
EKG P-R INTERVAL: 152 MS
EKG Q-T INTERVAL: 410 MS
EKG QRS DURATION: 86 MS
EKG QTC CALCULATION (BAZETT): 472 MS
EKG R AXIS: 33 DEGREES
EKG T AXIS: 153 DEGREES
EKG VENTRICULAR RATE: 80 BPM

## 2022-03-31 PROCEDURE — 93010 ELECTROCARDIOGRAM REPORT: CPT | Performed by: INTERNAL MEDICINE

## 2022-03-31 NOTE — PROGRESS NOTES
[id number]  CDU / OBSERVATION ENCOUNTER  ATTENDING NOTE       I performed a history and physical examination of the patient and discussed management with the resident or midlevel provider. I reviewed the resident or midlevel provider's note and agree with the documented findings and plan of care. Any areas of disagreement are noted on the chart. I was personally present for the key portions of any procedures. I have documented in the chart those procedures where I was not present during the key portions. I have reviewed the nurses notes. I agree with the chief complaint, past medical history, past surgical history, allergies, medications, social and family history as documented unless otherwise noted below. The Family history, social history, and ROS are effectively unchanged since admission unless noted elsewhere in the chart. Patient with resolution of chest pain yesterday. Patient with ongoing discomfort. Patient has further evaluation today with stress testing. Disposition based on testing results. Patient currently comfortable. Testing negative. Patient discharged in good condition for outpatient follow-up.     Aura Seay MD  Attending Emergency  Physician

## 2022-04-28 ENCOUNTER — OFFICE VISIT (OUTPATIENT)
Dept: FAMILY MEDICINE CLINIC | Age: 57
End: 2022-04-28
Payer: COMMERCIAL

## 2022-04-28 VITALS
SYSTOLIC BLOOD PRESSURE: 150 MMHG | HEART RATE: 88 BPM | DIASTOLIC BLOOD PRESSURE: 88 MMHG | RESPIRATION RATE: 16 BRPM | OXYGEN SATURATION: 98 % | WEIGHT: 235 LBS | BODY MASS INDEX: 40.34 KG/M2

## 2022-04-28 DIAGNOSIS — E11.9 TYPE 2 DIABETES MELLITUS WITHOUT COMPLICATION, WITHOUT LONG-TERM CURRENT USE OF INSULIN (HCC): Primary | ICD-10-CM

## 2022-04-28 PROCEDURE — 99214 OFFICE O/P EST MOD 30 MIN: CPT | Performed by: STUDENT IN AN ORGANIZED HEALTH CARE EDUCATION/TRAINING PROGRAM

## 2022-04-28 PROCEDURE — 3044F HG A1C LEVEL LT 7.0%: CPT | Performed by: STUDENT IN AN ORGANIZED HEALTH CARE EDUCATION/TRAINING PROGRAM

## 2022-04-28 ASSESSMENT — PATIENT HEALTH QUESTIONNAIRE - PHQ9
SUM OF ALL RESPONSES TO PHQ QUESTIONS 1-9: 0
SUM OF ALL RESPONSES TO PHQ QUESTIONS 1-9: 0
2. FEELING DOWN, DEPRESSED OR HOPELESS: 0
1. LITTLE INTEREST OR PLEASURE IN DOING THINGS: 0
SUM OF ALL RESPONSES TO PHQ QUESTIONS 1-9: 0
SUM OF ALL RESPONSES TO PHQ QUESTIONS 1-9: 0
SUM OF ALL RESPONSES TO PHQ9 QUESTIONS 1 & 2: 0

## 2022-05-02 NOTE — PROGRESS NOTES
Subjective: Vianney Stinson presents for   Chief Complaint   Patient presents with    Diabetes     med check from trulicity its going good but no weight loss    Health Maintenance     dclines covid and pneu vaccine        HPI   Here for follow-up on diabetes. Has currently been taking Trulicity 5.04 mg. States he is eating less but has not had no weight loss. Currently declines COVID and pneumonia vaccine. Patient Active Problem List   Diagnosis    Chronic back pain    Essential hypertension    SRI on CPAP    Mixed hyperlipidemia    S/P drug eluting coronary stent placement - Mid LAD 7/25/18-Dr. lea    Coronary artery disease involving native coronary artery of native heart with unstable angina pectoris (HCC)    Class 2 obesity due to excess calories with body mass index (BMI) of 39.0 to 39.9 in adult    Acute bronchitis due to other specified organisms    Hyperplastic colon polyp    Unstable angina ([facility] [address]    BMI 38.0-38.9,adult    Post PTCA    Former smoker    Hypokalemia    Hyperglycemia    Murmur, cardiac    Type 2 diabetes mellitus without complication, without long-term current use of insulin ([facility])    Chest pain    Coronary stent occlusion    Acne rosacea, erythematous telangiectatic type    Chest pain with high risk of acute coronary syndrome         Review of Systems   All other systems reviewed and are negative. Objective:  Physical Exam   Vitals:   Vitals:    04/28/22 1345   BP: (!) 150/88   Pulse: 88   Resp: 16   SpO2: 98%   Weight: 235 lb (106.6 kg)     Wt Readings from Last 3 Encounters:   04/28/22 235 lb (106.6 kg)   03/29/22 230 lb (104.3 kg)   03/07/22 232 lb 6.4 oz (105.4 kg)     Ht Readings from Last 3 Encounters:   03/29/22 5' 4\" (1.626 m)   03/07/22 5' 4\" (1.626 m)   10/07/21 5' 4\" (1.626 m)     Body mass index is 40.34 kg/m². Physical Exam  Vitals reviewed. Constitutional:       General: She is not in acute distress. Appearance: Normal appearance.    HENT: Mouth/Throat:      Mouth: Mucous membranes are moist.   Eyes:      Conjunctiva/sclera: Conjunctivae normal.   Cardiovascular:      Rate and Rhythm: Normal rate and regular rhythm. Heart sounds: Normal heart sounds. Pulmonary:      Effort: Pulmonary effort is normal.      Breath sounds: Normal breath sounds. Skin:     General: Skin is warm and dry. Neurological:      Mental Status: She is alert and oriented to person, place, and time. Psychiatric:         Mood and Affect: Mood normal.            Assessment/Plan:    Diagnosis Orders   1. Type 2 diabetes mellitus without complication, without long-term current use of insulin (HCC)  Dulaglutide 1.5 MG/0.5ML SOPN        Return in about 4 weeks (around 5/26/2022). Type 2 diabetes we will increase her Trulicity to 1.5 mg and then to 3.0 mg after 4 weeks. Reassess weight loss.

## 2022-05-03 RX ORDER — OMEPRAZOLE 40 MG/1
CAPSULE, DELAYED RELEASE ORAL
Qty: 30 CAPSULE | Refills: 0 | Status: SHIPPED | OUTPATIENT
Start: 2022-05-03 | End: 2022-05-31

## 2022-05-03 NOTE — TELEPHONE ENCOUNTER
David Bah is calling to request a refill on the following medication(s):    Medication Request:  Requested Prescriptions     Pending Prescriptions Disp Refills    omeprazole (PRILOSEC) 40 MG delayed release capsule [Pharmacy Med Name: OMEPRAZOLE DR 40 MG CAPSULE] 30 capsule 5     Sig: take 1 capsule by mouth once daily       Last Visit Date (If Applicable):  8/95/3471    Next Visit Date:    Visit date not found

## 2022-05-09 ENCOUNTER — TELEPHONE (OUTPATIENT)
Dept: FAMILY MEDICINE CLINIC | Age: 57
End: 2022-05-09

## 2022-05-09 NOTE — TELEPHONE ENCOUNTER
Patient had increase on her Dulaglutide 07 mg to 1.5 mg  She took this on Sunday 5/8/22 at 7:30am she been get sick ( vomiting all day ) and feeling very nausea ,    She did eat today small bowl l of cheerios with 1% milk was able to keep it down, But still very Nauseated. What should she do ? She does have Zofran should she take it ?

## 2022-05-11 NOTE — TELEPHONE ENCOUNTER
Patient called the office  and stated that the Dianne Thompson has helped and she is not as nauseas as she was. Pt is asking if she should continue the 1.5 dose? Also stated that herself and spouse took at home covid test and both are positive. Pt is very congested and has a headache.  Thank you

## 2022-05-31 RX ORDER — OMEPRAZOLE 40 MG/1
CAPSULE, DELAYED RELEASE ORAL
Qty: 30 CAPSULE | Refills: 0 | Status: SHIPPED | OUTPATIENT
Start: 2022-05-31 | End: 2022-06-30

## 2022-05-31 NOTE — TELEPHONE ENCOUNTER
Juanpablo Marsh is calling to request a refill on the following medication(s):    Medication Request:  Requested Prescriptions     Pending Prescriptions Disp Refills    omeprazole (PRILOSEC) 40 MG delayed release capsule [Pharmacy Med Name: OMEPRAZOLE DR 40 MG CAPSULE] 30 capsule 0     Sig: take 1 capsule by mouth once daily       Last Visit Date (If Applicable):  9/75/4761    Next Visit Date:    6/9/2022

## 2022-06-09 ENCOUNTER — OFFICE VISIT (OUTPATIENT)
Dept: FAMILY MEDICINE CLINIC | Age: 57
End: 2022-06-09
Payer: COMMERCIAL

## 2022-06-09 VITALS
WEIGHT: 235 LBS | BODY MASS INDEX: 40.34 KG/M2 | DIASTOLIC BLOOD PRESSURE: 60 MMHG | HEART RATE: 75 BPM | SYSTOLIC BLOOD PRESSURE: 130 MMHG | OXYGEN SATURATION: 97 % | TEMPERATURE: 97 F

## 2022-06-09 DIAGNOSIS — M54.50 CHRONIC LOW BACK PAIN WITHOUT SCIATICA, UNSPECIFIED BACK PAIN LATERALITY: ICD-10-CM

## 2022-06-09 DIAGNOSIS — R26.9 GAIT ABNORMALITY: ICD-10-CM

## 2022-06-09 DIAGNOSIS — E11.9 TYPE 2 DIABETES MELLITUS WITHOUT COMPLICATION, WITHOUT LONG-TERM CURRENT USE OF INSULIN (HCC): Primary | ICD-10-CM

## 2022-06-09 DIAGNOSIS — G89.29 CHRONIC LOW BACK PAIN WITHOUT SCIATICA, UNSPECIFIED BACK PAIN LATERALITY: ICD-10-CM

## 2022-06-09 PROCEDURE — 99214 OFFICE O/P EST MOD 30 MIN: CPT | Performed by: STUDENT IN AN ORGANIZED HEALTH CARE EDUCATION/TRAINING PROGRAM

## 2022-06-09 PROCEDURE — 3044F HG A1C LEVEL LT 7.0%: CPT | Performed by: STUDENT IN AN ORGANIZED HEALTH CARE EDUCATION/TRAINING PROGRAM

## 2022-06-09 RX ORDER — FLUCONAZOLE 150 MG/1
150 TABLET ORAL
Qty: 2 TABLET | Refills: 0 | Status: SHIPPED | OUTPATIENT
Start: 2022-06-09 | End: 2022-06-15

## 2022-06-09 NOTE — PROGRESS NOTES
Subjective: Dada Lee presents for   Chief Complaint   Patient presents with    Diabetes    Weight Loss     only took 1 dose of trulicity    Other     renew handicap placard       HPI   Here for follow-up on diabetes and weight loss. She has tried Trulicity at 1.5 mg but was unable to tolerate this due to nausea and vomiting. Tried to decrease amount of fluid and take sips of water but these were not helping either. Needs renewal of handicap placard. Patient Active Problem List   Diagnosis    Chronic back pain    Essential hypertension    SRI on CPAP    Mixed hyperlipidemia    S/P drug eluting coronary stent placement - Mid LAD 7/25/18-Dr. lea    Coronary artery disease involving native coronary artery of native heart with unstable angina pectoris (HCC)    Class 2 obesity due to excess calories with body mass index (BMI) of 39.0 to 39.9 in adult    Acute bronchitis due to other specified organisms    Hyperplastic colon polyp    Unstable angina ([facility] [address]    BMI 38.0-38.9,adult    Post PTCA    Former smoker    Hypokalemia    Hyperglycemia    Murmur, cardiac    Type 2 diabetes mellitus without complication, without long-term current use of insulin ([facility])    Chest pain    Coronary stent occlusion    Acne rosacea, erythematous telangiectatic type    Chest pain with high risk of acute coronary syndrome         Review of Systems   All other systems reviewed and are negative.        Objective:  Physical Exam   Vitals:   Vitals:    06/09/22 0800   BP: 130/60   Site: Left Upper Arm   Position: Sitting   Cuff Size: Large Adult   Pulse: 75   Temp: 97 °F (36.1 °C)   TempSrc: Temporal   SpO2: 97%   Weight: 235 lb (106.6 kg)     Wt Readings from Last 3 Encounters:   06/09/22 235 lb (106.6 kg)   04/28/22 235 lb (106.6 kg)   03/29/22 230 lb (104.3 kg)     Ht Readings from Last 3 Encounters:   03/29/22 5' 4\" (1.626 m)   03/07/22 5' 4\" (1.626 m)   10/07/21 5' 4\" (1.626 m)     Body mass index is 40.34 kg/m². Physical Exam  Vitals reviewed. Constitutional:       General: She is not in acute distress. Appearance: Normal appearance. HENT:      Mouth/Throat:      Mouth: Mucous membranes are moist.   Eyes:      Conjunctiva/sclera: Conjunctivae normal.   Cardiovascular:      Rate and Rhythm: Normal rate and regular rhythm. Heart sounds: Normal heart sounds. Pulmonary:      Effort: Pulmonary effort is normal.      Breath sounds: Normal breath sounds. Skin:     General: Skin is warm and dry. Neurological:      Mental Status: She is alert and oriented to person, place, and time. Psychiatric:         Mood and Affect: Mood normal.            Assessment/Plan:    Diagnosis Orders   1. Type 2 diabetes mellitus without complication, without long-term current use of insulin ([facility])  fluconazole (DIFLUCAN) 150 MG tablet    empagliflozin (JARDIANCE) 10 MG tablet    Hemoglobin A1C   2. Gait abnormality  Handicap Placard MISC   3. Chronic low back pain without sciatica, unspecified back pain laterality  Handicap Placard MISC        Return if symptoms worsen or fail to improve. Diabetes-we will start her on Jardiance 10 mg and discontinue Trulicity. Will also send in fluconazole. Will obtain a1c. Handicap placard given for five years for underlying low back pain and gait abnormality.

## 2022-06-27 NOTE — TELEPHONE ENCOUNTER
Corine Gallegos is calling to request a refill on the following medication(s):    Medication Request:  Requested Prescriptions     Pending Prescriptions Disp Refills    omeprazole (PRILOSEC) 40 MG delayed release capsule [Pharmacy Med Name: OMEPRAZOLE DR 40 MG CAPSULE] 30 capsule 0     Sig: take 1 capsule by mouth once daily       Last Visit Date (If Applicable):  5/7/8606    Next Visit Date:    Visit date not found

## 2022-06-30 RX ORDER — OMEPRAZOLE 40 MG/1
CAPSULE, DELAYED RELEASE ORAL
Qty: 30 CAPSULE | Refills: 0 | Status: SHIPPED | OUTPATIENT
Start: 2022-06-30 | End: 2022-08-01

## 2022-07-07 DIAGNOSIS — E11.9 TYPE 2 DIABETES MELLITUS WITHOUT COMPLICATION, WITHOUT LONG-TERM CURRENT USE OF INSULIN (HCC): ICD-10-CM

## 2022-07-07 NOTE — TELEPHONE ENCOUNTER
Last visit:6/9/22  Last Med refill: 6/2022  Does patient have enough medication for 72 hours:  Future Appointments   Date Time Provider Radha Taylori   7/11/2022 11:30 AM Sergio Paiz MD [address] [place] [address]   8/29/2022  1:00 PM Tyler Mcrae  [address] [facility] Maintenance   Topic Date Due    Pneumococcal 0-64 years Vaccine (1 - PCV) Never done    HIV screen  Never done    Diabetic retinal exam  Never done    Hepatitis C screen  Never done    Hepatitis B vaccine (1 of 3 - Risk 3-dose series) Never done    Shingles vaccine (1 of 2) Never done    COVID-19 Vaccine (3 - Booster for Pfizer series) 09/12/2021    Lipids  05/22/2022    Flu vaccine (1) 09/01/2022    Diabetic microalbuminuria test  11/08/2022    Breast cancer screen  12/10/2022    Diabetic foot exam  02/08/2023    A1C test (Diabetic or Prediabetic)  02/08/2023    Depression Screen  04/28/2023    Colorectal Cancer Screen  08/16/2028    DTaP/Tdap/Td vaccine (2 - Td or Tdap) 04/26/2030    Hepatitis A vaccine  Aged Out    Hib vaccine  Aged Out    Meningococcal (ACWY) vaccine  Aged Out       Hemoglobin A1C (%)   Date Value   02/08/2022 6.9 (H)   11/08/2021 6.7   05/22/2021 6.3 (H)             ( goal A1C is < 7)   No results found for: LABMICR  LDL Cholesterol (mg/dL)   Date Value   05/22/2021 68   01/15/2021 92       (goal LDL is <100)   AST (U/L)   Date Value   02/08/2022 20     ALT (U/L)   Date Value   02/08/2022 23     BUN (mg/dL)   Date Value   03/29/2022 14     BP Readings from Last 3 Encounters:   06/09/22 130/60   04/28/22 (!) 150/88   03/30/22 135/79          (goal 120/80)    All Future Testing planned in [facility]  Lab Frequency Next Occurrence   Hemoglobin A1C Once 06/09/2022               Patient Active Problem List:     Chronic back pain     Essential hypertension     SRI on CPAP     Mixed hyperlipidemia     S/P drug eluting coronary stent placement - Mid LAD 7/25/18-Dr. lea     Coronary artery disease involving native coronary artery of native heart with unstable angina pectoris (HCC)     Class 2 obesity due to excess calories with body mass index (BMI) of 39.0 to 39.9 in adult     Acute bronchitis due to other specified organisms     Hyperplastic colon polyp     Unstable angina (HCC)     BMI 38.0-38.9,adult     Post PTCA     Former smoker     Hypokalemia     Hyperglycemia     Murmur, cardiac     Type 2 diabetes mellitus without complication, without long-term current use of insulin (HCC)     Chest pain     Coronary stent occlusion     Acne rosacea, erythematous telangiectatic type     Chest pain with high risk of acute coronary syndrome

## 2022-07-11 ENCOUNTER — OFFICE VISIT (OUTPATIENT)
Dept: PAIN MANAGEMENT | Age: 57
End: 2022-07-11
Payer: COMMERCIAL

## 2022-07-11 VITALS — WEIGHT: 232.2 LBS | HEIGHT: 64 IN | BODY MASS INDEX: 39.64 KG/M2

## 2022-07-11 DIAGNOSIS — G89.29 OTHER CHRONIC PAIN: Primary | ICD-10-CM

## 2022-07-11 DIAGNOSIS — M54.16 LUMBAR RADICULOPATHY: ICD-10-CM

## 2022-07-11 PROCEDURE — 99213 OFFICE O/P EST LOW 20 MIN: CPT | Performed by: PAIN MEDICINE

## 2022-07-11 RX ORDER — PREGABALIN 50 MG/1
50 CAPSULE ORAL 2 TIMES DAILY
Qty: 60 CAPSULE | Refills: 3 | Status: SHIPPED | OUTPATIENT
Start: 2022-07-11 | End: 2022-10-06 | Stop reason: SDUPTHER

## 2022-07-11 ASSESSMENT — ENCOUNTER SYMPTOMS
BOWEL INCONTINENCE: 0
BACK PAIN: 1

## 2022-07-11 NOTE — PROGRESS NOTES
HPI:     Back Pain  This is a chronic problem. The current episode started more than 1 year ago. The problem occurs constantly. The problem is unchanged. The pain is present in the lumbar spine. The quality of the pain is described as aching and stabbing. The pain radiates to the left foot and right foot. The pain is at a severity of 3/10. The pain is the same all the time. The symptoms are aggravated by sitting, bending, coughing, position, lying down, standing, twisting and stress. Pertinent negatives include no bladder incontinence or bowel incontinence. She has tried chiropractic manipulation, heat, NSAIDs, home exercises and walking for the symptoms. The treatment provided significant relief. Doing well with lyrica. Patient denies any new neurological symptoms. Nobowel or bladder incontinence, no weakness, and no falling. Review of OARRS does not show any aberrant prescription behavior. Medication is helping the patient stay active. Patient denies any side effects and reports adequate analgesia. No sign of misuse/abuse. Past Medical History:   Diagnosis Date    Abnormal stress test     Allergic rhinitis     Arthritis     CAD (coronary artery disease)     Dr. Tavon Buenrostro last seen 2/20/2020    Chronic back pain     Diabetes mellitus ([facility] [address]     Former smoker 2/7/2020    30-year history.   Quit in 2018    Hyperlipidemia     Dr. True Wheat Hypertension     Dr. Karolina Ayers, cardiac 2/8/2020    SRI on CPAP     instructed to bring Dr. Raven Echols Wears prescription eyeglasses     Wellness examination     Dr. Keyur Rocha last seen 2/13/2020       Past Surgical History:   Procedure Laterality Date    ANESTHESIA NERVE BLOCK Left 12/19/2019    NERVE BLOCK (DEFINE) - # 1 L5-S1 performed by Berry Zhao MD at [facility] Bilateral 7/17/2020    NERVE BLOCK BILATERAL - B MBB # 1 L4-5, L5-S1 performed by Berry Zhao MD at [address] [place] [address] NERVE BLOCK Bilateral 2020    NERVE BLOCK BILATERAL - L-5, L5-S1 performed by Toni Mathias MD at [address]  2017    CARDIAC CATHETERIZATION  2019    CARDIAC CATHETERIZATION  2018    1 stent mid LAD PT HAS [facility] HEART STENT, MRI CONDITIONAL 3T OK, SAFE IMMEDIATELY POST IMPLANT.  CARDIAC CATHETERIZATION  02/10/2020    for chest pain no new blockage    CARPAL TUNNEL RELEASE Right      SECTION      x3    COLONOSCOPY N/A 2018    COLONOSCOPY WITH BIOPSY performed by Serene Maynard MD at [place] [address]  2014    cataract surgery left eye WITH IMPLANT. MRI OK.      FOOT SURGERY      HAND TENDON SURGERY Right     [facility] [place]. INJECTION PROCEDURE FOR SACROILIAC JOINT Left 2019    SACROILIAC JOINT INJECTION - #1 performed by Toni Mathias MD at [address] ([address] [facility] [place])     [address]  2020     NERVE BLOCK BILATERAL - B MBB # 1 L4-5, L5-S1 (Bilateral     NERVE BLOCK Bilateral 2020    NERVE BLOCK BILATERAL - L-5, L5-S1 (Bilateral )     NERVE BLOCK Left 2020    NERVE RADIOFREQUENCY ABLATION- L4-5, L5-S1    NERVE BLOCK Right 2020     NERVE RADIOFREQUENCY ABLATION (Right )     PAIN MANAGEMENT PROCEDURE Left 2020    NERVE RADIOFREQUENCY ABLATION- L4-5, L5-S1 performed by Toni Mathias MD at [address] [place] [address] Right 2020    NERVE RADIOFREQUENCY ABLATION performed by Toni Mathias MD at [address] [facility]         Allergies   Allergen Reactions    Bee Venom Hives    Tetracyclines & Related Nausea Only and Other (See Comments)     dizziness    Ace Inhibitors Other (See Comments)     cough    Trulicity [Dulaglutide] Nausea And Vomiting     Not able to tolerate GLP-1 agonist         Current Outpatient Medications:    pregabalin (LYRICA) 50 MG capsule, Take 1 capsule by mouth 2 times daily for 30 days. , Disp: 60 capsule, Rfl: 3    empagliflozin (JARDIANCE) 10 MG tablet, Take 1 tablet by mouth daily, Disp: 30 tablet, Rfl: 2    omeprazole (PRILOSEC) 40 MG delayed release capsule, take 1 capsule by mouth once daily, Disp: 30 capsule, Rfl: 0    Handicap Placard MISC, by Does not apply route Handicap placard for 5 years. , Disp: 1 each, Rfl: 0    Multiple Vitamins-Minerals (ONE-A-DAY 50 PLUS PO), Take 1 tablet by mouth daily, Disp: , Rfl:     Dulaglutide 0.75 MG/0.5ML SOPN, Inject 0.75 mg into the skin once a week, Disp: 2 mL, Rfl: 3    losartan (COZAAR) 100 MG tablet, take 1 tablet by mouth once daily, Disp: 30 tablet, Rfl: 5    ticagrelor (BRILINTA) 90 MG TABS tablet, Take 1 tablet by mouth 2 times daily, Disp: 180 tablet, Rfl: 4    atorvastatin (LIPITOR) 80 MG tablet, take 1 tablet by mouth once daily, Disp: 30 tablet, Rfl: 11    ezetimibe (ZETIA) 10 MG tablet, Take 10 mg by mouth daily, Disp: , Rfl:     metFORMIN (GLUCOPHAGE-XR) 500 MG extended release tablet, take 1 tablet by mouth once daily for 2 weeks then 1 tablet twice a day, Disp: 60 tablet, Rfl: 11    ranolazine (RANEXA) 500 MG extended release tablet, Take 1 tablet by mouth 2 times daily (Patient taking differently: Take 1,000 mg by mouth 2 times daily ), Disp: 60 tablet, Rfl: 3    carvedilol (COREG) 6.25 MG tablet, Take 1 tablet by mouth 2 times daily (with meals), Disp: 60 tablet, Rfl: 3    nitroGLYCERIN (NITROSTAT) 0.4 MG SL tablet, place 1 tablet under the tongue if needed every 5 minutes for chest pain for 3 doses IF NO RELIEF AFTER FIRST DOSE CALL PRESCRIBER ., Disp: 25 tablet, Rfl: 1    amLODIPine (NORVASC) 5 MG tablet, Take 10 mg by mouth daily , Disp: , Rfl:     hydrochlorothiazide (HYDRODIURIL) 25 MG tablet, Take 25 mg by mouth daily, Disp: , Rfl:     aspirin 81 MG tablet, Take 1 tablet by mouth daily (with breakfast), Disp: 30 tablet, Rfl: Housing Stability:     Unable to Pay for Housing in the Last Year: Not on file    Number of Places Lived in the Last Year: Not on file    Unstable Housing in the Last Year: Not on file       Review of Systems:  Review of Systems   Musculoskeletal: Positive for back pain. Gastrointestinal: Negative for bowel incontinence. Genitourinary: Negative for bladder incontinence. Physical Exam:  Ht 5' 4\" (1.626 m)   Wt 232 lb 3.2 oz (105.3 kg)   BMI 39.86 kg/m²     Physical Exam  Constitutional:       Appearance: Normal appearance. Pulmonary:      Effort: Pulmonary effort is normal.   Neurological:      Mental Status: She is alert. Psychiatric:         Attention and Perception: Attention and perception normal.         Mood and Affect: Mood and affect normal.       Record/Diagnostics Review:    As above, I did review the imaging    Orders:    Orders Placed This Encounter   Medications    pregabalin (LYRICA) 50 MG capsule     Sig: Take 1 capsule by mouth 2 times daily for 30 days. Dispense:  60 capsule     Refill:  3       Assessment:  1. Other chronic pain    2. Lumbar radiculopathy        Treatment Plan:  DISCUSSION: Treatment options discussed with patient and all questions answered to patient's satisfaction. OARRS Review: Reviewed and acceptable for medications prescribed. TREATMENT OPTIONS:     Discussed different treatment options including continued conservative care such as physical therapy, chiropractic care, acupuncture. Discussed different interventional options such as epidural steroids or medial branch blocks. Also discussed neuromodulation in the form of spinal cord stimulation. Also discussed surgical evaluation. At this point she was doing well on Lyrica and would like to continue doing this. Declines therapy at this time. She would like to hold off on any further imaging. Discussed the importance of continued physical therapy and exercise program as well.       [address] [place] [address]

## 2022-08-01 RX ORDER — OMEPRAZOLE 40 MG/1
CAPSULE, DELAYED RELEASE ORAL
Qty: 30 CAPSULE | Refills: 0 | Status: SHIPPED | OUTPATIENT
Start: 2022-08-01 | End: 2022-08-29

## 2022-08-01 NOTE — TELEPHONE ENCOUNTER
Sergei Rodríguez is calling to request a refill on the following medication(s):    Medication Request:  Requested Prescriptions     Pending Prescriptions Disp Refills    omeprazole (PRILOSEC) 40 MG delayed release capsule [Pharmacy Med Name: OMEPRAZOLE DR 40 MG CAPSULE] 30 capsule 0     Sig: take 1 capsule by mouth once daily       Last Visit Date (If Applicable):  Visit date not found    Next Visit Date:    8/29/2022

## 2022-08-29 ENCOUNTER — HOSPITAL ENCOUNTER (OUTPATIENT)
Age: 57
Discharge: HOME | End: 2022-08-29
Payer: COMMERCIAL

## 2022-08-29 ENCOUNTER — OFFICE VISIT (OUTPATIENT)
Dept: FAMILY MEDICINE CLINIC | Age: 57
End: 2022-08-29
Payer: COMMERCIAL

## 2022-08-29 VITALS
HEART RATE: 74 BPM | TEMPERATURE: 97.7 F | OXYGEN SATURATION: 96 % | SYSTOLIC BLOOD PRESSURE: 138 MMHG | DIASTOLIC BLOOD PRESSURE: 76 MMHG | BODY MASS INDEX: 39.14 KG/M2 | WEIGHT: 228 LBS

## 2022-08-29 DIAGNOSIS — I25.110 CORONARY ARTERY DISEASE INVOLVING NATIVE CORONARY ARTERY OF NATIVE HEART WITH UNSTABLE ANGINA PECTORIS (HCC): ICD-10-CM

## 2022-08-29 DIAGNOSIS — G89.29 CHRONIC LOW BACK PAIN WITHOUT SCIATICA, UNSPECIFIED BACK PAIN LATERALITY: ICD-10-CM

## 2022-08-29 DIAGNOSIS — Z99.89 OSA ON CPAP: ICD-10-CM

## 2022-08-29 DIAGNOSIS — E78.2 MIXED HYPERLIPIDEMIA: ICD-10-CM

## 2022-08-29 DIAGNOSIS — I10 ESSENTIAL HYPERTENSION: Primary | ICD-10-CM

## 2022-08-29 DIAGNOSIS — G47.33 OSA ON CPAP: ICD-10-CM

## 2022-08-29 DIAGNOSIS — E11.9 TYPE 2 DIABETES MELLITUS WITHOUT COMPLICATION, WITHOUT LONG-TERM CURRENT USE OF INSULIN (HCC): ICD-10-CM

## 2022-08-29 DIAGNOSIS — M54.50 CHRONIC LOW BACK PAIN WITHOUT SCIATICA, UNSPECIFIED BACK PAIN LATERALITY: ICD-10-CM

## 2022-08-29 PROBLEM — M19.049 LOCALIZED, PRIMARY OSTEOARTHRITIS OF HAND: Status: ACTIVE | Noted: 2022-08-29

## 2022-08-29 PROBLEM — I72.4 ANEURYSM OF ARTERY OF LOWER EXTREMITY (HCC): Status: ACTIVE | Noted: 2022-08-29

## 2022-08-29 LAB
ESTIMATED AVERAGE GLUCOSE: 131 MG/DL
HBA1C MFR BLD: 6.2 % (ref 4–6)

## 2022-08-29 PROCEDURE — 83036 HEMOGLOBIN GLYCOSYLATED A1C: CPT

## 2022-08-29 PROCEDURE — 99214 OFFICE O/P EST MOD 30 MIN: CPT | Performed by: FAMILY MEDICINE

## 2022-08-29 PROCEDURE — 3044F HG A1C LEVEL LT 7.0%: CPT | Performed by: FAMILY MEDICINE

## 2022-08-29 PROCEDURE — 36415 COLL VENOUS BLD VENIPUNCTURE: CPT

## 2022-08-29 RX ORDER — OMEPRAZOLE 40 MG/1
CAPSULE, DELAYED RELEASE ORAL
Qty: 90 CAPSULE | Refills: 1 | Status: SHIPPED | OUTPATIENT
Start: 2022-08-29

## 2022-08-29 ASSESSMENT — ENCOUNTER SYMPTOMS
SHORTNESS OF BREATH: 0
BACK PAIN: 1
ALLERGIC/IMMUNOLOGIC NEGATIVE: 1
ABDOMINAL PAIN: 0
GASTROINTESTINAL NEGATIVE: 1
CHEST TIGHTNESS: 1
BOWEL INCONTINENCE: 0
BLURRED VISION: 0
ORTHOPNEA: 0
EYES NEGATIVE: 1

## 2022-08-29 ASSESSMENT — PATIENT HEALTH QUESTIONNAIRE - PHQ9
SUM OF ALL RESPONSES TO PHQ QUESTIONS 1-9: 0
2. FEELING DOWN, DEPRESSED OR HOPELESS: 0
1. LITTLE INTEREST OR PLEASURE IN DOING THINGS: 0
SUM OF ALL RESPONSES TO PHQ QUESTIONS 1-9: 0
SUM OF ALL RESPONSES TO PHQ9 QUESTIONS 1 & 2: 0

## 2022-08-29 NOTE — TELEPHONE ENCOUNTER
Liliya Barbour is calling to request a refill on the following medication(s):    Medication Request:  Requested Prescriptions     Pending Prescriptions Disp Refills    omeprazole (PRILOSEC) 40 MG delayed release capsule [Pharmacy Med Name: OMEPRAZOLE DR 40 MG CAPSULE] 30 capsule 0     Sig: take 1 capsule by mouth once daily       Last Visit Date (If Applicable):  Visit date not found    Next Visit Date:    8/29/2022

## 2022-08-29 NOTE — ASSESSMENT & PLAN NOTE
Unclear control, continue current plan pending work up below, medication adherence emphasized and lifestyle modifications recommended   Recently started Jardiance  Had GI SEs from Rybelsus and increased dose of Dulagultide  Discussed trying Ozempic

## 2022-08-29 NOTE — PATIENT INSTRUCTIONS
Patient Education        Counting Carbohydrates for Diabetes: Care Instructions  Overview     Managing the amount of carbohydrate (carbs) you eat is an important part of planning healthy meals when you have diabetes. Carbs raise blood sugar more than any other nutrient. Carbs are found in grains, starchy vegetables, fruits,and milk and yogurt. Carbs are also found in sugar-sweetened foods and drinks. The more carbs you eat at one time, the higher your blood sugar will rise. Counting carbs can help you keep your blood sugar within your target range. If you use insulin, counting carbs helps you match the right amount of insulinto the number of grams of carbs in a meal.  A registered dietitian or diabetes educator can help you plan meals and snacks. Follow-up care is a key part of your treatment and safety. Be sure to make and go to all appointments, and call your doctor if you are having problems. It's also a good idea to know your test results and keep alist of the medicines you take. How can you care for yourself at home? Know your daily amount of carbohydrates  Your daily amount depends on several things, such as your weight, how active you are, which diabetes medicines you take, and what your goals are for your blood sugar levels. A registered dietitian or diabetes educator can help youplan how many carbs to include in each meal and snack. For most adults, a guideline for the daily amount of carbs is:  45 to 60 grams at each meal. That's about the same as 3 to 4 carbohydrate servings. 15 to 20 grams at each snack. That's about the same as 1 carbohydrate serving. Count carbs  Counting carbs lets you know how much rapid-acting insulin to take before you eat. If you use an insulin pump, you get a constant rate of insulin during the day. So the pump must be programmed at meals. This gives you extra insulin tocover the rise in blood sugar after meals. If you take insulin:  Learn your own insulin-to-carb ratio. You and your diabetes health professional will figure out the ratio. You can do this by testing your blood sugar after meals. For example, you may need a certain amount of insulin for every 15 grams of carbs. Add up the carb grams in a meal. Then you can figure out how many units of insulin to take based on your insulin-to-carb ratio. Exercise lowers blood sugar. You can use less insulin than you would if you were not doing exercise. Keep in mind that timing matters. If you exercise within 1 hour after a meal, your body may need less insulin for that meal than it would if you exercised 3 hours after the meal. Test your blood sugar to find out how exercise affects your need for insulin. If you do or don't take insulin:  Look at labels on packaged foods. This can tell you how many carbs are in a serving. Be aware of portions, or serving sizes. If a package has two servings and you eat the whole package, you need to double the number of grams of carbohydrate listed for one serving. Protein, fat, and fiber do not raise blood sugar as much as carbs do. If you eat a lot of these nutrients in a meal, your blood sugar will rise more slowly than it would otherwise. Where can you learn more? Go to https://[email or web address]. org and sign in to your [employer] account. Enter L098 in the [facility] box to learn more about \"Counting Carbohydrates for Diabetes: Care Instructions. \"     If you do not have an account, please click on the \"Sign Up Now\" link. Current as of: July 28, 2021               Content Version: 13.3  © 2006-2022 [employer]. Care instructions adapted under license by [facility] ([facility]). If you have questions about a medical condition or this instruction, always ask your healthcare professional. [address] any warranty or liability for your use of this information.

## 2022-08-29 NOTE — PROGRESS NOTES
AP[facility] Progress Note    Date:8/29/2022         Patient True Cooper     YOB: 1965     Age:57 y.o. Assessment/Plan        Problem List Items Addressed This Visit          Circulatory    Essential hypertension - Primary      Well-controlled, continue current medications, continue current treatment plan, medication adherence emphasized and lifestyle modifications recommended         Coronary artery disease involving native coronary artery of native heart with unstable angina pectoris ([address]      Monitored by specialist- no acute findings meriting change in the plan            Respiratory    SRI on CPAP     Patient is very compliant with CPAP therapy  Patient benefits greatly from CPAP therapy  Recommend continuing CPAP therapy            Endocrine    Type 2 diabetes mellitus without complication, without long-term current use of insulin ([address]      Unclear control, continue current plan pending work up below, medication adherence emphasized and lifestyle modifications recommended   Recently started Jardiance  Had GI SEs from Rybelsus and increased dose of Dulagultide  Discussed trying Ozempic            Other    Chronic back pain      Monitored by specialist- no acute findings meriting change in the plan         Mixed hyperlipidemia      Well-controlled, continue current medications, continue current treatment plan, medication adherence emphasized and lifestyle modifications recommended             Return in about 3 months (around 11/29/2022). Electronically signed by Natalia Cantrell DO on 8/29/22       Total time spent was between Time personally spent assessing and managing the patient on the date of service: Est: 30-39 minutes (52416) mins.  This included time spent reviewing the patient's medical record (e.g., recent visits, labs, and studies); seeing the patient in the office (face-to-face time); ordering medications, studies, procedures, or referrals; calling the patient or family later in the day with results and further recommendations; and documenting the visit in the medical record. Hu Johnson is a 61 y.o. female presenting today for   Chief Complaint   Patient presents with    Establish Care   . Sleep Apnea:  Current treatment: cPAP. Compliance: compliant most of the time. Residual symptoms include: none. Hypertension  This is a chronic problem. The current episode started more than 1 year ago. The problem has been gradually improving since onset. The problem is controlled. Pertinent negatives include no anxiety, blurred vision, chest pain, headaches, malaise/fatigue, neck pain, orthopnea, palpitations, peripheral edema, PND, shortness of breath or sweats. Past treatments include angiotensin blockers, beta blockers, diuretics and calcium channel blockers. The current treatment provides significant improvement. There is no history of chronic renal disease. Diabetes  She presents for her follow-up diabetic visit. She has type 2 diabetes mellitus. Her disease course has been stable. There are no hypoglycemic associated symptoms. Pertinent negatives for hypoglycemia include no dizziness, headaches or sweats. Associated symptoms include foot paresthesias. Pertinent negatives for diabetes include no blurred vision, no chest pain, no weakness and no weight loss. Symptoms are stable. Current diabetic treatment includes oral agent (dual therapy). She is compliant with treatment most of the time. An ACE inhibitor/angiotensin II receptor blocker is being taken. Hyperlipidemia  This is a chronic problem. The current episode started more than 1 year ago. The problem is controlled. Recent lipid tests were reviewed and are normal. Exacerbating diseases include diabetes and obesity. She has no history of chronic renal disease, hypothyroidism, liver disease or nephrotic syndrome. Associated symptoms include leg pain.  Pertinent negatives include no chest pain, focal sensory loss, focal weakness, myalgias or shortness of breath. Current antihyperlipidemic treatment includes statins. The current treatment provides significant improvement of lipids. Coronary Artery Disease  Presents for follow-up visit. Symptoms include chest tightness and muscle weakness. Pertinent negatives include no chest pain, chest pressure, dizziness, leg swelling, palpitations, shortness of breath or weight gain. Risk factors include hyperlipidemia and obesity. The symptoms have been stable. Compliance with diet is good. Compliance with exercise is variable. Compliance with medications is good. Back Pain  This is a chronic problem. The current episode started more than 1 year ago. The problem occurs intermittently. The problem has been waxing and waning since onset. The pain is present in the lumbar spine. The quality of the pain is described as aching. The pain is moderate. The symptoms are aggravated by position and bending. Associated symptoms include leg pain, numbness and paresthesias. Pertinent negatives include no abdominal pain, bladder incontinence, bowel incontinence, chest pain, dysuria, fever, headaches, paresis, pelvic pain, perianal numbness, tingling, weakness or weight loss. Treatments tried: lyrica sees pain management. The treatment provided significant relief. Review of Systems   Review of Systems   Constitutional: Negative. Negative for fever, malaise/fatigue, weight gain and weight loss. HENT: Negative. Eyes: Negative. Negative for blurred vision. Respiratory:  Positive for chest tightness. Negative for shortness of breath. Cardiovascular: Negative. Negative for chest pain, palpitations, orthopnea, leg swelling and PND. Gastrointestinal: Negative. Negative for abdominal pain and bowel incontinence. Endocrine: Negative. Genitourinary: Negative. Negative for bladder incontinence, dysuria and pelvic pain.    Musculoskeletal:  Positive for back pain and muscle 60 capsule 3    empagliflozin (JARDIANCE) 10 MG tablet Take 1 tablet by mouth daily 30 tablet 2     No current facility-administered medications for this visit. Past History    Past Medical History:   has a past medical history of Abnormal stress test, Allergic rhinitis, Arthritis, CAD (coronary artery disease), Chronic back pain, Diabetes mellitus ([address] Former smoker, Hyperlipidemia, Hypertension, Murmur, cardiac, SRI on CPAP, Wears prescription eyeglasses, and Wellness examination. Social History:   reports that she quit smoking about 4 years ago. Her smoking use included cigarettes. She has a 7.50 pack-year smoking history. She has never used smokeless tobacco. She reports that she does not drink alcohol and does not use drugs. Family History:   Family History   Problem Relation Age of Onset    Stroke Father     Breast Cancer Mother 48    Other Brother     Diabetes Maternal Grandmother        Surgical History:   Past Surgical History:   Procedure Laterality Date    ANESTHESIA NERVE BLOCK Left 2019    NERVE BLOCK (DEFINE) - # 1 L5-S1 performed by Sosa Brady MD at [address] Bilateral 2020    NERVE BLOCK BILATERAL - B MBB # 1 L4-5, L5-S1 performed by Sosa Brady MD at [address] Bilateral 2020    NERVE BLOCK BILATERAL - L-5, L5-S1 performed by Sosa Brady MD at [address]  2017    CARDIAC CATHETERIZATION  2019    CARDIAC CATHETERIZATION  2018    1 stent mid LAD PT HAS XIENCE ALPINE HEART STENT, MRI CONDITIONAL 3T OK, SAFE IMMEDIATELY POST IMPLANT.      CARDIAC CATHETERIZATION  02/10/2020    for chest pain no new blockage    CARPAL TUNNEL RELEASE Right      SECTION      x3    COLONOSCOPY N/A 2018    COLONOSCOPY WITH BIOPSY performed by Jena Ruiz MD at [place] [address]  2014    cataract surgery left eye WITH IMPLANT. MRI OK. FOOT SURGERY      HAND TENDON SURGERY Right     [facility]. INJECTION PROCEDURE FOR SACROILIAC JOINT Left 12/12/2019    SACROILIAC JOINT INJECTION - #1 performed by Karie Ricks MD at [address] ([address] [address])      [address] BLOCK  07/17/2020     NERVE BLOCK BILATERAL - B MBB # 1 L4-5, L5-S1 (Bilateral     NERVE BLOCK Bilateral 08/14/2020    NERVE BLOCK BILATERAL - L-5, L5-S1 (Bilateral )     NERVE BLOCK Left 09/18/2020    NERVE RADIOFREQUENCY ABLATION- L4-5, L5-S1    NERVE BLOCK Right 09/25/2020     NERVE RADIOFREQUENCY ABLATION (Right )     PAIN MANAGEMENT PROCEDURE Left 9/18/2020    NERVE RADIOFREQUENCY ABLATION- L4-5, L5-S1 performed by Karie Ricks MD at [address] [place] [address] Right 9/25/2020    NERVE RADIOFREQUENCY ABLATION performed by Karie Ricks MD at [address] [place] [address]          Physical Examination      Vitals:  /76   Pulse 74   Temp 97.7 °F (36.5 °C) (Temporal)   Wt 228 lb (103.4 kg)   SpO2 96%   BMI 39.14 kg/m²     Physical Exam  Vitals and nursing note reviewed. Constitutional:       General: She is not in acute distress. Appearance: Normal appearance. She is obese. She is not ill-appearing, toxic-appearing or diaphoretic. HENT:      Head: Normocephalic and atraumatic. Eyes:      General: No scleral icterus. Right eye: No discharge. Left eye: No discharge. Extraocular Movements: Extraocular movements intact. Conjunctiva/sclera: Conjunctivae normal.   Cardiovascular:      Rate and Rhythm: Normal rate and regular rhythm. Pulses: Normal pulses. Heart sounds: Normal heart sounds. No murmur heard. No friction rub. No gallop. Pulmonary:      Effort: Pulmonary effort is normal. No respiratory distress. Breath sounds: Normal breath sounds. No stridor. No wheezing, rhonchi or rales.    Chest: rest score:  0    Summed reversibility score:  0    Function:    End diastolic volume:  92CL    Left ventricular ejection fraction:  53%    TID score:  1.37 (scores greater than 1.39 are considered elevated for  Lexiscan stress with Tc99m)    Notes concerning risk stratification:    Risk stratification incorporates both clinical history and some testing  results. Final risk determination is the responsibility of the ordering  provider as other patient information and test results may increase or  decrease the risk assessment reported for this examination. Risk stratification criteria are adapted from \"Noninvasive Risk  Stratification\" criteria from Betzaida Desai. Al,  ACC/AATS/AHA/ASE/ASNC/SCAI/SCCT/STS 2017 Appropriate Use Criteria For  Coronary Revascularization in Patients With Stable Ischemic Heart Disease  [facility] Volume 69, Issue 17, May 2017    High risk (>3% annual death or MI)    1. Severe resting LV dysfunction (LVEF <35%) not readily explained by non  coronary causes    2. Resting perfusion abnormalities greater than 10% of the myocardium in  patients without prior history or evidence of MI    3. Stress-induced perfusion abnormalities encumbering greater than or equal  to 10% myocardium or stress segmental scores indicating multiple vascular  territories with abnormalities 4. Stress-induced LV dilatation (TID ratio  greater than 1.19 for exercise and greater than 1.39 for regadenoson)    Intermediate risk (1% to 3% annual death or MI)    1. Mild/moderate resting LV dysfunction (LVEF 35% to 49%) not readily  explained by non coronary causes. 2. Resting perfusion abnormalities in 5%-9.9% of the myocardium in patients  without a history or prior evidence of MI    3. Stress-induced perfusion abnormality encumbering 5%-9.9% of the myocardium  or stress segmental scores indicating 1 vascular territory with abnormalities  but without LV dilation 4.  Small wall motion abnormality involving 1-2  segments and only 1 coronary bed. Low Risk (Less than 1% annual death or MI)    1. Normal or small myocardial perfusion defect at rest or with stress  encumbering less than 5% of the myocardium. Impression: No stress-induced ischemia. Normal LVEF. TID score:  1.37.  (Scores greater than 1.39 are considered elevated for  Lexiscan stress with Tc99m)    Risk stratification: Low

## 2022-08-31 ENCOUNTER — TELEPHONE (OUTPATIENT)
Dept: FAMILY MEDICINE CLINIC | Age: 57
End: 2022-08-31

## 2022-08-31 NOTE — TELEPHONE ENCOUNTER
Patient called and stated she was seen on 8/29 in office and had her A1c done. Pt is asking about lab results. Pt stated that at the Office visit it was discussed that the medication Jardiance may change.  Please Advise Thank you

## 2022-08-31 NOTE — TELEPHONE ENCOUNTER
Writer called pt and informed her about her A1C and medication. Pt stated that she will continue on the Jardiance.  Thank you

## 2022-08-31 NOTE — TELEPHONE ENCOUNTER
A1c looks great and is at goal so we technically don't need to change anything if she is tolerating Jardiance well. We had discussed possibly stopping Jardiance and trying Ozempic to help with A1c and weight loss, but I'm of the school of \"if it ain't broke don't fix it\" so in terms of the diabetes control, I'm not inclined to change anything.  But will leave that decision up to Marcus Knott if she wants to try Ozempic to see if it helps with weight loss and doesn't give her the side effects that Rybelsus did

## 2022-09-29 ENCOUNTER — TELEPHONE (OUTPATIENT)
Dept: INTERNAL MEDICINE CLINIC | Age: 57
End: 2022-09-29

## 2022-09-29 NOTE — TELEPHONE ENCOUNTER
Mark-Pharmacist from [place](Case ON#[id number]) is asking if Omeprazole be reduced to 20 mg due to possible side effects?

## 2022-10-03 NOTE — TELEPHONE ENCOUNTER
Please call and ask patient if her GERD is controlled and if she would be interested in trying only 20mg daily. If she is ok with that then we can do that. If she wants to stay on 40mg then we will stay there.

## 2022-10-04 DIAGNOSIS — E11.9 TYPE 2 DIABETES MELLITUS WITHOUT COMPLICATION, WITHOUT LONG-TERM CURRENT USE OF INSULIN (HCC): ICD-10-CM

## 2022-10-04 RX ORDER — EMPAGLIFLOZIN 10 MG/1
TABLET, FILM COATED ORAL
Qty: 30 TABLET | Refills: 2 | Status: SHIPPED | OUTPATIENT
Start: 2022-10-04

## 2022-10-04 NOTE — TELEPHONE ENCOUNTER
[address] called back. They were informed and acknowledged to not decrease the dosage.     @ 11:43AM 10/4 [place]

## 2022-10-06 ENCOUNTER — OFFICE VISIT (OUTPATIENT)
Dept: PAIN MANAGEMENT | Age: 57
End: 2022-10-06
Payer: COMMERCIAL

## 2022-10-06 VITALS — BODY MASS INDEX: 39.03 KG/M2 | HEIGHT: 64 IN | WEIGHT: 228.6 LBS

## 2022-10-06 DIAGNOSIS — M51.36 DDD (DEGENERATIVE DISC DISEASE), LUMBAR: ICD-10-CM

## 2022-10-06 DIAGNOSIS — M54.16 LUMBAR RADICULOPATHY: ICD-10-CM

## 2022-10-06 DIAGNOSIS — M54.50 CHRONIC BILATERAL LOW BACK PAIN WITHOUT SCIATICA: Primary | ICD-10-CM

## 2022-10-06 DIAGNOSIS — G89.29 CHRONIC BILATERAL LOW BACK PAIN WITHOUT SCIATICA: Primary | ICD-10-CM

## 2022-10-06 PROBLEM — M51.369 DDD (DEGENERATIVE DISC DISEASE), LUMBAR: Status: ACTIVE | Noted: 2022-10-06

## 2022-10-06 PROCEDURE — 99213 OFFICE O/P EST LOW 20 MIN: CPT | Performed by: NURSE PRACTITIONER

## 2022-10-06 RX ORDER — PREGABALIN 50 MG/1
50 CAPSULE ORAL 2 TIMES DAILY
Qty: 60 CAPSULE | Refills: 2 | Status: SHIPPED | OUTPATIENT
Start: 2022-10-06 | End: 2022-11-05

## 2022-10-06 ASSESSMENT — ENCOUNTER SYMPTOMS
BACK PAIN: 1
CONSTIPATION: 0
COUGH: 0
SHORTNESS OF BREATH: 0
BOWEL INCONTINENCE: 0

## 2022-10-06 NOTE — PROGRESS NOTES
Chief Complaint   Patient presents with    Back Pain         [facility]       Pt reports chronic lumbar pain. Hx of 3 lumbar surgeries but continues to have significant low back pain. She has seen neurosurgeon who suggested more conservative measures. She has had physical therapy in the past with mild benefit. She has has injections in past with limited benefit and not interested in repeating at this time. She is on Brilinta for coronary artery disease. Stent placed July 2018. MRI lumbar spine 2020 with multilevel degenerative changes and disc bulging. EMG shows a left L4-5 radiculopathy and a right L5-S1 radiculopathy. Reports only taking lyrica once daily and still having radicular burning pain down both legs. Suggested to increase to BID dosing - will watch for side effects. .      Back Pain  This is a chronic problem. The current episode started more than 1 year ago. The problem occurs constantly. The problem has been gradually worsening since onset. The pain is present in the lumbar spine and gluteal. The quality of the pain is described as aching, stabbing and shooting. The pain radiates to the right knee and left knee. The pain is at a severity of 6/10. The pain is moderate. The pain is The same all the time. The symptoms are aggravated by bending, position, twisting, stress and coughing. Stiffness is present All day. Pertinent negatives include no bladder incontinence, bowel incontinence, chest pain or fever. She has tried heat, ice, home exercises, bed rest, walking and chiropractic manipulation for the symptoms. The treatment provided mild relief. Past Medical History:   Diagnosis Date    Abnormal stress test     Allergic rhinitis     Arthritis     CAD (coronary artery disease)     Dr. Esperanza Zamorano last seen 2/20/2020    Chronic back pain     Diabetes mellitus [facility])     Former smoker 2/7/2020    30-year history.   Quit in 2018    Hyperlipidemia     Dr. Grace Chambers    Hypertension      [facility]    Murmur, cardiac 2020    SRI on CPAP     instructed to bring Dr. Glory Felix    Wears prescription eyeglasses     Wellness examination     Dr. Erika Gaxiola last seen 2020       Past Surgical History:   Procedure Laterality Date    ANESTHESIA NERVE BLOCK Left 2019    NERVE BLOCK (DEFINE) - # 1 L5-S1 performed by Nelly Nunez MD at [address] Bilateral 2020    NERVE BLOCK BILATERAL - B MBB # 1 L4-5, L5-S1 performed by Nelly Nunez MD at [address] Bilateral 2020    NERVE BLOCK BILATERAL - L-5, L5-S1 performed by Nelly Nunez MD at [facility] [address]  2017    CARDIAC CATHETERIZATION  2019    CARDIAC CATHETERIZATION  2018    1 stent mid LAD PT HAS XIENCE ALPINE HEART STENT, MRI CONDITIONAL 3T OK, SAFE IMMEDIATELY POST IMPLANT. CARDIAC CATHETERIZATION  02/10/2020    for chest pain no new blockage    CARPAL TUNNEL RELEASE Right      SECTION      x3    COLONOSCOPY N/A 2018    COLONOSCOPY WITH BIOPSY performed by Huan Valenzuela MD at [place] [address]  2014    cataract surgery left eye WITH IMPLANT. MRI OK.      FOOT SURGERY      HAND TENDON SURGERY Right     [facility] INJECTION PROCEDURE FOR SACROILIAC JOINT Left 2019    SACROILIAC JOINT INJECTION - #1 performed by Nelly Nunez MD at [facility] ([address] [place] [address])      LUMBAR DISC SURGERY      NERVE BLOCK  2020     NERVE BLOCK BILATERAL - B MBB # 1 L4-5, L5-S1 (Bilateral     NERVE BLOCK Bilateral 2020    NERVE BLOCK BILATERAL - L-5, L5-S1 (Bilateral )     NERVE BLOCK Left 2020    NERVE RADIOFREQUENCY ABLATION- L4-5, L5-S1    NERVE BLOCK Right 2020     NERVE RADIOFREQUENCY ABLATION (Right )     PAIN MANAGEMENT PROCEDURE Left 2020    NERVE RADIOFREQUENCY ABLATION- L4-5, L5-S1 performed by Nelly Nunez MD at [facility] 9/25/2020    NERVE RADIOFREQUENCY ABLATION performed by Opal Andrews MD at [address] [facility]         Allergies   Allergen Reactions    Bee Venom Hives     Other reaction(s): Unknown    Tetracycline Hcl      Other reaction(s): Unknown    Tetracyclines & Related Nausea Only and Other (See Comments)     dizziness    Ace Inhibitors Other (See Comments)     cough    Trulicity [Dulaglutide] Nausea And Vomiting     Not able to tolerate GLP-1 agonist         Current Outpatient Medications:     pregabalin (LYRICA) 50 MG capsule, Take 1 capsule by mouth 2 times daily for 30 days. , Disp: 60 capsule, Rfl: 2    JARDIANCE 10 MG tablet, take 1 tablet by mouth once daily, Disp: 30 tablet, Rfl: 2    omeprazole (PRILOSEC) 40 MG delayed release capsule, take 1 capsule by mouth once daily, Disp: 90 capsule, Rfl: 1    Handicap Placard MISC, by Does not apply route Handicap placard for 5 years. , Disp: 1 each, Rfl: 0    Multiple Vitamins-Minerals (ONE-A-DAY 50 PLUS PO), Take 1 tablet by mouth daily, Disp: , Rfl:     losartan (COZAAR) 100 MG tablet, take 1 tablet by mouth once daily, Disp: 30 tablet, Rfl: 5    ticagrelor (BRILINTA) 90 MG TABS tablet, Take 1 tablet by mouth 2 times daily, Disp: 180 tablet, Rfl: 4    atorvastatin (LIPITOR) 80 MG tablet, take 1 tablet by mouth once daily, Disp: 30 tablet, Rfl: 11    ezetimibe (ZETIA) 10 MG tablet, Take 10 mg by mouth daily, Disp: , Rfl:     metFORMIN (GLUCOPHAGE-XR) 500 MG extended release tablet, take 1 tablet by mouth once daily for 2 weeks then 1 tablet twice a day, Disp: 60 tablet, Rfl: 11    ranolazine (RANEXA) 500 MG extended release tablet, Take 1 tablet by mouth 2 times daily (Patient taking differently: Take 1,000 mg by mouth 2 times daily), Disp: 60 tablet, Rfl: 3    carvedilol (COREG) 6.25 MG tablet, Take 1 tablet by mouth 2 times daily (with meals), Disp: 60 tablet, Rfl: 3 nitroGLYCERIN (NITROSTAT) 0.4 MG SL tablet, place 1 tablet under the tongue if needed every 5 minutes for chest pain for 3 doses IF NO RELIEF AFTER FIRST DOSE CALL PRESCRIBER ., Disp: 25 tablet, Rfl: 1    amLODIPine (NORVASC) 5 MG tablet, Take 10 mg by mouth daily , Disp: , Rfl:     hydrochlorothiazide (HYDRODIURIL) 25 MG tablet, Take 25 mg by mouth daily, Disp: , Rfl:     aspirin 81 MG tablet, Take 1 tablet by mouth daily (with breakfast), Disp: 30 tablet, Rfl: 11    Family History   Problem Relation Age of Onset    Stroke Father     Breast Cancer Mother 48    Other Brother     Diabetes Maternal Grandmother        Social History     Socioeconomic History    Marital status:      Spouse name: Not on file    Number of children: Not on file    Years of education: Not on file    Highest education level: Not on file   Occupational History    Not on file   Tobacco Use    Smoking status: Former     Packs/day: 0.25     Years: 30.00     Pack years: 7.50     Types: Cigarettes     Quit date: 2018     Years since quittin.2    Smokeless tobacco: Never   Vaping Use    Vaping Use: Never used   Substance and Sexual Activity    Alcohol use: No    Drug use: No    Sexual activity: Not on file   Other Topics Concern    Not on file   Social History Narrative    Not on file     Social Determinants of Health     Financial Resource Strain: Low Risk     Difficulty of Paying Living Expenses: Not hard at all   Food Insecurity: No Food Insecurity    Worried About Running Out of Food in the Last Year: Never true    Ran Out of Food in the Last Year: Never true   Transportation Needs: Not on file   Physical Activity: Sufficiently Active    Days of Exercise per Week: 5 days    Minutes of Exercise per Session: 90 min   Stress: Not on file   Social Connections: Not on file   Intimate Partner Violence: Not At Risk    Fear of Current or Ex-Partner: No    Emotionally Abused: No    Physically Abused: No    Sexually Abused:  No Housing Stability: Not on file       Review of Systems:  Review of Systems   Constitutional: Negative for chills and fever. Cardiovascular:  Negative for chest pain. Respiratory:  Negative for cough and shortness of breath. Musculoskeletal:  Positive for back pain. Gastrointestinal:  Negative for bowel incontinence and constipation. Genitourinary:  Negative for bladder incontinence. Physical Exam:  Ht 5' 4\" (1.626 m)   Wt 228 lb 9.6 oz (103.7 kg)   BMI 39.24 kg/m²     Physical Exam  Cardiovascular:      Rate and Rhythm: Normal rate. Pulmonary:      Effort: Pulmonary effort is normal.   Musculoskeletal:         General: Normal range of motion. Skin:     General: Skin is warm and dry. Neurological:      Mental Status: She is alert and oriented to person, place, and time. Assessment:  Problem List Items Addressed This Visit       DDD (degenerative disc disease), lumbar    Chronic back pain - Primary    Relevant Medications    pregabalin (LYRICA) 50 MG capsule     Other Visit Diagnoses       Lumbar radiculopathy        Relevant Medications    pregabalin (LYRICA) 50 MG capsule               Treatment Plan:  Patient relates current medications are helping the pain. Patient reports taking pain medications as prescribed, denies obtaining medications from different sources and denies use of illegal drugs. Patient denies side effects from medications like nausea, vomiting, constipation or drowsiness. Patient reports current activities of daily living are possible due to medications and would like to continue them. As always, we encourage daily stretching and strengthening exercises, and recommend minimizing use of pain medications unless patient cannot get through daily activities due to pain.      Script written for lyrica  Follow up appointment made for 3 months    I have reviewed the chief complaint and history of present illness (including ROS and PFSH) and vital documentation by my staff and I agree with their documentation and have added where applicable.

## 2022-10-19 RX ORDER — TICAGRELOR 90 MG/1
TABLET ORAL
Qty: 840 TABLET | OUTPATIENT
Start: 2022-10-19

## 2022-12-08 ENCOUNTER — OFFICE VISIT (OUTPATIENT)
Dept: FAMILY MEDICINE CLINIC | Age: 57
End: 2022-12-08
Payer: COMMERCIAL

## 2022-12-08 ENCOUNTER — HOSPITAL ENCOUNTER (OUTPATIENT)
Dept: GENERAL RADIOLOGY | Age: 57
Discharge: HOME | End: 2022-12-10
Payer: COMMERCIAL

## 2022-12-08 ENCOUNTER — HOSPITAL ENCOUNTER (OUTPATIENT)
Age: 57
Discharge: HOME | End: 2022-12-10
Payer: COMMERCIAL

## 2022-12-08 VITALS
WEIGHT: 230 LBS | OXYGEN SATURATION: 97 % | HEART RATE: 82 BPM | DIASTOLIC BLOOD PRESSURE: 70 MMHG | SYSTOLIC BLOOD PRESSURE: 130 MMHG | BODY MASS INDEX: 39.48 KG/M2

## 2022-12-08 DIAGNOSIS — G89.29 CHRONIC PAIN OF BOTH SHOULDERS: ICD-10-CM

## 2022-12-08 DIAGNOSIS — I25.110 CORONARY ARTERY DISEASE INVOLVING NATIVE CORONARY ARTERY OF NATIVE HEART WITH UNSTABLE ANGINA PECTORIS (HCC): ICD-10-CM

## 2022-12-08 DIAGNOSIS — M24.012 LOOSE BODY IN LEFT SHOULDER: ICD-10-CM

## 2022-12-08 DIAGNOSIS — E78.2 MIXED HYPERLIPIDEMIA: ICD-10-CM

## 2022-12-08 DIAGNOSIS — G89.29 CHRONIC PAIN OF BOTH SHOULDERS: Primary | ICD-10-CM

## 2022-12-08 DIAGNOSIS — I10 ESSENTIAL HYPERTENSION: ICD-10-CM

## 2022-12-08 DIAGNOSIS — G89.29 CHRONIC LOW BACK PAIN WITHOUT SCIATICA, UNSPECIFIED BACK PAIN LATERALITY: ICD-10-CM

## 2022-12-08 DIAGNOSIS — M25.512 CHRONIC PAIN OF BOTH SHOULDERS: ICD-10-CM

## 2022-12-08 DIAGNOSIS — M25.512 CHRONIC PAIN OF BOTH SHOULDERS: Primary | ICD-10-CM

## 2022-12-08 DIAGNOSIS — M54.50 CHRONIC LOW BACK PAIN WITHOUT SCIATICA, UNSPECIFIED BACK PAIN LATERALITY: ICD-10-CM

## 2022-12-08 DIAGNOSIS — E11.9 TYPE 2 DIABETES MELLITUS WITHOUT COMPLICATION, WITHOUT LONG-TERM CURRENT USE OF INSULIN (HCC): Primary | ICD-10-CM

## 2022-12-08 DIAGNOSIS — M25.511 CHRONIC PAIN OF BOTH SHOULDERS: ICD-10-CM

## 2022-12-08 DIAGNOSIS — M25.511 CHRONIC PAIN OF BOTH SHOULDERS: Primary | ICD-10-CM

## 2022-12-08 LAB — HBA1C MFR BLD: 6.3 %

## 2022-12-08 PROCEDURE — 73030 X-RAY EXAM OF SHOULDER: CPT

## 2022-12-08 PROCEDURE — 3078F DIAST BP <80 MM HG: CPT | Performed by: FAMILY MEDICINE

## 2022-12-08 PROCEDURE — 83036 HEMOGLOBIN GLYCOSYLATED A1C: CPT | Performed by: FAMILY MEDICINE

## 2022-12-08 PROCEDURE — 99214 OFFICE O/P EST MOD 30 MIN: CPT | Performed by: FAMILY MEDICINE

## 2022-12-08 PROCEDURE — 3074F SYST BP LT 130 MM HG: CPT | Performed by: FAMILY MEDICINE

## 2022-12-08 PROCEDURE — 3044F HG A1C LEVEL LT 7.0%: CPT | Performed by: FAMILY MEDICINE

## 2022-12-08 RX ORDER — MELOXICAM 15 MG/1
15 TABLET ORAL DAILY PRN
Qty: 90 TABLET | Refills: 1 | Status: SHIPPED | OUTPATIENT
Start: 2022-12-08

## 2022-12-08 RX ORDER — AMLODIPINE BESYLATE 10 MG/1
10 TABLET ORAL DAILY
COMMUNITY

## 2022-12-08 RX ORDER — CARVEDILOL 12.5 MG/1
12.5 TABLET ORAL 2 TIMES DAILY WITH MEALS
COMMUNITY

## 2022-12-08 RX ORDER — SEMAGLUTIDE 1.34 MG/ML
0.25 INJECTION, SOLUTION SUBCUTANEOUS WEEKLY
Qty: 1 ADJUSTABLE DOSE PRE-FILLED PEN SYRINGE | Refills: 1 | Status: SHIPPED | OUTPATIENT
Start: 2022-12-08

## 2022-12-08 SDOH — ECONOMIC STABILITY: FOOD INSECURITY: WITHIN THE PAST 12 MONTHS, THE FOOD YOU BOUGHT JUST DIDN'T LAST AND YOU DIDN'T HAVE MONEY TO GET MORE.: NEVER TRUE

## 2022-12-08 SDOH — ECONOMIC STABILITY: FOOD INSECURITY: WITHIN THE PAST 12 MONTHS, YOU WORRIED THAT YOUR FOOD WOULD RUN OUT BEFORE YOU GOT MONEY TO BUY MORE.: NEVER TRUE

## 2022-12-08 ASSESSMENT — ENCOUNTER SYMPTOMS
SHORTNESS OF BREATH: 0
ABDOMINAL PAIN: 0
ORTHOPNEA: 0
BLURRED VISION: 0
GASTROINTESTINAL NEGATIVE: 1
EYES NEGATIVE: 1
BOWEL INCONTINENCE: 0
ALLERGIC/IMMUNOLOGIC NEGATIVE: 1
CHEST TIGHTNESS: 1
BACK PAIN: 1

## 2022-12-08 ASSESSMENT — SOCIAL DETERMINANTS OF HEALTH (SDOH): HOW HARD IS IT FOR YOU TO PAY FOR THE VERY BASICS LIKE FOOD, HOUSING, MEDICAL CARE, AND HEATING?: NOT HARD AT ALL

## 2022-12-08 NOTE — PROGRESS NOTES
AP[facility] Progress Note    Date:12/8/2022         Patient Ramses Platt     YOB: 1965     Age:57 y.o. Assessment/Plan        Problem List Items Addressed This Visit          Circulatory    Essential hypertension      Well-controlled, continue current medications, continue current treatment plan, medication adherence emphasized and lifestyle modifications recommended         Coronary artery disease involving native coronary artery of native heart with unstable angina pectoris ([address]      Monitored by specialist- no acute findings meriting change in the plan         Relevant Medications    amLODIPine (NORVASC) 10 MG tablet    carvedilol (COREG) 12.5 MG tablet       Endocrine    Type 2 diabetes mellitus without complication, without long-term current use of insulin (HCC) - Primary      Well-controlled, changes made today: add Ozempic for weight loss - will stop jardiance if needed         Relevant Medications    Semaglutide,0.25 or 0.5MG/DOS, (OZEMPIC, 0.25 OR 0.5 MG/DOSE,) 2 MG/1.5ML SOPN    Other Relevant Orders    POCT glycosylated hemoglobin (Hb A1C) (Completed)       Other    Chronic back pain      Monitored by specialist- no acute findings meriting change in the plan         Relevant Medications    meloxicam (MOBIC) 15 MG tablet    Mixed hyperlipidemia      Well-controlled, continue current medications, continue current treatment plan, medication adherence emphasized and lifestyle modifications recommended         Relevant Medications    amLODIPine (NORVASC) 10 MG tablet    carvedilol (COREG) 12.5 MG tablet     Other Visit Diagnoses       Chronic pain of both shoulders        Home exercises given  Discussed PT    Relevant Medications    meloxicam (MOBIC) 15 MG tablet    Other Relevant Orders    XR SHOULDER LEFT (MIN 2 VIEWS)    XR SHOULDER RIGHT (MIN 2 VIEWS)    [facility]             Return in about 1 month (around 1/8/2023) for VV.     Electronically signed by Leeann Thompson Jose Quinn DO on 12/8/22       Total time spent was between Time personally spent assessing and managing the patient on the date of service: Est: 30-39 minutes (07553) mins. This included time spent reviewing the patient's medical record (e.g., recent visits, labs, and studies); seeing the patient in the office (face-to-face time); ordering medications, studies, procedures, or referrals; calling the patient or family later in the day with results and further recommendations; and documenting the visit in the medical record. Saeed Waldne is a 61 y.o. female presenting today for   Chief Complaint   Patient presents with    Hypertension    Diabetes   . Hypertension  This is a chronic problem. The current episode started more than 1 year ago. The problem has been gradually improving since onset. The problem is controlled. Pertinent negatives include no anxiety, blurred vision, chest pain, headaches, malaise/fatigue, neck pain, orthopnea, palpitations, peripheral edema, PND, shortness of breath or sweats. Past treatments include angiotensin blockers, beta blockers, diuretics and calcium channel blockers. The current treatment provides significant improvement. There is no history of chronic renal disease. Diabetes  She presents for her follow-up diabetic visit. She has type 2 diabetes mellitus. Her disease course has been stable. There are no hypoglycemic associated symptoms. Pertinent negatives for hypoglycemia include no dizziness, headaches or sweats. Associated symptoms include foot paresthesias. Pertinent negatives for diabetes include no blurred vision, no chest pain, no weakness and no weight loss. Symptoms are stable. Current diabetic treatment includes oral agent (dual therapy). She is compliant with treatment most of the time. An ACE inhibitor/angiotensin II receptor blocker is being taken. Hyperlipidemia  This is a chronic problem.  The current episode started more than 1 year ago. The problem is controlled. Recent lipid tests were reviewed and are normal. Exacerbating diseases include diabetes and obesity. She has no history of chronic renal disease, hypothyroidism, liver disease or nephrotic syndrome. Associated symptoms include leg pain. Pertinent negatives include no chest pain, focal sensory loss, focal weakness, myalgias or shortness of breath. Current antihyperlipidemic treatment includes statins. The current treatment provides significant improvement of lipids. Coronary Artery Disease  Presents for follow-up visit. Symptoms include chest tightness and muscle weakness. Pertinent negatives include no chest pain, chest pressure, dizziness, leg swelling, palpitations, shortness of breath or weight gain. Risk factors include hyperlipidemia and obesity. The symptoms have been stable. Compliance with diet is good. Compliance with exercise is variable. Compliance with medications is good. Shoulder Pain   The pain is present in the left shoulder and right shoulder. This is a new problem. The current episode started more than 1 month ago. There has been no history of extremity trauma. The problem occurs intermittently. The problem has been gradually worsening. The quality of the pain is described as aching. The pain is moderate. Associated symptoms include a limited range of motion and numbness. Pertinent negatives include no fever, inability to bear weight, itching, joint locking, joint swelling, stiffness or tingling. She has tried rest and movement for the symptoms. The treatment provided no relief. Her past medical history is significant for diabetes. Back Pain  This is a chronic problem. The current episode started more than 1 year ago. The problem occurs intermittently. The problem has been waxing and waning since onset. The pain is present in the lumbar spine. The quality of the pain is described as aching. The pain is moderate.  The symptoms are aggravated by position and bending. Associated symptoms include leg pain, numbness and paresthesias. Pertinent negatives include no abdominal pain, bladder incontinence, bowel incontinence, chest pain, dysuria, fever, headaches, paresis, pelvic pain, perianal numbness, tingling, weakness or weight loss. Treatments tried: lyrica sees pain management. The treatment provided significant relief. Review of Systems   Review of Systems   Constitutional: Negative. Negative for fever, malaise/fatigue, weight gain and weight loss. HENT: Negative. Eyes: Negative. Negative for blurred vision. Respiratory:  Positive for chest tightness. Negative for shortness of breath. Cardiovascular: Negative. Negative for chest pain, palpitations, orthopnea, leg swelling and PND. Gastrointestinal: Negative. Negative for abdominal pain and bowel incontinence. Endocrine: Negative. Genitourinary: Negative. Negative for bladder incontinence, dysuria and pelvic pain. Musculoskeletal:  Positive for back pain and muscle weakness. Negative for myalgias, neck pain and stiffness. Skin: Negative. Negative for itching. Allergic/Immunologic: Negative. Neurological:  Positive for numbness and paresthesias. Negative for dizziness, tingling, focal weakness, weakness and headaches. Hematological: Negative. Psychiatric/Behavioral: Negative. All other systems reviewed and are negative.     Medications     Current Outpatient Medications   Medication Sig Dispense Refill    amLODIPine (NORVASC) 10 MG tablet Take 10 mg by mouth daily      carvedilol (COREG) 12.5 MG tablet Take 12.5 mg by mouth 2 times daily (with meals)      ticagrelor (BRILINTA) 60 MG TABS tablet Take 60 mg by mouth 2 times daily      Semaglutide,0.25 or 0.5MG/DOS, (OZEMPIC, 0.25 OR 0.5 MG/DOSE,) 2 MG/1.5ML SOPN Inject 0.25 mg into the skin once a week 1 Adjustable Dose Pre-filled Pen Syringe 1    meloxicam (MOBIC) 15 MG tablet Take 1 tablet by mouth daily as needed for Pain 90 tablet 1    pregabalin (LYRICA) 50 MG capsule Take 1 capsule by mouth 2 times daily for 30 days. 60 capsule 2    JARDIANCE 10 MG tablet take 1 tablet by mouth once daily 30 tablet 2    omeprazole (PRILOSEC) 40 MG delayed release capsule take 1 capsule by mouth once daily 90 capsule 1    Handicap Placard MISC by Does not apply route Handicap placard for 5 years. 1 each 0    Multiple Vitamins-Minerals (ONE-A-DAY 50 PLUS PO) Take 1 tablet by mouth daily      losartan (COZAAR) 100 MG tablet take 1 tablet by mouth once daily 30 tablet 5    atorvastatin (LIPITOR) 80 MG tablet take 1 tablet by mouth once daily 30 tablet 11    ezetimibe (ZETIA) 10 MG tablet Take 10 mg by mouth daily      metFORMIN (GLUCOPHAGE-XR) 500 MG extended release tablet take 1 tablet by mouth once daily for 2 weeks then 1 tablet twice a day 60 tablet 11    ranolazine (RANEXA) 500 MG extended release tablet Take 1 tablet by mouth 2 times daily (Patient taking differently: Take 1,000 mg by mouth 2 times daily) 60 tablet 3    nitroGLYCERIN (NITROSTAT) 0.4 MG SL tablet place 1 tablet under the tongue if needed every 5 minutes for chest pain for 3 doses IF NO RELIEF AFTER FIRST DOSE CALL PRESCRIBER . 25 tablet 1    hydrochlorothiazide (HYDRODIURIL) 25 MG tablet Take 25 mg by mouth daily      aspirin 81 MG tablet Take 1 tablet by mouth daily (with breakfast) 30 tablet 11     No current facility-administered medications for this visit. Past History    Past Medical History:   has a past medical history of Abnormal stress test, Allergic rhinitis, Arthritis, CAD (coronary artery disease), Chronic back pain, Diabetes mellitus ([address] Former smoker, Hyperlipidemia, Hypertension, Murmur, cardiac, SRI on CPAP, Wears prescription eyeglasses, and Wellness examination. Social History:   reports that she quit smoking about 4 years ago. Her smoking use included cigarettes. She has a 7.50 pack-year smoking history.  She has never used smokeless tobacco. She reports that she does not drink alcohol and does not use drugs. Family History:   Family History   Problem Relation Age of Onset    Stroke Father     Breast Cancer Mother 48    Other Brother     Diabetes Maternal Grandmother        Surgical History:   Past Surgical History:   Procedure Laterality Date    ANESTHESIA NERVE BLOCK Left 2019    NERVE BLOCK (DEFINE) - # 1 L5-S1 performed by Daniel Wylie MD at [address] Bilateral 2020    NERVE BLOCK BILATERAL - B MBB # 1 L4-5, L5-S1 performed by Daniel Wylie MD at [address] Bilateral 2020    NERVE BLOCK BILATERAL - L-5, L5-S1 performed by Daniel Wylie MD at [facility] [address]  2017    CARDIAC CATHETERIZATION  2019    CARDIAC CATHETERIZATION  2018    1 stent mid LAD PT HAS XIENCE ALPINE HEART STENT, MRI CONDITIONAL 3T OK, SAFE IMMEDIATELY POST IMPLANT. CARDIAC CATHETERIZATION  02/10/2020    for chest pain no new blockage    CARPAL TUNNEL RELEASE Right      SECTION      x3    COLONOSCOPY N/A 2018    COLONOSCOPY WITH BIOPSY performed by Sohan Newton MD at [place] [address]  2014    cataract surgery left eye WITH IMPLANT. MRI OK.      FOOT SURGERY      HAND TENDON SURGERY Right     [facility] [place]. INJECTION PROCEDURE FOR SACROILIAC JOINT Left 2019    SACROILIAC JOINT INJECTION - #1 performed by Daniel Wylie MD at [facility] ([facility])      LUMBAR [address] [facility] [address] BLOCK  2020     NERVE BLOCK BILATERAL - B MBB # 1 L4-5, L5-S1 (Bilateral     NERVE BLOCK Bilateral 2020    NERVE BLOCK BILATERAL - L-5, L5-S1 (Bilateral )     NERVE BLOCK Left 2020    NERVE RADIOFREQUENCY ABLATION- L4-5, L5-S1    NERVE BLOCK Right 2020     NERVE RADIOFREQUENCY ABLATION (Right )     PAIN MANAGEMENT PROCEDURE Left 2020    NERVE RADIOFREQUENCY ABLATION- L4-5, L5-S1 performed by Savana Roberson MD at [address] [facility] Right 9/25/2020    NERVE RADIOFREQUENCY ABLATION performed by Savana Roberson MD at [address] [facility]          Physical Examination      Vitals:  /70   Pulse 82   Wt 230 lb (104.3 kg)   SpO2 97%   BMI 39.48 kg/m²     Physical Exam  Vitals and nursing note reviewed. Constitutional:       General: She is not in acute distress. Appearance: Normal appearance. She is obese. She is not ill-appearing, toxic-appearing or diaphoretic. HENT:      Head: Normocephalic and atraumatic. Eyes:      General: No scleral icterus. Right eye: No discharge. Left eye: No discharge. Extraocular Movements: Extraocular movements intact. Conjunctiva/sclera: Conjunctivae normal.   Cardiovascular:      Rate and Rhythm: Normal rate and regular rhythm. Pulses: Normal pulses. Heart sounds: Normal heart sounds. No murmur heard. No friction rub. No gallop. Pulmonary:      Effort: Pulmonary effort is normal. No respiratory distress. Breath sounds: Normal breath sounds. No stridor. No wheezing, rhonchi or rales. Chest:      Chest wall: No tenderness. Musculoskeletal:      Right shoulder: Tenderness present. No swelling, deformity, effusion, laceration, bony tenderness or crepitus. Decreased range of motion. Decreased strength. Normal pulse. Left shoulder: Tenderness present. No swelling, deformity, effusion, laceration, bony tenderness or crepitus. Decreased range of motion. Decreased strength. Normal pulse. Lumbar back: Tenderness present. No swelling, deformity or lacerations. Decreased range of motion. Back:       Comments: Negative drop arm and empty can test but did elicit some pain   Neurological:      Mental Status: She is alert and oriented to person, place, and time. Mental status is at baseline. Psychiatric:         Mood and Affect: Mood normal.         Behavior: Behavior normal.         Thought Content: Thought content normal.         Judgment: Judgment normal.       Labs/Imaging/Diagnostics   Labs:  Hemoglobin A1C   Date Value Ref Range Status   12/08/2022 6.3 % Final       Imaging Last 24 Hours:  NM Cardiac Stress Test Nuclear Imaging  Narrative: EXAMINATION:  MYOCARDIAL PERFUSION IMAGING    3/30/2022 11:10 am    TECHNIQUE:  For the rest study, 13.1 mCi of Tc 99 labeled sestamibi were injected. SPECT  images were acquired. Under cardiology supervision, 0.4mg Alda Lopez was infused. After  pharmacologic stress, 36.8 mCi of Tc 99 labeled sestamibi were injected. SPECT images with ECG gating were acquired. COMPARISON:  None Available. HISTORY:  ORDERING SYSTEM PROVIDED HISTORY: Chest pain  TECHNOLOGIST PROVIDED HISTORY:  Reason for Exam: Chest pain  Procedure Type->Rx  chest pain  Reason for Exam: chest pain, Former smoker, Hyperlipidemia, Hypertension,  Murmur, 9/2021 cardiac cath=  Mild disease with patent middle stent, Mal  apposition of the LAD stent. PD using 3 mm NC balloon  Additional signs and symptoms: coronary artery disease and is having similar  pain to prior episodes of exacerbations  Relevant Medical/Surgical History: Chronic back pain    FINDINGS:  Images interpreted utilizing [employer] system and [employer]. There is no evidence for a significant reversible or fixed perfusion defect. The gated images show no wall motion abnormalities. Normal myocardial  thickening. Perfusion scores are visually adjusted to account for artifact.     Summed stress score:  0    Summed rest score:  0    Summed reversibility score:  0    Function:    End diastolic volume:  41UY    Left ventricular ejection fraction:  53%    TID score:  1.37 (scores greater than 1.39 are considered elevated for  Lexiscan stress with Tc99m)    Notes concerning risk stratification:    Risk stratification incorporates both clinical history and some testing  results. Final risk determination is the responsibility of the ordering  provider as other patient information and test results may increase or  decrease the risk assessment reported for this examination. Risk stratification criteria are adapted from \"Noninvasive Risk  Stratification\" criteria from Antonio Mercer. Al,  ACC/AATS/AHA/ASE/ASNC/SCAI/SCCT/STS 2017 Appropriate Use Criteria For  Coronary Revascularization in Patients With Stable Ischemic Heart Disease  [facility] Volume 69, Issue 17, May 2017    High risk (>3% annual death or MI)    1. Severe resting LV dysfunction (LVEF <35%) not readily explained by non  coronary causes    2. Resting perfusion abnormalities greater than 10% of the myocardium in  patients without prior history or evidence of MI    3. Stress-induced perfusion abnormalities encumbering greater than or equal  to 10% myocardium or stress segmental scores indicating multiple vascular  territories with abnormalities 4. Stress-induced LV dilatation (TID ratio  greater than 1.19 for exercise and greater than 1.39 for regadenoson)    Intermediate risk (1% to 3% annual death or MI)    1. Mild/moderate resting LV dysfunction (LVEF 35% to 49%) not readily  explained by non coronary causes. 2. Resting perfusion abnormalities in 5%-9.9% of the myocardium in patients  without a history or prior evidence of MI    3. Stress-induced perfusion abnormality encumbering 5%-9.9% of the myocardium  or stress segmental scores indicating 1 vascular territory with abnormalities  but without LV dilation 4. Small wall motion abnormality involving 1-2  segments and only 1 coronary bed. Low Risk (Less than 1% annual death or MI)    1. Normal or small myocardial perfusion defect at rest or with stress  encumbering less than 5% of the myocardium. Impression: No stress-induced ischemia. Normal LVEF. TID score:  1.37.  (Scores greater than 1.39 are considered elevated for  Lexiscan stress with Tc99m)    Risk stratification: Low

## 2022-12-14 ENCOUNTER — HOSPITAL ENCOUNTER (OUTPATIENT)
Dept: PHYSICAL THERAPY | Facility: CLINIC | Age: 57
Setting detail: THERAPIES SERIES
Discharge: HOME | End: 2022-12-14
Payer: COMMERCIAL

## 2022-12-14 PROCEDURE — 97110 THERAPEUTIC EXERCISES: CPT

## 2022-12-14 PROCEDURE — 97161 PT EVAL LOW COMPLEX 20 MIN: CPT

## 2022-12-14 NOTE — CONSULTS
[x] [address] [facility]  Outpatient Rehabilitation &  Therapy  [place] [address]   [address]  P: [phone]  F: [phone] [] [address] [facility]  [address] [facility]   [address]  P: [phone]  F: [phone]       Physical Therapy Upper Extremity Extremity Evaluation    Date:  2022  Patient: Denise Govea  : 1965  MRN: [id number]  Physician: Dr. Antonia Vieira DO   Insurance: [facility] ([facility])  Medical Diagnosis: M25.511, M25.512 - chronic pain of both shoulders    Rehab Codes: M25.511, M25.512, M54.2, M62.81  Onset date: 2022  Next 's appt.: 2022 Dr. Elvin Turner    Subjective:   CC: bilateral shoulder pain  HPI: Pt reports bilateral shoulder pain and achiness that has intensified and become more constant over the past month. Pt reports greater L shoulder pain greater than R. Pt reports no known cause of her increased pain. She reports difficulty with overhead motions and carrying any weight in L UE. Difficulty picking up objects greater than 5 pounds. 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 Pt reports that her primary care doctor referred her to get an X-ray and then also referred her for a follow up with Dr. Elvin Turner. Pt reports that she gets intermittent numbness in tingling in bilateral hands, worse with getting up from a chair and driving. Pt reports that this has been going over for a period of time and has not intensified with her increased shoulder pain. Pt reports that her greatest amount of pain is anterior on her glenohumeral joint, she reports clicking when raising her arms overhead. Pt reports history of neck pain and tightness and feels like she needs to consistently self manipulate. Pt reports that neck pain may have increased some recently. PMHx: [x] HTN [x] MI/Heart Problems [x] Arthritis   [x] Other:  has a past medical history of Abnormal stress test, Allergic rhinitis, Arthritis, CAD (coronary artery disease), Chronic back pain, Diabetes mellitus ([facility] [address] Former smoker (2/7/2020), Hyperlipidemia, Hypertension, Murmur, cardiac (2/8/2020), SRI on CPAP, Wears prescription eyeglasses, and Wellness examination. [x] Refer to full medical chart  In EPIC       Comorbidities:   [] Obesity [] Dialysis  [] N/A   [] Asthma/COPD [] Dementia [] Other:   [] Stroke [x] Sleep apnea [] Other:   [] Vascular disease [] Rheumatic disease [] Other:     Tests: [x] X-Ray:  Impression   Right shoulder:       1. Mild degenerative changes as above. 2. No acute fracture or dislocation. Left shoulder:       1. Mild degenerative changes as above. 2 mm loose body at the superior   glenohumeral joint. 2. No acute fracture or dislocation.        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   Work Activities/duties  Desk work, prolonged sitting and typing, moving of objects across desk  Repetitive movement of items, pushing cart, standing   Recreational Activities Reading, watching TV, passenger to 's motorcycle in summer, walking     ADL/IADL Previous level of function Current level of function Who currently assists the patient with task   Bathing  [x] Independent  [] Assist [x] Independent  [] Assist    Dress/grooming [x] Independent  [] Assist [x] Independent  [] Assist    Transfer/mobility [x] Independent  [] Assist [x] Independent  [] Assist    Feeding [x] Independent  [] Assist [x] Independent  [] Assist    Toileting [x] Independent  [] Assist [x] Independent  [] Assist    Driving [x] Independent  [] Assist [x] Independent  [] Assist    Housekeeping [x] Independent  [] Assist [x] Independent  [] Assist Increased pain with activities such as lifting laundry basket but able to perform all ADLs   Grocery shop/meal prep [x] Independent  [] Assist [x] Independent  [] Assist      Gait Prior level of function Current level of function    [x] Independent  [] Assist [x] Independent  [] Assist   Device: [x] Independent [x] Independent       Pain present? Yes   Location Bilateral shoulders   Pain Rating currently R shoulder - 3-4/10  L shoulder - 6/10   Pain altered treatment N/A   Pain at worse 8/10   Pain at best 4/10   Description of pain Constant, dull, aching   Altered Sensation Intermittent in hands and increased at night throughout shoulders   What makes it worse Overhead activities, reaching behind back, lifting and carrying, pushing and pulling, opening/closing door, sleeping, positioning on side   What makes it better Heat    Symptom progression Increasing    Sleep Disturbed  Increased numbness in bilateral hands if lying on R side, increased numbness in R hand if lying on L side           Objective:       ROM  °A/P END FEEL STRENGTH TESTS (+/-) Left Right Not Tested    Left Right  Left Right Drop Arm   []   Sit Shld Flex 120 135  4+/5 4+/5 Sulcus Sign   []   Sit Shld Abd 90 106  4+/5 4+/5 Apprehension   []   Sit Shld IR      Yergasons   []   Shoulder Flex      Speeds   []   Ext      Neer   []   ABD      Magana  + - []   ER @ 0  55 60  4+/5 4+/5 Painful Arc + - []   IR L4 L5  4+/5 4+/5 Tinel   []   Supraspinatus      Reverse phalens + +    Infraspinatus      Val's + +    Elbow Flex. 4+/5 5/5       Elbow Ext.    4+/5 5/5       Pronation            Supination            Wrist Flex. Wrist Ext. Rad. Dev. Ulnar Dev.                             OBSERVATION No Deficit Deficit Not Tested Comments   Forward Head [] [x] []    Rounded Shoulders [] [x] []    Kyphosis [] [x] []    Scap Height/Position [x] [] []    Winging [x] [] []    SH Rhythm [] [x] []    INSPECTION/PALPATION    Tender to palpation of bilateral superior glenohumeral joint   SC/AC Joint [] [] []    Supraspinatus [] [] []    Biceps tendon/groove [] [x] [] Tender to palpation   Posterior shld [] [] []    Subscapularis [] [] []    NEUROLOGICAL       Cervical ROM/Quadrant [] [x] [] Extension: 20% impaired  Flexion: WFL  No reproduction of symptoms with repetitive testing  SB: R 24, L 38  Rotation: R 45, L 60   Reflexes [] [] []    Compression/Distraction [x] [] [] No reproduction of shoulder symptoms, reporting distraction feels good   Sensation [x] [] [] Normal sensation to light touch throughout bilateral upper extremities     Functional Test: Upper Extremity Functional Scale (UEFS) Score: 64/80, 20% functionally impaired     Comments:    Assessment: Amelia Martinez is a 61 y.o. female presenting with bilateral shoulder pain that has increased in frequency and intensity over the past month of insidious onset. Pt reports L sided symptoms greater than R sided symptoms. Pt reports increased pain and difficulty with overhead mobility, reaching behind her back, waited activities and pushing and pulling activities. Pt presenting with global neck and shoulder muscle tightness as well as increased rounding of bilateral shoulders. Symptoms reproduced with Val's clinical special testing and impingement tests. Physical therapy signs and symptoms consistent with potential thoracic outlet syndrome. Patient would benefit from skilled physical therapy services in order to: improve mobility, strength and postural awareness for improved tolerance to overhead activities and tolerance to repetitive tasks and weighted tasks required for work and daily activities. Problems:    [x] ? Pain: bilateral shoulder pain (L > R)  [x] ? ROM: impaired neck and shoulder mobility  [x] ? Strength: global UE strength deficits and impaired postural stability  [x] ?  Function: UEFS = 20% functional impairment  [x] Other: + Val's, + Hawkin's       STG: (to be met in 6 treatments)  ? Pain: Pt will report decreased maximal pain levels to <6/10 in order to improve tolerance to repetitive tasks required for daily activities and work. ? ROM: Pt will demonstrate improved cervical R SB and rotation to WNL when going to the L in order to improve mobility throughout L sided neck musculature to improve postural awareness and positioning with prolonged sitting activities required for work. ? Strength: Pt will increase bilateral UE strength to 5/5 globally in order to improve tolerance to lifting and carrying activities. Patient to be independent with home exercise program as demonstrated by performance with correct form without cues. LTG: (to be met in 12 treatments)  Pt will report average pain levels of 2-3/10 with repetitive tasks and lifting and carrying tasks in order to improve tolerance to daily activities and work related tasks. Pt will increase bilateral shoulder flexion and abduction AROM to >130 degrees in order to improve tolerance to overhead tasks. Pt will demonstrate negative clinical special testing for Hawkin's Yefri and Val's testing in order to improve tolerance and positioning for prolonged sitting for work. Pt will demonstrate improved functional activity tolerance as evident by an improved score on the UEFS to <10% functional impairment. Patient goals: \"reduce pain + uninterrupted sleep\"    Rehab Potential:  [x] Good  [] Fair  [] Poor   Suggested Professional Referral:  [x] No  [] Yes:  Barriers to Goal Achievement:  [x] No  [] Yes:  Domestic Concerns:  [x] No  [] Yes:    Pt. Education:  [x] Plans/Goals, Risks/Benefits discussed  [x] Home exercise program    Method of Education: [x] Verbal  [x] Demo  [x] Written  Access Code: [email or web address]  URL: [email or web address]  Date: 12/14/2022  Prepared by: Erik Parson    Exercises  Seated Upper Trapezius Stretch - 1 x daily - 7 x weekly - 1 sets - 3 reps - 30 hold  Gentle Levator Scapulae Stretch - 1 x daily - 7 x weekly - 1 sets - 3 reps - 30 hold  Seated Scapular Retraction - 1 x daily - 7 x weekly - 2 sets - 10 reps - 5 hold  Standing Pec Stretch at Wall - 1 x daily - 7 x weekly - 1 sets - 2 reps - 30 hold  Doorway Pec Stretch at 60 Elevation - 1 x daily - 7 x weekly - 1 sets - 2 reps - 30 hold    Comprehension of Education:  [x] Verbalizes understanding. [x] Demonstrates understanding. [x] Needs Review. [] Demonstrates/verbalizes understanding of HEP/Ed previously given. Treatment Plan:  [x] Therapeutic Exercise   84309  [] Iontophoresis: 4 mg/mL Dexamethasone Sodium Phosphate  mAmin  14208   [x] Therapeutic Activity  02245 [x] Vasopneumatic cold with compression  07530    [x] Gait Training   25274 [] Ultrasound   96318   [x] Neuromuscular Re-education  22212 [] Electrical Stimulation Unattended  10332   [x] Manual Therapy  61102 [] Electrical Stimulation Attended  87789   [x] Instruction in HEP  [] Lumbar/Cervical Traction  49720   [] Aquatic Therapy   75070 [x] Cold/hotpack    [] Massage   43873      [] Dry Needling, 1 or 2 muscles  50939   [] Biofeedback, first 15 minutes   51393  [] Biofeedback, additional 15 minutes   04536 [] Dry Needling, 3 or more muscles  40823     []  Medication allergies reviewed for use of    Dexamethasone Sodium Phosphate 4mg/ml     with iontophoresis treatments. Pt is not allergic.     Frequency:  2 x/week for 12 visits        Todays Treatment:  Modalities:   Precautions: none  Exercises:  Exercise Reps/ Time Weight/ Level Comments   Upper trap stretch  2x30\" ea     Levator stretch 2x30\" ea     Seated scap retraction 10x5\", x2     Doorway pec stretch 2x30\"     Unilateral wall/doorway pec stretch 2x30\" ea     Other:    Specific Instructions for next treatment: improve neck and shoulder mobility, postural awareness and scapular strengthening      Evaluation Complexity:  History (Personal factors, comorbidities) [] 0 [] 1-2 [x] 3+   Exam (limitations, restrictions) [] 1-2 [x] 3 [] 4+   Clinical presentation (progression) [x] Stable [] Evolving  [] Unstable   Decision Making [x] Low [] Moderate [] High    [x] Low Complexity [] Moderate Complexity [] High Complexity       Treatment Charges: Mins Units   [x] Evaluation       [x]  Low       []  Moderate       []  High 30 1   []  Modalities     [x]  Ther Exercise 15 1   []  Manual Therapy     []  Ther Activities     []  Aquatics     []  Vasocompression     []  Other       TOTAL TREATMENT TIME: 45    Time in: 11:00 am   Time Out: 11:50 am    Electronically signed by: Coral López PT        Physician Signature:________________________________Date:__________________  By signing above or cosigning this note, I have reviewed this plan of care and certify a need for medically necessary rehabilitation services.      *PLEASE SIGN ABOVE AND FAX BACK ALL PAGES*

## 2022-12-20 ENCOUNTER — HOSPITAL ENCOUNTER (OUTPATIENT)
Dept: PHYSICAL THERAPY | Facility: CLINIC | Age: 57
Setting detail: THERAPIES SERIES
Discharge: HOME | End: 2022-12-20
Payer: COMMERCIAL

## 2022-12-20 PROCEDURE — 97140 MANUAL THERAPY 1/> REGIONS: CPT

## 2022-12-20 PROCEDURE — 97110 THERAPEUTIC EXERCISES: CPT

## 2022-12-20 NOTE — FLOWSHEET NOTE
[] [facility]) [facility] &  Therapy  [address]  [phone]  F: [phone] [x] [address]  [facility]   [address]  P: [phone]  F: [phone] [] [address]  [address]  P: [phone]  F: [phone] [] [address]  P: [phone]  F: [phone] [] [address]  [facility]   [address]   [place]: [phone]  F: [phone]      Physical Therapy Daily Treatment Note    Date:  2022  Patient Name:  Saul Lepe    :  1965  MRN: [id number]  Physician: Dr. Kathie Braxton DO                              Insurance: [employer] ([address] [place] [address])  Medical Diagnosis: M25.511, M25.512 - chronic pain of both shoulders                 Rehab Codes: M25.511, M25.512, M54.2, M62.81  Onset date: 2022                       Next 's appt.: 2022 Dr. Skye Reynolds  Visit# / total visits: 2/12    Cancels/No Shows: 0    Subjective:    Pain:  [x] Yes  [] No  Location: B shoulders  Pain Rating: (0-10 scale) 4/10  Pain altered Tx:  [x] No  [] Yes  Action:    Comments: pt reports soreness bilaterally, however greatest on the L. Reports fair tolerance to HEP thus far.      Objective:  Modalities:   Precautions:  Exercises: [employer] Access Code: [email or web address]  Exercise Reps/ Time Weight/ Level Comments   UBE 2'F  2\"R Lv 2 Warm up         Manual  17'  Hypervolt with cushion attachment to B neck, shoulders and upper back         Upper trap stretch  2x30\" ea       Levator stretch 2x30\" ea       Seated scap retraction 10x5\", x2       Doorway pec stretch 2x30\"       Unilateral wall/doorway pec stretch 2x30\" ea       Wall slides  10xea  Flexion and scaption    Cervical rotation 10x ea 5\" hold    Shoulder flexion 10x A Seated    Scaption  10x A Seated    Shoulder abduction  10x A Seated          Other:      Treatment Charges: Mins Units   []  Modalities     [x]  Ther Exercise 32 2   [x]  Manual Therapy 17 1   []  Ther Activities     []  Aquatics     []  Vasocompression     []  Other     Total Treatment time 49 3       Assessment: [x] Progressing toward goals. Started session with UBE warm up to promote tissue extensibility. Followed warm up with manual using hypervolt to reduce muscular tension. Pt tolerates manual well reporting it to feel good and provide relief of discomfort. Continued with exercises as noted above. Reviewing HEP previously issued first. Added exercises to pt tolerance with overall fair tolerance secondary to pain. Pt cued frequently throughout session to reduce UT compensation and utilized mirror for visual cuing. [] No change. [] Other:  [x] Patient would continue to benefit from skilled physical therapy services in order to:  improve mobility, strength and postural awareness for improved tolerance to overhead activities and tolerance to repetitive tasks and weighted tasks required for work and daily activities. At Eval:   Functional Test: Upper Extremity Functional Scale (UEFS) Score: 64/80, 20% functionally impaired     Problems:    [x] ? Pain: bilateral shoulder pain (L > R)  [x] ? ROM: impaired neck and shoulder mobility  [x] ? Strength: global UE strength deficits and impaired postural stability  [x] ? Function: UEFS = 20% functional impairment  [x] Other: + Val's, + Gabino's                 STG: (to be met in 6 treatments)  ? Pain: Pt will report decreased maximal pain levels to <6/10 in order to improve tolerance to repetitive tasks required for daily activities and work.   ? ROM: Pt will demonstrate improved cervical R SB and rotation to WNL when going to the L in order to improve mobility throughout L sided neck musculature to improve postural awareness and positioning with prolonged sitting activities required for work.  ? Strength: Pt will increase bilateral UE strength to 5/5 globally in order to improve tolerance to lifting and carrying activities. Patient to be independent with home exercise program as demonstrated by performance with correct form without cues. LTG: (to be met in 12 treatments)  Pt will report average pain levels of 2-3/10 with repetitive tasks and lifting and carrying tasks in order to improve tolerance to daily activities and work related tasks. Pt will increase bilateral shoulder flexion and abduction AROM to >130 degrees in order to improve tolerance to overhead tasks. Pt will demonstrate negative clinical special testing for Hawkin's Yefri and Val's testing in order to improve tolerance and positioning for prolonged sitting for work. Pt will demonstrate improved functional activity tolerance as evident by an improved score on the UEFS to <10% functional impairment. Patient goals: \"reduce pain + uninterrupted sleep\"    Pt. Education:  [x] Yes  [] No  [x] Reviewed Prior HEP/Ed  Method of Education: [x] Verbal  [x] Demo  [] Written  Access Code: Ashlee Macedo  URL: [email or web address]  Date: 12/20/2022  Prepared by: Anthony Bevels  Exercises  Seated Upper Trapezius Stretch - 1 x daily - 7 x weekly - 1 sets - 3 reps - 30 hold  Gentle Levator Scapulae Stretch - 1 x daily - 7 x weekly - 1 sets - 3 reps - 30 hold  Seated Scapular Retraction - 1 x daily - 7 x weekly - 2 sets - 10 reps - 5 hold  Standing Pec Stretch at Wall - 1 x daily - 7 x weekly - 1 sets - 2 reps - 30 hold  Doorway Pec Stretch at 60 Elevation - 1 x daily - 7 x weekly - 1 sets - 2 reps - 30 hold  Scaption Wall Slide with Towel - 1 x daily - 7 x weekly - 10 reps  Standing Bilateral Shoulder Scaption Wall Slide - 1 x daily - 7 x weekly - 10 reps  Seated Cervical Rotation AROM - 1 x daily - 7 x weekly - 10 reps - 5 seconds hold  Standing Shoulder Flexion Full Range - 1 x daily - 7 x weekly - 10 reps  Shoulder Abduction - Thumbs Up - 1 x daily - 7 x weekly - 10 reps  Standing Shoulder Scaption - 1 x daily - 7 x weekly - 10 reps  Comprehension of Education:  [x] Verbalizes understanding. [x] Demonstrates understanding. [x] Needs review and printed from [employer] not working, handout was not issued. [x] Demonstrates/verbalizes HEP/Ed previously given. Plan: [x] Continue current frequency toward long and short term goals. [x] Specific Instructions for subsequent treatments: improve neck and shoulder mobility, postural awareness and scapular strengthening.  UPDATED HEP NEEDS PRINTED;ALREADY UPDATED IN [employer]      Time In:12:04pm             Time Out: 12:56pm    Electronically signed by:  Venus Michaud PTA

## 2022-12-23 ENCOUNTER — APPOINTMENT (OUTPATIENT)
Dept: PHYSICAL THERAPY | Facility: CLINIC | Age: 57
End: 2022-12-23
Payer: COMMERCIAL

## 2022-12-27 ENCOUNTER — OFFICE VISIT (OUTPATIENT)
Dept: ORTHOPEDIC SURGERY | Age: 57
End: 2022-12-27
Payer: COMMERCIAL

## 2022-12-27 VITALS — HEIGHT: 64 IN | BODY MASS INDEX: 39.27 KG/M2 | WEIGHT: 230 LBS

## 2022-12-27 DIAGNOSIS — G56.03 BILATERAL CARPAL TUNNEL SYNDROME: ICD-10-CM

## 2022-12-27 DIAGNOSIS — G56.02 CARPAL TUNNEL SYNDROME, LEFT: Primary | ICD-10-CM

## 2022-12-27 PROCEDURE — 99212 OFFICE O/P EST SF 10 MIN: CPT | Performed by: ORTHOPAEDIC SURGERY

## 2022-12-27 PROCEDURE — 20526 THER INJECTION CARP TUNNEL: CPT | Performed by: ORTHOPAEDIC SURGERY

## 2022-12-27 RX ORDER — LIDOCAINE HYDROCHLORIDE 10 MG/ML
1 INJECTION, SOLUTION INFILTRATION; PERINEURAL ONCE
Status: COMPLETED | OUTPATIENT
Start: 2022-12-27 | End: 2022-12-27

## 2022-12-27 RX ORDER — METHYLPREDNISOLONE ACETATE 40 MG/ML
40 INJECTION, SUSPENSION INTRA-ARTICULAR; INTRALESIONAL; INTRAMUSCULAR; SOFT TISSUE ONCE
Status: COMPLETED | OUTPATIENT
Start: 2022-12-27 | End: 2022-12-27

## 2022-12-27 RX ADMIN — LIDOCAINE HYDROCHLORIDE 1 ML: 10 INJECTION, SOLUTION INFILTRATION; PERINEURAL at 15:52

## 2022-12-27 RX ADMIN — METHYLPREDNISOLONE ACETATE 40 MG: 40 INJECTION, SUSPENSION INTRA-ARTICULAR; INTRALESIONAL; INTRAMUSCULAR; SOFT TISSUE at 15:53

## 2022-12-27 SDOH — HEALTH STABILITY: PHYSICAL HEALTH: ON AVERAGE, HOW MANY DAYS PER WEEK DO YOU ENGAGE IN MODERATE TO STRENUOUS EXERCISE (LIKE A BRISK WALK)?: 5 DAYS

## 2022-12-27 SDOH — HEALTH STABILITY: PHYSICAL HEALTH: ON AVERAGE, HOW MANY MINUTES DO YOU ENGAGE IN EXERCISE AT THIS LEVEL?: 80 MIN

## 2022-12-27 NOTE — PROGRESS NOTES
This 79-year-old patient is seen here complaining of pain in both the shoulders which she says has probably been going on for about 2 months. Patient has no history of injury. Denies any headaches. She does have some pain in the neck. She has never been involved in an accident. The shoulders never had any injuries. The patient still lies on the left side. She has actually hard time sleeping on the right shoulder. She does get numbness and tingling in both the hands. It radiates down from the shoulder all the way into the fingers. It does wake her up at night. She shakes her hand and occasionally she gets some relief from it. Patient has had a right carpal tunnel release done several years ago. Patient is diabetic. Examination: Shoulder examination shows excellent painless motion. Neck examination shows she does have some pain on every motion but she has normal range of motion. And the pain  did not appear to be pronounced. The patient's Phalen test is positive. There is no thenar wasting. No sensory changes. X-rays: Reviewed the x-rays taken before right shoulder x-rays are normal the left shoulder suggest a small body in the supraglenoid area. Diagnosis: Bilateral carpal tunnel release. Treatment: Under sterile condition I injected left carpal tunnel with 40 mg Depo-Medrol and 1 cc of 1% plain lidocaine without any complications. She has been instructed in icing and we will see her again in 2 weeks time. If she responds well on the left side then we will inject the right side.

## 2022-12-28 ENCOUNTER — HOSPITAL ENCOUNTER (OUTPATIENT)
Dept: PHYSICAL THERAPY | Facility: CLINIC | Age: 57
Setting detail: THERAPIES SERIES
Discharge: HOME | End: 2022-12-28
Payer: COMMERCIAL

## 2022-12-28 PROCEDURE — 97140 MANUAL THERAPY 1/> REGIONS: CPT

## 2022-12-28 PROCEDURE — 97110 THERAPEUTIC EXERCISES: CPT

## 2022-12-28 NOTE — FLOWSHEET NOTE
[] [facility]) [facility] &  Therapy  [address]  [phone]  F: [phone] [x] [address]  [facility]   [address]  P: [phone]  F: [phone] [] [address]  [address]  P: [phone]  F: [phone] [] [address]  P: [phone]  F: [phone] [] [address]  [facility]   [address]   [place]: [phone]  F: [phone]      Physical Therapy Daily Treatment Note    Date:  2022  Patient Name:  Chilo Farias    :  1965  MRN: [id number]  Physician: Dr. Letha Trujillo,                               Insurance: [employer] ([address] [place] [address])  Medical Diagnosis: M25.511, M25.512 - chronic pain of both shoulders                 Rehab Codes: M25.511, M25.512, M54.2, M62.81  Onset date: 2022                       Next 's appt.: 2022 Dr. Sarah Beth Castillo  Visit# / total visits: 3/12    Cancels/No Shows: 0    Subjective:    Pain:  [x] Yes  [] No  Location: B shoulders  Pain Rating: (0-10 scale) 3/10  Pain altered Tx:  [x] No  [] Yes  Action:    Comments: Pt reports that her shoulders have been gradually feeling a little better. Pt reports that she saw Dr. Sarah Beth Castillo yesterday and she reports that he believes her symptoms are coming from carpal tunnel and she reports that he injected her L hand and she has 7/10 pain, she reports inability to put pressure on her L hand and reports difficulty with mobility.      Objective:  Modalities:   Precautions:  Exercises: [employer]and Access Code: [email or web address]  Exercise Reps/ Time Weight/ Level Comments   UBE 2'F  2'R Lv 2 Warm up         Manual  15'  Hypervolt with cushion attachment to B neck, shoulders and upper back         Upper trap stretch  3x30\" ea       Levator stretch 2x30\" ea Seated scap retraction 10x5\", x2       Doorway pec stretch 2x30\"       Unilateral wall/doorway pec stretch 2x30\" ea       Wall slides  10xea  Flexion and scaption    Cervical rotation 10x ea 5\" hold    Shoulder flexion 10x A Seated    Scaption  10x A Seated    Shoulder abduction  10x A Seated    Bilateral ER 10x orange Added 12/28   Snow angels 10x Towel roll thoracic region Added 12/28   Other:      Treatment Charges: Mins Units   []  Modalities     [x]  Ther Exercise 30 2   [x]  Manual Therapy 15 1   []  Ther Activities     []  Aquatics     []  Vasocompression     []  Other     Total Treatment time 45 3       Assessment: [x] Progressing toward goals. Began treatment session with UBE warm up followed by manual therapy with use of hypervolt. Pt reporting decreased tension and improved symptoms following use of hypervolt. Pt demonstrating decreased upper trap compensation compared to initial evaluation throughout treatment session. Exercises performed and progressed as noted above. Impaired mobility evident with addition of snow angels. [] No change. [] Other:  [x] Patient would continue to benefit from skilled physical therapy services in order to:  improve mobility, strength and postural awareness for improved tolerance to overhead activities and tolerance to repetitive tasks and weighted tasks required for work and daily activities. At Eval:   Functional Test: Upper Extremity Functional Scale (UEFS) Score: 64/80, 20% functionally impaired     Problems:    [x] ? Pain: bilateral shoulder pain (L > R)  [x] ? ROM: impaired neck and shoulder mobility  [x] ? Strength: global UE strength deficits and impaired postural stability  [x] ? Function: UEFS = 20% functional impairment  [x] Other: + Val's, + Gabino's                 STG: (to be met in 6 treatments)  ?  Pain: Pt will report decreased maximal pain levels to <6/10 in order to improve tolerance to repetitive tasks required for daily activities and work.  ? ROM: Pt will demonstrate improved cervical R SB and rotation to WNL when going to the L in order to improve mobility throughout L sided neck musculature to improve postural awareness and positioning with prolonged sitting activities required for work. ? Strength: Pt will increase bilateral UE strength to 5/5 globally in order to improve tolerance to lifting and carrying activities. Patient to be independent with home exercise program as demonstrated by performance with correct form without cues. LTG: (to be met in 12 treatments)  Pt will report average pain levels of 2-3/10 with repetitive tasks and lifting and carrying tasks in order to improve tolerance to daily activities and work related tasks. Pt will increase bilateral shoulder flexion and abduction AROM to >130 degrees in order to improve tolerance to overhead tasks. Pt will demonstrate negative clinical special testing for Hawkin's Yefri and Val's testing in order to improve tolerance and positioning for prolonged sitting for work. Pt will demonstrate improved functional activity tolerance as evident by an improved score on the UEFS to <10% functional impairment. Patient goals: \"reduce pain + uninterrupted sleep\"    Pt. Education:  [x] Yes  [] No  [x] Reviewed Prior HEP/Ed  Method of Education: [x] Verbal  [x] Demo  [x] Written  Access Code: Margaret Abraham  URL: [email or web address]  Date: 12/20/2022  Prepared by: Chyrl Rubinstein  Exercises  Seated Upper Trapezius Stretch - 1 x daily - 7 x weekly - 1 sets - 3 reps - 30 hold  Gentle Levator Scapulae Stretch - 1 x daily - 7 x weekly - 1 sets - 3 reps - 30 hold  Seated Scapular Retraction - 1 x daily - 7 x weekly - 2 sets - 10 reps - 5 hold  Standing Pec Stretch at Wall - 1 x daily - 7 x weekly - 1 sets - 2 reps - 30 hold  Doorway Pec Stretch at 60 Elevation - 1 x daily - 7 x weekly - 1 sets - 2 reps - 30 hold  Scaption Wall Slide with Towel - 1 x daily - 7 x weekly - 10 reps  Standing Bilateral Shoulder Scaption Wall Slide - 1 x daily - 7 x weekly - 10 reps  Seated Cervical Rotation AROM - 1 x daily - 7 x weekly - 10 reps - 5 seconds hold  Standing Shoulder Flexion Full Range - 1 x daily - 7 x weekly - 10 reps  Shoulder Abduction - Thumbs Up - 1 x daily - 7 x weekly - 10 reps  Standing Shoulder Scaption - 1 x daily - 7 x weekly - 10 reps    Handout given 12/28/22    Comprehension of Education:  [x] Verbalizes understanding. [x] Demonstrates understanding. [x] Needs review and printed from [employer] not working, handout was not issued. [x] Demonstrates/verbalizes HEP/Ed previously given. Plan: [x] Continue current frequency toward long and short term goals. [x] Specific Instructions for subsequent treatments: improve neck and shoulder mobility, postural awareness and scapular strengthening.        Time In: 12:00 pm             Time Out: 12:50 pm    Electronically signed by:  Ernestine Genao PT

## 2022-12-30 ENCOUNTER — HOSPITAL ENCOUNTER (OUTPATIENT)
Dept: PHYSICAL THERAPY | Facility: CLINIC | Age: 57
Setting detail: THERAPIES SERIES
Discharge: HOME | End: 2022-12-30
Payer: COMMERCIAL

## 2022-12-30 PROCEDURE — 97110 THERAPEUTIC EXERCISES: CPT

## 2022-12-30 PROCEDURE — 97140 MANUAL THERAPY 1/> REGIONS: CPT

## 2022-12-30 NOTE — FLOWSHEET NOTE
[] [facility]) - [facility] &  Therapy  [address]  [phone]  F: [phone] [x] [address]  [facility]   [address]  P: [phone]  F: [phone] [] [address]  [address]  P: [phone]  F: [phone] [] [address]  P: [phone]  F: [phone] [] [address]  [facility]   [address]   [place]: [phone]  F: [phone]      Physical Therapy Daily Treatment Note    Date:  2022  Patient Name:  Madelyn Daniels    :  1965  MRN: [id number]  Physician: Dr. El Leyva,                               Insurance: [employer] ([address] [place] [address])  Medical Diagnosis: M25.511, M25.512 - chronic pain of both shoulders                 Rehab Codes: M25.511, M25.512, M54.2, M62.81  Onset date: 2022                       Next 's appt.: 2022 Dr. Toshia Mckay  Visit# / total visits:     Cancels/No Shows: 0    Subjective:    Pain:  [x] Yes  [] No  Location: B shoulders  Pain Rating: (0-10 scale) 0/10  Pain altered Tx:  [x] No  [] Yes  Action:    Comments: Pt reports she has no pain in her B shoulders today just some tightness. She does have some pain in her L wrist from the injection.      Objective:  Modalities:   Precautions:  Exercises: [employer] Access Code: [email or web address]  Exercise Reps/ Time Weight/ Level Comments   UBE 2'F  2'R Lv 2 Warm up         Manual  15'  Hypervolt with cushion attachment to B neck, shoulders and upper back         Upper trap stretch  3x30\" ea       Levator stretch 2x30\" ea       Seated scap retraction 10x5\", x2       Doorway pec stretch 2x30\"       Unilateral wall/doorway pec stretch 2x30\" ea       Wall slides  10xea  Flexion and scaption    Cervical rotation 10x ea 5\" hold    Shoulder flexion 10x A Seated    Scaption  10x A Seated    Shoulder abduction  10x A Seated    Bilateral ER 10x orange Added 12/28   Snow angels 10x Towel roll thoracic region Added 12/28   Other:      Treatment Charges: Mins Units   []  Modalities     [x]  Ther Exercise 30 2   [x]  Manual Therapy 15 1   []  Ther Activities     []  Aquatics     []  Vasocompression     []  Other     Total Treatment time 45 3       Assessment: [x] Progressing toward goals. Continued with manual using the hypervolt to B shoulders, neck, and upper back to start treatment. Pt reported the manual has helped decreased tightness in those areas. She did report some pain in B shoulders with all overhead exercises and also had some \"clicking. \" Pt felt good at end of treatment with some soreness. [] No change. [] Other:  [x] Patient would continue to benefit from skilled physical therapy services in order to:  improve mobility, strength and postural awareness for improved tolerance to overhead activities and tolerance to repetitive tasks and weighted tasks required for work and daily activities. At Eval:   Functional Test: Upper Extremity Functional Scale (UEFS) Score: 64/80, 20% functionally impaired     Problems:    [x] ? Pain: bilateral shoulder pain (L > R)  [x] ? ROM: impaired neck and shoulder mobility  [x] ? Strength: global UE strength deficits and impaired postural stability  [x] ? Function: UEFS = 20% functional impairment  [x] Other: + Val's, + Gabino's                 STG: (to be met in 6 treatments)  ? Pain: Pt will report decreased maximal pain levels to <6/10 in order to improve tolerance to repetitive tasks required for daily activities and work. ? ROM: Pt will demonstrate improved cervical R SB and rotation to WNL when going to the L in order to improve mobility throughout L sided neck musculature to improve postural awareness and positioning with prolonged sitting activities required for work.   ? Strength: Pt will increase bilateral UE strength to 5/5 globally in order to improve tolerance to lifting and carrying activities. Patient to be independent with home exercise program as demonstrated by performance with correct form without cues. LTG: (to be met in 12 treatments)  Pt will report average pain levels of 2-3/10 with repetitive tasks and lifting and carrying tasks in order to improve tolerance to daily activities and work related tasks. Pt will increase bilateral shoulder flexion and abduction AROM to >130 degrees in order to improve tolerance to overhead tasks. Pt will demonstrate negative clinical special testing for Hawkin's Yefri and Val's testing in order to improve tolerance and positioning for prolonged sitting for work. Pt will demonstrate improved functional activity tolerance as evident by an improved score on the UEFS to <10% functional impairment. Patient goals: \"reduce pain + uninterrupted sleep\"    Pt. Education:  [x] Yes  [] No  [x] Reviewed Prior HEP/Ed  Method of Education: [x] Verbal  [x] Demo  [x] Written  Access Code: [employer]  URL: [email or web address]  Date: 12/20/2022  Prepared by: Flaquito Guillen  Exercises  Seated Upper Trapezius Stretch - 1 x daily - 7 x weekly - 1 sets - 3 reps - 30 hold  Gentle Levator Scapulae Stretch - 1 x daily - 7 x weekly - 1 sets - 3 reps - 30 hold  Seated Scapular Retraction - 1 x daily - 7 x weekly - 2 sets - 10 reps - 5 hold  Standing Pec Stretch at Wall - 1 x daily - 7 x weekly - 1 sets - 2 reps - 30 hold  Doorway Pec Stretch at 60 Elevation - 1 x daily - 7 x weekly - 1 sets - 2 reps - 30 hold  Scaption Wall Slide with Towel - 1 x daily - 7 x weekly - 10 reps  Standing Bilateral Shoulder Scaption Wall Slide - 1 x daily - 7 x weekly - 10 reps  Seated Cervical Rotation AROM - 1 x daily - 7 x weekly - 10 reps - 5 seconds hold  Standing Shoulder Flexion Full Range - 1 x daily - 7 x weekly - 10 reps  Shoulder Abduction - Thumbs Up - 1 x daily - 7 x weekly - 10 reps  Standing Shoulder Scaption - 1 x daily - 7 x weekly - 10 reps    Handout given 12/28/22    Comprehension of Education:  [x] Verbalizes understanding. [x] Demonstrates understanding. [x] Needs review and printed from [employer] not working, handout was not issued. [x] Demonstrates/verbalizes HEP/Ed previously given. Plan: [x] Continue current frequency toward long and short term goals. [x] Specific Instructions for subsequent treatments: improve neck and shoulder mobility, postural awareness and scapular strengthening.        Time In: 12:05 pm             Time Out: 12:55 pm    Electronically signed by:  Markus Guzman PTA

## 2023-01-03 ENCOUNTER — HOSPITAL ENCOUNTER (OUTPATIENT)
Dept: PHYSICAL THERAPY | Facility: CLINIC | Age: 58
Setting detail: THERAPIES SERIES
Discharge: HOME | End: 2023-01-03
Payer: COMMERCIAL

## 2023-01-03 PROCEDURE — 97110 THERAPEUTIC EXERCISES: CPT

## 2023-01-03 NOTE — FLOWSHEET NOTE
[] [facility] [place]) [facility] &  Therapy  [address]  [phone]  F: [phone] [x] [address]  [facility] [address]   [address]  P: [phone]  F: [phone] [] [address]  [address]  P: [phone]  F: [phone] [] [address]  P: [phone]  F: [phone] [] [address]  [facility]   [address]   [place]: [phone]  F: [phone]      Physical Therapy Daily Treatment Note    Date:  1/3/2023  Patient Name:  Oscar Mcdaniels    :  1965  MRN: [id number]  Physician: Dr. Robb Garsia, DO                              Insurance: [employer] ([address] [place] [address])  Medical Diagnosis: M25.511, M25.512 - chronic pain of both shoulders                 Rehab Codes: M25.511, M25.512, M54.2, M62.81  Onset date: 2022                       Next 's appt.: 2022 Dr. Amee Boykin  Visit# / total visits:     Cancels/No Shows: 0    Subjective:    Pain:  [x] Yes  [] No  Location: B shoulders  Pain Rating: (0-10 scale) 3/10  Pain altered Tx:  [x] No  [] Yes  Action:    Comments: Pt reports that overall her shoulder pain has been improving. Pt reports that her L hand is improving; however, she continues to have increased pain and difficulty with her L hand following injection last week. She reports that she is able to make a fist a little better but she is unable to lift much weight in her L hand.      Objective:  Modalities:   Precautions:  Exercises: [employer] Access Code: [email or web address]  Exercise Reps/ Time Weight/ Level Comments   UBE 2'F  2'R Lv 2 Warm up         Manual  15'  Hypervolt with cushion attachment to B neck, shoulders and upper back         Upper trap stretch  3x30\" ea       Levator stretch 2x30\" ea       Seated scap retraction 10x5\", x2       Doorway pec stretch 2x30\"       Unilateral wall/doorway pec stretch 2x30\" ea       Wall slides  10xea  Flexion and scaption    Cervical rotation 10x ea 5\" hold    Shoulder flexion 10x A Seated    Scaption  10x A Seated    Shoulder abduction  10x A Seated    Bilateral ER 10x orange Added 12/28   Snow angels 10x Towel roll thoracic region Added 12/28   Other:    Objective Measures completed by Enriqueta Orellana, PT, DPT 1/3/2023:       ROM  °A/P END FEEL STRENGTH TESTS (+/-) Left Right Not Tested     Left Right   Left Right Drop Arm     []    Sit Shld Flex 128 142   4+/5 5/5 Sulcus Sign     []    Sit Shld Abd 100 105   4+/5 5/5 Apprehension     []    Sit Shld IR           Yergasons     []    Shoulder Flex           Speeds     []    Ext           Neer     []    ABD           Magana  + - []    ER @ 0  40 45   4+/5 4+/5 Painful Arc + - []    IR L2 L5   5/5 5/5 Tinel     []    Supraspinatus           Reverse phalens       Infraspinatus           Val's - -     Elbow Flex. 4+/5 5/5           Elbow Ext.       4+/5 5/5           Pronation                     Supination                     Wrist Flex. Wrist Ext. Rad. Dev. Ulnar Dev.                        Cervical ROM/Quadrant []  [x]  []  Extension: 20% impaired  Flexion: WFL  No reproduction of symptoms with repetitive testing  SB: R 30, L 42  Rotation: R 55, L 55   Reflexes           Treatment Charges: Mins Units   []  Modalities     [x]  Ther Exercise 15 1   []  Manual Therapy     []  Ther Activities     []  Aquatics     []  Vasocompression     []  Other     Total Treatment time 15 1       Assessment: [x] Progressing toward goals. Goal assessment performed during today's treatment session with pt demonstrating improvements in bilateral strength and mobility.  Pt continues to demonstrate greater ROM restrictions on her L shoulder as well as greater strength limitations throughout her L UE. Improvements in cervical mobility noted; however, pt continues to demonstrate impaired R side bending. Pt would benefit from continued skilled PT intervention in order to restore ROM and strength for improved tolerance to daily activities and for improved use of L UE with heavier tasks around her home. [] No change. [] Other:  [x] Patient would continue to benefit from skilled physical therapy services in order to:  improve mobility, strength and postural awareness for improved tolerance to overhead activities and tolerance to repetitive tasks and weighted tasks required for work and daily activities. At Eval:   Functional Test: Upper Extremity Functional Scale (UEFS) Score: 64/80, 20% functionally impaired - initial evaluation    1/3/2023: 58/80, 37.5% functionally impaired     Problems:    [x] ? Pain: bilateral shoulder pain (L > R)  [x] ? ROM: impaired neck and shoulder mobility  [x] ? Strength: global UE strength deficits and impaired postural stability  [x] ? Function: UEFS = 20% functional impairment  [x] Other: + Val's, + Gabino's                 STG: (to be met in 6 treatments)  ? Pain: Pt will report decreased maximal pain levels to <6/10 in order to improve tolerance to repetitive tasks required for daily activities and work. - progress made 1/3/2023  ? ROM: Pt will demonstrate improved cervical R SB and rotation to WNL when going to the L in order to improve mobility throughout L sided neck musculature to improve postural awareness and positioning with prolonged sitting activities required for work. - Partially met 1/3/2023, met for rotation, continues to demonstrate impairments with SB  ? Strength: Pt will increase bilateral UE strength to 5/5 globally in order to improve tolerance to lifting and carrying activities.  - Partially met 1/3/2023, met for R UE, ongoing for L UE  Patient to be independent with home exercise program as demonstrated by performance with correct form without cues. - MET 1/3/2023  LTG: (to be met in 12 treatments)  Pt will report average pain levels of 2-3/10 with repetitive tasks and lifting and carrying tasks in order to improve tolerance to daily activities and work related tasks. Pt will increase bilateral shoulder flexion and abduction AROM to >130 degrees in order to improve tolerance to overhead tasks. Pt will demonstrate negative clinical special testing for Hawkin's Yefri and Val's testing in order to improve tolerance and positioning for prolonged sitting for work. Pt will demonstrate improved functional activity tolerance as evident by an improved score on the UEFS to <10% functional impairment. Patient goals: \"reduce pain + uninterrupted sleep\"    Pt. Education:  [] Yes  [] No  [x] Reviewed Prior HEP/Ed  Method of Education: [] Verbal  [] Demo  [] Written  Access Code: Chey Schwartz: [email or web address]  Date: 12/20/2022  Prepared by: Raquel Negrete  Exercises  Seated Upper Trapezius Stretch - 1 x daily - 7 x weekly - 1 sets - 3 reps - 30 hold  Gentle Levator Scapulae Stretch - 1 x daily - 7 x weekly - 1 sets - 3 reps - 30 hold  Seated Scapular Retraction - 1 x daily - 7 x weekly - 2 sets - 10 reps - 5 hold  Standing Pec Stretch at [address] - 1 x daily - 7 x weekly - 1 sets - 2 reps - 30 hold  Doorway Pec Stretch at 60 Elevation - 1 x daily - 7 x weekly - 1 sets - 2 reps - 30 hold  Scaption Wall Slide with Towel - 1 x daily - 7 x weekly - 10 reps  Standing Bilateral Shoulder Scaption Wall Slide - 1 x daily - 7 x weekly - 10 reps  Seated Cervical Rotation AROM - 1 x daily - 7 x weekly - 10 reps - 5 seconds hold  Standing Shoulder Flexion Full Range - 1 x daily - 7 x weekly - 10 reps  Shoulder Abduction - Thumbs Up - 1 x daily - 7 x weekly - 10 reps  Standing Shoulder Scaption - 1 x daily - 7 x weekly - 10 reps    Handout given 12/28/22    Comprehension of Education:  [] Verbalizes understanding.   [] Demonstrates understanding. [] Needs review   [x] Demonstrates/verbalizes HEP/Ed previously given. Plan: [x] Continue current frequency toward long and short term goals. [x] Specific Instructions for subsequent treatments: improve neck and shoulder mobility, postural awareness and scapular strengthening.        Time In: 12:00 pm             Time Out: 12:15 pm    Electronically signed by:  Adrianna Persaud PT

## 2023-01-04 DIAGNOSIS — E11.9 TYPE 2 DIABETES MELLITUS WITHOUT COMPLICATION, WITHOUT LONG-TERM CURRENT USE OF INSULIN (HCC): ICD-10-CM

## 2023-01-05 ENCOUNTER — OFFICE VISIT (OUTPATIENT)
Dept: PAIN MANAGEMENT | Age: 58
End: 2023-01-05
Payer: COMMERCIAL

## 2023-01-05 VITALS — HEIGHT: 64 IN | BODY MASS INDEX: 39.27 KG/M2 | WEIGHT: 230 LBS

## 2023-01-05 DIAGNOSIS — M51.36 DDD (DEGENERATIVE DISC DISEASE), LUMBAR: ICD-10-CM

## 2023-01-05 DIAGNOSIS — M54.50 CHRONIC BILATERAL LOW BACK PAIN WITHOUT SCIATICA: Primary | ICD-10-CM

## 2023-01-05 DIAGNOSIS — G89.29 CHRONIC BILATERAL LOW BACK PAIN WITHOUT SCIATICA: Primary | ICD-10-CM

## 2023-01-05 DIAGNOSIS — M54.16 LUMBAR RADICULOPATHY: ICD-10-CM

## 2023-01-05 PROCEDURE — 99213 OFFICE O/P EST LOW 20 MIN: CPT | Performed by: NURSE PRACTITIONER

## 2023-01-05 RX ORDER — EMPAGLIFLOZIN 10 MG/1
TABLET, FILM COATED ORAL
Qty: 30 TABLET | Refills: 2 | Status: SHIPPED | OUTPATIENT
Start: 2023-01-05

## 2023-01-05 RX ORDER — PREGABALIN 50 MG/1
50 CAPSULE ORAL 2 TIMES DAILY
Qty: 60 CAPSULE | Refills: 2 | Status: SHIPPED | OUTPATIENT
Start: 2023-01-05 | End: 2023-02-04

## 2023-01-05 ASSESSMENT — ENCOUNTER SYMPTOMS
SHORTNESS OF BREATH: 0
COUGH: 0
BACK PAIN: 1
CONSTIPATION: 0

## 2023-01-05 NOTE — PROGRESS NOTES
Chief Complaint   Patient presents with    Back Pain         [facility]     Pt reports chronic lumbar pain. Hx of 3 lumbar surgeries but continues to have significant low back pain. She has seen neurosurgeon who suggested more conservative measures. She has had physical therapy in the past with mild benefit. She has has injections in past with limited benefit and not interested in repeating at this time. She is on Brilinta for coronary artery disease. Stent placed July 2018. MRI lumbar spine 2020 with multilevel degenerative changes and disc bulging. EMG shows a left L4-5 radiculopathy and a right L5-S1 radiculopathy. Reported only taking lyrica once daily and still having radicular burning pain down both legs. Suggested to increase to BID dosing  with no side effects and feels this helping with better pain control  Today reports occ tingling to bottom of feet after prolonged sitting and first time in morning that resolves once she is up walking      Back Pain  This is a chronic problem. The current episode started more than 1 year ago. The problem occurs constantly. The problem is unchanged. The pain is present in the lumbar spine and gluteal. The quality of the pain is described as aching. The pain radiates to the right thigh and left thigh. The pain is at a severity of 3/10. The pain is mild. The pain is The same all the time. The symptoms are aggravated by bending, position, sitting, standing and twisting. Stiffness is present In the morning. Associated symptoms include numbness and tingling (bottom of feet). Pertinent negatives include no chest pain, fever, headaches or weakness. Risk factors include menopause, obesity, poor posture, sedentary lifestyle and lack of exercise. She has tried bed rest, home exercises, heat, ice, walking and NSAIDs for the symptoms. The treatment provided mild relief.               Past Medical History:   Diagnosis Date    Abnormal stress test     Allergic rhinitis Arthritis     CAD (coronary artery disease)     Dr. Christiano Lu last seen 2020    Chronic back pain     Diabetes mellitus [facility])     Former smoker 2020    30-year history. Quit in 2018    Hyperlipidemia     Dr. Merle Ayala    Hypertension     Dr. Maggie Sinha, cardiac 2020    SRI on CPAP     instructed to bring Dr. Michelet Maldonado    Wears prescription eyeglasses     Wellness examination     Dr. Nancy Muniz last seen 2020       Past Surgical History:   Procedure Laterality Date    ANESTHESIA NERVE BLOCK Left 2019    NERVE BLOCK (DEFINE) - # 1 L5-S1 performed by Beto Randle MD at [address] Bilateral 2020    NERVE BLOCK BILATERAL - B MBB # 1 L4-5, L5-S1 performed by Beto Randle MD at [address] Bilateral 2020    NERVE BLOCK BILATERAL - L-5, L5-S1 performed by Beto Randle MD at [facility] [address]  2017    CARDIAC CATHETERIZATION  2019    CARDIAC CATHETERIZATION  2018    1 stent mid LAD PT HAS [facility] HEART STENT, MRI CONDITIONAL 3T OK, SAFE IMMEDIATELY POST IMPLANT. CARDIAC CATHETERIZATION  02/10/2020    for chest pain no new blockage    CARPAL TUNNEL RELEASE Right      SECTION      x3    COLONOSCOPY N/A 2018    COLONOSCOPY WITH BIOPSY performed by Ute Roberts MD at [place] [address]  2014    cataract surgery left eye WITH IMPLANT. MRI OK.      FOOT SURGERY      HAND TENDON SURGERY Right     [facility]. INJECTION PROCEDURE FOR SACROILIAC JOINT Left 2019    SACROILIAC JOINT INJECTION - #1 performed by Beto Randle MD at . [facility] ([address])      LUMBAR 1319 Count includes the [facility] [address] BLOCK  2020     NERVE BLOCK BILATERAL - B MBB # 1 L4-5, L5-S1 (Bilateral     NERVE BLOCK Bilateral 2020    NERVE BLOCK BILATERAL - L-5, L5-S1 (Bilateral )     NERVE BLOCK Left 2020 NERVE RADIOFREQUENCY ABLATION- L4-5, L5-S1    NERVE BLOCK Right 09/25/2020     NERVE RADIOFREQUENCY ABLATION (Right )     PAIN MANAGEMENT PROCEDURE Left 9/18/2020    NERVE RADIOFREQUENCY ABLATION- L4-5, L5-S1 performed by Zeenat Pratt MD at [facility] Right 9/25/2020    NERVE RADIOFREQUENCY ABLATION performed by Zeenat Pratt MD at [facility]         Allergies   Allergen Reactions    Bee Venom Hives     Other reaction(s): Unknown    Tetracycline Hcl      Other reaction(s): Unknown    Tetracyclines & Related Nausea Only and Other (See Comments)     dizziness    Ace Inhibitors Other (See Comments)     cough    Trulicity [Dulaglutide] Nausea And Vomiting     Not able to tolerate GLP-1 agonist         Current Outpatient Medications:     pregabalin (LYRICA) 50 MG capsule, Take 1 capsule by mouth 2 times daily for 30 days. , Disp: 60 capsule, Rfl: 2    JARDIANCE 10 MG tablet, take 1 tablet by mouth once daily, Disp: 30 tablet, Rfl: 2    amLODIPine (NORVASC) 10 MG tablet, Take 10 mg by mouth daily, Disp: , Rfl:     carvedilol (COREG) 12.5 MG tablet, Take 12.5 mg by mouth 2 times daily (with meals), Disp: , Rfl:     ticagrelor (BRILINTA) 60 MG TABS tablet, Take 60 mg by mouth 2 times daily, Disp: , Rfl:     Semaglutide,0.25 or 0.5MG/DOS, (OZEMPIC, 0.25 OR 0.5 MG/DOSE,) 2 MG/1.5ML SOPN, Inject 0.25 mg into the skin once a week, Disp: 1 Adjustable Dose Pre-filled Pen Syringe, Rfl: 1    meloxicam (MOBIC) 15 MG tablet, Take 1 tablet by mouth daily as needed for Pain, Disp: 90 tablet, Rfl: 1    omeprazole (PRILOSEC) 40 MG delayed release capsule, take 1 capsule by mouth once daily, Disp: 90 capsule, Rfl: 1    Handicap Placard MISC, by Does not apply route Handicap placard for 5 years. , Disp: 1 each, Rfl: 0    Multiple Vitamins-Minerals (ONE-A-DAY 50 PLUS PO), Take 1 tablet by mouth daily, Disp: , Rfl:     losartan (COZAAR) 100 MG tablet, take 1 tablet by mouth once daily, Disp: 30 tablet, Rfl: 5    atorvastatin (LIPITOR) 80 MG tablet, take 1 tablet by mouth once daily, Disp: 30 tablet, Rfl: 11    ezetimibe (ZETIA) 10 MG tablet, Take 10 mg by mouth daily, Disp: , Rfl:     metFORMIN (GLUCOPHAGE-XR) 500 MG extended release tablet, take 1 tablet by mouth once daily for 2 weeks then 1 tablet twice a day, Disp: 60 tablet, Rfl: 11    ranolazine (RANEXA) 500 MG extended release tablet, Take 1 tablet by mouth 2 times daily (Patient taking differently: Take 1,000 mg by mouth 2 times daily), Disp: 60 tablet, Rfl: 3    nitroGLYCERIN (NITROSTAT) 0.4 MG SL tablet, place 1 tablet under the tongue if needed every 5 minutes for chest pain for 3 doses IF NO RELIEF AFTER FIRST DOSE CALL PRESCRIBER ., Disp: 25 tablet, Rfl: 1    hydrochlorothiazide (HYDRODIURIL) 25 MG tablet, Take 25 mg by mouth daily, Disp: , Rfl:     aspirin 81 MG tablet, Take 1 tablet by mouth daily (with breakfast), Disp: 30 tablet, Rfl: 11    Family History   Problem Relation Age of Onset    Stroke Father     Breast Cancer Mother 48    Other Brother     Diabetes Maternal Grandmother        Social History     Socioeconomic History    Marital status:      Spouse name: Not on file    Number of children: Not on file    Years of education: Not on file    Highest education level: Not on file   Occupational History    Not on file   Tobacco Use    Smoking status: Former     Packs/day: 0.25     Years: 30.00     Pack years: 7.50     Types: Cigarettes     Quit date: 2018     Years since quittin.4    Smokeless tobacco: Never   Vaping Use    Vaping Use: Never used   Substance and Sexual Activity    Alcohol use: No    Drug use: No    Sexual activity: Not on file   Other Topics Concern    Not on file   Social History Narrative    Not on file     Social Determinants of Health     Financial Resource Strain: Low Risk     Difficulty of Paying Living Expenses: Not hard at all   Food Insecurity: No Food Insecurity    Worried About Running Out of Food in the Last Year: Never true    Ran Out of Food in the Last Year: Never true   Transportation Needs: Not on file   Physical Activity: Sufficiently Active    Days of Exercise per Week: 5 days    Minutes of Exercise per Session: 80 min   Stress: Not on file   Social Connections: Not on file   Intimate Partner Violence: Not At Risk    Fear of Current or Ex-Partner: No    Emotionally Abused: No    Physically Abused: No    Sexually Abused: No   Housing Stability: Not on file       Review of Systems:  Review of Systems   Constitutional: Negative for chills and fever. Cardiovascular:  Negative for chest pain. Respiratory:  Negative for cough and shortness of breath. Musculoskeletal:  Positive for back pain. Gastrointestinal:  Negative for constipation. Neurological:  Positive for numbness and tingling (bottom of feet). Negative for headaches and weakness. Physical Exam:  Ht 5' 4\" (1.626 m)   Wt 230 lb (104.3 kg)   BMI 39.48 kg/m²     Physical Exam  Cardiovascular:      Rate and Rhythm: Normal rate. Pulmonary:      Effort: Pulmonary effort is normal.   Musculoskeletal:         General: Normal range of motion. Skin:     General: Skin is warm and dry. Neurological:      Mental Status: She is alert and oriented to person, place, and time. Assessment:  Problem List Items Addressed This Visit       DDD (degenerative disc disease), lumbar    Lumbar radiculopathy    Relevant Medications    pregabalin (LYRICA) 50 MG capsule    Chronic back pain - Primary    Relevant Medications    pregabalin (LYRICA) 50 MG capsule          Treatment Plan:  Patient relates current medications are helping the pain. Patient reports taking pain medications as prescribed, denies obtaining medications from different sources and denies use of illegal drugs. Medication risk and benefits have been discussed.  Patient denies side effects from medications like nausea, vomiting, constipation or drowsiness. Patient reports current activities of daily living are possible due to medications and would like to continue them. As always, we encourage daily stretching and strengthening exercises, and recommend minimizing use of pain medications unless patient cannot get through daily activities due to pain. Continue current medication management, pt has been stable and compliant. Script written for lyrica  Follow up appointment made for 3 months    I have reviewed the chief complaint and history of present illness (including ROS and [address]) and vital documentation by my staff and I agree with their documentation and have added where applicable.

## 2023-01-05 NOTE — TELEPHONE ENCOUNTER
Maurice Valentine is calling to request a refill on the following medication(s):    Medication Request:  Requested Prescriptions     Pending Prescriptions Disp Refills    JARDIANCE 10 MG tablet [Pharmacy Med Name: Mela Ramon 10 MG TABLET] 30 tablet 2     Sig: take 1 tablet by mouth once daily       Last Visit Date (If Applicable):  01/7/8171    Next Visit Date:    1/9/2023

## 2023-01-06 ENCOUNTER — HOSPITAL ENCOUNTER (OUTPATIENT)
Dept: PHYSICAL THERAPY | Facility: CLINIC | Age: 58
Setting detail: THERAPIES SERIES
Discharge: HOME | End: 2023-01-06
Payer: COMMERCIAL

## 2023-01-06 PROCEDURE — 97110 THERAPEUTIC EXERCISES: CPT

## 2023-01-06 PROCEDURE — 97140 MANUAL THERAPY 1/> REGIONS: CPT

## 2023-01-06 NOTE — FLOWSHEET NOTE
[] [facility]) [facility] &  Therapy  [address]  [phone]  F: [phone] [x] [address]  [facility] [address]   [address]  P: [phone]  F: [phone] [] [address]  [address]  P: [phone]  F: [phone] [] [address]  P: [phone]  F: [phone] [] [address]  [facility]   [address]   [place]: [phone]  F: [phone]      Physical Therapy Daily Treatment Note    Date:  2023  Patient Name:  Bakari Estes    :  1965  MRN: [id number]  Physician: Dr. Lai Vincent, DO                              Insurance: [employer] ([address] [place] [address])   [id number]; Madhav Palencia, 23-3/6/23  Liz Freshwater not required for 4 vs in Dec 22. Total of 9 vs  Medical Diagnosis: M25.511, M25.512 - chronic pain of both shoulders                 Rehab Codes: M25.511, M25.512, M54.2, M62.81  Onset date: 2022                       Next 's appt.: 2022 Dr. Cecil Vizcarra  Visit# / total visits: 6    Cancels/No Shows: 0    Subjective:    Pain:  [x] Yes  [] No  Location: B shoulders  Pain Rating: (0-10 scale) 4/10  Pain altered Tx:  [x] No  [] Yes  Action:    Comments: Pt arrives reporting moderate levels of soreness and stiffness in B shoulders.  Pt denies changes in L hand pain        Objective:  Modalities: No VASO per insurance   Precautions:  Exercises: [place] Access Code: Lakesha Eason completed   Exercise Reps/ Time Weight/ Level Comments   UBE 2'F  2'R Lv 2 Warm up         Manual  10'  Hypervolt with cushion attachment to B neck, shoulders and upper back         Side lying       Arms extended Open books  10x3\"ea           Supine       Flexion  x  Held pain    Horiz abd/add 10xea  Cane     SA punch  10x  Cane     Chest press 10x      Supine pec stretch  x  Held pain          Seated       Upper trap stretch  3x30\" ea       Levator stretch 3x30\" ea    inc reps 1/6   Seated scap retraction 10x5\", x2       Doorway pec stretch 2x30\"       Unilateral wall/doorway pec stretch 2x30\" ea       Wall slides  10xea  Flexion and scaption    Cervical rotation 10x ea 5\" hold    Shoulder flexion 10x A Seated    Scaption  10x A Seated          Standing       Shoulder abduction  10x A Standing- only able to tolerate ~90deg    Bilateral ER 10x orange Added 12/28   Snow angels 10x Towel roll thoracic region Added 12/28               Other:    Objective Measures completed by [facility], PT, DPT 1/3/2023:       ROM  °A/P END FEEL STRENGTH TESTS (+/-) Left Right Not Tested     Left Right   Left Right Drop Arm     []    Sit Shld Flex 128 142   4+/5 5/5 Sulcus Sign     []    Sit Shld Abd 100 105   4+/5 5/5 Apprehension     []    Sit Shld IR           Yergasons     []    Shoulder Flex           Speeds     []    Ext           Neer     []    ABD           Magana  + - []    ER @ 0  40 45   4+/5 4+/5 Painful Arc + - []    IR L2 L5   5/5 5/5 Tinel     []    Supraspinatus           Reverse phalens       Infraspinatus           Val's - -     Elbow Flex. 4+/5 5/5           Elbow Ext.       4+/5 5/5           Pronation                     Supination                     Wrist Flex. Wrist Ext. Rad. Dev. Ulnar Dev.                        Cervical ROM/Quadrant []  [x]  []  Extension: 20% impaired  Flexion: WFL  No reproduction of symptoms with repetitive testing  SB: R 30, L 42  Rotation: R 55, L 55   Reflexes           Treatment Charges: Mins Units   []  Modalities     [x]  Ther Exercise 51 3   [x]  Manual Therapy 10 1   []  Ther Activities     []  Aquatics     []  Vasocompression     []  Other     Total Treatment time 61 4       Assessment: [x] Progressing toward goals.  Initiated treatment with UBE warm up followed by manual via hypervolt to B neck, shoulders, and upper back as pt reports positive relief with this. Resumed cervical stretches as pt notes increased tightness in the L side of neck. Pt reports an increase of pain to 6/10 during seated shoulder flexion, scaption, and abduction anterior aspect of the B shoulders. Pt is only able to tolerate ~90 deg of shoulder abduction secondary to pain with ranges above that. Trailed side lying open books as pt does not have increased pain symptoms with this. Able to add supine chest press, hoirz add/abd, and SA punch to improve strength with good tolerance. Pt is unable to tolerate supine flexion and pec stretch secondary to increase numbess/tingling in B hands and pain symptoms therefore was held. Issued HEP of newly added exercises. Pt is schedule 1x a week for 3 more weeks per insurance. [] No change. [] Other:  [x] Patient would continue to benefit from skilled physical therapy services in order to:  improve mobility, strength and postural awareness for improved tolerance to overhead activities and tolerance to repetitive tasks and weighted tasks required for work and daily activities. At Eval:   Functional Test: Upper Extremity Functional Scale (UEFS) Score: 64/80, 20% functionally impaired - initial evaluation    1/3/2023: 58/80, 37.5% functionally impaired     Problems:    [x] ? Pain: bilateral shoulder pain (L > R)  [x] ? ROM: impaired neck and shoulder mobility  [x] ? Strength: global UE strength deficits and impaired postural stability  [x] ? Function: UEFS = 20% functional impairment  [x] Other: + Val's, + Gabino's                 STG: (to be met in 6 treatments)  ? Pain: Pt will report decreased maximal pain levels to <6/10 in order to improve tolerance to repetitive tasks required for daily activities and work. - progress made 1/3/2023  ?  ROM: Pt will demonstrate improved cervical R SB and rotation to WNL when going to the L in order to improve mobility throughout L sided neck musculature to improve postural awareness and positioning with prolonged sitting activities required for work. - Partially met 1/3/2023, met for rotation, continues to demonstrate impairments with SB  ? Strength: Pt will increase bilateral UE strength to 5/5 globally in order to improve tolerance to lifting and carrying activities. - Partially met 1/3/2023, met for R UE, ongoing for L UE  Patient to be independent with home exercise program as demonstrated by performance with correct form without cues. - MET 1/3/2023  LTG: (to be met in 12 treatments) 9 visits per insurance auth   Pt will report average pain levels of 2-3/10 with repetitive tasks and lifting and carrying tasks in order to improve tolerance to daily activities and work related tasks. Pt will increase bilateral shoulder flexion and abduction AROM to >130 degrees in order to improve tolerance to overhead tasks. Pt will demonstrate negative clinical special testing for Hawkin's Yefri and Val's testing in order to improve tolerance and positioning for prolonged sitting for work. Pt will demonstrate improved functional activity tolerance as evident by an improved score on the UEFS to <10% functional impairment. Patient goals: \"reduce pain + uninterrupted sleep\"    Pt. Education:  [x] Yes  [] No  [x] Reviewed Prior HEP/Ed  Method of Education: [x] Verbal  [x] Demo  [x] Written    Access Code: Anthony Socks  URL: [email or web address]  Date: 12/20/2022  Prepared by: Tony Acuna  Seated Upper Trapezius Stretch - 1 x daily - 7 x weekly - 1 sets - 3 reps - 30 hold  Gentle Levator Scapulae Stretch - 1 x daily - 7 x weekly - 1 sets - 3 reps - 30 hold  Seated Scapular Retraction - 1 x daily - 7 x weekly - 2 sets - 10 reps - 5 hold  Standing Pec Stretch at Wall - 1 x daily - 7 x weekly - 1 sets - 2 reps - 30 hold  Doorway Pec Stretch at 60 Elevation - 1 x daily - 7 x weekly - 1 sets - 2 reps - 30 hold  Scaption Wall Slide with Towel - 1 x daily - 7 x weekly - 10 reps  Standing Bilateral Shoulder Scaption Wall Slide - 1 x daily - 7 x weekly - 10 reps  Seated Cervical Rotation AROM - 1 x daily - 7 x weekly - 10 reps - 5 seconds hold  Standing Shoulder Flexion Full Range - 1 x daily - 7 x weekly - 10 reps  Shoulder Abduction - Thumbs Up - 1 x daily - 7 x weekly - 10 reps  Standing Shoulder Scaption - 1 x daily - 7 x weekly - 10 reps  Handout given 12/28/22  Date: 01/06/2023  Prepared by: Azeem Sexton  Sidelying Thoracic Rotation with Open Book - 1 x daily - 7 x weekly - 3 sets - 10 reps  Supine Shoulder Horizontal Abduction Adduction AAROM with Dowel - 1 x daily - 7 x weekly - 3 sets - 10 reps  Supine Single Arm Shoulder Protraction - 1 x daily - 7 x weekly - 3 sets - 10 reps  Supine Shoulder Press AAROM in Abduction with Dowel - 1 x daily - 7 x weekly - 3 sets - 10 reps      Comprehension of Education:  [] Verbalizes understanding. [] Demonstrates understanding. [] Needs review   [x] Demonstrates/verbalizes HEP/Ed previously given. Plan: [x] Continue current frequency toward long and short term goals. [x] Specific Instructions for subsequent treatments: improve neck and shoulder mobility, postural awareness and scapular strengthening.        Time In: 12:00 pm             Time Out: 1:05 pm    Electronically signed by:  Azeem Sexton PTA

## 2023-01-07 ENCOUNTER — HOSPITAL ENCOUNTER (OUTPATIENT)
Age: 58
Discharge: HOME | End: 2023-01-07
Payer: COMMERCIAL

## 2023-01-07 LAB
ALT SERPL-CCNC: 18 U/L (ref 5–33)
AST SERPL-CCNC: 15 U/L
CHOLESTEROL, FASTING: 162 MG/DL
CHOLESTEROL/HDL RATIO: 2.2
HDLC SERPL-MCNC: 73 MG/DL
LDL CHOLESTEROL: 77 MG/DL (ref 0–130)
TRIGLYCERIDE, FASTING: 61 MG/DL

## 2023-01-07 PROCEDURE — 84450 TRANSFERASE (AST) (SGOT): CPT

## 2023-01-07 PROCEDURE — 80061 LIPID PANEL: CPT

## 2023-01-07 PROCEDURE — 84460 ALANINE AMINO (ALT) (SGPT): CPT

## 2023-01-07 PROCEDURE — 36415 COLL VENOUS BLD VENIPUNCTURE: CPT

## 2023-01-09 ENCOUNTER — TELEMEDICINE (OUTPATIENT)
Dept: FAMILY MEDICINE CLINIC | Age: 58
End: 2023-01-09
Payer: COMMERCIAL

## 2023-01-09 DIAGNOSIS — E11.9 TYPE 2 DIABETES MELLITUS WITHOUT COMPLICATION, WITHOUT LONG-TERM CURRENT USE OF INSULIN (HCC): Primary | ICD-10-CM

## 2023-01-09 DIAGNOSIS — I25.110 CORONARY ARTERY DISEASE INVOLVING NATIVE CORONARY ARTERY OF NATIVE HEART WITH UNSTABLE ANGINA PECTORIS (HCC): ICD-10-CM

## 2023-01-09 DIAGNOSIS — I10 ESSENTIAL HYPERTENSION: ICD-10-CM

## 2023-01-09 PROCEDURE — 99214 OFFICE O/P EST MOD 30 MIN: CPT | Performed by: FAMILY MEDICINE

## 2023-01-09 RX ORDER — BLOOD-GLUCOSE METER
1 KIT MISCELLANEOUS DAILY
Qty: 1 KIT | Refills: 0 | Status: SHIPPED | OUTPATIENT
Start: 2023-01-09

## 2023-01-09 ASSESSMENT — ENCOUNTER SYMPTOMS
ALLERGIC/IMMUNOLOGIC NEGATIVE: 1
EYES NEGATIVE: 1
GASTROINTESTINAL NEGATIVE: 1
ORTHOPNEA: 0
RESPIRATORY NEGATIVE: 1
BLURRED VISION: 0
BACK PAIN: 1

## 2023-01-09 NOTE — PATIENT INSTRUCTIONS
Patient Education        Starting a Weight Loss Plan: Care Instructions  Overview     If you're thinking about losing weight, it can be hard to know where to start. Your doctor can help you set up a weight loss plan that best meets your needs. You may want to take a class on nutrition or exercise, or you could join a weight loss support group. If you have questions about how to make changes to your eating or exercise habits, ask your doctor about seeing a registered dietitian or an exercise specialist.  It can be a big challenge to lose weight. But you don't have to make huge changes at once. Make small changes, and stick with them. When those changes become habit, add a few more changes. If you don't think you're ready to make changes right now, try to pick a date in the future. Make an appointment to see your doctor to discuss whether the time is right for you to start a plan. Follow-up care is a key part of your treatment and safety. Be sure to make and go to all appointments, and call your doctor if you are having problems. It's also a good idea to know your test results and keep a list of the medicines you take. How can you care for yourself at home? Set realistic goals. Many people expect to lose much more weight than is likely. A weight loss of 5% to 10% of your body weight may be enough to improve your health. Get family and friends involved to provide support. Talk to them about why you are trying to lose weight, and ask them to help. They can help by participating in exercise and having meals with you, even if they may be eating something different. Find what works best for you. If you do not have time or do not like to cook, a program that offers meal replacement bars or shakes may be better for you. Or if you like to prepare meals, finding a plan that includes daily menus and recipes may be best.  Ask your doctor about other health professionals who can help you achieve your weight loss goals.   A dietitian can help you make healthy changes in your diet. An exercise specialist or  can help you develop a safe and effective exercise program.  A counselor or psychiatrist can help you cope with issues such as depression, anxiety, or family problems that can make it hard to focus on weight loss. Consider joining a support group for people who are trying to lose weight. Your doctor can suggest groups in your area. Where can you learn more? Go to http://www.woods.com/ and enter U357 to learn more about \"Starting a Weight Loss Plan: Care Instructions. \"  Current as of: August 25, 2022               Content Version: 13.5  © 8165-4818 [employer]wise, [employer]. Care instructions adapted under license by [facility] ([facility]). If you have questions about a medical condition or this instruction, always ask your healthcare professional. [address] any warranty or liability for your use of this information.

## 2023-01-09 NOTE — PROGRESS NOTES
Bernie Forde (:  1965) is a Established patient, here for evaluation of the following:    Assessment & Plan   Below is the assessment and plan developed based on review of pertinent history, physical exam, labs, studies, and medications. 1. Type 2 diabetes mellitus without complication, without long-term current use of insulin (HCC)  Assessment & Plan:   Well-controlled, continue current medications, continue current treatment plan, medication adherence emphasized, lifestyle modifications recommended and will stay at Ozempic 0.25mg weekly. Will start checking CBG and if consistent lows will start to lower metformin  Orders:  -     glucose monitoring (FREESTYLE FREEDOM) kit; DAILY Starting Mon 2023, Disp-1 kit, R-0, Normal  2. Essential hypertension  Assessment & Plan:   Well-controlled, continue current medications and continue current treatment plan  3. Coronary artery disease involving native coronary artery of native heart with unstable angina pectoris [facility])  Assessment & Plan:   Monitored by specialist- no acute findings meriting change in the plan   Wants her on Jardiance  4. BMI 38.0-38.9,adult  Assessment & Plan:  Doesn't have a scale but has tightened her belt one notch    Return in about 1 month (around 2023). Subjective   Started ozempic and having some nausea with it. Is eating less. Hasn't weighed herself b/c she doesn't have a scale but knows she is losing b/c she's eating less getting callejas earlier and has tightened her belt by one notch. Doesn't have a glucometer but almost felt like she's had a low blood sugar level. Wants to get off Metformin if possible and wants to stay on jardiance b/c her cardiologist wants her on that    Hypertension  This is a chronic problem. The current episode started more than 1 year ago. The problem has been gradually improving since onset. The problem is controlled.  Pertinent negatives include no anxiety, blurred vision, malaise/fatigue, orthopnea, palpitations, peripheral edema, PND or sweats. Past treatments include angiotensin blockers, beta blockers, diuretics and calcium channel blockers. The current treatment provides significant improvement. Diabetes  She presents for her follow-up diabetic visit. She has type 2 diabetes mellitus. Her disease course has been stable. There are no hypoglycemic associated symptoms. Pertinent negatives for hypoglycemia include no sweats. Associated symptoms include foot paresthesias. Pertinent negatives for diabetes include no blurred vision. Symptoms are stable. Current diabetic treatment includes oral agent (dual therapy). She is compliant with treatment most of the time. An ACE inhibitor/angiotensin II receptor blocker is being taken. Coronary Artery Disease  Presents for follow-up visit. Pertinent negatives include no chest pressure, leg swelling, palpitations or weight gain. The symptoms have been stable. Compliance with diet is good. Compliance with exercise is variable. Compliance with medications is good. Review of Systems   Constitutional: Negative. Negative for malaise/fatigue and weight gain. HENT: Negative. Eyes: Negative. Negative for blurred vision. Respiratory: Negative. Cardiovascular: Negative. Negative for palpitations, orthopnea, leg swelling and PND. Gastrointestinal: Negative. Endocrine: Negative. Skin: Negative. Allergic/Immunologic: Negative. Hematological: Negative. Psychiatric/Behavioral: Negative. All other systems reviewed and are negative.        Objective   Patient-Reported Vitals  No data recorded     Physical Exam  [INSTRUCTIONS:  \"[x]\" Indicates a positive item  \"[]\" Indicates a negative item  -- DELETE ALL ITEMS NOT EXAMINED]    Constitutional: [x] Appears well-developed and well-nourished [x] No apparent distress      [] Abnormal -     Mental status: [x] Alert and awake  [x] Oriented to person/place/time [x] Able to follow commands    [] Abnormal -     Eyes:   EOM    [x]  Normal    [] Abnormal -   Sclera  [x]  Normal    [] Abnormal -          Discharge [x]  None visible   [] Abnormal -     HENT: [x] Normocephalic, atraumatic  [] Abnormal -   [x] Mouth/Throat: Mucous membranes are moist    External Ears [x] Normal  [] Abnormal -    Neck: [x] No visualized mass [] Abnormal -     Pulmonary/Chest: [x] Respiratory effort normal   [x] No visualized signs of difficulty breathing or respiratory distress        [] Abnormal -      Musculoskeletal:   [x] Normal gait with no signs of ataxia         [x] Normal range of motion of neck        [] Abnormal -     Neurological:        [x] No Facial Asymmetry (Cranial nerve 7 motor function) (limited exam due to video visit)          [x] No gaze palsy        [] Abnormal -          Skin:        [x] No significant exanthematous lesions or discoloration noted on facial skin         [] Abnormal -            Psychiatric:       [x] Normal Affect [] Abnormal -        [x] No Hallucinations    Other pertinent observable physical exam findings:-         On this date 1/9/2023 I have spent 31 minutes reviewing previous notes, test results and face to face (virtual) with the patient discussing the diagnosis and importance of compliance with the treatment plan as well as documenting on the day of the visit. [place] Garsiaine, was evaluated through a synchronous (real-time) audio-video encounter. The patient (or guardian if applicable) is aware that this is a billable service, which includes applicable co-pays. This Virtual Visit was conducted with patient's (and/or legal guardian's) consent. The visit was conducted pursuant to the emergency declaration under the [address] [place] [address] authority and the [employer]/ and [employer] General Act. Patient identification was verified, and a caregiver was present when appropriate.    The patient was located at Home: [facility] [address] [id number].    [facility] YVON Little 74, DO

## 2023-01-09 NOTE — ASSESSMENT & PLAN NOTE
Well-controlled, continue current medications, continue current treatment plan, medication adherence emphasized, lifestyle modifications recommended and will stay at Ozempic 0.25mg weekly.  Will start checking CBG and if consistent lows will start to lower metformin

## 2023-01-13 ENCOUNTER — HOSPITAL ENCOUNTER (OUTPATIENT)
Dept: PHYSICAL THERAPY | Facility: CLINIC | Age: 58
Setting detail: THERAPIES SERIES
Discharge: HOME | End: 2023-01-13
Payer: COMMERCIAL

## 2023-01-13 ENCOUNTER — PATIENT MESSAGE (OUTPATIENT)
Dept: FAMILY MEDICINE CLINIC | Age: 58
End: 2023-01-13

## 2023-01-13 DIAGNOSIS — E11.9 TYPE 2 DIABETES MELLITUS WITHOUT COMPLICATION, WITHOUT LONG-TERM CURRENT USE OF INSULIN (HCC): Primary | ICD-10-CM

## 2023-01-13 PROCEDURE — 97110 THERAPEUTIC EXERCISES: CPT

## 2023-01-13 PROCEDURE — 97140 MANUAL THERAPY 1/> REGIONS: CPT

## 2023-01-13 NOTE — FLOWSHEET NOTE
[] [facility]) - [facility]  [facility]rapy  [address]  [phone]  F: [phone] [x] [address]  [facility]   [address]  P: [phone]  F: [phone] [] [address]  [address]  P: [phone]  F: [phone] [] [address]  P: [phone]  F: [phone] [] [address]  [facility]   [address]   [place]: [phone]  F: [phone]      Physical Therapy Daily Treatment Note    Date:  2023  Patient Name:  Apryl Fowler    :  1965  MRN: [id number]  Physician: Dr. Riri Harris, DO                              Insurance: Mark Reyes ([address] [place] [address])   [id number]; BCBSDerenda Dus, 23-3/6/23  Steve Kirby not required for 4 vs in Dec 22. Total of 9 vs  Medical Diagnosis: M25.511, M25.512 - chronic pain of both shoulders                 Rehab Codes: M25.511, M25.512, M54.2, M62.81  Onset date: 2022                       Next 's appt.: 2022 Dr. Guzman Lopez  Visit# / total visits: 7    Cancels/No Shows: 0    Subjective:    Pain:  [x] Yes  [] No  Location: B shoulders  Pain Rating: (0-10 scale) 4/10  Pain altered Tx:  [x] No  [] Yes  Action:    Comments: Pt denies any significant changes since last session. Pt states she does believe therapy to be helping and she is not as achy and sore as she once was. Pt notes the L anterior shoulder is a \"sharp/stabbing\" pain at times.           Objective:  Modalities: No VASO per insurance   Precautions:  Exercises: [employer] Access Code: Ghazala Davidson completed   Exercise Reps/ Time Weight/ Level Comments   UBE 2'F  2'R Lv 2 Warm up         Manual  10'  Hypervolt with cushion attachment to B neck, shoulders and upper back         Side lying       Arms extended Open books  10x3\"ea ER  20x  New 1/13   Abd 20x  New 1/13   Horiz abd  20x  New 1/13                Supine       Flexion  x  Held pain    Horiz abd/add 2x10 ea  Cane     SA punch  2x10 Cane     Chest press  2x10  Cane     Supine pec stretch  x  Held pain          Seated       Upper trap stretch  3x30\" ea       Levator stretch 3x30\" ea    inc reps 1/6   Seated scap retraction 10x5\", x2       Doorway pec stretch 2x30\"       Unilateral wall/doorway pec stretch 2x30\" ea       Wall slides  10xea  Flexion and scaption    Cervical rotation 10x ea 5\" hold    Shoulder flexion 10x A Seated    Scaption  10x A Seated          Standing       Shoulder abduction  10x A Standing- only able to tolerate ~90deg    Bilateral ER 10x orange Added 12/28   Snow angels 10x Towel roll thoracic region Added 12/28               Other:    Objective Measures completed by Aida Ames PT, DPT 1/3/2023:       ROM  °A/P END FEEL STRENGTH TESTS (+/-) Left Right Not Tested     Left Right   Left Right Drop Arm     []    Sit Shld Flex 128 142   4+/5 5/5 Sulcus Sign     []    Sit Shld Abd 100 105   4+/5 5/5 Apprehension     []    Sit Shld IR           Cristiansons     []    Shoulder Flex           Speeds     []    Ext           Neer     []    ABD           Magana  + - []    ER @ 0  40 45   4+/5 4+/5 Painful Arc + - []    IR L2 L5   5/5 5/5 Tinel     []    Supraspinatus           Reverse phalens       Infraspinatus           Val's - -     Elbow Flex. 4+/5 5/5           Elbow Ext.       4+/5 5/5           Pronation                     Supination                     Wrist Flex. Wrist Ext. Rad. Dev. Ulnar Dev.                                             Cervical ROM/Quadrant []  [x]  []  Extension: 20% impaired  Flexion: WFL  No reproduction of symptoms with repetitive testing  SB: R 30, L 42  Rotation: R 55, L 55   Reflexes           Treatment Charges: Mins Units   []  Modalities     [x]  Ther Exercise 46 3 [x]  Manual Therapy 10 1   []  Ther Activities     []  Aquatics     []  Vasocompression     []  Other     Total Treatment time 56 4       Assessment: [x] Progressing toward goals. Initiated treatment with UBE warm up followed by side lying thoracic open book stretch with no increase of pain noted. Able to increase reps for supine AAROM exercises as pt demos good tolerance and notes a \"good stretch\". Held any interventions that increased pain symptoms from previous session. Implemented multiple side lying strengthening exercises as pt reports \"popping\" in the shoulder blades however, is tolerable. Ended with manual via hypervolt to reduce soreness. Issued updated and will assess carry over next session. [] No change. [] Other:  [x] Patient would continue to benefit from skilled physical therapy services in order to:  improve mobility, strength and postural awareness for improved tolerance to overhead activities and tolerance to repetitive tasks and weighted tasks required for work and daily activities. At Eval:   Functional Test: Upper Extremity Functional Scale (UEFS) Score: 64/80, 20% functionally impaired - initial evaluation    1/3/2023: 58/80, 37.5% functionally impaired     Problems:    [x] ? Pain: bilateral shoulder pain (L > R)  [x] ? ROM: impaired neck and shoulder mobility  [x] ? Strength: global UE strength deficits and impaired postural stability  [x] ? Function: UEFS = 20% functional impairment  [x] Other: + Val's, + Gabino's                 STG: (to be met in 6 treatments)  ? Pain: Pt will report decreased maximal pain levels to <6/10 in order to improve tolerance to repetitive tasks required for daily activities and work. - progress made 1/3/2023  ?  ROM: Pt will demonstrate improved cervical R SB and rotation to WNL when going to the L in order to improve mobility throughout L sided neck musculature to improve postural awareness and positioning with prolonged sitting activities required for work. - Partially met 1/3/2023, met for rotation, continues to demonstrate impairments with SB  ? Strength: Pt will increase bilateral UE strength to 5/5 globally in order to improve tolerance to lifting and carrying activities. - Partially met 1/3/2023, met for R UE, ongoing for L UE  Patient to be independent with home exercise program as demonstrated by performance with correct form without cues. - MET 1/3/2023  LTG: (to be met in 12 treatments) 9 visits per insurance auth   Pt will report average pain levels of 2-3/10 with repetitive tasks and lifting and carrying tasks in order to improve tolerance to daily activities and work related tasks. Pt will increase bilateral shoulder flexion and abduction AROM to >130 degrees in order to improve tolerance to overhead tasks. Pt will demonstrate negative clinical special testing for Hawkin's Yefri and Val's testing in order to improve tolerance and positioning for prolonged sitting for work. Pt will demonstrate improved functional activity tolerance as evident by an improved score on the UEFS to <10% functional impairment. Patient goals: \"reduce pain + uninterrupted sleep\"    Pt. Education:  [x] Yes  [] No  [x] Reviewed Prior HEP/Ed  Method of Education: [x] Verbal  [x] Demo  [x] Written    Access Code: Derrick King  URL: [email or web address]. [email or web address]  Date: 12/20/2022  Prepared by: Chelsea Domínguez  Seated Upper Trapezius Stretch - 1 x daily - 7 x weekly - 1 sets - 3 reps - 30 hold  Gentle Levator Scapulae Stretch - 1 x daily - 7 x weekly - 1 sets - 3 reps - 30 hold  Seated Scapular Retraction - 1 x daily - 7 x weekly - 2 sets - 10 reps - 5 hold  Standing Pec Stretch at Wall - 1 x daily - 7 x weekly - 1 sets - 2 reps - 30 hold  Doorway Pec Stretch at 60 Elevation - 1 x daily - 7 x weekly - 1 sets - 2 reps - 30 hold  Scaption Wall Slide with Towel - 1 x daily - 7 x weekly - 10 reps  Standing Bilateral Shoulder Scaption Wall Slide - 1 x daily - 7 x weekly - 10 reps  Seated Cervical Rotation AROM - 1 x daily - 7 x weekly - 10 reps - 5 seconds hold  Standing Shoulder Flexion Full Range - 1 x daily - 7 x weekly - 10 reps  Shoulder Abduction - Thumbs Up - 1 x daily - 7 x weekly - 10 reps  Standing Shoulder Scaption - 1 x daily - 7 x weekly - 10 reps  Handout given 12/28/22  Date: 01/06/2023  Prepared by: Cruzito Prajapati  Sidelying Thoracic Rotation with Open Book - 1 x daily - 7 x weekly - 3 sets - 10 reps  Supine Shoulder Horizontal Abduction Adduction AAROM with Dowel - 1 x daily - 7 x weekly - 3 sets - 10 reps  Supine Single Arm Shoulder Protraction - 1 x daily - 7 x weekly - 3 sets - 10 reps  Supine Shoulder Press AAROM in Abduction with Dowel - 1 x daily - 7 x weekly - 3 sets - 10 reps  Date: 01/13/2023  Prepared by: Alee Gaxiola Stretch - 1 x daily - 7 x weekly - 3 sets - 10 reps  Sidelying Shoulder External Rotation - 1 x daily - 7 x weekly - 3 sets - 10 reps  Sidelying Shoulder Horizontal Abduction - 1 x daily - 7 x weekly - 3 sets - 10 reps  Sidelying Shoulder Abduction - 1 x daily - 7 x weekly - 3 sets - 10 reps      Comprehension of Education:  [x] Verbalizes understanding. [x] Demonstrates understanding. [] Needs review   [x] Demonstrates/verbalizes HEP/Ed previously given. Plan: [x] Continue current frequency toward long and short term goals. [x] Specific Instructions for subsequent treatments: improve neck and shoulder mobility, postural awareness and scapular strengthening.        Time In: 1:00 pm             Time Out: 2:00 pm    Electronically signed by:  Cruzito Prajapati PTA

## 2023-01-16 NOTE — TELEPHONE ENCOUNTER
From: Ruma Cannon  To: Dr. Patricia Williamson  Sent: 1/13/2023 9:54 PM EST  Subject: Glucose Monitoring Device    I picked up the device from pharmacy but there are no test strips; they said you didnt order. Also only a few sample lancets. Can you call those in to pharmacy so I can use this device. They gave me FreeStyle Freedom Lite Blood Glucose Monitoring System by [employer]. It says use only FreeStyle Lite test strips.

## 2023-01-17 RX ORDER — LANCETS 30 GAUGE
1 EACH MISCELLANEOUS DAILY
Qty: 100 EACH | Refills: 3 | Status: SHIPPED | OUTPATIENT
Start: 2023-01-17

## 2023-01-20 ENCOUNTER — HOSPITAL ENCOUNTER (OUTPATIENT)
Dept: PHYSICAL THERAPY | Facility: CLINIC | Age: 58
Setting detail: THERAPIES SERIES
Discharge: HOME | End: 2023-01-20
Payer: COMMERCIAL

## 2023-01-20 NOTE — FLOWSHEET NOTE
[] [facility]) [facility] &  Therapy  [address]  [phone]  F: [phone] [x] [address]   [address]  P: [phone]  F: [phone] [] [address]  [address]  P: [phone]  F: [phone] [] [address]  P: [phone]  F: [phone] [] [address]  [facility]   [address]   [place]: [phone]  F: [phone]      Physical Therapy Daily Treatment Note    Date:  2023  Patient Name:  Kayla Butts    :  1965  MRN: [id number]  Physician: Dr. James Bingham, DO                              Insurance: Ronni Guardado ([address] [place] [address])   [id number]; BCBSMicphyllissumit Vijay, 23-3/6/23  Kranthi Goodman not required for 4 vs in Dec 22. Total of 9 vs  Medical Diagnosis: M25.511, M25.512 - chronic pain of both shoulders                 Rehab Codes: M25.511, M25.512, M54.2, M62.81  Onset date: 2022                       Next Dr's appt.: 2022 Dr. Andrew Matson  Visit# / total visits: 8/9    Cancels/No Shows: 0    Subjective:    Pain:  [x] Yes  [] No  Location: B shoulders  Pain Rating: (0-10 scale) 4/10  Pain altered Tx:  [x] No  [] Yes  Action:    Comments: Pt reports her mobility and flexibility has improved however, the pain in the shoulders remains the same.         Objective:  Modalities: No VASO per insurance   Precautions:  Exercises: [employer] Access Code: Ronnie Ambrosio completed   Exercise Reps/ Time Weight/ Level Comments   UBE 2'F  2'R Lv 2 Warm up         Manual  10'  Hypervolt with cushion attachment to B neck, shoulders and upper back         Side lying       Arms extended Open books  10x3\"ea     ER  20x  New    Abd 20x  New    Horiz abd  20x  New     Flexion  20x  New           Supine Flexion  x  Held pain    Horiz abd/add 2x10 ea  Cane     SA punch  2x10 Cane     Chest press  2x10  Cane     Supine pec stretch  x  Held pain          Seated       Upper trap stretch  3x30\" ea       Levator stretch 3x30\" ea    inc reps 1/6   Seated scap retraction 10x5\", x2       Doorway pec stretch 2x30\"       Unilateral wall/doorway pec stretch 2x30\" ea       Wall slides  10xea  Flexion and scaption    Cervical rotation 10x ea 5\" hold    Shoulder flexion 10x A Seated    Scaption  10x A Seated          Standing       Shoulder abduction  10x2 A Standing- only able to tolerate ~90deg    Bilateral ER 10x orange Added 12/28   B horiz abd  10x  Orange     Snow angels 10x Towel roll thoracic region Added 12/28   Thoraic stretch opening up away from wall             Other:    Objective Measures completed by Quique Grier, PT, DPT 1/3/2023:       ROM  °A/P END FEEL STRENGTH TESTS (+/-) Left Right Not Tested     Left Right   Left Right Drop Arm     []    Sit Shld Flex 128 142   4+/5 5/5 Sulcus Sign     []    Sit Shld Abd 100 105   4+/5 5/5 Apprehension     []    Sit Shld IR           Yergasons     []    Shoulder Flex           Speeds     []    Ext           Neer     []    ABD           Magana  + - []    ER @ 0  40 45   4+/5 4+/5 Painful Arc + - []    IR L2 L5   5/5 5/5 Tinel     []    Supraspinatus           Reverse phalens       Infraspinatus           Val's - -     Elbow Flex. 4+/5 5/5           Elbow Ext.       4+/5 5/5           Pronation                     Supination                     Wrist Flex. Wrist Ext. Rad. Dev. Ulnar Dev.                                             Cervical ROM/Quadrant []  [x]  []  Extension: 20% impaired  Flexion: WFL  No reproduction of symptoms with repetitive testing  SB: R 30, L 42  Rotation: R 55, L 55   Reflexes           Treatment Charges: Mins Units   []  Modalities     [x]  Ther Exercise 36 2   [x]  Manual Therapy 10 1   []  Ther Activities     []  Aquatics     []  Vasocompression     []  Other     Total Treatment time 46 3       Assessment: [x] Progressing toward goals. Continued with UBE warm up followed by side lying thoracic open book stretch to improve tissue extensibility with good tolerance. Pt notes UE weakness during side lying strengthening exercises however, denies any increase in pain. Some cueing required for correct form during mat table exercises with good carry over. Ended with manual via hypervolt to reduce soreness with positive relief noted post intervention. Pt is scheduled with Primary PT next session to assess LTG's and for discharge. [] No change. [] Other:  [x] Patient would continue to benefit from skilled physical therapy services in order to:  improve mobility, strength and postural awareness for improved tolerance to overhead activities and tolerance to repetitive tasks and weighted tasks required for work and daily activities. At Eval:   Functional Test: Upper Extremity Functional Scale (UEFS) Score: 64/80, 20% functionally impaired - initial evaluation    1/3/2023: 58/80, 37.5% functionally impaired     Problems:    [x] ? Pain: bilateral shoulder pain (L > R)  [x] ? ROM: impaired neck and shoulder mobility  [x] ? Strength: global UE strength deficits and impaired postural stability  [x] ? Function: UEFS = 20% functional impairment  [x] Other: + Val's, + Donnykin's                 STG: (to be met in 6 treatments)  ? Pain: Pt will report decreased maximal pain levels to <6/10 in order to improve tolerance to repetitive tasks required for daily activities and work. - progress made 1/3/2023  ? ROM: Pt will demonstrate improved cervical R SB and rotation to WNL when going to the L in order to improve mobility throughout L sided neck musculature to improve postural awareness and positioning with prolonged sitting activities required for work.  - Partially met 1/3/2023, met for rotation, continues to demonstrate impairments with SB  ? Strength: Pt will increase bilateral UE strength to 5/5 globally in order to improve tolerance to lifting and carrying activities. - Partially met 1/3/2023, met for R UE, ongoing for L UE  Patient to be independent with home exercise program as demonstrated by performance with correct form without cues. - MET 1/3/2023  LTG: (to be met in 12 treatments) 9 visits per insurance auth   Pt will report average pain levels of 2-3/10 with repetitive tasks and lifting and carrying tasks in order to improve tolerance to daily activities and work related tasks. Pt will increase bilateral shoulder flexion and abduction AROM to >130 degrees in order to improve tolerance to overhead tasks. Pt will demonstrate negative clinical special testing for Hawkin's Yefri and Val's testing in order to improve tolerance and positioning for prolonged sitting for work. Pt will demonstrate improved functional activity tolerance as evident by an improved score on the UEFS to <10% functional impairment. Patient goals: \"reduce pain + uninterrupted sleep\"    Pt. Education:  [x] Yes  [] No  [x] Reviewed Prior HEP/Ed  Method of Education: [x] Verbal  [] Demo  [] Written    Access Code: Jamilah Laquita  URL: [email or web address]  Date: 12/20/2022  Prepared by: Holger Coley  Seated Upper Trapezius Stretch - 1 x daily - 7 x weekly - 1 sets - 3 reps - 30 hold  Gentle Levator Scapulae Stretch - 1 x daily - 7 x weekly - 1 sets - 3 reps - 30 hold  Seated Scapular Retraction - 1 x daily - 7 x weekly - 2 sets - 10 reps - 5 hold  Standing Pec Stretch at Wall - 1 x daily - 7 x weekly - 1 sets - 2 reps - 30 hold  Doorway Pec Stretch at 60 Elevation - 1 x daily - 7 x weekly - 1 sets - 2 reps - 30 hold  Scaption Wall Slide with Towel - 1 x daily - 7 x weekly - 10 reps  Standing Bilateral Shoulder Scaption Wall Slide - 1 x daily - 7 x weekly - 10 reps  Seated Cervical Rotation AROM - 1 x daily - 7 x weekly - 10 reps - 5 seconds hold  Standing Shoulder Flexion Full Range - 1 x daily - 7 x weekly - 10 reps  Shoulder Abduction - Thumbs Up - 1 x daily - 7 x weekly - 10 reps  Standing Shoulder Scaption - 1 x daily - 7 x weekly - 10 reps  Handout given 12/28/22  Date: 01/06/2023  Prepared by: Scott Marx  Sidelying Thoracic Rotation with Open Book - 1 x daily - 7 x weekly - 3 sets - 10 reps  Supine Shoulder Horizontal Abduction Adduction AAROM with Dowel - 1 x daily - 7 x weekly - 3 sets - 10 reps  Supine Single Arm Shoulder Protraction - 1 x daily - 7 x weekly - 3 sets - 10 reps  Supine Shoulder Press AAROM in Abduction with Dowel - 1 x daily - 7 x weekly - 3 sets - 10 reps  Date: 01/13/2023  Prepared by: Darin Favre Stretch - 1 x daily - 7 x weekly - 3 sets - 10 reps  Sidelying Shoulder External Rotation - 1 x daily - 7 x weekly - 3 sets - 10 reps  Sidelying Shoulder Horizontal Abduction - 1 x daily - 7 x weekly - 3 sets - 10 reps  Sidelying Shoulder Abduction - 1 x daily - 7 x weekly - 3 sets - 10 reps      Comprehension of Education:  [] Verbalizes understanding. [] Demonstrates understanding. [] Needs review   [x] Demonstrates/verbalizes HEP/Ed previously given. Plan: [x] Continue current frequency toward long and short term goals. [x] Specific Instructions for subsequent treatments: improve neck and shoulder mobility, postural awareness and scapular strengthening.        Time In: 12:00 pm             Time Out: 12:50 pm    Electronically signed by:  Scott Marx PTA

## 2023-01-27 ENCOUNTER — HOSPITAL ENCOUNTER (OUTPATIENT)
Dept: PHYSICAL THERAPY | Facility: CLINIC | Age: 58
Setting detail: THERAPIES SERIES
Discharge: HOME | End: 2023-01-27
Payer: COMMERCIAL

## 2023-01-27 NOTE — FLOWSHEET NOTE
[] [facility] ([facility]) - [facility] &  Therapy  [address]    [phone]  F: [phone]   [x] [address]  [facility] [address]   [address]  P: [phone]  F: [phone]  [] [address] &  Therapy  [address] [facility]  P: [phone]  F: [phone] [] [address]  P: [phone]  F: [phone]  [] [address] [facility]   [address]   [place]: [phone]  F: [phone]   [] [place] [address] [facility] [address]  [place]: [phone]   F: [phone]     Physical Therapy Cancel/No Show note    Date: 2023  Patient: Alisha Rodriguez  : 1965  MRN: [id number]    Cancels/No Shows to date:     For today's appointment patient:    [x]  Cancelled    [x] Rescheduled appointment    [] No-show     Reason given by patient:    []  Patient ill    []  Conflicting appointment    [] No transportation      [x] Conflict with work    [] No reason given    [] Weather related    [] COVID-19    [] Other:      Comments:        [x] Next appointment was confirmed    Electronically signed by: Salvador Gustafson PT

## 2023-02-03 ENCOUNTER — HOSPITAL ENCOUNTER (OUTPATIENT)
Dept: PHYSICAL THERAPY | Facility: CLINIC | Age: 58
Setting detail: THERAPIES SERIES
Discharge: HOME | End: 2023-02-03
Payer: COMMERCIAL

## 2023-02-03 PROCEDURE — 97110 THERAPEUTIC EXERCISES: CPT

## 2023-02-03 PROCEDURE — 97140 MANUAL THERAPY 1/> REGIONS: CPT

## 2023-02-03 NOTE — FLOWSHEET NOTE
[] [facility] [address]  [address]  [phone]  F: [phone] [x] [address]  [facility]   [address]  P: [phone]  F: [phone] [] [address]  [address] [facility]  P: [phone]  F: [phone] [] [address]  P: [phone]  F: [phone] [] [address]  [facility]   [address]   [place]: [phone]  F: [phone]      Physical Therapy Daily Treatment Note    Date:  2/3/2023  Patient Name:  Moris Richmond    :  1965  MRN: [id number]  Physician: Dr. Kiko Izquierdo, DO                              Insurance: [facility] ([address] [place] [address])   [id number]; [facility]  Arnold Rasjúnior not required for 4 vs in Dec 22. Total of 9 vs  Medical Diagnosis: M25.511, M25.512 - chronic pain of both shoulders                 Rehab Codes: M25.511, M25.512, M54.2, M62.81  Onset date: 2022                       Next 's appt. : 2023  Visit# / total visits: 9/9    Cancels/No Shows: 1/0    Subjective:    Pain:  [x] Yes  [] No  Location: B shoulders  Pain Rating: (0-10 scale) 4/10  Pain altered Tx:  [x] No  [] Yes  Action:    Comments: Pt reports continued shoulder and neck stiffness and soreness but reports overall she is feeling a little better. She notes improvement in symptoms in her L hand. Pt reports she is challenged most with get comfortable with sleeping.         Objective:  Modalities: No VASO per insurance   Precautions:  Exercises: [employer] Access Code: Erika Garcia completed   Exercise Reps/ Time Weight/ Level Comments   UBE 2'F  2'R Lv 2 Warm up         Manual  10'  Hypervolt with cushion attachment to B neck, shoulders and upper back         Side lying       Arms extended Open books  10x3\"ea ER  20x  New 1/13   Abd 20x  New 1/13   Horiz abd  20x  New 1/13    Flexion  20x  New 1/20          Supine       Flexion  x  Held pain    Horiz abd/add 2x10 ea  2# wand Inc 2/3   SA punch  2x10 2# [address] 2/3   Chest press  2x10  2# wand  Inc 2/3   Supine pec stretch  x  Held pain          Seated       Upper trap stretch  3x30\" ea       Levator stretch 3x30\" ea    inc reps 1/6   Seated scap retraction 10x5\", x2       Doorway pec stretch 2x30\"       Unilateral wall/doorway pec stretch 2x30\" ea       Wall slides  10xea  Flexion and scaption    Cervical rotation 10x ea 5\" hold    Shoulder flexion 10x A Seated    Scaption  10x A Seated          Standing       Shoulder abduction  10x2 A Standing- only able to tolerate ~90deg    Bilateral ER 10x orange Added 12/28   B horiz abd  10x  Orange     Snow angels 10x Towel roll thoracic region Added 12/28   Thoraic stretch opening up away from wall             Other:    Objective Measures completed by Coral López, PT, DPT 2/3/2023:       ROM  °A/P END FEEL STRENGTH TESTS (+/-) Left Right Not Tested     Left Right   Left Right Drop Arm     []    Sit Shld Flex 132 138   5/5 5/5 Sulcus Sign     []    Sit Shld Abd 110 114   5/5 5/5 Apprehension     []    Sit Shld IR           Orlandos     []    Shoulder Flex           Speeds     []    Ext           Neer     []    ABD           Magana  + - []    ER @ 0  54 50   4+/5 4+/5 Painful Arc - - []    IR L2 L4   5/5 5/5 Tinel     []    Supraspinatus           Reverse phalens       Infraspinatus           Val's - -     Elbow Flex. 5/5 5/5           Elbow Ext.       5/5 5/5           Pronation                     Supination                     Wrist Flex. 5/5 5/5           Wrist Ext.        5/5 5/5           Rad. Dev. Ulnar Dev.                                             Cervical ROM/Quadrant []  [x]  []  Extension: 10% impaired  Flexion: WFL  No reproduction of symptoms with repetitive testing  SB: R 35, L 40  Rotation: R 50, L 70   Reflexes           Treatment Charges: Mins Units   []  Modalities     [x]  Ther Exercise 45 3   [x]  Manual Therapy 10 1   []  Ther Activities     []  Aquatics     []  Vasocompression     []  Other     Total Treatment time 55 4       Assessment: [x] Progressing toward goals. Began treatment session with UBE warm up followed by manual therapy. Pt reporting increased tenderness initially that improves with manual. Exercises performed as charted above, focused on slow, controlled motion and scapular positioning. Pt reporting fatigue with bilateral sidelying exercises. Global fatigue noted at end of treatment session. Pt demonstrating global improvements in ROM and strength since beginning PT intervention. Pt continues to have greater L sided deficits. Plan to discharge plan of care at this time. Pt to follow up with ortho and determine further course of treatment. [] No change. [] Other:  [x] Patient would continue to benefit from skilled physical therapy services in order to:  improve mobility, strength and postural awareness for improved tolerance to overhead activities and tolerance to repetitive tasks and weighted tasks required for work and daily activities. At Eval:   Functional Test: Upper Extremity Functional Scale (UEFS) Score: 64/80, 20% functionally impaired - initial evaluation    1/3/2023: 58/80, 37.5% functionally impaired    2/3/2023: 69/80, 13.75% functionally impaired     Problems:    [x] ? Pain: bilateral shoulder pain (L > R)  [x] ? ROM: impaired neck and shoulder mobility  [x] ? Strength: global UE strength deficits and impaired postural stability  [x] ? Function: UEFS = 20% functional impairment  [x] Other: + Val's, + Gabino's                 STG: (to be met in 6 treatments)  ? Pain: Pt will report decreased maximal pain levels to <6/10 in order to improve tolerance to repetitive tasks required for daily activities and work.  - Met 2/3/2023, max pain recently 8/11  ? ROM: Pt will demonstrate improved cervical R SB and rotation to WNL when going to the L in order to improve mobility throughout L sided neck musculature to improve postural awareness and positioning with prolonged sitting activities required for work. - Ongoing 2/3/2023, pt demonstrating improvement bilaterally but continues to have greater L sided impairments  ? Strength: Pt will increase bilateral UE strength to 5/5 globally in order to improve tolerance to lifting and carrying activities. - Partially met 1/3/2023, met for R UE, ongoing for L UE  Patient to be independent with home exercise program as demonstrated by performance with correct form without cues. - MET 1/3/2023  LTG: (to be met in 12 treatments) 9 visits per insurance auth   Pt will report average pain levels of 2-3/10 with repetitive tasks and lifting and carrying tasks in order to improve tolerance to daily activities and work related tasks. Ongoing 2/3/2023  Pt will increase bilateral shoulder flexion and abduction AROM to >130 degrees in order to improve tolerance to overhead tasks. - Met 2/3/2023  Pt will demonstrate negative clinical special testing for Hawkin's Dayna and Val's testing in order to improve tolerance and positioning for prolonged sitting for work. - Not met for L soniakin's dayna 2/3/2023  Pt will demonstrate improved functional activity tolerance as evident by an improved score on the UEFS to <10% functional impairment. - Progress made 2/3/2023, currently 13.75% functional impairment                    Patient goals: \"reduce pain + uninterrupted sleep\"    Pt. Education:  [x] Yes  [] No  [x] Reviewed Prior HEP/Ed  Method of Education: [x] Verbal  [] Demo  [] Written    Access Code: Allegra Inder  URL: [email or web address]  Date: 12/20/2022  Prepared by: Gordon Pradhan  Seated Upper Trapezius Stretch - 1 x daily - 7 x weekly - 1 sets - 3 reps - 30 hold  Gentle Levator Scapulae Stretch - 1 x daily - 7 x weekly - 1 sets - 3 reps - 30 hold  Seated Scapular Retraction - 1 x daily - 7 x weekly - 2 sets - 10 reps - 5 hold  Standing Pec Stretch at [address] - 1 x daily - 7 x weekly - 1 sets - 2 reps - 30 hold  Doorway Pec Stretch at 60 Elevation - 1 x daily - 7 x weekly - 1 sets - 2 reps - 30 hold  Scaption Wall Slide with Towel - 1 x daily - 7 x weekly - 10 reps  Standing Bilateral Shoulder Scaption Wall Slide - 1 x daily - 7 x weekly - 10 reps  Seated Cervical Rotation AROM - 1 x daily - 7 x weekly - 10 reps - 5 seconds hold  Standing Shoulder Flexion Full Range - 1 x daily - 7 x weekly - 10 reps  Shoulder Abduction - Thumbs Up - 1 x daily - 7 x weekly - 10 reps  Standing Shoulder Scaption - 1 x daily - 7 x weekly - 10 reps  Handout given 12/28/22  Date: 01/06/2023  Prepared by: Dorothy Pretty  Sidelying Thoracic Rotation with Open Book - 1 x daily - 7 x weekly - 3 sets - 10 reps  Supine Shoulder Horizontal Abduction Adduction AAROM with Dowel - 1 x daily - 7 x weekly - 3 sets - 10 reps  Supine Single Arm Shoulder Protraction - 1 x daily - 7 x weekly - 3 sets - 10 reps  Supine Shoulder Press AAROM in Abduction with Dowel - 1 x daily - 7 x weekly - 3 sets - 10 reps  Date: 01/13/2023  Prepared by: Keri Net Stretch - 1 x daily - 7 x weekly - 3 sets - 10 reps  Sidelying Shoulder External Rotation - 1 x daily - 7 x weekly - 3 sets - 10 reps  Sidelying Shoulder Horizontal Abduction - 1 x daily - 7 x weekly - 3 sets - 10 reps  Sidelying Shoulder Abduction - 1 x daily - 7 x weekly - 3 sets - 10 reps      Comprehension of Education:  [] Verbalizes understanding. [] Demonstrates understanding. [] Needs review   [x] Demonstrates/verbalizes HEP/Ed previously given. Plan: [] Continue current frequency toward long and short term goals.     [x] Specific Instructions for subsequent treatments: D/C current plan, pt to follow up with new ortho doctor      Time In: 7:59 am             Time Out: 8:59 am    Electronically signed by:  Bj Barakat PT

## 2023-02-08 DIAGNOSIS — E11.9 TYPE 2 DIABETES MELLITUS WITHOUT COMPLICATION, WITHOUT LONG-TERM CURRENT USE OF INSULIN (HCC): ICD-10-CM

## 2023-02-08 RX ORDER — SEMAGLUTIDE 1.34 MG/ML
INJECTION, SOLUTION SUBCUTANEOUS
Qty: 1.5 ML | Refills: 1 | Status: SHIPPED | OUTPATIENT
Start: 2023-02-08

## 2023-02-08 NOTE — TELEPHONE ENCOUNTER
Bryson Will is calling to request a refill on the following medication(s):    Medication Request:  Requested Prescriptions     Pending Prescriptions Disp Refills    OZEMPIC, 0.25 OR 0.5 MG/DOSE, 2 MG/1.5ML SOPN [Pharmacy Med Name: OZEMPIC 0.25-0.5 MG/DOSE PEN] 1.5 mL      Sig: inject 0.25 milligrams subcutaneously every week       Last Visit Date (If Applicable):  0/0/9348    Next Visit Date:    2/15/2023

## 2023-02-15 ENCOUNTER — OFFICE VISIT (OUTPATIENT)
Dept: FAMILY MEDICINE CLINIC | Age: 58
End: 2023-02-15
Payer: COMMERCIAL

## 2023-02-15 VITALS — SYSTOLIC BLOOD PRESSURE: 121 MMHG | BODY MASS INDEX: 38.62 KG/M2 | DIASTOLIC BLOOD PRESSURE: 80 MMHG | WEIGHT: 225 LBS

## 2023-02-15 DIAGNOSIS — G56.02 CARPAL TUNNEL SYNDROME, LEFT: ICD-10-CM

## 2023-02-15 DIAGNOSIS — I10 ESSENTIAL HYPERTENSION: ICD-10-CM

## 2023-02-15 DIAGNOSIS — M50.30 DDD (DEGENERATIVE DISC DISEASE), CERVICAL: ICD-10-CM

## 2023-02-15 DIAGNOSIS — E11.9 TYPE 2 DIABETES MELLITUS WITHOUT COMPLICATION, WITHOUT LONG-TERM CURRENT USE OF INSULIN (HCC): Primary | ICD-10-CM

## 2023-02-15 PROCEDURE — 99214 OFFICE O/P EST MOD 30 MIN: CPT | Performed by: FAMILY MEDICINE

## 2023-02-15 PROCEDURE — 3074F SYST BP LT 130 MM HG: CPT | Performed by: FAMILY MEDICINE

## 2023-02-15 PROCEDURE — 3078F DIAST BP <80 MM HG: CPT | Performed by: FAMILY MEDICINE

## 2023-02-15 RX ORDER — TIRZEPATIDE 2.5 MG/.5ML
2.5 INJECTION, SOLUTION SUBCUTANEOUS WEEKLY
Qty: 4 ADJUSTABLE DOSE PRE-FILLED PEN SYRINGE | Refills: 0 | COMMUNITY
Start: 2023-02-15 | End: 2023-02-15 | Stop reason: SDUPTHER

## 2023-02-15 RX ORDER — TIRZEPATIDE 2.5 MG/.5ML
2.5 INJECTION, SOLUTION SUBCUTANEOUS WEEKLY
Qty: 4 ADJUSTABLE DOSE PRE-FILLED PEN SYRINGE | Refills: 0 | Status: SHIPPED | OUTPATIENT
Start: 2023-02-15

## 2023-02-15 SDOH — ECONOMIC STABILITY: TRANSPORTATION INSECURITY
IN THE PAST 12 MONTHS, HAS LACK OF TRANSPORTATION KEPT YOU FROM MEETINGS, WORK, OR FROM GETTING THINGS NEEDED FOR DAILY LIVING?: NO

## 2023-02-15 SDOH — ECONOMIC STABILITY: INCOME INSECURITY: HOW HARD IS IT FOR YOU TO PAY FOR THE VERY BASICS LIKE FOOD, HOUSING, MEDICAL CARE, AND HEATING?: NOT HARD AT ALL

## 2023-02-15 SDOH — ECONOMIC STABILITY: FOOD INSECURITY: WITHIN THE PAST 12 MONTHS, THE FOOD YOU BOUGHT JUST DIDN'T LAST AND YOU DIDN'T HAVE MONEY TO GET MORE.: NEVER TRUE

## 2023-02-15 SDOH — ECONOMIC STABILITY: FOOD INSECURITY: WITHIN THE PAST 12 MONTHS, YOU WORRIED THAT YOUR FOOD WOULD RUN OUT BEFORE YOU GOT MONEY TO BUY MORE.: NEVER TRUE

## 2023-02-15 SDOH — ECONOMIC STABILITY: HOUSING INSECURITY
IN THE LAST 12 MONTHS, WAS THERE A TIME WHEN YOU DID NOT HAVE A STEADY PLACE TO SLEEP OR SLEPT IN A SHELTER (INCLUDING NOW)?: NO

## 2023-02-15 ASSESSMENT — PATIENT HEALTH QUESTIONNAIRE - PHQ9
SUM OF ALL RESPONSES TO PHQ QUESTIONS 1-9: 0
SUM OF ALL RESPONSES TO PHQ QUESTIONS 1-9: 0
SUM OF ALL RESPONSES TO PHQ9 QUESTIONS 1 & 2: 0
SUM OF ALL RESPONSES TO PHQ QUESTIONS 1-9: 0
2. FEELING DOWN, DEPRESSED OR HOPELESS: 0
SUM OF ALL RESPONSES TO PHQ QUESTIONS 1-9: 0
1. LITTLE INTEREST OR PLEASURE IN DOING THINGS: 0

## 2023-02-17 ENCOUNTER — TELEPHONE (OUTPATIENT)
Dept: FAMILY MEDICINE CLINIC | Age: 58
End: 2023-02-17

## 2023-02-27 ENCOUNTER — TELEPHONE (OUTPATIENT)
Dept: FAMILY MEDICINE CLINIC | Age: 58
End: 2023-02-27

## 2023-02-27 NOTE — TELEPHONE ENCOUNTER
Patient states she did Rybelsus, Trulicity, and Ozempic all with adverse reaction. Nausea and vomiting.  Patient would like PA for [facility]

## 2023-02-27 NOTE — TELEPHONE ENCOUNTER
Unfortunately it appears the only medication like that her insurance covers is Trulicity. It looks like she had nausea and vomiting the last time she tried Trulicity. Is that true? We could try to submit a prior authorization for Ozempic or Mounjaro is she is unable to tolerate Trulicity. Or we could try the Trulicity again and start at a low dose and increase slowly

## 2023-03-03 ASSESSMENT — ENCOUNTER SYMPTOMS
RESPIRATORY NEGATIVE: 1
ORTHOPNEA: 0
ABDOMINAL PAIN: 0
BLURRED VISION: 0
BOWEL INCONTINENCE: 0
ALLERGIC/IMMUNOLOGIC NEGATIVE: 1
EYES NEGATIVE: 1
BACK PAIN: 1
GASTROINTESTINAL NEGATIVE: 1

## 2023-03-03 NOTE — PROGRESS NOTES
[facility] Progress Note    Date:2/15/2023         Patient Jaylen Roach     YOB: 1965     Age:57 y.o. Assessment/Plan        Problem List Items Addressed This Visit          Circulatory    Essential hypertension      Well-controlled, continue current medications, continue current treatment plan, medication adherence emphasized and lifestyle modifications recommended            Endocrine    Type 2 diabetes mellitus without complication, without long-term current use of insulin (HCC) - Primary      Well-controlled, continue current medications, continue current treatment plan, medication adherence emphasized and lifestyle modifications recommended         Relevant Medications    Tirzepatide (MOUNJARO) 2.5 MG/0.5ML SOPN SC injection       Nervous and Auditory    Carpal tunnel syndrome, left      Borderline controlled, continue current treatment plan            Musculoskeletal and Integument    DDD (degenerative disc disease), cervical      Borderline controlled, continue current medications and continue current treatment plan             Return in about 1 month (around 3/15/2023) for virtual visit follow up on Mounjaro. Electronically signed by Peter Londono DO on 3/3/23       Total time spent was between Time personally spent assessing and managing the patient on the date of service: Est: 30-39 minutes ([id number]) mins. This included time spent reviewing the patient's medical record (e.g., recent visits, labs, and studies); seeing the patient in the office (face-to-face time); ordering medications, studies, procedures, or referrals; calling the patient or family later in the day with results and further recommendations; and documenting the visit in the medical record. João Baron is a 61 y.o. female presenting today for   Chief Complaint   Patient presents with    Hypertension    Diabetes   . Hypertension  This is a chronic problem.  The current episode started more than 1 year ago. The problem has been gradually improving since onset. The problem is controlled. Pertinent negatives include no anxiety, blurred vision, headaches, malaise/fatigue, neck pain, orthopnea, peripheral edema, PND or sweats. Past treatments include angiotensin blockers, beta blockers, diuretics and calcium channel blockers. The current treatment provides significant improvement. Diabetes  She presents for her follow-up diabetic visit. She has type 2 diabetes mellitus. Her disease course has been stable. There are no hypoglycemic associated symptoms. Pertinent negatives for hypoglycemia include no headaches or sweats. Associated symptoms include foot paresthesias. Pertinent negatives for diabetes include no blurred vision, no weakness and no weight loss. Symptoms are stable. Current diabetic treatment includes oral agent (dual therapy). She is compliant with treatment most of the time. An ACE inhibitor/angiotensin II receptor blocker is being taken. Back Pain  This is a chronic problem. The current episode started more than 1 year ago. The problem occurs intermittently. The problem has been waxing and waning since onset. Pain location: neck. The quality of the pain is described as aching. The pain is moderate. The symptoms are aggravated by position and bending. Associated symptoms include paresthesias. Pertinent negatives include no abdominal pain, bladder incontinence, bowel incontinence, dysuria, headaches, paresis, pelvic pain, perianal numbness, weakness or weight loss. Treatments tried: lyrica sees pain management. The treatment provided significant relief. Review of Systems   Review of Systems   Constitutional: Negative. Negative for malaise/fatigue and weight loss. HENT: Negative. Eyes: Negative. Negative for blurred vision. Respiratory: Negative. Cardiovascular: Negative. Negative for orthopnea and PND. Gastrointestinal: Negative.   Negative for abdominal pain and bowel incontinence. Endocrine: Negative. Genitourinary: Negative. Negative for bladder incontinence, dysuria and pelvic pain. Musculoskeletal:  Positive for back pain. Negative for neck pain. Skin: Negative. Allergic/Immunologic: Negative. Neurological:  Positive for paresthesias. Negative for weakness and headaches. Hematological: Negative. Psychiatric/Behavioral: Negative. All other systems reviewed and are negative. Medications     Current Outpatient Medications   Medication Sig Dispense Refill    Tirzepatide (MOUNJARO) 2.5 MG/0.5ML SOPN SC injection Inject 0.5 mLs into the skin once a week 4 Adjustable Dose Pre-filled Pen Syringe 0    blood glucose test strips (ASCENSIA AUTODISC VI;ONE TOUCH ULTRA TEST VI) strip Use with associated glucose meter. Use q day 100 strip 11    Lancets MISC 1 each by Does not apply route daily 100 each 3    glucose monitoring (FREESTYLE FREEDOM) kit 1 kit by Does not apply route daily 1 kit 0    JARDIANCE 10 MG tablet take 1 tablet by mouth once daily 30 tablet 2    amLODIPine (NORVASC) 10 MG tablet Take 10 mg by mouth daily      carvedilol (COREG) 12.5 MG tablet Take 12.5 mg by mouth 2 times daily (with meals)      ticagrelor (BRILINTA) 60 MG TABS tablet Take 60 mg by mouth 2 times daily      omeprazole (PRILOSEC) 40 MG delayed release capsule take 1 capsule by mouth once daily 90 capsule 1    Handicap Placard MISC by Does not apply route Handicap placard for 5 years.  1 each 0    Multiple Vitamins-Minerals (ONE-A-DAY 50 PLUS PO) Take 1 tablet by mouth daily      losartan (COZAAR) 100 MG tablet take 1 tablet by mouth once daily 30 tablet 5    atorvastatin (LIPITOR) 80 MG tablet take 1 tablet by mouth once daily 30 tablet 11    ezetimibe (ZETIA) 10 MG tablet Take 10 mg by mouth daily      metFORMIN (GLUCOPHAGE-XR) 500 MG extended release tablet take 1 tablet by mouth once daily for 2 weeks then 1 tablet twice a day 60 tablet 11    ranolazine (RANEXA) 500 MG extended release tablet Take 1 tablet by mouth 2 times daily (Patient taking differently: Take 1,000 mg by mouth 2 times daily) 60 tablet 3    nitroGLYCERIN (NITROSTAT) 0.4 MG SL tablet place 1 tablet under the tongue if needed every 5 minutes for chest pain for 3 doses IF NO RELIEF AFTER FIRST DOSE CALL PRESCRIBER . 16 tablet 1    hydrochlorothiazide (HYDRODIURIL) 25 MG tablet Take 25 mg by mouth daily      aspirin 81 MG tablet Take 1 tablet by mouth daily (with breakfast) 30 tablet 11    pregabalin (LYRICA) 50 MG capsule Take 1 capsule by mouth 2 times daily for 30 days. 60 capsule 2     No current facility-administered medications for this visit. Past History    Past Medical History:   has a past medical history of Abnormal stress test, Allergic rhinitis, Arthritis, CAD (coronary artery disease), Chronic back pain, Diabetes mellitus ([address] Former smoker, Hyperlipidemia, Hypertension, Murmur, cardiac, SRI on CPAP, Wears prescription eyeglasses, and Wellness examination. Social History:   reports that she quit smoking about 4 years ago. Her smoking use included cigarettes. She has a 7.50 pack-year smoking history. She has never used smokeless tobacco. She reports that she does not drink alcohol and does not use drugs.      Family History:   Family History   Problem Relation Age of Onset    Stroke Father     Breast Cancer Mother 48    Other Brother     Diabetes Maternal Grandmother        Surgical History:   Past Surgical History:   Procedure Laterality Date    ANESTHESIA NERVE BLOCK Left 12/19/2019    NERVE BLOCK (DEFINE) - # 1 L5-S1 performed by Emmy Sorto MD at [address] Bilateral 7/17/2020    NERVE BLOCK BILATERAL - B MBB # 1 L4-5, L5-S1 performed by Emmy Sorto MD at [address] Bilateral 8/14/2020    NERVE BLOCK BILATERAL - L-5, L5-S1 performed by Emmy Sorto MD at [facility] [address] 2017    CARDIAC CATHETERIZATION  2019    CARDIAC CATHETERIZATION  2018    1 stent mid LAD PT HAS [facility] STENT, MRI CONDITIONAL 3T OK, SAFE IMMEDIATELY POST IMPLANT. CARDIAC CATHETERIZATION  02/10/2020    for chest pain no new blockage    CARPAL TUNNEL RELEASE Right      SECTION      x3    COLONOSCOPY N/A 2018    COLONOSCOPY WITH BIOPSY performed by Prema Barroso MD at [place] [address]  2014    cataract surgery left eye WITH IMPLANT. MRI OK. FOOT SURGERY      HAND TENDON SURGERY Right     [facility] INJECTION PROCEDURE FOR SACROILIAC JOINT Left 2019    SACROILIAC JOINT INJECTION - #1 performed by Milagros Amado MD at . [address] [facility])      LUMBAR [address] BLOCK  2020     NERVE BLOCK BILATERAL - B MBB # 1 L4-5, L5-S1 (Bilateral     NERVE BLOCK Bilateral 2020    NERVE BLOCK BILATERAL - L-5, L5-S1 (Bilateral )     NERVE BLOCK Left 2020    NERVE RADIOFREQUENCY ABLATION- L4-5, L5-S1    NERVE BLOCK Right 2020     NERVE RADIOFREQUENCY ABLATION (Right )     PAIN MANAGEMENT PROCEDURE Left 2020    NERVE RADIOFREQUENCY ABLATION- L4-5, L5-S1 performed by Milagros Amado MD at [address] [place] [address] Right 2020    NERVE RADIOFREQUENCY ABLATION performed by Milagros Amado MD at [address] [place] [address]          Physical Examination      Vitals:  /80   Wt 225 lb (102.1 kg)   BMI 38.62 kg/m²     Physical Exam  Vitals and nursing note reviewed. Constitutional:       General: She is not in acute distress. Appearance: Normal appearance. She is obese. She is not ill-appearing, toxic-appearing or diaphoretic. HENT:      Head: Normocephalic and atraumatic. Eyes:      General: No scleral icterus. Right eye: No discharge. Left eye: No discharge.       Extraocular Movements: Extraocular movements intact. Conjunctiva/sclera: Conjunctivae normal.   Cardiovascular:      Rate and Rhythm: Normal rate and regular rhythm. Pulses: Normal pulses. Heart sounds: Normal heart sounds. No murmur heard. No friction rub. No gallop. Pulmonary:      Effort: Pulmonary effort is normal. No respiratory distress. Breath sounds: Normal breath sounds. No stridor. No wheezing, rhonchi or rales. Chest:      Chest wall: No tenderness. Neurological:      Mental Status: She is alert and oriented to person, place, and time. Mental status is at baseline. Psychiatric:         Mood and Affect: Mood normal.         Behavior: Behavior normal.         Thought Content: Thought content normal.         Judgment: Judgment normal.       Labs/Imaging/Diagnostics   Labs:  Hemoglobin A1C   Date Value Ref Range Status   12/08/2022 6.3 % Final       Imaging Last 24 Hours:  XR SHOULDER LEFT (MIN 2 VIEWS)  Narrative: EXAMINATION:  3 XRAY VIEWS OF THE RIGHT SHOULDER; 3 XRAY VIEWS OF THE LEFT SHOULDER    12/8/2022 1:07 pm    COMPARISON:  None. HISTORY:  ORDERING SYSTEM PROVIDED HISTORY: Chronic pain of both shoulders  TECHNOLOGIST PROVIDED HISTORY:  Reason for Exam: bilateral shoulder pain no injury    77-year-old female with chronic bilateral shoulder pain    FINDINGS:  Right shoulder:    Mild degenerative changes of the right AC and glenohumeral joints. Visualized ribs appear intact. No acute fracture or dislocation. Left shoulder:    Mild degenerative changes of the left AC and glenohumeral joints. Visualized  left-sided ribs appear intact. No acute fracture or dislocation. 2 mm loose  body at the superior glenohumeral joint. Impression: Right shoulder:    1. Mild degenerative changes as above. 2. No acute fracture or dislocation. Left shoulder:    1. Mild degenerative changes as above. 2 mm loose body at the superior  glenohumeral joint.   2. No acute fracture or dislocation. XR SHOULDER RIGHT (MIN 2 VIEWS)  Narrative: EXAMINATION:  3 XRAY VIEWS OF THE RIGHT SHOULDER; 3 XRAY VIEWS OF THE LEFT SHOULDER    12/8/2022 1:07 pm    COMPARISON:  None. HISTORY:  ORDERING SYSTEM PROVIDED HISTORY: Chronic pain of both shoulders  TECHNOLOGIST PROVIDED HISTORY:  Reason for Exam: bilateral shoulder pain no injury    59-year-old female with chronic bilateral shoulder pain    FINDINGS:  Right shoulder:    Mild degenerative changes of the right AC and glenohumeral joints. Visualized ribs appear intact. No acute fracture or dislocation. Left shoulder:    Mild degenerative changes of the left AC and glenohumeral joints. Visualized  left-sided ribs appear intact. No acute fracture or dislocation. 2 mm loose  body at the superior glenohumeral joint. Impression: Right shoulder:    1. Mild degenerative changes as above. 2. No acute fracture or dislocation. Left shoulder:    1. Mild degenerative changes as above. 2 mm loose body at the superior  glenohumeral joint. 2. No acute fracture or dislocation.

## 2023-03-06 ENCOUNTER — HOSPITAL ENCOUNTER (OUTPATIENT)
Dept: PHYSICAL THERAPY | Facility: CLINIC | Age: 58
Setting detail: THERAPIES SERIES
Discharge: HOME | End: 2023-03-06
Payer: COMMERCIAL

## 2023-03-06 PROCEDURE — 97162 PT EVAL MOD COMPLEX 30 MIN: CPT

## 2023-03-06 PROCEDURE — 97110 THERAPEUTIC EXERCISES: CPT

## 2023-03-06 NOTE — CONSULTS
[] [phone]  - [facility] TWELVE-STEP [facility] &  Therapy  [address]  [phone]  F: [phone] [x] [address]  [employer] [address]   [address]  P: [phone]  F: [phone] [] [address] &  Therapy  [address]  P: [phone]  F: [phone] [] [address]  P: [phone]  F: [phone] [] [address]  [facility]   [address]   [place]: [phone]  F: [phone]      Physical Therapy Spine Evaluation    Date:  3/6/2023  Patient: India Trimble  : 1965  MRN: [id number]  Physician: Dr. Erma Panda MD     Insurance: Romero Baxano ( [facility]  Medical Diagnosis: cervical DDD M50.30    Rehab Codes: M54.2, M79.1, R20.2,   Onset Date: 2023 date of script (began ~ 2022)    Next 's appt.:  call to schedule after gets EMG done    Subjective:   CC: Currently shoulder pain 4/10 at time of eval, pain with lifting anything above waist - bilateral.   Ongoing pain in bilateral neck, upper arms and hands falling asleep. If arms on arm rests bilateral lower arms goes to sleep  Shoulder joint pain constant. Rest of pains related to the neck per patient. Arms tingling at work if she rests her arms while typing    Sleeps on her back with pillows beside her, if arms rest back then arms go to sleep  If side sleeps (typically left)  right arm goes to sleep  For neck - using one small pillow - not under shoulders, protective curve of neck. On side uses blanket tucked up under her head. If side lying tries to keep arm on her side, not falling back. Using hot packs at home (tenses with cold)    HPI: Insidious onset . In 2022 shoulder pain started to keep her awake, PCP sent to PT for shoulders.   ( 9 visits)  Continued pain in bilateral arms and into hands, physician now thinks may be coming from the neck so therapy and EMG ordered for the neck       Wearing splints for carpal tunnel again at night - past 3-4 weeks, helping some but still getting numbness    PMHx: [] Unremarkable [] Diabetes [] HTN  [] Pacemaker   [] MI/Heart Problems [] Cancer [] Arthritis [] Other:              [x] Refer to full medical chart  In EPIC     Prior PT ending in Feb of 2023, pt reports it helped with flexibility of shoulders but hands continuing to fall asleep. Prior hx of Low back surgeries, - low back compressed taking Lyrica was going to have surgery prior to covid . Most recent Low back surgeries ~ 15 years ago microdiscectomy    Past Medical History:   Diagnosis Date    Abnormal stress test     Allergic rhinitis     Arthritis     CAD (coronary artery disease)     Dr. Amparo Lomas last seen 2/20/2020    Chronic back pain     Diabetes mellitus [facility])     Former smoker 2/7/2020    30-year history.   Quit in 2018    Hyperlipidemia     Dr. Fernando Glynn    Hypertension     Dr. Sherrill Godinez, cardiac 2/8/2020    SRI on CPAP     instructed to bring Dr. Sherlon Gosselin    Wears prescription eyeglasses     Wellness examination     Dr. Wilber Coulter last seen 2/13/2020           Comorbidities:   [] Obesity [] Dialysis  [] N/A   [] Asthma/COPD [] Dementia [x] Other:hx right thumb surgery - decreased dexterity since, tendon injury in 1984, now calcified, was dislocated, can't close thumb to fist, can oppose fingers, difficulty with jars, small tasks, less strength in right, can't open a bag of chips   [] Stroke [] Sleep apnea [x] Other:hx of carpal tunnel   [] Vascular disease [] Rheumatic disease [] Other:     Tests: [x] X-Ray: shoulders [] MRI:  [x] Other:pending MRI 3- [facility]  X rays for neck at referring doctors office    Medications: [x] Refer to full medical record [] None [] Other:  Allergies:      [x] Refer to full medical record  [] None [x] Other:bees, tetracycline (sensitive to ACE inhibitors)    Function:  Hand Dominance  [x] Right  [] Left  Patient lives with:    Employers - 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  [employer] - online orders, straightening, freight, lifting - going okay- pushes a cart, may pull from the top, has a waist level cart near her to work from - they are accommodating, is reaching, stretching- 5-8 hrs Sunday, 3 hours Monday thru Thursday    65 hours a week  5 years at [employer], since age 20 worked a lot     Job Status [x]  Normal duty   [] Light duty   [] Off due to condition    []  Retired   [] Not employed   [] Disability  [] Other:  []  Return to work: Work activities/duties reading     Computer at work  states they got her an ergonomic keyboard, still working on [employer] of computer, has a pull out tray that hits thighs so can't sit any higher.   Does get up and walk a lot to printer and other areas of office    ADL/IADL Previous level of function Current level of function Who currently assists the patient with task   Bathing  [x] Independent  [] Assist [x] Independent  [] Assist    Dress/grooming [x] Independent  [] Assist [] Independent  [x] Assist Drying hair arms start to hurt  Difficulty releasing bra straps - difficulty reaching behind,  helps  Also back limits some twisting for self care   Transfer/mobility [x] Independent  [] Assist [x] Independent  [] Assist    Feeding [x] Independent  [] Assist [x] Independent  [] Assist    Toileting [x] Independent  [] Assist [x] Independent  [] Assist    Driving [x] Independent  [] Assist [x] Independent  [] Assist    Housekeeping [x] Independent  [] Assist [x] Independent  [] Assist    Grocery shop/meal prep [x] Independent  [] Assist [x] Independent  [] Assist      Gait Prior level of function Current level of function    [x] Independent  [] Assist [x] Independent  [] Assist   Device: [x] Independent [x] Independent       Pain: [x] Yes  [] No Location: neck, across upper shoulders, upper arms and bilateral hands Pain Rating: (0-10 scale) 4-7/10   At night awakes her ~ 7/10  Pain altered Tx:  [] Yes  [] No  Action:    Symptoms:  [x] Improving- shoulders [x] Worsening- hands [] Same  Better:  [] AM    [] PM    [] Sit    [] Rise/Sit    []Stand    [] Walk    [] Lying    [x] Other: positional, heat sometimes  Worse:     [x] Other:resting on arms lifting, reaching weight  Sleep: [] OK    [x] Disturbed    Objective:        AROM  ROM    Left Right Cervical    C5 Shld Abd 104 100 Flexion 36+ pain   Shld Flexion 110 126 Extension 32 + pain   Shld IR   Rotation L  less 25% R wfl   Shld ER   Sidebend \"cracks\" L 36 R 32    C6 Elb Flex   Retraction    C7 Elb Ext        - L2 55 66                 UE/LE         TESTS (+/-) LEFT RIGHT Not Tested   Cerv. Comp + + []   Cerv. Distraction - - []   Adsons + + []   Devante Ford   []   Mallory Tests ? Pain ? Pain No Change Not Tested   Rep Prot [x] [] [] []   Rep Retract [x] [] [] []   Continues with stretches for shoulders at home, particularly to stretch backwards      OBSERVATION No Deficit Deficit Not Tested Comments   Posture       Forward Head [] [x] []    Rounded Shoulders [] [x] [] Right shoulder slightly elevated in standing   Kyphosis [] [x] []    Lordosis [] [] [x]    Lateral Shift [] [] [x]    Scoliosis [] [] [x]    Iliac Crest [] [] [x]    PSIS [] [] [x]    ASIS [] [] [x]    Genu Valgus [] [] [x]    Genu Varus [] [] [x]    Slumped Sitting [] [x] []    Palpation [] [x] [] + tightness in bilateral upper traps, cervical paraspinals  C6-T1 tenderness in paraspinals with palpation   Sensation [] [x] [] Numbness/tingling in  bilateral hands, sometimes day, at times night (wearing carpal tunnel splints now)   Edema [x] [] []        Functional Test: Neck Disability Index Score:  16/50 = 32% functionally impaired     Comments: Patient reports she is conscious of trying to relax shoulders.      Assessment: Patient would benefit from skilled physical therapy services in order to: decrease neck pain and decrease radicular symptoms in bilateral UE's    Problem list, as detailed above:   [x] ? pain bilateral upper traps    [x] ? Cervical Pain:    [x] ? ROM:  significant in shoulders, left cervical rotation    [x] ? Strength:  shoulders  [x] ? Function:headaches, difficulty looking down at work/phone, pain and limitations with lifting, disturbed sleep  [x] Postural Deviations forward flexed, IR at shoulders, forward head    [x] Other:tingling and numbness in bilateral hands     STG: (to be met in 4 treatments)  ? Pain: to 5/10 maximum to allow her to work 4 hours without increased pain or numbness in hands  ? ROM: left cervical rotation equal to right to improve turning head with driving  ? Function: able bend forward to look down for work/reading for 30 min x 2 without pain or radicular symptoms  Patient to be independent with home exercise program as demonstrated by performance with correct form without cues. LTG: (to be met in 8 treatments)   50% reduction in neck pain  Not to awaken due to numbness in hands  Ability to perform work tasks ( 2 jobs) without hand numbness.    Able to demonstrate chin tucks and scapular retraction to improve upright posturing and decrease strain to neck and shoulders      Patient goals:alleviate headaches and decrease pain, uninterrupted sleep    Rehab Potential:  [] Good  [x] Fair  [] Poor   Suggested Professional Referral:  [] No  [x] Yes: if no improvement with PT/pending EMG results  Barriers to Goal Achievement:  [] No  [x] Yes:prior history of shoulder pain, narrowing of disc space per pt per X ray, works 65 hours or so a week  Domestic Concerns:  [x] No  [] Yes:    Pt. Education:  [x] Plans/Goals, Risks/Benefits discussed  [x] Home exercise program  3-6-2023 discussed posture awareness, not turning to see TV, not looking one sided at work, scapular retraction  Method of Education: [x] Verbal  [x] Demo  [] Written  Comprehension of Education:  [] Verbalizes understanding. [] Demonstrates understanding. [] Needs Review. [] Demonstrates/verbalizes understanding of HEP/Ed previously given.     Treatment Plan:  [x] Therapeutic Exercise   86930  [] Iontophoresis: 4 mg/mL Dexamethasone Sodium Phosphate  mAmin  50045   [] Therapeutic Activity  71942 [] Vasopneumatic cold with compression  85048    [] Gait Training   33376 [] Ultrasound   23279   [] Neuromuscular Re-education  36487 [] Electrical Stimulation Unattended  60929   [x] Manual Therapy  53306 [] Electrical Stimulation Attended  00023   [x] Instruction in HEP  [x] Cervical Traction  99159   [] Aquatic Therapy   55557 [] Cold/hotpack    [] Massage   46293      [] Dry Needling, 1 or 2 muscles  50698   [] Biofeedback, first 15 minutes   41962  [] Biofeedback, additional 15 minutes   62647 [] Dry Needling, 3 or more muscles  56221     Frequency:  2 x/week for 8 visits    Todays Treatment:  Modalities:   Precautions: hx LBP/surgeries  Exercises:  Exercise Reps/ Time Weight/ Level Comments   Supine manual traction - cervical, with SOB 10 min  Feet on blue wedge                           Other:    Specific Instructions for next treatment:  Monitor response to manual traction, can try mechanical  Issue postural exercises, scapular retraction, chin tucks, wall angels      Evaluation Complexity:  History (Personal factors, comorbidities) [] 0 [] 1-2 [x] 3+   Exam (limitations, restrictions) [] 1-2 [x] 3 [] 4+   Clinical presentation (progression) [] Stable [x] Evolving  [] Unstable   Decision Making [] Low [x] Moderate [] High    [] Low Complexity [x] Moderate Complexity [] High Complexity       Treatment Charges: Mins Units   [x] Evaluation       []  Low       [x]  Moderate       []  High 40 min 1   []  Modalities     [x]  Ther Exercise 10 1   []  Manual Therapy     []  Ther Activities     []  Aquatics     []  Vasocompression     []  Other TOTAL TREATMENT TIME: 50 min    Time in: 11:10 am      Time out: 12:01 pm    Electronically signed by: Viola Medina PT

## 2023-03-15 ENCOUNTER — TELEMEDICINE (OUTPATIENT)
Dept: FAMILY MEDICINE CLINIC | Age: 58
End: 2023-03-15
Payer: COMMERCIAL

## 2023-03-15 DIAGNOSIS — I10 ESSENTIAL HYPERTENSION: ICD-10-CM

## 2023-03-15 DIAGNOSIS — E66.01 CLASS 2 SEVERE OBESITY DUE TO EXCESS CALORIES WITH SERIOUS COMORBIDITY AND BODY MASS INDEX (BMI) OF 38.0 TO 38.9 IN ADULT (HCC): ICD-10-CM

## 2023-03-15 DIAGNOSIS — E11.9 TYPE 2 DIABETES MELLITUS WITHOUT COMPLICATION, WITHOUT LONG-TERM CURRENT USE OF INSULIN (HCC): Primary | ICD-10-CM

## 2023-03-15 PROCEDURE — 99214 OFFICE O/P EST MOD 30 MIN: CPT | Performed by: FAMILY MEDICINE

## 2023-03-15 RX ORDER — TIRZEPATIDE 5 MG/.5ML
5 INJECTION, SOLUTION SUBCUTANEOUS WEEKLY
Qty: 2 ML | Refills: 0 | Status: SHIPPED | OUTPATIENT
Start: 2023-04-02

## 2023-03-15 ASSESSMENT — ENCOUNTER SYMPTOMS
BLURRED VISION: 0
GASTROINTESTINAL NEGATIVE: 1
ORTHOPNEA: 0
RESPIRATORY NEGATIVE: 1
BACK PAIN: 1
ALLERGIC/IMMUNOLOGIC NEGATIVE: 1
EYES NEGATIVE: 1

## 2023-03-15 NOTE — PATIENT INSTRUCTIONS
Patient Education        Counting Carbohydrates for Diabetes: Care Instructions  Overview     Managing the amount of carbohydrate (carbs) you eat is an important part of planning healthy meals when you have diabetes. Carbs raise blood sugar more than any other nutrient. Carbs are found in grains, starchy vegetables, fruits, and milk and yogurt. Carbs are also found in sugar-sweetened foods and drinks. The more carbs you eat at one time, the higher your blood sugar will rise. Counting carbs can help you keep your blood sugar within your target range. If you use insulin, counting carbs helps you match the right amount of insulin to the number of grams of carbs in a meal.  A registered dietitian or diabetes educator can help you plan meals and snacks. Follow-up care is a key part of your treatment and safety. Be sure to make and go to all appointments, and call your doctor if you are having problems. It's also a good idea to know your test results and keep a list of the medicines you take. How can you care for yourself at home? Know your daily amount of carbohydrates  Your daily amount depends on several things, such as your weight, how active you are, which diabetes medicines you take, and what your goals are for your blood sugar levels. A registered dietitian or diabetes educator can help you plan how many carbs to include in each meal and snack. For most adults, a guideline for the daily amount of carbs is:  45 to 60 grams at each meal. That's about the same as 3 to 4 carbohydrate servings. 15 to 20 grams at each snack. That's about the same as 1 carbohydrate serving. Count carbs  Counting carbs lets you know how much rapid-acting insulin to take before you eat. If you use an insulin pump, you get a constant rate of insulin during the day. So the pump must be programmed at meals. This gives you extra insulin to cover the rise in blood sugar after meals.   If you take insulin:  Learn your own insulin-to-carb ratio. You and your diabetes health professional will figure out the ratio. You can do this by testing your blood sugar after meals. For example, you may need a certain amount of insulin for every 15 grams of carbs. Add up the carb grams in a meal. Then you can figure out how many units of insulin to take based on your insulin-to-carb ratio. Exercise lowers blood sugar. You can use less insulin than you would if you were not doing exercise. Keep in mind that timing matters. If you exercise within 1 hour after a meal, your body may need less insulin for that meal than it would if you exercised 3 hours after the meal. Test your blood sugar to find out how exercise affects your need for insulin. If you do or don't take insulin:  Look at labels on packaged foods. This can tell you how many carbs are in a serving. Be aware of portions, or serving sizes. If a package has two servings and you eat the whole package, you need to double the number of grams of carbohydrate listed for one serving. Protein, fat, and fiber do not raise blood sugar as much as carbs do. If you eat a lot of these nutrients in a meal, your blood sugar will rise more slowly than it would otherwise. Where can you learn more? Go to http://www.woods.com/ and enter G703 to learn more about \"Counting Carbohydrates for Diabetes: Care Instructions. \"  Current as of: May 9, 2022               Content Version: 13.5  © 1046-8336 [employer]wise, Incorporated. Care instructions adapted under license by [facility] ([facility]). If you have questions about a medical condition or this instruction, always ask your healthcare professional. [place] [address] any warranty or liability for your use of this information.

## 2023-03-15 NOTE — ASSESSMENT & PLAN NOTE
Well-controlled, changes made today: increase Mounjaro next month, medication adherence emphasized and lifestyle modifications recommended

## 2023-03-15 NOTE — PROGRESS NOTES
Alma Holman (:  1965) is a Established patient, here for evaluation of the following:    Assessment & Plan   Below is the assessment and plan developed based on review of pertinent history, physical exam, labs, studies, and medications. 1. Type 2 diabetes mellitus without complication, without long-term current use of insulin (HCC)  Assessment & Plan:   Well-controlled, changes made today: increase Mounjaro next month, medication adherence emphasized and lifestyle modifications recommended  Orders:  -     Tirzepatide (MOUNJARO) 5 MG/0.5ML SOPN SC injection; Inject 0.5 mLs into the skin once a week, Disp-2 mL, R-0Normal  2. Essential hypertension  Assessment & Plan:   Well-controlled, continue current medications, continue current treatment plan, medication adherence emphasized and lifestyle modifications recommended  3. Class 2 severe obesity due to excess calories with serious comorbidity and body mass index (BMI) of 38.0 to 38.9 in [facility])  Assessment & Plan:   Improving  Increase Mounjaro next month  Orders:  -     Tirzepatide (MOUNJARO) 5 MG/0.5ML SOPN SC injection; Inject 0.5 mLs into the skin once a week, Disp-2 mL, R-0Normal    Return in about 1 month (around 4/15/2023). Subjective   Obesity - no nausea or diarrhea from mounjaro - hasn't weighed herself today - clothes not fitting as tight but she knows she's only eating half the portions she used to    Diabetes  She presents for her follow-up diabetic visit. She has type 2 diabetes mellitus. Her disease course has been stable. There are no hypoglycemic associated symptoms. Pertinent negatives for hypoglycemia include no sweats. Associated symptoms include foot paresthesias. Pertinent negatives for diabetes include no blurred vision. Symptoms are stable. Current diabetic treatment includes oral agent (dual therapy). She is compliant with treatment most of the time. An ACE inhibitor/angiotensin II receptor blocker is being taken. Hypertension  This is a chronic problem. The current episode started more than 1 year ago. The problem has been gradually improving since onset. The problem is controlled. Pertinent negatives include no anxiety, blurred vision, malaise/fatigue, orthopnea, peripheral edema, PND or sweats. Past treatments include angiotensin blockers, beta blockers, diuretics and calcium channel blockers. The current treatment provides significant improvement. Review of Systems   Constitutional: Negative. Negative for malaise/fatigue. HENT: Negative. Eyes: Negative. Negative for blurred vision. Respiratory: Negative. Cardiovascular: Negative. Negative for orthopnea and PND. Gastrointestinal: Negative. Endocrine: Negative. Skin: Negative. Allergic/Immunologic: Negative. Hematological: Negative. Psychiatric/Behavioral: Negative. All other systems reviewed and are negative.        Objective   Patient-Reported Vitals  No data recorded     Physical Exam  [INSTRUCTIONS:  \"[x]\" Indicates a positive item  \"[]\" Indicates a negative item  -- DELETE ALL ITEMS NOT EXAMINED]    Constitutional: [x] Appears well-developed and well-nourished [x] No apparent distress      [] Abnormal -     Mental status: [x] Alert and awake  [x] Oriented to person/place/time [x] Able to follow commands    [] Abnormal -     Eyes:   EOM    [x]  Normal    [] Abnormal -   Sclera  [x]  Normal    [] Abnormal -          Discharge [x]  None visible   [] Abnormal -     HENT: [x] Normocephalic, atraumatic  [] Abnormal -   [x] Mouth/Throat: Mucous membranes are moist    External Ears [x] Normal  [] Abnormal -    Neck: [x] No visualized mass [] Abnormal -     Pulmonary/Chest: [x] Respiratory effort normal   [x] No visualized signs of difficulty breathing or respiratory distress        [] Abnormal -      Musculoskeletal:   [x] Normal gait with no signs of ataxia         [x] Normal range of motion of neck        [] Abnormal -     Neurological: [x] No Facial Asymmetry (Cranial nerve 7 motor function) (limited exam due to video visit)          [x] No gaze palsy        [] Abnormal -          Skin:        [x] No significant exanthematous lesions or discoloration noted on facial skin         [] Abnormal -            Psychiatric:       [x] Normal Affect [] Abnormal -        [x] No Hallucinations    Other pertinent observable physical exam findings:-         On this date 3/15/2023 I have spent 31 minutes reviewing previous notes, test results and face to face (virtual) with the patient discussing the diagnosis and importance of compliance with the treatment plan as well as documenting on the day of the visitSivan Olivas, was evaluated through a synchronous (real-time) audio-video encounter. The patient (or guardian if applicable) is aware that this is a billable service, which includes applicable co-pays. This Virtual Visit was conducted with patient's (and/or legal guardian's) consent. The visit was conducted pursuant to the emergency declaration under the [address] authority and the [employer] and [employer] General Act. Patient identification was verified, and a caregiver was present when appropriate.    The patient was located at Home: [address]  Provider was located at Home ([facility] [address] [facility] [place],

## 2023-03-20 ENCOUNTER — HOSPITAL ENCOUNTER (OUTPATIENT)
Dept: PHYSICAL THERAPY | Facility: CLINIC | Age: 58
Setting detail: THERAPIES SERIES
Discharge: HOME | End: 2023-03-20
Payer: COMMERCIAL

## 2023-03-20 PROCEDURE — 97110 THERAPEUTIC EXERCISES: CPT

## 2023-03-20 PROCEDURE — 97140 MANUAL THERAPY 1/> REGIONS: CPT

## 2023-03-20 NOTE — FLOWSHEET NOTE
increased pain or numbness in hands  ? ROM: left cervical rotation equal to right to improve turning head with driving  ? Function: able bend forward to look down for work/reading for 30 min x 2 without pain or radicular symptoms  Patient to be independent with home exercise program as demonstrated by performance with correct form without cues. LTG: (to be met in 8 treatments)   50% reduction in neck pain  Not to awaken due to numbness in hands  Ability to perform work tasks ( 2 jobs) without hand numbness. Able to demonstrate chin tucks and scapular retraction to improve upright posturing and decrease strain to neck and shoulders        Patient goals: alleviate headaches and decrease pain, uninterrupted sleep    Pt. Education:  [x] Yes  [] No  [] Reviewed Prior HEP/Ed  Method of Education: [x] Verbal  [x] Demo  [x] Written  Comprehension of Education:  [x] Verbalizes understanding. [x] Demonstrates understanding. [] Needs review. [x] Demonstrates/verbalizes HEP/Ed previously given. 3-6-2023 discussed posture awareness, not turning to see TV, not looking one sided at work, scapular retraction  Access Code: [email or web address]  URL: [email or web address]  Date: 03/20/2023  Prepared by: Arch [place]  Seated Cervical Retraction - 1 x daily - 7 x weekly - 3 sets - 10 reps  Seated Cervical Retraction and Extension - 1 x daily - 7 x weekly - 3 sets - 10 reps  Seated Cervical Rotation AROM - 1 x daily - 7 x weekly - 3 sets - 10 reps  Gentle Levator Scapulae Stretch - 1 x daily - 7 x weekly - 3 sets - 10 reps  Seated Upper Trapezius Stretch - 1 x daily - 7 x weekly - 3 sets - 10 reps  Seated Scapular Retraction - 1 x daily - 7 x weekly - 3 sets - 10 reps  Wall Cloverleaf Colony - 1 x daily - 7 x weekly - 3 sets - 10 reps      Plan: [x] Continue current frequency toward long and short term goals. [x] Specific Instructions for subsequent treatments: progress per poc.        Time In: 8:00 am            Time Out: 9:00

## 2023-03-27 ENCOUNTER — HOSPITAL ENCOUNTER (OUTPATIENT)
Dept: PHYSICAL THERAPY | Facility: CLINIC | Age: 58
Setting detail: THERAPIES SERIES
Discharge: HOME | End: 2023-03-27
Payer: COMMERCIAL

## 2023-03-27 PROCEDURE — 97140 MANUAL THERAPY 1/> REGIONS: CPT

## 2023-03-27 PROCEDURE — 97110 THERAPEUTIC EXERCISES: CPT

## 2023-03-27 NOTE — FLOWSHEET NOTE
[] [facility] [address] [facility]  [address]  [phone]  F: [phone] [x] [address]  [facility]   [address]  P: [phone]  F: [phone] [] [place] &  Therapy  [address] [facility]  P: [phone]  F: [phone] [] [address]  P: [phone]  F: [phone] [] [address]  [facility]   [address]   [place]: [phone]  F: [phone]      Physical Therapy Daily Treatment Note    Date:  3/27/2023  Patient Name:  Liliya Barbour    :  1965  MRN: [id number]  Physician: Dr. Dequan Gee MD                                  Insurance: [employer] Renee 5 visits approved   Medical Diagnosis: cervical DDD M50.30                  Rehab Codes: M54.2, M79.1, R20.2,   Onset Date: 2023 date of script (began ~ 2022)                          Next 's appt.:  call to schedule after gets EMG done       Visit# / total visits: 3 /8; 5 visits approved      Cancels/No Shows: 0/0    Subjective:    Pain:  [x] Yes  [] No Location: neck, across upper shoulders, upper arms and bilateral hands   Pain Rating: (0-10 scale) 4/10  Pain altered Tx:  [] No  [x] Yes  Action:HELD  Comments     Patient reports some improvement with sleeping at night, UE tingling and pain less intense and less frequent  Was told at EMG , UE symptoms most likely not from neck but hasn't gotten official report    3- EMB    Objective:  Modalities: MHP to cervical spine in supine x15' post session.    Precautions: hx LBP/surgeries  Mechanical Cervical traction not covered by insurance  Exercises:  Exercise Reps/ Time Weight/ Level Comments   Supine       manual cervical traction   -Suboccipital release, B UT release, SCM release, 25 min   Feet

## 2023-04-03 ENCOUNTER — HOSPITAL ENCOUNTER (OUTPATIENT)
Dept: PHYSICAL THERAPY | Facility: CLINIC | Age: 58
Setting detail: THERAPIES SERIES
Discharge: HOME | End: 2023-04-03
Payer: COMMERCIAL

## 2023-04-03 PROCEDURE — 97110 THERAPEUTIC EXERCISES: CPT

## 2023-04-03 PROCEDURE — 97140 MANUAL THERAPY 1/> REGIONS: CPT

## 2023-04-03 NOTE — FLOWSHEET NOTE
Cervical mobs with t-ball  Rotation, extension  10xea   t-ball             Seated           Scap retraction   10x5\"         Cervical retraction - supine  10x      3/27 able to resume,  supine      Cervical rotation   5x3\"ea       Cervical flexion then ext to neutral  5xea       UT stretch   2x30\"ea       Levator scap stretch   2x30\"ea            Other:     Specific Instructions for next treatment:    Progress postural exercises as able       Treatment Charges: Mins Units   [x]  Modalities- MHP  15 --   [x]  Ther Exercise 15 1   [x]  Manual Therapy 20 1   []  Ther Activities     []  Aquatics     []  Vasocompression     []  Other     Total Treatment time 35 2       Assessment: [x] Progressing toward goals. Started session with manual cervical work then moved to active exercises. Patient reports she is noticing some improvement with decreased frequency of headaches but still has muscular soreness. [] No change. [x] Other:pt has home massage gun which she reports is beneficial  [x] Patient would continue to benefit from skilled physical therapy services in order to: decrease neck pain and decrease radicular symptoms in bilateral UE's    Problem list, as detailed above:   [x] ? pain bilateral upper traps                          [x] ? Cervical Pain:        [x] ? ROM:  significant in shoulders, left cervical rotation                    [x] ? Strength:   shoulders  [x] ? Function:headaches, difficulty looking down at work/phone, pain and limitations with lifting, disturbed sleep  [x] Postural Deviations forward flexed, IR at shoulders, forward head     [x] Other:tingling and numbness in bilateral hands         STG: (to be met in 4 treatments)  ? Pain: to 5/10 maximum to allow her to work 4 hours without increased pain or numbness in hands 4-3-2023 pain 3/10  ? ROM: left cervical rotation equal to right to improve turning head with driving  ?  Function: able bend forward to look down for work/reading for 30 min x 2

## 2023-04-04 DIAGNOSIS — E11.9 TYPE 2 DIABETES MELLITUS WITHOUT COMPLICATION, WITHOUT LONG-TERM CURRENT USE OF INSULIN (HCC): ICD-10-CM

## 2023-04-04 RX ORDER — EMPAGLIFLOZIN 10 MG/1
TABLET, FILM COATED ORAL
Qty: 30 TABLET | Refills: 2 | Status: SHIPPED | OUTPATIENT
Start: 2023-04-04

## 2023-04-04 NOTE — TELEPHONE ENCOUNTER
Luther Hernandez is calling to request a refill on the following medication(s):    Last Visit Date (If Applicable):  2/31/8085    Next Visit Date:    Visit date not found    Medication Request:  Requested Prescriptions     Pending Prescriptions Disp Refills    JARDIANCE 10 MG tablet [Pharmacy Med Name: Jean Bonine 10 MG TABLET] 30 tablet 2     Sig: take 1 tablet by mouth once daily

## 2023-04-06 ENCOUNTER — OFFICE VISIT (OUTPATIENT)
Dept: PAIN MANAGEMENT | Age: 58
End: 2023-04-06
Payer: COMMERCIAL

## 2023-04-06 VITALS — HEIGHT: 64 IN | BODY MASS INDEX: 38.28 KG/M2 | WEIGHT: 224.2 LBS

## 2023-04-06 DIAGNOSIS — M54.16 LUMBAR RADICULOPATHY: Primary | ICD-10-CM

## 2023-04-06 DIAGNOSIS — M51.36 DDD (DEGENERATIVE DISC DISEASE), LUMBAR: ICD-10-CM

## 2023-04-06 PROCEDURE — 99213 OFFICE O/P EST LOW 20 MIN: CPT | Performed by: NURSE PRACTITIONER

## 2023-04-06 RX ORDER — PREGABALIN 50 MG/1
50 CAPSULE ORAL 2 TIMES DAILY
Qty: 180 CAPSULE | Refills: 0 | Status: SHIPPED | OUTPATIENT
Start: 2023-04-06 | End: 2023-07-05

## 2023-04-06 ASSESSMENT — ENCOUNTER SYMPTOMS
BACK PAIN: 1
BOWEL INCONTINENCE: 0

## 2023-04-06 NOTE — PROGRESS NOTES
6    Family History   Problem Relation Age of Onset    Stroke Father     Breast Cancer Mother 48    Other Brother     Diabetes Maternal Grandmother        Social History     Socioeconomic History    Marital status:      Spouse name: Not on file    Number of children: Not on file    Years of education: Not on file    Highest education level: Not on file   Occupational History    Not on file   Tobacco Use    Smoking status: Former     Packs/day: 0.25     Years: 30.00     Pack years: 7.50     Types: Cigarettes     Quit date: 2018     Years since quittin.7    Smokeless tobacco: Never   Vaping Use    Vaping Use: Never used   Substance and Sexual Activity    Alcohol use: No    Drug use: No    Sexual activity: Not on file   Other Topics Concern    Not on file   Social History Narrative    Not on file     Social Determinants of Health     Financial Resource Strain: Low Risk     Difficulty of Paying Living Expenses: Not hard at all   Food Insecurity: No Food Insecurity    Worried About [address] in the Last Year: Never true    [address]  [employer] in the Last Year: Never true   Transportation Needs: Unknown    Lack of Transportation (Medical): Not on file    Lack of Transportation (Non-Medical): No   Physical Activity: Sufficiently Active    Days of Exercise per Week: 5 days    Minutes of Exercise per Session: 80 min   Stress: Not on file   Social Connections: Not on file   Intimate Partner Violence: Not At Risk    Fear of Current or Ex-Partner: No    Emotionally Abused: No    Physically Abused: No    Sexually Abused: No   Housing Stability: Unknown    Unable to Pay for Housing in the Last Year: Not on file    Number of Places Lived in the Last Year: Not on file    Unstable Housing in the Last Year: No       Review of Systems:  Review of Systems   Musculoskeletal:  Positive for back pain and neck pain. Gastrointestinal:  Negative for bowel incontinence.    Genitourinary:  Negative for bladder

## 2023-04-26 ENCOUNTER — HOSPITAL ENCOUNTER (OUTPATIENT)
Dept: PHYSICAL THERAPY | Facility: CLINIC | Age: 58
Setting detail: THERAPIES SERIES
Discharge: HOME | End: 2023-04-26
Payer: COMMERCIAL

## 2023-04-26 PROCEDURE — 97110 THERAPEUTIC EXERCISES: CPT

## 2023-04-26 PROCEDURE — 97140 MANUAL THERAPY 1/> REGIONS: CPT

## 2023-04-26 NOTE — FLOWSHEET NOTE
[] [address]  [address]  [phone]  F: [phone] [x] [address]  [place] [address]   [address]  P: [phone]  F: [phone] [] [address]  [address]  P: [phone]  F: [phone] [] [address]  P: [phone]  F: [phone] [] [address]  [address]: [phone]  F: [phone]      Physical Therapy Daily Treatment Note/DISCHARGE    Date:  2023  Patient Name:  Vasquez Mace    :  1965  MRN: [id number]  Physician: Dr. Oralia MD                                  Insurance: [employer]  eval + 5 visits approved   Medical Diagnosis: cervical DDD M50.30                  Rehab Codes: M54.2, M79.1, R20.2,   Onset Date: 2023 date of script (began ~ 2022)                          Next 's appt.:  call to schedule after gets EMG done       Visit# / total visits: ; 5 visits approved   + eval    Cancels/No Shows: 0/0    Subjective:    Pain:  [x] Yes  [] No Location: neck, across upper shoulders, upper arms and bilateral hands   Pain Rating: (0-10 scale) 3/10  Pain altered Tx:  [x] No  [] Yes  Action:  Comments Pt arrives noting her pain is 3/10 and HA is 4/10    3- EMG, pt reports \"no impingement\"    Objective:  Modalities:   Precautions: hx LBP/surgeries  Mechanical Cervical traction not covered by insurance  Exercises:  Exercise Reps/ Time Weight/ Level Comments   Supine       manual  20 min total    SOR, MFR UT, cervical paraspinals, SCM, cervical distraction    PROM cervical spine x     Cervical mobs with t-ball  Rotation, extension  10xea   t-ball             Seated           Scap retraction   10x5\"         Cervical

## 2023-05-13 DIAGNOSIS — E66.01 CLASS 2 SEVERE OBESITY DUE TO EXCESS CALORIES WITH SERIOUS COMORBIDITY AND BODY MASS INDEX (BMI) OF 38.0 TO 38.9 IN ADULT (HCC): ICD-10-CM

## 2023-05-13 DIAGNOSIS — E11.9 TYPE 2 DIABETES MELLITUS WITHOUT COMPLICATION, WITHOUT LONG-TERM CURRENT USE OF INSULIN (HCC): ICD-10-CM

## 2023-05-15 RX ORDER — TIRZEPATIDE 5 MG/.5ML
INJECTION, SOLUTION SUBCUTANEOUS
Qty: 2 ML | Refills: 0 | Status: SHIPPED | OUTPATIENT
Start: 2023-05-15

## 2023-05-15 NOTE — TELEPHONE ENCOUNTER
Chilo Farias is calling to request a refill on the following medication(s):    Medication Request:  Requested Prescriptions     Pending Prescriptions Disp Refills    MOUNJARO 5 MG/0.5ML SOPN SC injection [Pharmacy Med Name: MOUNJARO 5 MG/0.5 ML PEN] 2 mL 0     Sig: INJECT 0.5ML INTO THE MUSCLE ONCE A WEEK       Last Visit Date (If Applicable):  0/00/7099    Next Visit Date:    Visit date not found

## 2023-06-22 ENCOUNTER — TELEMEDICINE (OUTPATIENT)
Dept: FAMILY MEDICINE CLINIC | Age: 58
End: 2023-06-22
Payer: COMMERCIAL

## 2023-06-22 DIAGNOSIS — E11.9 TYPE 2 DIABETES MELLITUS WITHOUT COMPLICATION, WITHOUT LONG-TERM CURRENT USE OF INSULIN (HCC): Primary | ICD-10-CM

## 2023-06-22 DIAGNOSIS — I10 ESSENTIAL HYPERTENSION: ICD-10-CM

## 2023-06-22 DIAGNOSIS — E66.01 CLASS 2 SEVERE OBESITY DUE TO EXCESS CALORIES WITH SERIOUS COMORBIDITY AND BODY MASS INDEX (BMI) OF 37.0 TO 37.9 IN ADULT (HCC): ICD-10-CM

## 2023-06-22 PROCEDURE — 99214 OFFICE O/P EST MOD 30 MIN: CPT | Performed by: FAMILY MEDICINE

## 2023-06-22 RX ORDER — TIRZEPATIDE 7.5 MG/.5ML
7.5 INJECTION, SOLUTION SUBCUTANEOUS WEEKLY
Qty: 2 ML | Refills: 0 | Status: SHIPPED | OUTPATIENT
Start: 2023-06-22

## 2023-06-22 ASSESSMENT — ENCOUNTER SYMPTOMS
GASTROINTESTINAL NEGATIVE: 1
ORTHOPNEA: 0
BLURRED VISION: 0
EYES NEGATIVE: 1
ALLERGIC/IMMUNOLOGIC NEGATIVE: 1
RESPIRATORY NEGATIVE: 1

## 2023-06-22 NOTE — PROGRESS NOTES
gradually improving since onset. The problem is controlled. Pertinent negatives include no anxiety, blurred vision, malaise/fatigue, orthopnea, peripheral edema, PND or sweats. Past treatments include angiotensin blockers, beta blockers, diuretics and calcium channel blockers. The current treatment provides significant improvement. Review of Systems   Constitutional: Negative. Negative for malaise/fatigue. HENT: Negative. Eyes: Negative. Negative for blurred vision. Respiratory: Negative. Cardiovascular: Negative. Negative for orthopnea and PND. Gastrointestinal: Negative. Endocrine: Negative. Genitourinary: Negative. Musculoskeletal: Negative. Skin: Negative. Allergic/Immunologic: Negative. Neurological: Negative. Hematological: Negative. Psychiatric/Behavioral: Negative. All other systems reviewed and are negative.        Objective   Patient-Reported Vitals  Patient-Reported Weight: 210lbs  Patient-Reported Height: 5'4\"       Physical Exam  [INSTRUCTIONS:  \"[x]\" Indicates a positive item  \"[]\" Indicates a negative item  -- DELETE ALL ITEMS NOT EXAMINED]    Constitutional: [x] Appears well-developed and well-nourished [x] No apparent distress      [] Abnormal -     Mental status: [x] Alert and awake  [x] Oriented to person/place/time [x] Able to follow commands    [] Abnormal -     Eyes:   EOM    [x]  Normal    [] Abnormal -   Sclera  [x]  Normal    [] Abnormal -          Discharge [x]  None visible   [] Abnormal -     HENT: [x] Normocephalic, atraumatic  [] Abnormal -   [x] Mouth/Throat: Mucous membranes are moist    External Ears [x] Normal  [] Abnormal -    Neck: [x] No visualized mass [] Abnormal -     Pulmonary/Chest: [x] Respiratory effort normal   [x] No visualized signs of difficulty breathing or respiratory distress        [] Abnormal -      Musculoskeletal:   [x] Normal gait with no signs of ataxia         [x] Normal range of motion of neck        [] Abnormal

## 2023-06-22 NOTE — PATIENT INSTRUCTIONS
Patient Education        Learning About Low-Carbohydrate Diets  What is a low-carbohydrate diet? A low-carbohydrate (or \"low-carb\") diet limits foods and drinks that have carbohydrates. This includes grains, fruits, milk and yogurt, and starchy vegetables like potatoes, beans, and corn. It also avoids foods and drinks that have added sugar. Instead, low-carb diets include foods that are high in protein and fat. Why might you follow a low-carb diet? Low-carb diets may be used for a variety of reasons, such as for weight loss. People who have diabetes may use a low-carb diet to help manage their blood sugar levels. What should you do before you start the diet? Talk to your doctor before you try any diet. This is even more important if you have health problems like kidney disease, heart disease, or diabetes. Your doctor may suggest that you meet with a registered dietitian. A dietitian can help you make an eating plan that works for you. What foods do you eat on a low-carb diet? On a low-carb diet, you choose foods that are high in protein and fat. Examples of these are:  Meat, poultry, and fish. Eggs. Nuts, such as walnuts, pecans, almonds, and peanuts. Peanut butter and other nut butters. Tofu. Avocado. Cherre Smiles. Non-starchy vegetables like broccoli, cauliflower, green beans, mushrooms, peppers, lettuce, and spinach. Unsweetened non-dairy milks like almond milk and coconut milk. Cheese, cottage cheese, and cream cheese. Where can you learn more? Go to http://www.woods.com/ and enter C335 to learn more about \"Learning About Low-Carbohydrate Diets. \"  Current as of: March 1, 2023               Content Version: 13.7  © 2006-2023 [employer] Inc[employer]. Care instructions adapted under license by [facility] ([facility]).  If you have questions about a medical condition or this instruction, always ask your healthcare professional. [address] any warranty or liability for

## 2023-07-04 DIAGNOSIS — E11.9 TYPE 2 DIABETES MELLITUS WITHOUT COMPLICATION, WITHOUT LONG-TERM CURRENT USE OF INSULIN (HCC): ICD-10-CM

## 2023-07-05 RX ORDER — EMPAGLIFLOZIN 10 MG/1
TABLET, FILM COATED ORAL
Qty: 30 TABLET | Refills: 2 | Status: SHIPPED | OUTPATIENT
Start: 2023-07-05

## 2023-07-05 NOTE — TELEPHONE ENCOUNTER
Brittany Dash is calling to request a refill on the following medication(s):    Medication Request:  Requested Prescriptions     Pending Prescriptions Disp Refills    JARDIANCE 10 MG tablet [Pharmacy Med Name: Grant Paling 10 MG TABLET] 30 tablet 2     Sig: take 1 tablet by mouth once daily       Last Visit Date (If Applicable):  5/71/9055    Next Visit Date:    Visit date not found

## 2023-07-06 ENCOUNTER — OFFICE VISIT (OUTPATIENT)
Dept: PAIN MANAGEMENT | Age: 58
End: 2023-07-06
Payer: COMMERCIAL

## 2023-07-06 VITALS — WEIGHT: 208.8 LBS | HEIGHT: 64 IN | BODY MASS INDEX: 35.65 KG/M2

## 2023-07-06 DIAGNOSIS — M51.36 DDD (DEGENERATIVE DISC DISEASE), LUMBAR: Primary | ICD-10-CM

## 2023-07-06 DIAGNOSIS — M54.16 LUMBAR RADICULOPATHY: ICD-10-CM

## 2023-07-06 PROCEDURE — 99214 OFFICE O/P EST MOD 30 MIN: CPT | Performed by: NURSE PRACTITIONER

## 2023-07-06 RX ORDER — PREGABALIN 50 MG/1
50 CAPSULE ORAL 2 TIMES DAILY
Qty: 180 CAPSULE | Refills: 0 | Status: SHIPPED | OUTPATIENT
Start: 2023-07-06 | End: 2023-10-04

## 2023-07-06 ASSESSMENT — ENCOUNTER SYMPTOMS
CONSTIPATION: 0
SHORTNESS OF BREATH: 0
COUGH: 0
BACK PAIN: 1
BOWEL INCONTINENCE: 0

## 2023-07-06 NOTE — PROGRESS NOTES
Chief Complaint   Patient presents with    Back Pain         PMH     She is on Brilinta for coronary artery disease. Stent placed July 2018. Pt reports chronic lumbar pain. Hx of 3 lumbar surgeries but continues to have significant low back pain. She has seen neurosurgeon who suggested more conservative measures. She has had physical therapy in the past with mild benefit. She has has injections in past with limited benefit and not interested in repeating at this time. MRI lumbar spine 2020 with multilevel degenerative changes and disc bulging. EMG shows a left L4-5 radiculopathy and a right L5-S1 radiculopathy. Reports  taking lyrica twice daily and feels this helping with better pain control until about 2 weeks ago now having bilat SI joint pain L>R         HPI  Back Pain  This is a chronic problem. The current episode started more than 1 year ago. The problem occurs constantly. The problem has been gradually worsening since onset. The pain is present in the lumbar spine. The quality of the pain is described as aching and shooting. The pain does not radiate. The pain is at a severity of 5/10. The pain is mild. The pain is Worse during the day. The symptoms are aggravated by twisting, position, bending, lying down, standing and coughing. Stiffness is present In the morning. Pertinent negatives include no bladder incontinence, bowel incontinence, chest pain or fever. She has tried home exercises, bed rest, walking, heat and ice for the symptoms. The treatment provided significant relief. Past Medical History:   Diagnosis Date    Abnormal stress test     Allergic rhinitis     Arthritis     CAD (coronary artery disease)     Dr. Kamaljit Johnson last seen 2/20/2020    Chronic back pain     Diabetes mellitus [facility])     Former smoker 2/7/2020    30-year history.   Quit in 2018    Hyperlipidemia     Dr. Page Spearing    Hypertension     Dr. Syeda Coyne, cardiac 2/8/2020    SRI on CPAP     instructed

## 2023-07-17 ENCOUNTER — HOSPITAL ENCOUNTER (OUTPATIENT)
Dept: MRI IMAGING | Age: 58
Discharge: HOME | End: 2023-07-19
Payer: COMMERCIAL

## 2023-07-17 DIAGNOSIS — M51.36 DDD (DEGENERATIVE DISC DISEASE), LUMBAR: ICD-10-CM

## 2023-07-17 DIAGNOSIS — M54.16 LUMBAR RADICULOPATHY: ICD-10-CM

## 2023-07-17 LAB
CREAT SERPL-MCNC: 0.6 MG/DL (ref 0.5–0.9)
GFR SERPL CREATININE-BSD FRML MDRD: >60 ML/MIN/1.73M2

## 2023-07-17 PROCEDURE — 82565 ASSAY OF CREATININE: CPT

## 2023-07-17 PROCEDURE — 36415 COLL VENOUS BLD VENIPUNCTURE: CPT

## 2023-07-17 PROCEDURE — 72158 MRI LUMBAR SPINE W/O & W/DYE: CPT

## 2023-07-17 PROCEDURE — 6360000004 HC RX CONTRAST MEDICATION: Performed by: NURSE PRACTITIONER

## 2023-07-17 PROCEDURE — A9579 GAD-BASE MR CONTRAST NOS,1ML: HCPCS | Performed by: NURSE PRACTITIONER

## 2023-07-17 RX ADMIN — GADOTERIDOL 19 ML: 279.3 INJECTION, SOLUTION INTRAVENOUS at 18:01

## 2023-07-18 ENCOUNTER — HOSPITAL ENCOUNTER (OUTPATIENT)
Dept: PHYSICAL THERAPY | Age: 58
Setting detail: THERAPIES SERIES
Discharge: HOME | End: 2023-07-18
Payer: COMMERCIAL

## 2023-07-18 PROCEDURE — 97162 PT EVAL MOD COMPLEX 30 MIN: CPT

## 2023-07-18 NOTE — CONSULTS
[x] [address]  [address] [place] [address].  [phone]  F: [phone]         Physical Therapy Spine Evaluation    Date:  2023  Patient: Preston Thapa  : 1965  MRN: [id number]  Physician: Ronnie Cat, BRITTON - CNP     Insurance: [facility] [employer] PPO (AUTH AFTER EVAL)  Medical Diagnosis: Lumbar radiculopathy (M54.16); DDD (degenerative disc disease), lumbar M51.36     Rehab Codes: M54.59, M79.651, M79.652, M62.551, M62.552, M62.561, M62.562, MR29.3  Onset Date: 2023    Next 's appt.: 2023 pn mgmt     Subjective:   HPI/CC: Pt reports h/o chronic back pain, 3 microdiskectomy '98, '00, '01. LBP has been gradually getting worse, had planned lumbar fusion in , cancelled due to Covid outbreak. Dr linda White due to cancelled surgery, made pain tolerable, has been tolerable until 1 month ago. 3-4 weeks ago Pt was standing and felt something \"slipping\" forward in back, w/severe constant pain LB since that time. [place] slipping more in R LB, but pain at first in L LB, last few days pain in R LB. Pain goes down B LE, more frequent in L LE than R.  Pain goes to ant and post thighs, to post knee B. Pt reports has scar tissue and degeneration. Average pain 5-6/10. Pain increases w/rolling, sitting 30 min, slouching, sitting in soft chair, bending, sit-stands and twisting, up to 8/10. At times pain is so bad it takes her breath away. When sitting, Pt finds her self shifting to R to decrease pain, also putting hand behind her back decreases pain. Pain decreases with hot water from shower (lasts approx 1 hour), and w/changing positions. Walking is ok, while doing, but painful when sits after walking for awhile. Pt can't sit in soft chair, needs something firm, support for back when sitting.     Pt reports she had a discogram 3-4 years ago w/numbness bottom of feet since that time, more L than R, also gets top of foot when rubs/touches top

## 2023-08-04 ENCOUNTER — HOSPITAL ENCOUNTER (OUTPATIENT)
Dept: PHYSICAL THERAPY | Age: 58
Setting detail: THERAPIES SERIES
Discharge: HOME | End: 2023-08-04
Payer: COMMERCIAL

## 2023-08-04 PROCEDURE — 97110 THERAPEUTIC EXERCISES: CPT

## 2023-08-04 PROCEDURE — 97140 MANUAL THERAPY 1/> REGIONS: CPT

## 2023-08-05 DIAGNOSIS — E11.9 TYPE 2 DIABETES MELLITUS WITHOUT COMPLICATION, WITHOUT LONG-TERM CURRENT USE OF INSULIN (HCC): ICD-10-CM

## 2023-08-07 RX ORDER — TIRZEPATIDE 7.5 MG/.5ML
INJECTION, SOLUTION SUBCUTANEOUS
Qty: 2 ML | Refills: 0 | Status: SHIPPED | OUTPATIENT
Start: 2023-08-07

## 2023-08-07 NOTE — TELEPHONE ENCOUNTER
Linnette Fiore is calling to request a refill on the following medication(s):    Medication Request:  Requested Prescriptions     Pending Prescriptions Disp Refills    MOUNJARO 7.5 MG/0.5ML SOPN SC injection [Pharmacy Med Name: MOUNJARO 7.5 MG/0.5 ML PEN] 2 mL 0     Sig: inject 0.5 milliliters subcutaneously every week       Last Visit Date (If Applicable):  6/24/6109    Next Visit Date:    Visit date not found

## 2023-08-09 ENCOUNTER — HOSPITAL ENCOUNTER (OUTPATIENT)
Dept: PHYSICAL THERAPY | Age: 58
Setting detail: THERAPIES SERIES
Discharge: HOME | End: 2023-08-09
Payer: COMMERCIAL

## 2023-08-09 PROCEDURE — 97110 THERAPEUTIC EXERCISES: CPT

## 2023-08-09 PROCEDURE — 97140 MANUAL THERAPY 1/> REGIONS: CPT

## 2023-08-09 NOTE — FLOWSHEET NOTE
[x] [address]  [phone]  F: [phone]             Physical Therapy Daily Treatment Note    Date:  2023  Patient Name:  Diamond Horner    :  1965  MRN: [id number]  Physician: Aline Mejia, APRN - CNP                                     Insurance: [facility]  8 visits, from 23 to 23. Authorization number [id number]. Medical Diagnosis: Lumbar radiculopathy (M54.16); DDD (degenerative disc disease), lumbar M51.36                         Rehab Codes: M54.59, M79.651, M79.652, M62.551, M62.552, M62.561, M62.562, MR29.3  Onset Date: 2023                        Next 's appt.: 2023 pn mgmt   Visit# / total visits: 3/8; Progress note for Medicare patient due at visit 8     Cancels/No Shows: 0/0    Subjective:    Pain:  [x] Yes  [] No   Location: Low back  Pain Rating: (0-10 scale) 5/10  Pain altered Tx:  [x] No  [] Yes  Action:  Comments: Patient reports her back is sore today. She felt a little better after last Rx, was loosened up for a while, but then she was a little sore for about a day afterward.       Objective:  Modalities: HP (large) LB at end of Rx, in prone   Precautions:  Exercises:  Exercise Reps/ Time Weight/ Level Comments   NuStep 5' Level 3          Corner stretch 3x 20sec Added    Scap retractions  10x 5sec Added    ER w/scap retraction    Add soon         Standing      Incline stretch 3x20\"     HR 15x  Added  15x  Progressed reps    Hip abd 10x           Seated      Hamstring stretch 3x20\"     PB roll out 2 min           Supine   Towel roll under lumbar   Glut sets 10x 3sec Progressed hold time    PPT 10x 3sec Progressed hold time    Add sets  10x 3sec Added , Pt reports legs are shaking, feels weak    SKTC  10x 3sec Added    Alt UE raises  15x  W/abdom lara, added 8/9   Marches 15x  W/abdom lara    SLR 10x ea

## 2023-08-10 ENCOUNTER — HOSPITAL ENCOUNTER (OUTPATIENT)
Dept: PHYSICAL THERAPY | Age: 58
Setting detail: THERAPIES SERIES
Discharge: HOME | End: 2023-08-10
Payer: COMMERCIAL

## 2023-08-10 PROCEDURE — 97110 THERAPEUTIC EXERCISES: CPT

## 2023-08-10 PROCEDURE — 97140 MANUAL THERAPY 1/> REGIONS: CPT

## 2023-08-10 NOTE — FLOWSHEET NOTE
[x] [facility]) [facility] &  Therapy  [address] [facility].  [phone]  F: [phone]             Physical Therapy Daily Treatment Note    Date:  8/10/2023  Patient Name:  Lucien Porter    :  1965  MRN: [id number]  Physician: Byron Rodriguez APRN - CNP                                     Insurance: [employer] P[employer]  8 visits, from 23 to 23. Authorization number [id number]. Medical Diagnosis: Lumbar radiculopathy (M54.16); DDD (degenerative disc disease), lumbar M51.36                         Rehab Codes: M54.59, M79.651, M79.652, M62.551, M62.552, M62.561, M62.562, MR29.3  Onset Date: 2023                        Next 's appt.: 2023 pn mgmt   Visit# / total visits: ; Progress note for Medicare patient due at visit 8     Cancels/No Shows: 0/0    Subjective:    Pain:  [x] Yes  [] No   Location: Low back  Pain Rating: (0-10 scale) 5/10  Pain altered Tx:  [x] No  [] Yes  Action:  Comments: Patient reports she was a little sore last night at 6/10. Charley horse in calf woke her up this morning, reports she gets those sometimes when she doesn't drink enough water.       Objective:  Modalities: HP (large) LB at end of Rx, in prone   Precautions:  Exercises:  Exercise Reps/ Time Weight/ Level Comments   NuStep 6' Level 3 Progressed time 810         Corner stretch 3x 20sec    Scap retractions  10x 5sec    ER w/scap retraction  10x 3sec Added 8/10         Standing      Incline stretch 3x20\"     HR 15x     Marches 15x     Hip abd 15x  Progressed reps 8/10         Seated      Hamstring stretch 3x20\"     PB roll out 2 min 5sec Progressed hold time 8/10         Supine   Towel roll under lumbar-not needed    Glut sets 10x 3sec    PPT 10x 3sec    Add sets  10x 3sec    SKTC  10x 5sec Progressed hold time 8/10   Alt UE raises  15x  W/abdom lara   Marches 15x  W/abdom lara    Alt opp UE/LE raises    Add soon   SLR 10x ea     Bridges    Add soon   Dead bug    Add

## 2023-08-15 ENCOUNTER — HOSPITAL ENCOUNTER (OUTPATIENT)
Dept: PHYSICAL THERAPY | Age: 58
Setting detail: THERAPIES SERIES
Discharge: HOME | End: 2023-08-15
Payer: COMMERCIAL

## 2023-08-15 PROCEDURE — 97110 THERAPEUTIC EXERCISES: CPT

## 2023-08-15 PROCEDURE — 97140 MANUAL THERAPY 1/> REGIONS: CPT

## 2023-08-15 NOTE — FLOWSHEET NOTE
[x] [address] CENTER &  Therapy  [address] [facility].  [phone]  F: [phone]             Physical Therapy Daily Treatment Note    Date:  8/15/2023  Patient Name:  Holden Rene    :  1965  MRN: [id number]  Physician: BRITTON Khan - CNP                                     Insurance: [facility]  8 visits, from 23 to 23. Authorization number [id number]. Medical Diagnosis: Lumbar radiculopathy (M54.16); DDD (degenerative disc disease), lumbar M51.36                         Rehab Codes: M54.59, M79.651, M79.652, M62.551, M62.552, M62.561, M62.562, MR29.3  Onset Date: 2023                        Next 's appt.: 2023 pn mgmt   Visit# / total visits: ; Progress note for Medicare patient due at visit 8     Cancels/No Shows: 0/0    Subjective:    Pain:  [x] Yes  [] No   Location: Low back    Pain Rating: (0-10 scale) 4/10  Pain altered Tx:  [x] No  [] Yes  Action:  Comments: Patient reports back pain is not too bad today.        Objective:  Modalities: HP (large) LB at end of Rx, in prone   Precautions:  Exercises:  Exercise Reps/ Time Weight/ Level Comments   NuStep 6' Level 3          Corner stretch 3x 20sec    Scap retractions  10x 3sec    ER w/scap retraction  10x 3sec          Standing      Incline stretch 3x20\"     HR 15x     HS curls  15x  Added 8/15   Marches 15x     Hip abd 15x  8/15 Painful L, educated in decreased ROM and speed          Seated      Hamstring stretch 3x20\"     PB roll out 2 min 5sec          Supine   Towel roll under lumbar-not needed 8/15   Glut sets 10x 3sec    PPT 10x 3sec    Add sets  10x 3sec    SKTC  10x2 5sec    Alt UE raises  15x  W/abdom lara   Marches 15x  W/abdom lara    Alt opp UE/LE raises  10x  Added 8/15   SLR 10x ea     Bridges  5x  Added 8/15, sore LB   Dead bug    Add soon               Other:  Manual: Hyper volt B LB, buttocks, sup HS x 10 min    Treatment Charges: Mins Units   [x]

## 2023-08-17 ENCOUNTER — OFFICE VISIT (OUTPATIENT)
Dept: PAIN MANAGEMENT | Age: 58
End: 2023-08-17
Payer: COMMERCIAL

## 2023-08-17 VITALS — HEIGHT: 64 IN | WEIGHT: 208 LBS | BODY MASS INDEX: 35.51 KG/M2

## 2023-08-17 DIAGNOSIS — M50.30 DDD (DEGENERATIVE DISC DISEASE), CERVICAL: Primary | ICD-10-CM

## 2023-08-17 DIAGNOSIS — M47.817 LUMBOSACRAL SPONDYLOSIS WITHOUT MYELOPATHY: ICD-10-CM

## 2023-08-17 DIAGNOSIS — M47.816 LUMBAR FACET ARTHROPATHY: ICD-10-CM

## 2023-08-17 PROCEDURE — 99214 OFFICE O/P EST MOD 30 MIN: CPT | Performed by: NURSE PRACTITIONER

## 2023-08-17 PROCEDURE — 64493 INJ PARAVERT F JNT L/S 1 LEV: CPT | Performed by: NURSE PRACTITIONER

## 2023-08-17 ASSESSMENT — ENCOUNTER SYMPTOMS
BACK PAIN: 1
BOWEL INCONTINENCE: 0

## 2023-08-18 ENCOUNTER — HOSPITAL ENCOUNTER (OUTPATIENT)
Dept: PHYSICAL THERAPY | Age: 58
Setting detail: THERAPIES SERIES
Discharge: HOME | End: 2023-08-18
Payer: COMMERCIAL

## 2023-08-18 NOTE — FLOWSHEET NOTE
[x] [facility] ([facility]) - [facility] &  Therapy  [address] [facility].    [phone]  F: [phone]   [] [address] [facility]  [address]   [address]  P: [phone]  F: [phone]  [] [address] &  Therapy  [address] [facility]  P: [phone]  F: [phone] [] [facility]  P: [phone]  F: [phone]  [] [address]   [address]   [place]: [phone]  F: [phone]   [] [address] [facility]  [address]  [place]: [phone]   F: [phone]     Physical Therapy Cancel/No Show note    Date: 2023  Patient: Hilma Dance  : 1965  MRN: [id number]    Cancels/No Shows to date:     For today's appointment patient:    [x]  Cancelled    [] Rescheduled appointment    [] No-show     Reason given by patient:    [x]  Patient ill    []  Conflicting appointment    [] No transportation      [] Conflict with work    [] No reason given    [] Weather related    [] [id number]    [] Other:      Comments:        [x] Next appointment was confirmed    Electronically signed by: Shaylee Rhodes PTA

## 2023-08-22 ENCOUNTER — HOSPITAL ENCOUNTER (OUTPATIENT)
Dept: PHYSICAL THERAPY | Age: 58
Setting detail: THERAPIES SERIES
Discharge: HOME | End: 2023-08-22
Payer: COMMERCIAL

## 2023-08-22 PROCEDURE — 97140 MANUAL THERAPY 1/> REGIONS: CPT

## 2023-08-22 PROCEDURE — 97110 THERAPEUTIC EXERCISES: CPT

## 2023-08-22 NOTE — FLOWSHEET NOTE
[x] [address] &  Therapy  [address] [facility].  [phone]  F: [phone]             Physical Therapy Daily Treatment Note    Date:  2023  Patient Name:  Hilma Dance    :  1965  MRN: [id number]  Physician: BRITTON Garcia - CNP                                     Insurance: [employer] [facility] [facility]  8 visits, from 23 to 23. Authorization number [id number]. Medical Diagnosis: Lumbar radiculopathy (M54.16); DDD (degenerative disc disease), lumbar M51.36                         Rehab Codes: M54.59, M79.651, M79.652, M62.551, M62.552, M62.561, M62.562, MR29.3  Onset Date: 2023                        Next 's appt.: 2023 pn mgmt   Visit# / total visits: ; Progress note/recheck goals at visit 9     Cancels/No Shows: 1/0    Subjective:    Pain:  [x] Yes  [] No   Location: Low back    Pain Rating: (0-10 scale) 7/10  Pain altered Tx:  [x] No  [] Yes  Action:  Comments: Patient reports today is not a good day for her back. She didn't do anything extra over weekend, but admits she probably didn't move around enough as she was sick.   Pt reports she scheduled her first 2 nerve blocks for Sept.      Objective:  Modalities: HP (large) LB at end of Rx, in prone   Precautions:  Exercises:  Exercise Reps/ Time Weight/ Level Comments   NuStep 6' Level 3          Corner stretch 3x 20sec    Scap retractions  10x 3sec    ER w/scap retraction  10x 3sec          Standing      Incline stretch 3x20\"     HR 20x  Progressed reps    HS curls  20x  Progressed reps  15x     Hip abd 15x  decreased ROM and speed to avoid pain         Seated      Hamstring stretch 3x20\"     PB roll out 2 min 5sec          Supine   Towel roll under lumbar-not needed    Glut sets 10x 3sec    PPT 10x 3sec    Add sets  10x 3sec    SKTC  10x2 5sec    Alt UE raises  15x  W/abdom lara   March 15x  W/abdom lara    Alt opp UE/LE raises  15x  Progressed reps

## 2023-08-25 ENCOUNTER — HOSPITAL ENCOUNTER (OUTPATIENT)
Dept: PHYSICAL THERAPY | Age: 58
Setting detail: THERAPIES SERIES
Discharge: HOME | End: 2023-08-25
Payer: COMMERCIAL

## 2023-08-25 PROCEDURE — 97110 THERAPEUTIC EXERCISES: CPT

## 2023-08-25 PROCEDURE — 97140 MANUAL THERAPY 1/> REGIONS: CPT

## 2023-08-25 NOTE — FLOWSHEET NOTE
flex, ext WNL w/out increased pain to improve bending and turning  ? Strength: B hip flex 4/5, ext 4-/5, B HS 4/5 to improve transfers and household activities   ? Function:Oswestry 34% loss of function   Pt report improved tolerance to sitting        Patient goals: \"Reduce pain and improve flexibility\"     Rehab Potential:  [x] Good  [] Fair  [] Poor    Suggested Professional Referral:  [x] No  [] Yes:  Barriers to Goal Achievement:  [x] No  [] Yes:  Domestic Concerns:  [x] No  [] Yes:    Pt. Education:  [x] Yes  [] No  [] Reviewed Prior HEP/Ed  Method of Education: [x] Verbal  [x] Demo  [x] Written  Comprehension of Education:  [x] Verbalizes understanding. [x] Demonstrates understanding. [] Needs review. [] Demonstrates/verbalizes HEP/Ed previously given. 8/22 HEP- Access Code: [email or web address]  URL: [email or web address]  Prepared by: Jarrod Hendrix    - Seated Scapular Retraction  - 3-5 x daily - 7 x weekly - 10 reps - 5 seconds  hold  - Shoulder External Rotation and Scapular Retraction  - 3-5 x daily - 7 x weekly - 20 reps - 5 seconds  hold  - Heel Raises with Counter Support  - 1-2 x daily - 7 x weekly - 20 reps  - Standing Knee Flexion  - 1-2 x daily - 7 x weekly - 1 sets - 20-30 reps  - Standing March with Counter Support  - 1-2 x daily - 7 x weekly - 1 sets - 20-30 reps  - Standing Hip Abduction  - 1-2 x daily - 7 x weekly - 1 sets - 20-30 reps  - Doorway Pec Stretch at 90 Degrees Abduction  - 3-5 x daily - 7 x weekly - 5 reps - 20 seconds hold  - Seated Hamstring Stretch  - 3 x daily - 7 x weekly - 5 reps - 20 sec hold  - Hooklying Gluteal Sets  - 1-2 x daily - 7 x weekly - 10 reps - 5sec hold  - Supine Pelvic Tilt  - 1-2 x daily - 7 x weekly - 10 reps - 5 seconds hold  - Supine Hip Adduction Isometric with Ball  - 1-2 x daily - 7 x weekly - 10 reps - 5 seconds hold  - Supine Single Knee to Chest Stretch  - 1-2 x daily - 7 x weekly - 10 reps - 5 seconds hold  - Dead Bug Alternating Arm Extension  -

## 2023-08-30 ENCOUNTER — HOSPITAL ENCOUNTER (OUTPATIENT)
Dept: PHYSICAL THERAPY | Age: 58
Setting detail: THERAPIES SERIES
Discharge: HOME | End: 2023-08-30
Payer: COMMERCIAL

## 2023-08-30 PROCEDURE — 97110 THERAPEUTIC EXERCISES: CPT

## 2023-08-30 PROCEDURE — 97140 MANUAL THERAPY 1/> REGIONS: CPT

## 2023-08-30 NOTE — THERAPY DISCHARGE
improve ADLs, rolling in bed, sitting, and sit-stands-Progress Toward   ? Strength: B hip flex 4-/5, ext 3+/5, B HS 4-/5, R knee ext 4+/5 to improve ADLs, rolling in bed, and washing and drying hair-Met Knees, Progress Toward Hips  ? Function:Oswestry 44% loss of function-Met  Pt report decreased pain when sitting after walking large amounts-No Change   Patient to be independent with home exercise program as demonstrated by performance with correct form without cues-Met  LTG:(to be met in 20 treatments)  ? Pain: LB average 4/10, 6/10 at worst   ? ROM: Lumbar flex, ext WNL w/out increased pain to improve bending and turning  ? Strength: B hip flex 4/5, ext 4-/5, B HS 4/5 to improve transfers and household activities   ? Function:Oswestry 34% loss of function   Pt report improved tolerance to sitting    Treatment to Date:  [x] Therapeutic Exercise    [x] Modalities:  [] Therapeutic Activity    [] Ultrasound  [] Electrical Stimulation  [] Gait Training     [] Massage       [] Lumbar/Cervical Traction  [] Neuromuscular Re-education [x] Cold/hotpack [] Iontophoresis: 4 mg/mL  [x] Instruction in Home Exercise Program                     Dexamethasone Sodium  [x] Manual Therapy             Phosphate 40-80 mAmin  [] Aquatic Therapy                   [] Vasocompression/    [] Other:             Game Ready    Discharge Status:     [] Pt recovered from conditions. Treatment goals were met. [] Pt received maximum benefit. No further therapy indicated at this time. [x] Pt to continue exercise/home instructions independently. [x] Therapy interrupted due to: Pt at end of insurance auth. [] Pt has 2 or more no shows/cancels, is discontinued per our policy. [] Pt has completed prescribed number of treatment sessions. [x] Other: Pt would benefit from home lumbar traction unit as she gets relief from manual traction at PT Rx (insurance denied mechanical traction).           Electronically signed by Milly Medrano PT

## 2023-09-01 DIAGNOSIS — E11.9 TYPE 2 DIABETES MELLITUS WITHOUT COMPLICATION, WITHOUT LONG-TERM CURRENT USE OF INSULIN (HCC): ICD-10-CM

## 2023-09-05 RX ORDER — TIRZEPATIDE 7.5 MG/.5ML
INJECTION, SOLUTION SUBCUTANEOUS
Qty: 2 ML | Refills: 0 | Status: SHIPPED | OUTPATIENT
Start: 2023-09-05

## 2023-09-05 NOTE — TELEPHONE ENCOUNTER
Jessica Womack is calling to request a refill on the following medication(s):    Medication Request:  Requested Prescriptions     Pending Prescriptions Disp Refills    MOUNJARO 7.5 MG/0.5ML SOPN SC injection [Pharmacy Med Name: MOUNJARO 7.5 MG/0.5 ML PEN] 2 mL 0     Sig: inject 0.5 milliliters subcutaneously every week       Last Visit Date (If Applicable):  9/38/7312    Next Visit Date:    Visit date not found

## 2023-09-06 ENCOUNTER — PATIENT MESSAGE (OUTPATIENT)
Dept: FAMILY MEDICINE CLINIC | Age: 58
End: 2023-09-06

## 2023-09-07 NOTE — TELEPHONE ENCOUNTER
From: Kellie Stringer  To: Dr. Lilian Coker  Sent: 9/6/2023 10:34 PM EDT  Subject: Possible UTI    I am seeing pink after urinating along with discomfort. I've never had one but I had a total hysterectomy in 2005. I haven't had a gynecologist in years. I made a appt with a new one but as a new patient, it's not until 10/16. What should I do?

## 2023-09-08 ENCOUNTER — OFFICE VISIT (OUTPATIENT)
Dept: FAMILY MEDICINE CLINIC | Age: 58
End: 2023-09-08
Payer: COMMERCIAL

## 2023-09-08 ENCOUNTER — HOSPITAL ENCOUNTER (OUTPATIENT)
Age: 58
Setting detail: SPECIMEN
Discharge: HOME | End: 2023-09-08

## 2023-09-08 VITALS — SYSTOLIC BLOOD PRESSURE: 120 MMHG | BODY MASS INDEX: 34.67 KG/M2 | WEIGHT: 202 LBS | DIASTOLIC BLOOD PRESSURE: 70 MMHG

## 2023-09-08 DIAGNOSIS — R35.0 FREQUENT URINATION: Primary | ICD-10-CM

## 2023-09-08 DIAGNOSIS — N30.01 ACUTE CYSTITIS WITH HEMATURIA: ICD-10-CM

## 2023-09-08 DIAGNOSIS — B37.9 YEAST INFECTION: ICD-10-CM

## 2023-09-08 DIAGNOSIS — I10 ESSENTIAL HYPERTENSION: ICD-10-CM

## 2023-09-08 DIAGNOSIS — E11.9 TYPE 2 DIABETES MELLITUS WITHOUT COMPLICATION, WITHOUT LONG-TERM CURRENT USE OF INSULIN (HCC): ICD-10-CM

## 2023-09-08 DIAGNOSIS — E66.09 CLASS 1 OBESITY DUE TO EXCESS CALORIES WITH SERIOUS COMORBIDITY AND BODY MASS INDEX (BMI) OF 34.0 TO 34.9 IN ADULT: ICD-10-CM

## 2023-09-08 PROBLEM — E66.811 CLASS 1 OBESITY DUE TO EXCESS CALORIES WITH SERIOUS COMORBIDITY AND BODY MASS INDEX (BMI) OF 34.0 TO 34.9 IN ADULT: Status: ACTIVE | Noted: 2018-07-25

## 2023-09-08 LAB
BACTERIA URNS QL MICRO: ABNORMAL
BILIRUB UR QL STRIP: NEGATIVE
BILIRUBIN, POC: NEGATIVE
BLOOD URINE, POC: ABNORMAL
CASTS #/AREA URNS LPF: ABNORMAL /LPF (ref 0–8)
CLARITY UR: ABNORMAL
CLARITY, POC: ABNORMAL
COLOR UR: YELLOW
COLOR, POC: YELLOW
EPI CELLS #/AREA URNS HPF: ABNORMAL /HPF (ref 0–5)
GLUCOSE UR STRIP-MCNC: NEGATIVE MG/DL
GLUCOSE URINE, POC: NEGATIVE
HGB UR QL STRIP.AUTO: ABNORMAL
KETONES UR STRIP-MCNC: NEGATIVE MG/DL
KETONES, POC: NEGATIVE
LEUKOCYTE EST, POC: ABNORMAL
LEUKOCYTE ESTERASE UR QL STRIP: ABNORMAL
NITRITE UR QL STRIP: POSITIVE
NITRITE, POC: ABNORMAL
PH UR STRIP: 6.5 [PH] (ref 5–8)
PH, POC: 6.5
PROT UR STRIP-MCNC: ABNORMAL MG/DL
PROTEIN, POC: ABNORMAL
RBC #/AREA URNS HPF: ABNORMAL /HPF (ref 0–4)
SP GR UR STRIP: 1.02 (ref 1–1.03)
SPECIFIC GRAVITY, POC: 1.03
UROBILINOGEN UR STRIP-ACNC: NORMAL EU/DL (ref 0–1)
UROBILINOGEN, POC: 0.2
WBC #/AREA URNS HPF: ABNORMAL /HPF (ref 0–5)

## 2023-09-08 PROCEDURE — 99214 OFFICE O/P EST MOD 30 MIN: CPT | Performed by: FAMILY MEDICINE

## 2023-09-08 PROCEDURE — 3078F DIAST BP <80 MM HG: CPT | Performed by: FAMILY MEDICINE

## 2023-09-08 PROCEDURE — 81003 URINALYSIS AUTO W/O SCOPE: CPT | Performed by: FAMILY MEDICINE

## 2023-09-08 PROCEDURE — 3074F SYST BP LT 130 MM HG: CPT | Performed by: FAMILY MEDICINE

## 2023-09-08 RX ORDER — FLUCONAZOLE 150 MG/1
150 TABLET ORAL ONCE
Qty: 1 TABLET | Refills: 0 | Status: SHIPPED | OUTPATIENT
Start: 2023-09-08 | End: 2023-09-08

## 2023-09-08 RX ORDER — NITROFURANTOIN 25; 75 MG/1; MG/1
100 CAPSULE ORAL 2 TIMES DAILY
Qty: 14 CAPSULE | Refills: 0 | Status: SHIPPED | OUTPATIENT
Start: 2023-09-08 | End: 2023-09-15

## 2023-09-08 ASSESSMENT — ENCOUNTER SYMPTOMS
GASTROINTESTINAL NEGATIVE: 1
BLURRED VISION: 0
ALLERGIC/IMMUNOLOGIC NEGATIVE: 1
BACK PAIN: 1
EYES NEGATIVE: 1
RESPIRATORY NEGATIVE: 1
ORTHOPNEA: 0

## 2023-09-08 NOTE — ASSESSMENT & PLAN NOTE
Borderline controlled, changes made today: consider increase in [facility]

## 2023-09-08 NOTE — ASSESSMENT & PLAN NOTE
Medication being requested: benztropine (COGENTIN) 1 MG  Last Refilled: 7/26/21  Last seen: 8/26/21       Well-controlled, continue current medications, continue current treatment plan, medication adherence emphasized and lifestyle modifications recommended

## 2023-09-08 NOTE — PROGRESS NOTES
APSO Progress Note    Date:9/8/2023         Rebecca Chauhan     YOB: 1965     Age:58 y.o. Assessment/Plan        Problem List Items Addressed This Visit          Circulatory    Essential hypertension      Well-controlled, continue current medications, continue current treatment plan, medication adherence emphasized and lifestyle modifications recommended              Endocrine    Type 2 diabetes mellitus without complication, without long-term current use of insulin (HCC)      Well-controlled, continue current medications, continue current treatment plan, medication adherence emphasized and lifestyle modifications recommended              Other    Class 1 obesity due to excess calories with serious comorbidity and body mass index (BMI) of 34.0 to 34.9 in adult      Borderline controlled, changes made today: consider increase in Cappuccini            Other Visit Diagnoses       Frequent urination    -  Primary    Relevant Orders    POCT Urinalysis No Micro (Auto) (Completed)    Acute cystitis with hematuria        Positive nitrates on POCT urine dip  Will send for UA and culture  Start abx    Relevant Medications    nitrofurantoin, macrocrystal-monohydrate, (MACROBID) 100 MG capsule    Other Relevant Orders    Urinalysis with Reflex to Culture    Yeast infection        Relevant Medications    fluconazole (DIFLUCAN) 150 MG tablet             Return if symptoms worsen or fail to improve. Electronically signed by Annette De La Vega DO on 9/8/23       Total time spent was between Time personally spent assessing and managing the patient on the date of service: Est: 30-39 minutes (12155) mins.  This included time spent reviewing the patient's medical record (e.g., recent visits, labs, and studies); seeing the patient in the office (face-to-face time); ordering medications, studies, procedures, or referrals; calling the patient or family later in the day with results and further

## 2023-09-10 LAB
MICROORGANISM SPEC CULT: ABNORMAL
MICROORGANISM SPEC CULT: ABNORMAL
SPECIMEN DESCRIPTION: ABNORMAL

## 2023-09-19 ENCOUNTER — NURSE ONLY (OUTPATIENT)
Dept: FAMILY MEDICINE CLINIC | Age: 58
End: 2023-09-19
Payer: COMMERCIAL

## 2023-09-19 ENCOUNTER — HOSPITAL ENCOUNTER (OUTPATIENT)
Age: 58
Setting detail: SPECIMEN
Discharge: HOME | End: 2023-09-19

## 2023-09-19 DIAGNOSIS — N39.0 URINARY TRACT INFECTION WITHOUT HEMATURIA, SITE UNSPECIFIED: ICD-10-CM

## 2023-09-19 DIAGNOSIS — N39.0 URINARY TRACT INFECTION WITHOUT HEMATURIA, SITE UNSPECIFIED: Primary | ICD-10-CM

## 2023-09-19 LAB
BILIRUB UR QL STRIP: NEGATIVE
BILIRUBIN, POC: NEGATIVE
BLOOD URINE, POC: NEGATIVE
CASTS #/AREA URNS LPF: NORMAL /LPF (ref 0–8)
CLARITY UR: CLEAR
CLARITY, POC: CLEAR
COLOR UR: YELLOW
COLOR, POC: YELLOW
EPI CELLS #/AREA URNS HPF: NORMAL /HPF (ref 0–5)
GLUCOSE UR STRIP-MCNC: NEGATIVE MG/DL
GLUCOSE URINE, POC: NEGATIVE
HGB UR QL STRIP.AUTO: NEGATIVE
KETONES UR STRIP-MCNC: NEGATIVE MG/DL
KETONES, POC: NEGATIVE
LEUKOCYTE EST, POC: ABNORMAL
LEUKOCYTE ESTERASE UR QL STRIP: ABNORMAL
NITRITE UR QL STRIP: NEGATIVE
NITRITE, POC: NEGATIVE
PH UR STRIP: 6 [PH] (ref 5–8)
PH, POC: 6
PROT UR STRIP-MCNC: NEGATIVE MG/DL
PROTEIN, POC: ABNORMAL
RBC #/AREA URNS HPF: NORMAL /HPF (ref 0–4)
SP GR UR STRIP: 1.02 (ref 1–1.03)
SPECIFIC GRAVITY, POC: 1.02
UROBILINOGEN UR STRIP-ACNC: NORMAL EU/DL (ref 0–1)
UROBILINOGEN, POC: 0.2
WBC #/AREA URNS HPF: NORMAL /HPF (ref 0–5)

## 2023-09-19 PROCEDURE — 99999 PR OFFICE/OUTPT VISIT,PROCEDURE ONLY: CPT | Performed by: FAMILY MEDICINE

## 2023-09-19 PROCEDURE — 81003 URINALYSIS AUTO W/O SCOPE: CPT | Performed by: FAMILY MEDICINE

## 2023-09-26 DIAGNOSIS — E11.9 TYPE 2 DIABETES MELLITUS WITHOUT COMPLICATION, WITHOUT LONG-TERM CURRENT USE OF INSULIN (HCC): ICD-10-CM

## 2023-09-26 RX ORDER — EMPAGLIFLOZIN 10 MG/1
TABLET, FILM COATED ORAL
Qty: 30 TABLET | Refills: 2 | Status: SHIPPED | OUTPATIENT
Start: 2023-09-26

## 2023-09-26 NOTE — TELEPHONE ENCOUNTER
Jessica Womack is calling to request a refill on the following medication(s):    Last Visit Date (If Applicable):  6/7/6631    Next Visit Date:    Visit date not found    Medication Request:  Requested Prescriptions     Pending Prescriptions Disp Refills    JARDIANCE 10 MG tablet [Pharmacy Med Name: Andrew Brown 10 MG TABLET] 30 tablet 2     Sig: take 1 tablet by mouth once daily

## 2023-09-27 DIAGNOSIS — E11.9 TYPE 2 DIABETES MELLITUS WITHOUT COMPLICATION, WITHOUT LONG-TERM CURRENT USE OF INSULIN (HCC): ICD-10-CM

## 2023-09-27 NOTE — TELEPHONE ENCOUNTER
Patient states that you wanted to change the dose at the next refill          Diamond Horner is calling to request a refill on the following medication(s):    Medication Request:  Requested Prescriptions     Pending Prescriptions Disp Refills    Tirzepatide (MOUNJARO) 7.5 MG/0.5ML SOPN SC injection 2 mL 0     Sig: inject 0.5 milliliters subcutaneously every week       Last Visit Date (If Applicable):  5/9/5271    Next Visit Date:    Visit date not found

## 2023-09-28 ENCOUNTER — HOSPITAL ENCOUNTER (OUTPATIENT)
Dept: PAIN MANAGEMENT | Facility: CLINIC | Age: 58
Discharge: HOME | End: 2023-09-28
Payer: COMMERCIAL

## 2023-09-28 VITALS
OXYGEN SATURATION: 96 % | DIASTOLIC BLOOD PRESSURE: 77 MMHG | TEMPERATURE: 97 F | HEIGHT: 64 IN | WEIGHT: 202 LBS | RESPIRATION RATE: 12 BRPM | HEART RATE: 71 BPM | SYSTOLIC BLOOD PRESSURE: 140 MMHG | BODY MASS INDEX: 34.49 KG/M2

## 2023-09-28 DIAGNOSIS — R52 PAIN MANAGEMENT: ICD-10-CM

## 2023-09-28 LAB — GLUCOSE BLD-MCNC: 82 MG/DL (ref 65–105)

## 2023-09-28 PROCEDURE — 64493 INJ PARAVERT F JNT L/S 1 LEV: CPT | Performed by: PAIN MEDICINE

## 2023-09-28 PROCEDURE — 6360000002 HC RX W HCPCS: Performed by: PAIN MEDICINE

## 2023-09-28 PROCEDURE — 64493 INJ PARAVERT F JNT L/S 1 LEV: CPT

## 2023-09-28 PROCEDURE — 64494 INJ PARAVERT F JNT L/S 2 LEV: CPT | Performed by: PAIN MEDICINE

## 2023-09-28 PROCEDURE — 2500000003 HC RX 250 WO HCPCS: Performed by: PAIN MEDICINE

## 2023-09-28 PROCEDURE — 64494 INJ PARAVERT F JNT L/S 2 LEV: CPT

## 2023-09-28 PROCEDURE — 82947 ASSAY GLUCOSE BLOOD QUANT: CPT

## 2023-09-28 RX ORDER — FENTANYL CITRATE 50 UG/ML
INJECTION, SOLUTION INTRAMUSCULAR; INTRAVENOUS
Status: COMPLETED | OUTPATIENT
Start: 2023-09-28 | End: 2023-09-28

## 2023-09-28 RX ORDER — BUPIVACAINE HYDROCHLORIDE 2.5 MG/ML
INJECTION, SOLUTION EPIDURAL; INFILTRATION; INTRACAUDAL
Status: COMPLETED | OUTPATIENT
Start: 2023-09-28 | End: 2023-09-28

## 2023-09-28 RX ORDER — MIDAZOLAM HYDROCHLORIDE 2 MG/2ML
INJECTION, SOLUTION INTRAMUSCULAR; INTRAVENOUS
Status: COMPLETED | OUTPATIENT
Start: 2023-09-28 | End: 2023-09-28

## 2023-09-28 RX ORDER — LIDOCAINE HYDROCHLORIDE 5 MG/ML
INJECTION, SOLUTION INFILTRATION; INTRAVENOUS
Status: COMPLETED | OUTPATIENT
Start: 2023-09-28 | End: 2023-09-28

## 2023-09-28 RX ADMIN — BUPIVACAINE HYDROCHLORIDE 10 ML: 2.5 INJECTION, SOLUTION EPIDURAL; INFILTRATION; INTRACAUDAL; PERINEURAL at 16:00

## 2023-09-28 RX ADMIN — MIDAZOLAM HYDROCHLORIDE 2 MG: 1 INJECTION, SOLUTION INTRAMUSCULAR; INTRAVENOUS at 15:55

## 2023-09-28 RX ADMIN — LIDOCAINE HYDROCHLORIDE 6 ML: 5 INJECTION, SOLUTION INFILTRATION at 15:57

## 2023-09-28 RX ADMIN — FENTANYL CITRATE 50 MCG: 50 INJECTION, SOLUTION INTRAMUSCULAR; INTRAVENOUS at 16:03

## 2023-09-28 ASSESSMENT — PAIN DESCRIPTION - FREQUENCY: FREQUENCY: CONTINUOUS

## 2023-09-28 ASSESSMENT — PAIN SCALES - GENERAL: PAINLEVEL_OUTOF10: 5

## 2023-09-28 ASSESSMENT — PAIN - FUNCTIONAL ASSESSMENT: PAIN_FUNCTIONAL_ASSESSMENT: PREVENTS OR INTERFERES SOME ACTIVE ACTIVITIES AND ADLS

## 2023-09-28 ASSESSMENT — PAIN DESCRIPTION - LOCATION: LOCATION: BACK

## 2023-09-28 ASSESSMENT — PAIN DESCRIPTION - ORIENTATION: ORIENTATION: RIGHT;LEFT;LOWER

## 2023-09-28 ASSESSMENT — PAIN DESCRIPTION - DESCRIPTORS: DESCRIPTORS: BURNING;ACHING

## 2023-09-28 ASSESSMENT — PAIN DESCRIPTION - PAIN TYPE: TYPE: CHRONIC PAIN

## 2023-09-28 NOTE — DISCHARGE INSTRUCTIONS
You have received a sedative/anesthetic therefore you should not consume any alcoholic beverages for 24 hours. Do not drive or operate machinery for 24 hours. Do not take a tub bath for 72 hours after procedure (this includes hot tubs). You may shower, but avoid hot water to injection site. Avoid strenuous activity TODAY especially if you experience dizziness. Remove band-aid the next day. Wash off any residual iodine 24 hours from today. Do not use heat, heating pad, or any other heating device over the injection site for 3 days after the procedure. If you experience pain after your procedure, you may continue with your current pain medication as prescribed. (DO NOT INCREASE YOUR PAIN MEDICATION WITHOUT TALKING TO DOCTOR)  Soreness and pain at injection site is common, may use ice to reduce soreness. Please complete pain diary as instructed. The office staff will call you to discuss the results of the procedure within 24 hours, please call the office if you do not hear from them.       Call Mary Ellen at [phone] if you experience:   Fever, chills or temperature over 100    Vomiting, headache, persistent stiff neck, nausea or blurred vision   Difficulty urinating or unable to urinate within 8 hours   Increase in weakness, numbness or loss of function of limbs  Increased redness, swelling or drainage at the injection site

## 2023-09-28 NOTE — H&P
Pain Pre-Op H&P Note    Beverly Goldsmith MD    HPI: Remi Matson  presents with back pain. Has had benefit with RFA in the past.     Past Medical History:   Diagnosis Date    Abnormal stress test     Allergic rhinitis     Arthritis     CAD (coronary artery disease)     Dr. Charisma Hernandez last seen 2020    Chronic back pain     Diabetes mellitus [facility])     Former smoker 2020    30-year history. Quit in 2018    Hyperlipidemia     Dr. Milli Roldan    Hypertension     Dr. Gladis Beatty, cardiac 2020    SRI on CPAP     instructed to bring Dr. Gerardo Brown    Wears prescription eyeglasses     Wellness examination     Dr. Diamond Arguello last seen 2020       Past Surgical History:   Procedure Laterality Date    ANESTHESIA NERVE BLOCK Left 2019    NERVE BLOCK (DEFINE) - # 1 L5-S1 performed by Beverly Goldsmith MD at [address] [place] [address] Bilateral 2020    NERVE BLOCK BILATERAL - B MBB # 1 L4-5, L5-S1 performed by Beverly Goldsmith MD at [address] [place] [address] Bilateral 2020    NERVE BLOCK BILATERAL - L-5, L5-S1 performed by Beverly Goldsmith MD at [facility]  2017    CARDIAC CATHETERIZATION  2019    CARDIAC CATHETERIZATION  2018    1 stent mid LAD PT HAS XIENCE ALPINE HEART STENT, MRI CONDITIONAL 3T OK, SAFE IMMEDIATELY POST IMPLANT. CARDIAC CATHETERIZATION  02/10/2020    for chest pain no new blockage    CARPAL TUNNEL RELEASE Right      SECTION      x3    COLONOSCOPY N/A 2018    COLONOSCOPY WITH BIOPSY performed by Anand Díaz MD at [facility]  2014    cataract surgery left eye WITH IMPLANT. MRI OK.      FOOT SURGERY      HAND TENDON SURGERY Right     [facility] INJE[facility]ED[facility] FOR SACROILIAC JOINT Left 2019    SACROILIAC JOINT INJECTION - #1 performed by Beverly Goldsmith MD at [facility] ( [facility])

## 2023-09-28 NOTE — OP NOTE
Lumbar Facet Nerve Block Injection:  Surgeon: Ted Toscano MD     PRE-OP DIAGNOSIS: M47.817 (lumbosacral spondylosis), M54.5 (low back pain)    POST-OP DIAGNOSIS: Same. PROCEDURE PERFORMED: Lumbar Facet Nerve Block Multiple Levels  Bilateral L4 - 5 and L5 - S1. Physician confirmed and marked the surgical site. EBL: minimal      CONSENT: Patient has undergone the educational process with this procedure, is aware and fully understands the risks involved: potential damage to any and all body organs including possible bleeding, infection and nerve injury, allergic reaction and headache. Patient also understands that the procedure will be undertaken in a safe, controlled, and monitored setting. Patient recognizes that the benefits include relief from pain and reduction in the oral use of medications. Patient agreed to proceed. The patient was counseled at length about the risks of mason Covid-19 during their perioperative period and any recovery window from their procedure. The patient was made aware that mason Covid-19  may worsen their prognosis for recovering from their procedure  and lend to a higher morbidity and/or mortality risk. All material risks, benefits, and reasonable alternatives including postponing the procedure were discussed. The patient does wish to proceed with the procedure at this time. PREP: Timeout was performed prior to starting the procedure. The patient's back was prepped with chloroprep and draped appropriately. 0.5% lidocaine was used to anesthetize the skin and subcutaneous tissue. PROCEDURE NOTE: 25 gauge spinal needles were advanced under  fluoroscopic guidance to the appropriate anatomic location for the medial branches and/or dorsal ramus corresponding to the indicated facet joints. Aspiration was negative.  1 ml of 0.25% marcaine was then injected at each site to block medial branch nerve innervating the  Bilateral L4 - 5 and L5 - S1 facet

## 2023-09-29 ENCOUNTER — TELEPHONE (OUTPATIENT)
Dept: PAIN MANAGEMENT | Age: 58
End: 2023-09-29

## 2023-09-29 RX ORDER — TIRZEPATIDE 7.5 MG/.5ML
INJECTION, SOLUTION SUBCUTANEOUS
Qty: 2 ML | Refills: 0 | Status: CANCELLED | OUTPATIENT
Start: 2023-09-29

## 2023-09-29 RX ORDER — TIRZEPATIDE 10 MG/.5ML
10 INJECTION, SOLUTION SUBCUTANEOUS WEEKLY
Qty: 2 ML | Refills: 1 | Status: SHIPPED | OUTPATIENT
Start: 2023-09-29

## 2023-09-29 NOTE — TELEPHONE ENCOUNTER
S/P: Lumbar Facet Nerve Block Multiple Levels  Bilateral L4 - 5 and L5 - S1.      DOS: 09/28/2023    Pain  before procedure with activity : 5    Pain after procedure with activity: 0    Activities following procedure include: Went shopping, went to dinner and did dishes    % of pain relief: 100%    Procedure successful: Yes    OR scheduled: 10/12/2023

## 2023-10-06 ENCOUNTER — OFFICE VISIT (OUTPATIENT)
Dept: PAIN MANAGEMENT | Age: 58
End: 2023-10-06
Payer: COMMERCIAL

## 2023-10-06 VITALS — HEIGHT: 64 IN | WEIGHT: 202 LBS | BODY MASS INDEX: 34.49 KG/M2

## 2023-10-06 DIAGNOSIS — M46.1 SACROILIITIS (HCC): ICD-10-CM

## 2023-10-06 DIAGNOSIS — M47.817 LUMBOSACRAL SPONDYLOSIS WITHOUT MYELOPATHY: Primary | ICD-10-CM

## 2023-10-06 PROCEDURE — 99214 OFFICE O/P EST MOD 30 MIN: CPT | Performed by: PAIN MEDICINE

## 2023-10-06 RX ORDER — PREGABALIN 50 MG/1
50 CAPSULE ORAL 2 TIMES DAILY
Qty: 180 CAPSULE | Refills: 0 | Status: SHIPPED | OUTPATIENT
Start: 2023-10-06 | End: 2024-01-04

## 2023-10-06 ASSESSMENT — ENCOUNTER SYMPTOMS
BACK PAIN: 1
BOWEL INCONTINENCE: 0

## 2023-10-06 NOTE — PROGRESS NOTES
HPI:     Back Pain  This is a chronic problem. The current episode started more than 1 year ago. The problem occurs constantly. The problem is unchanged. The pain is present in the lumbar spine. The quality of the pain is described as aching and burning. The pain does not radiate. The pain is at a severity of 5/10. The pain is Worse during the day. The symptoms are aggravated by bending, sitting and standing. Stiffness is present In the morning and at night. Pertinent negatives include no bladder incontinence, bowel incontinence, chest pain, fever, headaches, numbness, tingling or weakness. Treatments tried: MBB. The treatment provided significant relief. Chronic nonradiating low back pain the worst of her complaints. MRI with previous surgery as well as degenerative changes disc bulging and foraminal narrowing. Lyrica with benefit. Has done well with lumbar RFA in the past.  Did complain of some neuritis in the buttock area after the RFA which did eventually resolve with Lyrica. Confirmatory MBB with excellent benefit as well. Pain seemingly over the SI joints as well. Patient denies any new neurological symptoms. Nobowel or bladder incontinence, no weakness, and no falling. Review of OARRS does not show any aberrant prescription behavior. Past Medical History:   Diagnosis Date    Abnormal stress test     Allergic rhinitis     Arthritis     CAD (coronary artery disease)     Dr. Veva Sicard last seen 2/20/2020    Chronic back pain     Diabetes mellitus [facility])     Former smoker 2/7/2020    30-year history.   Quit in 2018    Hyperlipidemia     Dr. Hattie Sosa    Hypertension     Dr. Elva Soto, cardiac 2/8/2020    SRI on CPAP     instructed to bring Dr. Karina Gupta    Wears prescription eyeglasses     Wellness examination     Dr. Emily Araujo last seen 2/13/2020       Past Surgical History:   Procedure Laterality Date    ANESTHESIA NERVE BLOCK Left 12/19/2019    NERVE BLOCK (DEFINE) - # 1 L5-S1 performed by

## 2023-10-12 ENCOUNTER — HOSPITAL ENCOUNTER (OUTPATIENT)
Dept: PAIN MANAGEMENT | Facility: CLINIC | Age: 58
Discharge: HOME | End: 2023-10-12
Payer: COMMERCIAL

## 2023-10-12 VITALS
HEART RATE: 81 BPM | WEIGHT: 200 LBS | DIASTOLIC BLOOD PRESSURE: 83 MMHG | BODY MASS INDEX: 34.15 KG/M2 | OXYGEN SATURATION: 97 % | TEMPERATURE: 97.4 F | SYSTOLIC BLOOD PRESSURE: 182 MMHG | HEIGHT: 64 IN | RESPIRATION RATE: 12 BRPM

## 2023-10-12 DIAGNOSIS — R52 PAIN MANAGEMENT: ICD-10-CM

## 2023-10-12 LAB — GLUCOSE BLD-MCNC: 78 MG/DL (ref 65–105)

## 2023-10-12 PROCEDURE — 2500000003 HC RX 250 WO HCPCS: Performed by: PAIN MEDICINE

## 2023-10-12 PROCEDURE — 6360000002 HC RX W HCPCS: Performed by: PAIN MEDICINE

## 2023-10-12 PROCEDURE — 82947 ASSAY GLUCOSE BLOOD QUANT: CPT

## 2023-10-12 PROCEDURE — G0260 INJ FOR SACROILIAC JT ANESTH: HCPCS

## 2023-10-12 PROCEDURE — 27096 INJECT SACROILIAC JOINT: CPT | Performed by: PAIN MEDICINE

## 2023-10-12 RX ORDER — LIDOCAINE HYDROCHLORIDE 5 MG/ML
INJECTION, SOLUTION INFILTRATION; INTRAVENOUS
Status: COMPLETED | OUTPATIENT
Start: 2023-10-12 | End: 2023-10-12

## 2023-10-12 RX ORDER — DEXAMETHASONE SODIUM PHOSPHATE 10 MG/ML
INJECTION, SOLUTION INTRAMUSCULAR; INTRAVENOUS
Status: COMPLETED | OUTPATIENT
Start: 2023-10-12 | End: 2023-10-12

## 2023-10-12 RX ORDER — MIDAZOLAM HYDROCHLORIDE 2 MG/2ML
INJECTION, SOLUTION INTRAMUSCULAR; INTRAVENOUS
Status: COMPLETED | OUTPATIENT
Start: 2023-10-12 | End: 2023-10-12

## 2023-10-12 RX ORDER — BUPIVACAINE HYDROCHLORIDE 2.5 MG/ML
INJECTION, SOLUTION EPIDURAL; INFILTRATION; INTRACAUDAL
Status: COMPLETED | OUTPATIENT
Start: 2023-10-12 | End: 2023-10-12

## 2023-10-12 RX ADMIN — DEXAMETHASONE SODIUM PHOSPHATE 10 MG: 10 INJECTION, SOLUTION INTRAMUSCULAR; INTRAVENOUS at 15:33

## 2023-10-12 RX ADMIN — BUPIVACAINE HYDROCHLORIDE 5 ML: 2.5 INJECTION, SOLUTION EPIDURAL; INFILTRATION; INTRACAUDAL; PERINEURAL at 15:33

## 2023-10-12 RX ADMIN — MIDAZOLAM HYDROCHLORIDE 2 MG: 1 INJECTION, SOLUTION INTRAMUSCULAR; INTRAVENOUS at 15:29

## 2023-10-12 RX ADMIN — LIDOCAINE HYDROCHLORIDE 3 ML: 5 INJECTION, SOLUTION INFILTRATION at 15:34

## 2023-10-12 RX ADMIN — LIDOCAINE HYDROCHLORIDE 3 ML: 5 INJECTION, SOLUTION INFILTRATION at 15:32

## 2023-10-12 ASSESSMENT — PAIN DESCRIPTION - DESCRIPTORS: DESCRIPTORS: ACHING;BURNING

## 2023-10-12 NOTE — H&P
Disp: 60 tablet, Rfl: 11    carvedilol (COREG) 12.5 MG tablet, take 1 tablet by mouth twice a day (STOP 6.25 MG DOSE), Disp: 60 tablet, Rfl: 11    blood glucose test strips (ASCENSIA AUTODISC VI;ONE TOUCH ULTRA TEST VI) strip, Use with associated glucose meter. Use q day, Disp: 100 strip, Rfl: 11    Lancets MISC, 1 each by Does not apply route daily, Disp: 100 each, Rfl: 3    glucose monitoring (FREESTYLE FREEDOM) kit, 1 kit by Does not apply route daily, Disp: 1 kit, Rfl: 0    amLODIPine (NORVASC) 10 MG tablet, Take 1 tablet by mouth daily, Disp: , Rfl:     omeprazole (PRILOSEC) 40 MG delayed release capsule, take 1 capsule by mouth once daily, Disp: 90 capsule, Rfl: 1    Handicap Placard MISC, by Does not apply route Handicap placard for 5 years. , Disp: 1 each, Rfl: 0    Multiple Vitamins-Minerals (ONE-A-DAY 50 PLUS PO), Take 1 tablet by mouth daily, Disp: , Rfl:     atorvastatin (LIPITOR) 80 MG tablet, take 1 tablet by mouth once daily, Disp: 30 tablet, Rfl: 11    ezetimibe (ZETIA) 10 MG tablet, Take 1 tablet by mouth daily, Disp: , Rfl:     metFORMIN (GLUCOPHAGE-XR) 500 MG extended release tablet, take 1 tablet by mouth once daily for 2 weeks then 1 tablet twice a day, Disp: 60 tablet, Rfl: 11    nitroGLYCERIN (NITROSTAT) 0.4 MG SL tablet, place 1 tablet under the tongue if needed every 5 minutes for chest pain for 3 doses IF NO RELIEF AFTER FIRST DOSE CALL PRESCRIBER ., Disp: 25 tablet, Rfl: 1    hydrochlorothiazide (HYDRODIURIL) 25 MG tablet, Take 1 tablet by mouth daily, Disp: , Rfl:     aspirin 81 MG tablet, Take 1 tablet by mouth daily (with breakfast), Disp: 30 tablet, Rfl: 11    Social History     Tobacco Use    Smoking status: Former     Packs/day: 0.25     Years: 30.00     Additional pack years: 0.00     Total pack years: 7.50     Types: Cigarettes     Quit date: 2018     Years since quittin.2    Smokeless tobacco: Never   Substance Use Topics    Alcohol use: No       Review of Systems:

## 2023-10-12 NOTE — DISCHARGE INSTRUCTIONS

## 2023-10-12 NOTE — OP NOTE
Sacro-Iliac Joint Injection:  SURGEON: Abril Arredondo MD    PRE-OP DIAGNOSIS: Sacroiliitis (M46.1), Low back pain (M54.5)    POST-OP DIAGNOSIS: Same. Procedure performed: Bilateral Sacroiliac Joint Injection. Physician confirmed and marked the surgical site. EBL: minimal    CONSENT: Patient has undergone the educational process with this procedure, is aware and fully understands the risks involved: potential damage to any and all body organs including possible bleeding, infection, and nerve injury, allergic reaction and headache. Patient also understands that the procedure will be undertaken in a safe, controlled and monitored setting. Patient recognizes that the benefits may include relief from pain and reduction in the oral use of medications. Patient agreed to proceed. The patient was counseled at length about the risks of mason Covid-19 during their perioperative period and any recovery window from their procedure. The patient was made aware that mason Covid-19  may worsen their prognosis for recovering from their procedure  and lend to a higher morbidity and/or mortality risk. All material risks, benefits, and reasonable alternatives including postponing the procedure were discussed. The patient does wish to proceed with the procedure at this time. PREP: The patient's back was prepped with chloroprep and draped appropriately. 5ml of 0.5% lidocaine was used to anesthetize the skin and subcutaneous tissue. PROCEDURE NOTE: 25 gauge spinal needle(s) was/were advanced to the  Bilateral SI joint under fluoroscopic guidance. Aspiration was negative. 2ml of  0.25% bupivacaine was mixed with 5mg Dexamethasone and slowly injected into the joint(s). The needle was withdrawn by the physician and the nurse applied a sterile dressing. The patient tolerated the procedure well. No complications occurred. Patient transferred to the recovery room in satisfactory condition.  Appropriate

## 2023-10-18 ENCOUNTER — APPOINTMENT (OUTPATIENT)
Dept: MRI IMAGING | Age: 58
End: 2023-10-18
Payer: COMMERCIAL

## 2023-10-18 ENCOUNTER — HOSPITAL ENCOUNTER (EMERGENCY)
Age: 58
Discharge: HOME | End: 2023-10-18
Attending: EMERGENCY MEDICINE
Payer: COMMERCIAL

## 2023-10-18 ENCOUNTER — APPOINTMENT (OUTPATIENT)
Dept: GENERAL RADIOLOGY | Age: 58
End: 2023-10-18
Payer: COMMERCIAL

## 2023-10-18 ENCOUNTER — APPOINTMENT (OUTPATIENT)
Dept: CT IMAGING | Age: 58
End: 2023-10-18
Payer: COMMERCIAL

## 2023-10-18 VITALS
DIASTOLIC BLOOD PRESSURE: 68 MMHG | OXYGEN SATURATION: 91 % | HEIGHT: 64 IN | HEART RATE: 66 BPM | BODY MASS INDEX: 34.14 KG/M2 | WEIGHT: 199.96 LBS | RESPIRATION RATE: 20 BRPM | TEMPERATURE: 98.7 F | SYSTOLIC BLOOD PRESSURE: 115 MMHG

## 2023-10-18 DIAGNOSIS — R20.0 NUMBNESS AND TINGLING IN LEFT HAND: Primary | ICD-10-CM

## 2023-10-18 DIAGNOSIS — R20.2 NUMBNESS AND TINGLING IN LEFT HAND: Primary | ICD-10-CM

## 2023-10-18 LAB
ANION GAP SERPL CALCULATED.3IONS-SCNC: 12 MMOL/L (ref 9–17)
BASOPHILS # BLD: 0.03 K/UL (ref 0–0.2)
BASOPHILS NFR BLD: 1 % (ref 0–2)
BUN BLD-MCNC: 21 MG/DL (ref 8–26)
BUN SERPL-MCNC: 20 MG/DL (ref 6–20)
CA-I BLD-SCNC: 1.21 MMOL/L (ref 1.15–1.33)
CALCIUM SERPL-MCNC: 9.3 MG/DL (ref 8.6–10.4)
CHLORIDE BLD-SCNC: 104 MMOL/L (ref 98–107)
CHLORIDE SERPL-SCNC: 102 MMOL/L (ref 98–107)
CHP ED QC CHECK: YES
CK SERPL-CCNC: 47 U/L (ref 26–192)
CO2 BLD CALC-SCNC: 25 MMOL/L (ref 22–30)
CO2 SERPL-SCNC: 22 MMOL/L (ref 20–31)
CREAT SERPL-MCNC: 0.9 MG/DL (ref 0.5–0.9)
EGFR, POC: >60 ML/MIN/1.73M2
EOSINOPHIL # BLD: 0.14 K/UL (ref 0–0.44)
EOSINOPHILS RELATIVE PERCENT: 2 % (ref 1–4)
ERYTHROCYTE [DISTWIDTH] IN BLOOD BY AUTOMATED COUNT: 13.6 % (ref 11.8–14.4)
GFR SERPL CREATININE-BSD FRML MDRD: >60 ML/MIN/1.73M2
GLUCOSE BLD-MCNC: 99 MG/DL
GLUCOSE BLD-MCNC: 99 MG/DL (ref 74–100)
GLUCOSE SERPL-MCNC: 113 MG/DL (ref 70–99)
HCO3 VENOUS: 25.5 MMOL/L (ref 22–29)
HCT VFR BLD AUTO: 40 % (ref 36–46)
HCT VFR BLD AUTO: 40.5 % (ref 36.3–47.1)
HGB BLD-MCNC: 13.1 G/DL (ref 11.9–15.1)
IMM GRANULOCYTES # BLD AUTO: <0.03 K/UL (ref 0–0.3)
IMM GRANULOCYTES NFR BLD: 0 %
INR PPP: 1
LYMPHOCYTES NFR BLD: 1.84 K/UL (ref 1.1–3.7)
LYMPHOCYTES RELATIVE PERCENT: 31 % (ref 24–43)
MCH RBC QN AUTO: 27.9 PG (ref 25.2–33.5)
MCHC RBC AUTO-ENTMCNC: 32.3 G/DL (ref 28.4–34.8)
MCV RBC AUTO: 86.4 FL (ref 82.6–102.9)
MONOCYTES NFR BLD: 0.76 K/UL (ref 0.1–1.2)
MONOCYTES NFR BLD: 13 % (ref 3–12)
MYOGLOBIN SERPL-MCNC: <21 NG/ML (ref 25–58)
NEUTROPHILS NFR BLD: 53 % (ref 36–65)
NEUTS SEG NFR BLD: 3.25 K/UL (ref 1.5–8.1)
NRBC BLD-RTO: 0 PER 100 WBC
O2 SAT, VEN: 89.5 % (ref 60–85)
PARTIAL THROMBOPLASTIN TIME: 28.1 SEC (ref 23–36.5)
PCO2, VEN: 38.7 MM HG (ref 41–51)
PH VENOUS: 7.43 (ref 7.32–7.43)
PLATELET # BLD AUTO: 235 K/UL (ref 138–453)
PMV BLD AUTO: 10.5 FL (ref 8.1–13.5)
PO2, VEN: 55.9 MM HG (ref 30–50)
POC ANION GAP: 12 MMOL/L (ref 7–16)
POC CREATININE: 0.6 MG/DL (ref 0.51–1.19)
POC HEMOGLOBIN (CALC): 13.6 G/DL (ref 12–16)
POC LACTIC ACID: 1.3 MMOL/L (ref 0.56–1.39)
POSITIVE BASE EXCESS, VEN: 1.2 MMOL/L (ref 0–3)
POTASSIUM BLD-SCNC: 3.8 MMOL/L (ref 3.5–4.5)
POTASSIUM SERPL-SCNC: 3.8 MMOL/L (ref 3.7–5.3)
PROTHROMBIN TIME: 13.2 SEC (ref 11.7–14.9)
RBC # BLD AUTO: 4.69 M/UL (ref 3.95–5.11)
SODIUM BLD-SCNC: 140 MMOL/L (ref 138–146)
SODIUM SERPL-SCNC: 136 MMOL/L (ref 135–144)
TROPONIN I SERPL HS-MCNC: <6 NG/L (ref 0–14)
TROPONIN I SERPL HS-MCNC: <6 NG/L (ref 0–14)
WBC OTHER # BLD: 6 K/UL (ref 3.5–11.3)

## 2023-10-18 PROCEDURE — 82803 BLOOD GASES ANY COMBINATION: CPT

## 2023-10-18 PROCEDURE — 93005 ELECTROCARDIOGRAM TRACING: CPT | Performed by: EMERGENCY MEDICINE

## 2023-10-18 PROCEDURE — 70498 CT ANGIOGRAPHY NECK: CPT

## 2023-10-18 PROCEDURE — 84484 ASSAY OF TROPONIN QUANT: CPT

## 2023-10-18 PROCEDURE — 70450 CT HEAD/BRAIN W/O DYE: CPT

## 2023-10-18 PROCEDURE — 82550 ASSAY OF CK (CPK): CPT

## 2023-10-18 PROCEDURE — 80051 ELECTROLYTE PANEL: CPT

## 2023-10-18 PROCEDURE — 99285 EMERGENCY DEPT VISIT HI MDM: CPT | Performed by: EMERGENCY MEDICINE

## 2023-10-18 PROCEDURE — 85014 HEMATOCRIT: CPT

## 2023-10-18 PROCEDURE — 82553 CREATINE MB FRACTION: CPT

## 2023-10-18 PROCEDURE — 85610 PROTHROMBIN TIME: CPT

## 2023-10-18 PROCEDURE — 83605 ASSAY OF LACTIC ACID: CPT

## 2023-10-18 PROCEDURE — 80048 BASIC METABOLIC PNL TOTAL CA: CPT

## 2023-10-18 PROCEDURE — 70551 MRI BRAIN STEM W/O DYE: CPT

## 2023-10-18 PROCEDURE — 82947 ASSAY GLUCOSE BLOOD QUANT: CPT

## 2023-10-18 PROCEDURE — 85730 THROMBOPLASTIN TIME PARTIAL: CPT

## 2023-10-18 PROCEDURE — 82330 ASSAY OF CALCIUM: CPT

## 2023-10-18 PROCEDURE — 82565 ASSAY OF CREATININE: CPT

## 2023-10-18 PROCEDURE — 6360000004 HC RX CONTRAST MEDICATION: Performed by: EMERGENCY MEDICINE

## 2023-10-18 PROCEDURE — 83874 ASSAY OF MYOGLOBIN: CPT

## 2023-10-18 PROCEDURE — 71045 X-RAY EXAM CHEST 1 VIEW: CPT

## 2023-10-18 PROCEDURE — 84520 ASSAY OF UREA NITROGEN: CPT

## 2023-10-18 PROCEDURE — 85025 COMPLETE CBC W/AUTO DIFF WBC: CPT

## 2023-10-18 RX ADMIN — IOPAMIDOL 90 ML: 755 INJECTION, SOLUTION INTRAVENOUS at 12:30

## 2023-10-18 ASSESSMENT — PAIN - FUNCTIONAL ASSESSMENT: PAIN_FUNCTIONAL_ASSESSMENT: NONE - DENIES PAIN

## 2023-10-18 ASSESSMENT — ENCOUNTER SYMPTOMS
SHORTNESS OF BREATH: 0
COUGH: 0

## 2023-10-18 ASSESSMENT — LIFESTYLE VARIABLES
HOW OFTEN DO YOU HAVE A DRINK CONTAINING ALCOHOL: NEVER
HOW MANY STANDARD DRINKS CONTAINING ALCOHOL DO YOU HAVE ON A TYPICAL DAY: PATIENT DOES NOT DRINK

## 2023-10-18 NOTE — ED PROVIDER NOTES
CAROTID ARTERIES: No dissection, arterial injury, or hemodynamically significant stenosis by NASCET criteria. VERTEBRAL ARTERIES: No dissection, arterial injury, or significant stenosis. SOFT TISSUES: The lung apices are clear. No cervical or superior mediastinal lymphadenopathy. The larynx and pharynx are unremarkable. No acute abnormality of the salivary and thyroid glands. BONES: No acute osseous abnormality. CTA HEAD: ANTERIOR CIRCULATION: No significant stenosis of the intracranial internal carotid, anterior cerebral, or middle cerebral arteries. No aneurysm. POSTERIOR CIRCULATION: No significant stenosis of the vertebral, basilar, or posterior cerebral arteries. No aneurysm. OTHER: No dural venous sinus thrombosis on this non-dedicated study. BRAIN: No mass effect or midline shift. No extra-axial fluid collection. The gray-white differentiation is maintained. No flow limiting stenosis or occlusion within the head or neck.              LABS:  Results for orders placed or performed during the hospital encounter of 10/18/23   STROKE PANEL   Result Value Ref Range    Sodium 136 135 - 144 mmol/L    Potassium 3.8 3.7 - 5.3 mmol/L    Chloride 102 98 - 107 mmol/L    CO2 22 20 - 31 mmol/L    Anion Gap 12 9 - 17 mmol/L    Glucose 113 (H) 70 - 99 mg/dL    BUN 20 6 - 20 mg/dL    Creatinine 0.9 0.5 - 0.9 mg/dL    Est, Glom Filt Rate >60 >60 mL/min/1.73m2    Calcium 9.3 8.6 - 10.4 mg/dL    WBC 6.0 3.5 - 11.3 k/uL    RBC 4.69 3.95 - 5.11 m/uL    Hemoglobin 13.1 11.9 - 15.1 g/dL    Hematocrit 40.5 36.3 - 47.1 %    MCV 86.4 82.6 - 102.9 fL    MCH 27.9 25.2 - 33.5 pg    MCHC 32.3 28.4 - 34.8 g/dL    RDW 13.6 11.8 - 14.4 %    Platelets 476 404 - 540 k/uL    MPV 10.5 8.1 - 13.5 fL    NRBC Automated 0.0 0.0 per 100 WBC    Total CK 47 26 - 192 U/L    Neutrophils % 53 36 - 65 %    Lymphocytes % 31 24 - 43 %    Monocytes % 13 (H) 3 - 12 %    Eosinophils % 2 1 - 4 %    Basophils % 1 0 - 2 %    Immature Granulocytes 0 0 %

## 2023-10-18 NOTE — PROGRESS NOTES
[facility]     Emergency/Trauma Note    PATIENT NAME: Flaquita Chavez    Shift date: 10/18/2023   Shift day: Wednesday   Shift # 1    Room # [facility]/50PED   Name: Flaquita Chavez            Age: 60 y.o. Gender: female          Faith: [address] [place] [address] of Zoroastrian:     Trauma/Incident type: Stroke Alert  Admit Date & Time: 10/18/2023 11:44 AM    PATIENT/EVENT DESCRIPTION:  Flaquita Chavez is a 60 y.o. female who was paged as a stroke alert. LKW was 11:00 am.. Pt to be admitted to [facility]. SPIRITUAL ASSESSMENT-INTERVENTION-OUTCOME:  Patient was in CT when  arrived. Pt's , Rico Saleem, was present. He shared some of pt's health history and said she has had similar issues before. He appeared to be coping well.  provided a supportive presence and assurance of prayers. PATIENT BELONGINGS:  No belongings noted    ANY BELONGINGS OF SIGNIFICANT VALUE NOTED:      REGISTRATION STAFF NOTIFIED? No      WHAT IS YOUR SPIRITUAL CARE PLAN FOR THIS PATIENT?:   Chaplains will remain available to offer spiritual and emotional support as needed.      Electronically signed by Antwon Del Toro on 10/18/2023 at 1:03 PM.  [address] [place] [address]  [phone]      10/18/23 1302   Encounter Summary   Encounter Overview/Reason  Crisis   Service Provided For: Family   Referral/Consult From: Multi-disciplinary team   Support System Spouse   Last Encounter  10/18/23   Complexity of Encounter Moderate   Begin Time 1210   End Time  1220   Total Time Calculated 10 min   Crisis   Type Code Stroke   Assessment/Intervention/Outcome   Assessment Coping   Intervention Active listening;Explored/Affirmed feelings, thoughts, concerns;Prayer (assurance of)/[place]   Outcome Expressed feelings, needs, and concerns;Expressed Gratitude;Receptive

## 2023-10-18 NOTE — ED TRIAGE NOTES
Pt arrived to ED for numbness around the mouth and left hand. Pt states she has no past stroke hx, and this started an hour ago.   Dr. Clara Dave made aware  Stroke Alert called

## 2023-10-18 NOTE — CONSULTS
Department of Neurology/Telestroke/Stroke  Resident Consult Note  Stroke Alert @12 PM  Arrival at bedside @12:05 PM    Reason for Consult:  ED Stroke Alert  Requesting Physician: Dr. Richy Yates  Endovascular Neurosurgeon:   Carmina Lopes MD    Stroke Team:  Radha Alas MD      History Obtained From:  patient, spouse    CHIEF COMPLAINT:       Left hand numbness, perioral numbness    HISTORY OF PRESENT ILLNESS:       The patient is a 60 y.o. female with PMH of HTN, HLD, T2DM, CAD s/p stenting, SRI, who presents with left hand with started around 11 AM this morning. About half an hour later patient stated she started noticing perioral numbness as well. Eventually decided to come into the ED for evaluation. Stroke alert was called while in the ED, initial NIH 1. Patient denies any numbness prior to this and states that her symptoms are acute in onset and have never occurred before. CT head without contrast and CTA head and neck with contrast were performed and were unremarkable for any acute abnormality. MRI brain limited ordered to rule out acute infarct. LKW: 11 AM  NIHSS: 1  Modified Tre Scale: Neuro  SBP: 110  Glucose: 99  CT head without contrast: Unremarkable  TNK: Not a candidate  Endovascular: Not a candidate       PAST MEDICAL HISTORY :       Past Medical History:        Diagnosis Date    Abnormal stress test     Allergic rhinitis     Arthritis     CAD (coronary artery disease)     Dr. Storm Johnson last seen 2/20/2020    Chronic back pain     Diabetes mellitus ([address])     Former smoker 2/7/2020    30-year history.   Quit in 2018    Hyperlipidemia     Dr. Teresa Chavarria    Hypertension     Dr. Jhonatan Whitaker, cardiac 2/8/2020    SRI on CPAP     instructed to bring Dr. Katie Oliveira    Wears prescription eyeglasses     Wellness examination     Dr. Moni Black last seen 2/13/2020       Past Surgical History:        Procedure Laterality Date    ANESTHESIA NERVE BLOCK Left 12/19/2019    NERVE BLOCK (DEFINE) - # 1 L5-S1

## 2023-10-19 LAB
EKG ATRIAL RATE: 68 BPM
EKG P AXIS: 48 DEGREES
EKG P-R INTERVAL: 144 MS
EKG Q-T INTERVAL: 444 MS
EKG QRS DURATION: 86 MS
EKG QTC CALCULATION (BAZETT): 472 MS
EKG R AXIS: 16 DEGREES
EKG T AXIS: 132 DEGREES
EKG VENTRICULAR RATE: 68 BPM

## 2023-10-24 PROBLEM — R20.0 NUMBNESS AND TINGLING IN LEFT HAND: Status: ACTIVE | Noted: 2023-10-24

## 2023-10-24 PROBLEM — R20.2 NUMBNESS AND TINGLING IN LEFT HAND: Status: ACTIVE | Noted: 2023-10-24

## 2023-11-06 ENCOUNTER — OFFICE VISIT (OUTPATIENT)
Dept: PAIN MANAGEMENT | Age: 58
End: 2023-11-06
Payer: COMMERCIAL

## 2023-11-06 VITALS — BODY MASS INDEX: 35 KG/M2 | HEIGHT: 64 IN | WEIGHT: 205 LBS

## 2023-11-06 DIAGNOSIS — M51.36 DEGENERATION OF LUMBAR INTERVERTEBRAL DISC: ICD-10-CM

## 2023-11-06 DIAGNOSIS — M96.1 POSTLAMINECTOMY SYNDROME, LUMBAR REGION: Primary | ICD-10-CM

## 2023-11-06 DIAGNOSIS — M47.817 LUMBOSACRAL SPONDYLOSIS WITHOUT MYELOPATHY: ICD-10-CM

## 2023-11-06 DIAGNOSIS — G89.29 OTHER CHRONIC PAIN: ICD-10-CM

## 2023-11-06 PROCEDURE — 99214 OFFICE O/P EST MOD 30 MIN: CPT | Performed by: PAIN MEDICINE

## 2023-11-06 RX ORDER — PREGABALIN 75 MG/1
75 CAPSULE ORAL 2 TIMES DAILY
Qty: 60 CAPSULE | Refills: 1 | Status: SHIPPED | OUTPATIENT
Start: 2023-11-06 | End: 2024-01-05

## 2023-11-06 ASSESSMENT — ENCOUNTER SYMPTOMS
BOWEL INCONTINENCE: 0
BACK PAIN: 1

## 2023-11-06 NOTE — PROGRESS NOTES
HPI:     Back Pain  This is a chronic problem. The current episode started more than 1 year ago. The problem occurs constantly. The problem is unchanged. The pain is present in the lumbar spine. The quality of the pain is described as aching and burning. The pain does not radiate. The pain is at a severity of 5/10. The pain is moderate. The pain is Worse during the day. The symptoms are aggravated by bending, position, standing and twisting. Stiffness is present In the morning. Pertinent negatives include no bladder incontinence or bowel incontinence. She has tried bed rest, home exercises, heat, ice, walking and NSAIDs for the symptoms. Pain in the low back. MRI with previous decompression and degenerative changes. Recent SI joint injection with minimal benefit. Done physical therapy. Lyrica with some benefit. Did have MBB with excellent benefit. RFA in the past with benefit for the low back pain but did develop some neuropathic type pain that improved with Lyrica. Was recently seen in the ER for some neurologic complaints. Those has resolved. Imaging with no acute process. Patient denies any new neurological symptoms. Nobowel or bladder incontinence, no weakness, and no falling. Review of OARRS does not show any aberrant prescription behavior. Past Medical History:   Diagnosis Date    Abnormal stress test     Allergic rhinitis     Arthritis     CAD (coronary artery disease)     Dr. Reese Hamilton last seen 2/20/2020    Chronic back pain     Diabetes mellitus [facility])     Former smoker 2/7/2020    30-year history.   Quit in 2018    Hyperlipidemia     Dr. Elsy Pepper    Hypertension     Dr. Blessing New, cardiac 2/8/2020    SRI on CPAP     instructed to bring Dr. Nathan Garcia    Wears prescription eyeglasses     Wellness examination     Dr. Ally Delacruz last seen 2/13/2020       Past Surgical History:   Procedure Laterality Date    ANESTHESIA NERVE BLOCK Left 12/19/2019    NERVE BLOCK (DEFINE) - # 1 L5-S1

## 2023-11-21 DIAGNOSIS — K21.9 GASTROESOPHAGEAL REFLUX DISEASE, UNSPECIFIED WHETHER ESOPHAGITIS PRESENT: ICD-10-CM

## 2023-11-21 DIAGNOSIS — E11.9 TYPE 2 DIABETES MELLITUS WITHOUT COMPLICATION, WITHOUT LONG-TERM CURRENT USE OF INSULIN (HCC): ICD-10-CM

## 2023-11-21 DIAGNOSIS — I10 ESSENTIAL HYPERTENSION: Primary | ICD-10-CM

## 2023-11-21 RX ORDER — HYDROCHLOROTHIAZIDE 25 MG/1
25 TABLET ORAL DAILY
Qty: 30 TABLET | Refills: 5 | Status: SHIPPED | OUTPATIENT
Start: 2023-11-21

## 2023-11-21 RX ORDER — OMEPRAZOLE 40 MG/1
40 CAPSULE, DELAYED RELEASE ORAL DAILY
Qty: 90 CAPSULE | Refills: 1 | Status: SHIPPED | OUTPATIENT
Start: 2023-11-21

## 2023-11-21 RX ORDER — METFORMIN HYDROCHLORIDE 500 MG/1
TABLET, EXTENDED RELEASE ORAL
Qty: 60 TABLET | Refills: 11 | Status: SHIPPED | OUTPATIENT
Start: 2023-11-21

## 2023-11-21 NOTE — TELEPHONE ENCOUNTER
Jonathan Coleman is calling to request a refill on the following medication(s):    Medication Request:  Requested Prescriptions     Pending Prescriptions Disp Refills    omeprazole (PRILOSEC) 40 MG delayed release capsule 90 capsule 1     Sig: Take 1 capsule by mouth daily    hydroCHLOROthiazide (HYDRODIURIL) 25 MG tablet 30 tablet      Sig: Take 1 tablet by mouth daily    metFORMIN (GLUCOPHAGE-XR) 500 MG extended release tablet 60 tablet 11     Sig: take 1 tablet by mouth once daily for 2 weeks then 1 tablet twice a day       Last Visit Date (If Applicable):  4/6/7049    Next Visit Date:    Visit date not found

## 2023-12-06 ENCOUNTER — OFFICE VISIT (OUTPATIENT)
Age: 58
End: 2023-12-06
Payer: COMMERCIAL

## 2023-12-06 VITALS
HEIGHT: 64 IN | SYSTOLIC BLOOD PRESSURE: 172 MMHG | DIASTOLIC BLOOD PRESSURE: 88 MMHG | HEART RATE: 72 BPM | BODY MASS INDEX: 34.11 KG/M2 | WEIGHT: 199.8 LBS

## 2023-12-06 DIAGNOSIS — M48.061 FORAMINAL STENOSIS OF LUMBAR REGION: ICD-10-CM

## 2023-12-06 DIAGNOSIS — M51.36 LUMBAR DEGENERATIVE DISC DISEASE: Primary | ICD-10-CM

## 2023-12-06 PROCEDURE — 3077F SYST BP >= 140 MM HG: CPT | Performed by: NEUROLOGICAL SURGERY

## 2023-12-06 PROCEDURE — 99204 OFFICE O/P NEW MOD 45 MIN: CPT | Performed by: NEUROLOGICAL SURGERY

## 2023-12-06 PROCEDURE — 3079F DIAST BP 80-89 MM HG: CPT | Performed by: NEUROLOGICAL SURGERY

## 2023-12-25 DIAGNOSIS — E11.9 TYPE 2 DIABETES MELLITUS WITHOUT COMPLICATION, WITHOUT LONG-TERM CURRENT USE OF INSULIN (HCC): ICD-10-CM

## 2023-12-26 DIAGNOSIS — E11.9 TYPE 2 DIABETES MELLITUS WITHOUT COMPLICATION, WITHOUT LONG-TERM CURRENT USE OF INSULIN (HCC): ICD-10-CM

## 2023-12-26 RX ORDER — EMPAGLIFLOZIN 10 MG/1
TABLET, FILM COATED ORAL
Qty: 30 TABLET | Refills: 2 | OUTPATIENT
Start: 2023-12-26

## 2023-12-26 RX ORDER — TIRZEPATIDE 10 MG/.5ML
INJECTION, SOLUTION SUBCUTANEOUS
Qty: 2 ML | Refills: 1 | Status: SHIPPED | OUTPATIENT
Start: 2023-12-26

## 2023-12-26 NOTE — TELEPHONE ENCOUNTER
Genna San is calling to request a refill on the following medication(s):    Medication Request:  Requested Prescriptions     Pending Prescriptions Disp Refills    MOUNJARO 10 MG/0.5ML SOPN SC injection [Pharmacy Med Name: MOUNJARO 10 MG/0.5 ML PEN] 2 mL 1     Sig: inject 0.5 milliliters subcutaneously weekly    empagliflozin (JARDIANCE) 10 MG tablet 30 tablet 2     Sig: Take 1 tablet by mouth daily       Last Visit Date (If Applicable):  3/3/8078    Next Visit Date:

## 2024-01-02 ENCOUNTER — OFFICE VISIT (OUTPATIENT)
Dept: NEUROLOGY | Age: 59
End: 2024-01-02

## 2024-01-02 VITALS
HEIGHT: 64 IN | SYSTOLIC BLOOD PRESSURE: 163 MMHG | BODY MASS INDEX: 33.97 KG/M2 | DIASTOLIC BLOOD PRESSURE: 92 MMHG | WEIGHT: 199 LBS | HEART RATE: 70 BPM | OXYGEN SATURATION: 94 %

## 2024-01-02 DIAGNOSIS — R20.0 NUMBNESS AND TINGLING: Primary | ICD-10-CM

## 2024-01-02 DIAGNOSIS — R20.2 NUMBNESS AND TINGLING: Primary | ICD-10-CM

## 2024-01-02 DIAGNOSIS — G56.02 CARPAL TUNNEL SYNDROME, LEFT: ICD-10-CM

## 2024-01-02 DIAGNOSIS — M54.16 LUMBAR RADICULOPATHY: ICD-10-CM

## 2024-01-02 ASSESSMENT — ENCOUNTER SYMPTOMS
NAUSEA: 0
VOMITING: 0
COLOR CHANGE: 0
WHEEZING: 0
CHEST TIGHTNESS: 0
SHORTNESS OF BREATH: 0
TROUBLE SWALLOWING: 0
PHOTOPHOBIA: 0
VOICE CHANGE: 0

## 2024-01-02 NOTE — PROGRESS NOTES
VI - extra-ocular muscles full: no pupillary defect; no KHURRAM, no nystagmus, no ptosis   V - normal facial sensation                                                               VII - normal facial symmetry                                                             VIII - intact hearing                                                                             IX, X - symmetrical palate                                                                  XI - symmetrical shoulder shrug                                                       XII - midline tongue without atrophy or fasciculation     Motor function  Normal muscle bulk and tone  Muscle strength: normal power 5/5  fine motor movements     Sensory function Intact to touch, pin prick, vibration, proprioception in bilateral upper and lower extremities.      Cerebellar Intact fine motor movement. No involuntary movements or tremors     Reflex function Intact 2+ DTR and symmetric. Negative Babinski     Gait                  Normal station and gait           PRIOR TESTS AND IMAGING: Following images and Labs were reviewed by the examiner             ASSESSMENT       Transient left forearm, hand, and perioral numbness     Symptoms are not consistent with stroke. They are unlikely related to sensory seizure. Possible Migraine aura phenomena, but patient lacks other features to be consistent with migraine. Symptoms have not recurred, and MRI brain is negative for stroke. Patient can monitor for future recurrences.If it occurs again, then will consider obtaining EEG or further migraine treatment.    No further work up from neurologic perspective.       PLAN:     Follow up as needed.   We discussed and recommend modification of vascular risk factors including managing blood pressure, cholesterol, blood glucose, avoiding tobacco abuse, eating a heart and brain healthy diet, and regular exercise.   She should go to the ER with any new or worsening numbness, weakness,

## 2024-01-06 NOTE — PROGRESS NOTES
[facility], [facility] [place]  [address]  [facility] [address]  Dept: [phone]    Chief complaint:   Chief Complaint   Patient presents with    New Patient    Vaginal Bleeding     After being treated for a UTI, bleeding has resolved       History Present Illness: Beata is a 57 yo female who presents for her annual exam, and to establish care.  She had some bleeding that she thought came from the vagina, but was treated for a UTI and the bleeding resolved.  She had a hysterectomy at age 30 for heavy painful periods with a fibroid uterus.  She had menarche at age 13.  She is not sexually active with her .  She says that intercourse is uncomfortable.  She has lost 50 lbs with mounjaro.  She denies any vaginal discharge or irritation.  She has leakage with laugh, cough and sneeze.  She tried pelvic floor PT and didn't think it made a difference.  She denies any bowel issues.      Current Medications (OTC/Herbal):   Current Outpatient Medications   Medication Sig Dispense Refill    MOUNJARO 10 MG/0.5ML SOPN SC injection inject 0.5 milliliters subcutaneously weekly 2 mL 1    empagliflozin (JARDIANCE) 10 MG tablet Take 1 tablet by mouth daily 30 tablet 2    omeprazole (PRILOSEC) 40 MG delayed release capsule Take 1 capsule by mouth daily 90 capsule 1    hydroCHLOROthiazide (HYDRODIURIL) 25 MG tablet Take 1 tablet by mouth daily 30 tablet 5    metFORMIN (GLUCOPHAGE-XR) 500 MG extended release tablet take 1 tablet by mouth once daily for 2 weeks then 1 tablet twice a day 60 tablet 11    carvedilol (COREG) 12.5 MG tablet Take 2 tablets by mouth 2 times daily 180 tablet 3    ranolazine (RANEXA) 500 MG extended release tablet Take 1 tablet by mouth 2 times daily (Patient not taking: Reported on 1/9/2024) 60 tablet 5    amLODIPine (NORVASC) 10 MG tablet Take 1 tablet by mouth daily 30 tablet 4    atorvastatin (LIPITOR) 80 MG tablet Take 1 tablet

## 2024-01-09 ENCOUNTER — OFFICE VISIT (OUTPATIENT)
Dept: OBGYN CLINIC | Age: 59
End: 2024-01-09
Payer: COMMERCIAL

## 2024-01-09 VITALS
DIASTOLIC BLOOD PRESSURE: 82 MMHG | HEIGHT: 64 IN | SYSTOLIC BLOOD PRESSURE: 158 MMHG | WEIGHT: 199.8 LBS | HEART RATE: 62 BPM | BODY MASS INDEX: 34.11 KG/M2

## 2024-01-09 DIAGNOSIS — Z12.31 ENCOUNTER FOR SCREENING MAMMOGRAM FOR BREAST CANCER: ICD-10-CM

## 2024-01-09 DIAGNOSIS — Z01.419 GYNECOLOGIC EXAM NORMAL: Primary | ICD-10-CM

## 2024-01-09 DIAGNOSIS — Z76.89 ENCOUNTER TO ESTABLISH CARE WITH NEW DOCTOR: ICD-10-CM

## 2024-01-09 PROCEDURE — 3079F DIAST BP 80-89 MM HG: CPT | Performed by: OBSTETRICS & GYNECOLOGY

## 2024-01-09 PROCEDURE — 99386 PREV VISIT NEW AGE 40-64: CPT | Performed by: OBSTETRICS & GYNECOLOGY

## 2024-01-09 PROCEDURE — 3077F SYST BP >= 140 MM HG: CPT | Performed by: OBSTETRICS & GYNECOLOGY

## 2024-01-09 ASSESSMENT — ENCOUNTER SYMPTOMS
SHORTNESS OF BREATH: 0
ABDOMINAL PAIN: 0
BACK PAIN: 0
COUGH: 0

## 2024-01-12 ENCOUNTER — OFFICE VISIT (OUTPATIENT)
Dept: FAMILY MEDICINE CLINIC | Age: 59
End: 2024-01-12
Payer: COMMERCIAL

## 2024-01-12 VITALS
TEMPERATURE: 97.3 F | HEART RATE: 73 BPM | WEIGHT: 199.6 LBS | SYSTOLIC BLOOD PRESSURE: 142 MMHG | BODY MASS INDEX: 34.26 KG/M2 | DIASTOLIC BLOOD PRESSURE: 90 MMHG | OXYGEN SATURATION: 96 %

## 2024-01-12 DIAGNOSIS — R93.6 ABNORMAL X-RAY OF SHOULDER: ICD-10-CM

## 2024-01-12 DIAGNOSIS — I10 ESSENTIAL HYPERTENSION: ICD-10-CM

## 2024-01-12 DIAGNOSIS — G89.29 CHRONIC PAIN IN LEFT SHOULDER: Primary | ICD-10-CM

## 2024-01-12 DIAGNOSIS — M25.512 CHRONIC PAIN IN LEFT SHOULDER: Primary | ICD-10-CM

## 2024-01-12 DIAGNOSIS — Z12.31 ENCOUNTER FOR SCREENING MAMMOGRAM FOR MALIGNANT NEOPLASM OF BREAST: Primary | ICD-10-CM

## 2024-01-12 PROCEDURE — 99214 OFFICE O/P EST MOD 30 MIN: CPT

## 2024-01-12 PROCEDURE — 3080F DIAST BP >= 90 MM HG: CPT

## 2024-01-12 PROCEDURE — 3077F SYST BP >= 140 MM HG: CPT

## 2024-01-12 SDOH — ECONOMIC STABILITY: INCOME INSECURITY: HOW HARD IS IT FOR YOU TO PAY FOR THE VERY BASICS LIKE FOOD, HOUSING, MEDICAL CARE, AND HEATING?: NOT HARD AT ALL

## 2024-01-12 SDOH — ECONOMIC STABILITY: FOOD INSECURITY: WITHIN THE PAST 12 MONTHS, YOU WORRIED THAT YOUR FOOD WOULD RUN OUT BEFORE YOU GOT MONEY TO BUY MORE.: NEVER TRUE

## 2024-01-12 SDOH — ECONOMIC STABILITY: FOOD INSECURITY: WITHIN THE PAST 12 MONTHS, THE FOOD YOU BOUGHT JUST DIDN'T LAST AND YOU DIDN'T HAVE MONEY TO GET MORE.: NEVER TRUE

## 2024-01-12 ASSESSMENT — PATIENT HEALTH QUESTIONNAIRE - PHQ9
SUM OF ALL RESPONSES TO PHQ QUESTIONS 1-9: 0
1. LITTLE INTEREST OR PLEASURE IN DOING THINGS: 0
SUM OF ALL RESPONSES TO PHQ QUESTIONS 1-9: 0
SUM OF ALL RESPONSES TO PHQ9 QUESTIONS 1 & 2: 0
2. FEELING DOWN, DEPRESSED OR HOPELESS: 0

## 2024-01-12 NOTE — PROGRESS NOTES
Beata Mohan (:  1965) is a 58 y.o. female,Established patient, here for evaluation of the following chief complaint(s):  Shoulder Pain          Subjective   SUBJECTIVE/OBJECTIVE:  Pt here today for shoulder pain  BP elevated    This is not a new problem pt has dealt w/ this for years  Denies new injury  She was evaluated by PCP 2022 for bilateral chronic shoulder pain, xrays were obtained  Degenerative changes noted to bilateral shoulder, left shoulder did have a finding of a 2 mm loose body  This correlates to where pt is having reduced ROM and pain    She was evaluated by 2 different ortho providers  Pt was told her pain was not shoulder related and received an injection in her hand  This worsened her chronic hand pain and did not improve her shoulder  2nd opinion completed EMG and no further shoulder eval    Pt has completed PT in the past w/ slight improvement to ROM  She continues to do exercises at home daily but finds her pain limits her ability on the left side worse than it had previously        Review of Systems       Objective   Physical Exam  Vitals and nursing note reviewed.   Constitutional:       Appearance: Normal appearance.   Musculoskeletal:      Right shoulder: Normal.      Left shoulder: Tenderness, bony tenderness and crepitus present. No swelling, deformity or effusion. Decreased range of motion. Decreased strength. Normal pulse.   Skin:     General: Skin is warm and dry.   Neurological:      Mental Status: She is alert.                 ASSESSMENT/PLAN:  1. Chronic pain in left shoulder  2. Abnormal x-ray of shoulder  -MRI for eval d/t worsening chronic sx and abnormal finding of loose body on previous xray    -     MRI SHOULDER LEFT WO CONTRAST; Future    3. Essential hypertension  -above goal, follows w/ cardiology      Return if symptoms worsen or fail to improve.               An electronic signature was used to authenticate this note.    --Khloe Dia, APRN - CNP

## 2024-01-17 ENCOUNTER — TELEPHONE (OUTPATIENT)
Dept: PAIN MANAGEMENT | Age: 59
End: 2024-01-17

## 2024-01-17 ENCOUNTER — HOSPITAL ENCOUNTER (OUTPATIENT)
Dept: MRI IMAGING | Age: 59
Discharge: HOME | End: 2024-01-19
Payer: COMMERCIAL

## 2024-01-17 DIAGNOSIS — G89.29 OTHER CHRONIC PAIN: ICD-10-CM

## 2024-01-17 DIAGNOSIS — G89.29 CHRONIC PAIN IN LEFT SHOULDER: ICD-10-CM

## 2024-01-17 DIAGNOSIS — M96.1 POSTLAMINECTOMY SYNDROME, LUMBAR REGION: ICD-10-CM

## 2024-01-17 DIAGNOSIS — M47.817 LUMBOSACRAL SPONDYLOSIS WITHOUT MYELOPATHY: ICD-10-CM

## 2024-01-17 DIAGNOSIS — M25.512 CHRONIC PAIN IN LEFT SHOULDER: ICD-10-CM

## 2024-01-17 DIAGNOSIS — M51.36 DEGENERATION OF LUMBAR INTERVERTEBRAL DISC: ICD-10-CM

## 2024-01-17 DIAGNOSIS — R93.6 ABNORMAL X-RAY OF SHOULDER: ICD-10-CM

## 2024-01-17 PROCEDURE — 73221 MRI JOINT UPR EXTREM W/O DYE: CPT

## 2024-01-17 RX ORDER — PREGABALIN 75 MG/1
75 CAPSULE ORAL 2 TIMES DAILY
Qty: 60 CAPSULE | Refills: 0 | Status: SHIPPED | OUTPATIENT
Start: 2024-01-17 | End: 2024-03-17

## 2024-01-17 RX ORDER — PREGABALIN 75 MG/1
CAPSULE ORAL
Qty: 60 CAPSULE | OUTPATIENT
Start: 2024-01-17

## 2024-01-17 NOTE — TELEPHONE ENCOUNTER
Need refill on Lyrica usually comes every 3mths, last refill was only for 2 months, next appt 2/12

## 2024-01-19 DIAGNOSIS — G89.29 CHRONIC PAIN IN LEFT SHOULDER: ICD-10-CM

## 2024-01-19 DIAGNOSIS — M19.042 LOCALIZED PRIMARY OSTEOARTHRITIS OF LEFT HAND: ICD-10-CM

## 2024-01-19 DIAGNOSIS — G56.02 CARPAL TUNNEL SYNDROME, LEFT: Primary | ICD-10-CM

## 2024-01-19 DIAGNOSIS — M25.512 CHRONIC PAIN IN LEFT SHOULDER: ICD-10-CM

## 2024-01-26 ENCOUNTER — HOSPITAL ENCOUNTER (OUTPATIENT)
Dept: MAMMOGRAPHY | Age: 59
Discharge: HOME | End: 2024-01-26
Attending: OBSTETRICS & GYNECOLOGY
Payer: COMMERCIAL

## 2024-01-26 VITALS — HEIGHT: 64 IN | WEIGHT: 200 LBS | BODY MASS INDEX: 34.15 KG/M2

## 2024-01-26 DIAGNOSIS — Z12.31 ENCOUNTER FOR SCREENING MAMMOGRAM FOR BREAST CANCER: ICD-10-CM

## 2024-01-26 PROCEDURE — 77063 BREAST TOMOSYNTHESIS BI: CPT

## 2024-02-01 ENCOUNTER — OFFICE VISIT (OUTPATIENT)
Age: 59
End: 2024-02-01

## 2024-02-01 VITALS — BODY MASS INDEX: 34.15 KG/M2 | WEIGHT: 200 LBS | HEIGHT: 64 IN

## 2024-02-01 DIAGNOSIS — M75.21 BICEPS TENDINITIS OF BOTH UPPER EXTREMITIES: Primary | ICD-10-CM

## 2024-02-01 DIAGNOSIS — M75.22 BICEPS TENDINITIS OF BOTH UPPER EXTREMITIES: Primary | ICD-10-CM

## 2024-02-01 RX ADMIN — METHYLPREDNISOLONE ACETATE 80 MG: 80 INJECTION, SUSPENSION INTRA-ARTICULAR; INTRALESIONAL; INTRAMUSCULAR; SOFT TISSUE at 08:47

## 2024-02-01 RX ADMIN — LIDOCAINE HYDROCHLORIDE 2 ML: 10 INJECTION, SOLUTION INFILTRATION; PERINEURAL at 08:46

## 2024-02-01 SDOH — HEALTH STABILITY: PHYSICAL HEALTH: ON AVERAGE, HOW MANY DAYS PER WEEK DO YOU ENGAGE IN MODERATE TO STRENUOUS EXERCISE (LIKE A BRISK WALK)?: 5 DAYS

## 2024-02-01 SDOH — HEALTH STABILITY: PHYSICAL HEALTH: ON AVERAGE, HOW MANY MINUTES DO YOU ENGAGE IN EXERCISE AT THIS LEVEL?: 80 MIN

## 2024-02-01 NOTE — PROGRESS NOTES
[facility]      [facility] PHYSIC[facility]ANS [facility]  [facility] ORTHOPAEDICS AND SPORTS MEDICINE  [facility]  [place] [address]  Dept: [phone]  Dept Fax: [phone]    Chief Compliant:  Chief Complaint   Patient presents with    Shoulder Pain     L shoulder        History of Present Illness:  This is a pleasant 58 y.o. female who is here for evaluation of chronic left shoulder pain and new onset right shoulder pain. She has had left shoulder pain for a little over a year. She was previously seeing Dr. Restrepo who diagnosed her with carpal tunnel syndrome and injected the left carpal tunnel, though patient states she was having shoulder pain and no CTS when she saw him. She occasionally takes Tylenol and completed multiple sessions of physical therapy with minimal relief of symptoms. She states both shoulders feel like they catch/lock when doing any overhead or reaching activities. States the other day she went to catch a pillow from falling overhead with the left shoulder and felt a sudden severe pain but then later was able to have better range of motion of the shoulder with little to no pain. Her right shoulder is now more painful than the left. She points to anterior shoulder pain over the biceps tendon on both shoulders but states this pain is not what she was previously experiencing. Denies any known injury or fall. Denies any numbness or tingling. History of carpal tunnel release surgery on the right hand 5 years ago.      Physical Exam: Left shoulder: full range of motion, equal to contralateral side. Mild to moderate TTP over biceps tendon insertion. 5/5 rotator cuff strength. Normal lift off and belly press. Negative Speed's test. Negative Yergason's test. Minimal pain with cross body adduction.     Right shoulder: Full range of motion, equal to contralateral side. Moderate to severe TTP over biceps tendon insertion. 5/5 rotator cuff

## 2024-02-02 RX ORDER — METHYLPREDNISOLONE ACETATE 80 MG/ML
80 INJECTION, SUSPENSION INTRA-ARTICULAR; INTRALESIONAL; INTRAMUSCULAR; SOFT TISSUE ONCE
Status: COMPLETED | OUTPATIENT
Start: 2024-02-02 | End: 2024-02-01

## 2024-02-02 RX ORDER — LIDOCAINE HYDROCHLORIDE 10 MG/ML
2 INJECTION, SOLUTION INFILTRATION; PERINEURAL ONCE
Status: COMPLETED | OUTPATIENT
Start: 2024-02-02 | End: 2024-02-01

## 2024-02-12 ENCOUNTER — OFFICE VISIT (OUTPATIENT)
Dept: PAIN MANAGEMENT | Age: 59
End: 2024-02-12
Payer: COMMERCIAL

## 2024-02-12 VITALS — BODY MASS INDEX: 34.15 KG/M2 | WEIGHT: 200 LBS | HEIGHT: 64 IN

## 2024-02-12 DIAGNOSIS — M51.36 DEGENERATION OF LUMBAR INTERVERTEBRAL DISC: ICD-10-CM

## 2024-02-12 DIAGNOSIS — M47.817 LUMBOSACRAL SPONDYLOSIS WITHOUT MYELOPATHY: ICD-10-CM

## 2024-02-12 DIAGNOSIS — G89.29 OTHER CHRONIC PAIN: ICD-10-CM

## 2024-02-12 DIAGNOSIS — M96.1 POSTLAMINECTOMY SYNDROME, LUMBAR REGION: ICD-10-CM

## 2024-02-12 PROCEDURE — 99213 OFFICE O/P EST LOW 20 MIN: CPT | Performed by: NURSE PRACTITIONER

## 2024-02-12 RX ORDER — PREGABALIN 75 MG/1
75 CAPSULE ORAL 2 TIMES DAILY
Qty: 60 CAPSULE | Refills: 2 | Status: SHIPPED | OUTPATIENT
Start: 2024-02-12 | End: 2024-05-12

## 2024-02-12 NOTE — PROGRESS NOTES
No  significant spinal canal stenosis or neural foraminal narrowing is present.     L3-L4: There is no disc bulge or protrusion present.  There is no significant  spinal canal stenosis or neural foraminal narrowing present.  There is  bilateral facet arthropathy.     L4-L5: Status post left hemilaminectomy.  There is scar formation extending  along the left lateral recess, similar to the previous exam.  There is a disc  bulge, facet arthropathy and posterior osteophyte formation resulting in  severe left and moderate right neural foraminal narrowing, not significantly  changed.  Mild effacement of the lateral recesses is unchanged.  There is no  significant spinal canal stenosis.     L5-S1: Status post right hemilaminectomy.  There is abnormal enhancement  extending along the right lateral recess suggesting scar formation.  There is  a disc bulge and facet arthropathy resulting in severe right neural foraminal  narrowing.  No significant left neural foraminal narrowing is present.     IMPRESSION:  1. Interval resolution of a left paracentral disc extrusion at L4-5.  There  is improved effacement of the left lateral recess.  2. Persistent disc bulge, facet arthropathy and posterior osteophyte  formation at L4-5 resulting in severe left and moderate right neural  foraminal narrowing.  Enhancement along the left lateral recess suggest scar  formation.  3. Severe right neural foraminal narrowing L5-S1 secondary to a disc bulge  and posterior osteophyte formation.  Enhancement along the right lateral  recess suggest scar formation.  Appearance is similar to the previous exam.    Assessment:  Problem List Items Addressed This Visit       Lumbosacral spondylosis without myelopathy    Relevant Medications    pregabalin (LYRICA) 75 MG capsule     Other Visit Diagnoses       Postlaminectomy syndrome, lumbar region        Relevant Medications    pregabalin (LYRICA) 75 MG capsule    Degeneration of lumbar intervertebral disc

## 2024-02-18 DIAGNOSIS — E11.9 TYPE 2 DIABETES MELLITUS WITHOUT COMPLICATION, WITHOUT LONG-TERM CURRENT USE OF INSULIN (HCC): ICD-10-CM

## 2024-02-19 NOTE — TELEPHONE ENCOUNTER
Beata Mohan is calling to request a refill on the following medication(s):    Medication Request:  Requested Prescriptions     Pending Prescriptions Disp Refills    MOUNJARO 10 MG/0.5ML SOPN SC injection [Pharmacy Med Name: MOUNJARO 10 MG/0.5 ML PEN] 2 mL 1     Sig: inject 0.5 milliliters subcutaneously weekly       Last Visit Date (If Applicable):  9/8/2023    Next Visit Date:    Visit date not found

## 2024-02-20 RX ORDER — TIRZEPATIDE 10 MG/.5ML
INJECTION, SOLUTION SUBCUTANEOUS
Qty: 2 ML | Refills: 1 | Status: SHIPPED | OUTPATIENT
Start: 2024-02-20

## 2024-03-10 DIAGNOSIS — E11.9 TYPE 2 DIABETES MELLITUS WITHOUT COMPLICATION, WITHOUT LONG-TERM CURRENT USE OF INSULIN (HCC): ICD-10-CM

## 2024-03-11 RX ORDER — TIRZEPATIDE 10 MG/.5ML
INJECTION, SOLUTION SUBCUTANEOUS
Qty: 2 ML | Refills: 1 | Status: SHIPPED | OUTPATIENT
Start: 2024-03-11

## 2024-03-11 NOTE — TELEPHONE ENCOUNTER
Beata Mohan is calling to request a refill on the following medication(s):    Medication Request:  Requested Prescriptions     Pending Prescriptions Disp Refills    Tirzepatide (MOUNJARO) 10 MG/0.5ML SOPN SC injection 2 mL 1       Last Visit Date (If Applicable):  9/8/2023    Next Visit Date:    Visit date not found

## 2024-03-26 DIAGNOSIS — E11.9 TYPE 2 DIABETES MELLITUS WITHOUT COMPLICATION, WITHOUT LONG-TERM CURRENT USE OF INSULIN (HCC): ICD-10-CM

## 2024-03-26 RX ORDER — EMPAGLIFLOZIN 10 MG/1
10 TABLET, FILM COATED ORAL DAILY
Qty: 30 TABLET | Refills: 2 | Status: SHIPPED | OUTPATIENT
Start: 2024-03-26

## 2024-03-26 NOTE — TELEPHONE ENCOUNTER
Beata Mohan is calling to request a refill on the following medication(s):    Medication Request:  Requested Prescriptions     Pending Prescriptions Disp Refills    JARDIANCE 10 MG tablet [Pharmacy Med Name: JARDIANCE 10 MG TABLET] 30 tablet 2     Sig: take 1 tablet by mouth once daily       Last Visit Date (If Applicable):  9/8/2023    Next Visit Date:    Visit date not found

## 2024-03-27 ENCOUNTER — TELEPHONE (OUTPATIENT)
Dept: FAMILY MEDICINE CLINIC | Age: 59
End: 2024-03-27

## 2024-03-27 DIAGNOSIS — I10 ESSENTIAL HYPERTENSION: Primary | ICD-10-CM

## 2024-03-27 DIAGNOSIS — E78.2 MIXED HYPERLIPIDEMIA: ICD-10-CM

## 2024-03-27 DIAGNOSIS — G47.33 OSA ON CPAP: ICD-10-CM

## 2024-03-27 DIAGNOSIS — E11.9 TYPE 2 DIABETES MELLITUS WITHOUT COMPLICATION, WITHOUT LONG-TERM CURRENT USE OF INSULIN (HCC): ICD-10-CM

## 2024-03-27 NOTE — TELEPHONE ENCOUNTER
Patient was wanting to know if she can go up on her dose of mounjaro. Do you want to see her? Please advise.

## 2024-03-30 ENCOUNTER — HOSPITAL ENCOUNTER (OUTPATIENT)
Age: 59
Discharge: HOME | End: 2024-03-30
Payer: COMMERCIAL

## 2024-03-30 DIAGNOSIS — G47.33 OSA ON CPAP: ICD-10-CM

## 2024-03-30 DIAGNOSIS — E78.2 MIXED HYPERLIPIDEMIA: ICD-10-CM

## 2024-03-30 DIAGNOSIS — E11.9 TYPE 2 DIABETES MELLITUS WITHOUT COMPLICATION, WITHOUT LONG-TERM CURRENT USE OF INSULIN (HCC): ICD-10-CM

## 2024-03-30 LAB
ALBUMIN SERPL-MCNC: 3.9 G/DL (ref 3.5–5.2)
ALBUMIN/GLOB SERPL: 2 {RATIO} (ref 1–2.5)
ALP SERPL-CCNC: 101 U/L (ref 35–104)
ALT SERPL-CCNC: 16 U/L (ref 10–35)
ANION GAP SERPL CALCULATED.3IONS-SCNC: 9 MMOL/L (ref 9–16)
AST SERPL-CCNC: 21 U/L (ref 10–35)
BASOPHILS # BLD: 0.05 K/UL (ref 0–0.2)
BASOPHILS NFR BLD: 1 % (ref 0–2)
BILIRUB SERPL-MCNC: 0.4 MG/DL (ref 0–1.2)
BUN SERPL-MCNC: 10 MG/DL (ref 6–20)
CALCIUM SERPL-MCNC: 9.4 MG/DL (ref 8.6–10.4)
CHLORIDE SERPL-SCNC: 106 MMOL/L (ref 98–107)
CHOLEST SERPL-MCNC: 254 MG/DL (ref 0–199)
CHOLESTEROL/HDL RATIO: 4
CO2 SERPL-SCNC: 27 MMOL/L (ref 20–31)
CREAT SERPL-MCNC: 0.7 MG/DL (ref 0.5–0.9)
EOSINOPHIL # BLD: 0.1 K/UL (ref 0–0.44)
EOSINOPHILS RELATIVE PERCENT: 2 % (ref 1–4)
ERYTHROCYTE [DISTWIDTH] IN BLOOD BY AUTOMATED COUNT: 14.3 % (ref 11.8–14.4)
GFR SERPL CREATININE-BSD FRML MDRD: >90 ML/MIN/1.73M2
GLUCOSE SERPL-MCNC: 112 MG/DL (ref 74–99)
HCT VFR BLD AUTO: 40.6 % (ref 36.3–47.1)
HDLC SERPL-MCNC: 60 MG/DL
HGB BLD-MCNC: 13.3 G/DL (ref 11.9–15.1)
IMM GRANULOCYTES # BLD AUTO: <0.03 K/UL (ref 0–0.3)
IMM GRANULOCYTES NFR BLD: 0 %
LDLC SERPL CALC-MCNC: 170 MG/DL (ref 0–100)
LYMPHOCYTES NFR BLD: 2.09 K/UL (ref 1.1–3.7)
LYMPHOCYTES RELATIVE PERCENT: 41 % (ref 24–43)
MAGNESIUM SERPL-MCNC: 2 MG/DL (ref 1.6–2.6)
MCH RBC QN AUTO: 28.5 PG (ref 25.2–33.5)
MCHC RBC AUTO-ENTMCNC: 32.8 G/DL (ref 28.4–34.8)
MCV RBC AUTO: 87.1 FL (ref 82.6–102.9)
MONOCYTES NFR BLD: 0.46 K/UL (ref 0.1–1.2)
MONOCYTES NFR BLD: 9 % (ref 3–12)
NEUTROPHILS NFR BLD: 47 % (ref 36–65)
NEUTS SEG NFR BLD: 2.43 K/UL (ref 1.5–8.1)
NRBC BLD-RTO: 0 PER 100 WBC
PLATELET # BLD AUTO: 225 K/UL (ref 138–453)
PMV BLD AUTO: 11 FL (ref 8.1–13.5)
POTASSIUM SERPL-SCNC: 3.9 MMOL/L (ref 3.7–5.3)
PROT SERPL-MCNC: 6.5 G/DL (ref 6.6–8.7)
RBC # BLD AUTO: 4.66 M/UL (ref 3.95–5.11)
SODIUM SERPL-SCNC: 142 MMOL/L (ref 136–145)
TRIGL SERPL-MCNC: 122 MG/DL
VLDLC SERPL CALC-MCNC: 24 MG/DL
WBC OTHER # BLD: 5.1 K/UL (ref 3.5–11.3)

## 2024-03-30 PROCEDURE — 83036 HEMOGLOBIN GLYCOSYLATED A1C: CPT

## 2024-03-30 PROCEDURE — 85025 COMPLETE CBC W/AUTO DIFF WBC: CPT

## 2024-03-30 PROCEDURE — 83735 ASSAY OF MAGNESIUM: CPT

## 2024-03-30 PROCEDURE — 80061 LIPID PANEL: CPT

## 2024-03-30 PROCEDURE — 80053 COMPREHEN METABOLIC PANEL: CPT

## 2024-03-30 PROCEDURE — 36415 COLL VENOUS BLD VENIPUNCTURE: CPT

## 2024-03-31 LAB
EST. AVERAGE GLUCOSE BLD GHB EST-MCNC: 103 MG/DL
HBA1C MFR BLD: 5.2 % (ref 4–6)

## 2024-04-11 ENCOUNTER — HOSPITAL ENCOUNTER (OUTPATIENT)
Age: 59
Setting detail: SPECIMEN
Discharge: HOME | End: 2024-04-11

## 2024-04-11 ENCOUNTER — OFFICE VISIT (OUTPATIENT)
Dept: FAMILY MEDICINE CLINIC | Age: 59
End: 2024-04-11
Payer: COMMERCIAL

## 2024-04-11 VITALS
DIASTOLIC BLOOD PRESSURE: 68 MMHG | WEIGHT: 206 LBS | BODY MASS INDEX: 35.36 KG/M2 | SYSTOLIC BLOOD PRESSURE: 132 MMHG | OXYGEN SATURATION: 98 % | HEART RATE: 75 BPM

## 2024-04-11 DIAGNOSIS — E66.01 SEVERE OBESITY (BMI 35.0-39.9) WITH COMORBIDITY (HCC): ICD-10-CM

## 2024-04-11 DIAGNOSIS — I72.4 ANEURYSM OF ARTERY OF LOWER EXTREMITY (HCC): ICD-10-CM

## 2024-04-11 DIAGNOSIS — R39.15 URINARY URGENCY: ICD-10-CM

## 2024-04-11 DIAGNOSIS — E78.2 MIXED HYPERLIPIDEMIA: ICD-10-CM

## 2024-04-11 DIAGNOSIS — E11.9 TYPE 2 DIABETES MELLITUS WITHOUT COMPLICATION, WITHOUT LONG-TERM CURRENT USE OF INSULIN (HCC): Primary | ICD-10-CM

## 2024-04-11 DIAGNOSIS — R01.1 MURMUR, CARDIAC: ICD-10-CM

## 2024-04-11 DIAGNOSIS — E83.42 HYPOMAGNESEMIA: ICD-10-CM

## 2024-04-11 DIAGNOSIS — I10 ESSENTIAL HYPERTENSION: ICD-10-CM

## 2024-04-11 DIAGNOSIS — M46.1 SACROILIITIS, NOT ELSEWHERE CLASSIFIED (HCC): ICD-10-CM

## 2024-04-11 LAB
BILIRUBIN, POC: NEGATIVE
BLOOD URINE, POC: NORMAL
CLARITY, POC: CLEAR
COLOR, POC: YELLOW
GLUCOSE URINE, POC: 500
KETONES, POC: NORMAL
LEUKOCYTE EST, POC: NEGATIVE
NITRITE, POC: NEGATIVE
PH, POC: 6.5
PROTEIN, POC: 100
SPECIFIC GRAVITY, POC: 1.02
UROBILINOGEN, POC: 1

## 2024-04-11 PROCEDURE — 3078F DIAST BP <80 MM HG: CPT | Performed by: FAMILY MEDICINE

## 2024-04-11 PROCEDURE — 3044F HG A1C LEVEL LT 7.0%: CPT | Performed by: FAMILY MEDICINE

## 2024-04-11 PROCEDURE — 81002 URINALYSIS NONAUTO W/O SCOPE: CPT | Performed by: FAMILY MEDICINE

## 2024-04-11 PROCEDURE — 3075F SYST BP GE 130 - 139MM HG: CPT | Performed by: FAMILY MEDICINE

## 2024-04-11 PROCEDURE — 99214 OFFICE O/P EST MOD 30 MIN: CPT | Performed by: FAMILY MEDICINE

## 2024-04-11 RX ORDER — TIRZEPATIDE 12.5 MG/.5ML
12.5 INJECTION, SOLUTION SUBCUTANEOUS WEEKLY
Qty: 2 ML | Refills: 0 | Status: SHIPPED | OUTPATIENT
Start: 2024-04-11

## 2024-04-11 RX ORDER — METFORMIN HYDROCHLORIDE 500 MG/1
500 TABLET, EXTENDED RELEASE ORAL
Qty: 90 TABLET | Refills: 3
Start: 2024-04-11

## 2024-04-11 NOTE — PROGRESS NOTES
Left 12/12/2019    SACROILIAC JOINT INJECTION - #1 performed by Paulie Castro MD at [facility] OR    HYSTERECTOMY (CERVIX STATUS UNKNOWN)      LUMBAR DISC SURGERY      NERVE BLOCK  07/17/2020     NERVE BLOCK BILATERAL - B MBB # 1 L4-5, L5-S1 (Bilateral     NERVE BLOCK Bilateral 08/14/2020    NERVE BLOCK BILATERAL - L-5, L5-S1 (Bilateral )     NERVE BLOCK Left 09/18/2020    NERVE RADIOFREQUENCY ABLATION- L4-5, L5-S1    NERVE BLOCK Right 09/25/2020     NERVE RADIOFREQUENCY ABLATION (Right )     PAIN MANAGEMENT PROCEDURE Left 9/18/2020    NERVE RADIOFREQUENCY ABLATION- L4-5, L5-S1 performed by Paulie Castro MD at [facility] OR    PAIN MANAGEMENT PROCEDURE Right 9/25/2020    NERVE RADIOFREQUENCY ABLATION performed by Paulie Castro MD at [facility] OR    TONSILLECTOMY      TUBAL LIGATION          Physical Examination      Vitals:  /68   Pulse 75   Wt 93.4 kg (206 lb)   SpO2 98%   BMI 35.36 kg/m²     Physical Exam  Vitals and nursing note reviewed.   Constitutional:       General: She is not in acute distress.     Appearance: Normal appearance. She is obese. She is not ill-appearing, toxic-appearing or diaphoretic.   HENT:      Head: Normocephalic and atraumatic.   Eyes:      General: No scleral icterus.        Right eye: No discharge.         Left eye: No discharge.      Extraocular Movements: Extraocular movements intact.      Conjunctiva/sclera: Conjunctivae normal.   Cardiovascular:      Rate and Rhythm: Normal rate and regular rhythm.      Pulses: Normal pulses.      Heart sounds: Murmur heard.      No friction rub. No gallop.   Pulmonary:      Effort: Pulmonary effort is normal. No respiratory distress.      Breath sounds: Normal breath sounds. No stridor. No wheezing, rhonchi or rales.   Chest:      Chest wall: No tenderness.   Neurological:      Mental Status: She is alert and oriented to person, place, and time. Mental status is at baseline.   Psychiatric:         Mood and

## 2024-04-12 DIAGNOSIS — R39.15 URINARY URGENCY: ICD-10-CM

## 2024-04-12 LAB
BACTERIA URNS QL MICRO: NORMAL
BILIRUB UR QL STRIP: NEGATIVE
CASTS #/AREA URNS LPF: NORMAL /LPF (ref 0–8)
CLARITY UR: CLEAR
COLOR UR: YELLOW
EPI CELLS #/AREA URNS HPF: NORMAL /HPF (ref 0–5)
GLUCOSE UR STRIP-MCNC: ABNORMAL MG/DL
HGB UR QL STRIP.AUTO: NEGATIVE
KETONES UR STRIP-MCNC: ABNORMAL MG/DL
LEUKOCYTE ESTERASE UR QL STRIP: NEGATIVE
NITRITE UR QL STRIP: NEGATIVE
PH UR STRIP: 6 [PH] (ref 5–8)
PROT UR STRIP-MCNC: ABNORMAL MG/DL
RBC #/AREA URNS HPF: NORMAL /HPF (ref 0–4)
SP GR UR STRIP: 1.03 (ref 1–1.03)
UROBILINOGEN UR STRIP-ACNC: NORMAL EU/DL (ref 0–1)
WBC #/AREA URNS HPF: NORMAL /HPF (ref 0–5)

## 2024-04-14 PROBLEM — E66.01 SEVERE OBESITY (BMI 35.0-39.9) WITH COMORBIDITY (HCC): Status: ACTIVE | Noted: 2024-04-14

## 2024-04-18 ENCOUNTER — HOSPITAL ENCOUNTER (OUTPATIENT)
Age: 59
Discharge: HOME | End: 2024-04-20
Payer: COMMERCIAL

## 2024-04-18 VITALS
SYSTOLIC BLOOD PRESSURE: 123 MMHG | WEIGHT: 205 LBS | BODY MASS INDEX: 35 KG/M2 | HEIGHT: 64 IN | DIASTOLIC BLOOD PRESSURE: 66 MMHG

## 2024-04-18 DIAGNOSIS — R01.1 MURMUR, CARDIAC: ICD-10-CM

## 2024-04-18 LAB
ECHO AO ROOT DIAM: 2.7 CM
ECHO AO ROOT INDEX: 1.36 CM/M2
ECHO AV AREA PEAK VELOCITY: 1.9 CM2
ECHO AV AREA VTI: 2 CM2
ECHO AV AREA/BSA PEAK VELOCITY: 1 CM2/M2
ECHO AV AREA/BSA VTI: 1 CM2/M2
ECHO AV MEAN GRADIENT: 7 MMHG
ECHO AV MEAN VELOCITY: 1.2 M/S
ECHO AV PEAK GRADIENT: 14 MMHG
ECHO AV PEAK VELOCITY: 1.9 M/S
ECHO AV VELOCITY RATIO: 0.74
ECHO AV VTI: 39.4 CM
ECHO BSA: 2.05 M2
ECHO LA AREA 2C: 20.5 CM2
ECHO LA AREA 4C: 21.2 CM2
ECHO LA DIAMETER INDEX: 1.87 CM/M2
ECHO LA DIAMETER: 3.7 CM
ECHO LA MAJOR AXIS: 5.2 CM
ECHO LA MINOR AXIS: 5.2 CM
ECHO LA TO AORTIC ROOT RATIO: 1.37
ECHO LA VOL BP: 69 ML (ref 22–52)
ECHO LA VOL MOD A2C: 67 ML (ref 22–52)
ECHO LA VOL MOD A4C: 71 ML (ref 22–52)
ECHO LA VOL/BSA BIPLANE: 35 ML/M2 (ref 16–34)
ECHO LA VOLUME INDEX MOD A2C: 34 ML/M2 (ref 16–34)
ECHO LA VOLUME INDEX MOD A4C: 36 ML/M2 (ref 16–34)
ECHO LV E' LATERAL VELOCITY: 8 CM/S
ECHO LV E' SEPTAL VELOCITY: 6 CM/S
ECHO LV EDV A2C: 63 ML
ECHO LV EDV A4C: 67 ML
ECHO LV EDV INDEX A4C: 34 ML/M2
ECHO LV EDV NDEX A2C: 32 ML/M2
ECHO LV EJECTION FRACTION A2C: 76 %
ECHO LV EJECTION FRACTION A4C: 81 %
ECHO LV EJECTION FRACTION BIPLANE: 78 % (ref 55–100)
ECHO LV ESV A2C: 15 ML
ECHO LV ESV A4C: 13 ML
ECHO LV ESV INDEX A2C: 8 ML/M2
ECHO LV ESV INDEX A4C: 7 ML/M2
ECHO LV FRACTIONAL SHORTENING: 40 % (ref 28–44)
ECHO LV INTERNAL DIMENSION DIASTOLE INDEX: 2.12 CM/M2
ECHO LV INTERNAL DIMENSION DIASTOLIC: 4.2 CM (ref 3.9–5.3)
ECHO LV INTERNAL DIMENSION SYSTOLIC INDEX: 1.26 CM/M2
ECHO LV INTERNAL DIMENSION SYSTOLIC: 2.5 CM
ECHO LV IVSD: 1.1 CM (ref 0.6–0.9)
ECHO LV MASS 2D: 157.1 G (ref 67–162)
ECHO LV MASS INDEX 2D: 79.3 G/M2 (ref 43–95)
ECHO LV POSTERIOR WALL DIASTOLIC: 1.1 CM (ref 0.6–0.9)
ECHO LV RELATIVE WALL THICKNESS RATIO: 0.52
ECHO LVOT AREA: 2.5 CM2
ECHO LVOT AV VTI INDEX: 0.8
ECHO LVOT DIAM: 1.8 CM
ECHO LVOT MEAN GRADIENT: 4 MMHG
ECHO LVOT PEAK GRADIENT: 8 MMHG
ECHO LVOT PEAK VELOCITY: 1.4 M/S
ECHO LVOT STROKE VOLUME INDEX: 40.6 ML/M2
ECHO LVOT SV: 80.4 ML
ECHO LVOT VTI: 31.6 CM
ECHO MV A VELOCITY: 1 M/S
ECHO MV AREA VTI: 2.3 CM2
ECHO MV E DECELERATION TIME (DT): 185 MS
ECHO MV E VELOCITY: 1.08 M/S
ECHO MV E/A RATIO: 1.08
ECHO MV E/E' LATERAL: 13.5
ECHO MV E/E' RATIO (AVERAGED): 15.75
ECHO MV LVOT VTI INDEX: 1.1
ECHO MV MAX VELOCITY: 1.4 M/S
ECHO MV MEAN GRADIENT: 3 MMHG
ECHO MV MEAN VELOCITY: 0.9 M/S
ECHO MV PEAK GRADIENT: 7 MMHG
ECHO MV VTI: 34.8 CM
ECHO PV MAX VELOCITY: 1.1 M/S
ECHO PV PEAK GRADIENT: 5 MMHG
ECHO RV BASAL DIMENSION: 3.9 CM
ECHO RV FREE WALL PEAK S': 13 CM/S
ECHO RV TAPSE: 2.3 CM (ref 1.7–?)

## 2024-04-18 PROCEDURE — C8929 TTE W OR WO FOL WCON,DOPPLER: HCPCS

## 2024-04-18 PROCEDURE — 6360000002 HC RX W HCPCS: Performed by: FAMILY MEDICINE

## 2024-04-18 RX ADMIN — PERFLUTREN 2 ML: 6.52 INJECTION, SUSPENSION INTRAVENOUS at 09:47

## 2024-04-19 DIAGNOSIS — R93.1 ABNORMAL ECHOCARDIOGRAM: Primary | ICD-10-CM

## 2024-04-19 DIAGNOSIS — I51.89 DIASTOLIC DYSFUNCTION: ICD-10-CM

## 2024-05-08 DIAGNOSIS — E11.9 TYPE 2 DIABETES MELLITUS WITHOUT COMPLICATION, WITHOUT LONG-TERM CURRENT USE OF INSULIN (HCC): ICD-10-CM

## 2024-05-08 RX ORDER — TIRZEPATIDE 12.5 MG/.5ML
INJECTION, SOLUTION SUBCUTANEOUS
Qty: 2 ML | Refills: 0 | Status: SHIPPED | OUTPATIENT
Start: 2024-05-08

## 2024-05-08 NOTE — TELEPHONE ENCOUNTER
Beata Mohan is calling to request a refill on the following medication(s):    Medication Request:  Requested Prescriptions     Pending Prescriptions Disp Refills    MOUNJARO 12.5 MG/0.5ML SOPN SC injection [Pharmacy Med Name: MOUNJARO 12.5 MG/0.5 ML PEN] 2 mL 0     Sig: inject 0.5 MILLILITERS subcutaneously every week       Last Visit Date (If Applicable):  4/11/2024    Next Visit Date:    10/11/2024

## 2024-05-14 DIAGNOSIS — K21.9 GASTROESOPHAGEAL REFLUX DISEASE, UNSPECIFIED WHETHER ESOPHAGITIS PRESENT: ICD-10-CM

## 2024-05-15 RX ORDER — OMEPRAZOLE 40 MG/1
40 CAPSULE, DELAYED RELEASE ORAL DAILY
Qty: 90 CAPSULE | Refills: 1 | Status: SHIPPED | OUTPATIENT
Start: 2024-05-15

## 2024-05-15 NOTE — TELEPHONE ENCOUNTER
Beata Mohan is calling to request a refill on the following medication(s):    Last Visit Date (If Applicable):  4/11/2024    Next Visit Date:    10/11/2024    Medication Request:  Requested Prescriptions     Pending Prescriptions Disp Refills    omeprazole (PRILOSEC) 40 MG delayed release capsule [Pharmacy Med Name: OMEPRAZOLE DR 40 MG CAPSULE] 90 capsule 1     Sig: take 1 capsule by mouth once daily

## 2024-05-16 ENCOUNTER — OFFICE VISIT (OUTPATIENT)
Dept: PAIN MANAGEMENT | Age: 59
End: 2024-05-16
Payer: COMMERCIAL

## 2024-05-16 VITALS — WEIGHT: 195 LBS | BODY MASS INDEX: 33.29 KG/M2 | HEIGHT: 64 IN

## 2024-05-16 DIAGNOSIS — M96.1 POSTLAMINECTOMY SYNDROME, LUMBAR REGION: ICD-10-CM

## 2024-05-16 DIAGNOSIS — M51.36 DEGENERATION OF LUMBAR INTERVERTEBRAL DISC: ICD-10-CM

## 2024-05-16 DIAGNOSIS — M47.817 LUMBOSACRAL SPONDYLOSIS WITHOUT MYELOPATHY: ICD-10-CM

## 2024-05-16 DIAGNOSIS — G89.29 OTHER CHRONIC PAIN: ICD-10-CM

## 2024-05-16 PROCEDURE — 99213 OFFICE O/P EST LOW 20 MIN: CPT | Performed by: NURSE PRACTITIONER

## 2024-05-16 RX ORDER — PREGABALIN 75 MG/1
75 CAPSULE ORAL 2 TIMES DAILY
Qty: 60 CAPSULE | Refills: 2 | Status: SHIPPED | OUTPATIENT
Start: 2024-05-16 | End: 2024-08-14

## 2024-05-16 ASSESSMENT — ENCOUNTER SYMPTOMS
COUGH: 0
BACK PAIN: 1
CONSTIPATION: 0

## 2024-05-16 NOTE — PROGRESS NOTES
Chief Complaint:  Chief Complaint   Patient presents with    Back Pain         PMH     Pt is on Brilinta for coronary artery disease. Stent placed July 2018.        Pt reports chronic lumbar pain. Hx of 3 lumbar surgeries but continues to have significant low back pain.    She has seen neurosurgeon who suggested more conservative measures.    She has completed physical therapy   EMG shows a left L4-5 radiculopathy and a right L5-S1 radiculopathy.   Reports taking lyrica 75 mg twice daily and feels this helping     Lumbar MRI updated 7/2023 showed Persistent disc bulge, facet arthropathy and posterior osteophyte formation at L4-5 resulting in severe left and moderate right neural  foraminal narrowing.  Enhancement along the left lateral recess suggest scar  formation. Severe right neural foraminal narrowing L5-S1 secondary to a disc bulge  and posterior osteophyte formation.  Enhancement along the right lateral  recess suggest scar formation.  Appearance is similar to the previous exam.     She had bilateral SI joint injection 10/2023 with minimal relief  Did have MBB with excellent benefit. RFA in the past with benefit for the low back pain but did develop some neuropathic type pain that improved with Lyrica.      She saw Dr. Galloway 12/6/23 and discussed \"redo L4-5 and L5-S1 laminectomies with L4-S1 PLIF\". She states she is considering doing this next year. She feels the pain tolerable at this time.      Continues to follow with orthopedics for shoulders - she had steroid injections with some relief.     Back Pain  This is a chronic problem. The current episode started more than 1 year ago. The problem occurs constantly. The problem has been gradually worsening since onset. The pain is present in the lumbar spine. The quality of the pain is described as burning and aching. The pain does not radiate. The pain is at a severity of 4/10. The pain is mild. The symptoms are aggravated by bending, position,

## 2024-05-19 DIAGNOSIS — I10 ESSENTIAL HYPERTENSION: ICD-10-CM

## 2024-05-20 RX ORDER — HYDROCHLOROTHIAZIDE 25 MG/1
25 TABLET ORAL DAILY
Qty: 30 TABLET | Refills: 5 | Status: SHIPPED | OUTPATIENT
Start: 2024-05-20

## 2024-05-20 NOTE — TELEPHONE ENCOUNTER
Beata Mohan is calling to request a refill on the following medication(s):    Medication Request:  Requested Prescriptions     Pending Prescriptions Disp Refills    hydroCHLOROthiazide (HYDRODIURIL) 25 MG tablet [Pharmacy Med Name: HYDROCHLOROTHIAZIDE 25 MG TAB] 30 tablet 5     Sig: take 1 tablet by mouth once daily       Last Visit Date (If Applicable):  4/11/2024    Next Visit Date:    10/11/2024

## 2024-05-24 ENCOUNTER — HOSPITAL ENCOUNTER (OUTPATIENT)
Dept: VASCULAR LAB | Age: 59
Discharge: HOME | End: 2024-05-24
Attending: INTERNAL MEDICINE
Payer: COMMERCIAL

## 2024-05-24 DIAGNOSIS — R09.89 BRUIT OF LEFT CAROTID ARTERY: ICD-10-CM

## 2024-05-24 LAB
VAS LEFT BULB EDV: 16.1 CM/S
VAS LEFT BULB PSV: 76.8 CM/S
VAS LEFT CCA DIST EDV: 23.1 CM/S
VAS LEFT CCA DIST PSV: 93.1 CM/S
VAS LEFT CCA PROX EDV: 30.1 CM/S
VAS LEFT CCA PROX PSV: 114.1 CM/S
VAS LEFT ECA EDV: 11.44 CM/S
VAS LEFT ECA PSV: 100.2 CM/S
VAS LEFT ICA DIST EDV: 27.8 CM/S
VAS LEFT ICA DIST PSV: 86.2 CM/S
VAS LEFT ICA PROX EDV: 27.8 CM/S
VAS LEFT ICA PROX PSV: 86.2 CM/S
VAS LEFT ICA/CCA PSV: 0.93 NO UNITS
VAS LEFT VERTEBRAL EDV: 20.61 CM/S
VAS LEFT VERTEBRAL PSV: 38.8 CM/S
VAS RIGHT BULB EDV: 12 CM/S
VAS RIGHT BULB PSV: 54.9 CM/S
VAS RIGHT CCA DIST EDV: 20.8 CM/S
VAS RIGHT CCA DIST PSV: 73.6 CM/S
VAS RIGHT CCA PROX EDV: 19.7 CM/S
VAS RIGHT CCA PROX PSV: 81.3 CM/S
VAS RIGHT ECA EDV: 9.55 CM/S
VAS RIGHT ECA PSV: 95.3 CM/S
VAS RIGHT ICA DIST EDV: 27.7 CM/S
VAS RIGHT ICA DIST PSV: 69.6 CM/S
VAS RIGHT ICA PROX EDV: 27.7 CM/S
VAS RIGHT ICA PROX PSV: 99.9 CM/S
VAS RIGHT ICA/CCA PSV: 1.4 NO UNITS
VAS RIGHT VERTEBRAL EDV: 18.4 CM/S
VAS RIGHT VERTEBRAL PSV: 76.6 CM/S

## 2024-05-24 PROCEDURE — 93880 EXTRACRANIAL BILAT STUDY: CPT

## 2024-06-18 ENCOUNTER — HOSPITAL ENCOUNTER (OUTPATIENT)
Age: 59
Discharge: HOME | End: 2024-06-18
Payer: COMMERCIAL

## 2024-06-18 DIAGNOSIS — I10 PRIMARY HYPERTENSION: ICD-10-CM

## 2024-06-18 LAB
ANION GAP SERPL CALCULATED.3IONS-SCNC: 11 MMOL/L (ref 9–16)
BUN SERPL-MCNC: 18 MG/DL (ref 8–23)
CALCIUM SERPL-MCNC: 9.7 MG/DL (ref 8.6–10.4)
CHLORIDE SERPL-SCNC: 101 MMOL/L (ref 98–107)
CO2 SERPL-SCNC: 27 MMOL/L (ref 20–31)
CREAT SERPL-MCNC: 1 MG/DL (ref 0.5–0.9)
GFR, ESTIMATED: 66 ML/MIN/1.73M2
GLUCOSE SERPL-MCNC: 93 MG/DL (ref 74–99)
POTASSIUM SERPL-SCNC: 3.8 MMOL/L (ref 3.7–5.3)
SODIUM SERPL-SCNC: 139 MMOL/L (ref 136–145)

## 2024-06-18 PROCEDURE — 80048 BASIC METABOLIC PNL TOTAL CA: CPT

## 2024-06-26 DIAGNOSIS — E11.9 TYPE 2 DIABETES MELLITUS WITHOUT COMPLICATION, WITHOUT LONG-TERM CURRENT USE OF INSULIN (HCC): ICD-10-CM

## 2024-06-26 RX ORDER — EMPAGLIFLOZIN 10 MG/1
10 TABLET, FILM COATED ORAL DAILY
Qty: 30 TABLET | Refills: 2 | Status: SHIPPED | OUTPATIENT
Start: 2024-06-26

## 2024-06-26 NOTE — TELEPHONE ENCOUNTER
Beata Mohan is calling to request a refill on the following medication(s):    Medication Request:  Requested Prescriptions     Pending Prescriptions Disp Refills    JARDIANCE 10 MG tablet [Pharmacy Med Name: JARDIANCE 10 MG TABLET] 30 tablet 2     Sig: take 1 tablet by mouth once daily       Last Visit Date (If Applicable):  4/11/2024    Next Visit Date:    10/11/2024

## 2024-07-19 DIAGNOSIS — E11.9 TYPE 2 DIABETES MELLITUS WITHOUT COMPLICATION, WITHOUT LONG-TERM CURRENT USE OF INSULIN (HCC): ICD-10-CM

## 2024-07-19 RX ORDER — TIRZEPATIDE 12.5 MG/.5ML
INJECTION, SOLUTION SUBCUTANEOUS
Qty: 6 ML | Refills: 1 | Status: SHIPPED | OUTPATIENT
Start: 2024-07-19

## 2024-07-19 RX ORDER — METFORMIN HYDROCHLORIDE 500 MG/1
500 TABLET, EXTENDED RELEASE ORAL
Qty: 90 TABLET | Refills: 3 | Status: SHIPPED | OUTPATIENT
Start: 2024-07-19

## 2024-07-19 NOTE — TELEPHONE ENCOUNTER
Beata Mohan is calling to request a refill on the following medication(s):    Medication Request:  Requested Prescriptions     Pending Prescriptions Disp Refills    Tirzepatide (MOUNJARO) 12.5 MG/0.5ML SOPN SC injection 2 mL 0    metFORMIN (GLUCOPHAGE-XR) 500 MG extended release tablet 90 tablet 3     Sig: Take 1 tablet by mouth daily (with breakfast)       Last Visit Date (If Applicable):  4/11/2024    Next Visit Date:    10/11/2024

## 2024-07-30 ENCOUNTER — OFFICE VISIT (OUTPATIENT)
Dept: FAMILY MEDICINE CLINIC | Age: 59
End: 2024-07-30
Payer: COMMERCIAL

## 2024-07-30 VITALS
HEART RATE: 70 BPM | BODY MASS INDEX: 31.86 KG/M2 | WEIGHT: 185.6 LBS | DIASTOLIC BLOOD PRESSURE: 84 MMHG | OXYGEN SATURATION: 98 % | SYSTOLIC BLOOD PRESSURE: 138 MMHG | TEMPERATURE: 97.3 F

## 2024-07-30 DIAGNOSIS — L08.9 INFECTED CYST OF SKIN: Primary | ICD-10-CM

## 2024-07-30 DIAGNOSIS — L72.9 INFECTED CYST OF SKIN: Primary | ICD-10-CM

## 2024-07-30 DIAGNOSIS — E11.9 TYPE 2 DIABETES MELLITUS WITHOUT COMPLICATION, WITHOUT LONG-TERM CURRENT USE OF INSULIN (HCC): ICD-10-CM

## 2024-07-30 PROCEDURE — 3044F HG A1C LEVEL LT 7.0%: CPT | Performed by: NURSE PRACTITIONER

## 2024-07-30 PROCEDURE — 3075F SYST BP GE 130 - 139MM HG: CPT | Performed by: NURSE PRACTITIONER

## 2024-07-30 PROCEDURE — 99214 OFFICE O/P EST MOD 30 MIN: CPT | Performed by: NURSE PRACTITIONER

## 2024-07-30 PROCEDURE — 3079F DIAST BP 80-89 MM HG: CPT | Performed by: NURSE PRACTITIONER

## 2024-07-30 RX ORDER — DOXYCYCLINE HYCLATE 100 MG
100 TABLET ORAL 2 TIMES DAILY
Qty: 14 TABLET | Refills: 0 | Status: SHIPPED | OUTPATIENT
Start: 2024-07-30 | End: 2024-08-06

## 2024-07-30 ASSESSMENT — PATIENT HEALTH QUESTIONNAIRE - PHQ9
SUM OF ALL RESPONSES TO PHQ QUESTIONS 1-9: 0
SUM OF ALL RESPONSES TO PHQ QUESTIONS 1-9: 0
SUM OF ALL RESPONSES TO PHQ9 QUESTIONS 1 & 2: 0
2. FEELING DOWN, DEPRESSED OR HOPELESS: NOT AT ALL
1. LITTLE INTEREST OR PLEASURE IN DOING THINGS: NOT AT ALL
SUM OF ALL RESPONSES TO PHQ QUESTIONS 1-9: 0
SUM OF ALL RESPONSES TO PHQ QUESTIONS 1-9: 0

## 2024-07-30 NOTE — PROGRESS NOTES
[facility], [facility]  [address]  [place]  [address]  [phone]  [phone]    Beata Mohan is a 59 y.o. female who is here with c/o of:    Chief Complaint: Other (Pt c/o  a possible infection on Upper lt arm x 1 week)      Patient Accompanied by: n/a    HPI - Beata Mohan is here today with c/o:    Patient here with concerns for infection of a mass on her left upper arm that started 2 Thursday ago. She says it bursts that evening. She says that now it is red and warm and tender. She says originally the drainage was purulent and malodorous. Denies fevers or chills      Health Maintenance Due   Topic Date Due    Pneumococcal 0-64 years Vaccine (1 of 2 - PCV) Never done    HIV screen  Never done    Diabetic retinal exam  Never done    Hepatitis C screen  Never done    Lung Cancer Screening &/or Counseling  Never done    Diabetic Alb to Cr ratio (uACR) test  11/08/2022    Diabetic foot exam  02/08/2023    COVID-19 Vaccine (5 - 2023-24 season) 09/01/2023        Patient Active Problem List:     Chronic back pain     Essential hypertension     SRI on CPAP     Mixed hyperlipidemia     S/P drug eluting coronary stent placement - Mid LAD 7/25/18-Dr. lea     Coronary artery disease involving native coronary artery of native heart with unstable angina pectoris (HCC)     Class 1 obesity due to excess calories with serious comorbidity and body mass index (BMI) of 34.0 to 34.9 in adult     Acute bronchitis due to other specified organisms     Hyperplastic colon polyp     Unstable angina (HCC)     BMI 38.0-38.9,adult     Post PTCA     Former smoker     Hypokalemia     Hyperglycemia     Murmur, cardiac     Type 2 diabetes mellitus without complication, without long-term current use of insulin (HCC)     Chest pain     Coronary stent occlusion     Acne rosacea, erythematous telangiectatic type     Chest pain with high risk of acute coronary syndrome     Localized, primary

## 2024-08-19 ENCOUNTER — OFFICE VISIT (OUTPATIENT)
Dept: PAIN MANAGEMENT | Age: 59
End: 2024-08-19
Payer: COMMERCIAL

## 2024-08-19 ENCOUNTER — TELEPHONE (OUTPATIENT)
Dept: FAMILY MEDICINE CLINIC | Age: 59
End: 2024-08-19

## 2024-08-19 VITALS — HEIGHT: 64 IN | HEART RATE: 70 BPM | OXYGEN SATURATION: 98 % | WEIGHT: 185 LBS | BODY MASS INDEX: 31.58 KG/M2

## 2024-08-19 DIAGNOSIS — M47.817 LUMBOSACRAL SPONDYLOSIS WITHOUT MYELOPATHY: ICD-10-CM

## 2024-08-19 DIAGNOSIS — M51.36 DEGENERATION OF LUMBAR INTERVERTEBRAL DISC: ICD-10-CM

## 2024-08-19 DIAGNOSIS — M96.1 POSTLAMINECTOMY SYNDROME, LUMBAR REGION: ICD-10-CM

## 2024-08-19 DIAGNOSIS — G89.29 OTHER CHRONIC PAIN: ICD-10-CM

## 2024-08-19 PROCEDURE — 99213 OFFICE O/P EST LOW 20 MIN: CPT | Performed by: NURSE PRACTITIONER

## 2024-08-19 RX ORDER — PREGABALIN 75 MG/1
75 CAPSULE ORAL 2 TIMES DAILY
Qty: 60 CAPSULE | Refills: 2 | Status: SHIPPED | OUTPATIENT
Start: 2024-08-19 | End: 2024-11-17

## 2024-08-19 ASSESSMENT — ENCOUNTER SYMPTOMS
SHORTNESS OF BREATH: 0
BACK PAIN: 1
CONSTIPATION: 0
COUGH: 0
BOWEL INCONTINENCE: 0

## 2024-08-19 NOTE — TELEPHONE ENCOUNTER
----- Message from Valeri TODD sent at 8/14/2024  1:22 PM EDT -----  Regarding: [facility] Appointment Request  E[facility] Appointment Request    Patient needs appointment for E[facility] Appointment Type: Existing Condition Follow Up.    Patient Requested Dates(s):as soon as possible  Patient Requested Time: 8 am and 1 pm  Provider Name:NAIMA Richter    Reason for Appointment Request: Established Patient - Available appointments did not meet patient need  The patient is requesting to set an appointment  with NAIMA Richter  get follow up check up for cyst    --------------------------------------------------------------------------------------------------------------------------    Relationship to Patient: Self     Call Back Information: OK to leave message on voicemail  Preferred Call Back Number: Phone [phone]

## 2024-08-19 NOTE — PROGRESS NOTES
Chief Complaint   Patient presents with    Back Pain        PMH   Pt is on Brilinta for coronary artery disease. Stent placed July 2018.        Pt reports chronic lumbar pain. Hx of 3 lumbar surgeries but continues to have significant low back pain.    She has seen neurosurgeon who suggested more conservative measures.    She has completed physical therapy   EMG shows a left L4-5 radiculopathy and a right L5-S1 radiculopathy.   Reports taking lyrica 75 mg twice daily and feels this helping      Lumbar MRI updated 7/2023 showed Persistent disc bulge, facet arthropathy and posterior osteophyte formation at L4-5 resulting in severe left and moderate right neural  foraminal narrowing.  Enhancement along the left lateral recess suggest scar  formation. Severe right neural foraminal narrowing L5-S1 secondary to a disc bulge  and posterior osteophyte formation.  Enhancement along the right lateral  recess suggest scar formation.  Appearance is similar to the previous exam.     She had bilateral SI joint injection 10/2023 with minimal relief  Did have MBB with excellent benefit. RFA in the past with benefit for the low back pain but did develop some neuropathic type pain that improved with Lyrica.      She saw Dr. Galloway 12/6/23 and discussed \"redo L4-5 and L5-S1 laminectomies with L4-S1 PLIF\". She states she is considering doing this next year. She feels the pain tolerable at this time.      Continues to follow with orthopedics for shoulders - she had steroid injections with some relief.         Back Pain  This is a chronic problem. The current episode started more than 1 year ago. The problem occurs constantly. The problem is unchanged. The pain is present in the lumbar spine. The quality of the pain is described as burning. The pain radiates to the left thigh, left knee, right thigh and right knee. The pain is at a severity of 4/10. The pain is mild. The pain is Worse during the day. The symptoms are

## 2024-08-20 ENCOUNTER — TELEPHONE (OUTPATIENT)
Dept: FAMILY MEDICINE CLINIC | Age: 59
End: 2024-08-20

## 2024-08-20 NOTE — TELEPHONE ENCOUNTER
----- Message from Valeri TODD sent at 8/14/2024  1:22 PM EDT -----  Regarding: [facility] Appointment Request  E[facility] Appointment Request    Patient needs appointment for ECC Appointment Type: Existing Condition Follow Up.    Patient Requested Dates(s):as soon as possible  Patient Requested Time: 8 am and 1 pm  Provider Name:NAIMA Richter    Reason for Appointment Request: Established Patient - Available appointments did not meet patient need  The patient is requesting to set an appointment  with NAIMA Richter  get follow up check up for cyst    --------------------------------------------------------------------------------------------------------------------------    Relationship to Patient: Self     Call Back Information: OK to leave message on voicemail  Preferred Call Back Number: Phone [phone]

## 2024-08-23 ENCOUNTER — OFFICE VISIT (OUTPATIENT)
Dept: FAMILY MEDICINE CLINIC | Age: 59
End: 2024-08-23
Payer: COMMERCIAL

## 2024-08-23 VITALS
DIASTOLIC BLOOD PRESSURE: 80 MMHG | WEIGHT: 196 LBS | BODY MASS INDEX: 33.64 KG/M2 | HEART RATE: 71 BPM | TEMPERATURE: 97.5 F | OXYGEN SATURATION: 97 % | SYSTOLIC BLOOD PRESSURE: 124 MMHG

## 2024-08-23 DIAGNOSIS — D36.7 CYST, DERMOID, ARM, LEFT: Primary | ICD-10-CM

## 2024-08-23 PROCEDURE — 99213 OFFICE O/P EST LOW 20 MIN: CPT | Performed by: NURSE PRACTITIONER

## 2024-08-23 PROCEDURE — 3079F DIAST BP 80-89 MM HG: CPT | Performed by: NURSE PRACTITIONER

## 2024-08-23 PROCEDURE — 3074F SYST BP LT 130 MM HG: CPT | Performed by: NURSE PRACTITIONER

## 2024-08-23 ASSESSMENT — PATIENT HEALTH QUESTIONNAIRE - PHQ9
SUM OF ALL RESPONSES TO PHQ QUESTIONS 1-9: 0
1. LITTLE INTEREST OR PLEASURE IN DOING THINGS: NOT AT ALL
SUM OF ALL RESPONSES TO PHQ QUESTIONS 1-9: 0
SUM OF ALL RESPONSES TO PHQ QUESTIONS 1-9: 0
SUM OF ALL RESPONSES TO PHQ9 QUESTIONS 1 & 2: 0
SUM OF ALL RESPONSES TO PHQ QUESTIONS 1-9: 0
2. FEELING DOWN, DEPRESSED OR HOPELESS: NOT AT ALL

## 2024-08-23 NOTE — PROGRESS NOTES
[facility]  [address], [address]  [place]  [address]  [phone]  [phone]    Beata Mohan is a 59 y.o. female who is here with c/o of:    Chief Complaint: Other (F/U on Cyst under Lt Arm per pt )      Patient Accompanied by: n/a    HPI - Beata Mohan is here today with c/o:    Patient here for follow up left arm cyst.     She completed antibiotics and says that has improved but the mass is still there.    Says she is still getting some fluid out of it. Denies pain, redness or erythema.    Health Maintenance Due   Topic Date Due    Pneumococcal 0-64 years Vaccine (1 of 2 - PCV) Never done    HIV screen  Never done    Diabetic retinal exam  Never done    Hepatitis C screen  Never done    Lung Cancer Screening &/or Counseling  Never done    Diabetic Alb to Cr ratio (uACR) test  11/08/2022    Diabetic foot exam  02/08/2023    COVID-19 Vaccine (5 - 2023-24 season) 09/01/2023    Flu vaccine (1) 08/01/2024        Patient Active Problem List:     Chronic back pain     Essential hypertension     SRI on CPAP     Mixed hyperlipidemia     S/P drug eluting coronary stent placement - Mid LAD 7/25/18-Dr. lea     Coronary artery disease involving native coronary artery of native heart with unstable angina pectoris (HCC)     Class 1 obesity due to excess calories with serious comorbidity and body mass index (BMI) of 34.0 to 34.9 in adult     Acute bronchitis due to other specified organisms     Hyperplastic colon polyp     Unstable angina (HCC)     BMI 38.0-38.9,adult     Post PTCA     Former smoker     Hypokalemia     Hyperglycemia     Murmur, cardiac     Type 2 diabetes mellitus without complication, without long-term current use of insulin (HCC)     Chest pain     Coronary stent occlusion     Acne rosacea, erythematous telangiectatic type     Chest pain with high risk of acute coronary syndrome     Localized, primary osteoarthritis of hand     Aneurysm of artery of lower

## 2024-09-19 ENCOUNTER — ANESTHESIA (OUTPATIENT)
Dept: OPERATING ROOM | Age: 59
End: 2024-09-19
Payer: COMMERCIAL

## 2024-09-19 ENCOUNTER — ANESTHESIA EVENT (OUTPATIENT)
Dept: OPERATING ROOM | Age: 59
End: 2024-09-19
Payer: COMMERCIAL

## 2024-09-19 ENCOUNTER — HOSPITAL ENCOUNTER (OUTPATIENT)
Age: 59
Setting detail: OUTPATIENT SURGERY
Discharge: HOME | End: 2024-09-19
Attending: STUDENT IN AN ORGANIZED HEALTH CARE EDUCATION/TRAINING PROGRAM | Admitting: STUDENT IN AN ORGANIZED HEALTH CARE EDUCATION/TRAINING PROGRAM
Payer: COMMERCIAL

## 2024-09-19 VITALS
WEIGHT: 195 LBS | BODY MASS INDEX: 33.29 KG/M2 | DIASTOLIC BLOOD PRESSURE: 82 MMHG | TEMPERATURE: 97.9 F | OXYGEN SATURATION: 98 % | RESPIRATION RATE: 22 BRPM | HEART RATE: 65 BPM | SYSTOLIC BLOOD PRESSURE: 148 MMHG | HEIGHT: 64 IN

## 2024-09-19 DIAGNOSIS — D36.7 DERMOID CYST OF LEFT UPPER EXTREMITY: ICD-10-CM

## 2024-09-19 LAB — GLUCOSE BLD-MCNC: 89 MG/DL (ref 65–105)

## 2024-09-19 PROCEDURE — 7100000011 HC PHASE II RECOVERY - ADDTL 15 MIN: Performed by: STUDENT IN AN ORGANIZED HEALTH CARE EDUCATION/TRAINING PROGRAM

## 2024-09-19 PROCEDURE — 2500000003 HC RX 250 WO HCPCS: Performed by: STUDENT IN AN ORGANIZED HEALTH CARE EDUCATION/TRAINING PROGRAM

## 2024-09-19 PROCEDURE — 3700000000 HC ANESTHESIA ATTENDED CARE: Performed by: STUDENT IN AN ORGANIZED HEALTH CARE EDUCATION/TRAINING PROGRAM

## 2024-09-19 PROCEDURE — 3600000012 HC SURGERY LEVEL 2 ADDTL 15MIN: Performed by: STUDENT IN AN ORGANIZED HEALTH CARE EDUCATION/TRAINING PROGRAM

## 2024-09-19 PROCEDURE — 2580000003 HC RX 258: Performed by: NURSE ANESTHETIST, CERTIFIED REGISTERED

## 2024-09-19 PROCEDURE — 3600000002 HC SURGERY LEVEL 2 BASE: Performed by: STUDENT IN AN ORGANIZED HEALTH CARE EDUCATION/TRAINING PROGRAM

## 2024-09-19 PROCEDURE — 6360000002 HC RX W HCPCS: Performed by: STUDENT IN AN ORGANIZED HEALTH CARE EDUCATION/TRAINING PROGRAM

## 2024-09-19 PROCEDURE — 7100000010 HC PHASE II RECOVERY - FIRST 15 MIN: Performed by: STUDENT IN AN ORGANIZED HEALTH CARE EDUCATION/TRAINING PROGRAM

## 2024-09-19 PROCEDURE — 3700000001 HC ADD 15 MINUTES (ANESTHESIA): Performed by: STUDENT IN AN ORGANIZED HEALTH CARE EDUCATION/TRAINING PROGRAM

## 2024-09-19 PROCEDURE — 2580000003 HC RX 258: Performed by: ANESTHESIOLOGY

## 2024-09-19 PROCEDURE — 82947 ASSAY GLUCOSE BLOOD QUANT: CPT

## 2024-09-19 PROCEDURE — 6360000002 HC RX W HCPCS: Performed by: NURSE ANESTHETIST, CERTIFIED REGISTERED

## 2024-09-19 PROCEDURE — 2500000003 HC RX 250 WO HCPCS: Performed by: NURSE ANESTHETIST, CERTIFIED REGISTERED

## 2024-09-19 PROCEDURE — 2709999900 HC NON-CHARGEABLE SUPPLY: Performed by: STUDENT IN AN ORGANIZED HEALTH CARE EDUCATION/TRAINING PROGRAM

## 2024-09-19 PROCEDURE — 88304 TISSUE EXAM BY PATHOLOGIST: CPT

## 2024-09-19 RX ORDER — DIPHENHYDRAMINE HYDROCHLORIDE 50 MG/ML
12.5 INJECTION INTRAMUSCULAR; INTRAVENOUS
Status: DISCONTINUED | OUTPATIENT
Start: 2024-09-19 | End: 2024-09-19 | Stop reason: HOSPADM

## 2024-09-19 RX ORDER — ONDANSETRON 2 MG/ML
4 INJECTION INTRAMUSCULAR; INTRAVENOUS
Status: DISCONTINUED | OUTPATIENT
Start: 2024-09-19 | End: 2024-09-19 | Stop reason: HOSPADM

## 2024-09-19 RX ORDER — FENTANYL CITRATE 50 UG/ML
50 INJECTION, SOLUTION INTRAMUSCULAR; INTRAVENOUS EVERY 5 MIN PRN
Status: DISCONTINUED | OUTPATIENT
Start: 2024-09-19 | End: 2024-09-19 | Stop reason: HOSPADM

## 2024-09-19 RX ORDER — LIDOCAINE HYDROCHLORIDE 20 MG/ML
INJECTION, SOLUTION EPIDURAL; INFILTRATION; INTRACAUDAL; PERINEURAL
Status: DISCONTINUED | OUTPATIENT
Start: 2024-09-19 | End: 2024-09-19 | Stop reason: SDUPTHER

## 2024-09-19 RX ORDER — SODIUM CHLORIDE 9 MG/ML
INJECTION, SOLUTION INTRAVENOUS CONTINUOUS
Status: DISCONTINUED | OUTPATIENT
Start: 2024-09-19 | End: 2024-09-19 | Stop reason: HOSPADM

## 2024-09-19 RX ORDER — PROPOFOL 10 MG/ML
INJECTION, EMULSION INTRAVENOUS
Status: DISCONTINUED | OUTPATIENT
Start: 2024-09-19 | End: 2024-09-19 | Stop reason: SDUPTHER

## 2024-09-19 RX ORDER — OXYCODONE HYDROCHLORIDE 5 MG/1
5 TABLET ORAL
Status: DISCONTINUED | OUTPATIENT
Start: 2024-09-19 | End: 2024-09-19 | Stop reason: HOSPADM

## 2024-09-19 RX ORDER — SODIUM CHLORIDE 9 MG/ML
INJECTION, SOLUTION INTRAVENOUS PRN
Status: DISCONTINUED | OUTPATIENT
Start: 2024-09-19 | End: 2024-09-19 | Stop reason: HOSPADM

## 2024-09-19 RX ORDER — IBUPROFEN 600 MG/1
600 TABLET, FILM COATED ORAL 3 TIMES DAILY PRN
Qty: 30 TABLET | Refills: 0 | Status: SHIPPED | OUTPATIENT
Start: 2024-09-19

## 2024-09-19 RX ORDER — MIDAZOLAM HYDROCHLORIDE 1 MG/ML
INJECTION INTRAMUSCULAR; INTRAVENOUS
Status: DISCONTINUED | OUTPATIENT
Start: 2024-09-19 | End: 2024-09-19 | Stop reason: SDUPTHER

## 2024-09-19 RX ORDER — BUPIVACAINE HYDROCHLORIDE AND EPINEPHRINE 2.5; 5 MG/ML; UG/ML
INJECTION, SOLUTION EPIDURAL; INFILTRATION; INTRACAUDAL; PERINEURAL PRN
Status: DISCONTINUED | OUTPATIENT
Start: 2024-09-19 | End: 2024-09-19 | Stop reason: ALTCHOICE

## 2024-09-19 RX ORDER — SODIUM CHLORIDE 0.9 % (FLUSH) 0.9 %
5-40 SYRINGE (ML) INJECTION EVERY 12 HOURS SCHEDULED
Status: DISCONTINUED | OUTPATIENT
Start: 2024-09-19 | End: 2024-09-19 | Stop reason: HOSPADM

## 2024-09-19 RX ORDER — FENTANYL CITRATE 50 UG/ML
25 INJECTION, SOLUTION INTRAMUSCULAR; INTRAVENOUS EVERY 5 MIN PRN
Status: DISCONTINUED | OUTPATIENT
Start: 2024-09-19 | End: 2024-09-19 | Stop reason: HOSPADM

## 2024-09-19 RX ORDER — SODIUM CHLORIDE, SODIUM LACTATE, POTASSIUM CHLORIDE, CALCIUM CHLORIDE 600; 310; 30; 20 MG/100ML; MG/100ML; MG/100ML; MG/100ML
INJECTION, SOLUTION INTRAVENOUS
Status: DISCONTINUED | OUTPATIENT
Start: 2024-09-19 | End: 2024-09-19 | Stop reason: SDUPTHER

## 2024-09-19 RX ORDER — SODIUM CHLORIDE 0.9 % (FLUSH) 0.9 %
5-40 SYRINGE (ML) INJECTION PRN
Status: DISCONTINUED | OUTPATIENT
Start: 2024-09-19 | End: 2024-09-19 | Stop reason: HOSPADM

## 2024-09-19 RX ORDER — PROCHLORPERAZINE EDISYLATE 5 MG/ML
5 INJECTION INTRAMUSCULAR; INTRAVENOUS
Status: DISCONTINUED | OUTPATIENT
Start: 2024-09-19 | End: 2024-09-19 | Stop reason: HOSPADM

## 2024-09-19 RX ORDER — FENTANYL CITRATE 50 UG/ML
INJECTION, SOLUTION INTRAMUSCULAR; INTRAVENOUS
Status: DISCONTINUED | OUTPATIENT
Start: 2024-09-19 | End: 2024-09-19 | Stop reason: SDUPTHER

## 2024-09-19 RX ORDER — LIDOCAINE HYDROCHLORIDE 10 MG/ML
1 INJECTION, SOLUTION EPIDURAL; INFILTRATION; INTRACAUDAL; PERINEURAL
Status: DISCONTINUED | OUTPATIENT
Start: 2024-09-20 | End: 2024-09-19 | Stop reason: HOSPADM

## 2024-09-19 RX ORDER — SODIUM CHLORIDE, SODIUM LACTATE, POTASSIUM CHLORIDE, CALCIUM CHLORIDE 600; 310; 30; 20 MG/100ML; MG/100ML; MG/100ML; MG/100ML
INJECTION, SOLUTION INTRAVENOUS CONTINUOUS
Status: DISCONTINUED | OUTPATIENT
Start: 2024-09-19 | End: 2024-09-19 | Stop reason: HOSPADM

## 2024-09-19 RX ADMIN — SODIUM CHLORIDE, POTASSIUM CHLORIDE, SODIUM LACTATE AND CALCIUM CHLORIDE: 600; 310; 30; 20 INJECTION, SOLUTION INTRAVENOUS at 12:07

## 2024-09-19 RX ADMIN — LIDOCAINE HYDROCHLORIDE 40 MG: 20 INJECTION, SOLUTION EPIDURAL; INFILTRATION; INTRACAUDAL; PERINEURAL at 12:51

## 2024-09-19 RX ADMIN — Medication 2000 MG: at 12:53

## 2024-09-19 RX ADMIN — PROPOFOL 100 MCG/KG/MIN: 10 INJECTION, EMULSION INTRAVENOUS at 12:51

## 2024-09-19 RX ADMIN — MIDAZOLAM 2 MG: 1 INJECTION INTRAMUSCULAR; INTRAVENOUS at 12:46

## 2024-09-19 RX ADMIN — FENTANYL CITRATE 50 MCG: 50 INJECTION INTRAMUSCULAR; INTRAVENOUS at 12:51

## 2024-09-19 RX ADMIN — SODIUM CHLORIDE, POTASSIUM CHLORIDE, SODIUM LACTATE AND CALCIUM CHLORIDE: 600; 310; 30; 20 INJECTION, SOLUTION INTRAVENOUS at 12:46

## 2024-09-19 RX ADMIN — FENTANYL CITRATE 50 MCG: 50 INJECTION INTRAMUSCULAR; INTRAVENOUS at 13:22

## 2024-09-19 ASSESSMENT — ENCOUNTER SYMPTOMS: SHORTNESS OF BREATH: 0

## 2024-09-19 ASSESSMENT — PAIN - FUNCTIONAL ASSESSMENT: PAIN_FUNCTIONAL_ASSESSMENT: 0-10

## 2024-09-24 LAB — SURGICAL PATHOLOGY REPORT: NORMAL

## 2024-09-27 ENCOUNTER — OFFICE VISIT (OUTPATIENT)
Dept: FAMILY MEDICINE CLINIC | Age: 59
End: 2024-09-27

## 2024-09-27 VITALS
TEMPERATURE: 97 F | DIASTOLIC BLOOD PRESSURE: 84 MMHG | SYSTOLIC BLOOD PRESSURE: 136 MMHG | BODY MASS INDEX: 32.96 KG/M2 | WEIGHT: 192 LBS | HEART RATE: 82 BPM | OXYGEN SATURATION: 96 %

## 2024-09-27 DIAGNOSIS — D23.4 DERMOID CYST OF SCALP: Primary | ICD-10-CM

## 2024-09-27 RX ORDER — DOXYCYCLINE HYCLATE 100 MG
100 TABLET ORAL 2 TIMES DAILY
Qty: 14 TABLET | Refills: 0 | Status: SHIPPED | OUTPATIENT
Start: 2024-09-27 | End: 2024-10-04

## 2024-09-27 ASSESSMENT — PATIENT HEALTH QUESTIONNAIRE - PHQ9
2. FEELING DOWN, DEPRESSED OR HOPELESS: NOT AT ALL
1. LITTLE INTEREST OR PLEASURE IN DOING THINGS: NOT AT ALL
SUM OF ALL RESPONSES TO PHQ QUESTIONS 1-9: 0
SUM OF ALL RESPONSES TO PHQ9 QUESTIONS 1 & 2: 0

## 2024-10-22 ENCOUNTER — OFFICE VISIT (OUTPATIENT)
Dept: FAMILY MEDICINE CLINIC | Age: 59
End: 2024-10-22
Payer: COMMERCIAL

## 2024-10-22 VITALS
HEART RATE: 67 BPM | DIASTOLIC BLOOD PRESSURE: 82 MMHG | WEIGHT: 203 LBS | SYSTOLIC BLOOD PRESSURE: 130 MMHG | TEMPERATURE: 97.5 F | OXYGEN SATURATION: 96 % | BODY MASS INDEX: 34.84 KG/M2

## 2024-10-22 DIAGNOSIS — I25.110 CORONARY ARTERY DISEASE INVOLVING NATIVE CORONARY ARTERY OF NATIVE HEART WITH UNSTABLE ANGINA PECTORIS (HCC): ICD-10-CM

## 2024-10-22 DIAGNOSIS — L72.9 SCALP CYST: ICD-10-CM

## 2024-10-22 DIAGNOSIS — I10 ESSENTIAL HYPERTENSION: Primary | ICD-10-CM

## 2024-10-22 DIAGNOSIS — E11.9 TYPE 2 DIABETES MELLITUS WITHOUT COMPLICATION, WITHOUT LONG-TERM CURRENT USE OF INSULIN (HCC): ICD-10-CM

## 2024-10-22 DIAGNOSIS — Z98.890 H/O REMOVAL OF CYST: ICD-10-CM

## 2024-10-22 DIAGNOSIS — G47.33 OSA ON CPAP: ICD-10-CM

## 2024-10-22 PROCEDURE — 3075F SYST BP GE 130 - 139MM HG: CPT | Performed by: FAMILY MEDICINE

## 2024-10-22 PROCEDURE — 3044F HG A1C LEVEL LT 7.0%: CPT | Performed by: FAMILY MEDICINE

## 2024-10-22 PROCEDURE — 3079F DIAST BP 80-89 MM HG: CPT | Performed by: FAMILY MEDICINE

## 2024-10-22 PROCEDURE — 99214 OFFICE O/P EST MOD 30 MIN: CPT | Performed by: FAMILY MEDICINE

## 2024-10-22 RX ORDER — NITROGLYCERIN 0.4 MG/1
TABLET SUBLINGUAL
Qty: 25 TABLET | Refills: 1 | Status: SHIPPED | OUTPATIENT
Start: 2024-10-22

## 2024-10-22 SDOH — ECONOMIC STABILITY: FOOD INSECURITY: WITHIN THE PAST 12 MONTHS, THE FOOD YOU BOUGHT JUST DIDN'T LAST AND YOU DIDN'T HAVE MONEY TO GET MORE.: NEVER TRUE

## 2024-10-22 SDOH — ECONOMIC STABILITY: FOOD INSECURITY: WITHIN THE PAST 12 MONTHS, YOU WORRIED THAT YOUR FOOD WOULD RUN OUT BEFORE YOU GOT MONEY TO BUY MORE.: NEVER TRUE

## 2024-10-22 SDOH — ECONOMIC STABILITY: INCOME INSECURITY: HOW HARD IS IT FOR YOU TO PAY FOR THE VERY BASICS LIKE FOOD, HOUSING, MEDICAL CARE, AND HEATING?: NOT HARD AT ALL

## 2024-10-22 ASSESSMENT — ENCOUNTER SYMPTOMS
CHEST TIGHTNESS: 1
GASTROINTESTINAL NEGATIVE: 1
EYES NEGATIVE: 1
ORTHOPNEA: 0
BLURRED VISION: 0
ALLERGIC/IMMUNOLOGIC NEGATIVE: 1

## 2024-10-22 ASSESSMENT — PATIENT HEALTH QUESTIONNAIRE - PHQ9
SUM OF ALL RESPONSES TO PHQ QUESTIONS 1-9: 0
1. LITTLE INTEREST OR PLEASURE IN DOING THINGS: NOT AT ALL
SUM OF ALL RESPONSES TO PHQ QUESTIONS 1-9: 0

## 2024-10-22 NOTE — TELEPHONE ENCOUNTER
[facility]      Beata Mohan is calling to request a refill on the following medication(s):    Medication Request:  Requested Prescriptions     Pending Prescriptions Disp Refills    nitroGLYCERIN (NITROSTAT) 0.4 MG SL tablet 25 tablet 1     Sig: place 1 tablet under the tongue if needed every 5 minutes for chest pain for 3 doses IF NO RELIEF AFTER FIRST DOSE CALL PRESCRIBER .       Last Visit Date (If Applicable):  10/22/2024    Next Visit Date:    Visit date not found

## 2024-10-22 NOTE — PROGRESS NOTES
discharge.      Extraocular Movements: Extraocular movements intact.      Conjunctiva/sclera: Conjunctivae normal.   Cardiovascular:      Rate and Rhythm: Normal rate and regular rhythm.      Pulses: Normal pulses.      Heart sounds: Normal heart sounds. No murmur heard.     No friction rub. No gallop.   Pulmonary:      Effort: Pulmonary effort is normal. No respiratory distress.      Breath sounds: Normal breath sounds. No stridor. No wheezing, rhonchi or rales.   Chest:      Chest wall: No tenderness.   Neurological:      Mental Status: She is alert and oriented to person, place, and time. Mental status is at baseline.   Psychiatric:         Mood and Affect: Mood normal.         Behavior: Behavior normal.         Thought Content: Thought content normal.         Judgment: Judgment normal.         Labs/Imaging/Diagnostics   Labs:  Hemoglobin A1C   Date Value Ref Range Status   03/30/2024 5.2 4.0 - 6.0 % Final       Imaging Last 24 Hours:  Vascular duplex carotid bilateral    Mild (<50%) stenosis in the bilateral internal carotid arteries.    Normal antegrade flow in the bilateral vertebral arteries.

## 2024-10-25 ENCOUNTER — ANESTHESIA (OUTPATIENT)
Dept: OPERATING ROOM | Age: 59
End: 2024-10-25

## 2024-10-25 ENCOUNTER — ANESTHESIA EVENT (OUTPATIENT)
Dept: OPERATING ROOM | Age: 59
End: 2024-10-25

## 2024-10-25 NOTE — ANESTHESIA PRE PROCEDURE
Department of Anesthesiology  Preprocedure Note       Name:  Beata Mohan   Age:  59 y.o.  :  1965                                          MRN:  [id number]         Date:  10/25/2024      Surgeon: Surgeon(s):  Ira Mckay DO    Procedure: Procedure(s):  EXCISION  SEBACEOUS CYST HEAD    Medications prior to admission:   Prior to Admission medications    Medication Sig Start Date End Date Taking? Authorizing Provider   Tirzepatide (MOUNJARO) 15 MG/0.5ML SOPN SC injection Inject 0.5 mLs into the skin once a week 10/22/24   Darshan Coyne DO   nitroGLYCERIN (NITROSTAT) 0.4 MG SL tablet place 1 tablet under the tongue if needed every 5 minutes for chest pain for 3 doses IF NO RELIEF AFTER FIRST DOSE CALL PRESCRIBER . 10/22/24   Darshan Coyne DO   Acetaminophen (TYLENOL) 325 MG CAPS Take 1-2 tablets by mouth every 4 hours as needed (pain) 24   Ira Mckay DO   pregabalin (LYRICA) 75 MG capsule Take 1 capsule by mouth 2 times daily for 90 days. Max Daily Amount: 150 mg 24  Krista Dunlap APRN - CNP   metFORMIN (GLUCOPHAGE-XR) 500 MG extended release tablet Take 1 tablet by mouth daily (with breakfast) 24   Darshan Coyne DO   JARDIANCE 10 MG tablet take 1 tablet by mouth once daily 24   Darshan Coyne DO   atorvastatin (LIPITOR) 80 MG tablet take 1 tablet by mouth once daily 24   Thuy Quintero APRN - CNP   losartan (COZAAR) 100 MG tablet take 1 tablet by mouth once daily 24   Thuy Quintero APRN - CNP   ezetimibe (ZETIA) 10 MG tablet take 1 tablet by mouth daily 24   Thuy Quintero APRN - CNP   hydroCHLOROthiazide (HYDRODIURIL) 25 MG tablet take 1 tablet by mouth once daily 24   Darshan Coyne DO   omeprazole (PRILOSEC) 40 MG delayed release capsule take 1 capsule by mouth once daily 5/15/24   Darshan Coyne DO   amLODIPine (NORVASC) 10 MG tablet take 1 tablet by mouth once daily 24

## 2024-11-11 SDOH — HEALTH STABILITY: PHYSICAL HEALTH: ON AVERAGE, HOW MANY DAYS PER WEEK DO YOU ENGAGE IN MODERATE TO STRENUOUS EXERCISE (LIKE A BRISK WALK)?: 5 DAYS

## 2024-11-11 SDOH — HEALTH STABILITY: PHYSICAL HEALTH: ON AVERAGE, HOW MANY MINUTES DO YOU ENGAGE IN EXERCISE AT THIS LEVEL?: 150+ MIN

## 2024-11-13 ENCOUNTER — OFFICE VISIT (OUTPATIENT)
Dept: FAMILY MEDICINE CLINIC | Age: 59
End: 2024-11-13

## 2024-11-13 ENCOUNTER — HOSPITAL ENCOUNTER (OUTPATIENT)
Age: 59
Setting detail: SPECIMEN
Discharge: HOME | End: 2024-11-13

## 2024-11-13 VITALS
HEIGHT: 64 IN | WEIGHT: 191 LBS | OXYGEN SATURATION: 98 % | HEART RATE: 74 BPM | DIASTOLIC BLOOD PRESSURE: 80 MMHG | BODY MASS INDEX: 32.61 KG/M2 | SYSTOLIC BLOOD PRESSURE: 136 MMHG | TEMPERATURE: 97 F

## 2024-11-13 DIAGNOSIS — I25.110 CORONARY ARTERY DISEASE INVOLVING NATIVE CORONARY ARTERY OF NATIVE HEART WITH UNSTABLE ANGINA PECTORIS (HCC): ICD-10-CM

## 2024-11-13 DIAGNOSIS — E11.9 ENCOUNTER FOR DIABETIC FOOT EXAM (HCC): ICD-10-CM

## 2024-11-13 DIAGNOSIS — I10 ESSENTIAL HYPERTENSION: ICD-10-CM

## 2024-11-13 DIAGNOSIS — Z11.59 NEED FOR HEPATITIS C SCREENING TEST: ICD-10-CM

## 2024-11-13 DIAGNOSIS — E11.9 TYPE 2 DIABETES MELLITUS WITHOUT COMPLICATION, WITHOUT LONG-TERM CURRENT USE OF INSULIN (HCC): ICD-10-CM

## 2024-11-13 DIAGNOSIS — Z00.00 ROUTINE HEALTH MAINTENANCE: ICD-10-CM

## 2024-11-13 DIAGNOSIS — Z11.4 SCREENING FOR HIV (HUMAN IMMUNODEFICIENCY VIRUS): ICD-10-CM

## 2024-11-13 DIAGNOSIS — Z76.89 ENCOUNTER TO ESTABLISH CARE: Primary | ICD-10-CM

## 2024-11-13 DIAGNOSIS — G47.33 OSA ON CPAP: ICD-10-CM

## 2024-11-13 DIAGNOSIS — E55.9 VITAMIN D INSUFFICIENCY: ICD-10-CM

## 2024-11-13 DIAGNOSIS — E66.811 CLASS 1 OBESITY DUE TO EXCESS CALORIES WITH SERIOUS COMORBIDITY AND BODY MASS INDEX (BMI) OF 32.0 TO 32.9 IN ADULT: ICD-10-CM

## 2024-11-13 DIAGNOSIS — E66.09 CLASS 1 OBESITY DUE TO EXCESS CALORIES WITH SERIOUS COMORBIDITY AND BODY MASS INDEX (BMI) OF 32.0 TO 32.9 IN ADULT: ICD-10-CM

## 2024-11-13 LAB
25(OH)D3 SERPL-MCNC: 12 NG/ML (ref 30–100)
ALBUMIN SERPL-MCNC: 4.3 G/DL (ref 3.5–5.2)
ALBUMIN/GLOB SERPL: 1.7 {RATIO} (ref 1–2.5)
ALP SERPL-CCNC: 124 U/L (ref 35–104)
ALT SERPL-CCNC: 12 U/L (ref 10–35)
ANION GAP SERPL CALCULATED.3IONS-SCNC: 9 MMOL/L (ref 9–16)
AST SERPL-CCNC: 20 U/L (ref 10–35)
BASOPHILS # BLD: 0.04 K/UL (ref 0–0.2)
BASOPHILS NFR BLD: 1 % (ref 0–2)
BILIRUB SERPL-MCNC: 0.5 MG/DL (ref 0–1.2)
BILIRUB UR QL STRIP: NEGATIVE
BUN SERPL-MCNC: 14 MG/DL (ref 8–23)
CALCIUM SERPL-MCNC: 9.4 MG/DL (ref 8.6–10.4)
CHLORIDE SERPL-SCNC: 105 MMOL/L (ref 98–107)
CHOLEST SERPL-MCNC: 184 MG/DL (ref 0–199)
CHOLESTEROL/HDL RATIO: 2.8
CLARITY UR: CLEAR
CO2 SERPL-SCNC: 27 MMOL/L (ref 20–31)
COLOR UR: YELLOW
COMMENT: NORMAL
CREAT SERPL-MCNC: 0.7 MG/DL (ref 0.6–0.9)
CREAT UR-MCNC: 220 MG/DL (ref 28–217)
EOSINOPHIL # BLD: 0.08 K/UL (ref 0–0.44)
EOSINOPHILS RELATIVE PERCENT: 2 % (ref 1–4)
ERYTHROCYTE [DISTWIDTH] IN BLOOD BY AUTOMATED COUNT: 13 % (ref 11.8–14.4)
EST. AVERAGE GLUCOSE BLD GHB EST-MCNC: 97 MG/DL
GFR, ESTIMATED: >90 ML/MIN/1.73M2
GLUCOSE SERPL-MCNC: 79 MG/DL (ref 74–99)
GLUCOSE UR STRIP-MCNC: NEGATIVE MG/DL
HBA1C MFR BLD: 5 % (ref 4–6)
HCT VFR BLD AUTO: 42.6 % (ref 36.3–47.1)
HCV AB SERPL QL IA: NONREACTIVE
HDLC SERPL-MCNC: 65 MG/DL
HGB BLD-MCNC: 13.9 G/DL (ref 11.9–15.1)
HGB UR QL STRIP.AUTO: NEGATIVE
HIV 1+2 AB+HIV1 P24 AG SERPL QL IA: NONREACTIVE
IMM GRANULOCYTES # BLD AUTO: <0.03 K/UL (ref 0–0.3)
IMM GRANULOCYTES NFR BLD: 0 %
KETONES UR STRIP-MCNC: NEGATIVE MG/DL
LDLC SERPL CALC-MCNC: 104 MG/DL (ref 0–100)
LEUKOCYTE ESTERASE UR QL STRIP: NEGATIVE
LYMPHOCYTES NFR BLD: 1.86 K/UL (ref 1.1–3.7)
LYMPHOCYTES RELATIVE PERCENT: 41 % (ref 24–43)
MAGNESIUM SERPL-MCNC: 2.3 MG/DL (ref 1.6–2.4)
MCH RBC QN AUTO: 27.5 PG (ref 25.2–33.5)
MCHC RBC AUTO-ENTMCNC: 32.6 G/DL (ref 28.4–34.8)
MCV RBC AUTO: 84.2 FL (ref 82.6–102.9)
MICROALBUMIN UR-MCNC: <12 MG/L (ref 0–20)
MICROALBUMIN/CREAT UR-RTO: ABNORMAL MCG/MG CREAT (ref 0–25)
MONOCYTES NFR BLD: 0.4 K/UL (ref 0.1–1.2)
MONOCYTES NFR BLD: 9 % (ref 3–12)
NEUTROPHILS NFR BLD: 47 % (ref 36–65)
NEUTS SEG NFR BLD: 2.13 K/UL (ref 1.5–8.1)
NITRITE UR QL STRIP: NEGATIVE
NRBC BLD-RTO: 0 PER 100 WBC
PH UR STRIP: 5.5 [PH] (ref 5–8)
PLATELET # BLD AUTO: 230 K/UL (ref 138–453)
PMV BLD AUTO: 11 FL (ref 8.1–13.5)
POTASSIUM SERPL-SCNC: 4.5 MMOL/L (ref 3.7–5.3)
PROT SERPL-MCNC: 6.9 G/DL (ref 6.6–8.7)
PROT UR STRIP-MCNC: NEGATIVE MG/DL
RBC # BLD AUTO: 5.06 M/UL (ref 3.95–5.11)
SODIUM SERPL-SCNC: 141 MMOL/L (ref 136–145)
SP GR UR STRIP: 1.02 (ref 1–1.03)
TRIGL SERPL-MCNC: 75 MG/DL
TSH SERPL DL<=0.05 MIU/L-ACNC: 2.05 UIU/ML (ref 0.27–4.2)
UROBILINOGEN UR STRIP-ACNC: NORMAL EU/DL (ref 0–1)
VLDLC SERPL CALC-MCNC: 15 MG/DL (ref 1–30)
WBC OTHER # BLD: 4.5 K/UL (ref 3.5–11.3)

## 2024-11-13 ASSESSMENT — ENCOUNTER SYMPTOMS
EYES NEGATIVE: 1
RESPIRATORY NEGATIVE: 1
GASTROINTESTINAL NEGATIVE: 1
ALLERGIC/IMMUNOLOGIC NEGATIVE: 1

## 2024-11-13 NOTE — PROGRESS NOTES
Visit Information    Have you changed or started any medications since your last visit including any over-the-counter medicines, vitamins, or herbal medicines? no   Are you having any side effects from any of your medications? -  no  Have you stopped taking any of your medications? Is so, why? -  no    Have you seen any other physician or provider since your last visit? No  Have you had any other diagnostic tests since your last visit? No  Have you been seen in the emergency room and/or had an admission to a hospital since we last saw you? No  Have you had your routine dental cleaning in the past 6 months? no    Have you activated your [employer] account? If not, what are your barriers? Yes     Patient Care Team:  Timoteo Srivastava,  as PCP - General (Family Medicine)  Darshan Coyne DO as PCP - Empaneled Provider  Farhad Fish MD    Medical History Review  Past Medical, Family, and Social History reviewed and does not contribute to the patient presenting condition    Health Maintenance   Topic Date Due    Pneumococcal 0-64 years Vaccine (1 of 2 - PCV) Never done    HIV screen  Never done    Diabetic retinal exam  Never done    Hepatitis C screen  Never done    Lung Cancer Screening &/or Counseling  Never done    Diabetic Alb to Cr ratio (uACR) test  11/08/2022    Diabetic foot exam  02/08/2023    COVID-19 Vaccine (5 - 2023-24 season) 09/01/2024    Shingles vaccine (1 of 2) 07/30/2025 (Originally 4/28/2014)    Respiratory Syncytial Virus (RSV) Pregnant or age 60 yrs+ (1 - 1-dose 60+ series) 07/30/2025 (Originally 4/28/2024)    Flu vaccine (1) 10/22/2025 (Originally 8/1/2024)    Breast cancer screen  01/26/2025    A1C test (Diabetic or Prediabetic)  03/30/2025    Lipids  03/30/2025    GFR test (Diabetes, CKD 3-4, OR last GFR 15-59)  06/18/2025    Depression Screen  10/22/2025    Colorectal Cancer Screen  08/16/2028    DTaP/Tdap/Td vaccine (2 - Td or Tdap) 04/26/2030    Hepatitis A vaccine  Aged Out    
at [facility] OR    TONSILLECTOMY      TUBAL LIGATION          Social History     Socioeconomic History    Marital status:      Spouse name: None    Number of children: None    Years of education: None    Highest education level: None   Tobacco Use    Smoking status: Former     Current packs/day: 0.00     Average packs/day: 0.8 packs/day for 52.0 years (40.5 ttl pk-yrs)     Types: Cigarettes     Start date: 1988     Quit date: 2018     Years since quittin.3    Smokeless tobacco: Never    Tobacco comments:     smoked 25 yrs, quit 12 yrs, started again in 10/16, quit for good 2018   Vaping Use    Vaping status: Never Used   Substance and Sexual Activity    Alcohol use: No    Drug use: Never    Sexual activity: Not Currently     Partners: Male     Social Determinants of Health     Financial Resource Strain: Low Risk  (10/22/2024)    Overall Financial Resource Strain (CARDIA)     Difficulty of Paying Living Expenses: Not hard at all   Food Insecurity: No Food Insecurity (10/22/2024)    Hunger Vital Sign     Worried About Running Out of Food in the Last Year: Never true     Ran Out of Food in the Last Year: Never true   Transportation Needs: Unknown (10/22/2024)    PRAPARE - Transportation     Lack of Transportation (Non-Medical): No   Physical Activity: Sufficiently Active (2024)    Exercise Vital Sign     Days of Exercise per Week: 5 days     Minutes of Exercise per Session: 150+ min   Intimate Partner Violence: Not At Risk (2022)    Humiliation, Afraid, Rape, and Kick questionnaire     Fear of Current or Ex-Partner: No     Emotionally Abused: No     Physically Abused: No     Sexually Abused: No   Housing Stability: Unknown (10/22/2024)    Housing Stability Vital Sign     Homeless in the Last Year: No        Family History   Problem Relation Age of Onset    Hypertension Mother     Stroke Mother     Breast Cancer Mother 69    Anemia Mother     Arthritis Mother     Heart Disease

## 2024-11-14 DIAGNOSIS — E11.9 TYPE 2 DIABETES MELLITUS WITHOUT COMPLICATION, WITHOUT LONG-TERM CURRENT USE OF INSULIN (HCC): ICD-10-CM

## 2024-11-21 ENCOUNTER — OFFICE VISIT (OUTPATIENT)
Dept: PAIN MANAGEMENT | Age: 59
End: 2024-11-21
Payer: COMMERCIAL

## 2024-11-21 VITALS — HEIGHT: 64 IN | WEIGHT: 191 LBS | BODY MASS INDEX: 32.61 KG/M2

## 2024-11-21 DIAGNOSIS — M47.817 LUMBOSACRAL SPONDYLOSIS WITHOUT MYELOPATHY: ICD-10-CM

## 2024-11-21 DIAGNOSIS — G89.29 OTHER CHRONIC PAIN: ICD-10-CM

## 2024-11-21 DIAGNOSIS — M96.1 POSTLAMINECTOMY SYNDROME, LUMBAR REGION: ICD-10-CM

## 2024-11-21 PROCEDURE — 99213 OFFICE O/P EST LOW 20 MIN: CPT | Performed by: NURSE PRACTITIONER

## 2024-11-21 RX ORDER — PREGABALIN 75 MG/1
75 CAPSULE ORAL 2 TIMES DAILY
Qty: 60 CAPSULE | Refills: 5 | Status: SHIPPED | OUTPATIENT
Start: 2024-11-21 | End: 2025-05-20

## 2024-11-21 RX ORDER — TIRZEPATIDE 15 MG/.5ML
INJECTION, SOLUTION SUBCUTANEOUS
COMMUNITY
Start: 2024-11-18

## 2024-11-21 ASSESSMENT — ENCOUNTER SYMPTOMS
SHORTNESS OF BREATH: 0
BACK PAIN: 1
CONSTIPATION: 0
COUGH: 0

## 2024-11-21 NOTE — PROGRESS NOTES
Chief Complaint   Patient presents with    Back Pain         PMH  Pt is on Brilinta for coronary artery disease. Stent placed July 2018.        Pt reports chronic lumbar pain. Hx of 3 lumbar surgeries but continues to have significant low back pain.    She has seen neurosurgeon who suggested more conservative measures.    She has completed physical therapy   EMG shows a left L4-5 radiculopathy and a right L5-S1 radiculopathy.   Reports taking lyrica 75 mg twice daily and feels this helping      Lumbar MRI updated 7/2023 showed Persistent disc bulge, facet arthropathy and posterior osteophyte formation at L4-5 resulting in severe left and moderate right neural  foraminal narrowing.  Enhancement along the left lateral recess suggest scar  formation. Severe right neural foraminal narrowing L5-S1 secondary to a disc bulge  and posterior osteophyte formation.  Enhancement along the right lateral  recess suggest scar formation.  Appearance is similar to the previous exam.     She had bilateral SI joint injection 10/2023 with minimal relief  Did have MBB with excellent benefit. RFA in the past with benefit for the low back pain but did develop some neuropathic type pain that improved with Lyrica.      She saw Dr. Galloway 12/6/23 and discussed \"redo L4-5 and L5-S1 laminectomies with L4-S1 PLIF\". She states she is considering doing this next year. She feels the pain tolerable at this time.      Continues to follow with orthopedics for shoulders - she had steroid injections with some relief.      Back Pain  This is a new problem. The current episode started more than 1 year ago. The problem occurs constantly. The problem is unchanged. The pain is present in the lumbar spine. The quality of the pain is described as aching and burning. The pain does not radiate. The pain is at a severity of 3/10. The pain is mild. The pain is The same all the time. The symptoms are aggravated by bending, position, twisting, lying

## 2024-12-16 ENCOUNTER — HOSPITAL ENCOUNTER (INPATIENT)
Age: 59
LOS: 2 days | Discharge: HOME | DRG: 303 | End: 2024-12-18
Attending: EMERGENCY MEDICINE | Admitting: EMERGENCY MEDICINE
Payer: COMMERCIAL

## 2024-12-16 ENCOUNTER — APPOINTMENT (OUTPATIENT)
Dept: GENERAL RADIOLOGY | Age: 59
DRG: 303 | End: 2024-12-16
Payer: COMMERCIAL

## 2024-12-16 DIAGNOSIS — I20.89 ANGINA AT REST (HCC): ICD-10-CM

## 2024-12-16 DIAGNOSIS — R07.9 CHEST PAIN, UNSPECIFIED TYPE: Primary | ICD-10-CM

## 2024-12-16 DIAGNOSIS — I20.9 ANGINA PECTORIS (HCC): ICD-10-CM

## 2024-12-16 LAB
ANION GAP SERPL CALCULATED.3IONS-SCNC: 10 MMOL/L (ref 9–16)
BASOPHILS # BLD: 0.04 K/UL (ref 0–0.2)
BASOPHILS NFR BLD: 1 % (ref 0–2)
BUN SERPL-MCNC: 10 MG/DL (ref 8–23)
CALCIUM SERPL-MCNC: 9.4 MG/DL (ref 8.6–10.4)
CHLORIDE SERPL-SCNC: 105 MMOL/L (ref 98–107)
CO2 SERPL-SCNC: 27 MMOL/L (ref 20–31)
CREAT SERPL-MCNC: 0.9 MG/DL (ref 0.6–0.9)
EOSINOPHIL # BLD: 0.12 K/UL (ref 0–0.44)
EOSINOPHILS RELATIVE PERCENT: 2 % (ref 1–4)
ERYTHROCYTE [DISTWIDTH] IN BLOOD BY AUTOMATED COUNT: 13.5 % (ref 11.8–14.4)
GFR, ESTIMATED: 73 ML/MIN/1.73M2
GLUCOSE SERPL-MCNC: 83 MG/DL (ref 74–99)
HCT VFR BLD AUTO: 40.2 % (ref 36.3–47.1)
HGB BLD-MCNC: 13.6 G/DL (ref 11.9–15.1)
IMM GRANULOCYTES # BLD AUTO: <0.03 K/UL (ref 0–0.3)
IMM GRANULOCYTES NFR BLD: 0 %
LYMPHOCYTES NFR BLD: 2.08 K/UL (ref 1.1–3.7)
LYMPHOCYTES RELATIVE PERCENT: 42 % (ref 24–43)
MCH RBC QN AUTO: 28.1 PG (ref 25.2–33.5)
MCHC RBC AUTO-ENTMCNC: 33.8 G/DL (ref 28.4–34.8)
MCV RBC AUTO: 83.1 FL (ref 82.6–102.9)
MONOCYTES NFR BLD: 0.55 K/UL (ref 0.1–1.2)
MONOCYTES NFR BLD: 11 % (ref 3–12)
NEUTROPHILS NFR BLD: 44 % (ref 36–65)
NEUTS SEG NFR BLD: 2.2 K/UL (ref 1.5–8.1)
NRBC BLD-RTO: 0 PER 100 WBC
PLATELET # BLD AUTO: 226 K/UL (ref 138–453)
PMV BLD AUTO: 10.6 FL (ref 8.1–13.5)
POTASSIUM SERPL-SCNC: 4.1 MMOL/L (ref 3.7–5.3)
RBC # BLD AUTO: 4.84 M/UL (ref 3.95–5.11)
SODIUM SERPL-SCNC: 142 MMOL/L (ref 136–145)
TROPONIN I SERPL HS-MCNC: <6 NG/L (ref 0–14)
TROPONIN I SERPL HS-MCNC: <6 NG/L (ref 0–14)
WBC OTHER # BLD: 5 K/UL (ref 3.5–11.3)

## 2024-12-16 PROCEDURE — 6370000000 HC RX 637 (ALT 250 FOR IP)

## 2024-12-16 PROCEDURE — 1200000000 HC SEMI PRIVATE

## 2024-12-16 PROCEDURE — 80048 BASIC METABOLIC PNL TOTAL CA: CPT

## 2024-12-16 PROCEDURE — 84484 ASSAY OF TROPONIN QUANT: CPT

## 2024-12-16 PROCEDURE — 99285 EMERGENCY DEPT VISIT HI MDM: CPT

## 2024-12-16 PROCEDURE — 93005 ELECTROCARDIOGRAM TRACING: CPT

## 2024-12-16 PROCEDURE — 71046 X-RAY EXAM CHEST 2 VIEWS: CPT

## 2024-12-16 PROCEDURE — 85025 COMPLETE CBC W/AUTO DIFF WBC: CPT

## 2024-12-16 RX ORDER — ONDANSETRON 4 MG/1
4 TABLET, ORALLY DISINTEGRATING ORAL EVERY 8 HOURS PRN
Status: DISCONTINUED | OUTPATIENT
Start: 2024-12-16 | End: 2024-12-18 | Stop reason: HOSPADM

## 2024-12-16 RX ORDER — SODIUM CHLORIDE 9 MG/ML
INJECTION, SOLUTION INTRAVENOUS PRN
Status: DISCONTINUED | OUTPATIENT
Start: 2024-12-16 | End: 2024-12-18 | Stop reason: HOSPADM

## 2024-12-16 RX ORDER — SODIUM CHLORIDE 0.9 % (FLUSH) 0.9 %
5-40 SYRINGE (ML) INJECTION PRN
Status: DISCONTINUED | OUTPATIENT
Start: 2024-12-16 | End: 2024-12-18 | Stop reason: HOSPADM

## 2024-12-16 RX ORDER — ONDANSETRON 2 MG/ML
4 INJECTION INTRAMUSCULAR; INTRAVENOUS EVERY 6 HOURS PRN
Status: DISCONTINUED | OUTPATIENT
Start: 2024-12-16 | End: 2024-12-18 | Stop reason: HOSPADM

## 2024-12-16 RX ORDER — ACETAMINOPHEN 325 MG/1
650 TABLET ORAL EVERY 4 HOURS PRN
Status: DISCONTINUED | OUTPATIENT
Start: 2024-12-16 | End: 2024-12-18 | Stop reason: HOSPADM

## 2024-12-16 RX ORDER — SODIUM CHLORIDE 0.9 % (FLUSH) 0.9 %
5-40 SYRINGE (ML) INJECTION EVERY 12 HOURS SCHEDULED
Status: DISCONTINUED | OUTPATIENT
Start: 2024-12-16 | End: 2024-12-18 | Stop reason: HOSPADM

## 2024-12-16 RX ORDER — HYDRALAZINE HYDROCHLORIDE 20 MG/ML
10 INJECTION INTRAMUSCULAR; INTRAVENOUS ONCE
Status: DISCONTINUED | OUTPATIENT
Start: 2024-12-16 | End: 2024-12-18 | Stop reason: HOSPADM

## 2024-12-16 RX ORDER — ASPIRIN 81 MG/1
234 TABLET, CHEWABLE ORAL ONCE
Status: COMPLETED | OUTPATIENT
Start: 2024-12-16 | End: 2024-12-16

## 2024-12-16 RX ADMIN — ASPIRIN 243 MG: 81 TABLET, CHEWABLE ORAL at 19:50

## 2024-12-16 ASSESSMENT — PAIN SCALES - GENERAL: PAINLEVEL_OUTOF10: 7

## 2024-12-17 ENCOUNTER — APPOINTMENT (OUTPATIENT)
Age: 59
DRG: 303 | End: 2024-12-17
Payer: COMMERCIAL

## 2024-12-17 LAB
EKG ATRIAL RATE: 74 BPM
EKG P AXIS: 40 DEGREES
EKG P-R INTERVAL: 122 MS
EKG Q-T INTERVAL: 392 MS
EKG QRS DURATION: 92 MS
EKG QTC CALCULATION (BAZETT): 435 MS
EKG R AXIS: 23 DEGREES
EKG T AXIS: 123 DEGREES
EKG VENTRICULAR RATE: 74 BPM

## 2024-12-17 PROCEDURE — 75574 CT ANGIO HRT W/3D IMAGE: CPT

## 2024-12-17 PROCEDURE — 99222 1ST HOSP IP/OBS MODERATE 55: CPT | Performed by: STUDENT IN AN ORGANIZED HEALTH CARE EDUCATION/TRAINING PROGRAM

## 2024-12-17 PROCEDURE — 93010 ELECTROCARDIOGRAM REPORT: CPT | Performed by: INTERNAL MEDICINE

## 2024-12-17 PROCEDURE — 6360000004 HC RX CONTRAST MEDICATION

## 2024-12-17 PROCEDURE — 6370000000 HC RX 637 (ALT 250 FOR IP)

## 2024-12-17 PROCEDURE — 1200000000 HC SEMI PRIVATE

## 2024-12-17 PROCEDURE — 2580000003 HC RX 258

## 2024-12-17 PROCEDURE — 2500000003 HC RX 250 WO HCPCS

## 2024-12-17 RX ORDER — LOSARTAN POTASSIUM 50 MG/1
100 TABLET ORAL DAILY
Status: DISCONTINUED | OUTPATIENT
Start: 2024-12-17 | End: 2024-12-18 | Stop reason: HOSPADM

## 2024-12-17 RX ORDER — METOPROLOL TARTRATE 25 MG/1
50 TABLET, FILM COATED ORAL ONCE
Status: COMPLETED | OUTPATIENT
Start: 2024-12-17 | End: 2024-12-17

## 2024-12-17 RX ORDER — ATORVASTATIN CALCIUM 80 MG/1
80 TABLET, FILM COATED ORAL DAILY
Status: DISCONTINUED | OUTPATIENT
Start: 2024-12-17 | End: 2024-12-18 | Stop reason: HOSPADM

## 2024-12-17 RX ORDER — PANTOPRAZOLE SODIUM 40 MG/1
40 TABLET, DELAYED RELEASE ORAL
Status: DISCONTINUED | OUTPATIENT
Start: 2024-12-17 | End: 2024-12-18 | Stop reason: HOSPADM

## 2024-12-17 RX ORDER — METFORMIN HYDROCHLORIDE 500 MG/1
500 TABLET, EXTENDED RELEASE ORAL
Status: DISCONTINUED | OUTPATIENT
Start: 2024-12-17 | End: 2024-12-18 | Stop reason: HOSPADM

## 2024-12-17 RX ORDER — METOPROLOL TARTRATE 1 MG/ML
5 INJECTION, SOLUTION INTRAVENOUS EVERY 5 MIN PRN
Status: COMPLETED | OUTPATIENT
Start: 2024-12-17 | End: 2024-12-17

## 2024-12-17 RX ORDER — IOPAMIDOL 755 MG/ML
109 INJECTION, SOLUTION INTRAVASCULAR
Status: COMPLETED | OUTPATIENT
Start: 2024-12-17 | End: 2024-12-17

## 2024-12-17 RX ORDER — CARVEDILOL 25 MG/1
25 TABLET ORAL 2 TIMES DAILY
Status: DISCONTINUED | OUTPATIENT
Start: 2024-12-17 | End: 2024-12-18 | Stop reason: HOSPADM

## 2024-12-17 RX ORDER — PREGABALIN 75 MG/1
75 CAPSULE ORAL 2 TIMES DAILY
Status: DISCONTINUED | OUTPATIENT
Start: 2024-12-17 | End: 2024-12-18 | Stop reason: HOSPADM

## 2024-12-17 RX ORDER — METOPROLOL TARTRATE 1 MG/ML
5 INJECTION, SOLUTION INTRAVENOUS
Status: ACTIVE | OUTPATIENT
Start: 2024-12-17 | End: 2024-12-17

## 2024-12-17 RX ORDER — HYDROCHLOROTHIAZIDE 25 MG/1
25 TABLET ORAL DAILY
Status: DISCONTINUED | OUTPATIENT
Start: 2024-12-17 | End: 2024-12-18 | Stop reason: HOSPADM

## 2024-12-17 RX ORDER — EZETIMIBE 10 MG/1
10 TABLET ORAL DAILY
Status: DISCONTINUED | OUTPATIENT
Start: 2024-12-17 | End: 2024-12-18 | Stop reason: HOSPADM

## 2024-12-17 RX ORDER — ASPIRIN 81 MG/1
81 TABLET ORAL
Status: DISCONTINUED | OUTPATIENT
Start: 2024-12-17 | End: 2024-12-18 | Stop reason: HOSPADM

## 2024-12-17 RX ORDER — AMLODIPINE BESYLATE 10 MG/1
10 TABLET ORAL DAILY
Status: DISCONTINUED | OUTPATIENT
Start: 2024-12-17 | End: 2024-12-18 | Stop reason: HOSPADM

## 2024-12-17 RX ADMIN — METOPROLOL TARTRATE 5 MG: 5 INJECTION INTRAVENOUS at 17:08

## 2024-12-17 RX ADMIN — CARVEDILOL 25 MG: 25 TABLET, FILM COATED ORAL at 21:07

## 2024-12-17 RX ADMIN — HYDROCHLOROTHIAZIDE 25 MG: 25 TABLET ORAL at 12:53

## 2024-12-17 RX ADMIN — METOPROLOL TARTRATE 5 MG: 5 INJECTION INTRAVENOUS at 17:13

## 2024-12-17 RX ADMIN — SODIUM CHLORIDE, PRESERVATIVE FREE 10 ML: 5 INJECTION INTRAVENOUS at 21:08

## 2024-12-17 RX ADMIN — PANTOPRAZOLE SODIUM 40 MG: 40 TABLET, DELAYED RELEASE ORAL at 05:32

## 2024-12-17 RX ADMIN — ACETAMINOPHEN 650 MG: 325 TABLET ORAL at 09:56

## 2024-12-17 RX ADMIN — METOPROLOL TARTRATE 5 MG: 5 INJECTION INTRAVENOUS at 16:53

## 2024-12-17 RX ADMIN — LOSARTAN POTASSIUM 100 MG: 50 TABLET, FILM COATED ORAL at 12:53

## 2024-12-17 RX ADMIN — SODIUM CHLORIDE, PRESERVATIVE FREE 10 ML: 5 INJECTION INTRAVENOUS at 03:33

## 2024-12-17 RX ADMIN — TICAGRELOR 60 MG: 60 TABLET ORAL at 21:07

## 2024-12-17 RX ADMIN — PREGABALIN 75 MG: 75 CAPSULE ORAL at 12:53

## 2024-12-17 RX ADMIN — PREGABALIN 75 MG: 75 CAPSULE ORAL at 21:07

## 2024-12-17 RX ADMIN — METOPROLOL TARTRATE 50 MG: 25 TABLET, FILM COATED ORAL at 12:53

## 2024-12-17 RX ADMIN — ATORVASTATIN CALCIUM 80 MG: 80 TABLET, FILM COATED ORAL at 12:54

## 2024-12-17 RX ADMIN — SODIUM CHLORIDE, PRESERVATIVE FREE 10 ML: 5 INJECTION INTRAVENOUS at 12:54

## 2024-12-17 RX ADMIN — IOPAMIDOL 109 ML: 755 INJECTION, SOLUTION INTRAVENOUS at 17:23

## 2024-12-17 RX ADMIN — AMLODIPINE BESYLATE 10 MG: 10 TABLET ORAL at 12:53

## 2024-12-17 ASSESSMENT — PAIN DESCRIPTION - ORIENTATION
ORIENTATION: LEFT;RIGHT;MID
ORIENTATION: MID

## 2024-12-17 ASSESSMENT — PAIN SCALES - GENERAL
PAINLEVEL_OUTOF10: 7
PAINLEVEL_OUTOF10: 0
PAINLEVEL_OUTOF10: 0
PAINLEVEL_OUTOF10: 3

## 2024-12-17 ASSESSMENT — ENCOUNTER SYMPTOMS
COUGH: 0
ABDOMINAL PAIN: 0
SHORTNESS OF BREATH: 0

## 2024-12-17 ASSESSMENT — PAIN DESCRIPTION - DESCRIPTORS
DESCRIPTORS: PRESSURE
DESCRIPTORS: ACHING

## 2024-12-17 ASSESSMENT — PAIN DESCRIPTION - PAIN TYPE: TYPE: ACUTE PAIN

## 2024-12-17 ASSESSMENT — PAIN DESCRIPTION - LOCATION
LOCATION: CHEST
LOCATION: HEAD

## 2024-12-17 NOTE — CONSULTS
Attestation signed by      Attending Physician Statement:    I have discussed the care of  Beata Mohan , including pertinent history and exam findings, with the Cardiology fellow/resident.     I have seen and examined the patient and the key elements of all parts of the encounter have been performed by me. I agree with the assessment, plan and orders as documented by the fellow/resident, after I modified exam findings and plan of treatments, and the final version is my approved version of the assessment.     Additional Comments:              [facility]    Consult / H&P               Today's Date: 12/17/2024  Patient Name: Beata Mohan  Date of admission: 12/16/2024  7:12 PM  Patient's age: 59 y.o., 1965  Admission Dx: Chest pain [R07.9]  Chest pain, unspecified type [R07.9]    Reason for Consult:  Cardiac evaluation    Requesting Physician: Nahid Linares MD    CHIEF COMPLAINT:  Chest pain    History Obtained From:  patient    HISTORY OF PRESENT ILLNESS:      The patient is a 59 y.o.  female who is admitted to the hospital for Chest Pain  Beata Mohan complains of chest pain. Onset was 1 day ago while she was sitting at work. Symptoms have improved since that time. The patient's pain is constant. The patient describes the pain as pressure and does not radiate. Patient rates pain as a 7/10 in intensity on admission but reports a 6/10 currently. Associated symptoms are: none. Patient reported this has happened before 1 year ago. Aggravating factors are:  inspiration . Alleviating factors are: none. Patient's cardiac risk factors are: diabetes mellitus, dyslipidemia, hypertension, obesity (BMI >= 30 kg/m2), and CAD . Patient's risk factors for DVT/PE: none. Previous cardiac testing: echocardiogram and Lexiscan . Patient is compliant to medication.     Past Medical History:   has a past medical history of Abnormal stress test, Acne rosacea, erythematous telangiectatic type,  hyperlipidemia    S/P drug eluting coronary stent placement - Mid LAD 7/25/18-Dr. lea    Coronary artery disease involving native coronary artery of native heart with unstable angina pectoris (HCC)    Unstable angina (HCC)    Post PTCA    Former smoker    Type 2 diabetes mellitus without complication, without long-term current use of insulin (HCC)    Degeneration of lumbar intervertebral disc    DDD (degenerative disc disease), cervical    Numbness and tingling in left hand    Sacroiliitis, not elsewhere classified (HCC)    Postlaminectomy syndrome, lumbar region    Lumbosacral spondylosis without myelopathy    Chest pain   S/p stent 2018 on mid LAD  Acute on chronic chest pain  CAD  Dmt2  HTN  obesity    RECOMMENDATIONS:  Due to patients chest pain, extensive cardiac history  and negative trops and negative lexiscan in 2022 will order a coronary CTA to evaluate for any occlusion.  Follow up with Dr. Hayden/Lenny as outpatient      Discussed with patient and Nurse.    Electronically signed by Srini Mesa on 12/17/2024 at 7:14 AM    [facility]      [phone]

## 2024-12-17 NOTE — PLAN OF CARE
Problem: Chronic Conditions and Co-morbidities  Goal: Patient's chronic conditions and co-morbidity symptoms are monitored and maintained or improved  Outcome: Progressing  Flowsheets (Taken 12/17/2024 0339)  Care Plan - Patient's Chronic Conditions and Co-Morbidity Symptoms are Monitored and Maintained or Improved:   Monitor and assess patient's chronic conditions and comorbid symptoms for stability, deterioration, or improvement   Collaborate with multidisciplinary team to address chronic and comorbid conditions and prevent exacerbation or deterioration   Update acute care plan with appropriate goals if chronic or comorbid symptoms are exacerbated and prevent overall improvement and discharge     Problem: Pain  Goal: Verbalizes/displays adequate comfort level or baseline comfort level  Outcome: Progressing  Flowsheets (Taken 12/17/2024 0325)  Verbalizes/displays adequate comfort level or baseline comfort level:   Encourage patient to monitor pain and request assistance   Assess pain using appropriate pain scale   Administer analgesics based on type and severity of pain and evaluate response   Implement non-pharmacological measures as appropriate and evaluate response   Notify Licensed Independent Practitioner if interventions unsuccessful or patient reports new pain     Problem: ABCDS Injury Assessment  Goal: Absence of physical injury  Outcome: Progressing  Flowsheets (Taken 12/17/2024 0339)  Absence of Physical Injury: Implement safety measures based on patient assessment

## 2024-12-17 NOTE — ED PROVIDER NOTES
[facility] EMERGENCY DEPARTMENT  Emergency Department Encounter  Emergency Medicine Resident     Pt Name:Beata Mohan  MRN: [id number]  Birthdate 1965  Date of evaluation: 12/17/24  PCP:  Timoteo Srivastava DO  Note Started: 1:57 AM EST      CHIEF COMPLAINT       Chief Complaint   Patient presents with    Rib Pain (injury)    Chest Pain       HISTORY OF PRESENT ILLNESS  (Location/Symptom, Timing/Onset, Context/Setting, Quality, Duration, Modifying Factors, Severity.)      Beata Mohan is a 59 y.o. female who presents with known history of CAD complaining of right-sided chest pain that started about 5 hours prior to arrival to the ER.  She describes her pain as squeezing in nature.  She reports that her chest pain is not accompanied by shortness of breath, nausea, vomiting diaphoresis or palpitations.  She reports that this is how she felt previously when she has had stents placed.  She recently was evaluated at her cardiology office.  She denies any recent trauma to the chest.  She denies any history of DVTs or PEs.  Is any recent changes in her medications.  She reports compliance with her medications.    PAST MEDICAL / SURGICAL / SOCIAL / FAMILY HISTORY      has a past medical history of Abnormal stress test, Acne rosacea, erythematous telangiectatic type, Allergic rhinitis, Arthritis, CAD (coronary artery disease), Carpal tunnel syndrome, left, Chronic back pain, Coronary artery disease involving native coronary artery of native heart with unstable angina pectoris (HCC), Coronary stent occlusion, DDD (degenerative disc disease), cervical, DDD (degenerative disc disease), lumbar, Diabetes mellitus (HCC), Essential hypertension, Former smoker, Hyperlipidemia, Hyperplastic colon polyp, Hypertension, Lumbar facet arthropathy, Lumbar radiculopathy, Lumbosacral spondylosis without myelopathy, MI (myocardial infarction) ([facility]), Mixed hyperlipidemia, Murmur, cardiac, Neuropathy, Numbness and tingling in

## 2024-12-17 NOTE — ED NOTES
Pt arrived to ED for chest pain  Pt states that the pain is a tight feeling around her ribs  Pt states she has hax of stent placement, states the pain feels the same as it did when it happened in 2018   Pt states that she ales brilinta daily as well as medications for HTN   Pt rates pain 7/10 has not taken anything for the pain at home  Pt states that the pain started about 5 hrs PTA   Pt denies having any SOB, dizziness, numbness or tingling  Pt states the pain does not radiate anywhere else   Breathing is non labored and no acute distress is noted.   Pt resting on stretcher, call light within reach.white board updated

## 2024-12-17 NOTE — PLAN OF CARE
Problem: Chronic Conditions and Co-morbidities  Goal: Patient's chronic conditions and co-morbidity symptoms are monitored and maintained or improved  12/17/2024 1756 by Cary Kang RN  Outcome: Progressing  12/17/2024 0406 by Krystal Buenrostro RN  Outcome: Progressing  Flowsheets (Taken 12/17/2024 0339)  Care Plan - Patient's Chronic Conditions and Co-Morbidity Symptoms are Monitored and Maintained or Improved:   Monitor and assess patient's chronic conditions and comorbid symptoms for stability, deterioration, or improvement   Collaborate with multidisciplinary team to address chronic and comorbid conditions and prevent exacerbation or deterioration   Update acute care plan with appropriate goals if chronic or comorbid symptoms are exacerbated and prevent overall improvement and discharge     Problem: Pain  Goal: Verbalizes/displays adequate comfort level or baseline comfort level  12/17/2024 1756 by Cary Kang RN  Outcome: Progressing  12/17/2024 0406 by Krystal Buenrostro RN  Outcome: Progressing  Flowsheets (Taken 12/17/2024 0325)  Verbalizes/displays adequate comfort level or baseline comfort level:   Encourage patient to monitor pain and request assistance   Assess pain using appropriate pain scale   Administer analgesics based on type and severity of pain and evaluate response   Implement non-pharmacological measures as appropriate and evaluate response   Notify Licensed Independent Practitioner if interventions unsuccessful or patient reports new pain     Problem: ABCDS Injury Assessment  Goal: Absence of physical injury  12/17/2024 1756 by Cary Kang RN  Outcome: Progressing  12/17/2024 0406 by Krystal Buenrostro RN  Outcome: Progressing  Flowsheets (Taken 12/17/2024 0339)  Absence of Physical Injury: Implement safety measures based on patient assessment

## 2024-12-17 NOTE — PROGRESS NOTES
PS sent to EVELIO Cohen regarding morning medications. Told writer to wait to give morning medications till after rounds to see what the plan is.     2599 Dr. Linares verbalized to writer which medications to give and to not give.

## 2024-12-17 NOTE — PROGRESS NOTES
[facility]  CDU / OBSERVATION ENCOUNTER  ATTENDING NOTE         I performed a history and physical examination of the patient and discussed management with the resident or midlevel provider. I reviewed the resident or midlevel provider's note and agree with the documented findings and plan of care. Any areas of disagreement are noted on the chart. I was personally present for the key portions of any procedures. I have documented in the chart those procedures where I was not present during the key portions. I have reviewed the nurses notes. I agree with the chief complaint, past medical history, past surgical history, allergies, medications, social and family history as documented unless otherwise noted below.    The Family history, social history, and ROS are effectively unchanged since admission unless noted elsewhere in the chart.     This patient was placed in the observation unit for reevaluation for possible admission to the hospital     Patient seen along with cardiology.  Patient admitted for chest pain rule out.  Patient with controlled symptoms at the time of evaluation.  Patient with known disease.  Discussed with cardiology and will do CT angiography of coronaries.  Discussed with patient the testing and patient given beta-blocker for rate control.  As needed rate control ordered.  Patient for reassessment after cardiac testing.       Nahid Linares MD  Attending Emergency  Physician

## 2024-12-17 NOTE — ED NOTES
Patient resting on stretcher, NAD  RR even and non-labored  Bed locked and in lowest position  Call light within reach  No needs expressed at this time

## 2024-12-17 NOTE — CARE COORDINATION
Case Management Assessment  Initial Evaluation    Date/Time of Evaluation: 12/17/2024 12:03 PM  Assessment Completed by: Han Solomon    If patient is discharged prior to next notation, then this note serves as note for discharge by case management.    Patient Name: Beaat Mohan                   YOB: 1965  Diagnosis: Chest pain [R07.9]  Chest pain, unspecified type [R07.9]                   Date / Time: 12/16/2024  7:12 PM    Patient Admission Status: Inpatient   Readmission Risk (Low < 19, Mod (19-27), High > 27): Readmission Risk Score: 7    Current PCP: Timoteo Srivastava, DO  PCP verified by CM? Yes    Chart Reviewed: Yes      History Provided by: Patient  Patient Orientation: Alert and Oriented    Patient Cognition: Alert    Hospitalization in the last 30 days (Readmission):  No    If yes, Readmission Assessment in CM Navigator will be completed.    Advance Directives:      Code Status: Full Code   Patient's Primary Decision Maker is: Legal Next of Kin      Discharge Planning:    Patient lives with: Spouse/Significant Other Type of Home: House  Primary Care Giver: Self  Patient Support Systems include: Spouse/Significant Other   Current Financial resources: Other (Comment)  Current community resources: None  Current services prior to admission: C-pap            Current DME:              Type of Home Care services:  None    ADLS  Prior functional level: Independent in ADLs/IADLs  Current functional level: Independent in ADLs/IADLs    PT AM-PAC:   /24  OT AM-PAC:   /24    Family can provide assistance at DC: Yes  Would you like Case Management to discuss the discharge plan with any other family members/significant others, and if so, who? No  Plans to Return to Present Housing: Yes  Other Identified Issues/Barriers to RETURNING to current housing: None  Potential Assistance needed at discharge: N/A            Potential DME:    Patient expects to discharge to: House  Plan for transportation at

## 2024-12-17 NOTE — H&P
[place]  CDU / OBSERVATION ENCOUNTER  Physician NOTE     Pt Name: Beata Mohan  MRN: [id number]  Birthdate 1965  Date of evaluation: 12/17/24  Patient's PCP is :  Timoteo Srivastava DO    CHIEF COMPLAINT       Chief Complaint   Patient presents with    Rib Pain (injury)    Chest Pain         HISTORY OF PRESENT ILLNESS    Beata Mohan is a 59 y.o. female who presents with a known history of coronary artery disease with a chief complaint of right-sided chest pain that began 5-hour prior to her arrival in the ER 12/16/2024.  At that time, patient reports that her pain was squeezing in nature.  She also reports that her pain was accompanied by shortness of breath, nausea, vomiting, diaphoresis, palpitations.  She reports that she has experienced symptoms like this before, most notably when she has had stents placed.  Patient denies any recent trauma, history of DVTs, PEs.  She is compliant with her medications.    Location/Symptom: chest  Timing/Onset: 1 day  Provocation: none  Quality: squeezing  Radiation: none  Severity: 8/10  Timing/Duration: 1 day  Modifying Factors: none    History was obtained in part through review of the ED chart. When possible, a direct discussion was had with ED nurses, residents, and attendings  REVIEW OF SYSTEMS       General ROS - No fevers, No malaise   Ophthalmic ROS - No discharge, No changes in vision  ENT ROS -  No sore throat, No rhinorrhea,   Respiratory ROS - no shortness of breath, no cough, no  wheezing  Cardiovascular ROS - Chest pain improved relative to initial presentation  Gastrointestinal ROS - No abdominal pain, no nausea or vomiting, no change in bowel habits, no black or bloody stools  Genito-Urinary ROS - No dysuria, trouble voiding, or hematuria  Musculoskeletal ROS - No myalgias, No arthalgias  Neurological ROS - No headache, no dizziness/lightheadedness, No focal weakness, no loss of sensation  Dermatological ROS - No lesions, No rash  Nerve Block (Bilateral, 2020); Anesthesia Nerve Block (Bilateral, 2020); Pain management procedure (Left, 2020); Nerve Block (Right, 2020); Pain management procedure (Right, 2020); Hysterectomy, total abdominal (); and Arm Surgery (Left, 2024).  I have reviewed and agree with Surgical History entered and it is pertinent to this complaint.     CURRENT MEDICATIONS     amLODIPine (NORVASC) tablet 10 mg, Daily  aspirin EC tablet 81 mg, Daily with breakfast  atorvastatin (LIPITOR) tablet 80 mg, Daily  carvedilol (COREG) tablet 25 mg, BID  empagliflozin (JARDIANCE) tablet 10 mg, Daily  ezetimibe (ZETIA) tablet 10 mg, Daily  hydroCHLOROthiazide (HYDRODIURIL) tablet 25 mg, Daily  losartan (COZAAR) tablet 100 mg, Daily  metFORMIN (GLUCOPHAGE-XR) extended release tablet 500 mg, Daily with breakfast  pantoprazole (PROTONIX) tablet 40 mg, QAM AC  pregabalin (LYRICA) capsule 75 mg, BID  ticagrelor (BRILINTA) tablet 60 mg, BID  hydrALAZINE (APRESOLINE) injection 10 mg, Once  sodium chloride flush 0.9 % injection 5-40 mL, 2 times per day  sodium chloride flush 0.9 % injection 5-40 mL, PRN  0.9 % sodium chloride infusion, PRN  acetaminophen (TYLENOL) tablet 650 mg, Q4H PRN  ondansetron (ZOFRAN-ODT) disintegrating tablet 4 mg, Q8H PRN   Or  ondansetron (ZOFRAN) injection 4 mg, Q6H PRN        All medication charted and reviewed.    ALLERGIES     is allergic to bee venom, ace inhibitors, isosorbide, ozempic (0.25 or 0.5 mg-dose) [semaglutide(0.25 or 0.5mg-dos)], tetracycline hcl, tetracyclines & related, and trulicity [dulaglutide].      FAMILY HISTORY     She indicated that her mother is . She indicated that her father is . She indicated that her brother is . She indicated that her maternal grandmother is . She indicated that her maternal grandfather is . She indicated that her paternal grandmother is . She indicated that her paternal grandfather

## 2024-12-17 NOTE — ED PROVIDER NOTES
[facility]     Emergency Department     Faculty Attestation    I performed a history and physical examination of the patient and discussed management with the resident. I reviewed the resident’s note and agree with the documented findings and plan of care. Any areas of disagreement are noted on the chart. I was personally present for the key portions of any procedures. I have documented in the chart those procedures where I was not present during the key portions. I have reviewed the emergency nurses triage note. I agree with the chief complaint, past medical history, past surgical history, allergies, medications, social and family history as documented unless otherwise noted below. Documentation of the HPI, Physical Exam and Medical Decision Making performed by medical students or scribes is based on my personal performance of the HPI, PE and MDM. For Physician Assistant/ Nurse Practitioner cases/documentation I have personally evaluated this patient and have completed at least one if not all key elements of the E/M (history, physical exam, and MDM). Additional findings are as noted.    Vital signs:   Vitals:    12/16/24 1841   BP: (!) 181/89   Pulse: 74   Resp: 18   Temp: 98.4 °F (36.9 °C)   SpO2: 95%      59-year-old female with a known history of coronary artery disease presents complaining of right sided chest pain that she describes as squeezing.  It is not accompanied by shortness of breath, nausea, vomiting, diaphoresis, or palpitations.  On exam, the patient is alert and afebrile.  Breath sounds are clear and equal bilaterally.  Cardiac exam with a normal rate, regular rhythm.  Abdomen is soft and nontender.  Extremities with no edema or calf tenderness    EKG Interpretation    Interpreted by emergency department physician    Rhythm: normal sinus   Rate: normal  Axis: normal  Ectopy: none  Conduction: normal  ST Segments: nonspecific changes  T Waves: inversion in  multiple  Q

## 2024-12-17 NOTE — ED NOTES
ED to inpatient nurses report      Chief Complaint:  Chief Complaint   Patient presents with    Rib Pain (injury)    Chest Pain     Present to ED from: Home    MOA:     LOC: alert and orientated to name, place, date  Mobility: Independent  Oxygen Baseline: RA    Current needs required: none    Pending ED orders: none  Present condition: stable     Why did the patient come to the ED? Pt arrived to ED for chest pain  Pt states that the pain is a tight feeling around her ribs  Pt states she has hax of stent placement, states the pain feels the same as it did when it happened in 2018   Pt states that she ales brilinta daily as well as medications for HTN   Pt rates pain 7/10 has not taken anything for the pain at home  Pt states that the pain started about 5 hrs PTA   Pt denies having any SOB, dizziness, numbness or tingling  Pt states the pain does not radiate anywhere else   Breathing is non labored and no acute distress is noted.   Pt resting on stretcher, call light within reach.white board updated   What is the plan? Admit for chest pain   Any procedures or intervention occur? Labs xray   Any safety concerns??    Mental Status:  Level of Consciousness: Alert (0)    Psych Assessment:   Psychosocial  Psychosocial (WDL): Within Defined Limits  Vital signs   Vitals:    12/16/24 1949 12/16/24 2000 12/16/24 2015 12/16/24 2030   BP: (!) 147/90 (!) 159/88 (!) 157/87 (!) 147/89   Pulse:  70 75 74   Resp:  20 27 21   Temp:       TempSrc:       SpO2:  97% 92% 94%   Weight:       Height:            Vitals:  Patient Vitals for the past 24 hrs:   BP Temp Temp src Pulse Resp SpO2 Height Weight   12/16/24 2030 (!) 147/89 -- -- 74 21 94 % -- --   12/16/24 2015 (!) 157/87 -- -- 75 27 92 % -- --   12/16/24 2000 (!) 159/88 -- -- 70 20 97 % -- --   12/16/24 1949 (!) 147/90 -- -- -- -- -- -- --   12/16/24 1842 -- -- -- -- -- -- 1.626 m (5' 4\") 85.3 kg (188 lb)   12/16/24 1841 (!) 181/89 98.4 °F (36.9 °C) Oral 74 18 95 % -- --      Visit        SURGICAL HISTORY       Past Surgical History:   Procedure Laterality Date    ANESTHESIA NERVE BLOCK Left 2019    NERVE BLOCK (DEFINE) - # 1 L5-S1 performed by Paulie Castro MD at [facility] OR    ANESTHESIA NERVE BLOCK Bilateral 2020    NERVE BLOCK BILATERAL - B MBB # 1 L4-5, L5-S1 performed by Paulie Castro MD at [facility] OR    ANESTHESIA NERVE BLOCK Bilateral 2020    NERVE BLOCK BILATERAL - L-5, L5-S1 performed by Paulie Castro MD at [facility] OR    ARM SURGERY Left 2024    EXCISION  LEFT UPPER ARM CYST performed by Ira Mckay DO at [facility] OR    CARDIAC CATHETERIZATION  2017    CARDIAC CATHETERIZATION  2019    CARDIAC CATHETERIZATION  2018    1 stent mid LAD PT HAS XIENCE ALPINE HEART STENT, MRI CONDITIONAL 3T OK, SAFE IMMEDIATELY POST IMPLANT.     CARDIAC CATHETERIZATION  02/10/2020    for chest pain no new blockage    CARPAL TUNNEL RELEASE Right      SECTION      x3    COLONOSCOPY N/A 2018    COLONOSCOPY WITH BIOPSY performed by Jared Ocampo MD at [facility] OR    ENDOMETRIAL ABLATION      EYE SURGERY  2014    cataract surgery left eye WITH IMPLANT. MRI OK.     FOOT SURGERY      HAND TENDON SURGERY Right     HC INJECTION PROCEDURE FOR SACROILIAC JOINT Left 2019    SACROILIAC JOINT INJECTION - #1 performed by Paulie Castro MD at [facility] OR    HYSTERECTOMY (CERVIX STATUS UNKNOWN)      HYSTERECTOMY, TOTAL ABDOMINAL (CERVIX REMOVED)      LUMBAR DISC SURGERY      NERVE BLOCK  2020     NERVE BLOCK BILATERAL - B MBB # 1 L4-5, L5-S1 (Bilateral     NERVE BLOCK Bilateral 2020    NERVE BLOCK BILATERAL - L-5, L5-S1 (Bilateral )     NERVE BLOCK Right 2020     NERVE RADIOFREQUENCY ABLATION (Right )     PAIN MANAGEMENT PROCEDURE Left 2020    NERVE RADIOFREQUENCY ABLATION- L4-5, L5-S1 performed by Paulie Castro MD at [facility] OR    PAIN MANAGEMENT PROCEDURE Right 2020

## 2024-12-18 ENCOUNTER — APPOINTMENT (OUTPATIENT)
Dept: NUCLEAR MEDICINE | Age: 59
DRG: 303 | End: 2024-12-18
Payer: COMMERCIAL

## 2024-12-18 ENCOUNTER — APPOINTMENT (OUTPATIENT)
Age: 59
DRG: 303 | End: 2024-12-18
Payer: COMMERCIAL

## 2024-12-18 VITALS
OXYGEN SATURATION: 96 % | DIASTOLIC BLOOD PRESSURE: 72 MMHG | RESPIRATION RATE: 20 BRPM | SYSTOLIC BLOOD PRESSURE: 120 MMHG | BODY MASS INDEX: 32.93 KG/M2 | WEIGHT: 192.9 LBS | HEART RATE: 78 BPM | TEMPERATURE: 97.3 F | HEIGHT: 64 IN

## 2024-12-18 PROBLEM — I20.89 ANGINA AT REST (HCC): Status: ACTIVE | Noted: 2024-12-16

## 2024-12-18 LAB — ECHO BSA: 1.99 M2

## 2024-12-18 PROCEDURE — B2151ZZ FLUOROSCOPY OF LEFT HEART USING LOW OSMOLAR CONTRAST: ICD-10-PCS | Performed by: STUDENT IN AN ORGANIZED HEALTH CARE EDUCATION/TRAINING PROGRAM

## 2024-12-18 PROCEDURE — A9500 TC99M SESTAMIBI: HCPCS | Performed by: NURSE PRACTITIONER

## 2024-12-18 PROCEDURE — 99233 SBSQ HOSP IP/OBS HIGH 50: CPT | Performed by: NURSE PRACTITIONER

## 2024-12-18 PROCEDURE — 2580000003 HC RX 258: Performed by: EMERGENCY MEDICINE

## 2024-12-18 PROCEDURE — 99152 MOD SED SAME PHYS/QHP 5/>YRS: CPT | Performed by: STUDENT IN AN ORGANIZED HEALTH CARE EDUCATION/TRAINING PROGRAM

## 2024-12-18 PROCEDURE — 93458 L HRT ARTERY/VENTRICLE ANGIO: CPT | Performed by: STUDENT IN AN ORGANIZED HEALTH CARE EDUCATION/TRAINING PROGRAM

## 2024-12-18 PROCEDURE — C1769 GUIDE WIRE: HCPCS | Performed by: STUDENT IN AN ORGANIZED HEALTH CARE EDUCATION/TRAINING PROGRAM

## 2024-12-18 PROCEDURE — 6360000002 HC RX W HCPCS: Performed by: STUDENT IN AN ORGANIZED HEALTH CARE EDUCATION/TRAINING PROGRAM

## 2024-12-18 PROCEDURE — 6360000004 HC RX CONTRAST MEDICATION: Performed by: STUDENT IN AN ORGANIZED HEALTH CARE EDUCATION/TRAINING PROGRAM

## 2024-12-18 PROCEDURE — B2111ZZ FLUOROSCOPY OF MULTIPLE CORONARY ARTERIES USING LOW OSMOLAR CONTRAST: ICD-10-PCS | Performed by: STUDENT IN AN ORGANIZED HEALTH CARE EDUCATION/TRAINING PROGRAM

## 2024-12-18 PROCEDURE — 2500000003 HC RX 250 WO HCPCS: Performed by: STUDENT IN AN ORGANIZED HEALTH CARE EDUCATION/TRAINING PROGRAM

## 2024-12-18 PROCEDURE — 3430000000 HC RX DIAGNOSTIC RADIOPHARMACEUTICAL: Performed by: NURSE PRACTITIONER

## 2024-12-18 PROCEDURE — 2709999900 HC NON-CHARGEABLE SUPPLY: Performed by: STUDENT IN AN ORGANIZED HEALTH CARE EDUCATION/TRAINING PROGRAM

## 2024-12-18 PROCEDURE — 6370000000 HC RX 637 (ALT 250 FOR IP)

## 2024-12-18 PROCEDURE — 2500000003 HC RX 250 WO HCPCS

## 2024-12-18 PROCEDURE — C1894 INTRO/SHEATH, NON-LASER: HCPCS | Performed by: STUDENT IN AN ORGANIZED HEALTH CARE EDUCATION/TRAINING PROGRAM

## 2024-12-18 PROCEDURE — 4A023N7 MEASUREMENT OF CARDIAC SAMPLING AND PRESSURE, LEFT HEART, PERCUTANEOUS APPROACH: ICD-10-PCS | Performed by: STUDENT IN AN ORGANIZED HEALTH CARE EDUCATION/TRAINING PROGRAM

## 2024-12-18 RX ORDER — IOPAMIDOL 755 MG/ML
INJECTION, SOLUTION INTRAVASCULAR PRN
Status: DISCONTINUED | OUTPATIENT
Start: 2024-12-18 | End: 2024-12-18 | Stop reason: HOSPADM

## 2024-12-18 RX ORDER — ALBUTEROL SULFATE 90 UG/1
2 INHALANT RESPIRATORY (INHALATION) PRN
Status: ACTIVE | OUTPATIENT
Start: 2024-12-18 | End: 2024-12-18

## 2024-12-18 RX ORDER — MIDAZOLAM 1 MG/ML
INJECTION INTRAMUSCULAR; INTRAVENOUS PRN
Status: DISCONTINUED | OUTPATIENT
Start: 2024-12-18 | End: 2024-12-18 | Stop reason: HOSPADM

## 2024-12-18 RX ORDER — SODIUM CHLORIDE 9 MG/ML
INJECTION, SOLUTION INTRAVENOUS CONTINUOUS
Status: DISCONTINUED | OUTPATIENT
Start: 2024-12-18 | End: 2024-12-18 | Stop reason: HOSPADM

## 2024-12-18 RX ORDER — VERAPAMIL HYDROCHLORIDE 2.5 MG/ML
INJECTION, SOLUTION INTRAVENOUS PRN
Status: DISCONTINUED | OUTPATIENT
Start: 2024-12-18 | End: 2024-12-18 | Stop reason: HOSPADM

## 2024-12-18 RX ORDER — ATROPINE SULFATE 0.1 MG/ML
0.5 INJECTION INTRAVENOUS EVERY 5 MIN PRN
Status: ACTIVE | OUTPATIENT
Start: 2024-12-18 | End: 2024-12-18

## 2024-12-18 RX ORDER — RANOLAZINE 500 MG/1
500 TABLET, EXTENDED RELEASE ORAL 2 TIMES DAILY
Qty: 60 TABLET | Refills: 1 | Status: SHIPPED | OUTPATIENT
Start: 2024-12-18

## 2024-12-18 RX ORDER — AMINOPHYLLINE 25 MG/ML
50 INJECTION, SOLUTION INTRAVENOUS PRN
Status: ACTIVE | OUTPATIENT
Start: 2024-12-18 | End: 2024-12-18

## 2024-12-18 RX ORDER — SODIUM CHLORIDE 9 MG/ML
500 INJECTION, SOLUTION INTRAVENOUS CONTINUOUS PRN
Status: ACTIVE | OUTPATIENT
Start: 2024-12-18 | End: 2024-12-18

## 2024-12-18 RX ORDER — TETRAKIS(2-METHOXYISOBUTYLISOCYANIDE)COPPER(I) TETRAFLUOROBORATE 1 MG/ML
16.5 INJECTION, POWDER, LYOPHILIZED, FOR SOLUTION INTRAVENOUS
Status: COMPLETED | OUTPATIENT
Start: 2024-12-18 | End: 2024-12-18

## 2024-12-18 RX ORDER — HEPARIN SODIUM 1000 [USP'U]/ML
INJECTION, SOLUTION INTRAVENOUS; SUBCUTANEOUS PRN
Status: DISCONTINUED | OUTPATIENT
Start: 2024-12-18 | End: 2024-12-18 | Stop reason: HOSPADM

## 2024-12-18 RX ORDER — NITROGLYCERIN 0.4 MG/1
0.4 TABLET SUBLINGUAL EVERY 5 MIN PRN
Status: ACTIVE | OUTPATIENT
Start: 2024-12-18 | End: 2024-12-18

## 2024-12-18 RX ORDER — REGADENOSON 0.08 MG/ML
0.4 INJECTION, SOLUTION INTRAVENOUS
Status: DISCONTINUED | OUTPATIENT
Start: 2024-12-18 | End: 2024-12-18 | Stop reason: HOSPADM

## 2024-12-18 RX ORDER — NITROGLYCERIN 20 MG/100ML
INJECTION INTRAVENOUS PRN
Status: DISCONTINUED | OUTPATIENT
Start: 2024-12-18 | End: 2024-12-18 | Stop reason: HOSPADM

## 2024-12-18 RX ORDER — METOPROLOL TARTRATE 1 MG/ML
5 INJECTION, SOLUTION INTRAVENOUS EVERY 5 MIN PRN
Status: ACTIVE | OUTPATIENT
Start: 2024-12-18 | End: 2024-12-18

## 2024-12-18 RX ORDER — SODIUM CHLORIDE 0.9 % (FLUSH) 0.9 %
5-40 SYRINGE (ML) INJECTION PRN
Status: ACTIVE | OUTPATIENT
Start: 2024-12-18 | End: 2024-12-18

## 2024-12-18 RX ADMIN — TETRAKIS(2-METHOXYISOBUTYLISOCYANIDE)COPPER(I) TETRAFLUOROBORATE 16.5 MILLICURIE: 1 INJECTION, POWDER, LYOPHILIZED, FOR SOLUTION INTRAVENOUS at 08:56

## 2024-12-18 RX ADMIN — SODIUM CHLORIDE, PRESERVATIVE FREE 10 ML: 5 INJECTION INTRAVENOUS at 10:48

## 2024-12-18 RX ADMIN — SODIUM CHLORIDE: 9 INJECTION, SOLUTION INTRAVENOUS at 10:48

## 2024-12-18 RX ADMIN — PANTOPRAZOLE SODIUM 40 MG: 40 TABLET, DELAYED RELEASE ORAL at 06:28

## 2024-12-18 NOTE — PLAN OF CARE
Problem: Chronic Conditions and Co-morbidities  Goal: Patient's chronic conditions and co-morbidity symptoms are monitored and maintained or improved  12/18/2024 1820 by Cary Kang RN  Outcome: Progressing  12/18/2024 0606 by Shan Guzman RN  Outcome: Progressing     Problem: Pain  Goal: Verbalizes/displays adequate comfort level or baseline comfort level  12/18/2024 1820 by Cary Kang RN  Outcome: Progressing  12/18/2024 0606 by Shan Guzman RN  Outcome: Progressing     Problem: ABCDS Injury Assessment  Goal: Absence of physical injury  12/18/2024 1820 by Cary Kang RN  Outcome: Progressing  12/18/2024 0606 by Shan Guzman RN  Outcome: Progressing

## 2024-12-18 NOTE — PLAN OF CARE
Problem: Chronic Conditions and Co-morbidities  Goal: Patient's chronic conditions and co-morbidity symptoms are monitored and maintained or improved  12/18/2024 0606 by Shan Guzman RN  Outcome: Progressing  12/17/2024 1756 by Cary Kang RN  Outcome: Progressing     Problem: Pain  Goal: Verbalizes/displays adequate comfort level or baseline comfort level  12/18/2024 0606 by Shan Guzman RN  Outcome: Progressing  12/17/2024 1756 by Cary Kang RN  Outcome: Progressing     Problem: ABCDS Injury Assessment  Goal: Absence of physical injury  12/18/2024 0606 by Shan Guzman RN  Outcome: Progressing  12/17/2024 1756 by Cary Kang RN  Outcome: Progressing

## 2024-12-18 NOTE — PROGRESS NOTES
[facility]   Progress Note                   Date:   12/18/2024  Patient name: Beata Mohan  Date of admission:  12/16/2024  7:12 PM  MRN:   [id number]  YOB: 1965  PCP: Timoteo Srivastava DO    Reason for Admission: Chest pain [R07.9]  Chest pain, unspecified type [R07.9]    Subjective:       Clinical Changes / Abnormalities: Pt seen and examined in the room.  Pt continues to complain about a squeezing around her chest.  She states that his is very similar to her symptoms in the past.  She always has a normal stress test, although has hx of stents and CAD. She states her symptoms are very classic for her.  She is currently NPO   Labs, vitals and tele reviewed-        Medications:   Scheduled Meds:   amLODIPine  10 mg Oral Daily    aspirin  81 mg Oral Daily with breakfast    atorvastatin  80 mg Oral Daily    carvedilol  25 mg Oral BID    empagliflozin  10 mg Oral Daily    ezetimibe  10 mg Oral Daily    hydroCHLOROthiazide  25 mg Oral Daily    losartan  100 mg Oral Daily    metFORMIN  500 mg Oral Daily with breakfast    pantoprazole  40 mg Oral QAM AC    pregabalin  75 mg Oral BID    ticagrelor  60 mg Oral BID    hydrALAZINE  10 mg IntraVENous Once    sodium chloride flush  5-40 mL IntraVENous 2 times per day     Continuous Infusions:   sodium chloride      sodium chloride       CBC:   Recent Labs     12/16/24 1941   WBC 5.0   HGB 13.6        BMP:    Recent Labs     12/16/24 1941      K 4.1      CO2 27   BUN 10   CREATININE 0.9   GLUCOSE 83     Hepatic: No results for input(s): \"AST\", \"ALT\", \"BILITOT\", \"ALKPHOS\" in the last 72 hours.    Invalid input(s): \"ALB\"  Troponin:   Recent Labs     12/16/24 1941 12/16/24 2052   TROPHS <6 <6     BNP: No results for input(s): \"BNP\" in the last 72 hours.  Lipids: No results for input(s): \"CHOL\", \"HDL\" in the last 72 hours.    Invalid input(s): \"LDLCALCU\"  INR: No results for input(s): \"INR\" in the last 72 hours.      CARDIAC HX

## 2024-12-18 NOTE — PROGRESS NOTES
OBS/CDU   Progress NOTE      Patients PCP is:  Timoteo Srivastava,         SUBJECTIVE      Patient had evaluation yesterday with CT coronaries.  Significantly elevated calcium score.  Lots of motion artifact.  Discussed directly with cardiology.  Discussed with both attending and NP.    Patient currently comfortable but will need further evaluation of coronary artery status.    PHYSICAL EXAM      General: NAD, AO X 3  Heent: EOMI, PERRL  Neck: SUPPLE, NO JVD  Cardiovascular: RRR, S1S2  Pulmonary: CTAB, NO SOB  Abdomen: SOFT, NTTP, ND, +BS  Extremities: +2/4 PULSES DISTAL, NO SWELLING  Neuro / Psych: NO NUMBNESS OR TINGLING, MENTATION AT BASELINE    PERTINENT TEST /EXAMS      I have reviewed all available laboratory results.    MEDICATIONS CURRENT   regadenoson (LEXISCAN) injection 0.4 mg, ONCE PRN  sodium chloride flush 0.9 % injection 5-40 mL, PRN  0.9 % sodium chloride infusion, Continuous PRN  albuterol sulfate HFA (PROVENTIL;VENTOLIN;PROAIR) 108 (90 Base) MCG/ACT inhaler 2 puff, PRN  atropine injection 0.5 mg, Q5 Min PRN  nitroGLYCERIN (NITROSTAT) SL tablet 0.4 mg, Q5 Min PRN  metoprolol (LOPRESSOR) injection 5 mg, Q5 Min PRN  aminophylline injection 50 mg, PRN  0.9 % sodium chloride infusion, Continuous  amLODIPine (NORVASC) tablet 10 mg, Daily  aspirin EC tablet 81 mg, Daily with breakfast  atorvastatin (LIPITOR) tablet 80 mg, Daily  carvedilol (COREG) tablet 25 mg, BID  empagliflozin (JARDIANCE) tablet 10 mg, Daily  ezetimibe (ZETIA) tablet 10 mg, Daily  hydroCHLOROthiazide (HYDRODIURIL) tablet 25 mg, Daily  losartan (COZAAR) tablet 100 mg, Daily  metFORMIN (GLUCOPHAGE-XR) extended release tablet 500 mg, Daily with breakfast  pantoprazole (PROTONIX) tablet 40 mg, QAM AC  pregabalin (LYRICA) capsule 75 mg, BID  ticagrelor (BRILINTA) tablet 60 mg, BID  hydrALAZINE (APRESOLINE) injection 10 mg, Once  sodium chloride flush 0.9 % injection 5-40 mL, 2 times per day  sodium chloride flush 0.9 % injection 5-40 mL,

## 2024-12-18 NOTE — PROGRESS NOTES
Patient admitted, consent signed and questions answered. Patient ready for procedure. Call light to reach with side rails up 2 of 2. Bilateral groins clipped with writer and Laura present.   History and physical updated.

## 2024-12-19 NOTE — DISCHARGE SUMMARY
CDU Discharge Summary        Patient:  Beata Mohan  YOB: 1965    MRN: [id number]   Acct: [id number]    Primary Care Physician: Timoteo Srivastava DO    Admit date:  12/16/2024  7:12 PM  Discharge date: 12/18/2024  7:37 PM     Discharge Diagnoses:     1.)  Acute pain secondary to unstable angina.  Improved with IV hydration, analgesics, rest and further cardiac intervention    Follow-up:  Call today/tomorrow for a follow up appointment with Timoteo Srivastava DO , or return to the Emergency Room with worsening symptoms    Stressed to patient the importance of following up with primary care doctor for further workup/management of symptoms.  Pt verbalizes understanding and agrees with plan.    Discharge Medication Changes:       Medication List        START taking these medications      ranolazine 500 MG extended release tablet  Commonly known as: Ranexa  Take 1 tablet by mouth 2 times daily            CONTINUE taking these medications      amLODIPine 10 MG tablet  Commonly known as: NORVASC  take 1 tablet by mouth once daily     aspirin 81 MG tablet  Take 1 tablet by mouth daily (with breakfast)     atorvastatin 80 MG tablet  Commonly known as: LIPITOR  take 1 tablet by mouth once daily     carvedilol 12.5 MG tablet  Commonly known as: COREG  Take 2 tablets by mouth 2 times daily     empagliflozin 10 MG tablet  Commonly known as: Jardiance  Take 1 tablet by mouth daily     ezetimibe 10 MG tablet  Commonly known as: ZETIA  take 1 tablet by mouth daily     Handicap Placard Misc  by Does not apply route Handicap placard for 5 years.     hydroCHLOROthiazide 25 MG tablet  Commonly known as: HYDRODIURIL  take 1 tablet by mouth once daily     Lancets Misc  1 each by Does not apply route daily     losartan 100 MG tablet  Commonly known as: COZAAR  take 1 tablet by mouth once daily     metFORMIN 500 MG extended release tablet  Commonly known as: GLUCOPHAGE-XR  Take 1 tablet by mouth daily (with breakfast)

## 2024-12-19 NOTE — DISCHARGE SUMMARY
Discharge instructions explained to patient, patient verbalized understanding. Patient waiting for  for ride home. Cath site no bleeding, vitals stable.

## 2024-12-19 NOTE — PLAN OF CARE
Problem: Chronic Conditions and Co-morbidities  Goal: Patient's chronic conditions and co-morbidity symptoms are monitored and maintained or improved  12/18/2024 1934 by Shan Guzman RN  Outcome: Completed  12/18/2024 1820 by Cary Kang RN  Outcome: Progressing  12/18/2024 0606 by Shan Guzman RN  Outcome: Progressing     Problem: Pain  Goal: Verbalizes/displays adequate comfort level or baseline comfort level  12/18/2024 1934 by Shan Guzman RN  Outcome: Completed  12/18/2024 1820 by Cary Kang RN  Outcome: Progressing  12/18/2024 0606 by Shan Guzman RN  Outcome: Progressing     Problem: ABCDS Injury Assessment  Goal: Absence of physical injury  12/18/2024 1934 by Shan Guzman RN  Outcome: Completed  12/18/2024 1820 by Cary Kang RN  Outcome: Progressing  12/18/2024 0606 by Shan Guzman RN  Outcome: Progressing

## 2025-01-03 ENCOUNTER — OFFICE VISIT (OUTPATIENT)
Dept: FAMILY MEDICINE CLINIC | Age: 60
End: 2025-01-03
Payer: COMMERCIAL

## 2025-01-03 VITALS
SYSTOLIC BLOOD PRESSURE: 122 MMHG | HEART RATE: 68 BPM | OXYGEN SATURATION: 98 % | BODY MASS INDEX: 31.55 KG/M2 | DIASTOLIC BLOOD PRESSURE: 72 MMHG | WEIGHT: 183.8 LBS

## 2025-01-03 DIAGNOSIS — Z09 HOSPITAL DISCHARGE FOLLOW-UP: Primary | ICD-10-CM

## 2025-01-03 DIAGNOSIS — I20.0 UNSTABLE ANGINA (HCC): ICD-10-CM

## 2025-01-03 DIAGNOSIS — J32.9 RHINOSINUSITIS: ICD-10-CM

## 2025-01-03 PROCEDURE — 1111F DSCHRG MED/CURRENT MED MERGE: CPT | Performed by: FAMILY MEDICINE

## 2025-01-03 PROCEDURE — 3074F SYST BP LT 130 MM HG: CPT | Performed by: FAMILY MEDICINE

## 2025-01-03 PROCEDURE — 3078F DIAST BP <80 MM HG: CPT | Performed by: FAMILY MEDICINE

## 2025-01-03 PROCEDURE — 99214 OFFICE O/P EST MOD 30 MIN: CPT | Performed by: FAMILY MEDICINE

## 2025-01-03 RX ORDER — MULTIVIT-MIN/IRON/FOLIC ACID/K 18-600-40
2000 CAPSULE ORAL DAILY
COMMUNITY

## 2025-01-03 RX ORDER — FLUTICASONE PROPIONATE 50 MCG
2 SPRAY, SUSPENSION (ML) NASAL DAILY
Qty: 16 G | Refills: 1 | Status: SHIPPED | OUTPATIENT
Start: 2025-01-03

## 2025-01-03 ASSESSMENT — PATIENT HEALTH QUESTIONNAIRE - PHQ9
1. LITTLE INTEREST OR PLEASURE IN DOING THINGS: NOT AT ALL
SUM OF ALL RESPONSES TO PHQ9 QUESTIONS 1 & 2: 0
2. FEELING DOWN, DEPRESSED OR HOPELESS: NOT AT ALL
SUM OF ALL RESPONSES TO PHQ QUESTIONS 1-9: 0

## 2025-01-03 ASSESSMENT — ENCOUNTER SYMPTOMS
RESPIRATORY NEGATIVE: 1
EYES NEGATIVE: 1
GASTROINTESTINAL NEGATIVE: 1
ALLERGIC/IMMUNOLOGIC NEGATIVE: 1

## 2025-01-03 NOTE — PROGRESS NOTES
Post-Discharge Transitional Care Follow Up      Beata Mohan   YOB: 1965    Date of Office Visit:  1/3/2025  Date of Hospital Admission: 12/16/24  Date of Hospital Discharge: 12/18/24  Readmission Risk Score (high >=14%. Medium >=10%):Readmission Risk Score: 8      Care management risk score Rising risk (score 2-5) and Complex Care (Scores >=6): No Risk Score On File     Non face to face  following discharge, date last encounter closed (first attempt may have been earlier): *No documented post hospital discharge outreach found in the last 14 days     Call initiated 2 business days of discharge: No    Hospital discharge follow-up  Comments:  See reports / testing from hospitalization.  Reviewed this AM.  Orders:  -     [place] DISCHARGE MEDS RECONCILED W/ CURRENT OUTPATIENT MED LIST  Unstable angina (HCC)  Comments:  Following with cardiology  Rhinosinusitis  -     fluticasone (FLONASE) 50 MCG/ACT nasal spray; 2 sprays by Each Nostril route daily, Disp-16 g, R-1Normal  BMI 31.0-31.9,adult    Medical Decision Making: moderate complexity  Return if symptoms worsen or fail to improve.           Subjective:   Hospital Course:    Beata Mohan originally presented to the hospital on 12/16/2024  7:12 PM with with complaints of chest pain.  At that time it was determined that She required further observation and cardiac evaluation and intervention. Labs and imaging were followed daily.  Imaging results as above.  She is medically stable to be discharged.  Discussed medication updates and changes by cardiology with patient.  Patient instructed to follow-up with PCP within 1 week, cardiology in 2 to 4 weeks and to return to the emergency department with any returning or worsening symptoms.    Clinical course has improved, labs and imaging reviewed.           Inpatient course: Discharge summary reviewed- see chart.    Interval history/Current status: Improved.    Patient Active Problem List   Diagnosis    Other

## 2025-01-13 NOTE — PROGRESS NOTES
lumbar 10/06/2022    Diabetes mellitus (HCC)     Essential hypertension     Former smoker 02/07/2020    30-year history.  Quit in 2018    Hyperlipidemia     Dr. Kothari    Hyperplastic colon polyp 09/13/2018    Hypertension     Dr. Kothari    Lumbar facet arthropathy 08/17/2023    Lumbar radiculopathy 01/05/2023    Lumbosacral spondylosis without myelopathy 08/17/2023    MI (myocardial infarction) ([facility])     Mixed hyperlipidemia 01/08/2017    Murmur, cardiac 02/08/2020    Neuropathy 2021    Disc degeneration    Numbness and tingling in left hand 10/24/2023    Obesity ?    SRI on CPAP     instructed to bring Dr. Garvey    Osteoarthritis     Sacroiliitis, not elsewhere classified (HCC) 04/11/2024    Type 2 diabetes mellitus without complication ([facility]) 2020?    Type 2 diabetes mellitus without complication, without long-term current use of insulin (HCC) 03/16/2020    Unstable angina ([facility]) 02/08/2020    Wears prescription eyeglasses     Wellness examination     Dr. Kothari-PCP last seen 2/13/2020     Past Surgical History:   Past Surgical History:   Procedure Laterality Date    ANESTHESIA NERVE BLOCK Left 12/19/2019    NERVE BLOCK (DEFINE) - # 1 L5-S1 performed by Paulie Castro MD at [facility] OR    ANESTHESIA NERVE BLOCK Bilateral 07/17/2020    NERVE BLOCK BILATERAL - B MBB # 1 L4-5, L5-S1 performed by Paulie Castro MD at [facility] OR    ANESTHESIA NERVE BLOCK Bilateral 08/14/2020    NERVE BLOCK BILATERAL - L-5, L5-S1 performed by Paulie Castro MD at [facility] OR    ARM SURGERY Left 9/19/2024    EXCISION  LEFT UPPER ARM CYST performed by Ira Mckay DO at [facility] OR    CARDIAC CATHETERIZATION  06/13/2017    CARDIAC CATHETERIZATION  03/2019    CARDIAC CATHETERIZATION  07/2018    1 stent mid LAD PT HAS XIENCE [employer] HEART STENT, MRI CONDITIONAL 3T OK, SAFE IMMEDIATELY POST IMPLANT.     CARDIAC CATHETERIZATION  02/10/2020    for chest pain no new blockage    CARDIAC PROCEDURE N/A 12/18/2024

## 2025-01-14 ENCOUNTER — HOSPITAL ENCOUNTER (OUTPATIENT)
Age: 60
Setting detail: SPECIMEN
Discharge: HOME | End: 2025-01-14

## 2025-01-14 ENCOUNTER — OFFICE VISIT (OUTPATIENT)
Dept: OBGYN CLINIC | Age: 60
End: 2025-01-14
Payer: COMMERCIAL

## 2025-01-14 VITALS
HEART RATE: 77 BPM | DIASTOLIC BLOOD PRESSURE: 84 MMHG | SYSTOLIC BLOOD PRESSURE: 142 MMHG | WEIGHT: 183 LBS | HEIGHT: 64 IN | BODY MASS INDEX: 31.24 KG/M2

## 2025-01-14 DIAGNOSIS — Z12.31 ENCOUNTER FOR SCREENING MAMMOGRAM FOR BREAST CANCER: ICD-10-CM

## 2025-01-14 DIAGNOSIS — R30.0 DYSURIA: ICD-10-CM

## 2025-01-14 DIAGNOSIS — Z01.419 ENCOUNTER FOR GYNECOLOGICAL EXAMINATION: Primary | ICD-10-CM

## 2025-01-14 LAB
BACTERIA URNS QL MICRO: ABNORMAL
BILIRUB UR QL STRIP: NEGATIVE
CASTS #/AREA URNS LPF: ABNORMAL /LPF (ref 0–2)
CASTS #/AREA URNS LPF: ABNORMAL /LPF (ref 0–2)
CLARITY UR: ABNORMAL
COLOR UR: YELLOW
EPI CELLS #/AREA URNS HPF: ABNORMAL /HPF (ref 0–5)
GLUCOSE UR STRIP-MCNC: NEGATIVE MG/DL
HGB UR QL STRIP.AUTO: ABNORMAL
KETONES UR STRIP-MCNC: NEGATIVE MG/DL
LEUKOCYTE ESTERASE UR QL STRIP: ABNORMAL
NITRITE UR QL STRIP: NEGATIVE
PH UR STRIP: 8 [PH] (ref 5–8)
PROT UR STRIP-MCNC: NEGATIVE MG/DL
RBC #/AREA URNS HPF: ABNORMAL /HPF (ref 0–2)
SP GR UR STRIP: 1.01 (ref 1–1.03)
UROBILINOGEN UR STRIP-ACNC: NORMAL EU/DL (ref 0–1)
WBC #/AREA URNS HPF: ABNORMAL /HPF (ref 0–5)

## 2025-01-14 PROCEDURE — 99396 PREV VISIT EST AGE 40-64: CPT | Performed by: OBSTETRICS & GYNECOLOGY

## 2025-01-14 PROCEDURE — 3079F DIAST BP 80-89 MM HG: CPT | Performed by: OBSTETRICS & GYNECOLOGY

## 2025-01-14 PROCEDURE — 99459 PELVIC EXAMINATION: CPT | Performed by: OBSTETRICS & GYNECOLOGY

## 2025-01-14 PROCEDURE — 3077F SYST BP >= 140 MM HG: CPT | Performed by: OBSTETRICS & GYNECOLOGY

## 2025-01-14 ASSESSMENT — ENCOUNTER SYMPTOMS
COUGH: 0
ABDOMINAL PAIN: 0
BACK PAIN: 0
SHORTNESS OF BREATH: 0

## 2025-01-15 RX ORDER — SULFAMETHOXAZOLE AND TRIMETHOPRIM 800; 160 MG/1; MG/1
1 TABLET ORAL 2 TIMES DAILY
Qty: 6 TABLET | Refills: 0 | Status: SHIPPED | OUTPATIENT
Start: 2025-01-15 | End: 2025-01-18

## 2025-01-16 LAB
MICROORGANISM SPEC CULT: ABNORMAL
SERVICE CMNT-IMP: ABNORMAL
SPECIMEN DESCRIPTION: ABNORMAL

## 2025-01-17 ENCOUNTER — OFFICE VISIT (OUTPATIENT)
Dept: FAMILY MEDICINE CLINIC | Age: 60
End: 2025-01-17
Payer: COMMERCIAL

## 2025-01-17 VITALS
OXYGEN SATURATION: 98 % | SYSTOLIC BLOOD PRESSURE: 122 MMHG | DIASTOLIC BLOOD PRESSURE: 76 MMHG | BODY MASS INDEX: 31.41 KG/M2 | WEIGHT: 183 LBS | HEART RATE: 74 BPM

## 2025-01-17 DIAGNOSIS — S46.812A STRAIN OF LEFT SUBSCAPULARIS MUSCLE, INITIAL ENCOUNTER: Primary | ICD-10-CM

## 2025-01-17 DIAGNOSIS — M54.9 UPPER BACK PAIN: ICD-10-CM

## 2025-01-17 PROCEDURE — 99213 OFFICE O/P EST LOW 20 MIN: CPT | Performed by: FAMILY MEDICINE

## 2025-01-17 PROCEDURE — 3074F SYST BP LT 130 MM HG: CPT | Performed by: FAMILY MEDICINE

## 2025-01-17 PROCEDURE — 3078F DIAST BP <80 MM HG: CPT | Performed by: FAMILY MEDICINE

## 2025-01-17 PROCEDURE — 96372 THER/PROPH/DIAG INJ SC/IM: CPT | Performed by: FAMILY MEDICINE

## 2025-01-17 RX ORDER — METHYLPREDNISOLONE 4 MG/1
TABLET ORAL
Qty: 1 KIT | Refills: 0 | Status: SHIPPED | OUTPATIENT
Start: 2025-01-17 | End: 2025-01-23

## 2025-01-17 RX ORDER — KETOROLAC TROMETHAMINE 30 MG/ML
60 INJECTION, SOLUTION INTRAMUSCULAR; INTRAVENOUS ONCE
Status: COMPLETED | OUTPATIENT
Start: 2025-01-17 | End: 2025-01-17

## 2025-01-17 RX ORDER — NAPROXEN 500 MG/1
500 TABLET ORAL 2 TIMES DAILY WITH MEALS
Qty: 60 TABLET | Refills: 3 | Status: SHIPPED | OUTPATIENT
Start: 2025-01-17

## 2025-01-17 RX ORDER — CYCLOBENZAPRINE HCL 10 MG
10 TABLET ORAL NIGHTLY PRN
Qty: 15 TABLET | Refills: 0 | Status: SHIPPED | OUTPATIENT
Start: 2025-01-17 | End: 2025-02-01

## 2025-01-17 RX ADMIN — KETOROLAC TROMETHAMINE 60 MG: 30 INJECTION, SOLUTION INTRAMUSCULAR; INTRAVENOUS at 08:16

## 2025-01-17 ASSESSMENT — ENCOUNTER SYMPTOMS
GASTROINTESTINAL NEGATIVE: 1
RESPIRATORY NEGATIVE: 1
EYES NEGATIVE: 1
ALLERGIC/IMMUNOLOGIC NEGATIVE: 1

## 2025-01-17 NOTE — PROGRESS NOTES
HENT:      Head: Normocephalic and atraumatic.      Right Ear: Tympanic membrane, ear canal and external ear normal.      Left Ear: Tympanic membrane, ear canal and external ear normal.      Nose: Nose normal. No congestion or rhinorrhea.      Mouth/Throat:      Mouth: Mucous membranes are moist.      Pharynx: Oropharynx is clear. No oropharyngeal exudate or posterior oropharyngeal erythema.   Eyes:      General: No scleral icterus.     Extraocular Movements: Extraocular movements intact.      Conjunctiva/sclera: Conjunctivae normal.      Pupils: Pupils are equal, round, and reactive to light.   Neck:      Vascular: No carotid bruit.   Cardiovascular:      Rate and Rhythm: Normal rate and regular rhythm.      Pulses: Normal pulses.      Heart sounds: Normal heart sounds. No murmur heard.  Pulmonary:      Effort: Pulmonary effort is normal.      Breath sounds: Normal breath sounds. No wheezing, rhonchi or rales.   Abdominal:      General: Bowel sounds are normal. There is no distension.      Palpations: Abdomen is soft.      Tenderness: There is no abdominal tenderness. There is no guarding or rebound.   Musculoskeletal:         General: Tenderness present. Normal range of motion.      Cervical back: Normal range of motion and neck supple. No tenderness.      Comments: Issue is left shoulder blade area.  + tightness/spasm   Lymphadenopathy:      Cervical: No cervical adenopathy.   Skin:     General: Skin is warm and dry.      Capillary Refill: Capillary refill takes less than 2 seconds.      Findings: No rash.   Neurological:      General: No focal deficit present.      Mental Status: She is alert and oriented to person, place, and time.      Cranial Nerves: No cranial nerve deficit.      Motor: No weakness.      Coordination: Coordination normal.      Gait: Gait normal.   Psychiatric:         Mood and Affect: Mood normal.         Behavior: Behavior normal.         ASSESSMENT/PLAN:       1. Strain of left

## 2025-01-29 ENCOUNTER — HOSPITAL ENCOUNTER (OUTPATIENT)
Dept: MAMMOGRAPHY | Age: 60
Discharge: HOME | End: 2025-01-31
Payer: COMMERCIAL

## 2025-01-29 VITALS — BODY MASS INDEX: 31.24 KG/M2 | HEIGHT: 64 IN | WEIGHT: 183 LBS

## 2025-01-29 DIAGNOSIS — Z12.31 ENCOUNTER FOR SCREENING MAMMOGRAM FOR BREAST CANCER: ICD-10-CM

## 2025-01-29 PROCEDURE — 77063 BREAST TOMOSYNTHESIS BI: CPT

## 2025-02-18 DIAGNOSIS — M54.9 UPPER BACK PAIN: ICD-10-CM

## 2025-02-18 DIAGNOSIS — S46.812A STRAIN OF LEFT SUBSCAPULARIS MUSCLE, INITIAL ENCOUNTER: ICD-10-CM

## 2025-02-19 RX ORDER — CYCLOBENZAPRINE HCL 10 MG
10 TABLET ORAL NIGHTLY PRN
Qty: 15 TABLET | Refills: 0 | Status: SHIPPED | OUTPATIENT
Start: 2025-02-19 | End: 2025-03-06

## 2025-02-19 NOTE — TELEPHONE ENCOUNTER
Last Visit:  1/17/2025     Next Visit Date:  Future Appointments   Date Time Provider Department Center   5/13/2025 11:30 AM [facility]   5/27/2025 11:15 AM Paulie Castro MD [facility]   7/21/2025  1:45 PM Gretta De Jesus APRN - CNP [facility]   1/19/2026 11:30 AM Malina Dyson MD [facility]

## 2025-04-25 ENCOUNTER — APPOINTMENT (OUTPATIENT)
Dept: GENERAL RADIOLOGY | Age: 60
End: 2025-04-25
Payer: COMMERCIAL

## 2025-04-25 ENCOUNTER — HOSPITAL ENCOUNTER (EMERGENCY)
Age: 60
Discharge: HOME | End: 2025-04-25
Attending: EMERGENCY MEDICINE
Payer: COMMERCIAL

## 2025-04-25 VITALS
DIASTOLIC BLOOD PRESSURE: 71 MMHG | BODY MASS INDEX: 30.73 KG/M2 | HEART RATE: 75 BPM | OXYGEN SATURATION: 95 % | WEIGHT: 180 LBS | RESPIRATION RATE: 21 BRPM | SYSTOLIC BLOOD PRESSURE: 124 MMHG | HEIGHT: 64 IN | TEMPERATURE: 97.5 F

## 2025-04-25 DIAGNOSIS — R07.9 CHEST PAIN, UNSPECIFIED TYPE: Primary | ICD-10-CM

## 2025-04-25 LAB
ANION GAP SERPL CALCULATED.3IONS-SCNC: 9 MMOL/L (ref 9–16)
BASOPHILS # BLD: 0.05 K/UL (ref 0–0.2)
BASOPHILS NFR BLD: 1 % (ref 0–2)
BUN SERPL-MCNC: 12 MG/DL (ref 8–23)
CALCIUM SERPL-MCNC: 9.3 MG/DL (ref 8.8–10.2)
CHLORIDE SERPL-SCNC: 106 MMOL/L (ref 98–107)
CO2 SERPL-SCNC: 25 MMOL/L (ref 20–31)
CREAT SERPL-MCNC: 0.9 MG/DL (ref 0.5–0.9)
EOSINOPHIL # BLD: 0.12 K/UL (ref 0–0.44)
EOSINOPHILS RELATIVE PERCENT: 2 % (ref 1–4)
ERYTHROCYTE [DISTWIDTH] IN BLOOD BY AUTOMATED COUNT: 12.7 % (ref 11.8–14.4)
GFR, ESTIMATED: 70 ML/MIN/1.73M2
GLUCOSE SERPL-MCNC: 96 MG/DL (ref 82–115)
HCT VFR BLD AUTO: 39.8 % (ref 36.3–47.1)
HGB BLD-MCNC: 14 G/DL (ref 11.9–15.1)
IMM GRANULOCYTES # BLD AUTO: 0 K/UL (ref 0–0.3)
IMM GRANULOCYTES NFR BLD: 0 %
LYMPHOCYTES NFR BLD: 2.1 K/UL (ref 1.1–3.7)
LYMPHOCYTES RELATIVE PERCENT: 36 % (ref 24–43)
MCH RBC QN AUTO: 29 PG (ref 25.2–33.5)
MCHC RBC AUTO-ENTMCNC: 35.2 G/DL (ref 28.4–34.8)
MCV RBC AUTO: 82.6 FL (ref 82.6–102.9)
MONOCYTES NFR BLD: 0.48 K/UL (ref 0.1–1.2)
MONOCYTES NFR BLD: 8 % (ref 3–12)
NEUTROPHILS NFR BLD: 53 % (ref 36–65)
NEUTS SEG NFR BLD: 3.03 K/UL (ref 1.5–8.1)
NRBC BLD-RTO: 0 PER 100 WBC
PLATELET # BLD AUTO: 216 K/UL (ref 138–453)
PMV BLD AUTO: 10.3 FL (ref 8.1–13.5)
POTASSIUM SERPL-SCNC: 3.8 MMOL/L (ref 3.7–5.3)
RBC # BLD AUTO: 4.82 M/UL (ref 3.95–5.11)
SODIUM SERPL-SCNC: 141 MMOL/L (ref 136–145)
TROPONIN I SERPL HS-MCNC: <6 NG/L (ref 0–14)
TROPONIN I SERPL HS-MCNC: <6 NG/L (ref 0–14)
WBC OTHER # BLD: 5.8 K/UL (ref 3.5–11.3)

## 2025-04-25 PROCEDURE — 36415 COLL VENOUS BLD VENIPUNCTURE: CPT

## 2025-04-25 PROCEDURE — 85025 COMPLETE CBC W/AUTO DIFF WBC: CPT

## 2025-04-25 PROCEDURE — 80048 BASIC METABOLIC PNL TOTAL CA: CPT

## 2025-04-25 PROCEDURE — 71045 X-RAY EXAM CHEST 1 VIEW: CPT

## 2025-04-25 PROCEDURE — 6370000000 HC RX 637 (ALT 250 FOR IP): Performed by: EMERGENCY MEDICINE

## 2025-04-25 PROCEDURE — 84484 ASSAY OF TROPONIN QUANT: CPT

## 2025-04-25 PROCEDURE — 99285 EMERGENCY DEPT VISIT HI MDM: CPT

## 2025-04-25 RX ORDER — ACETAMINOPHEN 325 MG/1
650 TABLET ORAL ONCE
Status: COMPLETED | OUTPATIENT
Start: 2025-04-25 | End: 2025-04-25

## 2025-04-25 RX ORDER — NITROGLYCERIN 0.4 MG/1
0.4 TABLET SUBLINGUAL ONCE
Status: COMPLETED | OUTPATIENT
Start: 2025-04-25 | End: 2025-04-25

## 2025-04-25 RX ORDER — ASPIRIN 81 MG/1
324 TABLET, CHEWABLE ORAL ONCE
Status: COMPLETED | OUTPATIENT
Start: 2025-04-25 | End: 2025-04-25

## 2025-04-25 RX ORDER — BUTALBITAL, ACETAMINOPHEN AND CAFFEINE 50; 325; 40 MG/1; MG/1; MG/1
1 TABLET ORAL ONCE
Status: COMPLETED | OUTPATIENT
Start: 2025-04-25 | End: 2025-04-25

## 2025-04-25 RX ORDER — NITROGLYCERIN 0.4 MG/1
0.4 TABLET SUBLINGUAL
Qty: 12 TABLET | Refills: 3 | Status: SHIPPED | OUTPATIENT
Start: 2025-04-25 | End: 2025-04-30

## 2025-04-25 RX ADMIN — BUTALBITAL, ACETAMINOPHEN, AND CAFFEINE 1 TABLET: 325; 50; 40 TABLET ORAL at 21:45

## 2025-04-25 RX ADMIN — ASPIRIN 324 MG: 81 TABLET, CHEWABLE ORAL at 19:33

## 2025-04-25 RX ADMIN — NITROGLYCERIN 0.4 MG: 0.4 TABLET SUBLINGUAL at 19:35

## 2025-04-25 RX ADMIN — ACETAMINOPHEN 650 MG: 325 TABLET, FILM COATED ORAL at 19:33

## 2025-04-25 ASSESSMENT — PAIN SCALES - GENERAL
PAINLEVEL_OUTOF10: 4
PAINLEVEL_OUTOF10: 4
PAINLEVEL_OUTOF10: 2

## 2025-04-25 ASSESSMENT — PAIN DESCRIPTION - ORIENTATION
ORIENTATION: MID
ORIENTATION: MID

## 2025-04-25 ASSESSMENT — PAIN DESCRIPTION - LOCATION
LOCATION: CHEST
LOCATION: HEAD

## 2025-04-25 ASSESSMENT — PAIN DESCRIPTION - DESCRIPTORS
DESCRIPTORS: DISCOMFORT
DESCRIPTORS: PRESSURE;POUNDING

## 2025-04-25 ASSESSMENT — ENCOUNTER SYMPTOMS: CHEST TIGHTNESS: 1

## 2025-04-25 ASSESSMENT — PAIN - FUNCTIONAL ASSESSMENT
PAIN_FUNCTIONAL_ASSESSMENT: NONE - DENIES PAIN
PAIN_FUNCTIONAL_ASSESSMENT: 0-10

## 2025-04-25 NOTE — ED PROVIDER NOTES
without complication (HCC) 2020?    Type 2 diabetes mellitus without complication, without long-term current use of insulin (HCC) 03/16/2020    Unstable angina (HCC) 02/08/2020    Wears prescription eyeglasses     Wellness examination     Dr. Kothari-PCP last seen 2/13/2020     Past Problem List  Patient Active Problem List   Diagnosis Code    Other chronic pain G89.29    Essential hypertension I10    SRI on CPAP G47.33    Mixed hyperlipidemia E78.2    S/P drug eluting coronary stent placement - Mid LAD 7/25/18-Dr. lea Z95.5    Coronary artery disease involving native coronary artery of native heart with unstable angina pectoris (HCC) I25.110    Unstable angina (HCC) I20.0    Post PTCA Z98.61    Former smoker Z87.891    Type 2 diabetes mellitus without complication, without long-term current use of insulin ([facility]) E11.9    Degeneration of lumbar intervertebral disc M51.369    DDD (degenerative disc disease), cervical M50.30    Numbness and tingling in left hand R20.0, R20.2    Sacroiliitis, not elsewhere classified M46.1    Postlaminectomy syndrome, lumbar region M96.1    Lumbosacral spondylosis without myelopathy M47.817    Chest pain R07.9    Angina at rest I20.89     SURGICAL HISTORY       Past Surgical History:   Procedure Laterality Date    ANESTHESIA NERVE BLOCK Left 12/19/2019    NERVE BLOCK (DEFINE) - # 1 L5-S1 performed by Paulie Castro MD at [facility] OR    ANESTHESIA NERVE BLOCK Bilateral 07/17/2020    NERVE BLOCK BILATERAL - B MBB # 1 L4-5, L5-S1 performed by Paulie Castro MD at [facility] OR    ANESTHESIA NERVE BLOCK Bilateral 08/14/2020    NERVE BLOCK BILATERAL - L-5, L5-S1 performed by Paulie Castro MD at [facility] OR    ARM SURGERY Left 09/19/2024    EXCISION  LEFT UPPER ARM CYST performed by Ira Mckay DO at [facility] OR    CARDIAC CATHETERIZATION  06/13/2017    CARDIAC CATHETERIZATION  03/2019    CARDIAC CATHETERIZATION  07/2018    1 stent mid LAD PT HAS XIENCE [employer]INE

## 2025-04-26 LAB
EKG ATRIAL RATE: 75 BPM
EKG P AXIS: 62 DEGREES
EKG P-R INTERVAL: 164 MS
EKG Q-T INTERVAL: 358 MS
EKG QRS DURATION: 84 MS
EKG QTC CALCULATION (BAZETT): 399 MS
EKG R AXIS: 38 DEGREES
EKG T AXIS: 124 DEGREES
EKG VENTRICULAR RATE: 75 BPM

## 2025-04-26 NOTE — DISCHARGE INSTRUCTIONS
I do highly recommend follow-up with your cardiologist next week in the office especially if you continue to have issues.  If you are having persistent chest pain and requiring nitro frequently I would return to the emergency room.  You may use nitro up to 3 times a day.

## 2025-05-09 DIAGNOSIS — R30.0 DYSURIA: Primary | ICD-10-CM

## 2025-05-09 DIAGNOSIS — R82.90 BAD ODOR OF URINE: ICD-10-CM

## 2025-05-10 ENCOUNTER — HOSPITAL ENCOUNTER (OUTPATIENT)
Age: 60
Discharge: HOME | End: 2025-05-10
Payer: COMMERCIAL

## 2025-05-10 DIAGNOSIS — R30.0 DYSURIA: ICD-10-CM

## 2025-05-10 DIAGNOSIS — R82.90 BAD ODOR OF URINE: ICD-10-CM

## 2025-05-10 LAB
BACTERIA URNS QL MICRO: NORMAL
BILIRUB UR QL STRIP: NEGATIVE
CASTS #/AREA URNS LPF: NORMAL /LPF (ref 0–8)
CLARITY UR: CLEAR
COLOR UR: YELLOW
EPI CELLS #/AREA URNS HPF: NORMAL /HPF (ref 0–5)
GLUCOSE UR STRIP-MCNC: ABNORMAL MG/DL
HGB UR QL STRIP.AUTO: NEGATIVE
KETONES UR STRIP-MCNC: NEGATIVE MG/DL
LEUKOCYTE ESTERASE UR QL STRIP: NEGATIVE
NITRITE UR QL STRIP: NEGATIVE
PH UR STRIP: 6 [PH] (ref 5–8)
PROT UR STRIP-MCNC: ABNORMAL MG/DL
RBC #/AREA URNS HPF: NORMAL /HPF (ref 0–4)
SP GR UR STRIP: 1.02 (ref 1–1.03)
UROBILINOGEN UR STRIP-ACNC: NORMAL EU/DL (ref 0–1)
WBC #/AREA URNS HPF: NORMAL /HPF (ref 0–5)

## 2025-05-10 PROCEDURE — 81001 URINALYSIS AUTO W/SCOPE: CPT

## 2025-05-11 ENCOUNTER — RESULTS FOLLOW-UP (OUTPATIENT)
Dept: OBGYN CLINIC | Age: 60
End: 2025-05-11

## 2025-05-29 ENCOUNTER — OFFICE VISIT (OUTPATIENT)
Dept: PAIN MANAGEMENT | Age: 60
End: 2025-05-29
Payer: COMMERCIAL

## 2025-05-29 VITALS — BODY MASS INDEX: 30.73 KG/M2 | WEIGHT: 180 LBS | HEIGHT: 64 IN

## 2025-05-29 DIAGNOSIS — G89.29 OTHER CHRONIC PAIN: ICD-10-CM

## 2025-05-29 DIAGNOSIS — M96.1 POSTLAMINECTOMY SYNDROME, LUMBAR REGION: ICD-10-CM

## 2025-05-29 DIAGNOSIS — M47.817 LUMBOSACRAL SPONDYLOSIS WITHOUT MYELOPATHY: ICD-10-CM

## 2025-05-29 PROCEDURE — 99213 OFFICE O/P EST LOW 20 MIN: CPT | Performed by: PAIN MEDICINE

## 2025-05-29 RX ORDER — PREGABALIN 75 MG/1
75 CAPSULE ORAL 2 TIMES DAILY
Qty: 60 CAPSULE | Refills: 2 | Status: SHIPPED | OUTPATIENT
Start: 2025-05-29 | End: 2025-08-27

## 2025-05-29 ASSESSMENT — ENCOUNTER SYMPTOMS: BACK PAIN: 1

## 2025-05-29 NOTE — PROGRESS NOTES
HPI:     Back Pain  This is a chronic problem. The current episode started more than 1 year ago. The problem occurs constantly. The problem is unchanged. The pain is present in the lumbar spine. The quality of the pain is described as aching. The pain does not radiate. The pain is The same all the time. The symptoms are aggravated by twisting, position, bending, lying down, sitting, standing, stress and coughing. Stiffness is present All day. She has tried home exercises, walking, chiropractic manipulation, heat, muscle relaxant, NSAIDs, analgesics and bed rest for the symptoms.     History of chronic back pain.  History of previous back surgery.  Did see a surgeon in December 2023 and they did discuss potential surgery however she elected not to go that route.  She is taking Lyrica 75 mg twice a day with good benefit.  Has done physical therapy in the past.  Has had injections with varying levels of benefit    Pain ranges from a 3/10 to a 7/10 depending on activity.    Patient denies any new neurological symptoms. No bowel or bladder incontinence, no weakness, and no falling.    Review of [place] does not show any aberrant prescription behavior. Medication is helping the patient stay active. Patient denies any side effects and reports adequate analgesia. No sign of misuse/abuse.    Past Medical History:   Diagnosis Date    Abnormal stress test     Acne rosacea, erythematous telangiectatic type 01/19/2021    Allergic rhinitis     Arthritis     CAD (coronary artery disease)     Dr. Hayden last seen 2/20/2020    Carpal tunnel syndrome, left 12/27/2022    Chronic back pain     Coronary artery disease involving native coronary artery of native heart with unstable angina pectoris (HCC) 07/25/2018    Coronary stent occlusion 01/15/2021    DDD (degenerative disc disease), cervical 02/15/2023    DDD (degenerative disc disease), lumbar 10/06/2022    Diabetes mellitus (HCC)     Essential hypertension     Former smoker 02/07/2020

## 2025-05-30 ENCOUNTER — OFFICE VISIT (OUTPATIENT)
Dept: FAMILY MEDICINE CLINIC | Age: 60
End: 2025-05-30

## 2025-05-30 VITALS
WEIGHT: 188 LBS | DIASTOLIC BLOOD PRESSURE: 88 MMHG | OXYGEN SATURATION: 98 % | SYSTOLIC BLOOD PRESSURE: 138 MMHG | BODY MASS INDEX: 32.27 KG/M2 | HEART RATE: 77 BPM

## 2025-05-30 DIAGNOSIS — I10 ESSENTIAL HYPERTENSION: ICD-10-CM

## 2025-05-30 DIAGNOSIS — Z87.891 PERSONAL HISTORY OF TOBACCO USE: ICD-10-CM

## 2025-05-30 DIAGNOSIS — G47.33 OSA ON CPAP: ICD-10-CM

## 2025-05-30 DIAGNOSIS — E11.9 ENCOUNTER FOR DIABETIC FOOT EXAM (HCC): ICD-10-CM

## 2025-05-30 DIAGNOSIS — E11.9 TYPE 2 DIABETES MELLITUS WITHOUT COMPLICATION, WITHOUT LONG-TERM CURRENT USE OF INSULIN (HCC): ICD-10-CM

## 2025-05-30 DIAGNOSIS — E66.09 CLASS 1 OBESITY DUE TO EXCESS CALORIES WITH SERIOUS COMORBIDITY AND BODY MASS INDEX (BMI) OF 31.0 TO 31.9 IN ADULT: ICD-10-CM

## 2025-05-30 DIAGNOSIS — E66.811 CLASS 1 OBESITY DUE TO EXCESS CALORIES WITH SERIOUS COMORBIDITY AND BODY MASS INDEX (BMI) OF 31.0 TO 31.9 IN ADULT: ICD-10-CM

## 2025-05-30 DIAGNOSIS — I25.110 CORONARY ARTERY DISEASE INVOLVING NATIVE CORONARY ARTERY OF NATIVE HEART WITH UNSTABLE ANGINA PECTORIS (HCC): ICD-10-CM

## 2025-05-30 DIAGNOSIS — Z76.89 ENCOUNTER TO ESTABLISH CARE: Primary | ICD-10-CM

## 2025-05-30 DIAGNOSIS — Z71.89 ENCOUNTER FOR MEDICATION REVIEW AND COUNSELING: ICD-10-CM

## 2025-05-30 ASSESSMENT — ENCOUNTER SYMPTOMS
ALLERGIC/IMMUNOLOGIC NEGATIVE: 1
RESPIRATORY NEGATIVE: 1
GASTROINTESTINAL NEGATIVE: 1
EYES NEGATIVE: 1

## 2025-05-30 NOTE — PROGRESS NOTES
[facility]     Date of Visit:  2025  Patient Name: Beata Mohan   Patient :  1965     CHIEF COMPLAINT:     Beata Mohan is a 60 y.o. female who presents today for an general visit to be evaluated for the following condition(s):    Chief Complaint   Patient presents with    Follow-up     Here for 6 month follow up visit from establishment visit here on 2025.  Please reference that notation.       HISTORY OF PRESENT ILLNESS:       Follows with:  General Surgery: Ira Mckay DO  Cardiology: Lenny Hayden MD - [facility]  Pain Management: Krista Dunlap APRN - CNP - [facility]  Ortho: Anatoliy Browning MD  OB/GYN: Malina Dyson MD    Diabetes  She presents for her follow-up diabetic visit. She has type 2 diabetes mellitus. Her disease course has been stable. There are no hypoglycemic associated symptoms. Pertinent negatives for hypoglycemia include no dizziness or sweats. Associated symptoms include foot paresthesias. Pertinent negatives for diabetes include no blurred vision. Symptoms are stable. Current diabetic treatment includes oral agent (dual therapy). She is compliant with treatment most of the time. An ACE inhibitor/angiotensin II receptor blocker is being taken.     Hypertension  This is a chronic problem. The current episode started more than 1 year ago. The problem has been gradually improving since onset. The problem is controlled. Pertinent negatives include no anxiety, blurred vision, malaise/fatigue, orthopnea, palpitations, peripheral edema, PND or sweats. Past treatments include angiotensin blockers, beta blockers, diuretics and calcium channel blockers. The current treatment provides significant improvement.     Coronary Artery Disease  Presents for follow-up visit. Symptoms include chest tightness. Pertinent negatives include no chest pressure, dizziness, leg swelling, palpitations or weight gain. The symptoms have been

## 2025-06-25 RX ORDER — TIRZEPATIDE 15 MG/.5ML
INJECTION, SOLUTION SUBCUTANEOUS
Qty: 2 ML | Refills: 0 | Status: SHIPPED | OUTPATIENT
Start: 2025-06-25

## 2025-06-25 NOTE — TELEPHONE ENCOUNTER
Beata Mohan is calling to request a refill on the following medication(s):    Medication Request:  Requested Prescriptions     Pending Prescriptions Disp Refills    MOUNJARO 15 MG/0.5ML SOAJ pen [Pharmacy Med Name: Mounjaro Subcutaneous Solution Auto-injector 15 MG/0.5ML] 2 mL 0     Sig: INJECT 15MG SUBCUTANEOUSLY ONCE A WEEK       Last Visit Date (If Applicable):  10/22/2024    Next Visit Date:    12/03/2025

## 2025-06-26 ENCOUNTER — HOSPITAL ENCOUNTER (OUTPATIENT)
Dept: CT IMAGING | Age: 60
Discharge: HOME | End: 2025-06-28
Attending: FAMILY MEDICINE
Payer: COMMERCIAL

## 2025-06-26 VITALS — HEIGHT: 64 IN | WEIGHT: 188 LBS | BODY MASS INDEX: 32.1 KG/M2

## 2025-06-26 DIAGNOSIS — Z87.891 PERSONAL HISTORY OF TOBACCO USE: ICD-10-CM

## 2025-06-26 PROCEDURE — 71271 CT THORAX LUNG CANCER SCR C-: CPT

## 2025-06-29 ENCOUNTER — RESULTS FOLLOW-UP (OUTPATIENT)
Dept: FAMILY MEDICINE CLINIC | Age: 60
End: 2025-06-29

## 2025-07-13 NOTE — OP NOTE
Lumbar Radiofrequency Ablation:  SURGEON: Kraig Castro    PRE-OP DIAGNOSIS: M47.817 (lumbosacral spondylosis), M54.5 (low back pain)    POST-OP DIAGNOSIS: Same. PROCEDURE PERFORMED: Radiofrequency Ablation Medial Branch Nerve at Left L4 - 5 and L5 - S1 facet joint(s). HISTORY AND INDICATIONS: Satisfactory response with previous RFA/ medial branch blocks at the indicated levels. Recurrence of painful symptoms attributed to the above diagnosis. The planned treatment is medically necessary to relieve pain, restore function and or reduce reliance on pain medication. EBL: minimal    CONSENT: Patient has undergone the educational process with this procedure, is aware and fully understands the risks involved: potential damage to any and all body organs including possible bleeding, infection, nerve injury, paralysis, allergic reaction and headache. Patient also understands that the procedure will be undertaken in a safe, controlled and monitored setting. Patient recognizes that the benefits may include relief from pain and reduction in the oral use of medications. Patient agreed to proceed. The patient was counseled at length about the risks of mason Covid-19 during their perioperative period and any recovery window from their procedure. The patient was made aware that mason Covid-19  may worsen their prognosis for recovering from their procedure  and lend to a higher morbidity and/or mortality risk. All material risks, benefits, and reasonable alternatives including postponing the procedure were discussed. The patient does wish to proceed with the procedure at this time. MONITORS INSTITUTED INCLUDE but not limited to:   Pulse Oximetry  Non-invasive blood pressure monitoring    PROCEDURE NOTE: The patient was taken to the procedure room and placed prone with the appropriate padding and positioning to assure patient comfort and physician access to the procedure site.  Time out was performed prior to starting the procedure. Fluoroscopic evaluation was utilized to target the appropriate treatment areas. The skin was prepped with antiseptic solution and draped sterilely. Then 0.5% lidocaine was used to anesthetize the skin and subcutaneous tissue. Under fluoroscopic guidance a 22 gauge x 10cm x 10mm active tip was advanced to the medial branch nerve at the indicated levels below to innervate the following facet joints Left L4 - 5 and L5 - S1 . The needles were placed sequentially. Position confirmed radiographically with the fluoroscope. Active tip corresponding at the base of the superior articulating process and/or sacral ala for the lumbar RFA. Motor stimulation checked at 2hz up to 3V and confirmed negative for radicular stimulation. After confirmation of the needle placement the patient received 1 ml of 0.25% marcaine to provide anesthesia. Radiofrequency was delivered to the lumbar region at 80 degrees for a limit of 90 seconds in length with no ill effect. The patient tolerated the procedure well and was transported to the recovery room where the patient was monitored with no complications and with stable vital signs. Patient was discharged with appropriate written discharge instructions. Follow-up discussed.       [address] [place] [address] Stable

## 2025-08-04 RX ORDER — TIRZEPATIDE 15 MG/.5ML
INJECTION, SOLUTION SUBCUTANEOUS
Qty: 2 ML | Refills: 2 | Status: SHIPPED | OUTPATIENT
Start: 2025-08-04

## 2025-08-28 ENCOUNTER — OFFICE VISIT (OUTPATIENT)
Dept: PAIN MANAGEMENT | Age: 60
End: 2025-08-28
Payer: COMMERCIAL

## 2025-08-28 VITALS — BODY MASS INDEX: 30.56 KG/M2 | HEIGHT: 64 IN | WEIGHT: 179 LBS

## 2025-08-28 DIAGNOSIS — G89.29 CHRONIC BILATERAL THORACIC BACK PAIN: Primary | ICD-10-CM

## 2025-08-28 DIAGNOSIS — G89.29 OTHER CHRONIC PAIN: ICD-10-CM

## 2025-08-28 DIAGNOSIS — M54.6 CHRONIC BILATERAL THORACIC BACK PAIN: Primary | ICD-10-CM

## 2025-08-28 DIAGNOSIS — M96.1 POSTLAMINECTOMY SYNDROME, LUMBAR REGION: ICD-10-CM

## 2025-08-28 DIAGNOSIS — M47.817 LUMBOSACRAL SPONDYLOSIS WITHOUT MYELOPATHY: ICD-10-CM

## 2025-08-28 PROCEDURE — 99214 OFFICE O/P EST MOD 30 MIN: CPT | Performed by: NURSE PRACTITIONER

## 2025-08-28 RX ORDER — PREGABALIN 75 MG/1
75 CAPSULE ORAL 2 TIMES DAILY
Qty: 60 CAPSULE | Refills: 2 | Status: SHIPPED | OUTPATIENT
Start: 2025-08-28 | End: 2025-11-26

## 2025-08-28 RX ORDER — TIZANIDINE 2 MG/1
2 TABLET ORAL NIGHTLY PRN
Qty: 10 TABLET | Refills: 0 | Status: SHIPPED | OUTPATIENT
Start: 2025-08-28 | End: 2025-09-27

## 2025-08-28 ASSESSMENT — ENCOUNTER SYMPTOMS
COUGH: 0
BACK PAIN: 1
SHORTNESS OF BREATH: 0
CONSTIPATION: 0

## (undated) DEVICE — GLOVE SURG SZ 6 CRM LTX FREE POLYISOPRENE POLYMER BEAD ANTI

## (undated) DEVICE — MARKER,SKIN,WI/RULER AND LABELS: Brand: MEDLINE

## (undated) DEVICE — LIQUIBAND RAPID ADHESIVE 36/CS 0.8ML: Brand: MEDLINE

## (undated) DEVICE — NEEDLE SFTY RETRCT ST 25GAX1IN INTEGRA

## (undated) DEVICE — BANDAGE ADH W0.75XL3IN NAT PLAS CURAD

## (undated) DEVICE — SYRINGE, LUER LOCK, 10ML: Brand: MEDLINE

## (undated) DEVICE — GLOVE ORANGE PI 8   MSG9080

## (undated) DEVICE — MEDICINE CUP, GRADUATED, STER: Brand: MEDLINE

## (undated) DEVICE — BANDAGE,GAUZE,BULKEE II,4.5"X4.1YD,STRL: Brand: MEDLINE

## (undated) DEVICE — TRAY NRV BLK SUPP CUST

## (undated) DEVICE — TOWEL,OR,DSP,ST,NATURAL,DLX,4/PK,20PK/CS: Brand: MEDLINE

## (undated) DEVICE — DRESSING TRNSPAR W5XL4.5IN FLM SHT SEMIPERMEABLE WIND

## (undated) DEVICE — BAND COMPR L24CM REG CLR PLAS HEMSTAT EXT HK AND LOOP RETEN

## (undated) DEVICE — GLOVE SURG SZ 65 CRM LTX FREE POLYISOPRENE POLYMER BEAD ANTI

## (undated) DEVICE — NEEDLE SPNL 25GA L4 11/16IN BLU HUB DISP

## (undated) DEVICE — SUTURE MONOCRYL SZ 4-0 L27IN ABSRB UD L19MM PS-2 1/2 CIR PRIM Y426H

## (undated) DEVICE — DRESSING TRNSPAR W2XL2.75IN FLM SHT SEMIPERMEABLE WIND

## (undated) DEVICE — GLIDESHEATH SLENDER STAINLESS STEEL KIT: Brand: GLIDESHEATH SLENDER

## (undated) DEVICE — SHEET, T, LAPAROTOMY, STERILE: Brand: MEDLINE

## (undated) DEVICE — SHEET,DRAPE,40X58,STERILE: Brand: MEDLINE

## (undated) DEVICE — NEEDLE SPNL 25GA L3.5IN BLU HUB S STL REG WALL FIT STYL W/

## (undated) DEVICE — TOWEL,OR,DSP,ST,BLUE,DLX,XR,4/PK,20PK/CS: Brand: MEDLINE

## (undated) DEVICE — ZINACTIVE USE 2537982 PAD GRND FOR RF PAIN MGMT DISP

## (undated) DEVICE — TOWEL,OR,DSP,ST,BLUE,STD,6/PK,12PK/CS: Brand: MEDLINE

## (undated) DEVICE — CATHETER ANGIO 6FR L100CM DIA0.056IN FR4 CRV VASC ACCS EXPO

## (undated) DEVICE — BLADE ES ELASTOMERIC COAT INSUL DURABLE BEND UPTO 90DEG

## (undated) DEVICE — FORCEPS BX L240CM JAW DIA2.2MM RAD JAW 4 HOT DISP

## (undated) DEVICE — APPLICATOR MEDICATED 26 CC SOLUTION HI LT ORNG CHLORAPREP

## (undated) DEVICE — NEEDLE FLTR 18GA L1.5IN MEM THK5UM BLNT DISP

## (undated) DEVICE — SKIN PREP TRAY W/CHG: Brand: MEDLINE INDUSTRIES, INC.

## (undated) DEVICE — CANNULA NSL AD L7FT O2 PRSS CRUSH RESIST TBNG M CONN AIRLFE

## (undated) DEVICE — MASTISOL ADHESIVE LIQ 2/3ML

## (undated) DEVICE — CURVED SHARP RF CANNULA, RADIOPAQUE MARKER: Brand: RADIOPAQUE RADIOFREQUENCY CANNULA

## (undated) DEVICE — Z DISCONTINUED APPLICATOR SURG PREP 0.35OZ 2% CHG 70% ISO ALC W/ HI LT

## (undated) DEVICE — STAZ MINOR BASIN: Brand: MEDLINE INDUSTRIES, INC.

## (undated) DEVICE — Z DISCONTINUED USE 2220190 SUTURE VICRYL SZ 3-0 L27IN ABSRB UD L26MM SH 1/2 CIR J416H

## (undated) DEVICE — Device: Brand: DEFENDO VALVE AND CONNECTOR KIT

## (undated) DEVICE — Device

## (undated) DEVICE — GOWN,SIRUS,NON REINFRCD,LARGE,SET IN SL: Brand: MEDLINE

## (undated) DEVICE — ANGIOGRAPHIC CATHETER: Brand: EXPO™

## (undated) DEVICE — SUTURE VICRYL + SZ 2-0 L27IN ABSRB WHT SH 1/2 CIR TAPERCUT VCP417H

## (undated) DEVICE — TUBING, SUCTION, 1/4" X 12', STRAIGHT: Brand: MEDLINE

## (undated) DEVICE — GLOVE SURG SZ 8 CRM LTX FREE POLYISOPRENE POLYMER BEAD ANTI

## (undated) DEVICE — PLATE ES AD W 9FT CRD 2

## (undated) DEVICE — NEEDLE HYPO 25GA L1.5IN BLU POLYPR HUB S STL REG BVL STR

## (undated) DEVICE — TRAY SURG CUST CRD CATH TOLEDO

## (undated) DEVICE — NEEDLE SPNL 22GA L3.5IN BLK HUB S STL REG WALL FIT STYL W/

## (undated) DEVICE — GUIDEWIRE 35/260/FC/PTFE/3J: Brand: GUIDEWIRE

## (undated) DEVICE — DEVICE COMPR LNG 27 CM VASC BND